# Patient Record
Sex: FEMALE | Race: WHITE | Employment: OTHER | URBAN - METROPOLITAN AREA
[De-identification: names, ages, dates, MRNs, and addresses within clinical notes are randomized per-mention and may not be internally consistent; named-entity substitution may affect disease eponyms.]

---

## 2017-01-13 ENCOUNTER — OFFICE VISIT (OUTPATIENT)
Dept: PAIN MANAGEMENT | Age: 82
End: 2017-01-13

## 2017-01-13 VITALS
SYSTOLIC BLOOD PRESSURE: 96 MMHG | DIASTOLIC BLOOD PRESSURE: 51 MMHG | HEIGHT: 65 IN | BODY MASS INDEX: 22.33 KG/M2 | HEART RATE: 68 BPM | WEIGHT: 134 LBS

## 2017-01-13 DIAGNOSIS — M47.816 LUMBAR FACET ARTHROPATHY: ICD-10-CM

## 2017-01-13 DIAGNOSIS — G89.4 CHRONIC PAIN SYNDROME: Primary | ICD-10-CM

## 2017-01-13 DIAGNOSIS — K59.03 CONSTIPATION DUE TO PAIN MEDICATION: ICD-10-CM

## 2017-01-13 DIAGNOSIS — G89.29 INSOMNIA SECONDARY TO CHRONIC PAIN: ICD-10-CM

## 2017-01-13 DIAGNOSIS — M43.16 SPONDYLOLISTHESIS OF LUMBAR REGION: ICD-10-CM

## 2017-01-13 DIAGNOSIS — M51.37 DEGENERATION OF LUMBAR OR LUMBOSACRAL INTERVERTEBRAL DISC: ICD-10-CM

## 2017-01-13 DIAGNOSIS — Z79.899 ENCOUNTER FOR LONG-TERM (CURRENT) USE OF HIGH-RISK MEDICATION: ICD-10-CM

## 2017-01-13 DIAGNOSIS — M51.36 DDD (DEGENERATIVE DISC DISEASE), LUMBAR: ICD-10-CM

## 2017-01-13 DIAGNOSIS — G47.01 INSOMNIA SECONDARY TO CHRONIC PAIN: ICD-10-CM

## 2017-01-13 RX ORDER — HYDROMORPHONE HYDROCHLORIDE 4 MG/1
TABLET ORAL
Qty: 90 TAB | Refills: 0 | Status: SHIPPED | OUTPATIENT
Start: 2017-01-13 | End: 2017-01-13 | Stop reason: SDUPTHER

## 2017-01-13 RX ORDER — HYDROMORPHONE HYDROCHLORIDE 4 MG/1
TABLET ORAL
Qty: 90 TAB | Refills: 0 | Status: ON HOLD | OUTPATIENT
Start: 2017-01-13 | End: 2017-04-21 | Stop reason: SDUPTHER

## 2017-01-13 NOTE — PATIENT INSTRUCTIONS
1.  Continue medications as prescribed  2. Follow up in two months  3.   Please r/s bone density (ordered several months ago)

## 2017-01-13 NOTE — PROGRESS NOTES
HISTORY OF PRESENT ILLNESS  Cesar Noe is a 80 y.o. female. HPI  The patient presents today for follow up of chronic  the lumbar spine and lower extremities distally attributed to lumbar spondylosis, degenerative disc disease, radiculopathy, and diabetic peripheral neuropathy. The patient denies any significant changes since last f/u. She tolerates medications without side effects. Cesar Noe reports no change in sleep or constipation. She takes trazodone for sleep at night. No cpap. She is not currently taking anything for constipation. The patient reports 50% relief with current medications. She continues to have significant low back pain that is achy and throbbing. She describes her pain in her legs as constant and cramping. Nothing in particular makes the leg pain worse or better. She did not have bone density done which was ordered several month ago. She is scheduled to see Dr. Tricia Menard soon. She is tolerating the dose of dilaudid and has been managing her pain. She reports the increase has been beneficial at tolerating her pain level. UDS 10/20/16 reviewed and consistent      Review of Systems   Constitutional: Positive for malaise/fatigue. Negative for chills, fever and weight loss (gain). HENT: Positive for congestion. Eyes: Negative. Reading glasses  Vision loss left eye (sees Dr. Grey Sommer q 3 months). Respiratory: Positive for shortness of breath (occasional/has seen pcp-has nebulizer machine prn ). Negative for cough (smoker). Cardiovascular: Negative for chest pain. Gastrointestinal: Positive for constipation. Negative for diarrhea, heartburn, nausea and vomiting. Genitourinary: Positive for dysuria (frequent UTI's  sees urology Dr. Marlene Lowery). Musculoskeletal: Positive for back pain, falls (risk/no new falls/no ED visit), joint pain (knees) and myalgias. Negative for neck pain. Skin: Negative.     Neurological: Positive for dizziness, tingling (feet) and headaches (occasional). Negative for seizures. Balance issues-saw ENT   Endo/Heme/Allergies: Positive for environmental allergies. Psychiatric/Behavioral: Positive for depression. Negative for suicidal ideas. The patient is nervous/anxious and has insomnia (takes trazodone nightly which helps). Physical Exam   Constitutional: She is oriented to person, place, and time. She appears well-developed and well-nourished. No distress (appears uncomfortable ). HENT:   Head: Normocephalic. Right Ear: External ear normal.   Left Ear: External ear normal.   Eyes: Conjunctivae and EOM are normal. Pupils are equal, round, and reactive to light. Neck: Normal range of motion. Pulmonary/Chest: Effort normal. No respiratory distress. Musculoskeletal: She exhibits tenderness (lumbar/pain radiating down legs). She exhibits no edema. Lumbar back: She exhibits decreased range of motion (painful), tenderness, pain and spasm. She exhibits no edema. Back:    Neurological: She is alert and oriented to person, place, and time. No cranial nerve deficit (grossly intact). Skin: Skin is warm and dry. Psychiatric: She has a normal mood and affect. Her behavior is normal. Judgment and thought content normal.    present   Nursing note and vitals reviewed. ASSESSMENT and PLAN  Encounter Diagnoses   Name Primary?  Chronic pain syndrome Yes    Encounter for long-term (current) use of high-risk medication     DDD (degenerative disc disease), lumbar     Spondylolisthesis of lumbar region     Lumbar facet arthropathy (HCC)     Degeneration of lumbar or lumbosacral intervertebral disc     Constipation due to pain medication     Insomnia secondary to chronic pain      Orders Placed This Encounter    DISCONTD: HYDROmorphone (DILAUDID) 4 mg tablet    HYDROmorphone (DILAUDID) 4 mg tablet      Patient Instructions   1. Continue medications as prescribed  2.   Follow up in two months  3. Please r/s bone density (ordered several months ago)      40 minutes spent in visit face to face with the patient.     >50% of time spent counseling and coordinating care

## 2017-01-13 NOTE — PROGRESS NOTES
Nursing Notes    Patient presents to the office today in follow-up. Patient rates her pain at 6/10 on the numerical pain scale. Reviewed medications with counts as follows:    Rx Date filled Qty Dispensed Pill Count Last Dose Short   Dilaudid 2 mg - medication not brought to the appt. Pt was counseled 12/09/16 per  90 25 per  today no                                 POC UDS was not performed in office today. Any new labs or imaging since last appointment? NO    Have you been to an emergency room (ER) or urgent care clinic since your last visit? NO            Have you been hospitalized since your last visit? NO     If yes, where, when, and reason for visit? Have you seen or consulted any other health care providers outside of the [address] [place] [address]  since your last visit? YES     If yes, where, when, and reason for visit? pcp    HM deferred to pcp.

## 2017-01-13 NOTE — MR AVS SNAPSHOT
Visit Information Date & Time Provider Department Dept. Phone Encounter #  
 1/13/2017  8:20 AM Kenzie [facility] [phone] Follow-up Instructions Return in about 2 months (around 3/13/2017). Your Appointments 1/26/2017  3:15 PM  
CONSULT with MD YORDY Pugh [facility] [address] [place] [address]) Appt Note: Schedule also a consult with Dr. Maria Luisa Dominguez for lumbar procedure options; consult; consult [address] [facility] [address]  
[phone]  [phone] 5/1/2017  9:05 AM  
LAB with IOC NURSE VISIT Internist of [facility] ([address] [place] [address]) Appt Note: PE lab fasting [address], [address] [facility] [address]  
  
   
 [address], [address]  
  
    
 5/8/2017  9:15 AM  
PHYSICAL with Cory Phan MD  
Internist of [address] [place] [address]) Appt Note: Physical  
 [address], [address] [facility] [address]  
  
   
 [address], [address]  
  
    
 6/7/2017  9:15 AM  
Office Visit with Jake Cantu MD  
[facility] [address] [place] [address]) Appt Note: checkup [address] [facility] [address] [address]  
[phone] [address] [place] [address]  
  
    
 9/6/2017 11:00 AM  
PROCEDURE with [facility] NONIMAGING  
[facility] (FAN SCHEDULING) Appt Note: leg art 1 yr [place] [address] [address]  
[phone] [address] [address]  
  
    
 9/19/2017 11:00 AM  
Follow Up with HETAL Cazares  
[facility] (FAN SCHEDULING) [address] [address]  
[phone]  
  
   
 [address] [address] Upcoming Health Maintenance Date Due  
 EYE EXAM RETINAL OR DILATED Q1 2/5/2015 GLAUCOMA SCREENING Q2Y 4/1/2016 MEDICARE YEARLY EXAM 12/8/2016 MICROALBUMIN Q1 4/5/2017 HEMOGLOBIN A1C Q6M 6/6/2017 LIPID PANEL Q1 12/6/2017 DTaP/Tdap/Td series (2 - Td) 6/11/2024 Allergies as of 1/13/2017  Review Complete On: 1/13/2017 By: HETAL Guevara Severity Noted Reaction Type Reactions Levaquin [Levofloxacin]  05/17/2011    Rash Current Immunizations  Reviewed on 12/13/2016 Name Date Influenza High Dose Vaccine PF 10/13/2016 Influenza Vaccine 10/15/2014 Influenza Vaccine (Quad) PF 12/8/2015 Influenza Vaccine Split 11/22/2011, 11/11/2010 11:46 AM  
 Pneumococcal Conjugate (PCV-13) 12/8/2015 Pneumococcal Vaccine (Unspecified Type) 9/20/2000 Tdap 6/11/2014 Not reviewed this visit You Were Diagnosed With   
  
 Codes Comments Chronic pain syndrome    -  Primary ICD-10-CM: G89.4 ICD-9-CM: 338.4 Encounter for long-term (current) use of high-risk medication     ICD-10-CM: Z79.899 ICD-9-CM: V58.69   
 DDD (degenerative disc disease), lumbar     ICD-10-CM: M51.36 
ICD-9-CM: 722.52 Spondylolisthesis of lumbar region     ICD-10-CM: M43.16 
ICD-9-CM: 738.4 Lumbar facet arthropathy (HCC)     ICD-10-CM: M12.88 ICD-9-CM: 721.3 Degeneration of lumbar or lumbosacral intervertebral disc     ICD-10-CM: M51.37 
ICD-9-CM: 722.52 Constipation due to pain medication     ICD-10-CM: K59.03 
ICD-9-CM: 564.09, E947.9 Insomnia secondary to chronic pain     ICD-10-CM: G89.29, G47.01 
ICD-9-CM: 338.29, 327.01 Vitals BP Pulse Height(growth percentile) Weight(growth percentile) BMI OB Status 96/51 68 5' 5\" (1.651 m) 134 lb (60.8 kg) 22.3 kg/m2 Hysterectomy Smoking Status Current Every Day Smoker Vitals History BMI and BSA Data  Body Mass Index Body Surface Area  
 22.3 kg/m 2 1.67 m 2  
  
  
 Preferred Pharmacy Pharmacy Name Phone [address] [facility], [address] [address] [place] [address] [address] Nyla Wellington Your Updated Medication List  
  
   
This list is accurate as of: 1/13/17  9:16 AM.  Always use your most recent med list.  
  
  
  
  
 albuterol 90 mcg/actuation inhaler Commonly known as:  PROVENTIL HFA, VENTOLIN HFA, PROAIR HFA Take 2 Puffs by inhalation every four (4) hours as needed for Wheezing. CENTRUM 0.4-162-18 mg Tab Generic drug:  WJ-YB-YZ-Fe-Min-Lycopen-Lutein Take 1 Tab by mouth daily. FISH OIL 1,000 mg Cap Generic drug:  omega-3 fatty acids-vitamin e Take 1 Cap by mouth.  
  
 gabapentin 300 mg capsule Commonly known as:  NEURONTIN Take 1 Cap by mouth three (3) times daily. HYDROmorphone 4 mg tablet Commonly known as:  DILAUDID  
1/2 to one tab up to three times per day prn severe pain due to fill 2/13/17 LORazepam 1 mg tablet Commonly known as:  ATIVAN Take  by mouth every four (4) hours as needed. LUMIGAN 0.01 % ophthalmic drops Generic drug:  bimatoprost  
  
 PARoxetine 20 mg tablet Commonly known as:  PAXIL Take 20 mg by mouth daily. pilocarpine hcl 0.25 % Drop Apply  to eye. RESTASIS 0.05 % ophthalmic emulsion Generic drug:  cycloSPORINE Administer 1 Drop to both eyes two (2) times a day. simvastatin 20 mg tablet Commonly known as:  ZOCOR Take 1 Tab by mouth nightly. tamsulosin 0.4 mg capsule Commonly known as:  FLOMAX Take 1 Cap by mouth daily. tiotropium bromide 1.25 mcg/actuation inhaler Commonly known as:  Porsha Catalina Take 2 Puffs by inhalation daily. traZODone 100 mg tablet Commonly known as:  Raquel Ammy Take 100 mg by mouth nightly. Patient takes one and half tabs at bedtime VITAMIN C 1,000 mg tablet Generic drug:  ascorbic acid (vitamin C) Take  by mouth. Prescriptions Printed Refills HYDROmorphone (DILAUDID) 4 mg tablet 0 Si/2 to one tab up to three times per day prn severe pain due to fill 17 Class: Print Follow-up Instructions Return in about 2 months (around 3/13/2017). Patient Instructions 1. Continue medications as prescribed 2. Follow up in two months 3. Please r/s bone density (ordered several months ago) Please provide this summary of care documentation to your next provider. Your primary care clinician is listed as Javier Gerardo. Edgar Enrique. If you have any questions after today's visit, please call [phone].

## 2017-02-06 ENCOUNTER — HOSPITAL ENCOUNTER (INPATIENT)
Age: 82
LOS: 3 days | Discharge: HOME HEALTH | DRG: 167 | End: 2017-02-10
Attending: EMERGENCY MEDICINE | Admitting: EMERGENCY MEDICINE
Payer: MEDICARE

## 2017-02-06 DIAGNOSIS — J44.1 COPD EXACERBATION (HCC): ICD-10-CM

## 2017-02-06 DIAGNOSIS — J18.9 HCAP (HEALTHCARE-ASSOCIATED PNEUMONIA): Primary | ICD-10-CM

## 2017-02-06 PROCEDURE — 99284 EMERGENCY DEPT VISIT MOD MDM: CPT

## 2017-02-06 PROCEDURE — 83880 ASSAY OF NATRIURETIC PEPTIDE: CPT | Performed by: EMERGENCY MEDICINE

## 2017-02-06 PROCEDURE — 96374 THER/PROPH/DIAG INJ IV PUSH: CPT

## 2017-02-06 PROCEDURE — 77030013140 HC MSK NEB VYRM -A

## 2017-02-06 PROCEDURE — 82550 ASSAY OF CK (CPK): CPT | Performed by: EMERGENCY MEDICINE

## 2017-02-06 PROCEDURE — 85025 COMPLETE CBC W/AUTO DIFF WBC: CPT | Performed by: EMERGENCY MEDICINE

## 2017-02-06 PROCEDURE — 80048 BASIC METABOLIC PNL TOTAL CA: CPT | Performed by: EMERGENCY MEDICINE

## 2017-02-06 PROCEDURE — 93005 ELECTROCARDIOGRAM TRACING: CPT

## 2017-02-06 PROCEDURE — 94640 AIRWAY INHALATION TREATMENT: CPT

## 2017-02-06 RX ORDER — ALBUTEROL SULFATE 0.83 MG/ML
2.5 SOLUTION RESPIRATORY (INHALATION)
Status: DISPENSED | OUTPATIENT
Start: 2017-02-06 | End: 2017-02-07

## 2017-02-06 RX ORDER — IPRATROPIUM BROMIDE AND ALBUTEROL SULFATE 2.5; .5 MG/3ML; MG/3ML
3 SOLUTION RESPIRATORY (INHALATION)
Status: COMPLETED | OUTPATIENT
Start: 2017-02-06 | End: 2017-02-07

## 2017-02-06 RX ORDER — AZITHROMYCIN 250 MG/1
500 TABLET, FILM COATED ORAL
Status: COMPLETED | OUTPATIENT
Start: 2017-02-06 | End: 2017-02-07

## 2017-02-06 NOTE — IP AVS SNAPSHOT
Current Discharge Medication List  
  
Take these medications at their scheduled times Dose & Instructions Dispensing Information Comments Morning Noon Evening Bedtime  
 amLODIPine 10 mg tablet Commonly known as:  Robert Dubon Your next dose is: Today, Tomorrow Other:  ____________ Dose:  10 mg Take 1 Tab by mouth daily. Quantity:  30 Tab Refills:  0  
     
   
   
   
  
 amoxicillin-clavulanate 875-125 mg per tablet Commonly known as:  AUGMENTIN Your next dose is: Today, Tomorrow Other:  ____________ Dose:  1 Tab Take 1 Tab by mouth every twelve (12) hours for 7 days. Quantity:  14 Tab Refills:  0  
     
   
   
   
  
 budesonide-formoterol 160-4.5 mcg/actuation HFA inhaler Commonly known as:  SYMBICORT Your next dose is: Today, Tomorrow Other:  ____________ Dose:  1 Puff Take 1 Puff by inhalation two (2) times a day. Quantity:  1 Inhaler Refills:  0 CENTRUM 0.4-162-18 mg Tab Generic drug:  VJ-OY-XI-Fe-Min-Lycopen-Lutein Your next dose is: Today, Tomorrow Other:  ____________ Dose:  1 Tab Take 1 Tab by mouth daily. Refills:  0  
     
   
   
   
  
 FISH -160-1,000 mg Cap Generic drug:  omega 3-dha-epa-fish oil Your next dose is: Today, Tomorrow Other:  ____________ Dose:  1000 mg Take 1,000 mg by mouth daily. Refills:  0  
     
   
   
   
  
 gabapentin 300 mg capsule Commonly known as:  NEURONTIN Your next dose is: Today, Tomorrow Other:  ____________ Dose:  300 mg Take 1 Cap by mouth three (3) times daily. Quantity:  270 Cap Refills:  1  
     
   
   
   
  
 metoprolol tartrate 25 mg tablet Commonly known as:  LOPRESSOR Your next dose is: Today, Tomorrow Other:  ____________ Dose:  25 mg Take 1 Tab by mouth two (2) times a day. Quantity:  60 Tab Refills:  0  
     
   
   
   
  
 nicotine 14 mg/24 hr patch Commonly known as:  [place] Your next dose is: Today, Tomorrow Other:  ____________ Dose:  1 Patch 1 Patch by TransDERmal route every twenty-four (24) hours for 30 days. Quantity:  30 Patch Refills:  0  
     
   
   
   
  
 potassium chloride 20 mEq tablet Commonly known as:  K-DUR, KLOR-CON Start taking on:  2/11/2017 Your next dose is: Today, Tomorrow Other:  ____________ Dose:  20 mEq Take 1 Tab by mouth daily for 3 days. Quantity:  3 Tab Refills:  0  
     
   
   
   
  
 RESTASIS 0.05 % ophthalmic emulsion Generic drug:  cycloSPORINE Your next dose is: Today, Tomorrow Other:  ____________ Dose:  1 Drop Administer 1 Drop to both eyes two (2) times a day. Refills:  0 Saccharomyces boulardii 250 mg capsule Commonly known as:  Fredy eLzama Your next dose is: Today, Tomorrow Other:  ____________ Dose:  250 mg Take 1 Cap by mouth two (2) times a day for 7 days. Quantity:  14 Cap Refills:  0  
     
   
   
   
  
 simvastatin 20 mg tablet Commonly known as:  ZOCOR Your next dose is: Today, Tomorrow Other:  ____________ Dose:  20 mg Take 1 Tab by mouth nightly. Quantity:  90 Tab Refills:  1  
     
   
   
   
  
 tamsulosin 0.4 mg capsule Commonly known as:  FLOMAX Your next dose is: Today, Tomorrow Other:  ____________ Dose:  0.4 mg Take 1 Cap by mouth daily. Quantity:  90 Cap Refills:  3  
     
   
   
   
  
 tiotropium bromide 1.25 mcg/actuation inhaler Commonly known as:  [place] Your next dose is: Today, Tomorrow Other:  ____________ Dose:  2 Puff Take 2 Puffs by inhalation daily. Quantity:  1 Inhaler Refills:  11  
     
   
   
   
  
 traZODone 100 mg tablet Commonly known as:  Gene Lang Your next dose is: Today, Tomorrow Other:  ____________ Dose:  100 mg Take 100 mg by mouth nightly. Patient takes one and half tabs at bedtime Refills:  0 Take these medications as needed Dose & Instructions Dispensing Information Comments Morning Noon Evening Bedtime  
 albuterol 90 mcg/actuation inhaler Commonly known as:  PROVENTIL HFA, VENTOLIN HFA, PROAIR HFA Your next dose is: Today, Tomorrow Other:  ____________ Dose:  2 Puff Take 2 Puffs by inhalation every four (4) hours as needed for Wheezing. Quantity:  1 Inhaler Refills:  5  
     
   
   
   
  
 albuterol-ipratropium 2.5 mg-0.5 mg/3 ml Nebu Commonly known as:  Camila Schaeffer Your next dose is: Today, Tomorrow Other:  ____________ Dose:  3 mL  
3 mL by Nebulization route every four (4) hours as needed. Quantity:  30 Nebule Refills:  0 LORazepam 1 mg tablet Commonly known as:  ATIVAN Your next dose is: Today, Tomorrow Other:  ____________ Dose:  0.5 mg Take 0.5 Tabs by mouth every eight (8) hours as needed. Max Daily Amount: 1.5 mg.  
 Quantity:  30 Tab Refills:  0 phenol throat spray 1.4 % spray Commonly known as:  Yuliya Handing Your next dose is: Today, Tomorrow Other:  ____________ Dose:  1 Spray Take 1 Spray by mouth as needed for Sore throat. Quantity:  1 Bottle Refills:  0 Take these medications as directed Dose & Instructions Dispensing Information Comments Morning Noon Evening Bedtime HYDROmorphone 4 mg tablet Commonly known as:  DILAUDID Your next dose is: Today, Tomorrow Other:  ____________  
   
   
 1/2 to one tab up to three times per day prn severe pain due to fill 2/13/17 Quantity:  90 Tab Refills:  0 LUMIGAN 0.01 % ophthalmic drops Generic drug:  bimatoprost  
   
Your next dose is: Today, Tomorrow Other:  ____________ Refills:  0  
     
   
   
   
  
 VITAMIN C 1,000 mg tablet Generic drug:  ascorbic acid (vitamin C) Your next dose is: Today, Tomorrow Other:  ____________ Take  by mouth. Refills:  0 Where to Get Your Medications These medications were sent to [address], [address] [facility] [address] [facility] [address], [place] [id number] Phone:  [phone] Saccharomyces boulardii 250 mg capsule Information about where to get these medications is not yet available ! Ask your nurse or doctor about these medications  
  albuterol-ipratropium 2.5 mg-0.5 mg/3 ml Nebu  
 amLODIPine 10 mg tablet  
 amoxicillin-clavulanate 875-125 mg per tablet  
 budesonide-formoterol 160-4.5 mcg/actuation HFA inhaler LORazepam 1 mg tablet  
 metoprolol tartrate 25 mg tablet  
 nicotine 14 mg/24 hr patch  
 phenol throat spray 1.4 % spray  
 potassium chloride 20 mEq tablet

## 2017-02-06 NOTE — IP AVS SNAPSHOT
[address] Patient: Donis Morin MRN: [id number] DJI:[id number] You are allergic to the following Allergen Reactions Levaquin (Levofloxacin) Rash Recent Documentation Height Weight Breastfeeding? BMI OB Status Smoking Status 1.651 m 54.7 kg No 20.05 kg/m2 Hysterectomy Current Every Day Smoker Unresulted Labs Order Current Status AFB CULTURE + SMEAR W/RFLX PCR AND ID Preliminary result AFB CULTURE + SMEAR W/RFLX PCR AND ID Preliminary result CULTURE, ANAEROBIC Preliminary result CULTURE, BLOOD Preliminary result CULTURE, BLOOD Preliminary result CULTURE, BLOOD Preliminary result CULTURE, FUNGUS Preliminary result CULTURE, FUNGUS Preliminary result CULTURE, TISSUE W GRAM STAIN Preliminary result Emergency Contacts Name Discharge Info Relation Home Work Mobile Brigitte Pan  Spouse [3] [phone]  
 ZARAZhou  Spouse [3] [phone] About your hospitalization You were admitted on:  February 7, 2017 You last received care in the[address] You were discharged on:  February 10, 2017 Unit phone number:  [phone] Why you were hospitalized Your primary diagnosis was:  Not on File Your diagnoses also included:  Hcap (Healthcare-Associated Pneumonia), Abnormal Ct Scan, Chest  
  
  
 
  
  
Providers Seen During Your Hospitalizations Provider Role Specialty Primary office phone Giselle Boykin MD Attending Provider Emergency Medicine [phone] Elva Perez MD Attending Provider Internal Medicine [phone] Your Primary Care Physician (PCP) Primary Care Physician Office Phone Office Fax Willard Mccrary [phone] Follow-up Information Follow up With Details Comments Contact Info Areli Jay MD On 2/13/2017 At 4:00pm [address] 200 [facility] [address] 
[phone] Helena Eguene MD  Office was closed an appointment is needed for 2 weeks office will open Monday Feb. 14, 2017 [address] [facility] [address] [address] 
[phone] [facility] On 2/15/2017 CT Scan outpatient At 12:30pm- Nothing by mouth, Take sips of water with medicine [address] [place] [address]. [address] Patient Maria L Yoder Brittany Lofton MD On 3/2/2017 At [address] [facility], [address] 200 [facility] [address] 
[phone] Your Appointments Monday February 13, 2017  4:00 PM EST TRANSITIONAL CARE MANAGEMENT with Areli Jay MD  
Internist of [facility] [address] [facility], [address] [place] [address] [facility] [address]  
[phone] Wednesday February 15, 2017  1:00 PM EST  
CT CHEST W WO CONT with [facility] CT RM 2 [facility] RAD CT [address]) [address] DIET RESTRICTIONS  Nothing to eat or drink 4 hours prior to study May have water to take meds  GENERAL INSTRUCTIONS  If you were given medications to take for a contrast allergy prior to having this study, please arrange to have someone drive you to your appointment. If you have had a creatinine level drawn within the past 30 days, please bring most recent results to your appt. This study does not require you to drink contrast prior to your study. MEDICATIONS  Bring a complete list of all medications you are currently taking to include prescriptions, over-the-counter meds, herbals, vitamins & any dietary supplements. OUTSIDE FILMS  Bring outside films, CDs, and reports related to the study with you on the day of your exam.  QUESTIONS  Notify the CT Department if you have any questions concerning your study. Idania Olea - [phone] [facility] Jey Lima - [phone] CHECK IN INSTRUCTIONS:  Check in to Patient Registration in the front of the hospital 30 minutes prior to your appointment. DR[facility] [address] [facility], [address] Thursday March 02, 2017  9:00 AM EST New Patient with Amelia Valencia MD  
[address] [facility]) [address]  
[phone] Tuesday March 07, 2017  3:15 PM EST  
CONSULT with Janet Pena MD  
[facility]) [address] [facility] [address]  
[phone] Friday March 10, 2017  8:40 AM EST Follow Up with HETAL Terry [facility] (FAN SCHEDULING) [address] [place] [id number] [phone] Current Discharge Medication List  
  
START taking these medications Dose & Instructions Dispensing Information Comments Morning Noon Evening Bedtime  
 albuterol-ipratropium 2.5 mg-0.5 mg/3 ml Nebu Commonly known as:  Ailyn Short Your next dose is: Today, Tomorrow Other:  _________ Dose:  3 mL  
3 mL by Nebulization route every four (4) hours as needed. Quantity:  30 Nebule Refills:  0  
     
   
   
   
  
 amLODIPine 10 mg tablet Commonly known as:  Love Breach Your next dose is: Today, Tomorrow Other:  _________ Dose:  10 mg Take 1 Tab by mouth daily. Quantity:  30 Tab Refills:  0  
     
   
   
   
  
 amoxicillin-clavulanate 875-125 mg per tablet Commonly known as:  AUGMENTIN Your next dose is: Today, Tomorrow Other:  _________ Dose:  1 Tab Take 1 Tab by mouth every twelve (12) hours for 7 days. Quantity:  14 Tab Refills:  0  
     
   
   
   
  
 budesonide-formoterol 160-4.5 mcg/actuation HFA inhaler Commonly known as:  SYMBICORT Your next dose is: Today, Tomorrow Other:  _________ Dose:  1 Puff Take 1 Puff by inhalation two (2) times a day. Quantity:  1 Inhaler Refills:  0  
     
   
   
   
  
 metoprolol tartrate 25 mg tablet Commonly known as:  LOPRESSOR Your next dose is: Today, Tomorrow Other:  _________ Dose:  25 mg Take 1 Tab by mouth two (2) times a day. Quantity:  60 Tab Refills:  0  
     
   
   
   
  
 nicotine 14 mg/24 hr patch Commonly known as:  Erskin Neither Your next dose is: Today, Tomorrow Other:  _________ Dose:  1 Patch 1 Patch by TransDERmal route every twenty-four (24) hours for 30 days. Quantity:  30 Patch Refills:  0 phenol throat spray 1.4 % spray Commonly known as:  [place] Carbine Your next dose is: Today, Tomorrow Other:  _________ Dose:  1 Spray Take 1 Spray by mouth as needed for Sore throat. Quantity:  1 Bottle Refills:  0  
     
   
   
   
  
 potassium chloride 20 mEq tablet Commonly known as:  K-DUR, KLOR-CON Start taking on:  2/11/2017 Your next dose is: Today, Tomorrow Other:  _________ Dose:  20 mEq Take 1 Tab by mouth daily for 3 days. Quantity:  3 Tab Refills:  0 Saccharomyces boulardii 250 mg capsule Commonly known as:  Fredy Lezama Your next dose is: Today, Tomorrow Other:  _________ Dose:  250 mg Take 1 Cap by mouth two (2) times a day for 7 days. Quantity:  14 Cap Refills:  0 CONTINUE these medications which have CHANGED Dose & Instructions Dispensing Information Comments Morning Noon Evening Bedtime LORazepam 1 mg tablet Commonly known as:  ATIVAN What changed:   
- how much to take - when to take this Your next dose is: Today, Tomorrow Other:  _________  Dose:  0.5 mg  
 Take 0.5 Tabs by mouth every eight (8) hours as needed. Max Daily Amount: 1.5 mg.  
 Quantity:  30 Tab Refills:  0 CONTINUE these medications which have NOT CHANGED Dose & Instructions Dispensing Information Comments Morning Noon Evening Bedtime  
 albuterol 90 mcg/actuation inhaler Commonly known as:  PROVENTIL HFA, VENTOLIN HFA, PROAIR HFA Your next dose is: Today, Tomorrow Other:  _________ Dose:  2 Puff Take 2 Puffs by inhalation every four (4) hours as needed for Wheezing. Quantity:  1 Inhaler Refills:  5 CENTRUM 0.4-162-18 mg Tab Generic drug:  CT-EI-IM-Fe-Min-Lycopen-Lutein Your next dose is: Today, Tomorrow Other:  _________ Dose:  1 Tab Take 1 Tab by mouth daily. Refills:  0  
     
   
   
   
  
 FISH -160-1,000 mg Cap Generic drug:  omega 3-dha-epa-fish oil Your next dose is: Today, Tomorrow Other:  _________ Dose:  1000 mg Take 1,000 mg by mouth daily. Refills:  0  
     
   
   
   
  
 gabapentin 300 mg capsule Commonly known as:  NEURONTIN Your next dose is: Today, Tomorrow Other:  _________ Dose:  300 mg Take 1 Cap by mouth three (3) times daily. Quantity:  270 Cap Refills:  1 HYDROmorphone 4 mg tablet Commonly known as:  DILAUDID Your next dose is: Today, Tomorrow Other:  _________  
   
   
 1/2 to one tab up to three times per day prn severe pain due to fill 2/13/17 Quantity:  90 Tab Refills:  0 LUMIGAN 0.01 % ophthalmic drops Generic drug:  bimatoprost  
   
Your next dose is: Today, Tomorrow Other:  _________ Refills:  0  
     
   
   
   
  
 RESTASIS 0.05 % ophthalmic emulsion Generic drug:  cycloSPORINE Your next dose is: Today, Tomorrow Other:  _________ Dose:  1 Drop Administer 1 Drop to both eyes two (2) times a day. Refills:  0  
     
   
   
   
  
 simvastatin 20 mg tablet Commonly known as:  ZOCOR Your next dose is: Today, Tomorrow Other:  _________ Dose:  20 mg Take 1 Tab by mouth nightly. Quantity:  90 Tab Refills:  1  
     
   
   
   
  
 tamsulosin 0.4 mg capsule Commonly known as:  FLOMAX Your next dose is: Today, Tomorrow Other:  _________ Dose:  0.4 mg Take 1 Cap by mouth daily. Quantity:  90 Cap Refills:  3  
     
   
   
   
  
 tiotropium bromide 1.25 mcg/actuation inhaler Commonly known as:  Gailen Salon Your next dose is: Today, Tomorrow Other:  _________ Dose:  2 Puff Take 2 Puffs by inhalation daily. Quantity:  1 Inhaler Refills:  11  
     
   
   
   
  
 traZODone 100 mg tablet Commonly known as:  Alden Ziegler Your next dose is: Today, Tomorrow Other:  _________ Dose:  100 mg Take 100 mg by mouth nightly. Patient takes one and half tabs at bedtime Refills:  0  
     
   
   
   
  
 VITAMIN C 1,000 mg tablet Generic drug:  ascorbic acid (vitamin C) Your next dose is: Today, Tomorrow Other:  _________ Take  by mouth. Refills:  0 STOP taking these medications FISH OIL 1,000 mg Cap Generic drug:  omega-3 fatty acids-vitamin e PARoxetine 20 mg tablet Commonly known as:  PAXIL  
   
  
 pilocarpine hcl 0.25 % Drop Where to Get Your Medications These medications were sent to [address] [place] [address], [address] [facility] [address] [facility] [address], [facility] [address] Phone:  [phone] Saccharomyces boulardii 250 mg capsule Information on where to get these meds will be given to you by the nurse or doctor. ! Ask your nurse or doctor about these medications albuterol-ipratropium 2.5 mg-0.5 mg/3 ml Nebu  
 amLODIPine 10 mg tablet  
 amoxicillin-clavulanate 875-125 mg per tablet  
 budesonide-formoterol 160-4.5 mcg/actuation HFA inhaler LORazepam 1 mg tablet  
 metoprolol tartrate 25 mg tablet  
 nicotine 14 mg/24 hr patch  
 phenol throat spray 1.4 % spray  
 potassium chloride 20 mEq tablet Discharge Instructions None Discharge Orders Procedure Order Date Status Priority Quantity Spec Type Associated Dx METABOLIC PANEL, BASIC 52/23/22 1429 Future Routine 1 Blood Comments:  On 2/14/17. Call report to PCP Reason: HTN, Hyponatremia DIET CARDIAC No options chosen 02/10/17 1429 Normal Routine 1 Questions: Additional options:  No options chosen CT CHEST W WO CONT 02/10/17 1432 Future Routine 1 Schedule Instructions:  Call report to [id number] Questions: Is Patient Allergic to Contrast Dye?:  Unknown General Information Please provide this summary of care documentation to your next provider. Patient Signature:  ____________________________________________________________ Date:  ____________________________________________________________  
  
[facility] Signature:  ____________________________________________________________ Date:  ____________________________________________________________

## 2017-02-07 ENCOUNTER — APPOINTMENT (OUTPATIENT)
Dept: CT IMAGING | Age: 82
DRG: 167 | End: 2017-02-07
Attending: INTERNAL MEDICINE
Payer: MEDICARE

## 2017-02-07 ENCOUNTER — APPOINTMENT (OUTPATIENT)
Dept: CT IMAGING | Age: 82
DRG: 167 | End: 2017-02-07
Attending: EMERGENCY MEDICINE
Payer: MEDICARE

## 2017-02-07 ENCOUNTER — ANESTHESIA EVENT (OUTPATIENT)
Dept: ENDOSCOPY | Age: 82
DRG: 167 | End: 2017-02-07
Payer: MEDICARE

## 2017-02-07 PROBLEM — J18.9 HCAP (HEALTHCARE-ASSOCIATED PNEUMONIA): Status: ACTIVE | Noted: 2017-02-07

## 2017-02-07 PROBLEM — R93.89 ABNORMAL CT SCAN, CHEST: Status: ACTIVE | Noted: 2017-02-07

## 2017-02-07 LAB
ANION GAP BLD CALC-SCNC: 10 MMOL/L (ref 3–18)
ARTERIAL PATENCY WRIST A: YES
ATRIAL RATE: 91 BPM
BASE EXCESS BLD CALC-SCNC: 7 MMOL/L
BASOPHILS # BLD AUTO: 0 K/UL (ref 0–0.1)
BASOPHILS # BLD: 0 % (ref 0–2)
BDY SITE: ABNORMAL
BNP SERPL-MCNC: 428 PG/ML (ref 0–1800)
BODY TEMPERATURE: 98.6
BUN SERPL-MCNC: 5 MG/DL (ref 7–18)
BUN/CREAT SERPL: 11 (ref 12–20)
CALCIUM SERPL-MCNC: 9.4 MG/DL (ref 8.5–10.1)
CALCULATED P AXIS, ECG09: 55 DEGREES
CALCULATED R AXIS, ECG10: -71 DEGREES
CALCULATED T AXIS, ECG11: 50 DEGREES
CHLORIDE SERPL-SCNC: 87 MMOL/L (ref 100–108)
CK MB CFR SERPL CALC: 2.6 % (ref 0–4)
CK MB CFR SERPL CALC: 2.9 % (ref 0–4)
CK MB SERPL-MCNC: 2.2 NG/ML (ref 0.5–3.6)
CK MB SERPL-MCNC: 3.7 NG/ML (ref 0.5–3.6)
CK SERPL-CCNC: 140 U/L (ref 26–192)
CK SERPL-CCNC: 76 U/L (ref 26–192)
CO2 SERPL-SCNC: 34 MMOL/L (ref 21–32)
CREAT SERPL-MCNC: 0.44 MG/DL (ref 0.6–1.3)
DIAGNOSIS, 93000: NORMAL
DIFFERENTIAL METHOD BLD: ABNORMAL
EOSINOPHIL # BLD: 0.1 K/UL (ref 0–0.4)
EOSINOPHIL NFR BLD: 1 % (ref 0–5)
ERYTHROCYTE [DISTWIDTH] IN BLOOD BY AUTOMATED COUNT: 13.6 % (ref 11.6–14.5)
GAS FLOW.O2 O2 DELIVERY SYS: ABNORMAL L/MIN
GLUCOSE SERPL-MCNC: 140 MG/DL (ref 74–99)
HCO3 BLD-SCNC: 31.7 MMOL/L (ref 22–26)
HCT VFR BLD AUTO: 37.4 % (ref 35–45)
HGB BLD-MCNC: 13.2 G/DL (ref 12–16)
IRON SATN MFR SERPL: 24 %
IRON SERPL-MCNC: 42 UG/DL (ref 50–175)
LYMPHOCYTES # BLD AUTO: 15 % (ref 21–52)
LYMPHOCYTES # BLD: 2 K/UL (ref 0.9–3.6)
MCH RBC QN AUTO: 31.8 PG (ref 24–34)
MCHC RBC AUTO-ENTMCNC: 35.3 G/DL (ref 31–37)
MCV RBC AUTO: 90.1 FL (ref 74–97)
MONOCYTES # BLD: 0.9 K/UL (ref 0.05–1.2)
MONOCYTES NFR BLD AUTO: 7 % (ref 3–10)
NEUTS SEG # BLD: 9.8 K/UL (ref 1.8–8)
NEUTS SEG NFR BLD AUTO: 77 % (ref 40–73)
O2/TOTAL GAS SETTING VFR VENT: 21 %
OSMOLALITY SERPL: 284 MOSM/KG H2O (ref 280–300)
P-R INTERVAL, ECG05: 120 MS
PCO2 BLD: 43.9 MMHG (ref 35–45)
PH BLD: 7.47 [PH] (ref 7.35–7.45)
PLATELET # BLD AUTO: 279 K/UL (ref 135–420)
PMV BLD AUTO: 9.9 FL (ref 9.2–11.8)
PO2 BLD: 53 MMHG (ref 80–100)
POTASSIUM SERPL-SCNC: 2.7 MMOL/L (ref 3.5–5.5)
Q-T INTERVAL, ECG07: 420 MS
QRS DURATION, ECG06: 114 MS
QTC CALCULATION (BEZET), ECG08: 516 MS
RBC # BLD AUTO: 4.15 M/UL (ref 4.2–5.3)
SAO2 % BLD: 89 % (ref 92–97)
SERVICE CMNT-IMP: ABNORMAL
SODIUM SERPL-SCNC: 131 MMOL/L (ref 136–145)
SPECIMEN TYPE: ABNORMAL
TIBC SERPL-MCNC: 174 UG/DL (ref 250–450)
TOTAL RESP. RATE, ITRR: 18
TROPONIN I SERPL-MCNC: 0.02 NG/ML (ref 0–0.04)
TROPONIN I SERPL-MCNC: 0.02 NG/ML (ref 0–0.04)
TSH SERPL DL<=0.05 MIU/L-ACNC: 0.42 UIU/ML (ref 0.36–3.74)
VENTRICULAR RATE, ECG03: 91 BPM
WBC # BLD AUTO: 12.8 K/UL (ref 4.6–13.2)

## 2017-02-07 PROCEDURE — 86038 ANTINUCLEAR ANTIBODIES: CPT | Performed by: INTERNAL MEDICINE

## 2017-02-07 PROCEDURE — 83930 ASSAY OF BLOOD OSMOLALITY: CPT | Performed by: INTERNAL MEDICINE

## 2017-02-07 PROCEDURE — 87077 CULTURE AEROBIC IDENTIFY: CPT | Performed by: EMERGENCY MEDICINE

## 2017-02-07 PROCEDURE — 86738 MYCOPLASMA ANTIBODY: CPT | Performed by: INTERNAL MEDICINE

## 2017-02-07 PROCEDURE — 74011636320 HC RX REV CODE- 636/320: Performed by: EMERGENCY MEDICINE

## 2017-02-07 PROCEDURE — 71275 CT ANGIOGRAPHY CHEST: CPT

## 2017-02-07 PROCEDURE — 74011250637 HC RX REV CODE- 250/637: Performed by: EMERGENCY MEDICINE

## 2017-02-07 PROCEDURE — 36600 WITHDRAWAL OF ARTERIAL BLOOD: CPT

## 2017-02-07 PROCEDURE — 36415 COLL VENOUS BLD VENIPUNCTURE: CPT | Performed by: INTERNAL MEDICINE

## 2017-02-07 PROCEDURE — 94640 AIRWAY INHALATION TREATMENT: CPT

## 2017-02-07 PROCEDURE — 87186 SC STD MICRODIL/AGAR DIL: CPT | Performed by: EMERGENCY MEDICINE

## 2017-02-07 PROCEDURE — 74011250637 HC RX REV CODE- 250/637: Performed by: INTERNAL MEDICINE

## 2017-02-07 PROCEDURE — 82803 BLOOD GASES ANY COMBINATION: CPT

## 2017-02-07 PROCEDURE — 93306 TTE W/DOPPLER COMPLETE: CPT

## 2017-02-07 PROCEDURE — 87040 BLOOD CULTURE FOR BACTERIA: CPT | Performed by: EMERGENCY MEDICINE

## 2017-02-07 PROCEDURE — 74011250636 HC RX REV CODE- 250/636: Performed by: INTERNAL MEDICINE

## 2017-02-07 PROCEDURE — 74176 CT ABD & PELVIS W/O CONTRAST: CPT

## 2017-02-07 PROCEDURE — 65660000000 HC RM CCU STEPDOWN

## 2017-02-07 PROCEDURE — 74011000250 HC RX REV CODE- 250: Performed by: INTERNAL MEDICINE

## 2017-02-07 PROCEDURE — 74011000258 HC RX REV CODE- 258: Performed by: EMERGENCY MEDICINE

## 2017-02-07 PROCEDURE — 92610 EVALUATE SWALLOWING FUNCTION: CPT

## 2017-02-07 PROCEDURE — 74011250636 HC RX REV CODE- 250/636: Performed by: EMERGENCY MEDICINE

## 2017-02-07 PROCEDURE — 83540 ASSAY OF IRON: CPT | Performed by: INTERNAL MEDICINE

## 2017-02-07 PROCEDURE — 86431 RHEUMATOID FACTOR QUANT: CPT | Performed by: INTERNAL MEDICINE

## 2017-02-07 PROCEDURE — 84443 ASSAY THYROID STIM HORMONE: CPT | Performed by: INTERNAL MEDICINE

## 2017-02-07 PROCEDURE — 74011000250 HC RX REV CODE- 250: Performed by: EMERGENCY MEDICINE

## 2017-02-07 PROCEDURE — 74011000258 HC RX REV CODE- 258: Performed by: INTERNAL MEDICINE

## 2017-02-07 PROCEDURE — 86430 RHEUMATOID FACTOR TEST QUAL: CPT | Performed by: INTERNAL MEDICINE

## 2017-02-07 RX ORDER — ADHESIVE BANDAGE
30 BANDAGE TOPICAL DAILY PRN
Status: DISCONTINUED | OUTPATIENT
Start: 2017-02-07 | End: 2017-02-10 | Stop reason: HOSPADM

## 2017-02-07 RX ORDER — CLONIDINE HYDROCHLORIDE 0.1 MG/1
0.1 TABLET ORAL
Status: DISCONTINUED | OUTPATIENT
Start: 2017-02-07 | End: 2017-02-10 | Stop reason: HOSPADM

## 2017-02-07 RX ORDER — SIMVASTATIN 20 MG/1
20 TABLET, FILM COATED ORAL
Status: DISCONTINUED | OUTPATIENT
Start: 2017-02-07 | End: 2017-02-10 | Stop reason: HOSPADM

## 2017-02-07 RX ORDER — HYDROMORPHONE HYDROCHLORIDE 2 MG/1
2 TABLET ORAL
Status: DISCONTINUED | OUTPATIENT
Start: 2017-02-07 | End: 2017-02-10 | Stop reason: HOSPADM

## 2017-02-07 RX ORDER — POTASSIUM CHLORIDE 20 MEQ/1
40 TABLET, EXTENDED RELEASE ORAL
Status: COMPLETED | OUTPATIENT
Start: 2017-02-07 | End: 2017-02-07

## 2017-02-07 RX ORDER — TRAZODONE HYDROCHLORIDE 50 MG/1
50 TABLET ORAL
Status: DISCONTINUED | OUTPATIENT
Start: 2017-02-07 | End: 2017-02-10 | Stop reason: HOSPADM

## 2017-02-07 RX ORDER — TAMSULOSIN HYDROCHLORIDE 0.4 MG/1
0.4 CAPSULE ORAL DAILY
Status: DISCONTINUED | OUTPATIENT
Start: 2017-02-07 | End: 2017-02-10 | Stop reason: HOSPADM

## 2017-02-07 RX ORDER — CYCLOSPORINE 0.5 MG/ML
1 EMULSION OPHTHALMIC 2 TIMES DAILY
Status: DISCONTINUED | OUTPATIENT
Start: 2017-02-07 | End: 2017-02-10 | Stop reason: HOSPADM

## 2017-02-07 RX ORDER — LORAZEPAM 0.5 MG/1
0.5 TABLET ORAL
Status: DISCONTINUED | OUTPATIENT
Start: 2017-02-07 | End: 2017-02-10 | Stop reason: HOSPADM

## 2017-02-07 RX ORDER — ACETAMINOPHEN 325 MG/1
650 TABLET ORAL
Status: DISCONTINUED | OUTPATIENT
Start: 2017-02-07 | End: 2017-02-10 | Stop reason: HOSPADM

## 2017-02-07 RX ORDER — ASCORBIC ACID 250 MG
500 TABLET ORAL DAILY
Status: DISCONTINUED | OUTPATIENT
Start: 2017-02-07 | End: 2017-02-10 | Stop reason: HOSPADM

## 2017-02-07 RX ORDER — SODIUM CHLORIDE 0.9 % (FLUSH) 0.9 %
5-10 SYRINGE (ML) INJECTION EVERY 8 HOURS
Status: DISCONTINUED | OUTPATIENT
Start: 2017-02-07 | End: 2017-02-10 | Stop reason: HOSPADM

## 2017-02-07 RX ORDER — GABAPENTIN 300 MG/1
300 CAPSULE ORAL 3 TIMES DAILY
Status: DISCONTINUED | OUTPATIENT
Start: 2017-02-07 | End: 2017-02-10 | Stop reason: HOSPADM

## 2017-02-07 RX ORDER — SODIUM CHLORIDE 0.9 % (FLUSH) 0.9 %
5-10 SYRINGE (ML) INJECTION AS NEEDED
Status: DISCONTINUED | OUTPATIENT
Start: 2017-02-07 | End: 2017-02-10 | Stop reason: HOSPADM

## 2017-02-07 RX ORDER — POTASSIUM CHLORIDE 20 MEQ/1
40 TABLET, EXTENDED RELEASE ORAL EVERY 4 HOURS
Status: COMPLETED | OUTPATIENT
Start: 2017-02-07 | End: 2017-02-07

## 2017-02-07 RX ORDER — IPRATROPIUM BROMIDE AND ALBUTEROL SULFATE 2.5; .5 MG/3ML; MG/3ML
3 SOLUTION RESPIRATORY (INHALATION)
Status: DISCONTINUED | OUTPATIENT
Start: 2017-02-07 | End: 2017-02-08

## 2017-02-07 RX ORDER — BUDESONIDE 0.5 MG/2ML
500 INHALANT ORAL
Status: DISCONTINUED | OUTPATIENT
Start: 2017-02-07 | End: 2017-02-10

## 2017-02-07 RX ORDER — HEPARIN SODIUM 5000 [USP'U]/ML
5000 INJECTION, SOLUTION INTRAVENOUS; SUBCUTANEOUS EVERY 8 HOURS
Status: DISCONTINUED | OUTPATIENT
Start: 2017-02-07 | End: 2017-02-10 | Stop reason: HOSPADM

## 2017-02-07 RX ORDER — NALOXONE HYDROCHLORIDE 0.4 MG/ML
0.4 INJECTION, SOLUTION INTRAMUSCULAR; INTRAVENOUS; SUBCUTANEOUS AS NEEDED
Status: DISCONTINUED | OUTPATIENT
Start: 2017-02-07 | End: 2017-02-10 | Stop reason: HOSPADM

## 2017-02-07 RX ORDER — METOPROLOL TARTRATE 25 MG/1
12.5 TABLET, FILM COATED ORAL 2 TIMES DAILY
Status: DISCONTINUED | OUTPATIENT
Start: 2017-02-07 | End: 2017-02-09

## 2017-02-07 RX ORDER — ONDANSETRON 2 MG/ML
4 INJECTION INTRAMUSCULAR; INTRAVENOUS
Status: DISCONTINUED | OUTPATIENT
Start: 2017-02-07 | End: 2017-02-10 | Stop reason: HOSPADM

## 2017-02-07 RX ORDER — CEFTRIAXONE 1 G/1
1 INJECTION, POWDER, FOR SOLUTION INTRAMUSCULAR; INTRAVENOUS
Status: DISCONTINUED | OUTPATIENT
Start: 2017-02-07 | End: 2017-02-07

## 2017-02-07 RX ORDER — IBUPROFEN 200 MG
1 TABLET ORAL EVERY 24 HOURS
Status: DISCONTINUED | OUTPATIENT
Start: 2017-02-07 | End: 2017-02-10 | Stop reason: HOSPADM

## 2017-02-07 RX ADMIN — METHYLPREDNISOLONE SODIUM SUCCINATE 125 MG: 125 INJECTION, POWDER, FOR SOLUTION INTRAMUSCULAR; INTRAVENOUS at 01:39

## 2017-02-07 RX ADMIN — GUAIFENESIN 600 MG: 600 TABLET, EXTENDED RELEASE ORAL at 13:20

## 2017-02-07 RX ADMIN — POTASSIUM CHLORIDE 40 MEQ: 1500 TABLET, EXTENDED RELEASE ORAL at 13:21

## 2017-02-07 RX ADMIN — CEFTRIAXONE 1 G: 1 INJECTION, POWDER, FOR SOLUTION INTRAMUSCULAR; INTRAVENOUS at 09:13

## 2017-02-07 RX ADMIN — DIATRIZOATE MEGLUMINE AND DIATRIZOATE SODIUM 30 ML: 600; 100 SOLUTION ORAL; RECTAL at 15:25

## 2017-02-07 RX ADMIN — CYCLOSPORINE 1 DROP: 0.5 EMULSION OPHTHALMIC at 18:00

## 2017-02-07 RX ADMIN — TRAZODONE HYDROCHLORIDE 50 MG: 50 TABLET ORAL at 22:14

## 2017-02-07 RX ADMIN — IPRATROPIUM BROMIDE AND ALBUTEROL SULFATE 3 ML: .5; 3 SOLUTION RESPIRATORY (INHALATION) at 16:41

## 2017-02-07 RX ADMIN — HEPARIN SODIUM 5000 UNITS: 5000 INJECTION, SOLUTION INTRAVENOUS; SUBCUTANEOUS at 20:44

## 2017-02-07 RX ADMIN — GUAIFENESIN 600 MG: 600 TABLET, EXTENDED RELEASE ORAL at 20:47

## 2017-02-07 RX ADMIN — Medication 500 MG: at 13:20

## 2017-02-07 RX ADMIN — Medication 10 ML: at 22:00

## 2017-02-07 RX ADMIN — LACTULOSE 13.33 G: 20 SOLUTION ORAL at 16:46

## 2017-02-07 RX ADMIN — IPRATROPIUM BROMIDE AND ALBUTEROL SULFATE 3 ML: .5; 3 SOLUTION RESPIRATORY (INHALATION) at 20:16

## 2017-02-07 RX ADMIN — GABAPENTIN 300 MG: 300 CAPSULE ORAL at 16:46

## 2017-02-07 RX ADMIN — AZITHROMYCIN 500 MG: 250 TABLET, FILM COATED ORAL at 01:27

## 2017-02-07 RX ADMIN — Medication 10 ML: at 14:47

## 2017-02-07 RX ADMIN — SIMVASTATIN 20 MG: 20 TABLET, FILM COATED ORAL at 22:14

## 2017-02-07 RX ADMIN — HEPARIN SODIUM 5000 UNITS: 5000 INJECTION, SOLUTION INTRAVENOUS; SUBCUTANEOUS at 13:19

## 2017-02-07 RX ADMIN — METOPROLOL TARTRATE 12.5 MG: 25 TABLET ORAL at 18:34

## 2017-02-07 RX ADMIN — METOPROLOL TARTRATE 12.5 MG: 25 TABLET ORAL at 13:20

## 2017-02-07 RX ADMIN — LACTULOSE 13.33 G: 20 SOLUTION ORAL at 22:00

## 2017-02-07 RX ADMIN — IPRATROPIUM BROMIDE AND ALBUTEROL SULFATE 3 ML: .5; 3 SOLUTION RESPIRATORY (INHALATION) at 11:58

## 2017-02-07 RX ADMIN — POTASSIUM CHLORIDE: 2 INJECTION, SOLUTION, CONCENTRATE INTRAVENOUS at 15:28

## 2017-02-07 RX ADMIN — MULTIPLE VITAMINS W/ MINERALS TAB 1 TABLET: TAB at 13:20

## 2017-02-07 RX ADMIN — IOPAMIDOL 100 ML: 755 INJECTION, SOLUTION INTRAVENOUS at 02:40

## 2017-02-07 RX ADMIN — TAMSULOSIN HYDROCHLORIDE 0.4 MG: 0.4 CAPSULE ORAL at 13:20

## 2017-02-07 RX ADMIN — IPRATROPIUM BROMIDE AND ALBUTEROL SULFATE 3 ML: .5; 3 SOLUTION RESPIRATORY (INHALATION) at 01:43

## 2017-02-07 RX ADMIN — POTASSIUM CHLORIDE 40 MEQ: 1500 TABLET, EXTENDED RELEASE ORAL at 16:46

## 2017-02-07 RX ADMIN — POTASSIUM CHLORIDE 40 MEQ: 1500 TABLET, EXTENDED RELEASE ORAL at 01:25

## 2017-02-07 RX ADMIN — POTASSIUM CHLORIDE: 2 INJECTION, SOLUTION, CONCENTRATE INTRAVENOUS at 14:45

## 2017-02-07 RX ADMIN — BIMATOPROST 1 DROP: 0.1 SOLUTION/ DROPS OPHTHALMIC at 18:34

## 2017-02-07 RX ADMIN — BUDESONIDE 500 MCG: 0.5 INHALANT RESPIRATORY (INHALATION) at 20:16

## 2017-02-07 RX ADMIN — CLONIDINE HYDROCHLORIDE 0.1 MG: 0.1 TABLET ORAL at 16:46

## 2017-02-07 RX ADMIN — POTASSIUM CHLORIDE: 2 INJECTION, SOLUTION, CONCENTRATE INTRAVENOUS at 16:48

## 2017-02-07 RX ADMIN — GABAPENTIN 300 MG: 300 CAPSULE ORAL at 22:00

## 2017-02-07 NOTE — H&P
[address] [facility]  ROUTINE H AND PS    Name:  Sourav Matta  MR#:  [id number]  :  1933  Account #:  [de-identified]  Date of Adm:  2017      PRIMARY CARE PHYSICIAN: Dr. El Hu. CHIEF COMPLAINT: Shortness of breath and dry cough. HISTORY OF PRESENT ILLNESS: This is an 80-year-old female  patient with the below-mentioned medical problem, presented to the  emergency room earlier for further evaluation of worsening shortness  of breath and dry cough for the last 3 weeks. The patient told me she  also had right-sided chest pain associated with cough and shortness of  breath earlier today when she came to the emergency room. In the  emergency room, she had a CTA chest done, which was negative for  PE, but showed multifocal and multilobar small pulmonary masses. The patient states that her appetite has been low and she has had  some night sweats. Her weight has been stable so far, though. No  headache or dizziness. Denies any runny nose or watery eyes. Dry  cough present. No sore throat. No problems swallowing. She has had  dyspnea on exertion and some orthopnea per the patient. Denies any  nausea, vomiting, or abdominal pain. However, she has history of  constipation. Denies any burning sensation while peeing. Denies any  fever or chills at this point. She has had chronic pain and tingling,  numbness in both lower extremities. REVIEW OF SYSTEMS  As described above, otherwise 10-point review of system is negative. ALLERGIES: LEVAQUIN. PAST MEDICAL HISTORY:  1. COPD. 2. Degenerative disk disease. 3. Degenerative lumbar spine disease. 4. Diabetes. 5. Dyslipidemia. 6. Hypertension, but not on medication. 7. Restless leg syndrome. 8. Hemorrhoids. 9. Sciatica. 10. History of syncope. 11. History of recurrent urinary tract infection. 12. Osteoarthritis. PAST SURGICAL HISTORY:  1. Polypectomy. 2. Appendectomy. 3. Hysterectomy. 4. Cholecystectomy.   5. Colonoscopy, last one in 2011 showed a tubular adenoma. FAMILY HISTORY: Sister had colon cancer. Brother had stomach  cancer, another brother had heart problem. SOCIAL HISTORY: She is . Smokes 1 pack a day for 45 years. Denies for any alcohol or illegal drug use. Walks with a cane. HOME MEDICATIONS: She does not remember all of the medication. Her daughter is at bedside and they say whatever we have on the  chart is what she is taking. As per the medical record, she has been  on:  1. Albuterol inhaler 2 puffs every 4 hours p.r.n. for shortness of breath. 2. Vitamin C 1000 mg daily. 3. Restasis 0.05% one drop both eyes b.i.d.  4. Neurontin 300 mg 3 times a day. 5. Dilaudid 4 mg 1/2 tablet 3 times a day as needed for pain. 6. Ativan 1 mg every 4 hours as needed. 7. Lumigan 0.01% in both eyes daily. 8. Multivitamin 1 tablet daily. 9. Fish oil 1 capsule daily. 10. Paxil 20 mg daily, but the patient denies taking it. 11. Zocor 20 mg daily. 12. Flomax 0.4 mg p.o. daily. 13. Spiriva 1 capsule inhaler daily. 14. Trazodone 100 mg p.o. daily. PHYSICAL EXAMINATION  GENERAL: The patient is awake, alert, oriented x3, not in acute  distress. VITAL SIGNS: Temperature 97.8, pulse rate 97, respiratory rate 16,  blood pressure 185/83, saturating 92% on room air. HEENT: Atraumatic, normocephalic. Oral mucosa moist. No pallor. NECK: No JVD. Trachea is midline. CHEST: No chest tenderness. LUNGS: Diminished breath sounds bilaterally as well as rhonchi  present, no wheezes. CARDIOVASCULAR: S1, S2 heard. ABDOMEN: Soft, nontender. Bowel sounds present. EXTREMITIES: No pitting pedal edema. NEUROLOGIC: He moves all 4 extremities very well. SKIN: No active skin rash or decubitus ulcer. NEUROLOGICAL: Follows commands appropriately. Moves all  extremities very well. LABORATORY DATA: WBC 12.8, hemoglobin 13.2, platelet of 861.   Sodium 131, potassium 2.7, chloride 87, bicarbonate 34, anion gap of  10, glucose 140, BUN of 5, creatinine of 0.4. One set of cardiac  enzymes is negative. BNP is 428. CTA chest with and without contrast  negative for PE, showed multifocal and multilobar abnormalities of the  lung and it is hard to rule out soft tissue density mass. ASSESSMENT:  1. Shortness of breath and dry cough, most likely secondary to #2 and  #3.  2. Acute bronchitis with atelectasis. 3. Abnormal CT chest showing soft tissue masses. 4. Hypertension. 5. Hypokalemia. 6. Hyponatremia. 7. Poor appetite. 8. Moderate protein-calorie malnutrition. 9. Right-sided chest pain, currently resolved, most likely  musculoskeletal, rule out acute coronary syndrome. 10. History of dyslipidemia. 11. Chronic obstructive pulmonary disease. 12. Lifetime smoker. 13. Chronic pain syndrome. ASSESSMENT AND PLAN: The patient will be admitted to the  telemetry floor. We will start her on Zosyn at this point. We will wait for  blood culture. We will do echocardiogram. Because the patient is  having history of constipation and family history of colon cancer, we  will go ahead and do CT abdomen and pelvis to look for if these  pulmonary masses are not due to metastasis. Pulmonary has been  consulted. Will check thyroid panel. Will do a basic workup for  hyponatremia. We will continue her statin for dyslipidemia. We will do 2  sets of cardiac enzymes, EKG x1. We will put her on a small dose of  beta blocker and p.r.n. clonidine for hypertension. Will continue some  of her home medication at different low-dose. Will check iron profile. If  the patient's blood culture remains negative and she starts feeling  better, she may be able to go home on Augmentin. CODE STATUS: SHE WISHES TO BE A FULL CODE. TOTAL TIME: Greater than 65 minutes. MD SUZANNA Miller / Navin Palomo  D:  02/07/2017   11:07  T:  02/07/2017   11:39  Job #:  [id number]    Dr. Pita See.

## 2017-02-07 NOTE — PROGRESS NOTES
Left message with Jared Gaitan for nurse to give PO contrast for CT abd/pelvis with oral contrast only

## 2017-02-07 NOTE — CONSULTS
Pulmonology Consult    Subjective:   Consult Note: 2/7/2017 11:43 AM    This patient has been seen and evaluated at the request of Dr. Shannon Parkinson. Patient is a 80 y.o. female admitted with cough and shortness of breath. More than 70 pack years of smoking and continues to smoke. Has been losing weight and appetite  C/o shortness of breath on exertion and cough for past 2 weeks.  Has never had any pft's in the past or seen a pulmonologist      Past Medical History   Diagnosis Date    Colon polyps     COPD 8-30-02    DDD (degenerative disc disease), lumbar 10/1/2014    Degeneration of lumbar or lumbosacral intervertebral disc     Depression     Diabetes ([facility] 7-19-05    Diverticulosis     DM neuropathy, painful ([facility] 10/1/2014    MOORE (Dyspnea on Exertion) 4-19-02     echo: +mild LVH, AG04-95% w/ diastolic dysfxn    Essential hypertension, benign     Glaucoma     Hemorrhoids 6-6-06    HX OTHER MEDICAL      breathing problems    Hypercholesterolemia 4-2008     TSH 1.9     Hyperlipidemia 5/17/2011    Hypertension 4-16-02    LBP (low back pain) 4-13-04    Low back pain radiating to both legs 10/1/2014    Lumbago     Lumbar disc herniation 10/1/2014    Lumbar facet arthropathy ([facility] [address] [facility] 10/1/2014    Lumbosacral radiculopathy at L5 10/1/2014    Lumbosacral radiculopathy at S1 10/1/2014    Microscopic hematuria     OA (osteoarthritis)     Other and unspecified hyperlipidemia     Overactive bladder 5/17/2011    RLS (restless legs syndrome) 1/17/2013    Sciatica     Shortness of breath     Spinal stenosis, lumbar region, without neurogenic claudication     Spondylolisthesis of lumbar region 10/1/2014    Spondylolisthesis, grade 1 10/1/2014    Stress urinary incontinence     Syncope      -MRI brain    Syncope and collapse     Urinary tract infection, site not specified      Past Surgical History   Procedure Laterality Date    Hx polypectomy      Hx appendectomy      Hx hysterectomy       (+)DUB    Hx cholecystectomy      Pr colonoscopy flx dx w/collj spec when pfrmd  6-16-06     normal, Dr Elliott Reyes    Pr colonoscopy flx dx w/collj spec when pfrmd       (+)polyp= tubular adenoma      Family History   Problem Relation Age of Onset    Colon Cancer Sister     Heart Disease Brother     Cancer Brother      stomach CA    Seizures Son     Depression Daughter     Colon Cancer Maternal Aunt      Social History   Substance Use Topics    Smoking status: Current Every Day Smoker     Packs/day: 1.00     Years: 45.00     Types: Cigarettes    Smokeless tobacco: Never Used    Alcohol use No      Current Facility-Administered Medications   Medication Dose Route Frequency Provider Last Rate Last Dose    ascorbic acid (vitamin C) (VITAMIN C) tablet 500 mg  500 mg Oral DAILY Marissa Arias MD        cycloSPORINE (RESTASIS) 0.05 % ophthalmic emulsion 1 Drop  1 Drop Both Eyes BID Marissa Arias MD        gabapentin (NEURONTIN) capsule 300 mg  300 mg Oral TID Marissa Arias MD        HYDROmorphone (DILAUDID) tablet 2 mg  2 mg Oral Q6H PRN Andry Elkins MD        LORazepam (ATIVAN) tablet 0.5 mg  0.5 mg Oral Q6H PRN Andry Elkins MD        bimatoprost (LUMIGAN) 0.01 % ophthalmic drops 1 Drop  1 Drop Both Eyes QPM Marissa Arias MD        multivitamin, tx-iron-ca-min (THERA-M w/ IRON) tablet 1 Tab  1 Tab Oral DAILY Marissa Arias MD        . PHARMACY TO SUBSTITUTE PER PROTOCOL    Per Protocol Marissa Arias MD        simvastatin (ZOCOR) tablet 20 mg  20 mg Oral QHS Marissa Arias MD        traZODone (DESYREL) tablet 50 mg  50 mg Oral QHS Marissa Arias MD        tamsulosin [facility]) capsule 0.4 mg  0.4 mg Oral DAILY Andry Elkins MD        sodium chloride (NS) flush 5-10 mL  5-10 mL IntraVENous Q8H Marissa Arias MD        sodium chloride (NS) flush 5-10 mL  5-10 mL IntraVENous PRN Marissa Arias MD        acetaminophen (TYLENOL) tablet 650 mg  650 mg Oral Q4H PRN Brayden Camarillo MD        naloxone [facility]) injection 0.4 mg  0.4 mg IntraVENous PRN Brayden Camarillo MD        ondansetron [facility]) injection 4 mg  4 mg IntraVENous Q4H PRN Brayden Camarillo MD        magnesium hydroxide (MILK OF MAGNESIA) 400 mg/5 mL oral suspension 30 mL  30 mL Oral DAILY PRN Brayden Camarillo MD        nicotine (NICODERM CQ) 14 mg/24 hr patch 1 Patch  1 Patch TransDERmal Q24H Brayden Camarillo MD        heparin (porcine) injection 5,000 Units  5,000 Units SubCUTAneous Q8H Brayden Camarillo MD        albuterol-ipratropium (DUO-NEB) 2.5 MG-0.5 MG/3 ML  3 mL Nebulization Q4H RT Brayden Camarillo MD        lactulose [facility]) solution 13.33 g  20 mL Oral TID Brayden Camarillo MD        guaiFENesin SR [facility]) tablet 600 mg  600 mg Oral Q12H Brayden Camarillo MD        potassium chloride 10 mEq, lidocaine (PF) (XYLOCAINE) 10 mg/mL (1 %) 1 mL in 0.9% sodium chloride 100 mL IVPB   IntraVENous Q1H Brayden Camarillo MD        potassium chloride (K-DUR, KLOR-CON) SR tablet 40 mEq  40 mEq Oral Q4H Andry Sargent MD        metoprolol tartrate (LOPRESSOR) tablet 12.5 mg  12.5 mg Oral BID Brayden Camarillo MD        cloNIDine HCl (CATAPRES) tablet 0.1 mg  0.1 mg Oral Q8H PRN Andry Sargent MD        budesonide (PULMICORT) 500 mcg/2 ml nebulizer suspension  500 mcg Nebulization BID RT Devan Cordoba MD            Allergies   Allergen Reactions    Levaquin [Levofloxacin] Rash       Review of Systems:  Constitutional: positive for fatigue, malaise and anorexia  Eyes: negative for visual disturbance  Ears, nose, mouth, throat, and face: negative for nasal congestion  Respiratory: positive for cough or sputum  Cardiovascular: negative for chest pain, chest pressure/discomfort  Gastrointestinal: negative for change in bowel habits  Genitourinary:negative for frequency and urinary incontinence  Hematologic/lymphatic: negative for easy bruising, bleeding, lymphadenopathy and petechiae  Musculoskeletal:positive for muscle weakness  Neurological: negative for headaches  Behavioral/Psych: negative for aggressive behavior  Endocrine: negative for fertility problems and temperature intolerance  Allergic/Immunologic: negative for urticaria and angioedema    Objective:     Blood pressure 182/87, pulse 93, temperature 97.5 °F (36.4 °C), resp. rate 18, height 5' 5\" (1.651 m), weight 55 kg (121 lb 3.2 oz), SpO2 93 %, not currently breastfeeding. Temp (24hrs), Av °F (36.7 °C), Min:97.5 °F (36.4 °C), Max:98.6 °F (37 °C)      Intake and Output:  Current Shift:  07 -  1900  In: 240 [P.O.:240]  Out: -   Last 3 Shifts:      Physical Exam:   Visit Vitals    /87 (BP 1 Location: Right arm, BP Patient Position: At rest)    Pulse 93    Temp 97.5 °F (36.4 °C)    Resp 18    Ht 5' 5\" (1.651 m)    Wt 55 kg (121 lb 3.2 oz)    SpO2 93%    Breastfeeding No    BMI 20.17 kg/m2     General:  Alert, cooperative, no distress, appears stated age. Thin built, appears weak and tired   Head:  Normocephalic, without obvious abnormality, atraumatic. Eyes:  Conjunctivae/corneas clear. PERRL, EOMs intact. Fundi benign. Ears:  Normal TMs and external ear canals both ears. Nose: Nares normal. Septum midline. Mucosa normal. No drainage or sinus tenderness. Throat: Lips, mucosa, and tongue normal. Teeth and gums normal.   Neck: Supple, symmetrical, trachea midline, no adenopathy, thyroid: no enlargement/tenderness/nodules, no carotid bruit and no JVD. Back:   Symmetric, no curvature. ROM normal. No CVA tenderness. Lungs:   Clear to auscultation bilaterally. Chest wall:  No tenderness or deformity. Heart:  Regular rate and rhythm, S1, S2 normal, no murmur, click, rub or gallop. Breast Exam:  No tenderness, masses, or nipple abnormality. Abdomen:   Soft, non-tender. Bowel sounds normal. No masses,  No organomegaly.    Genitalia:  Normal female without lesion, discharge or tenderness. Rectal:  Normal tone,  no masses or tenderness  Guaiac negative stool. Extremities: Extremities normal, atraumatic, no cyanosis or edema. Pulses: 2+ and symmetric all extremities. Skin: Skin color, texture, turgor normal. No rashes or lesions. Lymph nodes: Cervical, supraclavicular, and axillary nodes normal.   Neurologic: CNII-XII intact. Normal strength, sensation and reflexes throughout.        Additional comments:None    Lab/Data Review:  BMP:   Lab Results   Component Value Date/Time     (L) 02/06/2017 11:35 PM    K 2.7 (LL) 02/06/2017 11:35 PM    CL 87 (L) 02/06/2017 11:35 PM    CO2 34 (H) 02/06/2017 11:35 PM    AGAP 10 02/06/2017 11:35 PM     (H) 02/06/2017 11:35 PM    BUN 5 (L) 02/06/2017 11:35 PM    CREA 0.44 (L) 02/06/2017 11:35 PM    GFRAA >60 02/06/2017 11:35 PM    GFRNA >60 02/06/2017 11:35 PM     CBC:   Lab Results   Component Value Date/Time    WBC 12.8 02/06/2017 11:35 PM    HGB 13.2 02/06/2017 11:35 PM    HCT 37.4 02/06/2017 11:35 PM     02/06/2017 11:35 PM       Assessment:     Active Problems:    HCAP (healthcare-associated pneumonia) (2/7/2017)      Abnormal CT scan, chest (2/7/2017)        Plan:     Clinically appears to have significant copd  Reviewed ct chest : enlarging right hilar lymph node   R/o malignancy in view of loss of weight and appetite  D/w family about EBUS TBNA of the right hilar lymph node  Called OR for scheduling : block time for EBUS is on Fridays  Might do it Friday or earlier if time is provided   In the meantime agree to watch off abx  Neb rx  Iv steroids  acapella  Sputum for gram stain and culture    Spent more than 1 hour in the evaluation and management of this patient    EBUS scheduled for 10 am on 2/8/17

## 2017-02-07 NOTE — PROGRESS NOTES
Complete 2D echocardiogram study was performed on patient. Report to follow. Patient went back to room with armband still in place.  Echo done by ASHLIE Perrin

## 2017-02-07 NOTE — PROGRESS NOTES
conducted an initial consultation and Spiritual Assessment for Briseyda Chapa, who is a 80 y.o.,female. Patients Primary Language is: [place].    According to the patients EMR Adventist Affiliation is: [facility].     The reason the Patient came to the hospital is:   Patient Active Problem List    Diagnosis Date Noted    HCAP (healthcare-associated pneumonia) 02/07/2017    Abnormal CT scan, chest 02/07/2017    Panlobular emphysema ([address] 12/13/2016    Impaired fasting glucose 12/13/2016    Essential hypertension with goal blood pressure less than 140/90 08/15/2016    Hyperlipidemia LDL goal <100 04/12/2016    Primary osteoarthritis involving multiple joints 04/12/2016    Prediabetes 04/12/2016    Restless leg syndrome 04/12/2016    MOORE (dyspnea on exertion) 09/09/2015    SOB (shortness of breath) 09/09/2015    Murmur, cardiac 09/09/2015    Carotid artery stenosis 08/18/2015    Atherosclerosis of native arteries of the extremities with intermittent claudication 08/18/2015    Acute exacerbation of chronic obstructive pulmonary disease (COPD) ([address] 08/11/2015    Diabetic polyneuropathy associated with type 2 diabetes mellitus ([address] 08/11/2015    Low back pain radiating to both legs 10/01/2014    DDD (degenerative disc disease), lumbar 10/01/2014    Spondylolisthesis of lumbar region 10/01/2014    Spondylolisthesis, grade 1 10/01/2014    Lumbar facet arthropathy ([address] 10/01/2014    Lumbar disc herniation 10/01/2014    Lumbosacral radiculopathy at L5 10/01/2014    Lumbosacral radiculopathy at S1 10/01/2014    DM neuropathy, painful ([address] 10/01/2014    Thoracic or lumbosacral neuritis or radiculitis, unspecified 07/14/2014    Spinal stenosis in cervical region 10/11/2013    Polyneuropathy 10/11/2013    Lumbar stenosis 10/11/2013    High risk medication use 10/11/2013    Ulnar neuropathy at elbow 10/11/2013    Chronic pain syndrome 09/16/2013    Lumbosacral spondylosis without myelopathy 09/16/2013    Cervical stenosis of spinal canal 09/16/2013    Repeated falls 08/07/2013    Ataxic gait 07/22/2013    Chronic neck pain 07/22/2013    Orthostatic hypotension 07/22/2013    Paresthesia 07/22/2013    Aortic dissection, abdominal (HCC) 02/05/2013    RLS (restless legs syndrome) 01/17/2013    Glucose intolerance (pre-diabetes) 05/24/2012    Unspecified vitamin D deficiency 04/04/2012    Depression 09/20/2011    Sciatica     Degeneration of lumbar or lumbosacral intervertebral disc     Encounter for long-term (current) use of other medications     Hyperlipidemia 05/17/2011    Overactive bladder 05/17/2011        The  provided the following Interventions:  Initiated a relationship of care and support. Listened empathically. Offered prayer and assurance of continued prayers on patient's behalf. Chart reviewed. The following outcomes where achieved:  Patient processed feeling about current hospitalization. Patient expressed gratitude for 's visit. Assessment:  Patient does not have any Voodoo/cultural needs that will affect patients preferences in health care. There are no spiritual or Voodoo issues which require intervention at this time. Plan:  Chaplains will continue to follow and will provide pastoral care on an as needed/requested basis.  recommends bedside caregivers page  on duty if patient shows signs of acute spiritual or emotional distress.       [address] [facility] [place]   [phone]

## 2017-02-07 NOTE — ED NOTES
Late Entry. Patient has been assisted up to bedside commode multiple times throughout the night. Patient was assisted up and back to bed with naseem-care given and clean adult diaper applied. Bed linen has been changed when needed. Administration of Rocephin was delayed due to need for blood cultures to be drawn.

## 2017-02-07 NOTE — ED NOTES
Patient being transported to [address] [place] [address] via stretcher and on monitor. Transportation assisting Nurse from [address] [place] [address] in transport.

## 2017-02-07 NOTE — PROGRESS NOTES
Respiratory Care Assessment for Bronchial hygiene or Lung Expansion Therapy  Patient  Aliza Meo     80 y.o.   female     2/7/2017  12:05 PM  Patient Active Problem List   Diagnosis Code    Hyperlipidemia E78.5    Overactive bladder N32.81    Sciatica M54.30    Degeneration of lumbar or lumbosacral intervertebral disc M51.37    Encounter for long-term (current) use of other medications Z79.899    Depression F32.9    Unspecified vitamin D deficiency E55.9    Glucose intolerance (pre-diabetes) R73.03    RLS (restless legs syndrome) G25.81    Aortic dissection, abdominal ([address] I71.02    Ataxic gait R26.0    Chronic neck pain M54.2, G89.29    Orthostatic hypotension I95.1    Paresthesia R20.2    Repeated falls R29.6    Chronic pain syndrome G89.4    Lumbosacral spondylosis without myelopathy M47.817    Cervical stenosis of spinal canal M48.02    Spinal stenosis in cervical region M48.02    Polyneuropathy G62.9    Lumbar stenosis M48.06    High risk medication use Z79.899    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    Thoracic or lumbosacral neuritis or radiculitis, unspecified [id number]    Low back pain radiating to both legs M54.5    DDD (degenerative disc disease), lumbar M51.36    Spondylolisthesis of lumbar region M43.16    Spondylolisthesis, grade 1 M43.10    Lumbar facet arthropathy (HCC) M12.88    Lumbar disc herniation M51.26    Lumbosacral radiculopathy at L5 M54.17    Lumbosacral radiculopathy at S1 M54.17    DM neuropathy, painful ([facility] E11.40    Acute exacerbation of chronic obstructive pulmonary disease (COPD) ([facility]) J44.1    Diabetic polyneuropathy associated with type 2 diabetes mellitus (HCC) E11.42    Carotid artery stenosis I65.29    Atherosclerosis of native arteries of the extremities with intermittent claudication I70.219    MOORE (dyspnea on exertion) R06.09    SOB (shortness of breath) R06.02    Murmur, cardiac R01.1    Hyperlipidemia LDL goal <100 E78.5    Primary osteoarthritis involving multiple joints M15.0    Prediabetes R73.03    Restless leg syndrome G25.81    Essential hypertension with goal blood pressure less than 140/90 I10    Panlobular emphysema (HCC) J43.1    Impaired fasting glucose R73.01    HCAP (healthcare-associated pneumonia) J18.9    Abnormal CT scan, chest R93.8       ABG:  Date:2/7/2017  Lab Results   Component Value Date/Time    PHI 7.466 (H) 02/07/2017 12:31 AM    PCO2I 43.9 02/07/2017 12:31 AM    PO2I 53 (L) 02/07/2017 12:31 AM    HCO3I 31.7 (H) 02/07/2017 12:31 AM    FIO2I 21 02/07/2017 12:31 AM       Chest X-ray:  Date:2/7/2017    Results from Hospital Encounter encounter on 08/31/15   XR CHEST PA LAT   Narrative CHEST    CPT code: 54373    History: Cough, shortness of breath. Findings:     PA and lateral views of the chest demonstrate clear lungs and sharp pleural  margins. There is flattening of the diaphragms, bilaterally. The  cardiomediastinal silhouette is normal.  The bones and soft tissues are  unremarkable. There is no significant change from 2 June 2014. Impression IMPRESSION:    No acute pulmonic disease; possible chronic obstructive changes.           Lab Test:  Date:2/7/2017  WBC:   Lab Results   Component Value Date/Time    WBC 12.8 02/06/2017 11:35 PM   HGB: Lab Results   Component Value Date/Time    HGB 13.2 02/06/2017 11:35 PM    PLTS: Lab Results   Component Value Date/Time    PLATELET 672 71/71/0195 11:35 PM       SaO2%/flow:   SpO2 Readings from Last 1 Encounters:   02/07/17 93%       Vital Signs:   Patient Vitals for the past 8 hrs:   Temp Pulse Resp BP SpO2   02/07/17 1126 97.5 °F (36.4 °C) 93 18 182/87 93 %   02/07/17 0945 97.8 °F (36.6 °C) 97 16 185/83 92 %   02/07/17 0845 - 94 16 - 94 %   02/07/17 0840 - 95 18 (!) 123/96 94 %   02/07/17 0830 - 94 18 - 92 %   02/07/17 0815 - 88 17 - 92 %   02/07/17 0800 - (!) 102 22 198/88 93 %   02/07/17 0724 - 94 22 165/87 96 %   02/07/17 0640 - 92 19 176/71 - 02/07/17 0600 - 93 20 191/84 -         RA/O2 flow/device: RA      First Inital Assesment:     [x]Yes []No   Reevaluation/Reassessment:    []Yes []No     CHART REVIEW   Points 0 X 1 X 2 X 3 X 4 X Points   Pulmonary History Smoking History (1) none  Recent Smoking History <1 PPD  Recent Smoking History >1 PPD  Pulmonary Disease or Impairment  Severe Pulmonary Disease  1   Surgical History No Surgery  General Surgery  Lower Abdominal  Thoracic or Upper Abdominal  Thoracic & Pulmonary Disease  0   CXR Clear or not indicated  Chronic changes or CXR Pending  Infiltrate, atelectasis or pleural effusions  Infiltrates in more than 1lobe  Infiltrates +atelectasis  +/or pleural effusions  2     PATIENT ASSESSMENT    0 X 1 X 2 X 3 X 4 X Points   Respiratory Pattern Regular pattern RR 12-20  Increased RR 21-27   Mild Dyspnea at rest, irregular pattern RR 28-32  Moderate Dyspnea at rest, Use of accessory muscles, RR 33-36  Severe Dyspnea, Use of accessory muscles RR >36  0   Mental Status Alert Oriented cooperative  Confused, Follows commands  Lethargic, Does not follow commands  Obtunded  Unresponsive  0   Breath Sounds Clear  Decreased Unilaterally  Decreased Bilaterally  Mild Scattered wheezing or Crackles in bases  Severe Wheezing or rhonchi  0   Cough Strong dry NPC  Strong Productive  Weak NPC  Weak productive or weak with rhonchi  No cough or may require suctioning  0   Level of Activity Ambulatory  Ambulatory with assistance  Temporarily Non-ambulatory  Non-Ambulatory, able to position self  Unable to position self, confined to bed  1   Total Points/Score:   4     Specific Intervention Chart  Bronchial Hygiene/Secretion Clearance:    []EZPAP []Rotation bed with vibration    []CPT with percussor []CPT via vest   []Oscillastiang positive pressure expiratory device      Lung Expansion:    []Incentive Spirometer w/RT visits []Incentive Spirometer w/nursing    []EZPAP      *Suctioning:    []Nasal Tracheal []Tracheal *suctioning will be ordered and done PRN with an associated frequency such as QID/PRN based on score       Other:    Care Plan   Level # Score Modality Frequency Comment   Level 1 >17      Level 2 14-17      Level 3 10-13      Level 4 1-9 pep tid      BRONCHIAL HYGIENE SCORING AND FREQUENCY GUIDELINES   Frequency Indications/Findings Level #   Q4 ATC Copious secretions, SOB, unable to sleep 1   QID & PRN at night Moderate amounts of secretions 2   TID or Q6 wa Small amounts of secretions and poor cough: recent history of secretions 3   BID or Q8 wa Unable to deep breathe and cough effectively 4        Comments:     Respiratory Therapist: Olaf Bond, RT

## 2017-02-07 NOTE — PROGRESS NOTES
0945:  Received pt from ED via stretcher into [facility] room #205. Settled into bed. Telemetry applied. Complete admit assessment and database performed. Daughter, Cal Tom at side. Oriented to room and plan of care. Family states 4 prong chrome colored cane was in ED, not transferred to [facility] w/ this cane;  on phone, he did not take cane home; ED called, Shyanne Ledezma RN will look for pt's cane; pt and family notified of cane status. Discussed smoking cessation w/ patient; she verbalizes understanding all info. 1000:  \"I'm so hungry. Can I eat? \"  No admit orders noted. Hospitalist paged. 1030:  Hospitalist repaged to inquire about admit orders/ diet. 1530:  Oral contrast started now that pt returned to unit from echocardiogram testing. 1645:  Able to take 1/2 oral contrast only. CT dept called, informed of same, they will send for pt for testing. 1845:  Returned to room s/p CT abdomen. Dinner served. Multiple family at side. No distress. 1930:  Status without acute change noted. Bedside and Verbal shift change report given to [facility] (oncoming nurse) by Arina Snyder RN   (offgoing nurse). Report included the following information SBAR, Kardex, ED Summary, Intake/Output, MAR, Accordion, Recent Results, Med Rec Status, Cardiac Rhythm NSR. and Alarm Parameters .

## 2017-02-07 NOTE — PROGRESS NOTES
TRANSFER - IN REPORT:    Verbal report received from MIGDALIA LozaRN(name) on Cesar Noe  being received from ED(unit) for routine progression of care      Report consisted of patients Situation, Background, Assessment and   Recommendations(SBAR). Information from the following report(s) ED Summary, MAR, Recent Results and Cardiac Rhythm NSR. was reviewed with the receiving nurse. Opportunity for questions and clarification was provided. Assessment completed upon patients arrival to unit and care assumed.

## 2017-02-07 NOTE — PROGRESS NOTES
Problem: Dysphagia (Adult)  Goal: *Acute Goals and Plan of Care (Insert Text)  Patient will:  1. Tolerate PO trials with 0 s/s overt distress in 4/5 trials  2. Utilize compensatory swallow strategies/maneuvers (decrease bite/sip, size/rate, alt. liq/sol) with min cues in 4/5 trials  3. Perform oral-motor/laryngeal exercises to increase oropharyngeal swallow function with min cues  4. Complete an objective swallow study (i.e., MBSS) to assess swallow integrity, r/o aspiration, and determine of safest LRD, min A as indicated/ordered by MD     Recommend:   Reg with thin liquids  Meds as tolerated  Aspiration precautions  HOB >45 degrees during all intake and for at least 30 min after po   Small bites/sips, slow rate of intake, alternating bites/sips  Oral care post meals   [address] EVALUATION     Patient: Dolly Marsh (40 y.o. female)  Date: 2/7/2017  Primary Diagnosis: HCAP (healthcare-associated pneumonia)  Abnormal CT scan, chest  dx  Procedure(s) (LRB):  ENDOSCOPIC BRONCHOSCOPY ULTRASOUND (EBUS) (N/A)     Precautions: aspiration         ASSESSMENT :  Pt was seen for a bedside swallow evaluation with multiple family members present. Pt A&0x4, following commands and denying dysphagia. Cursory OME revealed oral motor structures functional for mastication and deglutition. Pt tolerating thin + straw and solid consistencies across all po trials. Pt demo adequate A-P transit and timely swallow reflex during po intake. Education on safe swallowing compensatory strategies completed and pt  demo verbal understanding. Rec regular diet with thin liquids, medications as tolerated, and utilizing compensatory strategies i.e., alternating solids/liquids, small bites and sips, HOB at ~45 degrees during post po intakes for 30 minutes. ST will follow x1-2 visits to ensure diet tolerance. Patient will benefit from skilled intervention to address the above impairments.   Patients rehabilitation potential is considered to be Good  Factors which may influence rehabilitation potential include:   [ ]            None noted  [ ]            Mental ability/status  [X]            Medical condition  [ ]            Home/family situation and support systems  [ ]            Safety awareness  [ ]            Pain tolerance/management  [ ]            Other:        PLAN :  Recommendations and Planned Interventions: Reg and thin   Frequency/Duration: Patient will be followed by speech-language pathology 1-2 times per day/3-7 days per week to address goals. Discharge Recommendations: To Be Determined       SUBJECTIVE:   Patient stated I am starving.       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    MOORE (Dyspnea on Exertion) 4-19-02       echo: +mild LVH, QM94-15% w/ diastolic dysfxn    Essential hypertension, benign      Glaucoma      Hemorrhoids 6-6-06    HX OTHER MEDICAL         breathing problems    Hypercholesterolemia 4-2008       TSH 1.9     Hyperlipidemia 5/17/2011    Hypertension 4-16-02    LBP (low back pain) 4-13-04    Low back pain radiating to both legs 10/1/2014    Lumbago      Lumbar disc herniation 10/1/2014    Lumbar facet arthropathy ([facility] [address] 10/1/2014    Lumbosacral radiculopathy at L5 10/1/2014    Lumbosacral radiculopathy at S1 10/1/2014    Microscopic hematuria      OA (osteoarthritis)      Other and unspecified hyperlipidemia      Overactive bladder 5/17/2011    RLS (restless legs syndrome) 1/17/2013    Sciatica      Shortness of breath      Spinal stenosis, lumbar region, without neurogenic claudication      Spondylolisthesis of lumbar region 10/1/2014    Spondylolisthesis, grade 1 10/1/2014    Stress urinary incontinence      Syncope        -MRI brain    Syncope and collapse      Urinary tract infection, site not specified       Past Surgical History   Procedure Laterality Date    Hx polypectomy        Hx appendectomy        Hx hysterectomy           (+)DUB    Hx cholecystectomy       Pr colonoscopy flx dx w/collj spec when pfrmd   6-16-06       normal, Dr Angie Queen Pr colonoscopy flx dx w/collj spec when pfrmd          (+)polyp= tubular adenoma     Prior Level of Function/Home Situation: home  Home Situation  Home Environment: Private residence  # Steps to Enter: 3  One/Two Story Residence: One story  Living Alone: No  Support Systems: Spouse/Significant Other/Partner, Family member(s)  Patient Expects to be Discharged to[de-identified] Private residence  Current DME Used/Available at Home: Cane, quad, Safety frame toliet  Diet prior to admission: reg and thin             Current Diet:  Reg and thin    Cognitive and Communication Status:  Neurologic State: Alert  Orientation Level: Oriented to person  Cognition: Appropriate decision making, Follows commands  Oral Assessment:  Oral Assessment  Labial: No impairment  Dentition: Natural  Oral Hygiene: good  Lingual: No impairment  Velum: No impairment  Mandible: No impairment  P.O. Trials:  Patient Position: ~45 at [facility]  Vocal quality prior to P.O.: No impairment  Consistency Presented: Solid; Thin liquid  How Presented: SLP-fed/presented;Straw  Bolus Acceptance: No impairment  Propulsion: No impairment  Oral Residue: None  Initiation of Swallow: No impairment  Laryngeal Elevation: Functional  Aspiration Signs/Symptoms: None  Pharyngeal Phase Characteristics: No impairment, issues, or problems   Effective Modifications: None  Cues for Modifications: None  Oral Phase Severity: No impairment  Pharyngeal Phase Severity : No impairment  GCODESwallowing:  Swallow Current Status CI= 1-19%   Swallow Goal Status CI= 1-19% The severity rating is based on the following outcomes:             Clinical Judgment     Pain:  Pt c/o 0/10 pain prior to evaluation. Pt c/o 0/10 pain post evaluation. After treatment:   [ ]            Patient left in no apparent distress sitting up in chair  [ ]            Patient left in no apparent distress in bed  [X]            Call bell left within reach  [ ]            Nursing notified  [ ]            Caregiver present  [ ]            Bed alarm activated      COMMUNICATION/EDUCATION:   [X]            SLP educated pt with regard to compensatory swallow strategies and                  aspiration/reflux precautions including: small bites/sips,                  alternate liquids/solids, decrease feeding rate, HOB > 45 with all po, and upright in bed at 30 degrees after po for at least 45 minutes. [X]            Patient/family have participated as able in goal setting and plan of care. [ ]            Patient/family agree to work toward stated goals and plan of care. [ ]            Patient understands intent and goals of therapy; neutral about participation. [ ]            Patient is unable to participate in goal setting and plan of care. Thank you for this referral.  Lynnette Madrid.  Lobo Jeffery SLP Student     [facility] [address] [id number] [facility]  Speech-Language Pathologist

## 2017-02-07 NOTE — ED PROVIDER NOTES
HPI Comments: 10:27 PM Karen Collier is a 80 y.o. Female smoker with a hx of a hysterectomy, appendectomy, cholecystectomy, hypercholesterolemia, HTN, COPD, diabetes, and osteoarthritis who presents to the ED for further evaluation of shortness of breath that started a couple of days ago. The pt's daughter reports associated right sided chest pain that started upon arrival to the ED. The patient has had a cold with associated cough and congestion for the past two weeks. She was seen at [facility] today where she had blood work, a chest x-ray, and an EKG. The x-ray showed no pneumonia and her daughter reports that the blood work showed an elevated white blood cell count and the EKG showed a bundle branch block. She was sent to the ED for further evaluation. She was given a breathing treatment at [facility], but she is still feeling short of breath. The pt's daughter denies leg swelling, fever, pt complaints of abdominal pain, and any further complaints. Pt refused to provide history, history provided by her daughter. The history is provided by a relative and the patient.         Past Medical History:   Diagnosis Date    Colon polyps     COPD 8-30-02    DDD (degenerative disc disease), lumbar 10/1/2014    Degeneration of lumbar or lumbosacral intervertebral disc     Depression     Diabetes ([address] 7-19-05    Diverticulosis     DM neuropathy, painful ([address] 10/1/2014    MOORE (Dyspnea on Exertion) 4-19-02     echo: +mild LVH, AG55-86% w/ diastolic dysfxn    Essential hypertension, benign     Glaucoma     Hemorrhoids 6-6-06    HX OTHER MEDICAL      breathing problems    Hypercholesterolemia 4-2008     TSH 1.9     Hyperlipidemia 5/17/2011    Hypertension 4-16-02    LBP (low back pain) 4-13-04    Low back pain radiating to both legs 10/1/2014    Lumbago     Lumbar disc herniation 10/1/2014    Lumbar facet arthropathy ([address] 10/1/2014    Lumbosacral radiculopathy at L5 10/1/2014    Lumbosacral radiculopathy at S1 10/1/2014    Microscopic hematuria     OA (osteoarthritis)     Other and unspecified hyperlipidemia     Overactive bladder 5/17/2011    RLS (restless legs syndrome) 1/17/2013    Sciatica     Shortness of breath     Spinal stenosis, lumbar region, without neurogenic claudication     Spondylolisthesis of lumbar region 10/1/2014    Spondylolisthesis, grade 1 10/1/2014    Stress urinary incontinence     Syncope      -MRI brain    Syncope and collapse     Urinary tract infection, site not specified        Past Surgical History:   Procedure Laterality Date    Hx polypectomy      Hx appendectomy      Hx hysterectomy       (+)DUB    Hx cholecystectomy      Pr colonoscopy flx dx w/collj spec when pfrmd  6-16-06     normal, Dr Shanta Britt    Pr colonoscopy flx dx w/collj spec when pfrmd       (+)polyp= tubular adenoma         Family History:   Problem Relation Age of Onset    Colon Cancer Sister     Heart Disease Brother     Cancer Brother      stomach CA    Seizures Son     Depression Daughter     Colon Cancer Maternal Aunt        Social History     Social History    Marital status:      Spouse name: N/A    Number of children: N/A    Years of education: N/A     Occupational History    Not on file. Social History Main Topics    Smoking status: Current Every Day Smoker     Packs/day: 1.00     Years: 45.00     Types: Cigarettes    Smokeless tobacco: Never Used    Alcohol use No    Drug use: No    Sexual activity: Not Currently     Other Topics Concern    Not on file     Social History Narrative         ALLERGIES: Levaquin [levofloxacin]    Review of Systems   Constitutional: Negative. Negative for activity change and appetite change. HENT: Positive for congestion. Negative for ear discharge, ear pain, facial swelling, nosebleeds, postnasal drip, sinus pressure, sneezing and tinnitus.     Eyes: Negative for pain, discharge, redness and visual disturbance. Respiratory: Positive for cough and shortness of breath. Negative for apnea, choking, chest tightness, wheezing and stridor. Cardiovascular: Positive for chest pain. Negative for leg swelling. Gastrointestinal: Negative for abdominal distention, abdominal pain, anal bleeding, blood in stool, constipation, diarrhea, nausea and vomiting. Genitourinary: Negative for decreased urine volume, difficulty urinating, dyspareunia, dysuria, flank pain, frequency, hematuria, pelvic pain, urgency, vaginal bleeding and vaginal discharge. Musculoskeletal: Negative for arthralgias, back pain, gait problem, joint swelling, myalgias, neck pain and neck stiffness. Skin: Negative for color change. Neurological: Negative for dizziness, tremors, seizures, speech difficulty, weakness, numbness and headaches. Hematological: Negative for adenopathy. Does not bruise/bleed easily. Psychiatric/Behavioral: Negative for agitation, dysphoric mood and self-injury. The patient is not nervous/anxious. Vitals:    02/06/17 2037 02/06/17 2312   BP: 185/76 197/87   Pulse: 90    Resp: 18    Temp: 98.6 °F (37 °C)    SpO2: 93% 96%   Weight: 118.7 kg (261 lb 11 oz)    Height: 5' 5\" (1.651 m)             Physical Exam   Constitutional: She is oriented to person, place, and time. She appears well-developed and well-nourished. HENT:   Head: Normocephalic and atraumatic. Right Ear: External ear normal.   Left Ear: External ear normal.   Nose: Nose normal.   Mouth/Throat: Oropharynx is clear and moist.   Eyes: Conjunctivae and EOM are normal. Pupils are equal, round, and reactive to light. Neck: Normal range of motion. Neck supple. Cardiovascular: Regular rhythm, normal heart sounds and intact distal pulses. Pulmonary/Chest: Effort normal. No respiratory distress. She has wheezes (2+ bilaterally). She has no rales. She exhibits no tenderness. Abdominal: Soft.  Bowel sounds are normal. She exhibits no distension and no mass. There is no tenderness. There is no rebound and no guarding. Musculoskeletal: Normal range of motion. She exhibits no edema. Neurological: She is alert and oriented to person, place, and time. Skin: Skin is warm and dry. No rash noted. No erythema. Psychiatric: She has a normal mood and affect. Her behavior is normal. Judgment normal.   Nursing note and vitals reviewed. MDM  Number of Diagnoses or Management Options  Diagnosis management comments: Patient is an 80year old female presenting with dyspnea. Known COPD. [facility], sent here for further evaluation and treatment.         Amount and/or Complexity of Data Reviewed  Clinical lab tests: ordered    Risk of Complications, Morbidity, and/or Mortality  Presenting problems: moderate      ED Course       Procedures    Vitals:  Patient Vitals for the past 12 hrs:   Temp Pulse Resp BP SpO2   02/06/17 2312 - - - 197/87 96 %   02/06/17 2037 98.6 °F (37 °C) 90 18 185/76 93 %   Pulse ox reviewed      Medications ordered:   Medications   albuterol (PROVENTIL VENTOLIN) nebulizer solution 2.5 mg (not administered)   cefTRIAXone (ROCEPHIN) injection 1 g (not administered)   albuterol-ipratropium (DUO-NEB) 2.5 MG-0.5 MG/3 ML (3 mL Nebulization Given 2/7/17 0143)   methylPREDNISolone (PF) (SOLU-MEDROL) injection 125 mg (125 mg IntraVENous Given 2/7/17 0139)   azithromycin (ZITHROMAX) tablet 500 mg (500 mg Oral Given 2/7/17 0127)   potassium chloride (K-DUR, KLOR-CON) SR tablet 40 mEq (40 mEq Oral Given 2/7/17 0125)   iopamidol (ISOVUE-370) 76 % injection  mL (100 mL IntraVENous Given 2/7/17 0240)         Lab findings:  Recent Results (from the past 12 hour(s))   EKG, 12 LEAD, INITIAL    Collection Time: 02/06/17  9:25 PM   Result Value Ref Range    Ventricular Rate 91 BPM    Atrial Rate 91 BPM    P-R Interval 120 ms    QRS Duration 114 ms    Q-T Interval 420 ms    QTC Calculation (Bezet) 516 ms    Calculated P Axis 55 degrees    Calculated R Axis -71 degrees    Calculated T Axis 50 degrees    Diagnosis       Normal sinus rhythm  Possible Left atrial enlargement  Right bundle branch block  Left anterior fascicular block  Bifascicular block  Abnormal ECG  When compared with ECG of 08-JUL-2010 12:45,  (RBBB and left anterior fascicular block) is now present  Criteria for Septal infarct are no longer present     CBC WITH AUTOMATED DIFF    Collection Time: 02/06/17 11:35 PM   Result Value Ref Range    WBC 12.8 4.6 - 13.2 K/uL    RBC 4.15 (L) 4.20 - 5.30 M/uL    HGB 13.2 12.0 - 16.0 g/dL    HCT 37.4 35.0 - 45.0 %    MCV 90.1 74.0 - 97.0 FL    MCH 31.8 24.0 - 34.0 PG    MCHC 35.3 31.0 - 37.0 g/dL    RDW 13.6 11.6 - 14.5 %    PLATELET 842 682 - 373 K/uL    MPV 9.9 9.2 - 11.8 FL    NEUTROPHILS 77 (H) 40 - 73 %    LYMPHOCYTES 15 (L) 21 - 52 %    MONOCYTES 7 3 - 10 %    EOSINOPHILS 1 0 - 5 %    BASOPHILS 0 0 - 2 %    ABS. NEUTROPHILS 9.8 (H) 1.8 - 8.0 K/UL    ABS. LYMPHOCYTES 2.0 0.9 - 3.6 K/UL    ABS. MONOCYTES 0.9 0.05 - 1.2 K/UL    ABS. EOSINOPHILS 0.1 0.0 - 0.4 K/UL    ABS.  BASOPHILS 0.0 0.0 - 0.1 K/UL    DF AUTOMATED     METABOLIC PANEL, BASIC    Collection Time: 02/06/17 11:35 PM   Result Value Ref Range    Sodium 131 (L) 136 - 145 mmol/L    Potassium 2.7 (LL) 3.5 - 5.5 mmol/L    Chloride 87 (L) 100 - 108 mmol/L    CO2 34 (H) 21 - 32 mmol/L    Anion gap 10 3.0 - 18 mmol/L    Glucose 140 (H) 74 - 99 mg/dL    BUN 5 (L) 7.0 - 18 MG/DL    Creatinine 0.44 (L) 0.6 - 1.3 MG/DL    BUN/Creatinine ratio 11 (L) 12 - 20      GFR est AA >60 >60 ml/min/1.73m2    GFR est non-AA >60 >60 ml/min/1.73m2    Calcium 9.4 8.5 - 10.1 MG/DL   CARDIAC PANEL,(CK, CKMB & TROPONIN)    Collection Time: 02/06/17 11:35 PM   Result Value Ref Range    CK 76 26 - 192 U/L    CK - MB 2.2 0.5 - 3.6 ng/ml    CK-MB Index 2.9 0.0 - 4.0 %    Troponin-I, Qt. 0.02 0.0 - 0.045 NG/ML   PRO-BNP    Collection Time: 02/06/17 11:35 PM   Result Value Ref Range    NT pro- 0 - 1800 PG/ML   POC G3    Collection Time: 02/07/17 12:31 AM   Result Value Ref Range    Device: ROOM AIR      FIO2 (POC) 21 %    pH (POC) 7.466 (H) 7.35 - 7.45      pCO2 (POC) 43.9 35.0 - 45.0 MMHG    pO2 (POC) 53 (L) 80 - 100 MMHG    HCO3 (POC) 31.7 (H) 22 - 26 MMOL/L    sO2 (POC) 89 (L) 92 - 97 %    Base excess (POC) 7 mmol/L    Allens test (POC) YES      Total resp. rate 18      Site LEFT RADIAL      Patient temp. 98.6      Specimen type (POC) ARTERIAL      Performed by Tanna Barahona        EKG interpretation by ED Physician:  [id number]: Normal sinus rhythm, rate of 91 BPM, possible left atrial enlargement, RBBB, left anterior fascicular block, bifascicular block, no STEMI per Dr. Nicholas Coleman, CT or other radiology findings or impressions:  CTA CHEST W WO CONT   Final Result   No evidence of PE. Multifocal and multilobar abnormalities of the lungs ranging from clustered  fuzzy nodularity to groundglass infiltrates to solid infiltrate or atelectasis  vs. soft tissue density mass. New findings from 2009. Likely infectious or  inflammatory. Follow-up recommended in 3 months to document resolution. Per Dr. Mercado Space notes, Consult notes or additional Procedure notes:   Consult:  Discussed care with Dr. Nam Zepeda, Specialty: Hospitalist  Standard discussion; including history of patients chief complaint, available diagnostic results, and treatment course. He requests a CTA chest with and without contrast. He would like to be informed about the result prior to admission  1:42 AM, 2/6/2017     5:15 AM Discussed with Dr. Nam Zepeda. He agrees on admission. He requests that the pt be given a dose of Rocephin. The pt has a long standing hx of COPD. She will be treated as if she has a healthcare associated pneumonia. Reevaluation of patient:  5:16 AM I have reassessed the patient and discussed results and diagnosis. Pt will be admitted. Patient understands and verbalizes agreement with plan. Disposition:  Diagnosis:   1. HCAP (healthcare-associated pneumonia)    2. COPD exacerbation ([facility] [address]        Disposition:Admit       Patient's Medications   Start Taking    No medications on file   Continue Taking    ALBUTEROL (PROVENTIL HFA, VENTOLIN HFA, PROAIR HFA) 90 MCG/ACTUATION INHALER    Take 2 Puffs by inhalation every four (4) hours as needed for Wheezing. ASCORBIC ACID (VITAMIN C) 1,000 MG TABLET    Take  by mouth. CYCLOSPORINE (RESTASIS) 0.05 % OPHTHALMIC EMULSION    Administer 1 Drop to both eyes two (2) times a day. GABAPENTIN (NEURONTIN) 300 MG CAPSULE    Take 1 Cap by mouth three (3) times daily. HYDROMORPHONE (DILAUDID) 4 MG TABLET    1/2 to one tab up to three times per day prn severe pain due to fill 2/13/17    LORAZEPAM (ATIVAN) 1 MG TABLET    Take  by mouth every four (4) hours as needed. LUMIGAN 0.01 % OPHTHALMIC DROPS        KD-QH-JX-FE-MIN-LYCOPEN-LUTEIN (CENTRUM) 0.4-162-18 MG TAB    Take 1 Tab by mouth daily. OMEGA-3 FATTY ACIDS-VITAMIN E (FISH OIL) 1,000 MG CAP    Take 1 Cap by mouth. PAROXETINE (PAXIL) 20 MG TABLET    Take 20 mg by mouth daily. PILOCARPINE HCL 0.25 % DROP    Apply  to eye. SIMVASTATIN (ZOCOR) 20 MG TABLET    Take 1 Tab by mouth nightly. TAMSULOSIN (FLOMAX) 0.4 MG CAPSULE    Take 1 Cap by mouth daily. TIOTROPIUM BROMIDE (SPIRIVA RESPIMAT) 1.25 MCG/ACTUATION INHALER    Take 2 Puffs by inhalation daily. TRAZODONE (DESYREL) 100 MG TABLET    Take 100 mg by mouth nightly.  Patient takes one and half tabs at bedtime   These Medications have changed    No medications on file   Stop Taking    No medications on file         SCRIBE ATTESTATION STATEMENT  Documented by: Sheri Rodriguez for, and in the presence of, Erinn Perez MD 10:28 PM     Signed by: Dinesh Carreno, 02/07/17 10:28 PM        PROVIDER ATTESTATION STATEMENT  I personally performed the services described in the documentation, reviewed the documentation, as recorded by the scribe in my presence, and it accurately and completely records my words and actions.   Marce Mccormick MD

## 2017-02-07 NOTE — ROUTINE PROCESS
TRANSFER - OUT REPORT:    Verbal report given to GREGOR Marie RN(name) on [facility]  being transferred to [address] [place] [address](unit) for routine progression of care       Report consisted of patients Situation, Background, Assessment and   Recommendations(SBAR). Information from the following report(s) SBAR, ED Summary, Intake/Output, MAR and Recent Results was reviewed with the receiving nurse. Lines:   Peripheral IV 01/29/13 Right Forearm (Active)        Opportunity for questions and clarification was provided.       Patient transported with:   Monitor  Registered Nurse

## 2017-02-07 NOTE — PROGRESS NOTES
NUTRITION    BPA/MST Referral    Nutrition Consult: General Nutrition Management & Supplements     RECOMMENDATIONS / PLAN:     - Add supplements: Glucerna Shake TID  - Continue RD inpatient monitoring and evaluation     NUTRITION INTERVENTIONS & DIAGNOSIS:     [x] Meals/Snacks: modified diet  [x] Medical food supplementation: add      Nutrition Diagnosis: Unintentional weight loss related to inadequate energy intake as evidenced by 23 lb, 16% weight loss x 3 months. ASSESSMENT:     Subjective/Objective: Hungry, diet advanced after swallow evaluation today. Current Appetite:   [] Good     [x] Fair     [] Poor     [] Other:  Appetite/meal intake prior to admission:   [] Good     [x] Fair     [] Poor     [] Other:    Diet: DIET CARDIAC Regular  DIET NPO      Food Allergies: NKFA  Feeding Limitations:  [] Swallowing difficulty    [] Chewing difficulty    [x] Other: SLP following   Current Meal Intake: No data found. Average po intake adequate to meet patients estimated nutritional needs:   [] Yes     [x] No   [] Unable to determine at this time    BM: 2/7   Skin Integrity: WDL  Edema: none    Pertinent Medications: Reviewed    Recent Labs      02/06/17   2335   NA  131*   K  2.7*   CL  87*   CO2  34*   GLU  140*   BUN  5*   CREA  0.44*   CA  9.4       Intake/Output Summary (Last 24 hours) at 02/07/17 1418  Last data filed at 02/07/17 1134   Gross per 24 hour   Intake              240 ml   Output                0 ml   Net              240 ml       Anthropometrics:  Ht Readings from Last 1 Encounters:   02/06/17 5' 5\" (1.651 m)     Last 3 Recorded Weights in this Encounter    02/06/17 2037 02/07/17 0945   Weight: 118.7 kg (261 lb 11 oz) 55 kg (121 lb 3.2 oz)     Body mass index is 20.17 kg/(m^2).           Weight History: previous weight was 144 lb 3 months PTA per patient (23 lb, 16% weight loss x 3 months)     Weight Metrics 2/7/2017 1/13/2017 12/13/2016 12/7/2016 11/30/2016 11/2/2016 10/20/2016   Weight 121 lb 3.2 oz 134 lb 134 lb 12.8 oz 133 lb 133 lb 142 lb 138 lb   BMI 20.17 kg/m2 22.3 kg/m2 22.43 kg/m2 22.13 kg/m2 22.13 kg/m2 23.63 kg/m2 22.96 kg/m2        Admitting Diagnosis: HCAP (healthcare-associated pneumonia)  Abnormal CT scan, chest  dx  Past Medical History   Diagnosis Date    Colon polyps     COPD 8-30-02    DDD (degenerative disc disease), lumbar 10/1/2014    Degeneration of lumbar or lumbosacral intervertebral disc     Depression     Diabetes (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 [address] 10/1/2014    MOORE (Dyspnea on Exertion) 4-19-02     echo: +mild LVH, MA18-29% w/ diastolic dysfxn    Essential hypertension, benign     Glaucoma     Hemorrhoids 6-6-06    HX OTHER MEDICAL      breathing problems    Hypercholesterolemia 4-2008     TSH 1.9     Hyperlipidemia 5/17/2011    Hypertension 4-16-02    LBP (low back pain) 4-13-04    Low back pain radiating to both legs 10/1/2014    Lumbago     Lumbar disc herniation 10/1/2014    Lumbar facet arthropathy ([facility] [address] 10/1/2014    Lumbosacral radiculopathy at L5 10/1/2014    Lumbosacral radiculopathy at S1 10/1/2014    Microscopic hematuria     OA (osteoarthritis)     Other and unspecified hyperlipidemia     Overactive bladder 5/17/2011    RLS (restless legs syndrome) 1/17/2013    Sciatica     Shortness of breath     Spinal stenosis, lumbar region, without neurogenic claudication     Spondylolisthesis of lumbar region 10/1/2014    Spondylolisthesis, grade 1 10/1/2014    Stress urinary incontinence     Syncope      -MRI brain    Syncope and collapse     Urinary tract infection, site not specified        Education Needs:        [x] None identified  [] Identified - Not appropriate at this time  []  Identified and addressed - refer to education log  Learning Limitations:   [x] None identified  [] Identified    Cultural, Yazidi & ethnic food preferences:  [x] None identified    [] Identified and addressed ESTIMATED NUTRITION NEEDS:     Calories: 3714-6479 kcal (MSJx1.2-1.5) based on  [x] Actual BW: 55 kg     [] IBW:   CHO: 151-189 gm (50% kcal)   Protein: 44-55 gm (0.8-1 gm/kg) based on  [x] Actual BW: 55 kg     [] IBW:   Fluid: 1 mL/kcal     MONITORING & EVALUATION:     Nutrition Goal(s):   1. Po intake of meals will meet >75% of patient estimated nutritional needs within the next 7 days.   Outcome:  [] Met/Ongoing    []  Not Met    [x] New/Initial Goal     Monitoring:   [x] Monitor meal intake    [x] Monitor diet tolerance     [x] Monitor supplement intake    Previous Recommendations (for follow-up assessments only):     []   Implemented       []   Not Implemented (RD to address)     [] No Recommendation Made     Discharge Planning: cardiac, diabetic diet, consistency per SLP  [x] Participated in care planning, discharge planning, & interdisciplinary rounds as appropriate      Sena Culver, [address]    Pager: [phone]

## 2017-02-08 ENCOUNTER — SURGERY (OUTPATIENT)
Age: 82
End: 2017-02-08

## 2017-02-08 ENCOUNTER — ANESTHESIA (OUTPATIENT)
Dept: ENDOSCOPY | Age: 82
DRG: 167 | End: 2017-02-08
Payer: MEDICARE

## 2017-02-08 LAB
ALBUMIN SERPL BCP-MCNC: 3 G/DL (ref 3.4–5)
ALBUMIN/GLOB SERPL: 0.8 {RATIO} (ref 0.8–1.7)
ALP SERPL-CCNC: 74 U/L (ref 45–117)
ALT SERPL-CCNC: 19 U/L (ref 13–56)
ANION GAP BLD CALC-SCNC: 6 MMOL/L (ref 3–18)
AST SERPL W P-5'-P-CCNC: 20 U/L (ref 15–37)
ATRIAL RATE: 70 BPM
BASOPHILS # BLD AUTO: 0 K/UL (ref 0–0.1)
BASOPHILS # BLD: 0 % (ref 0–2)
BILIRUB SERPL-MCNC: 0.3 MG/DL (ref 0.2–1)
BUN SERPL-MCNC: 7 MG/DL (ref 7–18)
BUN/CREAT SERPL: 15 (ref 12–20)
CALCIUM SERPL-MCNC: 9.3 MG/DL (ref 8.5–10.1)
CALCULATED P AXIS, ECG09: 86 DEGREES
CALCULATED R AXIS, ECG10: 26 DEGREES
CALCULATED T AXIS, ECG11: 42 DEGREES
CHLORIDE SERPL-SCNC: 98 MMOL/L (ref 100–108)
CHOLEST SERPL-MCNC: 117 MG/DL
CO2 SERPL-SCNC: 30 MMOL/L (ref 21–32)
CREAT SERPL-MCNC: 0.46 MG/DL (ref 0.6–1.3)
DIAGNOSIS, 93000: NORMAL
DIFFERENTIAL METHOD BLD: ABNORMAL
EOSINOPHIL # BLD: 0.1 K/UL (ref 0–0.4)
EOSINOPHIL NFR BLD: 1 % (ref 0–5)
ERYTHROCYTE [DISTWIDTH] IN BLOOD BY AUTOMATED COUNT: 14 % (ref 11.6–14.5)
GLOBULIN SER CALC-MCNC: 3.6 G/DL (ref 2–4)
GLUCOSE BLD STRIP.AUTO-MCNC: 164 MG/DL (ref 70–110)
GLUCOSE BLD STRIP.AUTO-MCNC: 188 MG/DL (ref 70–110)
GLUCOSE SERPL-MCNC: 133 MG/DL (ref 74–99)
HCT VFR BLD AUTO: 37.3 % (ref 35–45)
HDLC SERPL-MCNC: 47 MG/DL (ref 40–60)
HDLC SERPL: 2.5 {RATIO} (ref 0–5)
HGB BLD-MCNC: 13 G/DL (ref 12–16)
INR PPP: 1 (ref 0.8–1.2)
LDLC SERPL CALC-MCNC: 53.2 MG/DL (ref 0–100)
LIPID PROFILE,FLP: NORMAL
LYMPHOCYTES # BLD AUTO: 28 % (ref 21–52)
LYMPHOCYTES # BLD: 3.2 K/UL (ref 0.9–3.6)
MAGNESIUM SERPL-MCNC: 1.9 MG/DL (ref 1.8–2.4)
MCH RBC QN AUTO: 31.7 PG (ref 24–34)
MCHC RBC AUTO-ENTMCNC: 34.9 G/DL (ref 31–37)
MCV RBC AUTO: 91 FL (ref 74–97)
MONOCYTES # BLD: 0.9 K/UL (ref 0.05–1.2)
MONOCYTES NFR BLD AUTO: 8 % (ref 3–10)
NEUTS SEG # BLD: 7.1 K/UL (ref 1.8–8)
NEUTS SEG NFR BLD AUTO: 63 % (ref 40–73)
P-R INTERVAL, ECG05: 138 MS
PLATELET # BLD AUTO: 306 K/UL (ref 135–420)
PMV BLD AUTO: 10.3 FL (ref 9.2–11.8)
POTASSIUM SERPL-SCNC: 4.3 MMOL/L (ref 3.5–5.5)
PROT SERPL-MCNC: 6.6 G/DL (ref 6.4–8.2)
PROTHROMBIN TIME: 13.2 SEC (ref 11.5–15.2)
Q-T INTERVAL, ECG07: 450 MS
QRS DURATION, ECG06: 112 MS
QTC CALCULATION (BEZET), ECG08: 486 MS
RBC # BLD AUTO: 4.1 M/UL (ref 4.2–5.3)
RHEUMATOID FACT SER QL LA: POSITIVE
RHEUMATOID FACT TITR SER LA: ABNORMAL {TITER}
SODIUM SERPL-SCNC: 134 MMOL/L (ref 136–145)
T4 FREE SERPL-MCNC: 1.2 NG/DL (ref 0.7–1.5)
TRIGL SERPL-MCNC: 84 MG/DL (ref ?–150)
VENTRICULAR RATE, ECG03: 70 BPM
VLDLC SERPL CALC-MCNC: 16.8 MG/DL
WBC # BLD AUTO: 11.3 K/UL (ref 4.6–13.2)

## 2017-02-08 PROCEDURE — 85025 COMPLETE CBC W/AUTO DIFF WBC: CPT | Performed by: INTERNAL MEDICINE

## 2017-02-08 PROCEDURE — 94640 AIRWAY INHALATION TREATMENT: CPT

## 2017-02-08 PROCEDURE — 85610 PROTHROMBIN TIME: CPT | Performed by: INTERNAL MEDICINE

## 2017-02-08 PROCEDURE — 65660000000 HC RM CCU STEPDOWN

## 2017-02-08 PROCEDURE — 87102 FUNGUS ISOLATION CULTURE: CPT | Performed by: INTERNAL MEDICINE

## 2017-02-08 PROCEDURE — 76060000032 HC ANESTHESIA 0.5 TO 1 HR: Performed by: INTERNAL MEDICINE

## 2017-02-08 PROCEDURE — 07974ZX DRAINAGE OF THORAX LYMPHATIC, PERCUTANEOUS ENDOSCOPIC APPROACH, DIAGNOSTIC: ICD-10-PCS | Performed by: INTERNAL MEDICINE

## 2017-02-08 PROCEDURE — 83735 ASSAY OF MAGNESIUM: CPT | Performed by: INTERNAL MEDICINE

## 2017-02-08 PROCEDURE — 88173 CYTOPATH EVAL FNA REPORT: CPT | Performed by: INTERNAL MEDICINE

## 2017-02-08 PROCEDURE — 74011000250 HC RX REV CODE- 250: Performed by: INTERNAL MEDICINE

## 2017-02-08 PROCEDURE — 74011000250 HC RX REV CODE- 250

## 2017-02-08 PROCEDURE — 80053 COMPREHEN METABOLIC PANEL: CPT | Performed by: INTERNAL MEDICINE

## 2017-02-08 PROCEDURE — 74011636637 HC RX REV CODE- 636/637: Performed by: NURSE ANESTHETIST, CERTIFIED REGISTERED

## 2017-02-08 PROCEDURE — 36415 COLL VENOUS BLD VENIPUNCTURE: CPT | Performed by: INTERNAL MEDICINE

## 2017-02-08 PROCEDURE — 76040000007: Performed by: INTERNAL MEDICINE

## 2017-02-08 PROCEDURE — 84439 ASSAY OF FREE THYROXINE: CPT | Performed by: INTERNAL MEDICINE

## 2017-02-08 PROCEDURE — 77030012699 HC VLV SUC CNTRL OCOA -A: Performed by: INTERNAL MEDICINE

## 2017-02-08 PROCEDURE — 88172 CYTP DX EVAL FNA 1ST EA SITE: CPT | Performed by: INTERNAL MEDICINE

## 2017-02-08 PROCEDURE — 87075 CULTR BACTERIA EXCEPT BLOOD: CPT | Performed by: INTERNAL MEDICINE

## 2017-02-08 PROCEDURE — 87070 CULTURE OTHR SPECIMN AEROBIC: CPT | Performed by: INTERNAL MEDICINE

## 2017-02-08 PROCEDURE — 77030003406 HC NDL ASPIR BIOP OCOA -C: Performed by: INTERNAL MEDICINE

## 2017-02-08 PROCEDURE — 74011250637 HC RX REV CODE- 250/637: Performed by: NURSE ANESTHETIST, CERTIFIED REGISTERED

## 2017-02-08 PROCEDURE — 87040 BLOOD CULTURE FOR BACTERIA: CPT | Performed by: INTERNAL MEDICINE

## 2017-02-08 PROCEDURE — 74011250637 HC RX REV CODE- 250/637: Performed by: INTERNAL MEDICINE

## 2017-02-08 PROCEDURE — 74011250636 HC RX REV CODE- 250/636

## 2017-02-08 PROCEDURE — 87116 MYCOBACTERIA CULTURE: CPT | Performed by: INTERNAL MEDICINE

## 2017-02-08 PROCEDURE — 74011250636 HC RX REV CODE- 250/636: Performed by: INTERNAL MEDICINE

## 2017-02-08 PROCEDURE — 77030008683 HC TU ET CUF COVD -A: Performed by: ANESTHESIOLOGY

## 2017-02-08 PROCEDURE — 93005 ELECTROCARDIOGRAM TRACING: CPT

## 2017-02-08 PROCEDURE — 88305 TISSUE EXAM BY PATHOLOGIST: CPT | Performed by: INTERNAL MEDICINE

## 2017-02-08 PROCEDURE — 80061 LIPID PANEL: CPT | Performed by: INTERNAL MEDICINE

## 2017-02-08 PROCEDURE — 82962 GLUCOSE BLOOD TEST: CPT

## 2017-02-08 RX ORDER — FENTANYL CITRATE 50 UG/ML
25 INJECTION, SOLUTION INTRAMUSCULAR; INTRAVENOUS AS NEEDED
Status: DISCONTINUED | OUTPATIENT
Start: 2017-02-08 | End: 2017-02-08 | Stop reason: HOSPADM

## 2017-02-08 RX ORDER — LIDOCAINE HYDROCHLORIDE 20 MG/ML
INJECTION, SOLUTION EPIDURAL; INFILTRATION; INTRACAUDAL; PERINEURAL AS NEEDED
Status: DISCONTINUED | OUTPATIENT
Start: 2017-02-08 | End: 2017-02-08 | Stop reason: HOSPADM

## 2017-02-08 RX ORDER — ONDANSETRON 2 MG/ML
INJECTION INTRAMUSCULAR; INTRAVENOUS AS NEEDED
Status: DISCONTINUED | OUTPATIENT
Start: 2017-02-08 | End: 2017-02-08 | Stop reason: HOSPADM

## 2017-02-08 RX ORDER — IPRATROPIUM BROMIDE AND ALBUTEROL SULFATE 2.5; .5 MG/3ML; MG/3ML
3 SOLUTION RESPIRATORY (INHALATION)
Status: DISCONTINUED | OUTPATIENT
Start: 2017-02-09 | End: 2017-02-09

## 2017-02-08 RX ORDER — INSULIN LISPRO 100 [IU]/ML
INJECTION, SOLUTION INTRAVENOUS; SUBCUTANEOUS ONCE
Status: COMPLETED | OUTPATIENT
Start: 2017-02-08 | End: 2017-02-08

## 2017-02-08 RX ORDER — INSULIN LISPRO 100 [IU]/ML
INJECTION, SOLUTION INTRAVENOUS; SUBCUTANEOUS ONCE
Status: DISCONTINUED | OUTPATIENT
Start: 2017-02-08 | End: 2017-02-08 | Stop reason: HOSPADM

## 2017-02-08 RX ORDER — SODIUM CHLORIDE, SODIUM LACTATE, POTASSIUM CHLORIDE, CALCIUM CHLORIDE 600; 310; 30; 20 MG/100ML; MG/100ML; MG/100ML; MG/100ML
75 INJECTION, SOLUTION INTRAVENOUS CONTINUOUS
Status: DISCONTINUED | OUTPATIENT
Start: 2017-02-08 | End: 2017-02-08 | Stop reason: HOSPADM

## 2017-02-08 RX ORDER — DEXTROSE 50 % IN WATER (D50W) INTRAVENOUS SYRINGE
25-50 AS NEEDED
Status: DISCONTINUED | OUTPATIENT
Start: 2017-02-08 | End: 2017-02-08 | Stop reason: HOSPADM

## 2017-02-08 RX ORDER — SODIUM CHLORIDE 0.9 % (FLUSH) 0.9 %
5-10 SYRINGE (ML) INJECTION AS NEEDED
Status: DISCONTINUED | OUTPATIENT
Start: 2017-02-08 | End: 2017-02-08 | Stop reason: HOSPADM

## 2017-02-08 RX ORDER — FENTANYL CITRATE 50 UG/ML
INJECTION, SOLUTION INTRAMUSCULAR; INTRAVENOUS AS NEEDED
Status: DISCONTINUED | OUTPATIENT
Start: 2017-02-08 | End: 2017-02-08 | Stop reason: HOSPADM

## 2017-02-08 RX ORDER — FAMOTIDINE 20 MG/1
20 TABLET, FILM COATED ORAL ONCE
Status: COMPLETED | OUTPATIENT
Start: 2017-02-08 | End: 2017-02-08

## 2017-02-08 RX ORDER — SUCCINYLCHOLINE CHLORIDE 20 MG/ML
INJECTION INTRAMUSCULAR; INTRAVENOUS AS NEEDED
Status: DISCONTINUED | OUTPATIENT
Start: 2017-02-08 | End: 2017-02-08 | Stop reason: HOSPADM

## 2017-02-08 RX ORDER — PROPOFOL 10 MG/ML
INJECTION, EMULSION INTRAVENOUS AS NEEDED
Status: DISCONTINUED | OUTPATIENT
Start: 2017-02-08 | End: 2017-02-08 | Stop reason: HOSPADM

## 2017-02-08 RX ORDER — ROCURONIUM BROMIDE 10 MG/ML
INJECTION, SOLUTION INTRAVENOUS AS NEEDED
Status: DISCONTINUED | OUTPATIENT
Start: 2017-02-08 | End: 2017-02-08 | Stop reason: HOSPADM

## 2017-02-08 RX ORDER — DEXAMETHASONE SODIUM PHOSPHATE 4 MG/ML
INJECTION, SOLUTION INTRA-ARTICULAR; INTRALESIONAL; INTRAMUSCULAR; INTRAVENOUS; SOFT TISSUE AS NEEDED
Status: DISCONTINUED | OUTPATIENT
Start: 2017-02-08 | End: 2017-02-08 | Stop reason: HOSPADM

## 2017-02-08 RX ORDER — SODIUM CHLORIDE 0.9 % (FLUSH) 0.9 %
5-10 SYRINGE (ML) INJECTION EVERY 8 HOURS
Status: DISCONTINUED | OUTPATIENT
Start: 2017-02-08 | End: 2017-02-08 | Stop reason: HOSPADM

## 2017-02-08 RX ORDER — LABETALOL HYDROCHLORIDE 5 MG/ML
INJECTION, SOLUTION INTRAVENOUS AS NEEDED
Status: DISCONTINUED | OUTPATIENT
Start: 2017-02-08 | End: 2017-02-08 | Stop reason: HOSPADM

## 2017-02-08 RX ORDER — MAGNESIUM SULFATE 100 %
4 CRYSTALS MISCELLANEOUS AS NEEDED
Status: DISCONTINUED | OUTPATIENT
Start: 2017-02-08 | End: 2017-02-08 | Stop reason: HOSPADM

## 2017-02-08 RX ADMIN — METOPROLOL TARTRATE 12.5 MG: 25 TABLET ORAL at 17:42

## 2017-02-08 RX ADMIN — HEPARIN SODIUM 5000 UNITS: 5000 INJECTION, SOLUTION INTRAVENOUS; SUBCUTANEOUS at 12:35

## 2017-02-08 RX ADMIN — Medication 10 ML: at 17:43

## 2017-02-08 RX ADMIN — HEPARIN SODIUM 5000 UNITS: 5000 INJECTION, SOLUTION INTRAVENOUS; SUBCUTANEOUS at 04:42

## 2017-02-08 RX ADMIN — IPRATROPIUM BROMIDE AND ALBUTEROL SULFATE 3 ML: .5; 3 SOLUTION RESPIRATORY (INHALATION) at 19:50

## 2017-02-08 RX ADMIN — VANCOMYCIN HYDROCHLORIDE 750 MG: 10 INJECTION, POWDER, LYOPHILIZED, FOR SOLUTION INTRAVENOUS at 21:19

## 2017-02-08 RX ADMIN — BUDESONIDE 500 MCG: 0.5 INHALANT RESPIRATORY (INHALATION) at 07:51

## 2017-02-08 RX ADMIN — PROPOFOL 80 MG: 10 INJECTION, EMULSION INTRAVENOUS at 10:43

## 2017-02-08 RX ADMIN — BIMATOPROST 1 DROP: 0.1 SOLUTION/ DROPS OPHTHALMIC at 17:41

## 2017-02-08 RX ADMIN — GUAIFENESIN 600 MG: 600 TABLET, EXTENDED RELEASE ORAL at 21:13

## 2017-02-08 RX ADMIN — DEXAMETHASONE SODIUM PHOSPHATE 4 MG: 4 INJECTION, SOLUTION INTRA-ARTICULAR; INTRALESIONAL; INTRAMUSCULAR; INTRAVENOUS; SOFT TISSUE at 10:43

## 2017-02-08 RX ADMIN — Medication 1 CAPSULE: at 12:41

## 2017-02-08 RX ADMIN — GABAPENTIN 300 MG: 300 CAPSULE ORAL at 21:13

## 2017-02-08 RX ADMIN — IPRATROPIUM BROMIDE AND ALBUTEROL SULFATE 3 ML: .5; 3 SOLUTION RESPIRATORY (INHALATION) at 16:34

## 2017-02-08 RX ADMIN — IPRATROPIUM BROMIDE AND ALBUTEROL SULFATE 3 ML: .5; 3 SOLUTION RESPIRATORY (INHALATION) at 12:28

## 2017-02-08 RX ADMIN — TRAZODONE HYDROCHLORIDE 50 MG: 50 TABLET ORAL at 21:13

## 2017-02-08 RX ADMIN — ONDANSETRON 4 MG: 2 INJECTION INTRAMUSCULAR; INTRAVENOUS at 10:43

## 2017-02-08 RX ADMIN — BUDESONIDE 500 MCG: 0.5 INHALANT RESPIRATORY (INHALATION) at 19:57

## 2017-02-08 RX ADMIN — Medication 500 MG: at 12:41

## 2017-02-08 RX ADMIN — FAMOTIDINE 20 MG: 20 TABLET ORAL at 08:43

## 2017-02-08 RX ADMIN — TAMSULOSIN HYDROCHLORIDE 0.4 MG: 0.4 CAPSULE ORAL at 12:36

## 2017-02-08 RX ADMIN — ROCURONIUM BROMIDE 10 MG: 10 INJECTION, SOLUTION INTRAVENOUS at 10:43

## 2017-02-08 RX ADMIN — Medication 10 ML: at 21:15

## 2017-02-08 RX ADMIN — SUCCINYLCHOLINE CHLORIDE 100 MG: 20 INJECTION INTRAMUSCULAR; INTRAVENOUS at 10:43

## 2017-02-08 RX ADMIN — INSULIN LISPRO 3 UNITS: 100 INJECTION, SOLUTION INTRAVENOUS; SUBCUTANEOUS at 11:53

## 2017-02-08 RX ADMIN — IPRATROPIUM BROMIDE AND ALBUTEROL SULFATE 3 ML: .5; 3 SOLUTION RESPIRATORY (INHALATION) at 07:51

## 2017-02-08 RX ADMIN — LABETALOL HYDROCHLORIDE 5 MG: 5 INJECTION, SOLUTION INTRAVENOUS at 11:11

## 2017-02-08 RX ADMIN — HEPARIN SODIUM 5000 UNITS: 5000 INJECTION, SOLUTION INTRAVENOUS; SUBCUTANEOUS at 19:49

## 2017-02-08 RX ADMIN — CYCLOSPORINE 1 DROP: 0.5 EMULSION OPHTHALMIC at 17:42

## 2017-02-08 RX ADMIN — SIMVASTATIN 20 MG: 20 TABLET, FILM COATED ORAL at 21:13

## 2017-02-08 RX ADMIN — FENTANYL CITRATE 50 MCG: 50 INJECTION, SOLUTION INTRAMUSCULAR; INTRAVENOUS at 11:08

## 2017-02-08 RX ADMIN — IPRATROPIUM BROMIDE AND ALBUTEROL SULFATE 3 ML: .5; 3 SOLUTION RESPIRATORY (INHALATION) at 00:01

## 2017-02-08 RX ADMIN — FENTANYL CITRATE 100 MCG: 50 INJECTION, SOLUTION INTRAMUSCULAR; INTRAVENOUS at 10:43

## 2017-02-08 RX ADMIN — IPRATROPIUM BROMIDE AND ALBUTEROL SULFATE 3 ML: .5; 3 SOLUTION RESPIRATORY (INHALATION) at 03:22

## 2017-02-08 RX ADMIN — VANCOMYCIN HYDROCHLORIDE 750 MG: 10 INJECTION, POWDER, LYOPHILIZED, FOR SOLUTION INTRAVENOUS at 17:37

## 2017-02-08 RX ADMIN — LABETALOL HYDROCHLORIDE 5 MG: 5 INJECTION, SOLUTION INTRAVENOUS at 11:02

## 2017-02-08 RX ADMIN — LIDOCAINE HYDROCHLORIDE 20 MG: 20 INJECTION, SOLUTION EPIDURAL; INFILTRATION; INTRACAUDAL; PERINEURAL at 10:43

## 2017-02-08 RX ADMIN — GABAPENTIN 300 MG: 300 CAPSULE ORAL at 17:42

## 2017-02-08 RX ADMIN — GUAIFENESIN 600 MG: 600 TABLET, EXTENDED RELEASE ORAL at 12:41

## 2017-02-08 NOTE — PERIOP NOTES
Informed Dr. Andrea Farmer of patient's blood sugar of 188 and that patient does not take any blood sugar medicines. Dr. Andrea Farmer stated hold insulin.

## 2017-02-08 NOTE — ANESTHESIA PREPROCEDURE EVALUATION
Anesthetic History               Review of Systems / Medical History  Patient summary reviewed, nursing notes reviewed and pertinent labs reviewed    Pulmonary    COPD: moderate      Smoker         Neuro/Psych              Cardiovascular    Hypertension: well controlled              Exercise tolerance: >4 METS     GI/Hepatic/Renal  Within defined limits              Endo/Other    Diabetes: well controlled, type 2    Arthritis     Other Findings   Comments: Current Smoker? YES       Elective Surgery? Yes       Abstained from smoking 24 hours prior to anesthesia? NO    Risk Factors for Postoperative nausea/vomiting:       History of postoperative nausea/vomiting? NO       Female? YES       Motion sickness? NO       Intended opioid administration for postoperative analgesia?   NO           Physical Exam    Airway  Mallampati: III  TM Distance: 4 - 6 cm  Neck ROM: decreased range of motion   Mouth opening: Diminished (comment)     Cardiovascular  Regular rate and rhythm,  S1 and S2 normal,  no murmur, click, rub, or gallop             Dental    Dentition: Full lower dentures     Pulmonary  Breath sounds clear to auscultation               Abdominal  GI exam deferred       Other Findings            Anesthetic Plan    ASA: 3  Anesthesia type: MAC            Anesthetic plan and risks discussed with: Patient and Son / Daughter

## 2017-02-08 NOTE — PROGRESS NOTES
Pulmonology Progress Note    Subjective: Charles Moreno is awake, alert and on no pressors, breathing comfortably and without complaints of pain.     Current Facility-Administered Medications   Medication Dose Route Frequency    lactated ringers infusion  75 mL/hr IntraVENous CONTINUOUS    sodium chloride (NS) flush 5-10 mL  5-10 mL IntraVENous Q8H    sodium chloride (NS) flush 5-10 mL  5-10 mL IntraVENous PRN    insulin lispro (HUMALOG) injection   SubCUTAneous ONCE    ascorbic acid (vitamin C) (VITAMIN C) tablet 500 mg  500 mg Oral DAILY    cycloSPORINE (RESTASIS) 0.05 % ophthalmic emulsion 1 Drop  1 Drop Both Eyes BID    gabapentin (NEURONTIN) capsule 300 mg  300 mg Oral TID    HYDROmorphone (DILAUDID) tablet 2 mg  2 mg Oral Q6H PRN    LORazepam (ATIVAN) tablet 0.5 mg  0.5 mg Oral Q6H PRN    bimatoprost (LUMIGAN) 0.01 % ophthalmic drops 1 Drop  1 Drop Both Eyes QPM    multivitamin, tx-iron-ca-min (THERA-M w/ IRON) tablet 1 Tab  1 Tab Oral DAILY    fish oil-omega-3 fatty acids 340-1,000 mg capsule 1 Cap  1 Cap Oral DAILY    simvastatin (ZOCOR) tablet 20 mg  20 mg Oral QHS    traZODone (DESYREL) tablet 50 mg  50 mg Oral QHS    tamsulosin (FLOMAX) capsule 0.4 mg  0.4 mg Oral DAILY    sodium chloride (NS) flush 5-10 mL  5-10 mL IntraVENous Q8H    sodium chloride (NS) flush 5-10 mL  5-10 mL IntraVENous PRN    acetaminophen (TYLENOL) tablet 650 mg  650 mg Oral Q4H PRN    naloxone (NARCAN) injection 0.4 mg  0.4 mg IntraVENous PRN    ondansetron (ZOFRAN) injection 4 mg  4 mg IntraVENous Q4H PRN    magnesium hydroxide (MILK OF MAGNESIA) 400 mg/5 mL oral suspension 30 mL  30 mL Oral DAILY PRN    nicotine (NICODERM CQ) 14 mg/24 hr patch 1 Patch  1 Patch TransDERmal Q24H    heparin (porcine) injection 5,000 Units  5,000 Units SubCUTAneous Q8H    albuterol-ipratropium (DUO-NEB) 2.5 MG-0.5 MG/3 ML  3 mL Nebulization Q4H RT    lactulose (CHRONULAC) solution 13.33 g  20 mL Oral TID    guaiFENesin SR (MUCINEX) tablet 600 mg  600 mg Oral Q12H    metoprolol tartrate (LOPRESSOR) tablet 12.5 mg  12.5 mg Oral BID    cloNIDine HCl (CATAPRES) tablet 0.1 mg  0.1 mg Oral Q8H PRN    budesonide (PULMICORT) 500 mcg/2 ml nebulizer suspension  500 mcg Nebulization BID RT     Facility-Administered Medications Ordered in Other Encounters   Medication Dose Route Frequency    fentaNYL citrate (PF) injection   IntraVENous PRN    lidocaine (PF) (XYLOCAINE) 20 mg/mL (2 %) injection   IntraVENous PRN    rocuronium (ZEMURON) injection   IntraVENous PRN    succinylcholine (ANECTINE) injection   IntraVENous PRN    propofol (DIPRIVAN) 10 mg/mL injection   IntraVENous PRN    ondansetron (ZOFRAN) injection   IntraVENous PRN    dexamethasone (DECADRON) 4 mg/mL injection    PRN    labetalol (NORMODYNE;TRANDATE) injection    PRN       Review of Systems:  Pertinent items are noted in HPI. Objective:     Visit Vitals    /67    Pulse (!) 105    Temp 98.4 °F (36.9 °C)    Resp 20    Ht 5' 5\" (1.651 m)    Wt 55 kg (121 lb 3.2 oz)    SpO2 94%    Breastfeeding No    BMI 20.17 kg/m2    O2 Flow Rate (L/min): 2 l/min O2 Device: Nasal cannula    02/06 1901 - 02/08 0700  In: 1330 [P.O.:1330]  Out: 202 [Urine:200]       Physical Exam:   Visit Vitals    /67    Pulse (!) 105    Temp 98.4 °F (36.9 °C)    Resp 20    Ht 5' 5\" (1.651 m)    Wt 55 kg (121 lb 3.2 oz)    SpO2 94%    Breastfeeding No    BMI 20.17 kg/m2     General:  Alert, cooperative, no distress, appears stated age. Head:  Normocephalic, without obvious abnormality, atraumatic. Eyes:  Conjunctivae/corneas clear. PERRL, EOMs intact. Fundi benign. Ears:  Normal TMs and external ear canals both ears. Nose: Nares normal. Septum midline. Mucosa normal. No drainage or sinus tenderness.    Throat: Lips, mucosa, and tongue normal. Teeth and gums normal.   Neck: Supple, symmetrical, trachea midline, no adenopathy, thyroid: no enlargement/tenderness/nodules, no carotid bruit and no JVD. Back:   Symmetric, no curvature. ROM normal. No CVA tenderness. Lungs:   Clear to auscultation bilaterally. Chest wall:  No tenderness or deformity. Heart:  Regular rate and rhythm, S1, S2 normal, no murmur, click, rub or gallop. Breast Exam:  No tenderness, masses, or nipple abnormality. Abdomen:   Soft, non-tender. Bowel sounds normal. No masses,  No organomegaly. Genitalia:  Normal female without lesion, discharge or tenderness. Rectal:  Normal tone,  no masses or tenderness  Guaiac negative stool. Extremities: Extremities normal, atraumatic, no cyanosis or edema. Pulses: 2+ and symmetric all extremities. Skin: Skin color, texture, turgor normal. No rashes or lesions. Lymph nodes: Cervical, supraclavicular, and axillary nodes normal.   Neurologic: CNII-XII intact. Normal strength, sensation and reflexes throughout.        Data Review:    Recent Results (from the past 24 hour(s))   TSH 3RD GENERATION    Collection Time: 02/07/17  8:06 PM   Result Value Ref Range    TSH 0.42 0.36 - 3.74 uIU/mL   IRON PROFILE    Collection Time: 02/07/17  8:06 PM   Result Value Ref Range    Iron 42 (L) 50 - 175 ug/dL    TIBC 174 (L) 250 - 450 ug/dL    Iron % saturation 24 %   CARDIAC PANEL,(CK, CKMB & TROPONIN)    Collection Time: 02/07/17  8:06 PM   Result Value Ref Range     26 - 192 U/L    CK - MB 3.7 (H) 0.5 - 3.6 ng/ml    CK-MB Index 2.6 0.0 - 4.0 %    Troponin-I, Qt. 0.02 0.0 - 0.045 NG/ML   OSMOLALITY, SERUM/PLASMA    Collection Time: 02/07/17  8:06 PM   Result Value Ref Range    Osmolality, serum/plasma 284 280 - 300 MOSM/kg S4T   METABOLIC PANEL, COMPREHENSIVE    Collection Time: 02/08/17  2:30 AM   Result Value Ref Range    Sodium 134 (L) 136 - 145 mmol/L    Potassium 4.3 3.5 - 5.5 mmol/L    Chloride 98 (L) 100 - 108 mmol/L    CO2 30 21 - 32 mmol/L    Anion gap 6 3.0 - 18 mmol/L    Glucose 133 (H) 74 - 99 mg/dL    BUN 7 7.0 - 18 MG/DL Creatinine 0.46 (L) 0.6 - 1.3 MG/DL    BUN/Creatinine ratio 15 12 - 20      GFR est AA >60 >60 ml/min/1.73m2    GFR est non-AA >60 >60 ml/min/1.73m2    Calcium 9.3 8.5 - 10.1 MG/DL    Bilirubin, total 0.3 0.2 - 1.0 MG/DL    ALT (SGPT) 19 13 - 56 U/L    AST (SGOT) 20 15 - 37 U/L    Alk. phosphatase 74 45 - 117 U/L    Protein, total 6.6 6.4 - 8.2 g/dL    Albumin 3.0 (L) 3.4 - 5.0 g/dL    Globulin 3.6 2.0 - 4.0 g/dL    A-G Ratio 0.8 0.8 - 1.7     T4, FREE    Collection Time: 02/08/17  2:30 AM   Result Value Ref Range    T4, Free 1.2 0.7 - 1.5 NG/DL   LIPID PANEL    Collection Time: 02/08/17  2:30 AM   Result Value Ref Range    LIPID PROFILE          Cholesterol, total 117 <200 MG/DL    Triglyceride 84 <150 MG/DL    HDL Cholesterol 47 40 - 60 MG/DL    LDL, calculated 53.2 0 - 100 MG/DL    VLDL, calculated 16.8 MG/DL    CHOL/HDL Ratio 2.5 0 - 5.0     MAGNESIUM    Collection Time: 02/08/17  2:30 AM   Result Value Ref Range    Magnesium 1.9 1.8 - 2.4 mg/dL   CBC WITH AUTOMATED DIFF    Collection Time: 02/08/17  2:30 AM   Result Value Ref Range    WBC 11.3 4.6 - 13.2 K/uL    RBC 4.10 (L) 4.20 - 5.30 M/uL    HGB 13.0 12.0 - 16.0 g/dL    HCT 37.3 35.0 - 45.0 %    MCV 91.0 74.0 - 97.0 FL    MCH 31.7 24.0 - 34.0 PG    MCHC 34.9 31.0 - 37.0 g/dL    RDW 14.0 11.6 - 14.5 %    PLATELET 671 634 - 129 K/uL    MPV 10.3 9.2 - 11.8 FL    NEUTROPHILS 63 40 - 73 %    LYMPHOCYTES 28 21 - 52 %    MONOCYTES 8 3 - 10 %    EOSINOPHILS 1 0 - 5 %    BASOPHILS 0 0 - 2 %    ABS. NEUTROPHILS 7.1 1.8 - 8.0 K/UL    ABS. LYMPHOCYTES 3.2 0.9 - 3.6 K/UL    ABS. MONOCYTES 0.9 0.05 - 1.2 K/UL    ABS. EOSINOPHILS 0.1 0.0 - 0.4 K/UL    ABS.  BASOPHILS 0.0 0.0 - 0.1 K/UL    DF AUTOMATED     PROTHROMBIN TIME + INR    Collection Time: 02/08/17  2:30 AM   Result Value Ref Range    Prothrombin time 13.2 11.5 - 15.2 sec    INR 1.0 0.8 - 1.2     EKG, 12 LEAD, SUBSEQUENT    Collection Time: 02/08/17  7:55 AM   Result Value Ref Range    Ventricular Rate 70 BPM Atrial Rate 70 BPM    P-R Interval 138 ms    QRS Duration 112 ms    Q-T Interval 450 ms    QTC Calculation (Bezet) 486 ms    Calculated P Axis 86 degrees    Calculated R Axis 26 degrees    Calculated T Axis 42 degrees    Diagnosis       Normal sinus rhythm  Possible Left atrial enlargement  Incomplete right bundle branch block  Borderline ECG  When compared with ECG of 06-FEB-2017 21:25,  Left anterior fascicular block is no longer present  Nonspecific T wave abnormality no longer evident in Anterior leads     GLUCOSE, POC    Collection Time: 02/08/17  8:38 AM   Result Value Ref Range    Glucose (POC) 188 (H) 70 - 110 mg/dL           Assessment:     Active Problems:    HCAP (healthcare-associated pneumonia) (2/7/2017)      Abnormal CT scan, chest (2/7/2017)        Plan:       Continue neb rx  For EBUS today

## 2017-02-08 NOTE — ROUTINE PROCESS
Bedside and Verbal shift change report given to Nate Becerra (oncoming nurse) by Alonna Kehr, RN (offgoing nurse). Report included the following information SBAR, Kardex, Intake/Output, MAR and Recent Results.

## 2017-02-08 NOTE — PROCEDURES
Procedure:  EBUS-TBNA    Indication:  Enlarging right hilar lymph node    Summary:  Appropriate time out was performed  Intubated by ET tube no:8 by anesthesia  Procedure performed in endosopy suite under general anesthesia  EBUS scope passed via the ET tube : purulent secretions in the airway which were suctioned out  Right hilar lymph node was identified under ultrasound. 21 gauge needle was passed in to the lymph node under direct ultrasound visualization  Needle aspiration was performed x4 and sent for cytology and culture.   Patient tolerated procedure well  Blood loss less than 5 ml

## 2017-02-08 NOTE — ANESTHESIA POSTPROCEDURE EVALUATION
Post-Anesthesia Evaluation and Assessment    Patient: Rona Parry MRN: [id number]  SSN: [id number]    YOB: 1933  Age: 80 y.o. Sex: female       Cardiovascular Function/Vital Signs  Visit Vitals    /63    Pulse 91    Temp 37.2 °C (98.9 °F)    Resp 20    Ht 5' 5\" (1.651 m)    Wt 55 kg (121 lb 3.2 oz)    SpO2 96%    Breastfeeding No    BMI 20.17 kg/m2       Patient is status post MAC anesthesia for Procedure(s):  ENDOSCOPIC BRONCHOSCOPY ULTRASOUND (EBUS) w/ FNA. Nausea/Vomiting: None    Postoperative hydration reviewed and adequate. Pain:  Pain Scale 1: Numeric (0 - 10) (02/08/17 0735)  Pain Intensity 1: 0 (02/08/17 0735)   Managed    Neurological Status:   Neuro  Neurologic State: Alert (02/08/17 7396)  Orientation Level: Oriented X4 (02/08/17 0735)  Cognition: Appropriate decision making (02/08/17 0735)   At baseline    Mental Status and Level of Consciousness: Arousable    Pulmonary Status:   O2 Device: Oxygen mask (02/08/17 1134)   Adequate oxygenation and airway patent    Complications related to anesthesia: None    Post-anesthesia assessment completed.  No concerns    Signed By: J Luis Wright MD     February 8, 2017

## 2017-02-08 NOTE — ROUTINE PROCESS
Bedside and Verbal shift change report given to Enzo Issa (oncoming nurse) by kieran Coronaing nurse). Report included the following information Kardex, Intake/Output and MAR.

## 2017-02-08 NOTE — PROGRESS NOTES
SUBJECTIVE:    NO CHEST PAIN.  WANTS TO EAT. SOB and cough improving. No nausea or vomiting. No abdominal pain. Family is at bedside. OBJECTIVE:    Visit Vitals    /63    Pulse 91    Temp 98.9 °F (37.2 °C)    Resp 20    Ht 5' 5\" (1.651 m)    Wt 55 kg (121 lb 3.2 oz)    SpO2 96%    Breastfeeding No    BMI 20.17 kg/m2     RS: Bilateral rhonchi present  CVS: RRR  GI: NT, BS +  Extremities; no pedal edema  General: NAD    ASSESSMENT:    1. Shortness of breath and dry cough, most likely secondary to #2 and #3.  2. Acute bronchitis with atelectasis. 3. Abnormal CT chest showing soft tissue masses s/p EBUS and biopsy. 4. Hypertension. 5. Hypokalemia, resolved. 6. Hyponatremia, improving. 7. Poor appetite. 8. Moderate protein-calorie malnutrition. 9. Right-sided chest pain, currently resolved, most likely  musculoskeletal, rule out acute coronary syndrome. 10. History of dyslipidemia. 11. Chronic obstructive pulmonary disease. 12. Lifetime smoker. 13. Chronic pain syndrome.   14. GPC bacteremia 1/4, contamination    PLAN:    Continue current management  Will follow up biopsy report  Talked to dr. Kevin Garcia vancomycin and repeat blood c/s      CMP:   Lab Results   Component Value Date/Time     (L) 02/08/2017 02:30 AM    K 4.3 02/08/2017 02:30 AM    CL 98 (L) 02/08/2017 02:30 AM    CO2 30 02/08/2017 02:30 AM    AGAP 6 02/08/2017 02:30 AM     (H) 02/08/2017 02:30 AM    BUN 7 02/08/2017 02:30 AM    CREA 0.46 (L) 02/08/2017 02:30 AM    GFRAA >60 02/08/2017 02:30 AM    GFRNA >60 02/08/2017 02:30 AM    CA 9.3 02/08/2017 02:30 AM    MG 1.9 02/08/2017 02:30 AM    ALB 3.0 (L) 02/08/2017 02:30 AM    TP 6.6 02/08/2017 02:30 AM    GLOB 3.6 02/08/2017 02:30 AM    AGRAT 0.8 02/08/2017 02:30 AM    SGOT 20 02/08/2017 02:30 AM    ALT 19 02/08/2017 02:30 AM     CBC:   Lab Results   Component Value Date/Time    WBC 11.3 02/08/2017 02:30 AM    HGB 13.0 02/08/2017 02:30 AM    HCT 37.3 02/08/2017 02:30 AM     02/08/2017 02:30 AM

## 2017-02-08 NOTE — ROUTINE PROCESS
TRANSFER - IN REPORT:    Electronic report received on Sandi Andersonr  being received from [facility](unit) for routine progression of care      Report consisted of patients Situation, Background, Assessment and   Recommendations(SBAR). Information from the following report(s) Kardex was reviewed with the receiving nurse. Assessment completed upon patients arrival to unit and care assumed.

## 2017-02-08 NOTE — PROGRESS NOTES
Care Management Interventions  PCP Verified by CM: Yes  Transition of Care Consult (CM Consult): Discharge Planning  Discharge Durable Medical Equipment: No (Pt used a 4 prong cane (now missing). )  Physical Therapy Consult: No  Occupational Therapy Consult: No  Speech Therapy Consult: Yes  Current Support Network: Lives with Spouse  Confirm Follow Up Transport: Family  Plan discussed with Pt/Family/Caregiver: Yes  Discharge Location  Discharge Placement: Home  Pt is a 80year old female admitted for health care associated pneumonia. Pt's spouse and  primary caregiver at bedside. Pt's 4 prong cane has been found and at bedside. Pt was independent with ADLs and IADLs prior to admission. Pt is planning to return home upon d/c.

## 2017-02-08 NOTE — PROGRESS NOTES
conducted a Follow up consultation and Spiritual Assessment for Charles Moreno, who is a 80 y.o.,female. The  provided the following Interventions:  Continued the relationship of care and support. Listened empathically. Offered prayer and assurance of continued prayer on patients behalf. Chart reviewed. The following outcomes were achieved:  Patient expressed gratitude for 's visit. Patient seems to be more at peace about her upcoming surgery. Assessment:  There are no further spiritual or Jewish issues which require Spiritual Care Services interventions at this time. Plan:  Chaplains will continue to follow and will provide pastoral care on an as needed/requested basis.  recommends bedside caregivers page  on duty if patient shows signs of acute spiritual or emotional distress.      [id number] [facility] Certified [address] [place] [address]   [phone]

## 2017-02-08 NOTE — PERIOP NOTES
TRANSFER - OUT REPORT:    Verbal report given to Rolan Andrea RN(name) on Juany Landry  being transferred to [address] [place] [address](unit) for routine progression of care       Report consisted of patients Situation, Background, Assessment and   Recommendations(SBAR). Information from the following report(s) SBAR, Kardex, OR Summary, Procedure Summary, Intake/Output, MAR and Recent Results was reviewed with the receiving nurse. Lines:   Peripheral IV 01/29/13 Right Forearm (Active)       Peripheral IV 02/06/17 Left Forearm (Active)   Site Assessment Clean, dry, & intact 2/8/2017  4:57 AM   Phlebitis Assessment 0 2/8/2017  4:57 AM   Infiltration Assessment 0 2/8/2017  4:57 AM   Dressing Status Clean, dry, & intact 2/8/2017  4:57 AM   Dressing Type Transparent 2/8/2017  4:57 AM   Hub Color/Line Status Pink 2/8/2017  4:57 AM        Opportunity for questions and clarification was provided.       Patient transported with:   O2 @ 2 liters  Tech

## 2017-02-09 LAB
ANA TITR SER IF: NEGATIVE {TITER}
DATE LAST DOSE: NORMAL
GLUCOSE BLD STRIP.AUTO-MCNC: 118 MG/DL (ref 70–110)
GLUCOSE BLD STRIP.AUTO-MCNC: 153 MG/DL (ref 70–110)
GLUCOSE BLD STRIP.AUTO-MCNC: 160 MG/DL (ref 70–110)
M PNEUMO IGG SER IA-ACNC: 431 U/ML (ref 0–99)
M PNEUMO IGM SER IA-ACNC: <770 U/ML (ref 0–769)
REPORTED DOSE,DOSE: NORMAL UNITS
REPORTED DOSE/TIME,TMG: 556
VANCOMYCIN TROUGH SERPL-MCNC: 10.4 UG/ML (ref 10–20)

## 2017-02-09 PROCEDURE — 92526 ORAL FUNCTION THERAPY: CPT

## 2017-02-09 PROCEDURE — 82962 GLUCOSE BLOOD TEST: CPT

## 2017-02-09 PROCEDURE — 94640 AIRWAY INHALATION TREATMENT: CPT

## 2017-02-09 PROCEDURE — 74011250637 HC RX REV CODE- 250/637: Performed by: INTERNAL MEDICINE

## 2017-02-09 PROCEDURE — 74011250636 HC RX REV CODE- 250/636: Performed by: INTERNAL MEDICINE

## 2017-02-09 PROCEDURE — 74011636637 HC RX REV CODE- 636/637: Performed by: INTERNAL MEDICINE

## 2017-02-09 PROCEDURE — 74011000250 HC RX REV CODE- 250: Performed by: INTERNAL MEDICINE

## 2017-02-09 PROCEDURE — 36415 COLL VENOUS BLD VENIPUNCTURE: CPT | Performed by: EMERGENCY MEDICINE

## 2017-02-09 PROCEDURE — 80202 ASSAY OF VANCOMYCIN: CPT | Performed by: EMERGENCY MEDICINE

## 2017-02-09 PROCEDURE — 65660000000 HC RM CCU STEPDOWN

## 2017-02-09 RX ORDER — INSULIN LISPRO 100 [IU]/ML
INJECTION, SOLUTION INTRAVENOUS; SUBCUTANEOUS
Status: DISCONTINUED | OUTPATIENT
Start: 2017-02-09 | End: 2017-02-10 | Stop reason: HOSPADM

## 2017-02-09 RX ORDER — AMLODIPINE BESYLATE 5 MG/1
5 TABLET ORAL DAILY
Status: DISCONTINUED | OUTPATIENT
Start: 2017-02-09 | End: 2017-02-10 | Stop reason: HOSPADM

## 2017-02-09 RX ORDER — ALBUTEROL SULFATE 0.83 MG/ML
2.5 SOLUTION RESPIRATORY (INHALATION)
Status: DISCONTINUED | OUTPATIENT
Start: 2017-02-09 | End: 2017-02-10 | Stop reason: HOSPADM

## 2017-02-09 RX ORDER — MAGNESIUM SULFATE 100 %
16 CRYSTALS MISCELLANEOUS AS NEEDED
Status: DISCONTINUED | OUTPATIENT
Start: 2017-02-09 | End: 2017-02-10 | Stop reason: HOSPADM

## 2017-02-09 RX ORDER — DEXTROSE 50 % IN WATER (D50W) INTRAVENOUS SYRINGE
25-50 AS NEEDED
Status: DISCONTINUED | OUTPATIENT
Start: 2017-02-09 | End: 2017-02-10 | Stop reason: HOSPADM

## 2017-02-09 RX ORDER — METOPROLOL TARTRATE 25 MG/1
25 TABLET, FILM COATED ORAL 2 TIMES DAILY
Status: DISCONTINUED | OUTPATIENT
Start: 2017-02-09 | End: 2017-02-10 | Stop reason: HOSPADM

## 2017-02-09 RX ORDER — ACETYLCYSTEINE 100 MG/ML
2 SOLUTION ORAL; RESPIRATORY (INHALATION)
Status: DISPENSED | OUTPATIENT
Start: 2017-02-09 | End: 2017-02-09

## 2017-02-09 RX ORDER — IPRATROPIUM BROMIDE AND ALBUTEROL SULFATE 2.5; .5 MG/3ML; MG/3ML
3 SOLUTION RESPIRATORY (INHALATION)
Status: DISCONTINUED | OUTPATIENT
Start: 2017-02-10 | End: 2017-02-10 | Stop reason: HOSPADM

## 2017-02-09 RX ADMIN — BUDESONIDE 500 MCG: 0.5 INHALANT RESPIRATORY (INHALATION) at 19:48

## 2017-02-09 RX ADMIN — GUAIFENESIN 600 MG: 600 TABLET, EXTENDED RELEASE ORAL at 23:12

## 2017-02-09 RX ADMIN — INSULIN LISPRO 2 UNITS: 100 INJECTION, SOLUTION INTRAVENOUS; SUBCUTANEOUS at 13:43

## 2017-02-09 RX ADMIN — IPRATROPIUM BROMIDE AND ALBUTEROL SULFATE 3 ML: .5; 3 SOLUTION RESPIRATORY (INHALATION) at 19:47

## 2017-02-09 RX ADMIN — CYCLOSPORINE 1 DROP: 0.5 EMULSION OPHTHALMIC at 18:42

## 2017-02-09 RX ADMIN — GABAPENTIN 300 MG: 300 CAPSULE ORAL at 08:26

## 2017-02-09 RX ADMIN — IPRATROPIUM BROMIDE AND ALBUTEROL SULFATE 3 ML: .5; 3 SOLUTION RESPIRATORY (INHALATION) at 01:35

## 2017-02-09 RX ADMIN — METOPROLOL TARTRATE 12.5 MG: 25 TABLET ORAL at 08:27

## 2017-02-09 RX ADMIN — AMLODIPINE BESYLATE 5 MG: 5 TABLET ORAL at 11:04

## 2017-02-09 RX ADMIN — Medication 10 ML: at 05:57

## 2017-02-09 RX ADMIN — Medication 10 ML: at 13:39

## 2017-02-09 RX ADMIN — VANCOMYCIN HYDROCHLORIDE 750 MG: 10 INJECTION, POWDER, LYOPHILIZED, FOR SOLUTION INTRAVENOUS at 05:56

## 2017-02-09 RX ADMIN — CLONIDINE HYDROCHLORIDE 0.1 MG: 0.1 TABLET ORAL at 17:50

## 2017-02-09 RX ADMIN — TAMSULOSIN HYDROCHLORIDE 0.4 MG: 0.4 CAPSULE ORAL at 08:26

## 2017-02-09 RX ADMIN — SIMVASTATIN 20 MG: 20 TABLET, FILM COATED ORAL at 23:12

## 2017-02-09 RX ADMIN — INSULIN LISPRO 2 UNITS: 100 INJECTION, SOLUTION INTRAVENOUS; SUBCUTANEOUS at 23:13

## 2017-02-09 RX ADMIN — BIMATOPROST 1 DROP: 0.1 SOLUTION/ DROPS OPHTHALMIC at 18:45

## 2017-02-09 RX ADMIN — IPRATROPIUM BROMIDE AND ALBUTEROL SULFATE 3 ML: .5; 3 SOLUTION RESPIRATORY (INHALATION) at 08:31

## 2017-02-09 RX ADMIN — GABAPENTIN 300 MG: 300 CAPSULE ORAL at 17:47

## 2017-02-09 RX ADMIN — METOPROLOL TARTRATE 25 MG: 25 TABLET ORAL at 18:39

## 2017-02-09 RX ADMIN — Medication 1 CAPSULE: at 08:23

## 2017-02-09 RX ADMIN — GUAIFENESIN 600 MG: 600 TABLET, EXTENDED RELEASE ORAL at 08:23

## 2017-02-09 RX ADMIN — TRAZODONE HYDROCHLORIDE 50 MG: 50 TABLET ORAL at 23:12

## 2017-02-09 RX ADMIN — MULTIPLE VITAMINS W/ MINERALS TAB 1 TABLET: TAB at 08:29

## 2017-02-09 RX ADMIN — HEPARIN SODIUM 5000 UNITS: 5000 INJECTION, SOLUTION INTRAVENOUS; SUBCUTANEOUS at 11:04

## 2017-02-09 RX ADMIN — Medication 500 MG: at 08:28

## 2017-02-09 RX ADMIN — CLONIDINE HYDROCHLORIDE 0.1 MG: 0.1 TABLET ORAL at 10:59

## 2017-02-09 RX ADMIN — HEPARIN SODIUM 5000 UNITS: 5000 INJECTION, SOLUTION INTRAVENOUS; SUBCUTANEOUS at 18:40

## 2017-02-09 RX ADMIN — GABAPENTIN 300 MG: 300 CAPSULE ORAL at 23:12

## 2017-02-09 RX ADMIN — CYCLOSPORINE 1 DROP: 0.5 EMULSION OPHTHALMIC at 10:59

## 2017-02-09 RX ADMIN — BUDESONIDE 500 MCG: 0.5 INHALANT RESPIRATORY (INHALATION) at 08:31

## 2017-02-09 RX ADMIN — ACETYLCYSTEINE 200 MG: 100 INHALANT RESPIRATORY (INHALATION) at 16:28

## 2017-02-09 RX ADMIN — VANCOMYCIN HYDROCHLORIDE 750 MG: 10 INJECTION, POWDER, LYOPHILIZED, FOR SOLUTION INTRAVENOUS at 13:38

## 2017-02-09 RX ADMIN — Medication 10 ML: at 22:00

## 2017-02-09 RX ADMIN — HEPARIN SODIUM 5000 UNITS: 5000 INJECTION, SOLUTION INTRAVENOUS; SUBCUTANEOUS at 04:00

## 2017-02-09 NOTE — PROGRESS NOTES
Pulmonology Progress Note    Subjective:     Feeling better  Flutter valve is helping her    Current Facility-Administered Medications   Medication Dose Route Frequency    Vancomycin Lab Information  1 Each Other Once per day on Thu    insulin lispro (HUMALOG) injection   SubCUTAneous AC&HS    glucose chewable tablet 16 g  16 g Oral PRN    glucagon (GLUCAGEN) injection 1 mg  1 mg IntraMUSCular PRN    dextrose (D50W) injection syrg 12.5-25 g  25-50 mL IntraVENous PRN    metoprolol tartrate (LOPRESSOR) tablet 25 mg  25 mg Oral BID    amLODIPine (NORVASC) tablet 5 mg  5 mg Oral DAILY    acetylcysteine (MUCOMYST) 100 mg/mL (10 %) nebulizer solution 200 mg  2 mL Nebulization Q4H RT    vancomycin (VANCOCIN) 750 mg in 0.9% sodium chloride 250 mL IVPB  750 mg IntraVENous Q8H    albuterol-ipratropium (DUO-NEB) 2.5 MG-0.5 MG/3 ML  3 mL Nebulization Q6H RT    ascorbic acid (vitamin C) (VITAMIN C) tablet 500 mg  500 mg Oral DAILY    cycloSPORINE (RESTASIS) 0.05 % ophthalmic emulsion 1 Drop  1 Drop Both Eyes BID    gabapentin (NEURONTIN) capsule 300 mg  300 mg Oral TID    HYDROmorphone (DILAUDID) tablet 2 mg  2 mg Oral Q6H PRN    LORazepam (ATIVAN) tablet 0.5 mg  0.5 mg Oral Q6H PRN    bimatoprost (LUMIGAN) 0.01 % ophthalmic drops 1 Drop  1 Drop Both Eyes QPM    multivitamin, tx-iron-ca-min (THERA-M w/ IRON) tablet 1 Tab  1 Tab Oral DAILY    fish oil-omega-3 fatty acids 340-1,000 mg capsule 1 Cap  1 Cap Oral DAILY    simvastatin (ZOCOR) tablet 20 mg  20 mg Oral QHS    traZODone (DESYREL) tablet 50 mg  50 mg Oral QHS    tamsulosin (FLOMAX) capsule 0.4 mg  0.4 mg Oral DAILY    sodium chloride (NS) flush 5-10 mL  5-10 mL IntraVENous Q8H    sodium chloride (NS) flush 5-10 mL  5-10 mL IntraVENous PRN    acetaminophen (TYLENOL) tablet 650 mg  650 mg Oral Q4H PRN    naloxone (NARCAN) injection 0.4 mg  0.4 mg IntraVENous PRN    ondansetron (ZOFRAN) injection 4 mg  4 mg IntraVENous Q4H PRN    magnesium hydroxide (MILK OF MAGNESIA) 400 mg/5 mL oral suspension 30 mL  30 mL Oral DAILY PRN    nicotine (NICODERM CQ) 14 mg/24 hr patch 1 Patch  1 Patch TransDERmal Q24H    heparin (porcine) injection 5,000 Units  5,000 Units SubCUTAneous Q8H    guaiFENesin SR (MUCINEX) tablet 600 mg  600 mg Oral Q12H    cloNIDine HCl (CATAPRES) tablet 0.1 mg  0.1 mg Oral Q8H PRN    budesonide (PULMICORT) 500 mcg/2 ml nebulizer suspension  500 mcg Nebulization BID RT       Objective:     Visit Vitals    BP (!) 184/94 (BP 1 Location: Right arm, BP Patient Position: At rest)    Pulse 80    Temp 97.8 °F (36.6 °C)    Resp 20    Ht 5' 5\" (1.651 m)    Wt 55 kg (121 lb 3.2 oz)    SpO2 94%    Breastfeeding No    BMI 20.17 kg/m2    O2 Flow Rate (L/min): 2 l/min O2 Device: Nasal cannula    02/07 1901 - 02/09 0700  In: 880 [P.O.:730; I.V.:150]  Out: 1305 [Urine:1300]  02/09 0701 - 02/09 1900  In: 240 [P.O.:240]  Out: 1550 [Urine:1550]    Physical Exam:   Visit Vitals    BP (!) 184/94 (BP 1 Location: Right arm, BP Patient Position: At rest)    Pulse 80    Temp 97.8 °F (36.6 °C)    Resp 20    Ht 5' 5\" (1.651 m)    Wt 55 kg (121 lb 3.2 oz)    SpO2 94%    Breastfeeding No    BMI 20.17 kg/m2     General:  Alert, cooperative, no distress, appears stated age, thin built   Head:  Normocephalic, without obvious abnormality, atraumatic. Eyes:  Conjunctivae/corneas clear. PERRL, EOMs intact. Fundi benign. Ears:  Normal TMs and external ear canals both ears. Nose: Nares normal. Septum midline. Mucosa normal. No drainage or sinus tenderness. Throat: Lips, mucosa, and tongue normal. Teeth and gums normal.   Neck: Supple, symmetrical, trachea midline, no adenopathy, thyroid: no enlargement/tenderness/nodules, no carotid bruit and no JVD. Back:   Symmetric, no curvature. ROM normal. No CVA tenderness. Lungs:   Poor air entry bilaterally. Chest wall:  No tenderness or deformity.    Heart:  Regular rate and rhythm, S1, S2 normal, no murmur, click, rub or gallop. Breast Exam:  No tenderness, masses, or nipple abnormality. Abdomen:   Soft, non-tender. Bowel sounds normal. No masses,  No organomegaly. Genitalia:  Normal female without lesion, discharge or tenderness. Rectal:  Normal tone,  no masses or tenderness  Guaiac negative stool. Extremities: Extremities normal, atraumatic, no cyanosis or edema. Pulses: 2+ and symmetric all extremities. Skin: Skin color, texture, turgor normal. No rashes or lesions. Lymph nodes: Cervical, supraclavicular, and axillary nodes normal.   Neurologic: CNII-XII intact. Normal strength, sensation and reflexes throughout.        Data Review:    Recent Results (from the past 24 hour(s))   CULTURE, BLOOD    Collection Time: 02/08/17  1:43 PM   Result Value Ref Range    Special Requests: NO SPECIAL REQUESTS      Culture result: NO GROWTH AFTER 18 HOURS     CULTURE, BLOOD    Collection Time: 02/08/17  1:55 PM   Result Value Ref Range    Special Requests: NO SPECIAL REQUESTS      Culture result: NO GROWTH AFTER 18 HOURS           Assessment:     Active Problems:    HCAP (healthcare-associated pneumonia) (2/7/2017)      Abnormal CT scan, chest (2/7/2017)        Plan:     F/u cultures  Continue vanco : suspect was just a skin contaminant  Assess for home o2 prior to d/c  Will need pft's as outpatient  F/u the hilar lymph node cytology as outpatient with [facility]

## 2017-02-09 NOTE — DIABETES MGMT
GLYCEMIC CONTROL PLAN OF CARE    Assessment:  Pt is 80 yr old female admitted on 2/6/17 with shortness of breath and dry cough. Pt with past medical history significant for HCAP, HLD, Vitamin D deficiency, DM, polynueropathy, DDD. Recommendations:  Pt BG trending above target range, recommend place pt on correctional lispro protocol ACHS- normal insulin sensitivity to monitor and manage inpatient bG. Will continue to monitor inpatient for intervention. Most recent blood glucose values:  Results for Agustin Rivera (MRN [id number]) as of 2/9/2017 10:58   Ref.  Range 2/8/2017 08:38 2/8/2017 11:43   GLUCOSE,FAST - POC Latest Ref Range: 70 - 110 mg/dL 188 (H) 164 (H)     Current A1C:  5.9% (12/06/16) equivalent  to ave Blood Glucose of 123mg/dl for 2-3 months prior to admission    Current hospital diabetes medications:  None noted    Diet:   Cardiac, consistent carbohydrate 1800 kcal with glucerna each meal  Current Meal Intake:  Patient Vitals for the past 100 hrs:   % Diet Eaten   02/08/17 1807 100 %   02/08/17 0457 0 %   02/07/17 1859 25 %   02/07/17 1400 100 %       Home diabetes medications:  None     Goals:  Pt BG will be within target range by _2/12/17____    Education:  ___  Refer to Diabetes Education Record             _x__  Education not indicated at this time    Dallin Calzada, MS, RD, CDE

## 2017-02-09 NOTE — ROUTINE PROCESS
Bedside and Verbal shift change report given to ADRIANNA Clement (oncoming nurse) by Erlin Fairbanks RN (offgoing nurse). Report included the following information SBAR and Kardex. Patient had no acute events overnight. Currently resting in bed in [facility].

## 2017-02-09 NOTE — PROGRESS NOTES
SUBJECTIVE:    Wants to go home. Dry cough present. No chest pain or abdominal pain. No N/V/D.  Spo2 is 94% on room air. OBJECTIVE:    Visit Vitals    BP (!) 185/101 (BP 1 Location: Right arm, BP Patient Position: Sitting)    Pulse 84    Temp 97.5 °F (36.4 °C)    Resp 18    Ht 5' 5\" (1.651 m)    Wt 55 kg (121 lb 3.2 oz)    SpO2 94%  Comment: Room air    Breastfeeding No    BMI 20.17 kg/m2     RS: Bilateral rhonchi present but better  CVS: RRR  GI: NT, BS +  Extremities; no pedal edema  General: NAD    ASSESSMENT:    1. Shortness of breath and dry cough, most likely secondary to #2 and #3.  2. Acute bronchitis with atelectasis. 3. Abnormal CT chest showing soft tissue masses s/p EBUS and biopsy. 4. Hypertension. 5. Hypokalemia, resolved. 6. Hyponatremia, improving. 7. Poor appetite. 8. Moderate protein-calorie malnutrition. 9. Right-sided chest pain, currently resolved, most likely musculoskeletal, rule out acute coronary syndrome. 10. History of dyslipidemia. 11. Chronic obstructive pulmonary disease. 12. Lifetime smoker. 13. Chronic pain syndrome. 14. GPC bacteremia 1/4, contamination    PLAN:    Continue current management  Will follow up biopsy report  Talked to dr. Alex Solares to be on board to follow up on biopsy report  Will follow repeat blood c/s  PT/OT and possible discharge tomorrow if remains stable overnight.      CMP:   No results found for: NA, K, CL, CO2, AGAP, GLU, BUN, CREA, GFRAA, GFRNA, CA, MG, PHOS, ALB, TBIL, TP, ALB, GLOB, AGRAT, SGOT, ALT, GPT     CBC:   No results found for: WBC, HGB, HGBEXT, HCT, HCTEXT, PLT, PLTEXT, HGBEXT, HCTEXT, PLTEXT

## 2017-02-10 ENCOUNTER — HOME HEALTH ADMISSION (OUTPATIENT)
Dept: HOME HEALTH SERVICES | Facility: HOME HEALTH | Age: 82
End: 2017-02-10

## 2017-02-10 VITALS
OXYGEN SATURATION: 91 % | SYSTOLIC BLOOD PRESSURE: 178 MMHG | WEIGHT: 120.5 LBS | BODY MASS INDEX: 20.08 KG/M2 | HEART RATE: 82 BPM | DIASTOLIC BLOOD PRESSURE: 78 MMHG | RESPIRATION RATE: 18 BRPM | TEMPERATURE: 97.9 F | HEIGHT: 65 IN

## 2017-02-10 LAB
ANION GAP BLD CALC-SCNC: 10 MMOL/L (ref 3–18)
ARTERIAL PATENCY WRIST A: YES
BACTERIA SPEC CULT: ABNORMAL
BACTERIA SPEC CULT: NORMAL
BASE EXCESS BLD CALC-SCNC: 3 MMOL/L
BASOPHILS # BLD AUTO: 0 K/UL (ref 0–0.1)
BASOPHILS # BLD: 0 % (ref 0–2)
BDY SITE: ABNORMAL
BUN SERPL-MCNC: 4 MG/DL (ref 7–18)
BUN/CREAT SERPL: 11 (ref 12–20)
CALCIUM SERPL-MCNC: 8.9 MG/DL (ref 8.5–10.1)
CHLORIDE SERPL-SCNC: 91 MMOL/L (ref 100–108)
CO2 SERPL-SCNC: 28 MMOL/L (ref 21–32)
CREAT SERPL-MCNC: 0.37 MG/DL (ref 0.6–1.3)
DIFFERENTIAL METHOD BLD: ABNORMAL
EOSINOPHIL # BLD: 0.1 K/UL (ref 0–0.4)
EOSINOPHIL NFR BLD: 2 % (ref 0–5)
ERYTHROCYTE [DISTWIDTH] IN BLOOD BY AUTOMATED COUNT: 13.4 % (ref 11.6–14.5)
GAS FLOW.O2 O2 DELIVERY SYS: ABNORMAL L/MIN
GLUCOSE BLD STRIP.AUTO-MCNC: 123 MG/DL (ref 70–110)
GLUCOSE BLD STRIP.AUTO-MCNC: 127 MG/DL (ref 70–110)
GLUCOSE BLD STRIP.AUTO-MCNC: 127 MG/DL (ref 70–110)
GLUCOSE SERPL-MCNC: 86 MG/DL (ref 74–99)
GRAM STN SPEC: ABNORMAL
GRAM STN SPEC: ABNORMAL
GRAM STN SPEC: NORMAL
GRAM STN SPEC: NORMAL
HCO3 BLD-SCNC: 26.6 MMOL/L (ref 22–26)
HCT VFR BLD AUTO: 35.3 % (ref 35–45)
HGB BLD-MCNC: 12.4 G/DL (ref 12–16)
LYMPHOCYTES # BLD AUTO: 23 % (ref 21–52)
LYMPHOCYTES # BLD: 2.2 K/UL (ref 0.9–3.6)
MAGNESIUM SERPL-MCNC: 1.8 MG/DL (ref 1.8–2.4)
MCH RBC QN AUTO: 31.5 PG (ref 24–34)
MCHC RBC AUTO-ENTMCNC: 35.1 G/DL (ref 31–37)
MCV RBC AUTO: 89.6 FL (ref 74–97)
MONOCYTES # BLD: 0.8 K/UL (ref 0.05–1.2)
MONOCYTES NFR BLD AUTO: 9 % (ref 3–10)
NEUTS SEG # BLD: 6.3 K/UL (ref 1.8–8)
NEUTS SEG NFR BLD AUTO: 66 % (ref 40–73)
PCO2 BLD: 37.5 MMHG (ref 35–45)
PH BLD: 7.46 [PH] (ref 7.35–7.45)
PLATELET # BLD AUTO: 307 K/UL (ref 135–420)
PMV BLD AUTO: 9.9 FL (ref 9.2–11.8)
PO2 BLD: 67 MMHG (ref 80–100)
POTASSIUM SERPL-SCNC: 3.3 MMOL/L (ref 3.5–5.5)
RBC # BLD AUTO: 3.94 M/UL (ref 4.2–5.3)
SAO2 % BLD: 94 % (ref 92–97)
SERVICE CMNT-IMP: ABNORMAL
SERVICE CMNT-IMP: ABNORMAL
SERVICE CMNT-IMP: NORMAL
SODIUM SERPL-SCNC: 129 MMOL/L (ref 136–145)
SPECIMEN TYPE: ABNORMAL
TOTAL RESP. RATE, ITRR: 14
WBC # BLD AUTO: 9.5 K/UL (ref 4.6–13.2)

## 2017-02-10 PROCEDURE — 36600 WITHDRAWAL OF ARTERIAL BLOOD: CPT

## 2017-02-10 PROCEDURE — 97165 OT EVAL LOW COMPLEX 30 MIN: CPT

## 2017-02-10 PROCEDURE — 80048 BASIC METABOLIC PNL TOTAL CA: CPT | Performed by: INTERNAL MEDICINE

## 2017-02-10 PROCEDURE — 74011250636 HC RX REV CODE- 250/636: Performed by: INTERNAL MEDICINE

## 2017-02-10 PROCEDURE — 36415 COLL VENOUS BLD VENIPUNCTURE: CPT | Performed by: INTERNAL MEDICINE

## 2017-02-10 PROCEDURE — 74011250637 HC RX REV CODE- 250/637: Performed by: INTERNAL MEDICINE

## 2017-02-10 PROCEDURE — 74011000250 HC RX REV CODE- 250: Performed by: INTERNAL MEDICINE

## 2017-02-10 PROCEDURE — 94640 AIRWAY INHALATION TREATMENT: CPT

## 2017-02-10 PROCEDURE — 97530 THERAPEUTIC ACTIVITIES: CPT

## 2017-02-10 PROCEDURE — 82962 GLUCOSE BLOOD TEST: CPT

## 2017-02-10 PROCEDURE — 97162 PT EVAL MOD COMPLEX 30 MIN: CPT

## 2017-02-10 PROCEDURE — 83735 ASSAY OF MAGNESIUM: CPT | Performed by: INTERNAL MEDICINE

## 2017-02-10 PROCEDURE — 85025 COMPLETE CBC W/AUTO DIFF WBC: CPT | Performed by: INTERNAL MEDICINE

## 2017-02-10 PROCEDURE — 82803 BLOOD GASES ANY COMBINATION: CPT

## 2017-02-10 RX ORDER — METOPROLOL TARTRATE 25 MG/1
25 TABLET, FILM COATED ORAL 2 TIMES DAILY
Qty: 60 TAB | Refills: 0 | Status: SHIPPED | OUTPATIENT
Start: 2017-02-10 | End: 2017-05-08 | Stop reason: SDUPTHER

## 2017-02-10 RX ORDER — IBUPROFEN 200 MG
1 TABLET ORAL EVERY 24 HOURS
Qty: 30 PATCH | Refills: 0 | Status: SHIPPED | OUTPATIENT
Start: 2017-02-10 | End: 2017-03-12

## 2017-02-10 RX ORDER — POTASSIUM CHLORIDE 20 MEQ/1
20 TABLET, EXTENDED RELEASE ORAL
Status: DISCONTINUED | OUTPATIENT
Start: 2017-02-10 | End: 2017-02-10

## 2017-02-10 RX ORDER — AMOXICILLIN AND CLAVULANATE POTASSIUM 875; 125 MG/1; MG/1
1 TABLET, FILM COATED ORAL EVERY 12 HOURS
Qty: 14 TAB | Refills: 0 | Status: SHIPPED | OUTPATIENT
Start: 2017-02-10 | End: 2017-02-17

## 2017-02-10 RX ORDER — IPRATROPIUM BROMIDE AND ALBUTEROL SULFATE 2.5; .5 MG/3ML; MG/3ML
3 SOLUTION RESPIRATORY (INHALATION)
Qty: 30 NEBULE | Refills: 0 | Status: SHIPPED | OUTPATIENT
Start: 2017-02-10 | End: 2017-12-13 | Stop reason: ALTCHOICE

## 2017-02-10 RX ORDER — POTASSIUM CHLORIDE 20 MEQ/1
40 TABLET, EXTENDED RELEASE ORAL
Status: COMPLETED | OUTPATIENT
Start: 2017-02-10 | End: 2017-02-10

## 2017-02-10 RX ORDER — AMLODIPINE BESYLATE 10 MG/1
10 TABLET ORAL DAILY
Qty: 30 TAB | Refills: 0 | Status: SHIPPED | OUTPATIENT
Start: 2017-02-10 | End: 2018-04-18 | Stop reason: SDUPTHER

## 2017-02-10 RX ORDER — BUDESONIDE AND FORMOTEROL FUMARATE DIHYDRATE 160; 4.5 UG/1; UG/1
1 AEROSOL RESPIRATORY (INHALATION) 2 TIMES DAILY
Qty: 1 INHALER | Refills: 0 | Status: SHIPPED | OUTPATIENT
Start: 2017-02-10 | End: 2017-03-24 | Stop reason: SDUPTHER

## 2017-02-10 RX ORDER — LORAZEPAM 1 MG/1
0.5 TABLET ORAL
Qty: 30 TAB | Refills: 0 | Status: SHIPPED
Start: 2017-02-10 | End: 2017-12-13 | Stop reason: ALTCHOICE

## 2017-02-10 RX ORDER — POTASSIUM CHLORIDE 20 MEQ/1
20 TABLET, EXTENDED RELEASE ORAL DAILY
Qty: 3 TAB | Refills: 0 | Status: SHIPPED | OUTPATIENT
Start: 2017-02-11 | End: 2017-02-13 | Stop reason: ALTCHOICE

## 2017-02-10 RX ORDER — SAME BUTANEDISULFONATE/BETAINE 400-600 MG
250 POWDER IN PACKET (EA) ORAL 2 TIMES DAILY
Qty: 14 CAP | Refills: 0 | Status: SHIPPED | OUTPATIENT
Start: 2017-02-10 | End: 2017-02-17

## 2017-02-10 RX ORDER — BUDESONIDE AND FORMOTEROL FUMARATE DIHYDRATE 160; 4.5 UG/1; UG/1
2 AEROSOL RESPIRATORY (INHALATION)
Status: DISCONTINUED | OUTPATIENT
Start: 2017-02-10 | End: 2017-02-10 | Stop reason: HOSPADM

## 2017-02-10 RX ADMIN — GABAPENTIN 300 MG: 300 CAPSULE ORAL at 16:41

## 2017-02-10 RX ADMIN — IPRATROPIUM BROMIDE AND ALBUTEROL SULFATE 3 ML: .5; 3 SOLUTION RESPIRATORY (INHALATION) at 14:26

## 2017-02-10 RX ADMIN — GUAIFENESIN 600 MG: 600 TABLET, EXTENDED RELEASE ORAL at 08:14

## 2017-02-10 RX ADMIN — MULTIPLE VITAMINS W/ MINERALS TAB 1 TABLET: TAB at 08:14

## 2017-02-10 RX ADMIN — HEPARIN SODIUM 5000 UNITS: 5000 INJECTION, SOLUTION INTRAVENOUS; SUBCUTANEOUS at 12:53

## 2017-02-10 RX ADMIN — ACETAMINOPHEN 650 MG: 325 TABLET, FILM COATED ORAL at 09:45

## 2017-02-10 RX ADMIN — BUDESONIDE 500 MCG: 0.5 INHALANT RESPIRATORY (INHALATION) at 08:06

## 2017-02-10 RX ADMIN — METOPROLOL TARTRATE 25 MG: 25 TABLET ORAL at 08:14

## 2017-02-10 RX ADMIN — Medication 500 MG: at 08:14

## 2017-02-10 RX ADMIN — GABAPENTIN 300 MG: 300 CAPSULE ORAL at 08:14

## 2017-02-10 RX ADMIN — Medication 10 ML: at 13:01

## 2017-02-10 RX ADMIN — HEPARIN SODIUM 5000 UNITS: 5000 INJECTION, SOLUTION INTRAVENOUS; SUBCUTANEOUS at 06:02

## 2017-02-10 RX ADMIN — IPRATROPIUM BROMIDE AND ALBUTEROL SULFATE 3 ML: .5; 3 SOLUTION RESPIRATORY (INHALATION) at 08:06

## 2017-02-10 RX ADMIN — CYCLOSPORINE 1 DROP: 0.5 EMULSION OPHTHALMIC at 08:14

## 2017-02-10 RX ADMIN — Medication 10 ML: at 06:02

## 2017-02-10 RX ADMIN — TAMSULOSIN HYDROCHLORIDE 0.4 MG: 0.4 CAPSULE ORAL at 08:14

## 2017-02-10 RX ADMIN — AMLODIPINE BESYLATE 5 MG: 5 TABLET ORAL at 08:14

## 2017-02-10 RX ADMIN — Medication 1 CAPSULE: at 08:14

## 2017-02-10 RX ADMIN — POTASSIUM CHLORIDE 40 MEQ: 1500 TABLET, EXTENDED RELEASE ORAL at 16:41

## 2017-02-10 RX ADMIN — VANCOMYCIN HYDROCHLORIDE 1250 MG: 10 INJECTION, POWDER, LYOPHILIZED, FOR SOLUTION INTRAVENOUS at 12:58

## 2017-02-10 RX ADMIN — VANCOMYCIN HYDROCHLORIDE 1250 MG: 10 INJECTION, POWDER, LYOPHILIZED, FOR SOLUTION INTRAVENOUS at 06:02

## 2017-02-10 NOTE — PROGRESS NOTES
Care Management Interventions  PCP Verified by CM: Yes  Transition of Care Consult (CM Consult): [address]: Yes  Discharge Durable Medical Equipment: No (Pt used a 4 prong cane (now missing). )  Physical Therapy Consult: No  Occupational Therapy Consult: No  Speech Therapy Consult: Yes  Current Support Network: Lives with Spouse  Confirm Follow Up Transport: Family  Plan discussed with Pt/Family/Caregiver: Yes  Freedom of Choice Offered: Yes (Pt chose [address])  Discharge Location  Discharge Placement: Home with home health  Pt is discharging to home today. Order submitted to [address] [facility] from [facility] aware. Khalif Obando from [employer] will call back with fax number from [employer] for submission of order for nebulizer. Spoke with Khalif Obando from [employer]. Pt agreeable to private pay with [employer]; Order faxed to [employer] received by Khalif Obando. Nebulizer to be delivered to Pt's room today. Family aware.

## 2017-02-10 NOTE — PROGRESS NOTES
Pulmonology Progress Note    Subjective:     Feeling better  Flutter valve is helping her    Current Facility-Administered Medications   Medication Dose Route Frequency    insulin lispro (HUMALOG) injection   SubCUTAneous AC&HS    glucose chewable tablet 16 g  16 g Oral PRN    glucagon (GLUCAGEN) injection 1 mg  1 mg IntraMUSCular PRN    dextrose (D50W) injection syrg 12.5-25 g  25-50 mL IntraVENous PRN    metoprolol tartrate (LOPRESSOR) tablet 25 mg  25 mg Oral BID    amLODIPine (NORVASC) tablet 5 mg  5 mg Oral DAILY    vancomycin (VANCOCIN) 1,250 mg in 0.9% sodium chloride 250 mL IVPB  1,250 mg IntraVENous Q8H    albuterol-ipratropium (DUO-NEB) 2.5 MG-0.5 MG/3 ML  3 mL Nebulization TID RT    albuterol (PROVENTIL VENTOLIN) nebulizer solution 2.5 mg  2.5 mg Nebulization Q6H PRN    ascorbic acid (vitamin C) (VITAMIN C) tablet 500 mg  500 mg Oral DAILY    cycloSPORINE (RESTASIS) 0.05 % ophthalmic emulsion 1 Drop  1 Drop Both Eyes BID    gabapentin (NEURONTIN) capsule 300 mg  300 mg Oral TID    HYDROmorphone (DILAUDID) tablet 2 mg  2 mg Oral Q6H PRN    LORazepam (ATIVAN) tablet 0.5 mg  0.5 mg Oral Q6H PRN    bimatoprost (LUMIGAN) 0.01 % ophthalmic drops 1 Drop  1 Drop Both Eyes QPM    multivitamin, tx-iron-ca-min (THERA-M w/ IRON) tablet 1 Tab  1 Tab Oral DAILY    fish oil-omega-3 fatty acids 340-1,000 mg capsule 1 Cap  1 Cap Oral DAILY    simvastatin (ZOCOR) tablet 20 mg  20 mg Oral QHS    traZODone (DESYREL) tablet 50 mg  50 mg Oral QHS    tamsulosin (FLOMAX) capsule 0.4 mg  0.4 mg Oral DAILY    sodium chloride (NS) flush 5-10 mL  5-10 mL IntraVENous Q8H    sodium chloride (NS) flush 5-10 mL  5-10 mL IntraVENous PRN    acetaminophen (TYLENOL) tablet 650 mg  650 mg Oral Q4H PRN    naloxone (NARCAN) injection 0.4 mg  0.4 mg IntraVENous PRN    ondansetron (ZOFRAN) injection 4 mg  4 mg IntraVENous Q4H PRN    magnesium hydroxide (MILK OF MAGNESIA) 400 mg/5 mL oral suspension 30 mL  30 mL Oral DAILY PRN    nicotine (NICODERM CQ) 14 mg/24 hr patch 1 Patch  1 Patch TransDERmal Q24H    heparin (porcine) injection 5,000 Units  5,000 Units SubCUTAneous Q8H    guaiFENesin SR (MUCINEX) tablet 600 mg  600 mg Oral Q12H    cloNIDine HCl (CATAPRES) tablet 0.1 mg  0.1 mg Oral Q8H PRN    budesonide (PULMICORT) 500 mcg/2 ml nebulizer suspension  500 mcg Nebulization BID RT       Objective:     Visit Vitals    /75 (BP 1 Location: Left arm, BP Patient Position: At rest)    Pulse 75    Temp 97.7 °F (36.5 °C)    Resp 18    Ht 5' 5\" (1.651 m)    Wt 54.7 kg (120 lb 8 oz)    SpO2 92%    Breastfeeding No    BMI 20.05 kg/m2    O2 Flow Rate (L/min): 2 l/min O2 Device: Room air    02/08 1901 - 02/10 0700  In: 1450 [P.O.:1200; I.V.:250]  Out: 2450 [Urine:2450]  02/10 0701 - 02/10 1900  In: 240 [P.O.:240]  Out: -     Physical Exam:   Visit Vitals    /75 (BP 1 Location: Left arm, BP Patient Position: At rest)    Pulse 75    Temp 97.7 °F (36.5 °C)    Resp 18    Ht 5' 5\" (1.651 m)    Wt 54.7 kg (120 lb 8 oz)    SpO2 92%    Breastfeeding No    BMI 20.05 kg/m2     General:  Alert, cooperative, no distress, appears stated age, thin built   Head:  Normocephalic, without obvious abnormality, atraumatic. Eyes:  Conjunctivae/corneas clear. PERRL, EOMs intact. Fundi benign. Ears:  Normal TMs and external ear canals both ears. Nose: Nares normal. Septum midline. Mucosa normal. No drainage or sinus tenderness. Throat: Lips, mucosa, and tongue normal. Teeth and gums normal.   Neck: Supple, symmetrical, trachea midline, no adenopathy, thyroid: no enlargement/tenderness/nodules, no carotid bruit and no JVD. Back:   Symmetric, no curvature. ROM normal. No CVA tenderness. Lungs:   Poor air entry bilaterally. Chest wall:  No tenderness or deformity. Heart:  Regular rate and rhythm, S1, S2 normal, no murmur, click, rub or gallop. Breast Exam:  No tenderness, masses, or nipple abnormality.    Abdomen: Soft, non-tender. Bowel sounds normal. No masses,  No organomegaly. Genitalia:  Normal female without lesion, discharge or tenderness. Rectal:  Normal tone,  no masses or tenderness  Guaiac negative stool. Extremities: Extremities normal, atraumatic, no cyanosis or edema. Pulses: 2+ and symmetric all extremities. Skin: Skin color, texture, turgor normal. No rashes or lesions. Lymph nodes: Cervical, supraclavicular, and axillary nodes normal.   Neurologic: CNII-XII intact. Normal strength, sensation and reflexes throughout. Data Review:    Recent Results (from the past 24 hour(s))   VANCOMYCIN, TROUGH    Collection Time: 02/09/17  1:37 PM   Result Value Ref Range    Vancomycin,trough 10.4 10.0 - 20.0 ug/mL    Reported dose date: 20170209      Reported dose time: 556      Reported dose: 750 MG UNITS   GLUCOSE, POC    Collection Time: 02/09/17  1:42 PM   Result Value Ref Range    Glucose (POC) 153 (H) 70 - 110 mg/dL   GLUCOSE, POC    Collection Time: 02/09/17  4:03 PM   Result Value Ref Range    Glucose (POC) 118 (H) 70 - 110 mg/dL   GLUCOSE, POC    Collection Time: 02/09/17  8:13 PM   Result Value Ref Range    Glucose (POC) 160 (H) 70 - 110 mg/dL   POC G3    Collection Time: 02/10/17  3:45 AM   Result Value Ref Range    Device: ROOM AIR      pH (POC) 7.460 (H) 7.35 - 7.45      pCO2 (POC) 37.5 35.0 - 45.0 MMHG    pO2 (POC) 67 (L) 80 - 100 MMHG    HCO3 (POC) 26.6 (H) 22 - 26 MMOL/L    sO2 (POC) 94 92 - 97 %    Base excess (POC) 3 mmol/L    Allens test (POC) YES      Total resp.  rate 14      Site RIGHT RADIAL      Specimen type (POC) ARTERIAL      Performed by Jody Orjoana    CBC WITH AUTOMATED DIFF    Collection Time: 02/10/17  3:56 AM   Result Value Ref Range    WBC 9.5 4.6 - 13.2 K/uL    RBC 3.94 (L) 4.20 - 5.30 M/uL    HGB 12.4 12.0 - 16.0 g/dL    HCT 35.3 35.0 - 45.0 %    MCV 89.6 74.0 - 97.0 FL    MCH 31.5 24.0 - 34.0 PG    MCHC 35.1 31.0 - 37.0 g/dL    RDW 13.4 11.6 - 14.5 %    PLATELET 400 135 - 420 K/uL    MPV 9.9 9.2 - 11.8 FL    NEUTROPHILS 66 40 - 73 %    LYMPHOCYTES 23 21 - 52 %    MONOCYTES 9 3 - 10 %    EOSINOPHILS 2 0 - 5 %    BASOPHILS 0 0 - 2 %    ABS. NEUTROPHILS 6.3 1.8 - 8.0 K/UL    ABS. LYMPHOCYTES 2.2 0.9 - 3.6 K/UL    ABS. MONOCYTES 0.8 0.05 - 1.2 K/UL    ABS. EOSINOPHILS 0.1 0.0 - 0.4 K/UL    ABS.  BASOPHILS 0.0 0.0 - 0.1 K/UL    DF AUTOMATED     METABOLIC PANEL, BASIC    Collection Time: 02/10/17  3:56 AM   Result Value Ref Range    Sodium 129 (L) 136 - 145 mmol/L    Potassium 3.3 (L) 3.5 - 5.5 mmol/L    Chloride 91 (L) 100 - 108 mmol/L    CO2 28 21 - 32 mmol/L    Anion gap 10 3.0 - 18 mmol/L    Glucose 86 74 - 99 mg/dL    BUN 4 (L) 7.0 - 18 MG/DL    Creatinine 0.37 (L) 0.6 - 1.3 MG/DL    BUN/Creatinine ratio 11 (L) 12 - 20      GFR est AA >60 >60 ml/min/1.73m2    GFR est non-AA >60 >60 ml/min/1.73m2    Calcium 8.9 8.5 - 10.1 MG/DL   MAGNESIUM    Collection Time: 02/10/17  3:56 AM   Result Value Ref Range    Magnesium 1.8 1.8 - 2.4 mg/dL   GLUCOSE, POC    Collection Time: 02/10/17  8:20 AM   Result Value Ref Range    Glucose (POC) 123 (H) 70 - 110 mg/dL         Assessment:     Active Problems:    HCAP (healthcare-associated pneumonia) (2/7/2017)      Abnormal CT scan, chest (2/7/2017)        Plan:     FNA of right hilar lymph node -ve for malignancy  Repeat ct in 6-8  Weeks  Will need pft's as outpatient  Assess for home o2 prior to d/c  Needs to f/u with [facility] as outpatient  Will sign off at this time

## 2017-02-10 NOTE — PROGRESS NOTES
Problem: Self Care Deficits Care Plan (Adult)  Goal: *Acute Goals and Plan of Care (Insert Text)  Occupational Therapy Goals  Initiated 2/10/2017     1. Patient will perform grooming with modified independence   2. Patient will perform bathing with supervision/set-up. 3. Patient will perform lower body dressing with supervision/set-up. 4. Patient will perform toilet transfers with supervision/set-up. 5. Patient will perform all aspects of toileting with supervision/set-up. 6. Patient will participate in upper extremity therapeutic exercise/activities with supervision/set-up in preparation for self care tasks  Outcome: Progressing Towards Goal  OCCUPATIONAL THERAPY EVALUATION     Patient: Juany Landry (34 y.o. female)  Date: 2/10/2017  Primary Diagnosis: HCAP (healthcare-associated pneumonia)  Abnormal CT scan, chest  dx  Procedure(s) (LRB):  ENDOSCOPIC BRONCHOSCOPY ULTRASOUND (EBUS) w/ FNA (N/A) 2 Days Post-Op   Precautions:   Fall      ASSESSMENT :  Based on the objective data described below, the patient needed CGA/SBA during functional mobility and simulated self care tasks. Patient educated on energy conservation during self care tasks. Patient has decreased but functional BUE strength. Patient returned to supine with supervision. Patient will benefit from skilled intervention to address the above impairments.   Patients rehabilitation potential is considered to be Good  Factors which may influence rehabilitation potential include:   [ ]             None noted  [ ]             Mental ability/status  [X]             Medical condition  [ ]             Home/family situation and support systems  [ ]             Safety awareness  [ ]             Pain tolerance/management  [ ]             Other:        PLAN :  Recommendations and Planned Interventions:  [X]               Self Care Training                  [X]        Therapeutic Activities  [X]               Functional Mobility Training    [ ] Cognitive Retraining  [X]               Therapeutic Exercises           [X]        Endurance Activities  [X]               Balance Training                   [ ]        Neuromuscular Re-Education  [ ]               Visual/Perceptual Training     [X]   Home Safety Training  [X]               Patient Education                 [ ]        Family Training/Education  [ ]               Other (comment):     Frequency/Duration: Patient will be followed by occupational therapy 1-2 times per day/4-7 days per week to address goals. Discharge Recommendations: Home Health  Further Equipment Recommendations for Discharge: transfer shower bench       PATIENT COMPLEXITY      Eval Complexity: History: LOW Complexity : Brief history review ; Examination: LOW Complexity : 1-3 performance deficits relating to physical, cognitive , or psychosocial skils that result in activity limitations and / or participation restrictions ; Decision Making:LOW Complexity : No comorbidities that affect functional and no verbal or physical assistance needed to complete eval tasks  Assessment: Low Complexity       G-CODES:      Self Care  Current  CJ= 20-39%   Goal  CI= 1-19%. The severity rating is based on the Level of Assistance required for Functional Mobility and ADLs. SUBJECTIVE:   Patient stated I'm just so tired. 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    MOORE (Dyspnea on Exertion) 4-19-02       echo: +mild LVH, XH15-14% w/ diastolic dysfxn    Essential hypertension, benign      Glaucoma      Hemorrhoids 6-6-06    HX OTHER MEDICAL         breathing problems    Hypercholesterolemia 4-2008       TSH 1.9     Hyperlipidemia 5/17/2011    Hypertension 4-16-02    LBP (low back pain) 4-13-04    Low back pain radiating to both legs 10/1/2014    Lumbago      Lumbar disc herniation 10/1/2014    Lumbar facet arthropathy ([address] 10/1/2014    Lumbosacral radiculopathy at L5 10/1/2014    Lumbosacral radiculopathy at S1 10/1/2014    Microscopic hematuria      OA (osteoarthritis)      Other and unspecified hyperlipidemia      Overactive bladder 5/17/2011    RLS (restless legs syndrome) 1/17/2013    Sciatica      Shortness of breath      Spinal stenosis, lumbar region, without neurogenic claudication      Spondylolisthesis of lumbar region 10/1/2014    Spondylolisthesis, grade 1 10/1/2014    Stress urinary incontinence      Syncope        -MRI brain    Syncope and collapse      Urinary tract infection, site not specified       Past Surgical History   Procedure Laterality Date    Hx polypectomy        Hx appendectomy        Hx hysterectomy           (+)DUB    Hx cholecystectomy       Pr colonoscopy flx dx w/collj spec when pfrmd   6-16-06       normal, Dr Aj Marley Pr colonoscopy flx dx w/collj spec when pfrmd          (+)polyp= tubular adenoma     Prior Level of Function/Home Situation:Patient reported she was independent in basic self care tasks and functional mobility PTA.    Home Situation  Home Environment: Private residence  # Steps to Enter: 3  One/Two Story Residence: One story  Living Alone: No  Support Systems: Spouse/Significant Other/Partner, Child(corina)  Patient Expects to be Discharged to[de-identified] Private residence  Current DME Used/Available at Home: Cane, quad, Walker, rolling  Tub or Shower Type: Tub/Shower combination  [X]  Right hand dominant          [ ]  Left hand dominant  Cognitive/Behavioral Status:  Neurologic State: Alert  Orientation Level: Oriented X4  Cognition: Follows commands     Skin: No skin changes noted     Edema: No edema noted     Vision/Perceptual:       Acuity: Within Defined Limits       Coordination:  Coordination: Within functional limits (BUEs)     Balance:  Sitting: Intact  Standing: Impaired; With support  Standing - Static: Good  Standing - Dynamic : Fair      Strength:  Strength: Generally decreased, functional ( strength)      Tone & Sensation:  Tone: Normal (BUEs)  Sensation: Intact (BUEs)      Range of Motion:  AROM: Generally decreased, functional (BUEs)     Functional Mobility and Transfers for ADLs:  Bed Mobility:  Supine to Sit: Supervision  Sit to Supine: Supervision  Scooting: Supervision  Transfers:  Sit to Stand: Contact guard assistance;Stand-by asssistance                 ADL Assessment:(clinical judgement based on functional mobility)  Feeding: Independent     Oral Facial Hygiene/Grooming: Stand-by assistance     Bathing: Contact guard assistance     Upper Body Dressing: Supervision     Lower Body Dressing: Contact guard assistance     Toileting: Contact guard assistance     Pain:  Pain Scale 1: Numeric (0 - 10)  Pain Intensity 1: 0     Activity Tolerance:   Fair     Please refer to the flowsheet for vital signs taken during this treatment. After treatment:   [ ] Patient left in no apparent distress sitting up in chair  [X] Patient left in no apparent distress in bed  [X] Call bell left within reach  [ ] Nursing notified  [X] Caregiver present  [ ] Bed alarm activated      COMMUNICATION/EDUCATION:   [ ] Home safety education was provided and the patient/caregiver indicated understanding. [X] Patient/family have participated as able in goal setting and plan of care. [X] Patient/family agree to work toward stated goals and plan of care. [ ] Patient understands intent and goals of therapy, but is neutral about his/her participation. [ ] Patient is unable to participate in goal setting and plan of care.      Thank you for this referral.  Margie Barnhart OTR/L  Time Calculation: 24 mins

## 2017-02-10 NOTE — ROUTINE PROCESS
Bedside, Verbal and Written shift change report given to BRO Benavides (oncoming nurse) by ADRIANNA Burns RN (offgoing nurse). Report included the following information SBAR, Kardex, Procedure Summary, Intake/Output and Recent Results. Assumed care of pt sitting up in bed, family at bedside, a/o x 4, following commands, denies pain, room air, telemetry, up with assist.  2200  Scheduled med's given, pt tolerating well, family at bedside. 0200  Pt status unchanged, sleeping at this time, family remains at bedside. 0600  Scheduled med's given, pt tolerated well.  0715  Bedside, Verbal and Written shift change report given to Rosanna Pritchard RN (oncoming nurse) by Danuta Dill. Tori Benavides (offgoing nurse). Report included the following information SBAR, Kardex, Intake/Output and Recent Results.

## 2017-02-10 NOTE — PROGRESS NOTES
Problem: Mobility Impaired (Adult and Pediatric)  Goal: *Acute Goals and Plan of Care (Insert Text)  Physical Therapy Goals  Initiated 2/10/2017 and to be accomplished within 7 day(s)  1. Patient will move from supine to sit and sit to supine in bed with independence. 2. Patient will transfer from bed to chair and chair to bed with modified independence using the least restrictive device. 3. Patient will perform sit to stand with modified independence. 4. Patient will ambulate with modified independence for 250 feet with the least restrictive device. 5. Patient will ascend/descend 3 stairs with 1 handrail(s) with supervision/set-up. PHYSICAL THERAPY EVALUATION     Patient: Angeles Augustine (14 y.o. female)  Date: 2/10/2017  Primary Diagnosis: HCAP (healthcare-associated pneumonia)  Abnormal CT scan, chest  dx  Procedure(s) (LRB):  ENDOSCOPIC BRONCHOSCOPY ULTRASOUND (EBUS) w/ FNA (N/A) 2 Days Post-Op   Precautions: fall         ASSESSMENT :  Based on the objective data described below, the patient presents with decreased strength, balance and activity tolerance resulting in decreased independence with functional mobility. Pt required use of RW during ambulation versus the quad cane that she previously used. Patient will benefit from skilled intervention to address the above impairments.   Patients rehabilitation potential is considered to be Good  Factors which may influence rehabilitation potential include:   [ ]         None noted  [ ]         Mental ability/status  [X]         Medical condition  [ ]         Home/family situation and support systems  [ ]         Safety awareness  [ ]         Pain tolerance/management  [ ]         Other:        PLAN :  Recommendations and Planned Interventions:  [X]           Bed Mobility Training             [ ]    Neuromuscular Re-Education  [X]           Transfer Training                   [ ]    Orthotic/Prosthetic Training  [X]           Gait Training [ ]    Modalities  [X]           Therapeutic Exercises          [ ]    Edema Management/Control  [X]           Therapeutic Activities            [X]    Patient and Family Training/Education  [ ]           Other (comment):     Frequency/Duration: Patient will be followed by physical therapy 1-2 times per day/4-7 days per week to address goals. Discharge Recommendations: Home Health  Further Equipment Recommendations for Discharge: rolling walker       G-CODES       Mobility  Current  CJ= 20-39%   Goal  CI= 1-19%. The severity rating is based on the Level of Assistance required for Functional Mobility and ADLs. Eval Complexity: History: MEDIUM  Complexity : 1-2 comorbidities / personal factors will impact the outcome/ POC Exam:MEDIUM Complexity : 3 Standardized tests and measures addressing body structure, function, activity limitation and / or participation in recreation  Presentation: MEDIUM Complexity : Evolving with changing characteristics  Clinical Decision Making:Medium Complexity , Overall Complexity:MEDIUM      SUBJECTIVE:   Patient stated I really want to go home.       OBJECTIVE DATA SUMMARY:       Past Medical History   Diagnosis Date    Colon polyps      COPD 8-30-02    DDD (degenerative disc disease), lumbar 10/1/2014    Degeneration of lumbar or lumbosacral intervertebral disc      Depression      Diabetes ([address] 7-19-05    Diverticulosis     DM neuropathy, painful ([address] 10/1/2014    MOORE (Dyspnea on Exertion) 4-19-02       echo: +mild LVH, MR47-62% w/ diastolic dysfxn    Essential hypertension, benign      Glaucoma      Hemorrhoids 6-6-06    HX OTHER MEDICAL         breathing problems    Hypercholesterolemia 4-2008       TSH 1.9     Hyperlipidemia 5/17/2011    Hypertension 4-16-02    LBP (low back pain) 4-13-04    Low back pain radiating to both legs 10/1/2014    Lumbago      Lumbar disc herniation 10/1/2014    Lumbar facet arthropathy ([address] 10/1/2014    Lumbosacral radiculopathy at L5 10/1/2014    Lumbosacral radiculopathy at S1 10/1/2014    Microscopic hematuria      OA (osteoarthritis)      Other and unspecified hyperlipidemia      Overactive bladder 5/17/2011    RLS (restless legs syndrome) 1/17/2013    Sciatica      Shortness of breath      Spinal stenosis, lumbar region, without neurogenic claudication      Spondylolisthesis of lumbar region 10/1/2014    Spondylolisthesis, grade 1 10/1/2014    Stress urinary incontinence      Syncope        -MRI brain    Syncope and collapse      Urinary tract infection, site not specified       Past Surgical History   Procedure Laterality Date    Hx polypectomy        Hx appendectomy        Hx hysterectomy           (+)DUB    Hx cholecystectomy       Pr colonoscopy flx dx w/collj spec when pfrmd   6-16-06       normal, Dr Jerrold Simmonds Pr colonoscopy flx dx w/collj spec when pfrmd          (+)polyp= tubular adenoma     Prior Level of Function/Home Situation: ambulated at mod I level with quad cane  Home Situation  Home Environment: Private residence  # Steps to Enter: 3  One/Two Story Residence: One story  Living Alone: No  Support Systems: Spouse/Significant Other/Partner, Family member(s)  Patient Expects to be Discharged to[de-identified] Private residence  Current DME Used/Available at Home: Cane, quad, Safety frame toliet  Critical Behavior:  Neurologic State: Alert  Orientation Level: Oriented X4  Cognition: Appropriate decision making        Strength:    Strength: Generally decreased, functional (B LEs)      Tone & Sensation:   Tone: Normal (B LEs)   Range Of Motion:  AROM: Generally decreased, functional (B LEs)      Functional Mobility:  Bed Mobility:  Supine to Sit: Independent  Sit to Supine: Independent     Transfers:  Sit to Stand: Supervision  Stand to Sit: Supervision  Balance:   Sitting: Intact  Standing: With support  Standing - Static: Good  Standing - Dynamic : Fair  Ambulation/Gait Training:  Distance (ft): 120 Feet (ft)  Assistive Device: Walker, rolling  Ambulation - Level of Assistance: Stand-by asssistance  Gait Abnormalities: Decreased step clearance        Pain:  Pt reports 0/10 pain or discomfort prior to treatment. Pt reports 0/10 pain or discomfort post treatment. Activity Tolerance:   fair  Please refer to the flowsheet for vital signs taken during this treatment. After treatment:   [ ]         Patient left in no apparent distress sitting up in chair  [X]         Patient left in no apparent distress in bed  [X]         Call bell left within reach  [X]         Nursing notified  [X]         Caregiver present  [ ]         Bed alarm activated      COMMUNICATION/EDUCATION:   [X]         Fall prevention education was provided and the patient/caregiver indicated understanding. [X]         Patient/family have participated as able in goal setting and plan of care. [X]         Patient/family agree to work toward stated goals and plan of care. [ ]         Patient understands intent and goals of therapy, but is neutral about his/her participation. [ ]         Patient is unable to participate in goal setting and plan of care.   Educated patient on activity pacing, calling for assistance to get up and using RW     Thank you for this referral.  Adry Brown, PT   Time Calculation: 15 mins

## 2017-02-10 NOTE — ROUTINE PROCESS
Bedside and Verbal shift change report given to Consuelo Barahona RN  (oncoming nurse) by Alexander Adams RN  (offgoing nurse). Report included the following information SBAR, Kardex, ED Summary, Procedure Summary, Intake/Output, MAR, Accordion and Recent Results.

## 2017-02-10 NOTE — CONSULTS
[facility] HEMATOLOGY ONCOLOGY       Assessment/ Plan:     Patient Active Problem List   Diagnosis Code    Hyperlipidemia E78.5    Overactive bladder N32.81    Sciatica M54.30    Degeneration of lumbar or lumbosacral intervertebral disc M51.37    Encounter for long-term (current) use of other medications Z79.899    Depression F32.9    Unspecified vitamin D deficiency E55.9    Glucose intolerance (pre-diabetes) R73.03    RLS (restless legs syndrome) G25.81    Aortic dissection, abdominal (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) M12.88    Lumbar disc herniation M51.26    Lumbosacral radiculopathy at L5 M54.17    Lumbosacral radiculopathy at S1 M54.17    DM neuropathy, painful ([facility]) E11.40    Acute exacerbation of chronic obstructive pulmonary disease (COPD) ([facility]) J44.1    Diabetic polyneuropathy associated with type 2 diabetes mellitus ([facility]) E11.42    Carotid artery stenosis I65.29    Atherosclerosis of native arteries of the extremities with intermittent claudication I70.219    MOORE (dyspnea on exertion) R06.09    SOB (shortness of breath) R06.02    Murmur, cardiac R01.1    Hyperlipidemia LDL goal <100 E78.5    Primary osteoarthritis involving multiple joints M15.0    Prediabetes R73.03    Restless leg syndrome G25.81    Essential hypertension with goal blood pressure less than 140/90 I10    Panlobular emphysema (HCC) J43.1    Impaired fasting glucose R73.01    HCAP (healthcare-associated pneumonia) J18.9    Abnormal CT scan, chest R93.8     This is an 80-year-old female patient  presented to the emergency room earlier for further evaluation of worsening shortness  of breath and dry cough for the last 3 weeks. The patient also had right-sided chest pain associated with cough and shortness of  breath when she came to the emergency room. In the emergency room, she had a CTA chest done, which was negative for  PE, but showed multifocal and multilobar small pulmonary masses. The patient states that her appetite has been low and she has had  some night sweats. Her weight has been stable so far, though. No headache or dizziness. Denies any runny nose or watery eyes. Dry  cough present. No sore throat. No problems swallowing. She has had dyspnea on exertion and some orthopnea per the patient. Denies any  nausea, vomiting, or abdominal pain. However, she has history of constipation. Denies any burning sensation while peeing. Denies any  fever or chills at this point. She has had chronic pain and tingling, numbness in both lower extremities. Bronchoscopic work up has not yielded any malignancy. I will see her in office and repeat CT scan in 3-4 weeks to document resolution of infectious process. If there is still persistent abnormality, I will pursue further work up. She will also be a candidate for Prolia injections due to severe osteoporosis.     Smoking cessation counseling done     Review and order of clinical lab tests done  Review of radiology test done  Review and summation of old records done  Patient is prescribed Drug therapy requiring intensive monitoring for toxicity    Highly Complex medical decision making today for therapy including dosage of therapy, frequency of therapy, monitoring parameters, continuation of therapy. Discussions included side-effect, toxicity, benefit and risks of therapy. Patient understood the expected side-effects. After weighing the benefit and risks, patient agreed to proceed with therapy. Thank you for the opportunity to participate in the care, please do not hesitate to call for any questions or concerns. I have discussed the diagnosis with the patient and the intended plan. The patient verbalized understanding and agrees with the plan.       History:   Davian Beth is a 80 y.o. female presenting today for Abnormal Lab Results      Current Facility-Administered Medications   Medication Dose Route Frequency Provider Last Rate Last Dose    insulin lispro (HUMALOG) injection   SubCUTAneous AC&HS Aristides Guerrero MD   Stopped at 02/10/17 0730    glucose chewable tablet 16 g  16 g Oral PRN Aristides Guerrero MD        glucagon (GLUCAGEN) injection 1 mg  1 mg IntraMUSCular PRN Aristides Guerrero MD        dextrose (D50W) injection syrg 12.5-25 g  25-50 mL IntraVENous PRN Aristides Guerrero MD        metoprolol tartrate (LOPRESSOR) tablet 25 mg  25 mg Oral BID Aristides Guerrero MD   25 mg at 02/10/17 0814    amLODIPine (NORVASC) tablet 5 mg  5 mg Oral DAILY Aristides Guerrero MD   5 mg at 02/10/17 0814    vancomycin (VANCOCIN) 1,250 mg in 0.9% sodium chloride 250 mL IVPB  1,250 mg IntraVENous Ijeoma Marsh .7 mL/hr at 02/10/17 0602 1,250 mg at 02/10/17 0602    albuterol-ipratropium (DUO-NEB) 2.5 MG-0.5 MG/3 ML  3 mL Nebulization TID RT Elena Aguilar MD   3 mL at 02/10/17 0806    albuterol (PROVENTIL VENTOLIN) nebulizer solution 2.5 mg  2.5 mg Nebulization Q6H PRN Elena Aguilar MD        ascorbic acid (vitamin C) (VITAMIN C) tablet 500 mg  500 mg Oral DAILY Aristides Guerrero MD   500 mg at 02/10/17 6525    cycloSPORINE (RESTASIS) 0.05 % ophthalmic emulsion 1 Drop  1 Drop Both Eyes BID Aristides Guerrero MD   1 Drop at 02/10/17 0814    gabapentin (NEURONTIN) capsule 300 mg  300 mg Oral TID Amparo Shine MD   300 mg at 02/10/17 0814    HYDROmorphone (DILAUDID) tablet 2 mg  2 mg Oral Q6H PRN Amparo Shine MD        LORazepam (ATIVAN) tablet 0.5 mg  0.5 mg Oral Q6H PRN Amparo Shine MD        bimatoprost (LUMIGAN) 0.01 % ophthalmic drops 1 Drop  1 Drop Both Eyes QPM Amparo Shine MD   1 Drop at 02/09/17 1845    multivitamin, tx-iron-ca-min (THERA-M w/ IRON) tablet 1 Tab  1 Tab Oral DAILY Amparo Shine MD   1 Tab at 02/10/17 0814    fish oil-omega-3 fatty acids 340-1,000 mg capsule 1 Cap  1 Cap Oral DAILY Amparo Shine MD   1 Cap at 02/10/17 0814    simvastatin (ZOCOR) tablet 20 mg  20 mg Oral QHS Amparo Shine MD   20 mg at 02/09/17 2312    traZODone (DESYREL) tablet 50 mg  50 mg Oral QHS Amparo Shine MD   50 mg at 02/09/17 2312    tamsulosin (FLOMAX) capsule 0.4 mg  0.4 mg Oral DAILY Amparo Shine MD   0.4 mg at 02/10/17 0814    sodium chloride (NS) flush 5-10 mL  5-10 mL IntraVENous Senia Stokes MD   10 mL at 02/10/17 0602    sodium chloride (NS) flush 5-10 mL  5-10 mL IntraVENous PRN Amparo Shine MD        acetaminophen (TYLENOL) tablet 650 mg  650 mg Oral Q4H PRN Amparo Shine MD   650 mg at 02/10/17 0945    naloxone (NARCAN) injection 0.4 mg  0.4 mg IntraVENous PRN Amparo Shine MD        ondansetron [facility]) injection 4 mg  4 mg IntraVENous Q4H PRN Amparo Shine MD        magnesium hydroxide (MILK OF MAGNESIA) 400 mg/5 mL oral suspension 30 mL  30 mL Oral DAILY PRN Amparo Shine MD        nicotine (NICODERM CQ) 14 mg/24 hr patch 1 Patch  1 Patch TransDERmal Q24H Amparo Shine MD   1 Patch at 02/09/17 1342    heparin (porcine) injection 5,000 Units  5,000 Units SubCUTAneous Senia Stokes MD   5,000 Units at 02/10/17 0602    guaiFENesin SR (MUCINEX) tablet 600 mg  600 mg Oral Q12H Amparo Shine MD   600 mg at 02/10/17 0814    cloNIDine HCl (CATAPRES) tablet 0.1 mg  0.1 mg Oral Q8H PRN Anish Cruz MD   0.1 mg at 02/09/17 1750    budesonide (PULMICORT) 500 mcg/2 ml nebulizer suspension  500 mcg Nebulization BID RT Andie Duval MD   500 mcg at 02/10/17 0806       Objective:   VS:    Visit Vitals    /75 (BP 1 Location: Left arm, BP Patient Position: At rest)    Pulse 75    Temp 97.7 °F (36.5 °C)    Resp 18    Ht 5' 5\" (1.651 m)    Wt 54.7 kg (120 lb 8 oz)    SpO2 92%    Breastfeeding No    BMI 20.05 kg/m2        Physical Exam:     Constitutional:  well developed, well nourished, no acute distress and alert and oriented x 3    HENT:  Oral mucosa pink and moist, no cyanosis observed and no pallor observed.    Eyes:  conjunctiva normal, upper and lower eyelid normal bilaterally.    Neck:  No jugular venous distension present.    Cardiovascular:  heart sounds normal, normal rate and regular rhythm, no murmurs or rubs, no thrills, no S3 or S4 palpable,and no palpable opening snaps. Point of maximal impulse normal. Carotid arteries normal. BP in 2+ extremities not indicated.    Pulmonary/Chest Wall:  breath sounds are coarse bilaterally and no use of accessory muscles in breathing.    Abdominal:  Non tender, no palpable masses, no hepatosplenomegaly, and no abdominal bruits.    Genitourinary/Anorectal:  Occult blood testing is not indicated for this patient.    Musculoskeletal:  No digital clubbing or cyanosis. Normal muscle strength and tone.    Skin:  Normal and no rashes or lesions    Peripheral Vascular:  No edema present in extremities. Femoral pulse normal bilaterally. Dorsalis pedis pulse normal bilaterally.        Review of Systems  Constitutional:  Fever: Negative, Chills: Negative, Weight Loss: Negative, Malaise/Fatigue: y  Diaphoresis: Negative, Weakness: y  Skin:  Rash: Negative, Itching: Negative  HENT:  Headache:Negative, Hearing Loss: Negative, Tinnitus: Negative, Ear Pain: Negative  Nosebleeds: Negative, Congestion: Negative, Stridor: Negative,  Sore Throat: Negative  Eyes:   Blurred Vision: Negative, Double Vision: Negative, Photophobia: Negative  Eye Pain: Negative, Eye Discharge: Negative, Eye Redness: Negative  Cardiovascular:   Chest Pain: Negative, Palpitations: Negative, Orthopnea: Negative  Claudication: Negative, Leg Swelling: Negative PND: Negative  Respiratory:  Cough: Negative, Hemoptysis: Negative, Sputum Production: y  Shortness of Breath: y, Wheezing: Negative  Gastrointestinal:  Heartburn: Negative, Nausea: Negative, Vomiting: Negative  Abdominal Pain: Negative, Diarrhea: Negative, Constipation: Negative  Blood in Stool: Negative, Melena: Negative   Genitourinary:   Dysuria: Negative, Urgency: Negative, Frequency: Negative  Hematuria: Negative, Flank Pain: Negative  Musculoskeletal:   Myalgias: Negative, Neck Pain: Negative, Back Pain: Negative    Joint Pain: Negative, Falls: Negative  Endo/Heme/Allergies:   Easy Bruise/Blood: Negative, Env. Allergies: Negative, Polydipsia: Negative   Neurological:   Dizziness: Negative, Tingling: Negative. Tremor: Negative,    Sensory Change: Negative.  Speech Change: Negative, Focal Weakness: Negative Seizures: Negative, LOC: Negative  Psychiatric:  Depression: Negative, Suicidal Ideas: Negative, Substance Abuse: Negative  Hallucinations: Negative, Nervous/Anxious: Negative  Insomnia: Negative, Memory Loss: Negative     Recent Results (from the past 168 hour(s))   EKG, 12 LEAD, INITIAL    Collection Time: 02/06/17  9:25 PM   Result Value Ref Range    Ventricular Rate 91 BPM    Atrial Rate 91 BPM    P-R Interval 120 ms    QRS Duration 114 ms    Q-T Interval 420 ms    QTC Calculation (Bezet) 516 ms    Calculated P Axis 55 degrees    Calculated R Axis -71 degrees    Calculated T Axis 50 degrees    Diagnosis       Normal sinus rhythm  Possible Left atrial enlargement  Right bundle branch block  Left anterior fascicular block  Bifascicular block  Abnormal ECG  When compared with ECG of 08-JUL-2010 12:45,  (RBBB and left anterior fascicular block) is now present  Criteria for Septal infarct are no longer present  Confirmed by mad Sensor ([phone]) on 2/7/2017 12:00:41 PM     CBC WITH AUTOMATED DIFF    Collection Time: 02/06/17 11:35 PM   Result Value Ref Range    WBC 12.8 4.6 - 13.2 K/uL    RBC 4.15 (L) 4.20 - 5.30 M/uL    HGB 13.2 12.0 - 16.0 g/dL    HCT 37.4 35.0 - 45.0 %    MCV 90.1 74.0 - 97.0 FL    MCH 31.8 24.0 - 34.0 PG    MCHC 35.3 31.0 - 37.0 g/dL    RDW 13.6 11.6 - 14.5 %    PLATELET 650 802 - 081 K/uL    MPV 9.9 9.2 - 11.8 FL    NEUTROPHILS 77 (H) 40 - 73 %    LYMPHOCYTES 15 (L) 21 - 52 %    MONOCYTES 7 3 - 10 %    EOSINOPHILS 1 0 - 5 %    BASOPHILS 0 0 - 2 %    ABS. NEUTROPHILS 9.8 (H) 1.8 - 8.0 K/UL    ABS. LYMPHOCYTES 2.0 0.9 - 3.6 K/UL    ABS. MONOCYTES 0.9 0.05 - 1.2 K/UL    ABS. EOSINOPHILS 0.1 0.0 - 0.4 K/UL    ABS.  BASOPHILS 0.0 0.0 - 0.1 K/UL    DF AUTOMATED     METABOLIC PANEL, BASIC    Collection Time: 02/06/17 11:35 PM   Result Value Ref Range    Sodium 131 (L) 136 - 145 mmol/L    Potassium 2.7 (LL) 3.5 - 5.5 mmol/L    Chloride 87 (L) 100 - 108 mmol/L    CO2 34 (H) 21 - 32 mmol/L    Anion gap 10 3.0 - 18 mmol/L    Glucose 140 (H) 74 - 99 mg/dL    BUN 5 (L) 7.0 - 18 MG/DL    Creatinine 0.44 (L) 0.6 - 1.3 MG/DL    BUN/Creatinine ratio 11 (L) 12 - 20      GFR est AA >60 >60 ml/min/1.73m2    GFR est non-AA >60 >60 ml/min/1.73m2    Calcium 9.4 8.5 - 10.1 MG/DL   CARDIAC PANEL,(CK, CKMB & TROPONIN)    Collection Time: 02/06/17 11:35 PM   Result Value Ref Range    CK 76 26 - 192 U/L    CK - MB 2.2 0.5 - 3.6 ng/ml    CK-MB Index 2.9 0.0 - 4.0 %    Troponin-I, Qt. 0.02 0.0 - 0.045 NG/ML   PRO-BNP    Collection Time: 02/06/17 11:35 PM   Result Value Ref Range    NT pro- 0 - 1800 PG/ML   POC G3    Collection Time: 02/07/17 12:31 AM   Result Value Ref Range    Device: ROOM AIR      FIO2 (POC) 21 %    pH (POC) 7.466 (H) 7.35 - 7.45      pCO2 (POC) 43.9 35.0 - 45.0 MMHG    pO2 (POC) 53 (L) 80 - 100 MMHG    HCO3 (POC) 31.7 (H) 22 - 26 MMOL/L    sO2 (POC) 89 (L) 92 - 97 %    Base excess (POC) 7 mmol/L    Allens test (POC) YES      Total resp. rate 18      Site LEFT RADIAL      Patient temp.  98.6      Specimen type (POC) ARTERIAL      Performed by Carolina Levine    CULTURE, BLOOD    Collection Time: 02/07/17  5:40 AM   Result Value Ref Range    Special Requests: NO SPECIAL REQUESTS      GRAM STAIN GRAM POSITIVE COCCI  IN GROUPS  ANAEROBIC BOTTLE        GRAM STAIN        SMEAR CALLED TO AND CORRECTLY REPEATED BY:  DR CHANTAL ATKINS 2S [id number] BY JULIETTE STATON [facility] MICRO      Culture result: ANAEROBIC BOTTLE  STAPHYLOCOCCUS EPIDERMIDIS   (A)         Susceptibility    Staphylococcus epidermidis - RONNIE     Ampicillin/sulbactam ($)  Resistant ug/mL     Penicillin G ($$) >=0.5 Resistant ug/mL     Cefazolin ($)  Resistant ug/mL     Clindamycin ($) <=0.25 Resistant ug/mL     Erythromycin ($$$$) >=8 Resistant ug/mL     Gentamicin ($) <=0.5 Susceptible ug/mL     Levofloxacin ($) >=8 Resistant ug/mL     Oxacillin >=4 Resistant ug/mL     Tetracycline 2 Susceptible ug/mL     Vancomycin ($) 1 Susceptible ug/mL     Trimeth-Sulfamethoxa 160 Resistant ug/mL     Tigecycline ($$$$) <=0.12 Susceptible ug/mL   CULTURE, BLOOD    Collection Time: 02/07/17  6:00 AM   Result Value Ref Range    Special Requests: NO SPECIAL REQUESTS      Culture result: NO GROWTH 3 DAYS     TSH 3RD GENERATION    Collection Time: 02/07/17  8:06 PM   Result Value Ref Range    TSH 0.42 0.36 - 3.74 uIU/mL   IRON PROFILE    Collection Time: 02/07/17  8:06 PM   Result Value Ref Range    Iron 42 (L) 50 - 175 ug/dL    TIBC 174 (L) 250 - 450 ug/dL    Iron % saturation 24 %   RHEUMATOID FACTOR, QL    Collection Time: 02/07/17  8:06 PM   Result Value Ref Range    RA Latex, Ql. POSITIVE (A) NEG     ANTINUCLEAR ANTIBODIES, IFA    Collection Time: 02/07/17  8:06 PM   Result Value Ref Range    Antinuclear Abs, IFA NEGATIVE      CARDIAC PANEL,(CK, CKMB & TROPONIN) Collection Time: 02/07/17  8:06 PM   Result Value Ref Range     26 - 192 U/L    CK - MB 3.7 (H) 0.5 - 3.6 ng/ml    CK-MB Index 2.6 0.0 - 4.0 %    Troponin-I, Qt. 0.02 0.0 - 0.045 NG/ML   OSMOLALITY, SERUM/PLASMA    Collection Time: 02/07/17  8:06 PM   Result Value Ref Range    Osmolality, serum/plasma 284 280 - 300 MOSM/kg H2O   MYCOPLASMA AB, IGG/IGM    Collection Time: 02/07/17  8:06 PM   Result Value Ref Range    M. pneumoniae Ab, IgG 431 (H) 0 - 99 U/mL    M. pneumoniae Ab, IgM <770 0 - 769 U/mL   RHEUMATOID FACTOR, QT    Collection Time: 02/07/17  8:06 PM   Result Value Ref Range    RA, Qt. POSITIVE 1:4 (A) NEG     METABOLIC PANEL, COMPREHENSIVE    Collection Time: 02/08/17  2:30 AM   Result Value Ref Range    Sodium 134 (L) 136 - 145 mmol/L    Potassium 4.3 3.5 - 5.5 mmol/L    Chloride 98 (L) 100 - 108 mmol/L    CO2 30 21 - 32 mmol/L    Anion gap 6 3.0 - 18 mmol/L    Glucose 133 (H) 74 - 99 mg/dL    BUN 7 7.0 - 18 MG/DL    Creatinine 0.46 (L) 0.6 - 1.3 MG/DL    BUN/Creatinine ratio 15 12 - 20      GFR est AA >60 >60 ml/min/1.73m2    GFR est non-AA >60 >60 ml/min/1.73m2    Calcium 9.3 8.5 - 10.1 MG/DL    Bilirubin, total 0.3 0.2 - 1.0 MG/DL    ALT (SGPT) 19 13 - 56 U/L    AST (SGOT) 20 15 - 37 U/L    Alk.  phosphatase 74 45 - 117 U/L    Protein, total 6.6 6.4 - 8.2 g/dL    Albumin 3.0 (L) 3.4 - 5.0 g/dL    Globulin 3.6 2.0 - 4.0 g/dL    A-G Ratio 0.8 0.8 - 1.7     T4, FREE    Collection Time: 02/08/17  2:30 AM   Result Value Ref Range    T4, Free 1.2 0.7 - 1.5 NG/DL   LIPID PANEL    Collection Time: 02/08/17  2:30 AM   Result Value Ref Range    LIPID PROFILE          Cholesterol, total 117 <200 MG/DL    Triglyceride 84 <150 MG/DL    HDL Cholesterol 47 40 - 60 MG/DL    LDL, calculated 53.2 0 - 100 MG/DL    VLDL, calculated 16.8 MG/DL    CHOL/HDL Ratio 2.5 0 - 5.0     MAGNESIUM    Collection Time: 02/08/17  2:30 AM   Result Value Ref Range    Magnesium 1.9 1.8 - 2.4 mg/dL   CBC WITH AUTOMATED DIFF Collection Time: 02/08/17  2:30 AM   Result Value Ref Range    WBC 11.3 4.6 - 13.2 K/uL    RBC 4.10 (L) 4.20 - 5.30 M/uL    HGB 13.0 12.0 - 16.0 g/dL    HCT 37.3 35.0 - 45.0 %    MCV 91.0 74.0 - 97.0 FL    MCH 31.7 24.0 - 34.0 PG    MCHC 34.9 31.0 - 37.0 g/dL    RDW 14.0 11.6 - 14.5 %    PLATELET 508 963 - 952 K/uL    MPV 10.3 9.2 - 11.8 FL    NEUTROPHILS 63 40 - 73 %    LYMPHOCYTES 28 21 - 52 %    MONOCYTES 8 3 - 10 %    EOSINOPHILS 1 0 - 5 %    BASOPHILS 0 0 - 2 %    ABS. NEUTROPHILS 7.1 1.8 - 8.0 K/UL    ABS. LYMPHOCYTES 3.2 0.9 - 3.6 K/UL    ABS. MONOCYTES 0.9 0.05 - 1.2 K/UL    ABS. EOSINOPHILS 0.1 0.0 - 0.4 K/UL    ABS.  BASOPHILS 0.0 0.0 - 0.1 K/UL    DF AUTOMATED     PROTHROMBIN TIME + INR    Collection Time: 02/08/17  2:30 AM   Result Value Ref Range    Prothrombin time 13.2 11.5 - 15.2 sec    INR 1.0 0.8 - 1.2     EKG, 12 LEAD, SUBSEQUENT    Collection Time: 02/08/17  7:55 AM   Result Value Ref Range    Ventricular Rate 70 BPM    Atrial Rate 70 BPM    P-R Interval 138 ms    QRS Duration 112 ms    Q-T Interval 450 ms    QTC Calculation (Bezet) 486 ms    Calculated P Axis 86 degrees    Calculated R Axis 26 degrees    Calculated T Axis 42 degrees    Diagnosis       Normal sinus rhythm  Possible Left atrial enlargement  Incomplete right bundle branch block  Borderline ECG  When compared with ECG of 06-FEB-2017 21:25,  Left anterior fascicular block is no longer present  Nonspecific T wave abnormality no longer evident in Anterior leads  Confirmed by Olinda Barrera ([id number]) on 2/8/2017 4:15:15 PM     GLUCOSE, POC    Collection Time: 02/08/17  8:38 AM   Result Value Ref Range    Glucose (POC) 188 (H) 70 - 110 mg/dL   CULTURE, RESPIRATORY/SPUTUM/BRONCH W GRAM STAIN    Collection Time: 02/08/17 11:20 AM   Result Value Ref Range    Special Requests: NO SPECIAL REQUESTS      GRAM STAIN FEW  WBC'S        GRAM STAIN NO ORGANISMS SEEN      Culture result: NO GROWTH 2 DAYS     AFB CULTURE + SMEAR W/RFLX PCR AND ID Collection Time: 02/08/17 11:20 AM   Result Value Ref Range    Source BRONCHIAL WASHING      AFB Specimen processing Concentration     Acid Fast Smear NEGATIVE       Acid Fast Culture PENDING    CULTURE, FUNGUS    Collection Time: 02/08/17 11:20 AM   Result Value Ref Range    Special Requests: NO SPECIAL REQUESTS      FUNGUS SMEAR NO FUNGAL ELEMENTS SEEN      Culture result: PENDING    CULTURE, TISSUE W GRAM STAIN    Collection Time: 02/08/17 11:20 AM   Result Value Ref Range    Special Requests: RT HILAR LYMPH NODE     GRAM STAIN FEW  WBC'S        GRAM STAIN NO ORGANISMS SEEN      Culture result: NO GROWTH 2 DAYS     CULTURE, ANAEROBIC    Collection Time: 02/08/17 11:20 AM   Result Value Ref Range    Special Requests: RT HILAR LYMPH NODE     Culture result: NO ANAEROBES ISOLATED 2 DAYS     AFB CULTURE + SMEAR W/RFLX PCR AND ID    Collection Time: 02/08/17 11:20 AM   Result Value Ref Range    Source LYMPH NODE      AFB Specimen processing Concentration     Acid Fast Smear NEGATIVE       Acid Fast Culture PENDING    CULTURE, FUNGUS    Collection Time: 02/08/17 11:20 AM   Result Value Ref Range    Special Requests: RT HILAR LYMPH NODE     FUNGUS SMEAR NO FUNGAL ELEMENTS SEEN      Culture result: PENDING    GLUCOSE, POC    Collection Time: 02/08/17 11:43 AM   Result Value Ref Range    Glucose (POC) 164 (H) 70 - 110 mg/dL   CULTURE, BLOOD    Collection Time: 02/08/17  1:43 PM   Result Value Ref Range    Special Requests: NO SPECIAL REQUESTS      Culture result: NO GROWTH 2 DAYS     CULTURE, BLOOD    Collection Time: 02/08/17  1:55 PM   Result Value Ref Range    Special Requests: NO SPECIAL REQUESTS      Culture result: NO GROWTH 2 DAYS     VANCOMYCIN, TROUGH    Collection Time: 02/09/17  1:37 PM   Result Value Ref Range    Vancomycin,trough 10.4 10.0 - 20.0 ug/mL    Reported dose date: 20170209      Reported dose time: 556      Reported dose: 750 MG UNITS   GLUCOSE, POC    Collection Time: 02/09/17  1:42 PM   Result Value Ref Range    Glucose (POC) 153 (H) 70 - 110 mg/dL   GLUCOSE, POC    Collection Time: 02/09/17  4:03 PM   Result Value Ref Range    Glucose (POC) 118 (H) 70 - 110 mg/dL   GLUCOSE, POC    Collection Time: 02/09/17  8:13 PM   Result Value Ref Range    Glucose (POC) 160 (H) 70 - 110 mg/dL   POC G3    Collection Time: 02/10/17  3:45 AM   Result Value Ref Range    Device: ROOM AIR      pH (POC) 7.460 (H) 7.35 - 7.45      pCO2 (POC) 37.5 35.0 - 45.0 MMHG    pO2 (POC) 67 (L) 80 - 100 MMHG    HCO3 (POC) 26.6 (H) 22 - 26 MMOL/L    sO2 (POC) 94 92 - 97 %    Base excess (POC) 3 mmol/L    Allens test (POC) YES      Total resp. rate 14      Site RIGHT RADIAL      Specimen type (POC) ARTERIAL      Performed by Tod Dietz    CBC WITH AUTOMATED DIFF    Collection Time: 02/10/17  3:56 AM   Result Value Ref Range    WBC 9.5 4.6 - 13.2 K/uL    RBC 3.94 (L) 4.20 - 5.30 M/uL    HGB 12.4 12.0 - 16.0 g/dL    HCT 35.3 35.0 - 45.0 %    MCV 89.6 74.0 - 97.0 FL    MCH 31.5 24.0 - 34.0 PG    MCHC 35.1 31.0 - 37.0 g/dL    RDW 13.4 11.6 - 14.5 %    PLATELET 325 489 - 642 K/uL    MPV 9.9 9.2 - 11.8 FL    NEUTROPHILS 66 40 - 73 %    LYMPHOCYTES 23 21 - 52 %    MONOCYTES 9 3 - 10 %    EOSINOPHILS 2 0 - 5 %    BASOPHILS 0 0 - 2 %    ABS. NEUTROPHILS 6.3 1.8 - 8.0 K/UL    ABS. LYMPHOCYTES 2.2 0.9 - 3.6 K/UL    ABS. MONOCYTES 0.8 0.05 - 1.2 K/UL    ABS. EOSINOPHILS 0.1 0.0 - 0.4 K/UL    ABS.  BASOPHILS 0.0 0.0 - 0.1 K/UL    DF AUTOMATED     METABOLIC PANEL, BASIC    Collection Time: 02/10/17  3:56 AM   Result Value Ref Range    Sodium 129 (L) 136 - 145 mmol/L    Potassium 3.3 (L) 3.5 - 5.5 mmol/L    Chloride 91 (L) 100 - 108 mmol/L    CO2 28 21 - 32 mmol/L    Anion gap 10 3.0 - 18 mmol/L    Glucose 86 74 - 99 mg/dL    BUN 4 (L) 7.0 - 18 MG/DL    Creatinine 0.37 (L) 0.6 - 1.3 MG/DL    BUN/Creatinine ratio 11 (L) 12 - 20      GFR est AA >60 >60 ml/min/1.73m2    GFR est non-AA >60 >60 ml/min/1.73m2    Calcium 8.9 8.5 - 10.1 MG/DL   MAGNESIUM Collection Time: 02/10/17  3:56 AM   Result Value Ref Range    Magnesium 1.8 1.8 - 2.4 mg/dL   GLUCOSE, POC    Collection Time: 02/10/17  8:20 AM   Result Value Ref Range    Glucose (POC) 123 (H) 70 - 110 mg/dL       Past Medical History   Diagnosis Date    Colon polyps     COPD 8-30-02    DDD (degenerative disc disease), lumbar 10/1/2014    Degeneration of lumbar or lumbosacral intervertebral disc     Depression     Diabetes ([facility] [address] 7-19-05    Diverticulosis     DM neuropathy, painful ([facility] [address] 10/1/2014    MOORE (Dyspnea on Exertion) 4-19-02     echo: +mild LVH, YP70-10% w/ diastolic dysfxn    Essential hypertension, benign     Glaucoma     Hemorrhoids 6-6-06    HX OTHER MEDICAL      breathing problems    Hypercholesterolemia 4-2008     TSH 1.9     Hyperlipidemia 5/17/2011    Hypertension 4-16-02    LBP (low back pain) 4-13-04    Low back pain radiating to both legs 10/1/2014    Lumbago     Lumbar disc herniation 10/1/2014    Lumbar facet arthropathy ([facility] [address] 10/1/2014    Lumbosacral radiculopathy at L5 10/1/2014    Lumbosacral radiculopathy at S1 10/1/2014    Microscopic hematuria     OA (osteoarthritis)     Other and unspecified hyperlipidemia     Overactive bladder 5/17/2011    RLS (restless legs syndrome) 1/17/2013    Sciatica     Shortness of breath     Spinal stenosis, lumbar region, without neurogenic claudication     Spondylolisthesis of lumbar region 10/1/2014    Spondylolisthesis, grade 1 10/1/2014    Stress urinary incontinence     Syncope      -MRI brain    Syncope and collapse     Urinary tract infection, site not specified        Past Surgical History   Procedure Laterality Date    Hx polypectomy      Hx appendectomy      Hx hysterectomy       (+)DUB    Hx cholecystectomy      Pr colonoscopy flx dx w/collj spec when pfrmd  6-16-06     normal, Dr Chelle Major Pr colonoscopy flx dx w/collj spec when pfrmd       (+)polyp= tubular adenoma Social History     Social History    Marital status:      Spouse name: N/A    Number of children: N/A    Years of education: N/A     Occupational History    Not on file.      Social History Main Topics    Smoking status: Current Every Day Smoker     Packs/day: 1.00     Years: 45.00     Types: Cigarettes    Smokeless tobacco: Never Used    Alcohol use No    Drug use: No    Sexual activity: Not Currently     Other Topics Concern    Not on file     Social History Narrative       Family History   Problem Relation Age of Onset    Colon Cancer Sister     Heart Disease Brother     Cancer Brother      stomach CA    Seizures Son     Depression Daughter     Colon Cancer Maternal Aunt        Allergies   Allergen Reactions    Levaquin [Levofloxacin] Rash       Follow-up Disposition: Not on File    Lilia Silva MD    Hematologist & Oncologist  [address] [facility]

## 2017-02-10 NOTE — DISCHARGE SUMMARY
PATIENT DISCHARGE INSTRUCTIONS      PATIENT DISCHARGE INSTRUCTIONS    Sandi Blossom / [id number] : 1933    Admitted 2017 Discharged: 2/10/2017     Dictated # [id number]    · It is important that you take the medication exactly as they are prescribed. · Keep your medication in the bottles provided by the pharmacist and keep a list of the medication names, dosages, and times to be taken in your wallet. · Do not take other medications without consulting your doctor. What to do at Home    Recommended Diet: Cardiac Diet    Recommended Activity: PT/OT per Home Health    Current Discharge Medication List      START taking these medications    Details   albuterol-ipratropium (DUO-NEB) 2.5 mg-0.5 mg/3 ml nebu 3 mL by Nebulization route every four (4) hours as needed. Qty: 30 Nebule, Refills: 0      amLODIPine (NORVASC) 10 mg tablet Take 1 Tab by mouth daily. Qty: 30 Tab, Refills: 0      metoprolol tartrate (LOPRESSOR) 25 mg tablet Take 1 Tab by mouth two (2) times a day. Qty: 60 Tab, Refills: 0      nicotine (NICODERM CQ) 14 mg/24 hr patch 1 Patch by TransDERmal route every twenty-four (24) hours for 30 days. Qty: 30 Patch, Refills: 0      potassium chloride (K-DUR, KLOR-CON) 20 mEq tablet Take 1 Tab by mouth daily for 3 days. Qty: 3 Tab, Refills: 0      budesonide-formoterol (SYMBICORT) 160-4.5 mcg/actuation HFA inhaler Take 1 Puff by inhalation two (2) times a day. Qty: 1 Inhaler, Refills: 0      phenol throat spray (CHLORASEPTIC) 1.4 % spray Take 1 Spray by mouth as needed for Sore throat. Qty: 1 Bottle, Refills: 0      amoxicillin-clavulanate (AUGMENTIN) 875-125 mg per tablet Take 1 Tab by mouth every twelve (12) hours for 7 days. Qty: 14 Tab, Refills: 0      Saccharomyces boulardii (FLORASTOR) 250 mg capsule Take 1 Cap by mouth two (2) times a day for 7 days.   Qty: 14 Cap, Refills: 0         CONTINUE these medications which have CHANGED    Details   LORazepam (ATIVAN) 1 mg tablet Take 0.5 Tabs by mouth every eight (8) hours as needed. Max Daily Amount: 1.5 mg.  Qty: 30 Tab, Refills: 0         CONTINUE these medications which have NOT CHANGED    Details   omega 3-dha-epa-fish oil (FISH OIL) 100-160-1,000 mg cap Take 1,000 mg by mouth daily. HYDROmorphone (DILAUDID) 4 mg tablet 1/2 to one tab up to three times per day prn severe pain due to fill 2/13/17  Qty: 90 Tab, Refills: 0      LUMIGAN 0.01 % ophthalmic drops       simvastatin (ZOCOR) 20 mg tablet Take 1 Tab by mouth nightly. Qty: 90 Tab, Refills: 1    Associated Diagnoses: Hyperlipidemia, unspecified hyperlipidemia type      gabapentin (NEURONTIN) 300 mg capsule Take 1 Cap by mouth three (3) times daily. Qty: 270 Cap, Refills: 1      tiotropium bromide (SPIRIVA RESPIMAT) 1.25 mcg/actuation inhaler Take 2 Puffs by inhalation daily. Qty: 1 Inhaler, Refills: 11      tamsulosin (FLOMAX) 0.4 mg capsule Take 1 Cap by mouth daily. Qty: 90 Cap, Refills: 3      ascorbic acid (VITAMIN C) 1,000 mg tablet Take  by mouth. albuterol (PROVENTIL HFA, VENTOLIN HFA, PROAIR HFA) 90 mcg/actuation inhaler Take 2 Puffs by inhalation every four (4) hours as needed for Wheezing. Qty: 1 Inhaler, Refills: 5      traZODone (DESYREL) 100 mg tablet Take 100 mg by mouth nightly. Patient takes one and half tabs at bedtime      cycloSPORINE (RESTASIS) 0.05 % ophthalmic emulsion Administer 1 Drop to both eyes two (2) times a day. ML-TM-OY-Fe-Min-Lycopen-Lutein (CENTRUM) 0.4-162-18 mg Tab Take 1 Tab by mouth daily.          STOP taking these medications       PARoxetine (PAXIL) 20 mg tablet Comments:   Reason for Stopping:         pilocarpine hcl 0.25 % Drop Comments:   Reason for Stopping:         omega-3 fatty acids-vitamin e (FISH OIL) 1,000 mg cap Comments:   Reason for Stopping:               Signed By: Adelina Guillen MD     February 10, 2017

## 2017-02-10 NOTE — DISCHARGE SUMMARY
[address]  Face to Face Encounter    Patients Name: Aliza Woo    YOB: 1933    Primary Diagnosis: COPD, Atelectasis, lung nodules    Date of Face to Face:   2/10/2017                                  Face to Face Encounter findings are related to primary reason for home care:   yes. 1. I certify that the patient needs intermittent care as follows: skilled nursing care:  skilled observation/assessment, patient education and complex care plan management  physical therapy: strengthening, stretching/ROM and transfer training  occupational therapy:  ADL safety (ie. cooking, bathing, dressing)    2. I certify that this patient is homebound, that is: 1) patient requires the use of a cane device, special transportation, or assistance of another to leave the home; or 2) patient's condition makes leaving the home medically contraindicated; and 3) patient has a normal inability to leave the home and leaving the home requires considerable and taxing effort. Patient may leave the home for infrequent and short duration for medical reasons, and occasional absences for non-medical reasons. Homebound status is due to the following functional limitations: Patient with poor safety awareness and is at risk for falls without assistance of another person and the use of an assistive device. Patient with poor ambulation endurance limiting their safe ability to ascend/descend the required number of steps to leave the home. Patient with increased shortness of breath with all exertional activity limiting ambulation outside the home. Patient with strength deficits limiting the ability to carry portable O2 for distances outside the home without the assistance of a caregiver.     3. I certify that this patient is under my care and that I, or a nurse practitioner or [phone] [phone], or clinical nurse specialist, or certified nurse midwife, working with me, had a Face-to-Face Encounter that meets the physician Face-to-Face Encounter requirements. The following are the clinical findings from the [address] [place] encounter that support the need for skilled services and is a summary of the encounter: COPD, Atelectasis, bronchitis, HTN, Hyponatremia. See discharge summary      Ese Woodall MD  2/10/2017      THE FOLLOWING TO BE COMPLETED BY THE COMMUNITY PHYSICIAN:    I concur with the findings described above from the F2F encounter that this patient is homebound and in need of a skilled service.     Certifying Physician: _____________________________________      Printed Certifying Physician Name: _____________________________________    Date: _________________

## 2017-02-10 NOTE — PROGRESS NOTES
ADULT PROTOCOL: JET AEROSOL ASSESSMENT    Patient  Chasidy Franco     80 y.o.   female     2/9/2017  8:12 PM    Breath Sounds Pre Procedure:  Breath Sounds Bilateral: Coarse              Left Breath Sounds: Diminished                             Breath Sounds Post Procedure: Breath Sounds Bilateral: Diminished                                               Breathing pattern: Pre procedure  Breathing Pattern: Regular          Post procedure  Breathing Pattern: Regular    Cough: Pre procedure  Cough: Congested               Post procedure Cough: Non-productive    Heart Rate: Pre procedure Pulse: 76           Post procedure Pulse: 76    Resp Rate: Pre procedure  Respirations: 16           Post procedure          Nebulizer Therapy: Current medications Aerosolized Medications: DuoNeb, Pulmicort       Problem List:   Patient Active Problem List   Diagnosis Code    Hyperlipidemia E78.5    Overactive bladder N32.81    Sciatica M54.30    Degeneration of lumbar or lumbosacral intervertebral disc M51.37    Encounter for long-term (current) use of other medications Z79.899    Depression F32.9    Unspecified vitamin D deficiency E55.9    Glucose intolerance (pre-diabetes) R73.03    RLS (restless legs syndrome) G25.81    Aortic dissection, abdominal (HCC) I71.02    Ataxic gait R26.0    Chronic neck pain M54.2, G89.29    Orthostatic hypotension I95.1    Paresthesia R20.2    Repeated falls R29.6    Chronic pain syndrome G89.4    Lumbosacral spondylosis without myelopathy M47.817    Cervical stenosis of spinal canal M48.02    Spinal stenosis in cervical region M48.02    Polyneuropathy G62.9    Lumbar stenosis M48.06    High risk medication use Z79.899    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    Thoracic or lumbosacral neuritis or radiculitis, unspecified [id number]    Low back pain radiating to both legs M54.5    DDD (degenerative disc disease), lumbar M51.36    Spondylolisthesis of lumbar region M43.16    Spondylolisthesis, grade 1 M43.10    Lumbar facet arthropathy ([facility]) M12.88    Lumbar disc herniation M51.26    Lumbosacral radiculopathy at L5 M54.17    Lumbosacral radiculopathy at S1 M54.17    DM neuropathy, painful ([facility]) E11.40    Acute exacerbation of chronic obstructive pulmonary disease (COPD) ([facility]) J44.1    Diabetic polyneuropathy associated with type 2 diabetes mellitus ([facility]) E11.42    Carotid artery stenosis I65.29    Atherosclerosis of native arteries of the extremities with intermittent claudication I70.219    MOORE (dyspnea on exertion) R06.09    SOB (shortness of breath) R06.02    Murmur, cardiac R01.1    Hyperlipidemia LDL goal <100 E78.5    Primary osteoarthritis involving multiple joints M15.0    Prediabetes R73.03    Restless leg syndrome G25.81    Essential hypertension with goal blood pressure less than 140/90 I10    Panlobular emphysema ([facility]) J43.1    Impaired fasting glucose R73.01    HCAP (healthcare-associated pneumonia) J18.9    Abnormal CT scan, chest R93.8       Patient alert and cooperative to use MDI: Yes    Home Respiratory Therapy Regimen/Frequency:  NO  Medication   Device  Frequency     SEVERITY INDEX:    ITEM 0 1 2 3 4 Score   Respiratory Pattern and or Rate Regular  10-19 Regular  20-24   24-30    30-34 Severe SOB or   Greater than 35 1   Breath Sounds Clear Occasional Wheeze Mild Wheezing Moderate Wheezing  wheezing/Absent breath sounds 1   Shortness of Breath None Dyspnea on Exertion Dyspnea at Rest Moderate Shortness of Breath at Rest Severe Shortness of Breath - Limited Speech 1       Total Score:  3    * Scoring Guidelines  0-4 pts:  PRN-BID   5-7 pts:  BID, TID, QID  8-9 pts:  TID, QID, Q6  10-12 pts:  Q4-Q6  * - Guidelines used with clinical judgement. PRN Treatments can be ordered to supplement scheduled treatments. Regardless of score, frequency should not be less than normal home regimen.     Recommended Order/Frequency:  3    Comments: Respiratory Therapist: Marge Everett, RT

## 2017-02-10 NOTE — HOME CARE
Rec HC order - d/c noted for today - family to transport pt home - [facility] will follow for SN/PT/OT - D Bulmaro PANCHAL

## 2017-02-10 NOTE — PROGRESS NOTES
SUBJECTIVE:    Wants to go home. Dry cough present. No chest pain or abdominal pain. No N/V/D. SOB is better. Spo2 is 94% on room air. OBJECTIVE:    Visit Vitals    /81 (BP 1 Location: Right arm, BP Patient Position: Sitting)    Pulse 65    Temp 97.7 °F (36.5 °C)    Resp 20    Ht 5' 5\" (1.651 m)    Wt 54.7 kg (120 lb 8 oz)    SpO2 93%    Breastfeeding No    BMI 20.05 kg/m2     RS: CTA, no wheezes  CVS: RRR  GI: NT, BS +  Extremities; no pedal edema  General: NAD    ASSESSMENT:    1. Shortness of breath and dry cough, most likely secondary to #2 and #3. resolving  2. Acute bronchitis with atelectasis. 3. Abnormal CT chest showing soft tissue masses s/p EBUS and negative biopsy. 4. Hypertension. 5. Hypokalemia, resolved. 6. Hyponatremia, improving. 7. Poor appetite. 8. Moderate protein-calorie malnutrition. 9. Right-sided chest pain, currently resolved, most likely musculoskeletal, rule out acute coronary syndrome. 10. History of dyslipidemia. 11. Chronic obstructive pulmonary disease. 12. Lifetime smoker. 13. Chronic pain syndrome. 14. GPC bacteremia 1/4, contamination.  RESOLVED    PLAN:    Continue current management  Pulmonology input noted  Discharge patient home with [facility]    CMP:   Lab Results   Component Value Date/Time     (L) 02/10/2017 03:56 AM    K 3.3 (L) 02/10/2017 03:56 AM    CL 91 (L) 02/10/2017 03:56 AM    CO2 28 02/10/2017 03:56 AM    AGAP 10 02/10/2017 03:56 AM    GLU 86 02/10/2017 03:56 AM    BUN 4 (L) 02/10/2017 03:56 AM    CREA 0.37 (L) 02/10/2017 03:56 AM    GFRAA >60 02/10/2017 03:56 AM    GFRNA >60 02/10/2017 03:56 AM    CA 8.9 02/10/2017 03:56 AM    MG 1.8 02/10/2017 03:56 AM        CBC:   Lab Results   Component Value Date/Time    WBC 9.5 02/10/2017 03:56 AM    HGB 12.4 02/10/2017 03:56 AM    HCT 35.3 02/10/2017 03:56 AM     02/10/2017 03:56 AM       Current Discharge Medication List      START taking these medications    Details albuterol-ipratropium (DUO-NEB) 2.5 mg-0.5 mg/3 ml nebu 3 mL by Nebulization route every four (4) hours as needed. Qty: 30 Nebule, Refills: 0      amLODIPine (NORVASC) 10 mg tablet Take 1 Tab by mouth daily. Qty: 30 Tab, Refills: 0      metoprolol tartrate (LOPRESSOR) 25 mg tablet Take 1 Tab by mouth two (2) times a day. Qty: 60 Tab, Refills: 0      nicotine (NICODERM CQ) 14 mg/24 hr patch 1 Patch by TransDERmal route every twenty-four (24) hours for 30 days. Qty: 30 Patch, Refills: 0      potassium chloride (K-DUR, KLOR-CON) 20 mEq tablet Take 1 Tab by mouth daily for 3 days. Qty: 3 Tab, Refills: 0      budesonide-formoterol (SYMBICORT) 160-4.5 mcg/actuation HFA inhaler Take 1 Puff by inhalation two (2) times a day. Qty: 1 Inhaler, Refills: 0      phenol throat spray (CHLORASEPTIC) 1.4 % spray Take 1 Spray by mouth as needed for Sore throat. Qty: 1 Bottle, Refills: 0      amoxicillin-clavulanate (AUGMENTIN) 875-125 mg per tablet Take 1 Tab by mouth every twelve (12) hours for 7 days. Qty: 14 Tab, Refills: 0      Saccharomyces boulardii (FLORASTOR) 250 mg capsule Take 1 Cap by mouth two (2) times a day for 7 days. Qty: 14 Cap, Refills: 0         CONTINUE these medications which have CHANGED    Details   LORazepam (ATIVAN) 1 mg tablet Take 0.5 Tabs by mouth every eight (8) hours as needed. Max Daily Amount: 1.5 mg.  Qty: 30 Tab, Refills: 0         CONTINUE these medications which have NOT CHANGED    Details   omega 3-dha-epa-fish oil (FISH OIL) 100-160-1,000 mg cap Take 1,000 mg by mouth daily. HYDROmorphone (DILAUDID) 4 mg tablet 1/2 to one tab up to three times per day prn severe pain due to fill 2/13/17  Qty: 90 Tab, Refills: 0      LUMIGAN 0.01 % ophthalmic drops       simvastatin (ZOCOR) 20 mg tablet Take 1 Tab by mouth nightly.   Qty: 90 Tab, Refills: 1    Associated Diagnoses: Hyperlipidemia, unspecified hyperlipidemia type      gabapentin (NEURONTIN) 300 mg capsule Take 1 Cap by mouth three (3) times daily. Qty: 270 Cap, Refills: 1      tiotropium bromide (SPIRIVA RESPIMAT) 1.25 mcg/actuation inhaler Take 2 Puffs by inhalation daily. Qty: 1 Inhaler, Refills: 11      tamsulosin (FLOMAX) 0.4 mg capsule Take 1 Cap by mouth daily. Qty: 90 Cap, Refills: 3      ascorbic acid (VITAMIN C) 1,000 mg tablet Take  by mouth. albuterol (PROVENTIL HFA, VENTOLIN HFA, PROAIR HFA) 90 mcg/actuation inhaler Take 2 Puffs by inhalation every four (4) hours as needed for Wheezing. Qty: 1 Inhaler, Refills: 5      traZODone (DESYREL) 100 mg tablet Take 100 mg by mouth nightly. Patient takes one and half tabs at bedtime      cycloSPORINE (RESTASIS) 0.05 % ophthalmic emulsion Administer 1 Drop to both eyes two (2) times a day. ZY-XA-DT-Fe-Min-Lycopen-Lutein (CENTRUM) 0.4-162-18 mg Tab Take 1 Tab by mouth daily.          STOP taking these medications       PARoxetine (PAXIL) 20 mg tablet Comments:   Reason for Stopping:         pilocarpine hcl 0.25 % Drop Comments:   Reason for Stopping:         omega-3 fatty acids-vitamin e (FISH OIL) 1,000 mg cap Comments:   Reason for Stopping:

## 2017-02-11 NOTE — DISCHARGE SUMMARY
[facility] [address]  [address] SUMMARY    Name:  Jena Morse  MR#:  [id number]  :  1933  Account #:  [de-identified]  Date of Adm:  2017  Date of Discharge:  02/10/2017      DISPOSITION: Discharged to home with home health care for PT, OT,  DME as needed, and skilled nursing. DISCHARGE CONDITION: Stable. DISCHARGE DIAGNOSES:  1. Shortness of breath and dry cough secondary to #2 and #3, now  improving. 2. Acute bronchitis with atelectasis, improving. 3. Abnormal CT chest showed during soft tissue masses, status  post EBUS and biopsy that was negative for malignancy. 4. Hypertension. 5. Hypokalemia. 6. Hyponatremia. 7. Poor appetite. 8. Moderate protein-calorie malnutrition. 9. Right-sided chest pain, musculoskeletal, now resolved. 10. History of dyslipidemia. 11. Soreness of throat. 12. Chronic obstructive pulmonary disease without any exacerbation at  this point. 13. Lifetime smoker. 14. Chronic pain syndrome. 15. Staphylococcus epidermidis bacteremia, 1/4 bottles contamination,  resolved. DISCHARGE MEDICATIONS:  1. DuoNeb every 4 hours p.r.n. for shortness of breath and/or  wheezing. 2. Norvasc 10 mg daily. 3. Metoprolol 25 mg b.i.d.. 4. Nicoderm 1 patch, 14 mg patch daily. 5. Potassium chloride 20 mEq p.o. daily. 6. Symbicort 160/4.5, 1 puff p.o. b.i.d.. 7. Chloraseptic spray as needed for sore throat. 8. Augmentin 875 mg b.i.d. for 7 days. 9. Florastor 250 mg b.i.d. for 7 days. 10. Ativan 0.5 mg every 8 hours p.r.n. for anxiety. 11. Dilaudid 2 mg 3 times a day as needed p.r.n. for pain. 12. Fish oil 1 capsule daily. 13. Lumigan 0.01% eye drops to both eyes per the admitting doses. 14. Zocor 20 mg daily. 15. Neurontin 300 mg 3 times a day. 16. Respimat 2 puffs daily. 17. Flomax 0.4 mg daily. 18. Vitamin C 1000 mg daily. 19. Albuterol inhaler 2 puffs every 4 hours p.r.n. for shortness of  breath.   20. Trazodone 100 mg daily at bedtime for sleep.  21. Restasis 1 drop both eyes 2 times a day. 22. Centrum Silver 1 tablet p.o. daily. IMAGING AND PROCEDURES:  1. CTA chest done showed the patient has multifocal and multilobar  abnormalities of the lung, most likely solid infiltrate versus atelectasis  versus soft tissue masses. The patient also has hilar lymphadenopathy. 2. EBUS was done and biopsy was negative for malignancy, so far. 3. CT abdomen and pelvis without contrast done showed diverticulosis,  small hiatal hernia, bilateral renal cysts and chronic L1 compression  fracture. 4. Echocardiogram was done, showed ejection fraction of 55% to 60%. 5. Blood culture done at the time of admission showed 1/4 bottles  staph epidermidis. 6. Repeat blood culture on 02/08/2017 remained negative. 7. Sputum culture and AFB remained negative. CONSULTANTS: Pulmonary with Dr. Kirill Beauchamp. HOSPITAL COURSE: The patient was admitted to the hospital on  February 7, 2017, with a complaint of shortness of breath and dry  cough. Please refer to hospital admission H and P done by me for  further details. The patient was admitted here with a diagnosis of acute  bronchitis and atelectasis. The patient also had a CT scan done in the  emergency room which was reported abnormal, may have some soft  tissue masses. The patient is a lifetime smoker. She was started on IV  antibiotic and nebulizer treatment with improvement in her symptoms. She was seen by pulmonary specialist for abnormal CT scan. The  patient underwent EBUS and biopsy was done, which was negative for  malignancy. The patient had 1/4 blood culture done at the time of  admission was positive for staph epidermidis which was contamination. She remained afebrile. No leukocytosis. Repeat blood cultures  remained negative. The patient's IV antibiotic was changed to p.o. Augmentin and will be continued on probiotic for 7 more days to  prevent diarrhea from Augmentin.  The patient was also seen by Dr. Alex Solares so he can follow this the patient as an outpatient. The patient  has RA factor positive and we will need further connective tissue  workup done as an outpatient, which can be ordered by the patient's  primary care physician. The patient's KANA is negative at this point. It is  hard to say this abnormal CT scan finding is due to connective tissue  disorder versus inflammation sources, infections. The plan is to  finish the antibiotic course and have a CT scan done after 1 month  from now and to see whether her lymph nodes, as well as soft tissue  masses are gone or not. If the patient has persistent soft tissue  masses, then she will need CT-guided biopsy done by Interventional  Radiologist. Dr. Giuliano Gar will also follow this patient as an outpatient. The  patient will be followed by pulmonologist as an outpatient  pulmonologist, Dr. Wilfrido Hernandez has seen this patient and cleared her for the  discharge. The patient had some soreness of throat after the  EBUS and will be treated with Chloraseptic spray. For COPD she will  be continued on Albuterol inhaler and Albuterol nebulizer as needed,  Spiriva and was added on Symbicort. The patient also has a history of  hypertension, but was not taking any medication, started on Norvasc  and beta blocker. DISCHARGE INSTRUCTIONS:  1. DIET: Cardiac diet. 2. Activity as tolerated. 3. Follow with PCP in 1 week. 4. Follow with Dr. Sanford Rosales in 2 weeks. 5. Follow with Dr. Giuliano Gar in 4 weeks. 6. Dr. Nikolay Riggins, PCP, needs to order rheumatoid arthritis workup with  some blood tests or the patient may have to go and see a  rheumatologist.  7. BMP after 4 days from now. Total time greater than 35 minutes.         Caretha Brittle, MD VP / [facility]  D:  02/10/2017   16:01  T:  02/10/2017   19:54  Job #:  [id number]

## 2017-02-12 ENCOUNTER — HOME CARE VISIT (OUTPATIENT)
Dept: HOME HEALTH SERVICES | Facility: HOME HEALTH | Age: 82
End: 2017-02-12

## 2017-02-13 ENCOUNTER — PATIENT OUTREACH (OUTPATIENT)
Dept: INTERNAL MEDICINE CLINIC | Age: 82
End: 2017-02-13

## 2017-02-13 LAB
BACTERIA SPEC CULT: NORMAL
GRAM STN SPEC: NORMAL
GRAM STN SPEC: NORMAL
SERVICE CMNT-IMP: NORMAL

## 2017-02-13 RX ORDER — ROPINIROLE 1 MG/1
1 TABLET, FILM COATED ORAL DAILY
COMMUNITY
End: 2017-09-13 | Stop reason: ALTCHOICE

## 2017-02-14 ENCOUNTER — HOME CARE VISIT (OUTPATIENT)
Dept: HOME HEALTH SERVICES | Facility: HOME HEALTH | Age: 82
End: 2017-02-14

## 2017-02-14 ENCOUNTER — TELEPHONE (OUTPATIENT)
Dept: INTERNAL MEDICINE CLINIC | Age: 82
End: 2017-02-14

## 2017-02-14 LAB
BACTERIA SPEC CULT: NORMAL
BACTERIA SPEC CULT: NORMAL
SERVICE CMNT-IMP: NORMAL
SERVICE CMNT-IMP: NORMAL

## 2017-02-14 NOTE — TELEPHONE ENCOUNTER
Home Health nurse calling. Says she went to first visit but patient and  refused the visit. She says they seemed over whelmed and didn't want her there.

## 2017-02-27 ENCOUNTER — OFFICE VISIT (OUTPATIENT)
Dept: INTERNAL MEDICINE CLINIC | Age: 82
End: 2017-02-27

## 2017-02-27 VITALS
TEMPERATURE: 98.3 F | WEIGHT: 130.6 LBS | SYSTOLIC BLOOD PRESSURE: 138 MMHG | HEART RATE: 50 BPM | DIASTOLIC BLOOD PRESSURE: 70 MMHG | BODY MASS INDEX: 21.76 KG/M2 | RESPIRATION RATE: 12 BRPM | HEIGHT: 65 IN | OXYGEN SATURATION: 94 %

## 2017-02-27 DIAGNOSIS — E78.5 HYPERLIPIDEMIA, UNSPECIFIED HYPERLIPIDEMIA TYPE: ICD-10-CM

## 2017-02-27 DIAGNOSIS — I10 ESSENTIAL HYPERTENSION WITH GOAL BLOOD PRESSURE LESS THAN 140/90: ICD-10-CM

## 2017-02-27 DIAGNOSIS — J43.1 PANLOBULAR EMPHYSEMA (HCC): Primary | ICD-10-CM

## 2017-02-27 DIAGNOSIS — M81.0 OSTEOPOROSIS: ICD-10-CM

## 2017-02-27 RX ORDER — SIMVASTATIN 20 MG/1
20 TABLET, FILM COATED ORAL
Qty: 90 TAB | Refills: 1 | Status: SHIPPED | OUTPATIENT
Start: 2017-02-27 | End: 2017-09-19 | Stop reason: SDUPTHER

## 2017-02-27 RX ORDER — TRIAMCINOLONE ACETONIDE 5 MG/G
CREAM TOPICAL
Qty: 30 G | Refills: 0 | Status: SHIPPED | OUTPATIENT
Start: 2017-02-27 | End: 2017-09-13 | Stop reason: ALTCHOICE

## 2017-02-27 NOTE — MR AVS SNAPSHOT
Visit Information Date & Time Provider Department Dept. Phone Encounter #  
 2/27/2017  4:00 PM Sandria Curling, MD Internist of [address] [place] [address] [id number] Your Appointments 3/2/2017  9:00 AM  
New Patient with Paola Molina MD  
[address] [facility] Appt Note: hos f/u; hos f/u  
 [address] [facility], [address] [facility] [address], [address]  
  
    
 3/7/2017  3:15 PM  
CONSULT with Chloe Brennan MD  
[address] [place] [address] for Pain Management [facility]) Appt Note: Schedule also a consult with Dr. Drew Santacruz for lumbar procedure options; consult; consult; Return in about 14 weeks (around 5/4/2017). [address] [facility] [address]  
[phone] [phone]  
  
    
 3/10/2017  8:40 AM  
Follow Up with HETAL Marie [address] [place] [address] for Pain Management (FAN SCHEDULING) Appt Note: return in months [address] [facility] [address]  
[phone] [id number]  
  
    
 3/24/2017 11:00 AM  
Follow Up with Michael Miller MD  
[address] ([facility]) Appt Note: hosp fu; seen by Dr Shayy Miranda; .  
 [address] [place] [address], [address] [facility] [address]  
[phone]  
  
   
 [address] [place] [address], [address] [facility] 1  15 [place] [address] 5/1/2017  9:05 AM  
LAB with [facility] NURSE VISIT Internist of [facility] ([facility] Appt Note: PE lab fasting [address], [address] [facility] [address] [place]  
  
   
 [address], [address]  
  
    
 5/8/2017  9:15 AM  
PHYSICAL with Sandria Curling, MD  
Internist of [address] [place] [address] [facility] Appt Note: Physical  
 [address], [address] [place] [address] Se  
[phone] [address]  
  
    
 6/7/2017  9:15 AM  
Office Visit with Jacky Mahoney MD  
[facility] [address]) Appt Note: checkup [address] [address]  
[phone] [address] [place] [address]  
  
    
 9/6/2017 11:00 AM  
PROCEDURE with [facility] NONIMAGING  
[facility] (FAN SCHEDULING) Appt Note: leg art 1 yr [place] [address] [address]  
[phone] [address] [place] [address]  
  
    
 9/19/2017 11:00 AM  
Follow Up with HETAL Connors  
[facility] (FAN SCHEDULING) [address] [address]  
[phone]  
  
   
 [address] [facility] [address] [address] Upcoming Health Maintenance Date Due  
 EYE EXAM RETINAL OR DILATED Q1 2/5/2015 GLAUCOMA SCREENING Q2Y 4/1/2016 MEDICARE YEARLY EXAM 12/8/2016 MICROALBUMIN Q1 4/5/2017 HEMOGLOBIN A1C Q6M 6/6/2017 LIPID PANEL Q1 2/8/2018 DTaP/Tdap/Td series (2 - Td) 6/11/2024 Allergies as of 2/27/2017  Review Complete On: 2/13/2017 By: Rolan Rdz RN Severity Noted Reaction Type Reactions Levaquin [Levofloxacin]  05/17/2011    Rash Current Immunizations  Reviewed on 2/7/2017 Name Date Influenza High Dose Vaccine PF 10/13/2016 Influenza Vaccine 10/15/2014 Influenza Vaccine (Quad) PF 12/8/2015 Influenza Vaccine Split 11/22/2011, 11/11/2010 11:46 AM  
 Pneumococcal Conjugate (PCV-13) 12/8/2015 Pneumococcal Vaccine (Unspecified Type) 9/20/2000 Tdap 6/11/2014 Not reviewed this visit You Were Diagnosed With   
  
 Codes Comments Panlobular emphysema ([facility] [address]    -  Primary ICD-10-CM: J43.1 ICD-9-CM: 492.8 Hyperlipidemia, unspecified hyperlipidemia type     ICD-10-CM: E78.5 ICD-9-CM: 272.4 Essential hypertension with goal blood pressure less than 140/90     ICD-10-CM: I10 
ICD-9-CM: 401.9 Vitals BP  
  
  
  
  
  
 140/60 Vitals History BMI and BSA Data Body Mass Index Body Surface Area 21.73 kg/m 2 1.65 m 2 Preferred Pharmacy Pharmacy Name Phone 305 [facility], [address] [address] [place] [address] [address] Nyla Wellington Your Updated Medication List  
  
   
This list is accurate as of: 2/27/17  4:46 PM.  Always use your most recent med list.  
  
  
  
  
 albuterol 90 mcg/actuation inhaler Commonly known as:  PROVENTIL HFA, VENTOLIN HFA, PROAIR HFA Take 2 Puffs by inhalation every four (4) hours as needed for Wheezing. albuterol-ipratropium 2.5 mg-0.5 mg/3 ml Nebu Commonly known as:  DUO-NEB  
3 mL by Nebulization route every four (4) hours as needed. amLODIPine 10 mg tablet Commonly known as:  Elspeth Bakes Take 1 Tab by mouth daily. budesonide-formoterol 160-4.5 mcg/actuation HFA inhaler Commonly known as:  SYMBICORT Take 1 Puff by inhalation two (2) times a day. CENTRUM 0.4-162-18 mg Tab Generic drug:  CP-UB-JZ-Fe-Min-Lycopen-Lutein Take 1 Tab by mouth daily. FISH -160-1,000 mg Cap Generic drug:  omega 3-dha-epa-fish oil Take 1,000 mg by mouth daily. gabapentin 300 mg capsule Commonly known as:  NEURONTIN Take 1 Cap by mouth three (3) times daily. HYDROmorphone 4 mg tablet Commonly known as:  DILAUDID  
1/2 to one tab up to three times per day prn severe pain due to fill 2/13/17 LINZESS 145 mcg Cap capsule Generic drug:  linaclotide Take  by mouth Daily (before breakfast). LORazepam 1 mg tablet Commonly known as:  ATIVAN Take 0.5 Tabs by mouth every eight (8) hours as needed. Max Daily Amount: 1.5 mg.  
  
 LUMIGAN 0.01 % ophthalmic drops Generic drug:  bimatoprost  
  
 metoprolol tartrate 25 mg tablet Commonly known as:  LOPRESSOR  
 Take 1 Tab by mouth two (2) times a day. nicotine 14 mg/24 hr patch Commonly known as:  NICODERM CQ  
1 Patch by TransDERmal route every twenty-four (24) hours for 30 days. phenol throat spray 1.4 % spray Commonly known as:  Steve Otter Take 1 Spray by mouth as needed for Sore throat. RESTASIS 0.05 % ophthalmic emulsion Generic drug:  cycloSPORINE Administer 1 Drop to both eyes two (2) times a day. rOPINIRole 1 mg tablet Commonly known as:  Karime Glimpse Take 1 mg by mouth daily. simvastatin 20 mg tablet Commonly known as:  ZOCOR Take 1 Tab by mouth nightly. tamsulosin 0.4 mg capsule Commonly known as:  FLOMAX Take 1 Cap by mouth daily. tiotropium bromide 1.25 mcg/actuation inhaler Commonly known as:  Mehul Founds Take 2 Puffs by inhalation daily. traZODone 100 mg tablet Commonly known as:  Fili [place] Take 100 mg by mouth nightly. Patient takes one and half tabs at bedtime VITAMIN C 1,000 mg tablet Generic drug:  ascorbic acid (vitamin C) Take  by mouth. Prescriptions Sent to Pharmacy Refills  
 simvastatin (ZOCOR) 20 mg tablet 1 Sig: Take 1 Tab by mouth nightly. Class: Normal  
 Pharm[address]: [address] Ph #: [phone] Route: Oral  
  
To-Do List   
 03/01/2017 10:30 AM  
  Appointment with [facility] RM 2 at [facility] [address] ([phone]) DIET RESTRICTIONS  Nothing to eat or drink 4 hours prior to study May have water to take meds  GENERAL INSTRUCTIONS  If you were given medications to take for a contrast allergy prior to having this study, please arrange to have someone drive you to your appointment. If you have had a creatinine level drawn within the past 30 days, please bring most recent results to your appt. This study does not require you to drink contrast prior to your study.   MEDICATIONS  Bring a complete list of all medications you are currently taking to include prescriptions, over-the-counter meds, herbals, vitamins & any dietary supplements. OUTSIDE FILMS  Bring outside films, CDs, and reports related to the study with you on the day of your exam.  QUESTIONS  Notify the CT Department if you have any questions concerning your study. Nerda Loredo - [phone] [facility] - [phone] Please provide this summary of care documentation to your next provider. Your primary care clinician is listed as Meghan Delgado. Saulo Pérez. If you have any questions after today's visit, please call [phone].

## 2017-02-27 NOTE — PROGRESS NOTES
Dale Speaker 9/22/1933, is a 80 y.o. female, who is seen today for reevaluation after fairly recent hospitalizations for apparent pneumonia versus exacerbation of COPD. She was discharged February 10 and from the time of admission through now she has not smoked. She sits around at home a lot and would love to smoke but has used nicotine patches and is managed not to smoke. She does have a follow-up CT of the chest scheduled for 2 days from now and follow-up with Dr. Shannan Esquivel after that. She had multiple nodules on chest CT during hospitalization. She is breathing quite well at this point and not coughing as much. Her  thinks she is using all of her medicines correctly but he has a handwritten list of medications and is really not clear exactly what she is taking. He talks about a capsule but it looks like she is on Spiriva Respimat instead. I am not sure what he refers to since that is not on his list and not on the hospital discharge list.  The patient's daughter sent a list of things she wanted done including evaluation by a rheumatologist for rheumatoid arthritis, colonoscopy, sleep medicine, bone density and treatment of hypertension.     Past Medical History:   Diagnosis Date    Colon polyps     COPD 8-30-02    DDD (degenerative disc disease), lumbar 10/1/2014    Degeneration of lumbar or lumbosacral intervertebral disc     Depression     Diabetes ([facility] [address] 7-19-05    Diverticulosis     DM neuropathy, painful ([facility] [address] 10/1/2014    MOORE (Dyspnea on Exertion) 4-19-02    echo: +mild LVH, ND90-86% w/ diastolic dysfxn    Essential hypertension, benign     Glaucoma     Hemorrhoids 6-6-06    HX OTHER MEDICAL     breathing problems    Hypercholesterolemia 4-2008    TSH 1.9     Hyperlipidemia 5/17/2011    Hypertension 4-16-02    LBP (low back pain) 4-13-04    Low back pain radiating to both legs 10/1/2014    Lumbago     Lumbar disc herniation 10/1/2014    Lumbar facet arthropathy ([facility] [address] 10/1/2014    Lumbosacral radiculopathy at L5 10/1/2014    Lumbosacral radiculopathy at S1 10/1/2014    Microscopic hematuria     OA (osteoarthritis)     Other and unspecified hyperlipidemia     Overactive bladder 5/17/2011    RLS (restless legs syndrome) 1/17/2013    Sciatica     Shortness of breath     Spinal stenosis, lumbar region, without neurogenic claudication     Spondylolisthesis of lumbar region 10/1/2014    Spondylolisthesis, grade 1 10/1/2014    Stress urinary incontinence     Syncope     -MRI brain    Syncope and collapse     Urinary tract infection, site not specified      Current Outpatient Prescriptions   Medication Sig Dispense Refill    simvastatin (ZOCOR) 20 mg tablet Take 1 Tab by mouth nightly. 90 Tab 1    triamcinolone (ARISTOCORT) 0.5 % topical cream Apply a thin layer to rash twice daily 30 g 0    rOPINIRole (REQUIP) 1 mg tablet Take 1 mg by mouth daily.  linaclotide (LINZESS) 145 mcg cap capsule Take  by mouth Daily (before breakfast).  albuterol-ipratropium (DUO-NEB) 2.5 mg-0.5 mg/3 ml nebu 3 mL by Nebulization route every four (4) hours as needed. 30 Nebule 0    amLODIPine (NORVASC) 10 mg tablet Take 1 Tab by mouth daily. 30 Tab 0    metoprolol tartrate (LOPRESSOR) 25 mg tablet Take 1 Tab by mouth two (2) times a day. 60 Tab 0    nicotine (NICODERM CQ) 14 mg/24 hr patch 1 Patch by TransDERmal route every twenty-four (24) hours for 30 days. 30 Patch 0    phenol throat spray (CHLORASEPTIC) 1.4 % spray Take 1 Spray by mouth as needed for Sore throat. 1 Bottle 0    omega 3-dha-epa-fish oil (FISH OIL) 100-160-1,000 mg cap Take 1,000 mg by mouth daily.  LUMIGAN 0.01 % ophthalmic drops       tamsulosin (FLOMAX) 0.4 mg capsule Take 1 Cap by mouth daily. 90 Cap 3    ascorbic acid (VITAMIN C) 1,000 mg tablet Take  by mouth.       albuterol (PROVENTIL HFA, VENTOLIN HFA, PROAIR HFA) 90 mcg/actuation inhaler Take 2 Puffs by inhalation every four (4) hours as needed for Wheezing. 1 Inhaler 5    traZODone (DESYREL) 100 mg tablet Take 100 mg by mouth nightly. Patient takes one and half tabs at bedtime      cycloSPORINE (RESTASIS) 0.05 % ophthalmic emulsion Administer 1 Drop to both eyes two (2) times a day.  TK-BB-GX-Fe-Min-Lycopen-Lutein (CENTRUM) 0.4-162-18 mg Tab Take 1 Tab by mouth daily.  LORazepam (ATIVAN) 1 mg tablet Take 0.5 Tabs by mouth every eight (8) hours as needed. Max Daily Amount: 1.5 mg. 30 Tab 0    budesonide-formoterol (SYMBICORT) 160-4.5 mcg/actuation HFA inhaler Take 1 Puff by inhalation two (2) times a day. 1 Inhaler 0    HYDROmorphone (DILAUDID) 4 mg tablet 1/2 to one tab up to three times per day prn severe pain due to fill 2/13/17 90 Tab 0    gabapentin (NEURONTIN) 300 mg capsule Take 1 Cap by mouth three (3) times daily. 270 Cap 1    tiotropium bromide (SPIRIVA RESPIMAT) 1.25 mcg/actuation inhaler Take 2 Puffs by inhalation daily. 1 Inhaler 11     Visit Vitals    /70    Pulse (!) 50    Temp 98.3 °F (36.8 °C) (Oral)    Resp 12    Ht 5' 5\" (1.651 m)    Wt 130 lb 9.6 oz (59.2 kg)    SpO2 94%    BMI 21.73 kg/m2     Carotids are 2+ without bruits. No JVD or HJR. Lungs are clear to percussion. Somewhat diminished breath sounds throughout with no wheezing or crackles. Heart reveals a regular rhythm with normal S1 and S2 no murmur gallop click or rub. Apical impulse is not palpable. Abdomen is soft and nontender with no hepatosplenomegaly or masses and no bruits. Extremities reveal no clubbing cyanosis or edema. Pulses are diminished in the feet but 2+ elsewhere. The hands reveal some mild degenerative changes in the MP joints but no deformities and stenosis effusions or redness or warmth anywhere over the joints. She does have a moderate amount of redness in areas on her upper arms where she has been wearing nicotine patches. Over-the-counter cortisone has not been helping.     Assessment: #1.  Blood pressure is adequately controlled. She will continue amlodipine 10 mg daily. #2.  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 Further evaluation is not needed. #3. She may well have osteoporosis, we will order a DEXA scan for her. #4.  Screening colonoscopy is not indicated. 83 and quite debilitated. #4.  Multiple pulmonary nodules, she will follow-up with CT and with Dr. Floresita Yates. #5.  Panlobular emphysema clinically, she had been smoking a pack per day until about 3 weeks ago and plans not to smoke anymore. She is going to plan on using her nicotine patches for another month even though she is developing dermatitis at the site of the patches. Her  will rotate the patches to the arms and upper back and will use triamcinolone cream 0.5% twice daily on the areas of redness until they clear. Hopefully she will not need the nicotine patches much longer but I have emphasized at least twice during this evaluation that she must not go back to smoking and she should call me if she feels like she will go back to smoking. Follow-up as previously planned    [facility]. Terri Alonzo MD FACP    Please note: This document has been produced using voice recognition software. Unrecognized errors in transcription may be present.

## 2017-03-01 ENCOUNTER — HOSPITAL ENCOUNTER (OUTPATIENT)
Dept: CT IMAGING | Age: 82
Discharge: HOME | End: 2017-03-01
Attending: INTERNAL MEDICINE
Payer: MEDICARE

## 2017-03-01 PROCEDURE — 71260 CT THORAX DX C+: CPT

## 2017-03-01 PROCEDURE — 74011636320 HC RX REV CODE- 636/320: Performed by: INTERNAL MEDICINE

## 2017-03-01 RX ADMIN — IOPAMIDOL 70 ML: 612 INJECTION, SOLUTION INTRAVENOUS at 11:03

## 2017-03-08 ENCOUNTER — PATIENT OUTREACH (OUTPATIENT)
Dept: INTERNAL MEDICINE CLINIC | Age: 82
End: 2017-03-08

## 2017-03-08 ENCOUNTER — TELEPHONE (OUTPATIENT)
Dept: INTERNAL MEDICINE CLINIC | Age: 82
End: 2017-03-08

## 2017-03-08 NOTE — PROGRESS NOTES
Contacted patient for s/p hospitalization. No answer. Left message introducing self, role and reason for call. Requested return call. Contact information provided. Will attempt to contact at a later time.

## 2017-03-10 ENCOUNTER — OFFICE VISIT (OUTPATIENT)
Dept: PAIN MANAGEMENT | Age: 82
End: 2017-03-10

## 2017-03-10 ENCOUNTER — PATIENT OUTREACH (OUTPATIENT)
Dept: INTERNAL MEDICINE CLINIC | Age: 82
End: 2017-03-10

## 2017-03-10 VITALS
HEIGHT: 65 IN | DIASTOLIC BLOOD PRESSURE: 71 MMHG | HEART RATE: 53 BPM | BODY MASS INDEX: 22.16 KG/M2 | WEIGHT: 133 LBS | SYSTOLIC BLOOD PRESSURE: 139 MMHG

## 2017-03-10 DIAGNOSIS — K59.03 CONSTIPATION DUE TO PAIN MEDICATION: ICD-10-CM

## 2017-03-10 DIAGNOSIS — M54.50 LOW BACK PAIN RADIATING TO BOTH LEGS: ICD-10-CM

## 2017-03-10 DIAGNOSIS — F51.04 CHRONIC INSOMNIA: ICD-10-CM

## 2017-03-10 DIAGNOSIS — M51.36 DDD (DEGENERATIVE DISC DISEASE), LUMBAR: ICD-10-CM

## 2017-03-10 DIAGNOSIS — M47.817 LUMBOSACRAL SPONDYLOSIS WITHOUT MYELOPATHY: ICD-10-CM

## 2017-03-10 DIAGNOSIS — M79.604 LOW BACK PAIN RADIATING TO BOTH LEGS: ICD-10-CM

## 2017-03-10 DIAGNOSIS — M48.061 LUMBAR STENOSIS: ICD-10-CM

## 2017-03-10 DIAGNOSIS — Z79.899 ENCOUNTER FOR LONG-TERM (CURRENT) USE OF HIGH-RISK MEDICATION: ICD-10-CM

## 2017-03-10 DIAGNOSIS — M48.02 SPINAL STENOSIS IN CERVICAL REGION: ICD-10-CM

## 2017-03-10 DIAGNOSIS — M79.605 LOW BACK PAIN RADIATING TO BOTH LEGS: ICD-10-CM

## 2017-03-10 DIAGNOSIS — M48.02 CERVICAL STENOSIS OF SPINAL CANAL: ICD-10-CM

## 2017-03-10 DIAGNOSIS — G89.4 CHRONIC PAIN SYNDROME: Primary | ICD-10-CM

## 2017-03-10 RX ORDER — HYDROMORPHONE HYDROCHLORIDE 4 MG/1
TABLET ORAL
Qty: 90 TAB | Refills: 0 | Status: SHIPPED | OUTPATIENT
Start: 2017-03-10 | End: 2017-04-22

## 2017-03-10 NOTE — PROGRESS NOTES
HISTORY OF PRESENT ILLNESS  Marlo Mendoza is a 80 y.o. female. HPI  The patient presents today for follow up of chronic the lumbar spine and lower extremities distally attributed to lumbar spondylosis, degenerative disc disease, radiculopathy, and diabetic peripheral neuropathy. She was recently admitted to [facility] for questionable pneumonia for four days in February. No other diagnoses. She recently saw Dr. Ashley Cabrera and he reordered her bone density (never heard about the one that was ordered here). She and her  were reminded to bring medication to each visit. They report she has filled two prescriptions for the dilaudid and still has one unfilled prescription. They believe she still has about 25 left in the bottle. The patient denies any other significant changes since last f/u. She tolerates medications without side effects. Marlo Mendoza reports no change in sleep or constipation. She takes trazodone for sleep at night. No cpap. She is not currently taking anything for constipation. She is requesting a refill of a medication we have given her in the past. The patient reports 50% relief with current medications. She continues to have significant low back pain that is achy and throbbing. She describes her pain in her legs as constant and cramping. Nothing in particular makes the leg pain worse or better. Review of Systems   Constitutional: Positive for malaise/fatigue. Negative for chills, fever and weight loss (gain). HENT: Positive for congestion. Eyes: Negative. Reading glasses  Vision loss left eye (sees Dr. Jj Tejada q 3 months). Respiratory: Positive for shortness of breath (occasional/has seen pcp-has nebulizer machine prn ). Negative for cough (smoker). Cardiovascular: Negative for chest pain. Gastrointestinal: Positive for constipation. Negative for diarrhea, heartburn, nausea and vomiting.    Genitourinary: Positive for dysuria (frequent UTI's  sees urology Dr. Catalina Scott). Musculoskeletal: Positive for back pain, falls (risk/no new falls/no ED visit), joint pain (knees) and myalgias. Negative for neck pain. Skin: Negative. Neurological: Positive for dizziness, tingling (feet) and headaches (occasional). Negative for seizures. Balance issues-saw ENT   Endo/Heme/Allergies: Positive for environmental allergies. Psychiatric/Behavioral: Positive for depression. Negative for suicidal ideas. The patient is nervous/anxious and has insomnia (takes trazodone nightly which helps). Physical Exam   Constitutional: She is oriented to person, place, and time. She appears well-developed and well-nourished. No distress (appears uncomfortable ). HENT:   Head: Normocephalic. Right Ear: External ear normal.   Left Ear: External ear normal.   Eyes: Conjunctivae and EOM are normal. Pupils are equal, round, and reactive to light. Neck: Normal range of motion. Pulmonary/Chest: Effort normal. No respiratory distress. Musculoskeletal: She exhibits tenderness (lumbar/pain radiating down legs). She exhibits no edema. Lumbar back: She exhibits decreased range of motion (painful), tenderness, pain and spasm. She exhibits no edema. Back:    Neurological: She is alert and oriented to person, place, and time. No cranial nerve deficit (grossly intact). Skin: Skin is warm and dry. Psychiatric: She has a normal mood and affect. Her behavior is normal. Judgment and thought content normal.    present   Nursing note and vitals reviewed. ASSESSMENT and PLAN  Encounter Diagnoses   Name Primary?     Chronic pain syndrome Yes    Encounter for long-term (current) use of high-risk medication     Lumbosacral spondylosis without myelopathy     Cervical stenosis of spinal canal     Spinal stenosis in cervical region     Lumbar stenosis     DDD (degenerative disc disease), lumbar     Low back pain radiating to both legs     Constipation due to pain medication     Chronic insomnia      Orders Placed This Encounter    HYDROmorphone (DILAUDID) 4 mg tablet    linaclotide (LINZESS) 145 mcg cap capsule      Patient Instructions   1. Continue medications as prescribed  2. Follow up in two months  3. Bring medication to every appointment       40 minutes spent in visit face to face with the patient.     >50% of time spent counseling and coordinating care

## 2017-03-10 NOTE — MR AVS SNAPSHOT
Visit Information Date & Time Provider Department Dept. Phone Encounter #  
 3/10/2017  8:40 AM [id number] Follow-up Instructions Return in about 2 months (around 5/10/2017). Your Appointments 3/24/2017 11:00 AM  
Follow Up with Matteo Perry MD  
[address] ([facility]) Appt Note: hosp fu; seen by Dr Maria De Jesus Marrero; .  
 [address] [place] [address], [address] [address]  
[phone]  
  
   
 [address] [place] [address], [address] [place] [address] 5/1/2017  9:05 AM  
LAB with IOC NURSE VISIT Internist of [facility] ([address] [place] [address]) Appt Note: PE lab fasting [address], [address] [facility] [address] [place]  
  
   
 [address], [address]  
  
    
 5/8/2017  9:15 AM  
PHYSICAL with Gee Gold MD  
Internist of [address] [place] [address]) Appt Note: Physical  
 [address], [address] [address] [address] [place]  
  
   
 [address], [address]  
  
    
 6/7/2017  9:15 AM  
Office Visit with Alexandria Spain MD  
[facility] [address] [place] [address]) Appt Note: checkup [address] [facility] [address] [address]  
[phone] [address] [place] [address]  
  
    
 9/6/2017 11:00 AM  
PROCEDURE with [facility] NONIMAGING  
[facility] (FAN SCHEDULING) Appt Note: leg art 1 yr [place] [address] [address]  
[phone] [address] [place] [address] [place] [address]  
  
    
 9/19/2017 11:00 AM  
Follow Up with HETAL Valenzuela  
[facility] (FAN SCHEDULING) [address] [address]  
[phone]  
  
   
 [address] [address] Upcoming Health Maintenance Date Due  
 EYE EXAM RETINAL OR DILATED Q1 2/5/2015 GLAUCOMA SCREENING Q2Y 4/1/2016 MEDICARE YEARLY EXAM 12/8/2016 MICROALBUMIN Q1 4/5/2017 HEMOGLOBIN A1C Q6M 6/6/2017 LIPID PANEL Q1 2/8/2018 DTaP/Tdap/Td series (2 - Td) 6/11/2024 Allergies as of 3/10/2017  Review Complete On: 3/10/2017 By: HETAL Gresham Severity Noted Reaction Type Reactions Levaquin [Levofloxacin]  05/17/2011    Rash Current Immunizations  Reviewed on 2/7/2017 Name Date Influenza High Dose Vaccine PF 10/13/2016 Influenza Vaccine 10/15/2014 Influenza Vaccine (Quad) PF 12/8/2015 Influenza Vaccine Split 11/22/2011, 11/11/2010 11:46 AM  
 Pneumococcal Conjugate (PCV-13) 12/8/2015 Pneumococcal Vaccine (Unspecified Type) 9/20/2000 Tdap 6/11/2014 Not reviewed this visit You Were Diagnosed With   
  
 Codes Comments Chronic pain syndrome    -  Primary ICD-10-CM: G89.4 ICD-9-CM: 338.4 Encounter for long-term (current) use of high-risk medication     ICD-10-CM: Z79.899 ICD-9-CM: V58.69 Lumbosacral spondylosis without myelopathy     ICD-10-CM: H95.307 ICD-9-CM: 721.3 Cervical stenosis of spinal canal     ICD-10-CM: M48.02 
ICD-9-CM: 723.0 Spinal stenosis in cervical region     ICD-10-CM: M48.02 
ICD-9-CM: 723.0 Lumbar stenosis     ICD-10-CM: M48.06 
ICD-9-CM: 724.02   
 DDD (degenerative disc disease), lumbar     ICD-10-CM: M51.36 
ICD-9-CM: 722.52 Low back pain radiating to both legs     ICD-10-CM: M54.5 ICD-9-CM: 724.2 Constipation due to pain medication     ICD-10-CM: K59.03 
ICD-9-CM: 564.09, E947.9 Chronic insomnia     ICD-10-CM: F51.04 
ICD-9-CM: 780.52 Vitals BP Pulse Height(growth percentile) Weight(growth percentile) BMI OB Status 139/71 (!) 53 5' 5\" (1.651 m) 133 lb (60.3 kg) 22.13 kg/m2 Hysterectomy Smoking Status Current Every Day Smoker Vitals History BMI and BSA Data Body Mass Index Body Surface Area  
 22.13 kg/m 2 1.66 m 2 Preferred Pharmacy Pharmacy Name Phone [facility] [address] [place], [address] Your Updated Medication List  
  
   
This list is accurate as of: 3/10/17  9:28 AM.  Always use your most recent med list.  
  
  
  
  
 albuterol 90 mcg/actuation inhaler Commonly known as:  PROVENTIL HFA, VENTOLIN HFA, PROAIR HFA Take 2 Puffs by inhalation every four (4) hours as needed for Wheezing. albuterol-ipratropium 2.5 mg-0.5 mg/3 ml Nebu Commonly known as:  DUO-NEB  
3 mL by Nebulization route every four (4) hours as needed. amLODIPine 10 mg tablet Commonly known as:  Milvia Gables Take 1 Tab by mouth daily. budesonide-formoterol 160-4.5 mcg/actuation HFA inhaler Commonly known as:  SYMBICORT Take 1 Puff by inhalation two (2) times a day. CENTRUM 0.4-162-18 mg Tab Generic drug:  IG-LA-RF-Fe-Min-Lycopen-Lutein Take 1 Tab by mouth daily. FISH -160-1,000 mg Cap Generic drug:  omega 3-dha-epa-fish oil Take 1,000 mg by mouth daily. gabapentin 300 mg capsule Commonly known as:  NEURONTIN Take 1 Cap by mouth three (3) times daily. * HYDROmorphone 4 mg tablet Commonly known as:  DILAUDID  
1/2 to one tab up to three times per day prn severe pain due to fill 2/13/17  
  
 * HYDROmorphone 4 mg tablet Commonly known as:  DILAUDID  
1/2 to one tab up to three times per day prn severe pain (due to fill 4/8/17) * LINZESS 145 mcg Cap capsule Generic drug:  linaclotide Take  by mouth Daily (before breakfast). * linaclotide 145 mcg Cap capsule Commonly known as:  Alto Longs Take 1 Cap by mouth Daily (before breakfast). LORazepam 1 mg tablet Commonly known as:  ATIVAN Take 0.5 Tabs by mouth every eight (8) hours as needed. Max Daily Amount: 1.5 mg.  
  
 LUMIGAN 0.01 % ophthalmic drops Generic drug:  bimatoprost  
  
 metoprolol tartrate 25 mg tablet Commonly known as:  LOPRESSOR Take 1 Tab by mouth two (2) times a day. nicotine 14 mg/24 hr patch Commonly known as:  NICODERM CQ  
1 Patch by TransDERmal route every twenty-four (24) hours for 30 days. phenol throat spray 1.4 % spray Commonly known as:  Willadean Larch Take 1 Spray by mouth as needed for Sore throat. RESTASIS 0.05 % ophthalmic emulsion Generic drug:  cycloSPORINE Administer 1 Drop to both eyes two (2) times a day. rOPINIRole 1 mg tablet Commonly known as:  Minerva Chapito Take 1 mg by mouth daily. simvastatin 20 mg tablet Commonly known as:  ZOCOR Take 1 Tab by mouth nightly. tamsulosin 0.4 mg capsule Commonly known as:  FLOMAX Take 1 Cap by mouth daily. tiotropium bromide 1.25 mcg/actuation inhaler Commonly known as:  [address] Take 2 Puffs by inhalation daily. traZODone 100 mg tablet Commonly known as:  [place] Bump Take 100 mg by mouth nightly. Patient takes one and half tabs at bedtime  
  
 triamcinolone 0.5 % topical cream  
Commonly known as:  ARISTOCORT Apply a thin layer to rash twice daily VITAMIN C 1,000 mg tablet Generic drug:  ascorbic acid (vitamin C) Take  by mouth. * Notice: This list has 4 medication(s) that are the same as other medications prescribed for you. Read the directions carefully, and ask your doctor or other care provider to review them with you. Prescriptions Printed Refills HYDROmorphone (DILAUDID) 4 mg tablet 0 Si/2 to one tab up to three times per day prn severe pain (due to fill 17) Class: Print Prescriptions Sent to Pharmacy Refills  
 linaclotide (LINZESS) 145 mcg cap capsule 2 Sig: Take 1 Cap by mouth Daily (before breakfast). Class: Normal  
 Pharmacy: [address] [place] [address] [place] [address] #: [phone] Route: Oral  
  
Follow-up Instructions Return in about 2 months (around 5/10/2017). Patient Instructions 1. Continue medications as prescribed 2. Follow up in two months 3. Bring medication to every appointment Please provide this summary of care documentation to your next provider. Your primary care clinician is listed as Pietro Fuller. Yasmin Short. If you have any questions after today's visit, please call [phone].

## 2017-03-10 NOTE — PROGRESS NOTES
Nursing Notes    Patient presents to the office today in follow-up. Patient rates her pain at 8/10 on the numerical pain scale. Reviewed medications with counts as follows:    Rx Date filled Qty Dispensed Pill Count Last Dose Short   Dilaudid 4 mg - pt did not bring medication today. Pt and  were counseled again about bringing medication to appts. Verbalized understanding. Pt has one unfilled prescription 02/21/17 per  90 25-30 per  today                                       POC UDS was not performed in office today    Any new labs or imaging since last appointment? YES. Pt had CT scan of chest and abd/pelvis. She had a chest x-ray and a bone density study done while she was in the hospital    Have you been to an emergency room (ER) or urgent care clinic since your last visit? YES. Pt went to the ER for breathing difficulties            Have you been hospitalized since your last visit? YES     If yes, where, when, and reason for visit? Pt was at [facility] for 4 days for pneumonia    Have you seen or consulted any other health care providers outside of the [address] [place] [address]  since your last visit? YES     If yes, where, when, and reason for visit? pcp    HM deferred to pcp.

## 2017-03-10 NOTE — PATIENT INSTRUCTIONS
1.  Continue medications as prescribed  2. Follow up in two months  3.   Bring medication to every appointment

## 2017-03-10 NOTE — PROGRESS NOTES
Returned call to Domee, .  verbalized patient is not having a whole lot of SOB and is doing quite well. Has pulmonary follow up on 3/24/17 with Dr. Terence Decker.  thanked writer for call. Episode closed. Patient admitted to [address] [facility] on 2/7/17 to 2/10/17 for HCAP. No hospitalization or ED admission post 30 days from discharge of 2/10/17.

## 2017-03-13 LAB
BACTERIA SPEC CULT: NORMAL
BACTERIA SPEC CULT: NORMAL
FUNGUS SMEAR,FNGSMR: NORMAL
FUNGUS SMEAR,FNGSMR: NORMAL
SERVICE CMNT-IMP: NORMAL
SERVICE CMNT-IMP: NORMAL

## 2017-03-20 ENCOUNTER — PATIENT OUTREACH (OUTPATIENT)
Dept: INTERNAL MEDICINE CLINIC | Age: 82
End: 2017-03-20

## 2017-03-21 ENCOUNTER — HOSPITAL ENCOUNTER (OUTPATIENT)
Dept: BONE DENSITY | Age: 82
Discharge: HOME | End: 2017-03-21
Attending: INTERNAL MEDICINE
Payer: MEDICARE

## 2017-03-21 DIAGNOSIS — M81.0 OSTEOPOROSIS: ICD-10-CM

## 2017-03-21 PROCEDURE — 77080 DXA BONE DENSITY AXIAL: CPT

## 2017-03-23 LAB
ACID FAST STN SPEC: NEGATIVE
ACID FAST STN SPEC: NEGATIVE
MYCOBACTERIUM SPEC QL CULT: NEGATIVE
MYCOBACTERIUM SPEC QL CULT: NEGATIVE
SPECIMEN PREPARATION: NORMAL
SPECIMEN PREPARATION: NORMAL
SPECIMEN SOURCE: NORMAL
SPECIMEN SOURCE: NORMAL

## 2017-03-24 ENCOUNTER — OFFICE VISIT (OUTPATIENT)
Dept: PULMONOLOGY | Age: 82
End: 2017-03-24

## 2017-03-24 VITALS
OXYGEN SATURATION: 96 % | TEMPERATURE: 97.1 F | HEIGHT: 65 IN | SYSTOLIC BLOOD PRESSURE: 110 MMHG | WEIGHT: 133 LBS | HEART RATE: 78 BPM | DIASTOLIC BLOOD PRESSURE: 70 MMHG | RESPIRATION RATE: 18 BRPM | BODY MASS INDEX: 22.16 KG/M2

## 2017-03-24 DIAGNOSIS — J43.1 PANLOBULAR EMPHYSEMA (HCC): Primary | ICD-10-CM

## 2017-03-24 DIAGNOSIS — R06.09 DOE (DYSPNEA ON EXERTION): ICD-10-CM

## 2017-03-24 DIAGNOSIS — J44.9 ASTHMA WITH COPD (HCC): ICD-10-CM

## 2017-03-24 DIAGNOSIS — J18.9 HCAP (HEALTHCARE-ASSOCIATED PNEUMONIA): ICD-10-CM

## 2017-03-24 RX ORDER — ALBUTEROL SULFATE 90 UG/1
2 AEROSOL, METERED RESPIRATORY (INHALATION)
Qty: 1 INHALER | Refills: 5 | Status: SHIPPED | OUTPATIENT
Start: 2017-03-24 | End: 2017-12-13 | Stop reason: ALTCHOICE

## 2017-03-24 RX ORDER — BUDESONIDE AND FORMOTEROL FUMARATE DIHYDRATE 160; 4.5 UG/1; UG/1
1 AEROSOL RESPIRATORY (INHALATION) 2 TIMES DAILY
Qty: 1 INHALER | Refills: 6 | Status: SHIPPED | OUTPATIENT
Start: 2017-03-24 | End: 2017-12-13 | Stop reason: ALTCHOICE

## 2017-03-24 NOTE — PATIENT INSTRUCTIONS
COPD and Asthma: Care Instructions  Your Care Instructions  Some people who have chronic obstructive pulmonary disease (COPD) also have asthma. Both of these problems can damage your lungs. This makes it very important to control them. Asthma causes the airways that lead to the lungs to swell and become narrow. This makes it hard to breathe. You may wheeze or cough. If you have a bad attack, you may need emergency care. There are two parts to treating asthma. · Controlling asthma over the long term. · Treating attacks when they occur. You and your doctor can make an asthma treatment plan that will help. This plan tells you the medicines you take every day to reduce the swelling in your airways and prevent attacks. It also tells you what to do if you have an asthma attack. Follow-up care is a key part of your treatment and safety. Be sure to make and go to all appointments, and call your doctor if you are having problems. It's also a good idea to know your test results and keep a list of the medicines you take. How can you care for yourself at home? To control asthma over the long term  Medicines  Controller medicines reduce swelling in your lungs. They also prevent asthma attacks. Take your controller medicine exactly as prescribed. Talk to your doctor if you have any problems with your medicine. · Inhaled corticosteroid is a common and effective controller medicine. Using it the right way can prevent or reduce most side effects. · Take your controller medicine every day, not just when you have symptoms. This helps prevent problems before they occur. · Always bring your asthma medicine with you when you travel. · Your doctor may prescribe long-acting medicine that combines a corticosteroid with a beta2-agonist. Follow your doctor's instructions exactly about how to take a long-acting medicine. Examples include:  ¨ Fluticasone and salmeterol (Advair). ¨ Budesonide and formoterol (Symbicort).   · Do not depend on your controller medicines to stop an asthma attack that has already started. They do not work fast enough to help. · Your doctor may also prescribe anticholinergic inhalers. These include ipratropium (Atrovent) and tiotropium (Spiriva). Education  · Learn what sets off an asthma attack. Avoid these triggers when you can. Common triggers include smoke, air pollution, pollen, animal dander, colds, stress, and cold air. · Do not smoke. Smoking can make COPD and asthma worse. If you need help quitting, talk to your doctor about stop-smoking programs and medicines. These can increase your chances of quitting for good. · Check yourself for symptoms to know which step to follow in your action plan. Watch for things like being short of breath, having chest tightness, coughing, and wheezing. Also notice if symptoms wake you up at night or if you get tired quickly when you exercise. · You may want to learn how to use a peak flow meter. This measures how open your airways are. It may help you know when you will have an asthma attack. To treat attacks when they occur  Use your asthma action plan when you have an attack. Your quick-relief medicine, such as albuterol, will stop an asthma attack. It relaxes the muscles that get tight around the airways. · Take your quick-relief medicine exactly as prescribed. Talk with your doctor if you have any problems with your medicine. · Keep this medicine with you at all times. · You may need to use this medicine before you exercise. If your doctor prescribed corticosteroid pills to use during an attack, take them as directed. They may take hours to work, but they may shorten the attack and help you breathe better. When should you call for help? Call 911 anytime you think you may need emergency care. For example, call if:  · You have severe trouble breathing.   Call your doctor now or seek immediate medical care if:  · You have new or worse shortness of breath. · You are coughing more deeply or more often, especially if you notice more mucus or a change in the color of your mucus. · You cough up blood. · You have new or increased swelling in your legs or belly. · You have a fever. · You have used your quick-relief medicine but you are still short of breath. Watch closely for changes in your health, and be sure to contact your doctor if you have any problems. Where can you learn more? Go to http://shantell-leona.info/. Enter A350 in the search box to learn more about \"COPD and Asthma: Care Instructions. \"  Current as of: May 23, 2016  Content Version: 11.1  © 5393-6938 [employer]. Care instructions adapted under license by [employer] (which disclaims liability or warranty for this information). If you have questions about a medical condition or this instruction, always ask your healthcare professional. [address] any warranty or liability for your use of this information.

## 2017-03-24 NOTE — PROGRESS NOTES
[facility]  Pulmonary, Critical Care, and Sleep Medicine      Pulmonary Office Progress Notes    Name: Mari Be     : 1933     Date: 3/24/2017        Subjective:     HPI:  Patient is a 80 y.o. female who is here today for a f/u visit s/p hospitalization at [facility] on 17 for SOB. Per chart, pt was seen in the ED for worsening SOB and dry cough x3 weeks with associated R sided CP. CTA was negative for PE but did show multifocal and multilobar small pulmonary masses. Pt was admitted and treated for SOB, dry cough, Acute bronchitis with atelectasis and abnormal CT chest showing soft tissue masses. Dr. Julia Fairbanks was following the pt while in the hospital. EBUS & biopsy was negative for malignancy. Of note, the following labs were abnormal:   RA - Positive   KANA - Positive   Sjogren's Anti-SS-B - 3.6 (High)   Mycoplasma AB, IgG - 431 (High)    17:  Pt states that she does feel better on today's visit  Still smoking, but is trying to quit - down to 6 cigarettes a day from 1 pk/day. Decrease in smoking since 02/10/17. Pt admits to nights sweats & chills at night; dry eyes & mouth  Pt states that she still is coughing, but now it is more of an occasional, intermittent dry cough without any sputum production. Still has some mild SOB and MOORE, but states that it feels like it is getting much better now as she is trying to quit smoking.    Pt states that she is eating better  Denies any CP, H/A, N/V, abd pain     Past Medical History:   Diagnosis Date    Colon polyps     COPD 02    DDD (degenerative disc disease), lumbar 10/1/2014    Degeneration of lumbar or lumbosacral intervertebral disc     Depression     Diabetes ([address] 05    Diverticulosis     DM neuropathy, painful ([facility] [address] 10/1/2014    MOORE (Dyspnea on Exertion) 02    echo: +mild LVH, ER01-07% w/ diastolic dysfxn    Essential hypertension, benign     Glaucoma     Hemorrhoids 06    HX OTHER MEDICAL     breathing problems    Hypercholesterolemia 4-2008    TSH 1.9     Hyperlipidemia 5/17/2011    Hypertension 4-16-02    LBP (low back pain) 4-13-04    Low back pain radiating to both legs 10/1/2014    Lumbago     Lumbar disc herniation 10/1/2014    Lumbar facet arthropathy ([address] 10/1/2014    Lumbosacral radiculopathy at L5 10/1/2014    Lumbosacral radiculopathy at S1 10/1/2014    Microscopic hematuria     OA (osteoarthritis)     Other and unspecified hyperlipidemia     Overactive bladder 5/17/2011    RLS (restless legs syndrome) 1/17/2013    Sciatica     Shortness of breath     Spinal stenosis, lumbar region, without neurogenic claudication     Spondylolisthesis of lumbar region 10/1/2014    Spondylolisthesis, grade 1 10/1/2014    Stress urinary incontinence     Syncope     -MRI brain    Syncope and collapse     Urinary tract infection, site not specified        Allergies   Allergen Reactions    Levaquin [Levofloxacin] Rash       Current Outpatient Prescriptions   Medication Sig Dispense Refill    linaclotide (LINZESS) 145 mcg cap capsule Take 1 Cap by mouth Daily (before breakfast). 90 Cap 2    simvastatin (ZOCOR) 20 mg tablet Take 1 Tab by mouth nightly. 90 Tab 1    linaclotide (LINZESS) 145 mcg cap capsule Take  by mouth Daily (before breakfast).  LORazepam (ATIVAN) 1 mg tablet Take 0.5 Tabs by mouth every eight (8) hours as needed. Max Daily Amount: 1.5 mg. 30 Tab 0    albuterol-ipratropium (DUO-NEB) 2.5 mg-0.5 mg/3 ml nebu 3 mL by Nebulization route every four (4) hours as needed. 30 Nebule 0    metoprolol tartrate (LOPRESSOR) 25 mg tablet Take 1 Tab by mouth two (2) times a day. 60 Tab 0    budesonide-formoterol (SYMBICORT) 160-4.5 mcg/actuation HFA inhaler Take 1 Puff by inhalation two (2) times a day. 1 Inhaler 0    omega 3-dha-epa-fish oil (FISH OIL) 100-160-1,000 mg cap Take 1,000 mg by mouth daily.       HYDROmorphone (DILAUDID) 4 mg tablet 1/2 to one tab up to three times per day prn severe pain due to fill 2/13/17 90 Tab 0    LUMIGAN 0.01 % ophthalmic drops       gabapentin (NEURONTIN) 300 mg capsule Take 1 Cap by mouth three (3) times daily. 270 Cap 1    tiotropium bromide (SPIRIVA RESPIMAT) 1.25 mcg/actuation inhaler Take 2 Puffs by inhalation daily. 1 Inhaler 11    tamsulosin (FLOMAX) 0.4 mg capsule Take 1 Cap by mouth daily. 90 Cap 3    ascorbic acid (VITAMIN C) 1,000 mg tablet Take  by mouth.  albuterol (PROVENTIL HFA, VENTOLIN HFA, PROAIR HFA) 90 mcg/actuation inhaler Take 2 Puffs by inhalation every four (4) hours as needed for Wheezing. 1 Inhaler 5    traZODone (DESYREL) 100 mg tablet Take 100 mg by mouth nightly. Patient takes one and half tabs at bedtime      cycloSPORINE (RESTASIS) 0.05 % ophthalmic emulsion Administer 1 Drop to both eyes two (2) times a day.  FN-EP-NX-Fe-Min-Lycopen-Lutein (CENTRUM) 0.4-162-18 mg Tab Take 1 Tab by mouth daily.  HYDROmorphone (DILAUDID) 4 mg tablet 1/2 to one tab up to three times per day prn severe pain (due to fill 4/8/17) 90 Tab 0    triamcinolone (ARISTOCORT) 0.5 % topical cream Apply a thin layer to rash twice daily 30 g 0    rOPINIRole (REQUIP) 1 mg tablet Take 1 mg by mouth daily.  amLODIPine (NORVASC) 10 mg tablet Take 1 Tab by mouth daily. 30 Tab 0    phenol throat spray (CHLORASEPTIC) 1.4 % spray Take 1 Spray by mouth as needed for Sore throat.  1 Bottle 0       Review of Systems:  HEENT: No epistaxis, no nasal drainage, no difficulty in swallowing, no redness in eyes  Respiratory: as above  Cardiovascular: no chest pain, no palpitations, no chronic leg edema, no syncope  Gastrointestinal: no abd pain, no vomiting, no diarrhea, no bleeding symptoms  Genitourinary: No urinary symptoms or hematuria  Integument/breast: No ulcers or rashes  Musculoskeletal:Neg  Neurological: No focal weakness, no seizures, no headaches  Behvioral/Psych: No anxiety, no depression  Constitutional: No fever, no chills, no weight loss, no night sweats     Objective:     Visit Vitals    /70 (BP 1 Location: Left arm, BP Patient Position: Sitting)    Pulse 78    Temp 97.1 °F (36.2 °C) (Oral)    Resp 18    Ht 5' 5\" (1.651 m)    Wt 60.3 kg (133 lb)    SpO2 96%    BMI 22.13 kg/m2        Physical Exam:   General: comfortable, NAD  HEENT: pupils reactive, sclera anicteric, EOM intact  Neck: No adenopathy or thyroid swelling, no JVD, supple  CVS: S1S2; RRR, no m/r/g  RS: Symmetrical chest rise; Mod AE bilaterally, decreased intensity of breath sounds; prolonged expiratory phase; no tactile fremitus or egophony, no accessory muscle use; no wheezes/rhonchi/rales noted. Abd: soft, non tender, no hepatosplenomegaly  Neuro: non focal, awake, alert  Extrm: no leg edema, clubbing or cyanosis  Skin: PWD; no rash     Data review:     Admission on 02/06/2017, Discharged on 02/10/2017   No results displayed because visit has over 200 results.       Hospital Outpatient Visit on 12/06/2016   Component Date Value Ref Range Status    Vitamin B12 12/06/2016 696  211 - 911 pg/mL Final    LIPID PROFILE 12/06/2016        Final    Cholesterol, total 12/06/2016 120  <200 MG/DL Final    Triglyceride 12/06/2016 71  <150 MG/DL Final    HDL Cholesterol 12/06/2016 58  40 - 60 MG/DL Final    LDL, calculated 12/06/2016 47.8  0 - 100 MG/DL Final    VLDL, calculated 12/06/2016 14.2  MG/DL Final    CHOL/HDL Ratio 12/06/2016 2.1  0 - 5.0   Final    Sodium 12/06/2016 130* 136 - 145 mmol/L Final    Potassium 12/06/2016 4.4  3.5 - 5.5 mmol/L Final    Chloride 12/06/2016 91* 100 - 108 mmol/L Final    CO2 12/06/2016 31  21 - 32 mmol/L Final    Anion gap 12/06/2016 8  3.0 - 18 mmol/L Final    Glucose 12/06/2016 120* 74 - 99 mg/dL Final    BUN 12/06/2016 5* 7.0 - 18 MG/DL Final    Creatinine 12/06/2016 0.50* 0.6 - 1.3 MG/DL Final    BUN/Creatinine ratio 12/06/2016 10* 12 - 20   Final    GFR est AA 12/06/2016 >60  >60 ml/min/1.73m2 Final    GFR est non-AA 12/06/2016 >60  >60 ml/min/1.73m2 Final    Comment: (NOTE)  Estimated GFR is calculated using the Modification of Diet in Renal   Disease (MDRD) Study equation, reported for both  Americans   (GFRAA) and non- Americans (GFRNA), and normalized to 1.73m2   body surface area. The physician must decide which value applies to   the patient. The MDRD study equation should only be used in   individuals age 25 or older. It has not been validated for the   following: pregnant women, patients with serious comorbid conditions,   or on certain medications, or persons with extremes of body size,   muscle mass, or nutritional status.  Calcium 12/06/2016 8.6  8.5 - 10.1 MG/DL Final    Bilirubin, total 12/06/2016 0.2  0.2 - 1.0 MG/DL Final    ALT (SGPT) 12/06/2016 30  13 - 56 U/L Final    AST (SGOT) 12/06/2016 32  15 - 37 U/L Final    Alk. phosphatase 12/06/2016 59  45 - 117 U/L Final    Protein, total 12/06/2016 6.6  6.4 - 8.2 g/dL Final    Albumin 12/06/2016 3.9  3.4 - 5.0 g/dL Final    Globulin 12/06/2016 2.7  2.0 - 4.0 g/dL Final    A-G Ratio 12/06/2016 1.4  0.8 - 1.7   Final    Hemoglobin A1c 12/06/2016 5.9* 4.2 - 5.6 % Final    Comment: (NOTE)  HbA1C Interpretive Ranges  <5.7              Normal  5.7 - 6.4         Consider Prediabetes  >6.5              Consider Diabetes      Est. average glucose 12/06/2016 123  mg/dL Final    Comment: (NOTE)  The eAG should be interpreted with patient characteristics in mind   since ethnicity, interindividual differences, red cell lifespan,   variation in rates of glycation, etc. may affect the validity of the   calculation. Imaging:  CT CHEST W CONT [03/01/17]:  COMPARISON: CT 2/7/2017     FINDINGS:    Stable curvilinear focus of subpleural scar or atelectasis which spans from the  posterior left lung apex to the posterior upper lobe. Associated groundglass  density has since resolved.  Fine linear band of atelectasis in the anterior left  upper lobe, stable. New focus of atelectasis at the left lung base. No airspace  consolidation, nodule or mass in the left lung.     The irregular somewhat triangular-shaped subpleural consolidation in the lateral  right upper lobe has decreased in size and density, axial image 26. The nodular  groundglass density in the right upper lobe versus superior aspect of the right  middle lobe has resolved. Irregular patchy linear densities in the right middle  lobe most likely representing atelectasis have a somewhat variable appearance. The more dense consolidation in the caudal and medial segment of the right  middle lobe persists but is intervally smaller measuring 2.3 x 1.5 cm on axial  image 46 compared to previous of 3.4 x 2.1 cm by my measurements. There is a new  irregular subpleural consolidation at the lateral right lower lobe which abuts a  small right pleural effusion. Persistent cluster of tree-in-bud nodularity in  the lateral right upper lobe, image 27, just above the fissure.     No axillary or intrathoracic lymphadenopathy. Normal caliber aorta with moderate  burden of calcific plaque. Variant aortic branching pattern with the left  vertebral artery arising directly from the arch. There is a large irregular  slightly ulcerated atherosclerotic plaque in the distal descending thoracic  aorta. No pericardial effusion. Nondistended esophagus is grossly normal.     Enhancing nodule in the right hepatic lobe measuring 9 mm. Previous study had  demonstrated a peripheral nodular pattern of enhancement.     L1 compression fracture with moderate loss of vertebral body height. Fracture is  nonacute and present on the 11/1/2016 lumbar MRI.  Mild loss of the T2, T3 and T5  vertebral body heights, age indeterminate but stable compared to 2/7/2017.     IMPRESSION:  Consolidations in the right upper and right middle lobe have intervally  decreased in size.     There is a new irregular focus of consolidation at the lateral right lung base  which is associated with a newly developed small pleural effusion. Area of  consolidation is favored to represent atelectasis.     Persistent cluster of tree-in-bud nodules in the lateral inferior right upper  lobe just above the fissure. Nonspecific but may be a small focus of  endobronchial infection.     Scattered subsegmental atelectasis with a new focus at the left lung base.     All other findings are stable including a hyperenhancing nodule in the right  hepatic lobe which may represent a hemangioma. CTA CHEST W WO CONT [02/07/17]: FINDINGS:   The quality of opacification of the pulmonary arteries is excellent. There are  no filling defects .     Focal groundglass infiltrate in the right upper lobe laterally measuring 3 x 1.8  cm, image 26 series 3. 9 mm groundglass infiltrate in the right upper lobe or  the superior aspect of the right middle lobe image 33 series 3. Focal  consolidation atelectasis or mass in the inferior medial right middle lobe  measuring 3.2 x 2.2 cm, image 185 series 2, image 47 series 3. Left upper lobe posterior medial groundglass opacity, image 20 series 3. Bronchial wall thickening. Patchy fuzzy micronodularity in the right upper lobe posterior segment image 28  series 3.     Atherosclerotic irregularity of the arch and descending thoracic aorta. No  evidence of aneurysm. Minimally prominent right hilar lymph node at 2.2 x 1.8 cm, image 1:15 series 2. Previous measurement 1.7 x 1.4 cm. Bilateral posterior medial diaphragmatic hernias containing only fat. The included portion of the of the liver is unremarkable. The adrenal glands  are normal.     The chest wall soft tissues are unremarkable.     Reconstructions: Coronal and sagittal MIPs ( maximum intensity projections) were  obtained from the axial acquisition. The pulmonary arteries branch normally.    There are no filling defects.     IMPRESSION:  No evidence of PE.  Multifocal and multilobar abnormalities of the lungs ranging from clustered  fuzzy nodularity to groundglass infiltrates to solid infiltrate or atelectasis  vs. soft tissue density mass. New findings from 2009. Likely infectious or  inflammatory. Follow-up recommended in 3 months to document resolution.       I have personally reviewed the patients radiographs and have reviewed the reports:  AMD     PFT's: 08/31/15  DATE 08/31/2015 Pre-Bronchodilator Post-Bronchodilator   FVC  1.38 (53) 1.85 (70)   FEV1  .99 (51) 1.19 (61)   FEV1/FVC  72 (98) 64 (87)         LUNG VOLUMES  RESULTS % PREDICTED   E3 TLC  3.57< 73<   E3 FRC  2.07 75<   E3 RV  1.99 88   E3 ERV  .08 17<   E3 RV/TLC  56 120>         DIFFUSION  RESULTS % PREDICTED   Dsb  12.44< 58<   DsbHb  12.44< 58<   VAsb  3.26 68<         IMPRESSION:   · S/p PNA - Multifocal, obstructive mycoplasma. CT 03/01/17 shows much improvement  · COPD - Asthma (FEV1 decreased with + Bronchodilator response)  · + KANA, + Sjogrens screen - ? Sjogrens Syndrome or SICCA Syndrome  · Current Smoker - Has cut back since 02/10/17  · Abnormal CT - Nodules, + hilar lymphadenopathy - Resolved; EBUS on 02/18/17 (-) for any milgnancy      RECOMMENDATIONS:   · No further w/o needed for lung nodules found on CT - last CT showed marked improvement  · Continue Symbicort, Spiriva Respimat, and Albuterol PRN  · Encourage hydration for possible Sjogren's Syndrome; recommended artifical tear drops if dry eyes become irritating.    · Continue to encourage smoking cessation   · Continue to encourage active lifestyle  · F/U in about 3 months to monitor progression         CHELITA Marques MD

## 2017-03-24 NOTE — PROGRESS NOTES
Chief Complaint   Patient presents with   [facility] Follow Up    COPD     Patient here for hospital follow up. Patient in [facility] 2/6/17-2/10/17.

## 2017-03-24 NOTE — MR AVS SNAPSHOT
Visit Information Date & Time Provider Department Dept. Phone Encounter #  
 3/24/2017 11:00 AM Lori Romero MD [facility] [place] [phone] Follow-up Instructions Return in about 3 months (around 6/24/2017). Your Appointments 5/1/2017  9:05 AM  
LAB with [facility] NURSE VISIT Internist of [facility] ([address]) Appt Note: PE lab fasting [address], Suite [facility] [address] [place]  
  
   
 [address], [address]  
  
    
 5/8/2017  9:15 AM  
PHYSICAL with Karla Fraga MD  
Internist of [facility] [address] [facility]) Appt Note: Physical  
 [address], [address] [place] [address] [place]  
  
   
 [address], [address]  
  
    
 5/10/2017  9:00 AM  
Follow Up with HETAL Starr [facility] (FAN SCHEDULING) Appt Note: 2 MONTH F/U  
 [address] [facility] [address]  
[phone] [address]  
  
    
 6/7/2017  9:15 AM  
Office Visit with Henry Santiago MD  
[facility] [address] [facility]) Appt Note: checkup [address] [facility] [address] [address]  
[phone] [address] [place] [address]  
  
    
 9/6/2017 11:00 AM  
PROCEDURE with [facility] NONIMAGING  
[facility] (FAN SCHEDULING) Appt Note: leg art 1 yr [place] [address] [address]  
[phone] [address] [place] [address] [place] [address]  
  
    
 9/19/2017 11:00 AM  
Follow Up with HETAL Lyle  
[facility] (FAN SCHEDULING) [address] [facility] [address] [address]  
[phone]  
  
   
 [address] [facility] [address] [address] Upcoming Health Maintenance  Date Due  
 EYE EXAM RETINAL OR DILATED Q1 2/5/2015 GLAUCOMA SCREENING Q2Y 4/1/2016 MEDICARE YEARLY EXAM 12/8/2016 MICROALBUMIN Q1 4/5/2017 HEMOGLOBIN A1C Q6M 6/6/2017 LIPID PANEL Q1 2/8/2018 DTaP/Tdap/Td series (2 - Td) 6/11/2024 Allergies as of 3/24/2017  Review Complete On: 3/24/2017 By: Samreen Barnes MD  
  
 Severity Noted Reaction Type Reactions Levaquin [Levofloxacin]  05/17/2011    Rash Current Immunizations  Reviewed on 2/7/2017 Name Date Influenza High Dose Vaccine PF 10/13/2016 Influenza Vaccine 10/15/2014 Influenza Vaccine (Quad) PF 12/8/2015 Influenza Vaccine Split 11/22/2011, 11/11/2010 11:46 AM  
 Pneumococcal Conjugate (PCV-13) 12/8/2015 Pneumococcal Vaccine (Unspecified Type) 9/20/2000 Tdap 6/11/2014 Not reviewed this visit You Were Diagnosed With   
  
 Codes Comments Panlobular emphysema ([facility]    -  Primary ICD-10-CM: J43.1 ICD-9-CM: 492.8 HCAP (healthcare-associated pneumonia)     ICD-10-CM: J18.9 ICD-9-CM: 711 MOORE (dyspnea on exertion)     ICD-10-CM: R06.09 
ICD-9-CM: 786.09 Asthma with COPD ([facility].)     ICD-10-CM: J44.9, J45.909 ICD-9-CM: 493.20 Vitals BP Pulse Temp Resp Height(growth percentile) Weight(growth percentile) 110/70 (BP 1 Location: Left arm, BP Patient Position: Sitting) 78 97.1 °F (36.2 °C) (Oral) 18 5' 5\" (1.651 m) 133 lb (60.3 kg) SpO2 BMI OB Status Smoking Status 96% 22.13 kg/m2 Hysterectomy Current Every Day Smoker Vitals History BMI and BSA Data Body Mass Index Body Surface Area  
 22.13 kg/m 2 1.66 m 2 Preferred Pharmacy Pharmacy Name Phone [facility] [address] Your Updated Medication List  
  
   
This list is accurate as of: 3/24/17 11:47 AM.  Always use your most recent med list.  
  
  
  
  
 albuterol 90 mcg/actuation inhaler Commonly known as:  PROVENTIL HFA, VENTOLIN HFA, PROAIR HFA Take 2 Puffs by inhalation every four (4) hours as needed for Wheezing. albuterol-ipratropium 2.5 mg-0.5 mg/3 ml Nebu Commonly known as:  DUO-NEB  
3 mL by Nebulization route every four (4) hours as needed. amLODIPine 10 mg tablet Commonly known as:  Xavier Ruths Take 1 Tab by mouth daily. budesonide-formoterol 160-4.5 mcg/actuation HFA inhaler Commonly known as:  SYMBICORT Take 1 Puff by inhalation two (2) times a day. CENTRUM 0.4-162-18 mg Tab Generic drug:  HF-PF-YC-Fe-Min-Lycopen-Lutein Take 1 Tab by mouth daily. FISH -160-1,000 mg Cap Generic drug:  omega 3-dha-epa-fish oil Take 1,000 mg by mouth daily. gabapentin 300 mg capsule Commonly known as:  NEURONTIN Take 1 Cap by mouth three (3) times daily. * HYDROmorphone 4 mg tablet Commonly known as:  DILAUDID  
1/2 to one tab up to three times per day prn severe pain due to fill 2/13/17  
  
 * HYDROmorphone 4 mg tablet Commonly known as:  DILAUDID  
1/2 to one tab up to three times per day prn severe pain (due to fill 4/8/17) * LINZESS 145 mcg Cap capsule Generic drug:  linaclotide Take  by mouth Daily (before breakfast). * linaclotide 145 mcg Cap capsule Commonly known as:  Charanjit [place] Take 1 Cap by mouth Daily (before breakfast). LORazepam 1 mg tablet Commonly known as:  ATIVAN Take 0.5 Tabs by mouth every eight (8) hours as needed. Max Daily Amount: 1.5 mg.  
  
 LUMIGAN 0.01 % ophthalmic drops Generic drug:  bimatoprost  
  
 metoprolol tartrate 25 mg tablet Commonly known as:  LOPRESSOR Take 1 Tab by mouth two (2) times a day. phenol throat spray 1.4 % spray Commonly known as:  Andrew Backers Take 1 Spray by mouth as needed for Sore throat. RESTASIS 0.05 % ophthalmic emulsion Generic drug:  cycloSPORINE Administer 1 Drop to both eyes two (2) times a day. rOPINIRole 1 mg tablet Commonly known as:  Stana Mc Take 1 mg by mouth daily. simvastatin 20 mg tablet Commonly known as:  ZOCOR Take 1 Tab by mouth nightly. tamsulosin 0.4 mg capsule Commonly known as:  FLOMAX Take 1 Cap by mouth daily. tiotropium bromide 1.25 mcg/actuation inhaler Commonly known as:  Arby Bevel Take 2 Puffs by inhalation daily. traZODone 100 mg tablet Commonly known as:  Lencho Nasra Take 100 mg by mouth nightly. Patient takes one and half tabs at bedtime  
  
 triamcinolone 0.5 % topical cream  
Commonly known as:  ARISTOCORT Apply a thin layer to rash twice daily VITAMIN C 1,000 mg tablet Generic drug:  ascorbic acid (vitamin C) Take  by mouth. * Notice: This list has 4 medication(s) that are the same as other medications prescribed for you. Read the directions carefully, and ask your doctor or other care provider to review them with you. Prescriptions Sent to Pharmacy Refills  
 budesonide-formoterol (SYMBICORT) 160-4.5 mcg/actuation HFA inhaler 6 Sig: Take 1 Puff by inhalation two (2) times a day. Class: Normal  
 Pharmacy: [facility] [address] [place] [address] [facility] Ph #: [phone] Route: Inhalation  
 tiotropium bromide (SPIRIVA RESPIMAT) 1.25 mcg/actuation inhaler 11 Sig: Take 2 Puffs by inhalation daily. Class: Normal  
 Pharmacy: [facility] [address] [place] [address] [facility] Ph #: [phone] Route: Inhalation  
 albuterol (PROVENTIL HFA, VENTOLIN HFA, PROAIR HFA) 90 mcg/actuation inhaler 5 Sig: Take 2 Puffs by inhalation every four (4) hours as needed for Wheezing. Class: Normal  
 Pharmacy: [facility] [address] [place] [address] Ph #: [phone] Route: Inhalation Follow-up Instructions Return in about 3 months (around 6/24/2017). Patient Instructions COPD and Asthma: Care Instructions Your Care Instructions Some people who have chronic obstructive pulmonary disease (COPD) also have asthma. Both of these problems can damage your lungs. This makes it very important to control them. Asthma causes the airways that lead to the lungs to swell and become narrow. This makes it hard to breathe. You may wheeze or cough. If you have a bad attack, you may need emergency care. There are two parts to treating asthma. · Controlling asthma over the long term. · Treating attacks when they occur. You and your doctor can make an asthma treatment plan that will help. This plan tells you the medicines you take every day to reduce the swelling in your airways and prevent attacks. It also tells you what to do if you have an asthma attack. Follow-up care is a key part of your treatment and safety. Be sure to make and go to all appointments, and call your doctor if you are having problems. It's also a good idea to know your test results and keep a list of the medicines you take. How can you care for yourself at home? To control asthma over the long term Medicines Controller medicines reduce swelling in your lungs. They also prevent asthma attacks. Take your controller medicine exactly as prescribed. Talk to your doctor if you have any problems with your medicine. · Inhaled corticosteroid is a common and effective controller medicine. Using it the right way can prevent or reduce most side effects. · Take your controller medicine every day, not just when you have symptoms. This helps prevent problems before they occur. · Always bring your asthma medicine with you when you travel. · Your doctor may prescribe long-acting medicine that combines a corticosteroid with a beta2-agonist. Follow your doctor's instructions exactly about how to take a long-acting medicine. Examples include: ¨ Fluticasone and salmeterol (Advair). ¨ Budesonide and formoterol (Symbicort). · Do not depend on your controller medicines to stop an asthma attack that has already started. They do not work fast enough to help. · Your doctor may also prescribe anticholinergic inhalers. These include ipratropium (Atrovent) and tiotropium (Spiriva). Education · Learn what sets off an asthma attack. Avoid these triggers when you can. Common triggers include smoke, air pollution, pollen, animal dander, colds, stress, and cold air. · Do not smoke. Smoking can make COPD and asthma worse. If you need help quitting, talk to your doctor about stop-smoking programs and medicines. These can increase your chances of quitting for good. · Check yourself for symptoms to know which step to follow in your action plan. Watch for things like being short of breath, having chest tightness, coughing, and wheezing. Also notice if symptoms wake you up at night or if you get tired quickly when you exercise. · You may want to learn how to use a peak flow meter. This measures how open your airways are. It may help you know when you will have an asthma attack. To treat attacks when they occur Use your asthma action plan when you have an attack. Your quick-relief medicine, such as albuterol, will stop an asthma attack. It relaxes the muscles that get tight around the airways. · Take your quick-relief medicine exactly as prescribed. Talk with your doctor if you have any problems with your medicine. · Keep this medicine with you at all times. · You may need to use this medicine before you exercise. If your doctor prescribed corticosteroid pills to use during an attack, take them as directed. They may take hours to work, but they may shorten the attack and help you breathe better. When should you call for help? Call 911 anytime you think you may need emergency care. For example, call if: 
· You have severe trouble breathing. Call your doctor now or seek immediate medical care if: · You have new or worse shortness of breath. · You are coughing more deeply or more often, especially if you notice more mucus or a change in the color of your mucus. · You cough up blood. · You have new or increased swelling in your legs or belly. · You have a fever. · You have used your quick-relief medicine but you are still short of breath. Watch closely for changes in your health, and be sure to contact your doctor if you have any problems. Where can you learn more? Go to http://shantell-leona.info/. Enter A350 in the search box to learn more about \"COPD and Asthma: Care Instructions. \" Current as of: May 23, 2016 Content Version: 11.1 © 8390-5740 "[employer]. Care instructions adapted under license by [employer] (which disclaims liability or warranty for this information). If you have questions about a medical condition or this instruction, always ask your healthcare professional. [place] [address] any warranty or liability for your use of this information. Please provide this summary of care documentation to your next provider. Your primary care clinician is listed as Morene Hodgkin. Naima Schuster. If you have any questions after today's visit, please call [phone].

## 2017-04-06 DIAGNOSIS — I10 ESSENTIAL HYPERTENSION WITH GOAL BLOOD PRESSURE LESS THAN 140/90: ICD-10-CM

## 2017-04-06 DIAGNOSIS — E55.9 HYPOVITAMINOSIS D: ICD-10-CM

## 2017-04-06 DIAGNOSIS — E78.5 HYPERLIPIDEMIA LDL GOAL <100: ICD-10-CM

## 2017-04-06 DIAGNOSIS — R73.01 IMPAIRED FASTING GLUCOSE: ICD-10-CM

## 2017-04-21 ENCOUNTER — TELEPHONE (OUTPATIENT)
Dept: INTERNAL MEDICINE CLINIC | Age: 82
End: 2017-04-21

## 2017-04-21 ENCOUNTER — APPOINTMENT (OUTPATIENT)
Dept: CT IMAGING | Age: 82
DRG: 690 | End: 2017-04-21
Attending: PHYSICIAN ASSISTANT
Payer: MEDICARE

## 2017-04-21 ENCOUNTER — APPOINTMENT (OUTPATIENT)
Dept: GENERAL RADIOLOGY | Age: 82
DRG: 690 | End: 2017-04-21
Attending: PHYSICIAN ASSISTANT
Payer: MEDICARE

## 2017-04-21 ENCOUNTER — HOSPITAL ENCOUNTER (INPATIENT)
Age: 82
LOS: 1 days | Discharge: HOME | DRG: 690 | End: 2017-04-22
Attending: EMERGENCY MEDICINE | Admitting: HOSPITALIST
Payer: MEDICARE

## 2017-04-21 DIAGNOSIS — S09.90XA CHI (CLOSED HEAD INJURY), INITIAL ENCOUNTER: ICD-10-CM

## 2017-04-21 DIAGNOSIS — D72.829 LEUKOCYTOSIS, UNSPECIFIED TYPE: ICD-10-CM
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
## 2017-04-21 DIAGNOSIS — R06.02 SOB (SHORTNESS OF BREATH): ICD-10-CM

## 2017-04-21 DIAGNOSIS — E87.6 HYPOKALEMIA: ICD-10-CM

## 2017-04-21 DIAGNOSIS — R42 DIZZINESS: ICD-10-CM

## 2017-04-21 DIAGNOSIS — R09.02 HYPOXIA: ICD-10-CM

## 2017-04-21 DIAGNOSIS — R77.8 ELEVATED TROPONIN: ICD-10-CM

## 2017-04-21 LAB
ALBUMIN SERPL BCP-MCNC: 3.9 G/DL (ref 3.4–5)
ALBUMIN/GLOB SERPL: 1.2 {RATIO} (ref 0.8–1.7)
ALP SERPL-CCNC: 74 U/L (ref 45–117)
ALT SERPL-CCNC: 28 U/L (ref 13–56)
ANION GAP BLD CALC-SCNC: 7 MMOL/L (ref 3–18)
APPEARANCE UR: ABNORMAL
AST SERPL W P-5'-P-CCNC: 21 U/L (ref 15–37)
ATRIAL RATE: 91 BPM
BACTERIA URNS QL MICRO: ABNORMAL /HPF
BASOPHILS # BLD AUTO: 0 K/UL (ref 0–0.06)
BASOPHILS # BLD: 0 % (ref 0–2)
BILIRUB DIRECT SERPL-MCNC: 0.1 MG/DL (ref 0–0.2)
BILIRUB SERPL-MCNC: 0.4 MG/DL (ref 0.2–1)
BILIRUB UR QL: NEGATIVE
BUN SERPL-MCNC: 18 MG/DL (ref 7–18)
BUN/CREAT SERPL: 20 (ref 12–20)
CALCIUM SERPL-MCNC: 9 MG/DL (ref 8.5–10.1)
CALCULATED P AXIS, ECG09: 83 DEGREES
CALCULATED R AXIS, ECG10: -84 DEGREES
CALCULATED T AXIS, ECG11: 46 DEGREES
CHLORIDE SERPL-SCNC: 95 MMOL/L (ref 100–108)
CK MB CFR SERPL CALC: 4.5 % (ref 0–4)
CK MB CFR SERPL CALC: 4.9 % (ref 0–4)
CK MB SERPL-MCNC: 2.7 NG/ML (ref 5–25)
CK MB SERPL-MCNC: 3.5 NG/ML (ref 5–25)
CK SERPL-CCNC: 60 U/L (ref 26–192)
CK SERPL-CCNC: 72 U/L (ref 26–192)
CO2 SERPL-SCNC: 33 MMOL/L (ref 21–32)
COLOR UR: ABNORMAL
CREAT SERPL-MCNC: 0.89 MG/DL (ref 0.6–1.3)
DIAGNOSIS, 93000: NORMAL
DIFFERENTIAL METHOD BLD: ABNORMAL
EOSINOPHIL # BLD: 0 K/UL (ref 0–0.4)
EOSINOPHIL NFR BLD: 0 % (ref 0–5)
EPITH CASTS URNS QL MICRO: ABNORMAL /LPF (ref 0–5)
ERYTHROCYTE [DISTWIDTH] IN BLOOD BY AUTOMATED COUNT: 13.3 % (ref 11.6–14.5)
GLOBULIN SER CALC-MCNC: 3.3 G/DL (ref 2–4)
GLUCOSE BLD STRIP.AUTO-MCNC: 158 MG/DL (ref 70–110)
GLUCOSE SERPL-MCNC: 198 MG/DL (ref 74–99)
GLUCOSE UR STRIP.AUTO-MCNC: NEGATIVE MG/DL
HCT VFR BLD AUTO: 43 % (ref 35–45)
HGB BLD-MCNC: 14.8 G/DL (ref 12–16)
HGB UR QL STRIP: NEGATIVE
HYALINE CASTS URNS QL MICRO: ABNORMAL /LPF (ref 0–2)
INR PPP: 1 (ref 0.8–1.2)
KETONES UR QL STRIP.AUTO: 15 MG/DL
LACTATE BLD-SCNC: 1.3 MMOL/L (ref 0.4–2)
LEUKOCYTE ESTERASE UR QL STRIP.AUTO: ABNORMAL
LYMPHOCYTES # BLD AUTO: 12 % (ref 21–52)
LYMPHOCYTES # BLD: 1.6 K/UL (ref 0.9–3.6)
MAGNESIUM SERPL-MCNC: 1.8 MG/DL (ref 1.6–2.6)
MCH RBC QN AUTO: 31.2 PG (ref 24–34)
MCHC RBC AUTO-ENTMCNC: 34.4 G/DL (ref 31–37)
MCV RBC AUTO: 90.5 FL (ref 74–97)
MONOCYTES # BLD: 0.9 K/UL (ref 0.05–1.2)
MONOCYTES NFR BLD AUTO: 6 % (ref 3–10)
MUCOUS THREADS URNS QL MICRO: ABNORMAL /LPF
NEUTS SEG # BLD: 11.2 K/UL (ref 1.8–8)
NEUTS SEG NFR BLD AUTO: 82 % (ref 40–73)
NITRITE UR QL STRIP.AUTO: NEGATIVE
P-R INTERVAL, ECG05: 138 MS
PH UR STRIP: 5.5 [PH] (ref 5–8)
PLATELET # BLD AUTO: 195 K/UL (ref 135–420)
PMV BLD AUTO: 11 FL (ref 9.2–11.8)
POTASSIUM SERPL-SCNC: 2.9 MMOL/L (ref 3.5–5.5)
PROT SERPL-MCNC: 7.2 G/DL (ref 6.4–8.2)
PROT UR STRIP-MCNC: ABNORMAL MG/DL
PROTHROMBIN TIME: 13.1 SEC (ref 11.5–15.2)
Q-T INTERVAL, ECG07: 446 MS
QRS DURATION, ECG06: 120 MS
QTC CALCULATION (BEZET), ECG08: 548 MS
RBC # BLD AUTO: 4.75 M/UL (ref 4.2–5.3)
RBC #/AREA URNS HPF: ABNORMAL /HPF (ref 0–5)
SODIUM SERPL-SCNC: 135 MMOL/L (ref 136–145)
SP GR UR REFRACTOMETRY: 1.02 (ref 1–1.03)
TROPONIN I SERPL-MCNC: 0.09 NG/ML (ref 0–0.04)
TROPONIN I SERPL-MCNC: 0.11 NG/ML (ref 0–0.04)
UROBILINOGEN UR QL STRIP.AUTO: 1 EU/DL (ref 0.2–1)
VENTRICULAR RATE, ECG03: 91 BPM
WBC # BLD AUTO: 13.7 K/UL (ref 4.6–13.2)
WBC URNS QL MICRO: ABNORMAL /HPF (ref 0–4)

## 2017-04-21 PROCEDURE — 85610 PROTHROMBIN TIME: CPT | Performed by: EMERGENCY MEDICINE

## 2017-04-21 PROCEDURE — 72125 CT NECK SPINE W/O DYE: CPT

## 2017-04-21 PROCEDURE — 80048 BASIC METABOLIC PNL TOTAL CA: CPT | Performed by: EMERGENCY MEDICINE

## 2017-04-21 PROCEDURE — 99285 EMERGENCY DEPT VISIT HI MDM: CPT

## 2017-04-21 PROCEDURE — 93005 ELECTROCARDIOGRAM TRACING: CPT

## 2017-04-21 PROCEDURE — 74011250637 HC RX REV CODE- 250/637: Performed by: HOSPITALIST

## 2017-04-21 PROCEDURE — 70450 CT HEAD/BRAIN W/O DYE: CPT

## 2017-04-21 PROCEDURE — 71010 XR CHEST PORT: CPT

## 2017-04-21 PROCEDURE — 74011636637 HC RX REV CODE- 636/637: Performed by: HOSPITALIST

## 2017-04-21 PROCEDURE — 36415 COLL VENOUS BLD VENIPUNCTURE: CPT | Performed by: PHYSICIAN ASSISTANT

## 2017-04-21 PROCEDURE — 74011250637 HC RX REV CODE- 250/637: Performed by: PHYSICIAN ASSISTANT

## 2017-04-21 PROCEDURE — 83605 ASSAY OF LACTIC ACID: CPT

## 2017-04-21 PROCEDURE — 82962 GLUCOSE BLOOD TEST: CPT

## 2017-04-21 PROCEDURE — 85025 COMPLETE CBC W/AUTO DIFF WBC: CPT | Performed by: EMERGENCY MEDICINE

## 2017-04-21 PROCEDURE — 87086 URINE CULTURE/COLONY COUNT: CPT | Performed by: HOSPITALIST

## 2017-04-21 PROCEDURE — 74011000258 HC RX REV CODE- 258: Performed by: HOSPITALIST

## 2017-04-21 PROCEDURE — 96360 HYDRATION IV INFUSION INIT: CPT

## 2017-04-21 PROCEDURE — 87040 BLOOD CULTURE FOR BACTERIA: CPT | Performed by: PHYSICIAN ASSISTANT

## 2017-04-21 PROCEDURE — 74011250636 HC RX REV CODE- 250/636: Performed by: HOSPITALIST

## 2017-04-21 PROCEDURE — 74011250636 HC RX REV CODE- 250/636: Performed by: PHYSICIAN ASSISTANT

## 2017-04-21 PROCEDURE — 65270000029 HC RM PRIVATE

## 2017-04-21 PROCEDURE — 99218 HC RM OBSERVATION: CPT

## 2017-04-21 PROCEDURE — 80076 HEPATIC FUNCTION PANEL: CPT | Performed by: EMERGENCY MEDICINE

## 2017-04-21 PROCEDURE — 81001 URINALYSIS AUTO W/SCOPE: CPT | Performed by: PHYSICIAN ASSISTANT

## 2017-04-21 PROCEDURE — 82550 ASSAY OF CK (CPK): CPT | Performed by: EMERGENCY MEDICINE

## 2017-04-21 PROCEDURE — 83735 ASSAY OF MAGNESIUM: CPT | Performed by: PHYSICIAN ASSISTANT

## 2017-04-21 RX ORDER — SODIUM CHLORIDE 0.9 % (FLUSH) 0.9 %
5-10 SYRINGE (ML) INJECTION AS NEEDED
Status: DISCONTINUED | OUTPATIENT
Start: 2017-04-21 | End: 2017-04-22 | Stop reason: HOSPADM

## 2017-04-21 RX ORDER — ONDANSETRON 2 MG/ML
4 INJECTION INTRAMUSCULAR; INTRAVENOUS
Status: DISCONTINUED | OUTPATIENT
Start: 2017-04-21 | End: 2017-04-22 | Stop reason: HOSPADM

## 2017-04-21 RX ORDER — ROPINIROLE 1 MG/1
1 TABLET, FILM COATED ORAL DAILY
Status: DISCONTINUED | OUTPATIENT
Start: 2017-04-22 | End: 2017-04-22 | Stop reason: HOSPADM

## 2017-04-21 RX ORDER — POTASSIUM CHLORIDE 20 MEQ/1
40 TABLET, EXTENDED RELEASE ORAL EVERY 6 HOURS
Status: COMPLETED | OUTPATIENT
Start: 2017-04-21 | End: 2017-04-22

## 2017-04-21 RX ORDER — ASPIRIN 325 MG
325 TABLET ORAL
Status: COMPLETED | OUTPATIENT
Start: 2017-04-21 | End: 2017-04-21

## 2017-04-21 RX ORDER — TRAZODONE HYDROCHLORIDE 50 MG/1
100 TABLET ORAL
Status: DISCONTINUED | OUTPATIENT
Start: 2017-04-21 | End: 2017-04-22 | Stop reason: HOSPADM

## 2017-04-21 RX ORDER — BUDESONIDE AND FORMOTEROL FUMARATE DIHYDRATE 160; 4.5 UG/1; UG/1
1 AEROSOL RESPIRATORY (INHALATION) 2 TIMES DAILY
Status: DISCONTINUED | OUTPATIENT
Start: 2017-04-21 | End: 2017-04-22

## 2017-04-21 RX ORDER — INSULIN LISPRO 100 [IU]/ML
INJECTION, SOLUTION INTRAVENOUS; SUBCUTANEOUS
Status: DISCONTINUED | OUTPATIENT
Start: 2017-04-21 | End: 2017-04-22 | Stop reason: HOSPADM

## 2017-04-21 RX ORDER — AMOXICILLIN 250 MG
2 CAPSULE ORAL
Status: DISCONTINUED | OUTPATIENT
Start: 2017-04-21 | End: 2017-04-22 | Stop reason: HOSPADM

## 2017-04-21 RX ORDER — FACIAL-BODY WIPES
10 EACH TOPICAL DAILY PRN
Status: DISCONTINUED | OUTPATIENT
Start: 2017-04-21 | End: 2017-04-22 | Stop reason: HOSPADM

## 2017-04-21 RX ORDER — DEXTROSE 50 % IN WATER (D50W) INTRAVENOUS SYRINGE
25-50 AS NEEDED
Status: DISCONTINUED | OUTPATIENT
Start: 2017-04-21 | End: 2017-04-22 | Stop reason: HOSPADM

## 2017-04-21 RX ORDER — GABAPENTIN 300 MG/1
300 CAPSULE ORAL 3 TIMES DAILY
Status: DISCONTINUED | OUTPATIENT
Start: 2017-04-21 | End: 2017-04-22 | Stop reason: HOSPADM

## 2017-04-21 RX ORDER — CYCLOSPORINE 0.5 MG/ML
1 EMULSION OPHTHALMIC 2 TIMES DAILY
Status: DISCONTINUED | OUTPATIENT
Start: 2017-04-21 | End: 2017-04-22 | Stop reason: HOSPADM

## 2017-04-21 RX ORDER — IPRATROPIUM BROMIDE AND ALBUTEROL SULFATE 2.5; .5 MG/3ML; MG/3ML
3 SOLUTION RESPIRATORY (INHALATION)
Status: DISCONTINUED | OUTPATIENT
Start: 2017-04-21 | End: 2017-04-21

## 2017-04-21 RX ORDER — SODIUM CHLORIDE 9 MG/ML
100 INJECTION, SOLUTION INTRAVENOUS CONTINUOUS
Status: DISCONTINUED | OUTPATIENT
Start: 2017-04-21 | End: 2017-04-22 | Stop reason: HOSPADM

## 2017-04-21 RX ORDER — ALBUTEROL SULFATE 0.83 MG/ML
2.5 SOLUTION RESPIRATORY (INHALATION)
Status: DISCONTINUED | OUTPATIENT
Start: 2017-04-21 | End: 2017-04-22 | Stop reason: HOSPADM

## 2017-04-21 RX ORDER — ENOXAPARIN SODIUM 100 MG/ML
40 INJECTION SUBCUTANEOUS EVERY 24 HOURS
Status: DISCONTINUED | OUTPATIENT
Start: 2017-04-21 | End: 2017-04-22 | Stop reason: HOSPADM

## 2017-04-21 RX ORDER — MAGNESIUM SULFATE 100 %
16 CRYSTALS MISCELLANEOUS AS NEEDED
Status: DISCONTINUED | OUTPATIENT
Start: 2017-04-21 | End: 2017-04-22 | Stop reason: HOSPADM

## 2017-04-21 RX ORDER — METOPROLOL TARTRATE 25 MG/1
12.5 TABLET, FILM COATED ORAL 2 TIMES DAILY
Status: DISCONTINUED | OUTPATIENT
Start: 2017-04-21 | End: 2017-04-22 | Stop reason: HOSPADM

## 2017-04-21 RX ORDER — LANOLIN ALCOHOL/MO/W.PET/CERES
400 CREAM (GRAM) TOPICAL
Status: COMPLETED | OUTPATIENT
Start: 2017-04-21 | End: 2017-04-21

## 2017-04-21 RX ORDER — TAMSULOSIN HYDROCHLORIDE 0.4 MG/1
0.4 CAPSULE ORAL DAILY
Status: DISCONTINUED | OUTPATIENT
Start: 2017-04-22 | End: 2017-04-22 | Stop reason: HOSPADM

## 2017-04-21 RX ORDER — ACETAMINOPHEN 500 MG
500 TABLET ORAL
Status: DISCONTINUED | OUTPATIENT
Start: 2017-04-21 | End: 2017-04-22 | Stop reason: HOSPADM

## 2017-04-21 RX ORDER — ASCORBIC ACID 250 MG
1000 TABLET ORAL DAILY
Status: DISCONTINUED | OUTPATIENT
Start: 2017-04-22 | End: 2017-04-21

## 2017-04-21 RX ORDER — SIMVASTATIN 20 MG/1
20 TABLET, FILM COATED ORAL
Status: DISCONTINUED | OUTPATIENT
Start: 2017-04-21 | End: 2017-04-22 | Stop reason: HOSPADM

## 2017-04-21 RX ADMIN — SODIUM CHLORIDE 100 ML/HR: 900 INJECTION, SOLUTION INTRAVENOUS at 20:58

## 2017-04-21 RX ADMIN — ENOXAPARIN SODIUM 40 MG: 40 INJECTION SUBCUTANEOUS at 21:09

## 2017-04-21 RX ADMIN — SODIUM CHLORIDE 1632 ML: 900 INJECTION, SOLUTION INTRAVENOUS at 15:20

## 2017-04-21 RX ADMIN — SODIUM CHLORIDE 250 ML: 9 INJECTION, SOLUTION INTRAVENOUS at 14:45

## 2017-04-21 RX ADMIN — TRAZODONE HYDROCHLORIDE 100 MG: 50 TABLET ORAL at 21:16

## 2017-04-21 RX ADMIN — INSULIN LISPRO 2 UNITS: 100 INJECTION, SOLUTION INTRAVENOUS; SUBCUTANEOUS at 22:37

## 2017-04-21 RX ADMIN — Medication 400 MG: at 20:59

## 2017-04-21 RX ADMIN — STANDARDIZED SENNA CONCENTRATE AND DOCUSATE SODIUM 2 TABLET: 8.6; 5 TABLET, FILM COATED ORAL at 21:16

## 2017-04-21 RX ADMIN — GABAPENTIN 300 MG: 300 CAPSULE ORAL at 21:16

## 2017-04-21 RX ADMIN — METOPROLOL TARTRATE 12.5 MG: 25 TABLET ORAL at 20:59

## 2017-04-21 RX ADMIN — CEFTRIAXONE SODIUM 1 G: 1 INJECTION, POWDER, FOR SOLUTION INTRAMUSCULAR; INTRAVENOUS at 20:58

## 2017-04-21 RX ADMIN — POTASSIUM CHLORIDE 40 MEQ: 1500 TABLET, EXTENDED RELEASE ORAL at 20:59

## 2017-04-21 RX ADMIN — SIMVASTATIN 20 MG: 20 TABLET, FILM COATED ORAL at 21:16

## 2017-04-21 RX ADMIN — LACTULOSE 30 G: 20 SOLUTION ORAL at 20:58

## 2017-04-21 RX ADMIN — ASPIRIN 325 MG ORAL TABLET 325 MG: 325 PILL ORAL at 16:28

## 2017-04-21 NOTE — ED TRIAGE NOTES
\"I'm here because I'm having hot and cold sweats all the time. I'm not in pain, I did fall this morning. When I woke up I felt dizzy and fell to the floor. I hit my back and head when I fell. \"

## 2017-04-21 NOTE — ED PROVIDER NOTES
HPI Comments: 1:57 PM: Alexia Shaw is a unipolar cane dependent 80 y.o. Frail appearing white female smoker h/o HTN, DM, HLP, COPD, MOORE, Syncope, Stress Urinary Incontinence, Depression, who presents to ED for evaluation secondary to falling. Pt stood up from her chair and felt dizzy, went to sit back down, missed the chair. Pt fell on tile floor, hit butt and head. No LOC. Pt is reporting no pain currently and states \"I feel fine\". She reports feeling dizzy this morning upon waking up this morning, generally feels weak, underlying sob. The history is provided by the patient.         Past Medical History:   Diagnosis Date    Colon polyps     COPD 8-30-02    DDD (degenerative disc disease), lumbar 10/1/2014    Degeneration of lumbar or lumbosacral intervertebral disc     Depression     Diabetes ([facility] [address] 7-19-05    Diverticulosis     DM neuropathy, painful ([facility] [address] 10/1/2014    MOORE (Dyspnea on Exertion) 4-19-02    echo: +mild LVH, KD50-54% w/ diastolic dysfxn    Glaucoma     HCAP (healthcare-associated pneumonia) 03/24/2017    Hemorrhoids 6-6-06    Hyperlipidemia 5/17/2011    Hypertension 4-16-02    LBP (low back pain) 4-13-04    Low back pain radiating to both legs 10/1/2014    Lumbago     Lumbar disc herniation 10/1/2014    Lumbar facet arthropathy ([facility] [address] 10/1/2014    Lumbosacral radiculopathy at L5 10/1/2014    Lumbosacral radiculopathy at S1 10/1/2014    Microscopic hematuria     OA (osteoarthritis)     Overactive bladder 5/17/2011    RLS (restless legs syndrome) 1/17/2013    Sciatica     Shortness of breath     Spinal stenosis, lumbar region, without neurogenic claudication     Spondylolisthesis of lumbar region 10/1/2014    Spondylolisthesis, grade 1 10/1/2014    Stress urinary incontinence     Syncope     -MRI brain    Urinary tract infection, site not specified        Past Surgical History:   Procedure Laterality Date    HX APPENDECTOMY      HX CHOLECYSTECTOMY      HX HYSTERECTOMY      (+)DUB    HX POLYPECTOMY      [place] COLONOSCOPY FLX DX W/COLLJ SPEC WHEN PFRMD  6-16-06    normal, Dr Rochelle Miranda    [place] COLONOSCOPY FLX DX W/COLLJ Vickie 1978 PFRMD      (+)polyp= tubular adenoma         Family History:   Problem Relation Age of Onset    Colon Cancer Sister     Heart Disease Brother     Cancer Brother      stomach CA    Seizures Son     Depression Daughter     Colon Cancer Maternal Aunt        Social History     Social History    Marital status:      Spouse name: N/A    Number of children: N/A    Years of education: N/A     Occupational History    Not on file. Social History Main Topics    Smoking status: Current Every Day Smoker     Packs/day: 0.25     Years: 45.00     Types: Cigarettes    Smokeless tobacco: Never Used    Alcohol use No    Drug use: No    Sexual activity: Not Currently     Other Topics Concern    Not on file     Social History Narrative         ALLERGIES: Levaquin [levofloxacin]    Review of Systems   Constitutional: Positive for chills, diaphoresis and fatigue. Negative for fever. HENT: Negative for ear pain, rhinorrhea, sore throat and trouble swallowing. Eyes: Negative for pain and visual disturbance. Respiratory: Positive for shortness of breath. Negative for cough, chest tightness and wheezing. Cardiovascular: Negative for chest pain, palpitations and leg swelling. Gastrointestinal: Negative for abdominal pain, blood in stool, diarrhea, nausea and vomiting. Genitourinary: Negative for dysuria, flank pain, frequency, hematuria and urgency. Musculoskeletal: Negative for arthralgias, back pain, joint swelling, neck pain and neck stiffness. Skin: Negative for color change, pallor, rash and wound. Neurological: Positive for dizziness, weakness and light-headedness. Negative for syncope, facial asymmetry, numbness and headaches.    Psychiatric/Behavioral: Negative for behavioral problems. The patient is not nervous/anxious. Vitals:    04/21/17 1304 04/21/17 1445 04/21/17 1446 04/21/17 1447   BP: 110/61 124/58 118/55 104/57   Pulse: 98 76 84 92   Resp: 18      Temp: 97.3 °F (36.3 °C)      SpO2: 90%      Weight: 54.4 kg (120 lb)               Physical Exam   Constitutional: She is oriented to person, place, and time. She appears well-developed and well-nourished. No distress. HENT:   Head: Normocephalic and atraumatic. Right Ear: Hearing, tympanic membrane, external ear and ear canal normal.   Left Ear: Hearing, tympanic membrane, external ear and ear canal normal.   Nose: Nose normal. No mucosal edema or rhinorrhea. Mouth/Throat: Uvula is midline, oropharynx is clear and moist and mucous membranes are normal. No uvula swelling. No oropharyngeal exudate, posterior oropharyngeal edema, posterior oropharyngeal erythema or tonsillar abscesses. Eyes: Conjunctivae and EOM are normal. Pupils are equal, round, and reactive to light. Right eye exhibits no discharge. Left eye exhibits no discharge. No scleral icterus. Neck: Trachea normal, normal range of motion, full passive range of motion without pain and phonation normal. Neck supple. No tracheal tenderness, no spinous process tenderness and no muscular tenderness present. No rigidity. No tracheal deviation, no edema, no erythema and normal range of motion present. No thyroid mass and no thyromegaly present. Cardiovascular: Normal rate, regular rhythm and normal heart sounds. Exam reveals no gallop and no friction rub. No murmur heard. Pulmonary/Chest: Effort normal and breath sounds normal. No accessory muscle usage or stridor. No tachypnea. No respiratory distress. She has no decreased breath sounds. She has no wheezes. She has no rhonchi. She has no rales. Abdominal: Soft. Bowel sounds are normal. There is no tenderness.  There is no rigidity, no rebound, no guarding, no CVA tenderness, no tenderness at McBurney's point and negative Elaine's sign. Musculoskeletal: Normal range of motion. Lymphadenopathy:     She has no cervical adenopathy. Neurological: She is alert and oriented to person, place, and time. She has normal strength and normal reflexes. No sensory deficit. Gait normal. GCS eye subscore is 4. GCS verbal subscore is 5. GCS motor subscore is 6. Skin: Skin is warm, dry and intact. No rash noted. She is not diaphoretic. No cyanosis. No pallor. Psychiatric: She has a normal mood and affect. Her behavior is normal.   Nursing note and vitals reviewed. MDM  Number of Diagnoses or Management Options  CHI (closed head injury), initial encounter: new and requires workup  Dizziness: new and requires workup  Elevated troponin: new and requires workup  Fall, initial encounter: new and requires workup  Hypokalemia: new and requires workup  Hypoxia: new and requires workup  Leukocytosis, unspecified type: new and requires workup  SOB (shortness of breath): new and requires workup  Diagnosis management comments: DDX: Migraine, Tension, Cluster, ICH, Cervical sprain, Cervical strain, Skull Fracture, CVA, TIA, Concussion with or without LOC, CHI, Skull Contusion, Neoplasm    DDX: Cervical Sprain/Strain, Fracture (Atlantoaxial Injury and Dysfunction), Brachial Plexus Injury, Cervical Disc Injury, Cervical Discogenic Pain Syndrome, Cervical Facet Syndrome, Cervical Radiculopathy, Myofascial Pain. DDX: Vestibular disorder of the inner ear, Cardiac Arrhythmia, Pulmonary Embolism, TIA, CVA, Seizures, Dehydration, Aortic Stenosis, Hypoglycemia, Subarachnoid hemorrhage, Hemorrhage (GI, Ectopic pregnancy), Adrenal insufficiency, Sepsis, Hypotension. ED EKG interpretation:  Rhythm: Normal Sinus Rhythm;  Vent rate: 91 bpm; MN Interval: 138 ms; QRS duration: 120 ms; QT/QTc: 446/548 ms; P-R-T axes: 83 -84 46. Other findings: Possible Left Atrial Enlargement, Left Axis Deviation, Pulmonary Disease Pattern, RBBB, Abnormal ekg. EKG was completed at 1318. This EKG was interpreted by Dr. Juliana Hammond (ED attending) at 1320. Compared to ECG [id number], d/w Dr. Brice Joya there are new ST Segment Depressions in anterolateral leads. Consulted with Dr. Elizabeth Holcomb (EP) concerning patient Trudee An, standard discussion of reason for visit, HPI, ROS, PE, and current results available. Recommendation for admission once all studies completed. HETAL Jones  April 21, 2017  2:54 PM     2:52 PM: CT HEAD W/O CONTRAST RESULT:   No evidence of intracranial hemorrhage, or any other definable acute  intracranial process.     Moderate cerebral cortical atrophy and mild central atrophy.     At the lateral aspect of left frontal lobe, anterior to left sylvian fissure  there is a focus of curvilinear hyperdensity, most likely to be due to vascular  calcification or possibly dural calcification, unchanged as compared to previous  CT scan on 4/17/2009.    3:00 PM: CT CERVICAL SPINE W/O CONTRAST RESULT:   No evidence of acute fracture or dislocation in cervical spine.     There are findings of multilevel moderate-to-severe spondylosis including  degenerative disc disease and facet osteoarthritis in cervical spine, as  described above. The findings are grossly unchanged as compared to previous MRI  of cervical spine on 8/1/2013.    3:49 PM: CXR PORTABLE RESULT: Mild hyperinflation. Consulted with Dr. Lukasz Brooks) concerning patient Trudee An, standard discussion of reason for visit, HPI, ROS, PE, and current results available. Agrees to admit. HETAL Ernst  April 21, 2017  4:31 PM       Consulted with Cardiology HETAL Anderson concerning patient Trudee An, standard discussion of reason for visit, HPI, ROS, PE, and current results available. Agrees to consult on the patient.     HETAL Ernst  April 21, 2017  4:30 PM            Amount and/or Complexity of Data Reviewed  Clinical lab tests: reviewed and ordered  Tests in the radiology section of CPT®: ordered and reviewed  Tests in the medicine section of CPT®: reviewed and ordered  Discussion of test results with the performing providers: yes  Decide to obtain previous medical records or to obtain history from someone other than the patient: yes  Obtain history from someone other than the patient: yes ()  Review and summarize past medical records: yes  Discuss the patient with other providers: yes  Independent visualization of images, tracings, or specimens: yes    Risk of Complications, Morbidity, and/or Mortality  Presenting problems: moderate  Diagnostic procedures: moderate  Management options: moderate    Patient Progress  Patient progress: stable        Procedures    Scribe Attestation:     Stephen Aguilar, dianeibing for and in the presence of Nick Alfaro [facility] April 21, 2017     Signed by: Dinesh Kathleen, April 21, 2017 at 4:31 PM     Physician Attestation:   I personally performed the services described in this documentation, reviewed and edited the documentation which was dictated to the scribe in my presence, and it accurately records my words and actions. Jared Davisma  April 21, 2017    Diagnosis:   1. Fall, initial encounter    2. CHI (closed head injury), initial encounter    3. Dizziness    4. SOB (shortness of breath)    5. Hypoxia    6. Elevated troponin    7. Hypokalemia    8. Leukocytosis, unspecified type          Disposition: ADMIT    Follow-up Information     None          Patient's Medications   Start Taking    No medications on file   Continue Taking    ALBUTEROL (PROVENTIL HFA, VENTOLIN HFA, PROAIR HFA) 90 MCG/ACTUATION INHALER    Take 2 Puffs by inhalation every four (4) hours as needed for Wheezing. ALBUTEROL-IPRATROPIUM (DUO-NEB) 2.5 MG-0.5 MG/3 ML NEBU    3 mL by Nebulization route every four (4) hours as needed. AMLODIPINE (NORVASC) 10 MG TABLET    Take 1 Tab by mouth daily.     ASCORBIC ACID (VITAMIN C) 1,000 MG TABLET    Take  by mouth. BUDESONIDE-FORMOTEROL (SYMBICORT) 160-4.5 MCG/ACTUATION HFA INHALER    Take 1 Puff by inhalation two (2) times a day. CYCLOSPORINE (RESTASIS) 0.05 % OPHTHALMIC EMULSION    Administer 1 Drop to both eyes two (2) times a day. GABAPENTIN (NEURONTIN) 300 MG CAPSULE    Take 1 Cap by mouth three (3) times daily. HYDROMORPHONE (DILAUDID) 4 MG TABLET    1/2 to one tab up to three times per day prn severe pain due to fill 2/13/17    HYDROMORPHONE (DILAUDID) 4 MG TABLET    1/2 to one tab up to three times per day prn severe pain (due to fill 4/8/17)    LINACLOTIDE (LINZESS) 145 MCG CAP CAPSULE    Take  by mouth Daily (before breakfast). LINACLOTIDE (LINZESS) 145 MCG CAP CAPSULE    Take 1 Cap by mouth Daily (before breakfast). LORAZEPAM (ATIVAN) 1 MG TABLET    Take 0.5 Tabs by mouth every eight (8) hours as needed. Max Daily Amount: 1.5 mg.    LUMIGAN 0.01 % OPHTHALMIC DROPS        METOPROLOL TARTRATE (LOPRESSOR) 25 MG TABLET    Take 1 Tab by mouth two (2) times a day. JP-PY-TA-FE-MIN-LYCOPEN-LUTEIN (CENTRUM) 0.4-162-18 MG TAB    Take 1 Tab by mouth daily. OMEGA 3-DHA-EPA-FISH OIL (FISH OIL) 100-160-1,000 MG CAP    Take 1,000 mg by mouth daily. PHENOL THROAT SPRAY (CHLORASEPTIC) 1.4 % SPRAY    Take 1 Spray by mouth as needed for Sore throat. ROPINIROLE (REQUIP) 1 MG TABLET    Take 1 mg by mouth daily. SIMVASTATIN (ZOCOR) 20 MG TABLET    Take 1 Tab by mouth nightly. TAMSULOSIN (FLOMAX) 0.4 MG CAPSULE    Take 1 Cap by mouth daily. TIOTROPIUM BROMIDE (SPIRIVA RESPIMAT) 1.25 MCG/ACTUATION INHALER    Take 2 Puffs by inhalation daily. TRAZODONE (DESYREL) 100 MG TABLET    Take 100 mg by mouth nightly.  Patient takes one and half tabs at bedtime    TRIAMCINOLONE (ARISTOCORT) 0.5 % TOPICAL CREAM    Apply a thin layer to rash twice daily   These Medications have changed    No medications on file   Stop Taking    No medications on file       Recent Results (from the past 24 hour(s))   EKG, 12 LEAD, SUBSEQUENT    Collection Time: 04/21/17  1:18 PM   Result Value Ref Range    Ventricular Rate 91 BPM    Atrial Rate 91 BPM    P-R Interval 138 ms    QRS Duration 120 ms    Q-T Interval 446 ms    QTC Calculation (Bezet) 548 ms    Calculated P Axis 83 degrees    Calculated R Axis -84 degrees    Calculated T Axis 46 degrees    Diagnosis       Normal sinus rhythm  Possible Left atrial enlargement  Left axis deviation  Right bundle branch block  Nonspecific ST abnormality  Prolonged QT  Abnormal ECG  When compared with ECG of 08-FEB-2017 07:55,  QRS axis shifted left  Non-specific change in ST segment in Inferolateral leads  QT has lengthened  Confirmed by Monika Mota MD ([id number]) on 4/21/2017 2:56:26 PM     CBC WITH AUTOMATED DIFF    Collection Time: 04/21/17  1:25 PM   Result Value Ref Range    WBC 13.7 (H) 4.6 - 13.2 K/uL    RBC 4.75 4.20 - 5.30 M/uL    HGB 14.8 12.0 - 16.0 g/dL    HCT 43.0 35.0 - 45.0 %    MCV 90.5 74.0 - 97.0 FL    MCH 31.2 24.0 - 34.0 PG    MCHC 34.4 31.0 - 37.0 g/dL    RDW 13.3 11.6 - 14.5 %    PLATELET 833 680 - 011 K/uL    MPV 11.0 9.2 - 11.8 FL    NEUTROPHILS 82 (H) 40 - 73 %    LYMPHOCYTES 12 (L) 21 - 52 %    MONOCYTES 6 3 - 10 %    EOSINOPHILS 0 0 - 5 %    BASOPHILS 0 0 - 2 %    ABS. NEUTROPHILS 11.2 (H) 1.8 - 8.0 K/UL    ABS. LYMPHOCYTES 1.6 0.9 - 3.6 K/UL    ABS. MONOCYTES 0.9 0.05 - 1.2 K/UL    ABS. EOSINOPHILS 0.0 0.0 - 0.4 K/UL    ABS.  BASOPHILS 0.0 0.0 - 0.06 K/UL    DF AUTOMATED     METABOLIC PANEL, BASIC    Collection Time: 04/21/17  1:25 PM   Result Value Ref Range    Sodium 135 (L) 136 - 145 mmol/L    Potassium 2.9 (LL) 3.5 - 5.5 mmol/L    Chloride 95 (L) 100 - 108 mmol/L    CO2 33 (H) 21 - 32 mmol/L    Anion gap 7 3.0 - 18 mmol/L    Glucose 198 (H) 74 - 99 mg/dL    BUN 18 7.0 - 18 MG/DL    Creatinine 0.89 0.6 - 1.3 MG/DL    BUN/Creatinine ratio 20 12 - 20      GFR est AA >60 >60 ml/min/1.73m2    GFR est non-AA >60 >60 ml/min/1.73m2    Calcium 9.0 8.5 - 10.1 MG/DL   HEPATIC FUNCTION PANEL    Collection Time: 04/21/17  1:25 PM   Result Value Ref Range    Protein, total 7.2 6.4 - 8.2 g/dL    Albumin 3.9 3.4 - 5.0 g/dL    Globulin 3.3 2.0 - 4.0 g/dL    A-G Ratio 1.2 0.8 - 1.7      Bilirubin, total 0.4 0.2 - 1.0 MG/DL    Bilirubin, direct 0.1 0.0 - 0.2 MG/DL    Alk.  phosphatase 74 45 - 117 U/L    AST (SGOT) 21 15 - 37 U/L    ALT (SGPT) 28 13 - 56 U/L   PROTHROMBIN TIME + INR    Collection Time: 04/21/17  1:25 PM   Result Value Ref Range    Prothrombin time 13.1 11.5 - 15.2 sec    INR 1.0 0.8 - 1.2     CARDIAC PANEL,(CK, CKMB & TROPONIN)    Collection Time: 04/21/17  1:25 PM   Result Value Ref Range    CK 60 26 - 192 U/L    CK - MB 2.7 <3.6 ng/ml    CK-MB Index 4.5 (H) 0.0 - 4.0 %    Troponin-I, Qt. 0.09 (H) 0.0 - 0.045 NG/ML   MAGNESIUM    Collection Time: 04/21/17  1:25 PM   Result Value Ref Range    Magnesium 1.8 1.6 - 2.6 mg/dL   POC LACTIC ACID    Collection Time: 04/21/17  3:05 PM   Result Value Ref Range    Lactic Acid (POC) 1.3 0.4 - 2.0 mmol/L

## 2017-04-21 NOTE — IP AVS SNAPSHOT
Current Discharge Medication List  
  
START taking these medications Dose & Instructions Dispensing Information Comments Morning Noon Evening Bedtime  
 trimethoprim-sulfamethoxazole  mg per tablet Commonly known as:  Catalina Knapp Your last dose was: Your next dose is:    
   
   
 Dose:  1 Tab Take 1 Tab by mouth every twelve (12) hours. Quantity:  3 Tab Refills:  0 CONTINUE these medications which have CHANGED Dose & Instructions Dispensing Information Comments Morning Noon Evening Bedtime  
 linaclotide 145 mcg Cap capsule Commonly known as:  Melanie Oliver What changed:  Another medication with the same name was removed. Continue taking this medication, and follow the directions you see here. Your last dose was: Your next dose is:    
   
   
 Dose:  145 mcg Take 1 Cap by mouth Daily (before breakfast). Quantity:  90 Cap Refills:  2 CONTINUE these medications which have NOT CHANGED Dose & Instructions Dispensing Information Comments Morning Noon Evening Bedtime  
 albuterol 90 mcg/actuation inhaler Commonly known as:  PROVENTIL HFA, VENTOLIN HFA, PROAIR HFA Your last dose was: Your next dose is:    
   
   
 Dose:  2 Puff Take 2 Puffs by inhalation every four (4) hours as needed for Wheezing. Quantity:  1 Inhaler Refills:  5  
     
   
   
   
  
 albuterol-ipratropium 2.5 mg-0.5 mg/3 ml Nebu Commonly known as:  Barry Castillo Your last dose was: Your next dose is:    
   
   
 Dose:  3 mL  
3 mL by Nebulization route every four (4) hours as needed. Quantity:  30 Nebule Refills:  0  
     
   
   
   
  
 amLODIPine 10 mg tablet Commonly known as:  Barabara Puls Your last dose was: Your next dose is:    
   
   
 Dose:  10 mg Take 1 Tab by mouth daily. Quantity:  30 Tab Refills:  0 budesonide-formoterol 160-4.5 mcg/actuation HFA inhaler Commonly known as:  SYMBICORT Your last dose was: Your next dose is:    
   
   
 Dose:  1 Puff Take 1 Puff by inhalation two (2) times a day. Quantity:  1 Inhaler Refills:  6 CENTRUM 0.4-162-18 mg Tab Generic drug:  NO-PM-UV-Fe-Min-Lycopen-Lutein Your last dose was: Your next dose is:    
   
   
 Dose:  1 Tab Take 1 Tab by mouth daily. Refills:  0  
     
   
   
   
  
 FISH -160-1,000 mg Cap Generic drug:  omega 3-dha-epa-fish oil Your last dose was: Your next dose is:    
   
   
 Dose:  1000 mg Take 1,000 mg by mouth daily. Refills:  0  
     
   
   
   
  
 gabapentin 300 mg capsule Commonly known as:  NEURONTIN Your last dose was: Your next dose is:    
   
   
 Dose:  300 mg Take 1 Cap by mouth three (3) times daily. Quantity:  270 Cap Refills:  1 LORazepam 1 mg tablet Commonly known as:  ATIVAN Your last dose was: Your next dose is:    
   
   
 Dose:  0.5 mg Take 0.5 Tabs by mouth every eight (8) hours as needed. Max Daily Amount: 1.5 mg.  
 Quantity:  30 Tab Refills:  0 LUMIGAN 0.01 % ophthalmic drops Generic drug:  bimatoprost  
   
Your last dose was: Your next dose is:    
   
   
 Dose:  1 Drop Administer 1 Drop to both eyes nightly. Refills:  0  
     
   
   
   
  
 metoprolol tartrate 25 mg tablet Commonly known as:  LOPRESSOR Your last dose was: Your next dose is:    
   
   
 Dose:  25 mg Take 1 Tab by mouth two (2) times a day. Quantity:  60 Tab Refills:  0 phenol throat spray 1.4 % spray Commonly known as:  Raquel Net Your last dose was: Your next dose is:    
   
   
 Dose:  1 Spray Take 1 Spray by mouth as needed for Sore throat. Quantity:  1 Bottle Refills:  0 RESTASIS 0.05 % ophthalmic emulsion Generic drug:  cycloSPORINE Your last dose was: Your next dose is:    
   
   
 Dose:  1 Drop Administer 1 Drop to both eyes two (2) times a day. Refills:  0  
     
   
   
   
  
 rOPINIRole 1 mg tablet Commonly known as:  Keagan Rosas Your last dose was: Your next dose is:    
   
   
 Dose:  1 mg Take 1 mg by mouth daily. Refills:  0  
     
   
   
   
  
 simvastatin 20 mg tablet Commonly known as:  ZOCOR Your last dose was: Your next dose is:    
   
   
 Dose:  20 mg Take 1 Tab by mouth nightly. Quantity:  90 Tab Refills:  1  
     
   
   
   
  
 tamsulosin 0.4 mg capsule Commonly known as:  FLOMAX Your last dose was: Your next dose is:    
   
   
 Dose:  0.4 mg Take 1 Cap by mouth daily. Quantity:  90 Cap Refills:  3  
     
   
   
   
  
 tiotropium bromide 1.25 mcg/actuation inhaler Commonly known as:  Catherene  Your last dose was: Your next dose is:    
   
   
 Dose:  2 Puff Take 2 Puffs by inhalation daily. Quantity:  1 Inhaler Refills:  11  
     
   
   
   
  
 traZODone 100 mg tablet Commonly known as:  Aranza Damon Your last dose was: Your next dose is:    
   
   
 Dose:  100 mg Take 100 mg by mouth nightly. Patient takes one and half tabs at bedtime Refills:  0  
     
   
   
   
  
 triamcinolone 0.5 % topical cream  
Commonly known as:  ARISTOCORT Your last dose was: Your next dose is:    
   
   
 Apply a thin layer to rash twice daily Quantity:  30 g Refills:  0  
     
   
   
   
  
 VITAMIN C 1,000 mg tablet Generic drug:  ascorbic acid (vitamin C) Your last dose was: Your next dose is:    
   
   
 Dose:  1000 mg Take 1,000 mg by mouth daily. Refills:  0 STOP taking these medications HYDROmorphone 4 mg tablet Commonly known as:  DILAUDID Where to Get Your Medications These medications were sent to [facility], [address] [facility], [address] [place] [id number] Phone:  [phone]  
  trimethoprim-sulfamethoxazole  mg per tablet

## 2017-04-21 NOTE — CONSULTS
[facility] - Consult Note    Consultation request by Adalberto Medeiros MD for advice/opinion related to evaluating Dizziness  CHI (closed head injury)  SOB (shortness of breath)  Elevated troponin  Hypokalemia    Date of  Admission: 4/21/2017  1:19 PM   Primary Care Physician:  Sana Joseph MD     Assessment:     Patient Active Problem List   Diagnosis Code    Hyperlipidemia E78.5    Overactive bladder N32.81    Sciatica M54.30    Degeneration of lumbar or lumbosacral intervertebral disc M51.37    Encounter for long-term (current) use of other medications Z79.899    Depression F32.9    Unspecified vitamin D deficiency E55.9    Glucose intolerance (pre-diabetes) R73.03    RLS (restless legs syndrome) G25.81    Aortic dissection, abdominal ([facility] [address] I71.02    Ataxic gait R26.0    Chronic neck pain M54.2, G89.29    Orthostatic hypotension I95.1    Paresthesia R20.2    Repeated falls R29.6    Chronic pain syndrome G89.4    Lumbosacral spondylosis without myelopathy M47.817    Cervical stenosis of spinal canal M48.02    Spinal stenosis in cervical region M48.02    Polyneuropathy G62.9    Lumbar stenosis M48.06    High risk medication use Z79.899    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    Thoracic or lumbosacral neuritis or radiculitis, unspecified [id number]    Low back pain radiating to both legs M54.5    DDD (degenerative disc disease), lumbar M51.36    Spondylolisthesis of lumbar region M43.16    Spondylolisthesis, grade 1 M43.10    Lumbar facet arthropathy (HCC) M12.88    Lumbar disc herniation M51.26    Lumbosacral radiculopathy at L5 M54.17    Lumbosacral radiculopathy at S1 M54.17    DM neuropathy, painful (HCC) E11.40    Acute exacerbation of chronic obstructive pulmonary disease (COPD) ([address] J44.1    Diabetic polyneuropathy associated with type 2 diabetes mellitus (HCC) E11.42    Carotid artery stenosis I65.29    Atherosclerosis of native arteries of the extremities with intermittent claudication I70.219    MOORE (dyspnea on exertion) R06.09    SOB (shortness of breath) R06.02    Murmur, cardiac R01.1    Hyperlipidemia LDL goal <100 E78.5    Primary osteoarthritis involving multiple joints M15.0    Prediabetes R73.03    Restless leg syndrome G25.81    Essential hypertension with goal blood pressure less than 140/90 I10    Panlobular emphysema (HCC) J43.1    Impaired fasting glucose R73.01    HCAP (healthcare-associated pneumonia) J18.9    Abnormal CT scan, chest R93.8    Hypercholesterolemia E78.00    Hypokalemia E87.6    Dizziness R42    CHI (closed head injury) S09. 90XA    Elevated troponin R74.8       -s/p fall with prodrome of dizziness after getting up, no LOC  -Indeterminate troponin, nonspecific EKG changes, previous RBBB. No chest pain, low suspicion for ACS  -Hypokalemia, Hypomagnesemia  -Leukocytosis with possible UTI  -Echo 2/7/17 with normal EF  -COPD on MDI  -Recent findings of lung masses, s/p bronch in Feb 2017 without malignancy. Suspected pneumonia  -Diabetes  -h/o HTN on meds  -Remote syncope  -HLD  -Protein malnutrition  -Long h/o tobacco use  -Chronic pain on narcotics, presumably for back issues     Plan:     Asked to see for indeterminate troponin after a fall today associated with dizziness. No LOC, occurred after getting up. Likely a combination of dehydration, deconditioning, malnutrition, medications, poor balance, possible infection. Low suspicion for ACS/arrhythmia. Replace K/Mg, serial enzymes (at a minimum 2nd set of enzymes before discharge). If remains in-house overnight, I will check limited echo for wall motion/EF. Discussed with Dr. Ancelmo Varela. History of Present Illness: This is a 80 y.o. female admitted for Dizziness  CHI (closed head injury)  SOB (shortness of breath)  Elevated troponin  Hypokalemia. Patient complains of:    Dizziness this morning.   Stood up from chair, went back to sit down, missed chair, fell on tile floor, hit coccyx and head. No chest pain, LOC. Generally feels with weak with chronic dyspnea. Asked to see for indeterminate troponin. No h/o CAD, MI, CHF, stroke. Eating poorly. Still smoking 5-6 cig/day, down from a pack a day.     Review of Symptoms:  Except as stated above include:  Constitutional:  Weak, fall  Respiratory:  dyspnea  Cardiovascular:  negative  Gastrointestinal: negative  Genitourinary:  negative  Musculoskeletal:  Negative  Neurological:  Negative  Dermatological:  Negative  Endocrinological: Negative  Psychological:  Negative     Past Medical History:     Past Medical History:   Diagnosis Date    Colon polyps     COPD 8-30-02    DDD (degenerative disc disease), lumbar 10/1/2014    Degeneration of lumbar or lumbosacral intervertebral disc     Depression     Diabetes ([facility]) 7-19-05    Diverticulosis     DM neuropathy, painful ([facility]) 10/1/2014    MOORE (Dyspnea on Exertion) 4-19-02    echo: +mild LVH, SX05-14% w/ diastolic dysfxn    Glaucoma     HCAP (healthcare-associated pneumonia) 03/24/2017    Hemorrhoids 6-6-06    Hyperlipidemia 5/17/2011    Hypertension 4-16-02    LBP (low back pain) 4-13-04    Low back pain radiating to both legs 10/1/2014    Lumbago     Lumbar disc herniation 10/1/2014    Lumbar facet arthropathy ([facility]) 10/1/2014    Lumbosacral radiculopathy at L5 10/1/2014    Lumbosacral radiculopathy at S1 10/1/2014    Microscopic hematuria     OA (osteoarthritis)     Overactive bladder 5/17/2011    RLS (restless legs syndrome) 1/17/2013    Sciatica     Shortness of breath     Spinal stenosis, lumbar region, without neurogenic claudication     Spondylolisthesis of lumbar region 10/1/2014    Spondylolisthesis, grade 1 10/1/2014    Stress urinary incontinence     Syncope     -MRI brain    Urinary tract infection, site not specified          Social History:     Social History     Social History    Marital status:      Spouse name: N/A    Number of children: N/A    Years of education: N/A     Social History Main Topics    Smoking status: Current Every Day Smoker     Packs/day: 0.25     Years: 45.00     Types: Cigarettes    Smokeless tobacco: Never Used    Alcohol use No    Drug use: No    Sexual activity: Not Currently     Other Topics Concern    None     Social History Narrative        Family History:     Family History   Problem Relation Age of Onset    Colon Cancer Sister     Heart Disease Brother     Cancer Brother      stomach CA    Seizures Son     Depression Daughter     Colon Cancer Maternal Aunt         Medications: Allergies   Allergen Reactions    Levaquin [Levofloxacin] Rash        Current Facility-Administered Medications   Medication Dose Route Frequency    sodium chloride (NS) flush 5-10 mL  5-10 mL IntraVENous PRN     Current Outpatient Prescriptions   Medication Sig    budesonide-formoterol (SYMBICORT) 160-4.5 mcg/actuation HFA inhaler Take 1 Puff by inhalation two (2) times a day.  tiotropium bromide (SPIRIVA RESPIMAT) 1.25 mcg/actuation inhaler Take 2 Puffs by inhalation daily.  albuterol (PROVENTIL HFA, VENTOLIN HFA, PROAIR HFA) 90 mcg/actuation inhaler Take 2 Puffs by inhalation every four (4) hours as needed for Wheezing.  HYDROmorphone (DILAUDID) 4 mg tablet 1/2 to one tab up to three times per day prn severe pain (due to fill 4/8/17)    linaclotide (LINZESS) 145 mcg cap capsule Take 1 Cap by mouth Daily (before breakfast).  simvastatin (ZOCOR) 20 mg tablet Take 1 Tab by mouth nightly.  triamcinolone (ARISTOCORT) 0.5 % topical cream Apply a thin layer to rash twice daily    rOPINIRole (REQUIP) 1 mg tablet Take 1 mg by mouth daily.  linaclotide (LINZESS) 145 mcg cap capsule Take  by mouth Daily (before breakfast).  LORazepam (ATIVAN) 1 mg tablet Take 0.5 Tabs by mouth every eight (8) hours as needed.  Max Daily Amount: 1.5 mg.   Jenni Howell albuterol-ipratropium (DUO-NEB) 2.5 mg-0.5 mg/3 ml nebu 3 mL by Nebulization route every four (4) hours as needed.  amLODIPine (NORVASC) 10 mg tablet Take 1 Tab by mouth daily.  metoprolol tartrate (LOPRESSOR) 25 mg tablet Take 1 Tab by mouth two (2) times a day.  phenol throat spray (CHLORASEPTIC) 1.4 % spray Take 1 Spray by mouth as needed for Sore throat.  omega 3-dha-epa-fish oil (FISH OIL) 100-160-1,000 mg cap Take 1,000 mg by mouth daily.  HYDROmorphone (DILAUDID) 4 mg tablet 1/2 to one tab up to three times per day prn severe pain due to fill 2/13/17    LUMIGAN 0.01 % ophthalmic drops     gabapentin (NEURONTIN) 300 mg capsule Take 1 Cap by mouth three (3) times daily.  tamsulosin (FLOMAX) 0.4 mg capsule Take 1 Cap by mouth daily.  ascorbic acid (VITAMIN C) 1,000 mg tablet Take  by mouth.  traZODone (DESYREL) 100 mg tablet Take 100 mg by mouth nightly. Patient takes one and half tabs at bedtime    cycloSPORINE (RESTASIS) 0.05 % ophthalmic emulsion Administer 1 Drop to both eyes two (2) times a day.  UH-OL-FQ-Fe-Min-Lycopen-Lutein (CENTRUM) 0.4-162-18 mg Tab Take 1 Tab by mouth daily.          Physical Exam:     Visit Vitals    /57 (BP 1 Location: Right arm, BP Patient Position: Standing)    Pulse 92    Temp 97.3 °F (36.3 °C)    Resp 18    Wt 54.4 kg (120 lb)    SpO2 90%    BMI 19.97 kg/m2     BP Readings from Last 3 Encounters:   04/21/17 104/57   03/24/17 110/70   03/10/17 139/71     Pulse Readings from Last 3 Encounters:   04/21/17 92   03/24/17 78   03/10/17 (!) 53     Wt Readings from Last 3 Encounters:   04/21/17 54.4 kg (120 lb)   03/24/17 60.3 kg (133 lb)   03/10/17 60.3 kg (133 lb)       General:  alert, cooperative, no distress, cachectic  Neck:  nontender  Lungs:  clear to auscultation bilaterally  Heart:  regular rate and rhythm, S1, S2 normal, no murmur, click, rub or gallop  Abdomen:  abdomen is soft without significant tenderness, masses, organomegaly or guarding  Extremities:  extremities normal, atraumatic, no cyanosis or edema  Skin: Warm and dry.  no hyperpigmentation, vitiligo, or suspicious lesions  Neuro: alert, oriented x3, affect appropriate, no focal neurological deficits, moves all extremities well, no involuntary movements, reflexes at knee and ankle intact  Psych: non focal     Data Review:     Recent Labs      04/21/17   1325   WBC  13.7*   HGB  14.8   HCT  43.0   PLT  195     Recent Labs      04/21/17   1325   NA  135*   K  2.9*   CL  95*   CO2  33*   GLU  198*   BUN  18   CREA  0.89   CA  9.0   MG  1.8   ALB  3.9   SGOT  21   ALT  28   INR  1.0       Results for orders placed or performed during the hospital encounter of 02/06/17   EKG, 12 LEAD, INITIAL   Result Value Ref Range    Ventricular Rate 91 BPM    Atrial Rate 91 BPM    P-R Interval 120 ms    QRS Duration 114 ms    Q-T Interval 420 ms    QTC Calculation (Bezet) 516 ms    Calculated P Axis 55 degrees    Calculated R Axis -71 degrees    Calculated T Axis 50 degrees    Diagnosis       Normal sinus rhythm  Possible Left atrial enlargement  Right bundle branch block  Left anterior fascicular block  Bifascicular block  Abnormal ECG  When compared with ECG of 08-JUL-2010 12:45,  (RBBB and left anterior fascicular block) is now present  Criteria for Septal infarct are no longer present  Confirmed by Marcela Mejia ([id number]) on 2/7/2017 12:00:41 PM         All Cardiac Markers in the last 24 hours:    Lab Results   Component Value Date/Time    CPK 60 04/21/2017 01:25 PM    CKMB 2.7 04/21/2017 01:25 PM    CKND1 4.5 (H) 04/21/2017 01:25 PM    TROIQ 0.09 (H) 04/21/2017 01:25 PM       Last Lipid:    Lab Results   Component Value Date/Time    Cholesterol, total 117 02/08/2017 02:30 AM    HDL Cholesterol 47 02/08/2017 02:30 AM    LDL, calculated 53.2 02/08/2017 02:30 AM    Triglyceride 84 02/08/2017 02:30 AM    CHOL/HDL Ratio 2.5 02/08/2017 02:30 AM       Signed By: Josee Matute MD     April 21, 2017

## 2017-04-21 NOTE — TELEPHONE ENCOUNTER
Patient's daughter is calling back. Stating her mother has fallen. She hit her head and is sweating profusely. Advised to go to the ER.

## 2017-04-21 NOTE — TELEPHONE ENCOUNTER
Patient's daughter is calling. She thinks her Mother may have pneumonia. Wants to know if we can order a CXR.

## 2017-04-21 NOTE — ED NOTES
TRANSFER - OUT REPORT:    Verbal report given to [facility]) on [facility]  being transferred to [facility] (unit) for routine progression of care       Report consisted of patients Situation, Background, Assessment and   Recommendations(SBAR). Information from the following report(s) SBAR, ED Summary, Intake/Output, MAR, Recent Results and Cardiac Rhythm NSR was reviewed with the receiving nurse. Lines:   Peripheral IV 04/21/17 Right Antecubital (Active)   Site Assessment Clean, dry, & intact 4/21/2017  2:45 PM   Phlebitis Assessment 0 4/21/2017  2:45 PM   Infiltration Assessment 0 4/21/2017  2:45 PM   Dressing Status Clean, dry, & intact 4/21/2017  2:45 PM   Dressing Type Transparent 4/21/2017  2:45 PM   Hub Color/Line Status Patent; Flushed 4/21/2017  2:45 PM        Opportunity for questions and clarification was provided.       Patient transported with:   Monitor  O2 @ 2 liters  Tech

## 2017-04-21 NOTE — IP AVS SNAPSHOT
[id number] [place] [address] Patient: Afshin De La Cruz MRN: [id number] ZUD:[birth year] You are allergic to the following Allergen Reactions Levaquin (Levofloxacin) Rash Recent Documentation Weight BMI OB Status Smoking Status 56.9 kg 20.87 kg/m2 Hysterectomy Current Every Day Smoker Unresulted Labs Order Current Status CULTURE, BLOOD Preliminary result CULTURE, BLOOD Preliminary result CULTURE, URINE Preliminary result Emergency Contacts Name Discharge Info Relation Home Work Mobile [address] [facility] CAREGIVER [3] Spouse [3] [phone]  
 ZARAZhou  Spouse [3] [phone] Iesha Rehman  Child [2] [phone] About your hospitalization You were admitted on:  April 21, 2017 You last received care in the:  [facility] [address] You were discharged on:  April 22, 2017 Unit phone number:  [phone] Why you were hospitalized Your primary diagnosis was:  Not on File Your diagnoses also included:  Sob (Shortness Of Breath), Hypokalemia, Dizziness, Chi (Closed Head Injury), Elevated Troponin Providers Seen During Your Hospitalizations Provider Role Specialty Primary office phone Rosalinda Epps MD Attending Provider Emergency Medicine [phone] Can Martinez MD Attending Provider Internal Medicine [phone] Your Primary Care Physician (PCP) Primary Care Physician Office Phone Office Fax Sheeba Nunez [phone] Follow-up Information Follow up With Details Comments Contact Info Paloma Colorado MD   [address] 
[phone] Your Appointments Monday May 01, 2017  9:05 AM EDT  
LAB with [facility] NURSE VISIT Internist of [facility] (Arnulfo Mittal) [address] [facility] [address] [facility]  
[phone] Monday May 08, 2017  9:15 AM EDT PHYSICAL with Aden Graf MD  
Internist of [facility] [address] [facility] [address] [place] [address]  
[phone] Wednesday May 10, 2017  9:00 AM EDT Follow Up with HETAL Negron [facility] (FAN SCHEDULING) [address] [facility] [address]  
[phone] Current Discharge Medication List  
  
START taking these medications Dose & Instructions Dispensing Information Comments Morning Noon Evening Bedtime  
 trimethoprim-sulfamethoxazole  mg per tablet Commonly known as:  Erik Caraballo Your last dose was: Your next dose is:    
   
   
 Dose:  1 Tab Take 1 Tab by mouth every twelve (12) hours. Quantity:  6 Tab Refills:  0 CONTINUE these medications which have CHANGED Dose & Instructions Dispensing Information Comments Morning Noon Evening Bedtime  
 linaclotide 145 mcg Cap capsule Commonly known as:  Beryle Downs What changed:  Another medication with the same name was removed. Continue taking this medication, and follow the directions you see here. Your last dose was: Your next dose is:    
   
   
 Dose:  145 mcg Take 1 Cap by mouth Daily (before breakfast). Quantity:  90 Cap Refills:  2 CONTINUE these medications which have NOT CHANGED Dose & Instructions Dispensing Information Comments Morning Noon Evening Bedtime  
 albuterol 90 mcg/actuation inhaler Commonly known as:  PROVENTIL HFA, VENTOLIN HFA, PROAIR HFA Your last dose was: Your next dose is:    
   
   
 Dose:  2 Puff Take 2 Puffs by inhalation every four (4) hours as needed for Wheezing. Quantity:  1 Inhaler Refills:  5 albuterol-ipratropium 2.5 mg-0.5 mg/3 ml Nebu Commonly known as:  Hansa Cline Your last dose was: Your next dose is:    
   
   
 Dose:  3 mL  
3 mL by Nebulization route every four (4) hours as needed. Quantity:  30 Nebule Refills:  0  
     
   
   
   
  
 amLODIPine 10 mg tablet Commonly known as:  Nahed Lai Your last dose was: Your next dose is:    
   
   
 Dose:  10 mg Take 1 Tab by mouth daily. Quantity:  30 Tab Refills:  0  
     
   
   
   
  
 budesonide-formoterol 160-4.5 mcg/actuation HFA inhaler Commonly known as:  SYMBICORT Your last dose was: Your next dose is:    
   
   
 Dose:  1 Puff Take 1 Puff by inhalation two (2) times a day. Quantity:  1 Inhaler Refills:  6 CENTRUM 0.4-162-18 mg Tab Generic drug:  IC-FQ-AP-Fe-Min-Lycopen-Lutein Your last dose was: Your next dose is:    
   
   
 Dose:  1 Tab Take 1 Tab by mouth daily. Refills:  0  
     
   
   
   
  
 FISH -160-1,000 mg Cap Generic drug:  omega 3-dha-epa-fish oil Your last dose was: Your next dose is:    
   
   
 Dose:  1000 mg Take 1,000 mg by mouth daily. Refills:  0  
     
   
   
   
  
 gabapentin 300 mg capsule Commonly known as:  NEURONTIN Your last dose was: Your next dose is:    
   
   
 Dose:  300 mg Take 1 Cap by mouth three (3) times daily. Quantity:  270 Cap Refills:  1 LORazepam 1 mg tablet Commonly known as:  ATIVAN Your last dose was: Your next dose is:    
   
   
 Dose:  0.5 mg Take 0.5 Tabs by mouth every eight (8) hours as needed. Max Daily Amount: 1.5 mg.  
 Quantity:  30 Tab Refills:  0 LUMIGAN 0.01 % ophthalmic drops Generic drug:  bimatoprost  
   
Your last dose was: Your next dose is:    
   
   
 Dose:  1 Drop Administer 1 Drop to both eyes nightly. Refills:  0 metoprolol tartrate 25 mg tablet Commonly known as:  LOPRESSOR Your last dose was: Your next dose is:    
   
   
 Dose:  25 mg Take 1 Tab by mouth two (2) times a day. Quantity:  60 Tab Refills:  0 phenol throat spray 1.4 % spray Commonly known as:  Ricardo Ramos Your last dose was: Your next dose is:    
   
   
 Dose:  1 Spray Take 1 Spray by mouth as needed for Sore throat. Quantity:  1 Bottle Refills:  0  
     
   
   
   
  
 RESTASIS 0.05 % ophthalmic emulsion Generic drug:  cycloSPORINE Your last dose was: Your next dose is:    
   
   
 Dose:  1 Drop Administer 1 Drop to both eyes two (2) times a day. Refills:  0  
     
   
   
   
  
 rOPINIRole 1 mg tablet Commonly known as:  Fabienne Torres Your last dose was: Your next dose is:    
   
   
 Dose:  1 mg Take 1 mg by mouth daily. Refills:  0  
     
   
   
   
  
 simvastatin 20 mg tablet Commonly known as:  ZOCOR Your last dose was: Your next dose is:    
   
   
 Dose:  20 mg Take 1 Tab by mouth nightly. Quantity:  90 Tab Refills:  1  
     
   
   
   
  
 tamsulosin 0.4 mg capsule Commonly known as:  FLOMAX Your last dose was: Your next dose is:    
   
   
 Dose:  0.4 mg Take 1 Cap by mouth daily. Quantity:  90 Cap Refills:  3  
     
   
   
   
  
 tiotropium bromide 1.25 mcg/actuation inhaler Commonly known as:  Emile Correia Your last dose was: Your next dose is:    
   
   
 Dose:  2 Puff Take 2 Puffs by inhalation daily. Quantity:  1 Inhaler Refills:  11  
     
   
   
   
  
 traZODone 100 mg tablet Commonly known as:  Alexia Salt Your last dose was: Your next dose is:    
   
   
 Dose:  100 mg Take 100 mg by mouth nightly. Patient takes one and half tabs at bedtime  Refills:  0  
     
   
   
   
  
 triamcinolone 0.5 % topical cream  
 Commonly known as:  ARISTOCORT Your last dose was: Your next dose is:    
   
   
 Apply a thin layer to rash twice daily Quantity:  30 g Refills:  0  
     
   
   
   
  
 VITAMIN C 1,000 mg tablet Generic drug:  ascorbic acid (vitamin C) Your last dose was: Your next dose is:    
   
   
 Dose:  1000 mg Take 1,000 mg by mouth daily. Refills:  0 STOP taking these medications HYDROmorphone 4 mg tablet Commonly known as:  DILAUDID Where to Get Your Medications Information on where to get these meds will be given to you by the nurse or doctor. ! Ask your nurse or doctor about these medications  
  trimethoprim-sulfamethoxazole  mg per tablet Discharge Instructions Dizziness: Care Instructions Your Care Instructions Dizziness is the feeling of unsteadiness or fuzziness in your head. It is different than having vertigo, which is a feeling that the room is spinning or that you are moving or falling. It is also different from lightheadedness, which is the feeling that you are about to faint. It can be hard to know what causes dizziness. Some people feel dizzy when they have migraine headaches. Sometimes bouts of flu can make you feel dizzy. Some medical conditions, such as heart problems or high blood pressure, can make you feel dizzy. Many medicines can cause dizziness, including medicines for high blood pressure, pain, or anxiety. If a medicine causes your symptoms, your doctor may recommend that you stop or change the medicine. If it is a problem with your heart, you may need medicine to help your heart work better. If there is no clear reason for your symptoms, your doctor may suggest watching and waiting for a while to see if the dizziness goes away on its own. Follow-up care is a key part of your treatment and safety.  Be sure to make and go to all appointments, and call your doctor if you are having problems. It's also a good idea to know your test results and keep a list of the medicines you take. How can you care for yourself at home? · If your doctor recommends or prescribes medicine, take it exactly as directed. Call your doctor if you think you are having a problem with your medicine. · Do not drive while you feel dizzy. · Try to prevent falls. Steps you can take include: ¨ Using nonskid mats, adding grab bars near the tub, and using night-lights. ¨ Clearing your home so that walkways are free of anything you might trip on. ¨ Letting family and friends know that you have been feeling dizzy. This will help them know how to help you. When should you call for help? Call 911 anytime you think you may need emergency care. For example, call if: 
· You passed out (lost consciousness). · You have dizziness along with symptoms of a heart attack. These may include: ¨ Chest pain or pressure, or a strange feeling in the chest. 
¨ Sweating. ¨ Shortness of breath. ¨ Nausea or vomiting. ¨ Pain, pressure, or a strange feeling in the back, neck, jaw, or upper belly or in one or both shoulders or arms. ¨ Lightheadedness or sudden weakness. ¨ A fast or irregular heartbeat. · You have symptoms of a stroke. These may include: 
¨ Sudden numbness, tingling, weakness, or loss of movement in your face, arm, or leg, especially on only one side of your body. ¨ Sudden vision changes. ¨ Sudden trouble speaking. ¨ Sudden confusion or trouble understanding simple statements. ¨ Sudden problems with walking or balance. ¨ A sudden, severe headache that is different from past headaches. Call your doctor now or seek immediate medical care if: 
· You feel dizzy and have a fever, headache, or ringing in your ears. · You have new or increased nausea and vomiting. · Your dizziness does not go away or comes back. Watch closely for changes in your health, and be sure to contact your doctor if: 
· You do not get better as expected. Where can you learn more? Go to http://shantell-leona.info/. Enter K571 in the search box to learn more about \"Dizziness: Care Instructions. \" Current as of: May 27, 2016 Content Version: 11.2 © 1657-5878 [employer]. Care instructions adapted under license by [employer] (which disclaims liability or warranty for this information). If you have questions about a medical condition or this instruction, always ask your healthcare professional. [place] [address] any warranty or liability for your use of this information. Patient armband removed and shredded Litehouse[employer] Activation Thank you for requesting access to [employer]. Please follow the instructions below to securely access and download your online medical record. [employer] allows you to send messages to your doctor, view your test results, renew your prescriptions, schedule appointments, and more. How Do I Sign Up? 1. In your internet browser, go to [email or web address] 
2. Click on the First Time User? Click Here link in the Sign In box. You will be redirect to the New Member Sign Up page. 3. Enter your [employer] Access Code exactly as it appears below. You will not need to use this code after youve completed the sign-up process. If you do not sign up before the expiration date, you must request a new code. [employer] Access Code: Activation code not generated Current [facility] Status: Patient Declined (This is the date your [employer] access code will ) 4. Enter the last four digits of your Social Security Number (xxxx) and Date of Birth (mm/dd/yyyy) as indicated and click Submit. You will be taken to the next sign-up page. 5. Create a [employer] ID. This will be your [employer] login ID and cannot be changed, so think of one that is secure and easy to remember. 6. Create a Comenta.TV (Wayin) password. You can change your password at any time. 7. Enter your Password Reset Question and Answer. This can be used at a later time if you forget your password. 8. Enter your e-mail address. You will receive e-mail notification when new information is available in [address] 9. Click Sign Up. You can now view and download portions of your medical record. 10. Click the Download Summary menu link to download a portable copy of your medical information. Additional Information If you have questions, please visit the Frequently Asked Questions section of the [employer]Way[employer] website at [email or web address] Remember, [employer]Way[employer]) is NOT to be used for urgent needs. For medical emergencies, dial 911. DISCHARGE SUMMARY from Nurse The following personal items are in your possession at time of discharge: 
 
Dental Appliances: None Home Medications: None Jewelry: None Clothing: Pants, Undergarments, With patient PATIENT INSTRUCTIONS: 
 
 
F-face looks uneven A-arms unable to move or move unevenly S-speech slurred or non-existent T-time-call 911 as soon as signs and symptoms begin-DO NOT go Back to bed or wait to see if you get better-TIME IS BRAIN. Warning Signs of HEART ATTACK Call 911 if you have these symptoms: 
? Chest discomfort. Most heart attacks involve discomfort in the center of the chest that lasts more than a few minutes, or that goes away and comes back. It can feel like uncomfortable pressure, squeezing, fullness, or pain. ? Discomfort in other areas of the upper body.  Symptoms can include pain or discomfort in one or both arms, the back, neck, jaw, or stomach. ? Shortness of breath with or without chest discomfort. ? Other signs may include breaking out in a cold sweat, nausea, or lightheadedness. Don't wait more than five minutes to call [address]! Fast action can save your life. Calling 911 is almost always the fastest way to get lifesaving treatment. Emergency Medical Services staff can begin treatment when they arrive  up to an hour sooner than if someone gets to the hospital by car. The discharge information has been reviewed with the patient and spouse. The patient and spouse verbalized understanding. Discharge medications reviewed with the patient and spouse and appropriate educational materials and side effects teaching were provided. Discharge Instructions Attachments/References SULFAMETHOXAZOLE/TRIMETHOPRIM (BY MOUTH) (ENGLISH) SOB (SHORTNESS OF BREATH) (ENGLISH) DIABETES: TYPE 2: GENERAL INFO (ENGLISH) Discharge Orders None ACO Transitions of Care Introducing [facility] offers a voluntary care coordination program to provide high quality service and care to [facility] fee-for-service beneficiaries. Janna Serrato was designed to help you enhance your health and well-being through the following services: ? Transitions of Care  support for individuals who are transitioning from one care setting to another (example: Hospital to home). ? Chronic and Complex Care Coordination  support for individuals and caregivers of those with serious or chronic illnesses or with more than one chronic (ongoing) condition and those who take a number of different medications. If you meet specific medical criteria, a [facility] may call you directly to coordinate your care with your primary care physician and your other care providers. For questions about the [facility] programs, please, contact your physicians office. For general questions or additional information about Accountable Care Organizations: 
Please visit www.medicare.gov/acos. html or call 1-800-MEDICARE (5-381.288.3848) Y users should call 7-905.661.8776. Cherry Announcement We are excited to announce that we are making your provider's discharge notes available to you in [employer]. You will see these notes when they are completed and signed by the physician that discharged you from your recent hospital stay. If you have any questions or concerns about any information you see in [employer], please call the Health Information Department where you were seen or reach out to your Primary Care Provider for more information about your plan of care. General Information Please provide this summary of care documentation to your next provider. Patient Signature:  ____________________________________________________________ Date:  ____________________________________________________________  
  
Sofiya Vasquez Provider Signature:  ____________________________________________________________ Date:  ____________________________________________________________ More Information Sulfamethoxazole/Trimethoprim (By mouth) Sulfamethoxazole (sul-fa-meth-OX-a-zole), Trimethoprim (trye-METH-oh-prim) Treats or prevents infections. Brand Name(s): Bactrim, Bactrim DS, SMZ-TMP Pediatric, Septra, Sulfatrim, Sulfatrim Pediatric There may be other brand names for this medicine. When This Medicine Should Not Be Used: This medicine is not right for everyone. Do not use it if you had an allergic reaction to trimethoprim, sulfamethoxazole, or any sulfa drug. Do not use this medicine if you are pregnant, if you have anemia caused by low levels of folic acid, or if you have a history of drug-induced thrombocytopenia. How to Use This Medicine:  
Liquid, Tablet · Your doctor will tell you how much medicine to use. Do not use more than directed. · Measure the oral liquid medicine with a marked measuring spoon, oral syringe, or medicine cup. · Drink extra fluids so you will urinate more often and help prevent kidney problems. · Take all of the medicine in your prescription to clear up your infection, even if you feel better after the first few doses. · Missed dose: Take a dose as soon as you remember. If it is almost time for your next dose, wait until then and take a regular dose. Do not take extra medicine to make up for a missed dose. · Store the medicine in a closed container at room temperature, away from heat, moisture, and direct light. Do not freeze the oral liquid. Drugs and Foods to Avoid: Ask your doctor or pharmacist before using any other medicine, including over-the-counter medicines, vitamins, and herbal products. · Some medicines can affect how this medicine works. Tell your doctor if you also use the following:  
¨ amantadine, cyclosporine, digoxin, indomethacin, memantine, methotrexate, phenytoin, pyrimethamine, or warfarin ¨ an ACE inhibitor, diabetes medicine (glipizide, glyburide, metformin, pioglitazone, repaglinide, rosiglitazone), a diuretic (water pill, such as hydrochlorothiazide), or a tricyclic antidepressant Warnings While Using This Medicine: · It is not safe to take this medicine during pregnancy. It could harm an unborn baby. Tell your doctor right away if you become pregnant. · Tell your doctor if you are breastfeeding, or if you have kidney disease, liver disease, diabetes, malabsorption or malnutrition, folate deficiency, porphyria, thyroid problems, or a history of alcoholism. Tell your doctor if you have asthma or severe allergies, especially if you are allergic to any medicines.  It is important for your doctor to know if you have HIV or AIDS, because this medicine might work differently for you. · This medicine may cause a severe allergic reaction. · This medicine may lower the number of platelets in your body, which are necessary for proper blood clotting. This may cause you to bleed or get infections more easily. Talk with your doctor if you have concerns about this. · This medicine can cause diarrhea. Call your doctor if the diarrhea becomes severe, does not stop, or is bloody. Do not take any medicine to stop diarrhea until you have talked to your doctor. Diarrhea can occur 2 months or more after you stop taking this medicine. · Tell any doctor or dentist who treats you that you are using this medicine. This medicine may affect certain medical test results. · Your doctor will do lab tests at regular visits to check on the effects of this medicine. Keep all appointments. · Keep all medicine out of the reach of children. Never share your medicine with anyone. Possible Side Effects While Using This Medicine:  
Call your doctor right away if you notice any of these side effects: · Allergic reaction: Itching or hives, swelling in your face or hands, swelling or tingling in your mouth or throat, chest tightness, trouble breathing · Blistering, peeling, or red skin rash · Dark urine or pale stools, nausea, vomiting, loss of appetite, stomach pain, yellow skin or eyes · Chest pain, cough, or trouble breathing · Confusion, weakness · Muscle twitching · Severe diarrhea, stomach pain, cramps, bloating · Skin rash, purple spots on your skin, or very pale or yellow skin · Sore throat, fever, muscle pain · Uneven heartbeat, numbness or tingling in your hands, feet, or lips · Unusual bleeding, bruising, or weakness If you notice these less serious side effects, talk with your doctor: · Mild nausea, vomiting, or loss of appetite If you notice other side effects that you think are caused by this medicine, tell your doctor. Call your doctor for medical advice about side effects. You may report side effects to FDA at 8-798-BKZ-9784 © 2016 [address] is for End User's use only and may not be sold, redistributed or otherwise used for commercial purposes. The above information is an  only. It is not intended as medical advice for individual conditions or treatments. Talk to your doctor, nurse or pharmacist before following any medical regimen to see if it is safe and effective for you. Shortness of Breath: Care Instructions Your Care Instructions Shortness of breath has many causes. Sometimes conditions such as anxiety can lead to shortness of breath. Some people get mild shortness of breath when they exercise. Trouble breathing also can be a symptom of a serious problem, such as asthma, lung disease, emphysema, heart problems, and pneumonia. If your shortness of breath continues, you may need tests and treatment. Watch for any changes in your breathing and other symptoms. Follow-up care is a key part of your treatment and safety. Be sure to make and go to all appointments, and call your doctor if you are having problems. Its also a good idea to know your test results and keep a list of the medicines you take. How can you care for yourself at home? · Do not smoke or allow others to smoke around you. If you need help quitting, talk to your doctor about stop-smoking programs and medicines. These can increase your chances of quitting for good. · Get plenty of rest and sleep. · Take your medicines exactly as prescribed. Call your doctor if you think you are having a problem with your medicine. · Find healthy ways to deal with stress. ¨ Exercise daily. ¨ Get plenty of sleep. ¨ Eat regularly and well. When should you call for help? Call 911 anytime you think you may need emergency care. For example, call if: 
· You have severe shortness of breath. · You have symptoms of a heart attack. These may include: ¨ Chest pain or pressure, or a strange feeling in the chest. 
¨ Sweating. ¨ Shortness of breath. ¨ Nausea or vomiting. ¨ Pain, pressure, or a strange feeling in the back, neck, jaw, or upper belly or in one or both shoulders or arms. ¨ Lightheadedness or sudden weakness. ¨ A fast or irregular heartbeat. After you call 911, the  may tell you to chew 1 adult-strength or 2 to 4 low-dose aspirin. Wait for an ambulance. Do not try to drive yourself. Call your doctor now or seek immediate medical care if: 
· Your shortness of breath gets worse or you start to wheeze. Wheezing is a high-pitched sound when you breathe. · You wake up at night out of breath or have to prop your head up on several pillows to breathe. · You are short of breath after only light activity or while at rest. 
Watch closely for changes in your health, and be sure to contact your doctor if: 
· You do not get better over the next 1 to 2 days. Where can you learn more? Go to http://shantell-leona.info/. Enter S780 in the search box to learn more about \"Shortness of Breath: Care Instructions. \" Current as of: May 23, 2016 Content Version: 11.2 © 0300-6640 [employer]. Care instructions adapted under license by [employer] (which disclaims liability or warranty for this information). If you have questions about a medical condition or this instruction, always ask your healthcare professional. [place] [address] any warranty or liability for your use of this information. Learning About Type 2 Diabetes What is type 2 diabetes? Insulin is a hormone that helps your body use sugar from your food as energy. Type 2 diabetes happens when your body can't use insulin the right way. Over time, the pancreas can't make enough insulin. If you don't have enough insulin, too much sugar stays in your blood. If you are overweight, get little or no exercise, or have type 2 diabetes in your family, you are more likely to have problems with the way insulin works in your body.  Americans, Hispanics, Native Americans,  Americans, and Pacific Islanders have a higher risk for type 2 diabetes. Type 2 diabetes can be prevented or delayed with a healthy lifestyle, which includes staying at a healthy weight, making smart food choices, and getting regular exercise. What can you expect with type 2 diabetes? Montserrat Nieves keep hearing about how important it is to keep your blood sugar within a target range. That's because over time, high blood sugar can lead to serious problems. It can: 
· Harm your eyes, nerves, and kidneys. · Damage your blood vessels, leading to heart disease and stroke. · Reduce blood flow and cause nerve damage to parts of your body, especially your feet. This can cause slow healing and pain when you walk. · Make your immune system weak and less able to fight infections. When people hear the word \"diabetes,\" they often think of problems like these. But daily care and treatment can help prevent or delay these problems. The goal is to keep your blood sugar in a target range. That's the best way to reduce your chance of having more problems from diabetes. What are the symptoms? Some people who have type 2 diabetes may not have any symptoms early on. Many people with the disease don't even know they have it at first. But with time, diabetes starts to cause symptoms. You experience most symptoms of type 2 diabetes when your blood sugar is either too high or too low. The most common symptoms of high blood sugar include: · Thirst. 
· Frequent urination. · Weight loss. · Blurry vision. The symptoms of low blood sugar include: · Sweating. · Shakiness. · Weakness. · Hunger. · Confusion. How can you prevent type 2 diabetes?  
The best way to prevent or delay type 2 diabetes is to adopt healthy habits, which include: 
· Staying at a healthy weight. · Exercising regularly. · Eating healthy foods. How is type 2 diabetes treated? If you have type 2 diabetes, here are the most important things you can do. · Take your diabetes medicines. · Check your blood sugar as often as your doctor recommends. Also, get a hemoglobin A1c test at least every 6 months. · Try to eat a variety of foods and to spread carbohydrate throughout the day. Carbohydrate raises blood sugar higher and more quickly than any other nutrient does. Carbohydrate is found in sugar, breads and cereals, fruit, starchy vegetables such as potatoes and corn, and milk and yogurt. · Get at least 30 minutes of exercise on most days of the week. Walking is a good choice. You also may want to do other activities, such as running, swimming, cycling, or playing tennis or team sports. If your doctor says it's okay, do muscle-strengthening exercises at least 2 times a week. · See your doctor for checkups and tests on a regular schedule. · If you have high blood pressure or high cholesterol, take the medicines as prescribed by your doctor. · Do not smoke. Smoking can make health problems worse. This includes problems you might have with type 2 diabetes. If you need help quitting, talk to your doctor about stop-smoking programs and medicines. These can increase your chances of quitting for good. Follow-up care is a key part of your treatment and safety. Be sure to make and go to all appointments, and call your doctor if you are having problems. It's also a good idea to know your test results and keep a list of the medicines you take. Where can you learn more? Go to http://shantell-leona.info/. Enter T059 in the search box to learn more about \"Learning About Type 2 Diabetes. \" Current as of: May 23, 2016 Content Version: 11.2 © 4353-9496 [employer].  Care instructions adapted under license by [address] (which disclaims liability or warranty for this information). If you have questions about a medical condition or this instruction, always ask your healthcare professional. [address] any warranty or liability for your use of this information.

## 2017-04-22 VITALS
HEART RATE: 66 BPM | DIASTOLIC BLOOD PRESSURE: 66 MMHG | RESPIRATION RATE: 16 BRPM | OXYGEN SATURATION: 93 % | TEMPERATURE: 97.8 F | WEIGHT: 125.4 LBS | BODY MASS INDEX: 20.87 KG/M2 | SYSTOLIC BLOOD PRESSURE: 148 MMHG

## 2017-04-22 LAB
ANION GAP BLD CALC-SCNC: 4 MMOL/L (ref 3–18)
ATRIAL RATE: 67 BPM
BUN SERPL-MCNC: 10 MG/DL (ref 7–18)
BUN/CREAT SERPL: 26 (ref 12–20)
CALCIUM SERPL-MCNC: 8.1 MG/DL (ref 8.5–10.1)
CALCULATED P AXIS, ECG09: 79 DEGREES
CALCULATED R AXIS, ECG10: -75 DEGREES
CALCULATED T AXIS, ECG11: -58 DEGREES
CHLORIDE SERPL-SCNC: 104 MMOL/L (ref 100–108)
CO2 SERPL-SCNC: 30 MMOL/L (ref 21–32)
CREAT SERPL-MCNC: 0.38 MG/DL (ref 0.6–1.3)
DIAGNOSIS, 93000: NORMAL
EST. AVERAGE GLUCOSE BLD GHB EST-MCNC: 131 MG/DL
GLUCOSE SERPL-MCNC: 109 MG/DL (ref 74–99)
HBA1C MFR BLD: 6.2 % (ref 4.2–5.6)
P-R INTERVAL, ECG05: 136 MS
POTASSIUM SERPL-SCNC: 4.3 MMOL/L (ref 3.5–5.5)
Q-T INTERVAL, ECG07: 492 MS
QRS DURATION, ECG06: 110 MS
QTC CALCULATION (BEZET), ECG08: 519 MS
SODIUM SERPL-SCNC: 138 MMOL/L (ref 136–145)
VENTRICULAR RATE, ECG03: 67 BPM

## 2017-04-22 PROCEDURE — 93308 TTE F-UP OR LMTD: CPT

## 2017-04-22 PROCEDURE — 84484 ASSAY OF TROPONIN QUANT: CPT | Performed by: HOSPITALIST

## 2017-04-22 PROCEDURE — 74011250636 HC RX REV CODE- 250/636: Performed by: HOSPITALIST

## 2017-04-22 PROCEDURE — 36415 COLL VENOUS BLD VENIPUNCTURE: CPT | Performed by: HOSPITALIST

## 2017-04-22 PROCEDURE — 99218 HC RM OBSERVATION: CPT

## 2017-04-22 PROCEDURE — 74011250637 HC RX REV CODE- 250/637: Performed by: HOSPITALIST

## 2017-04-22 PROCEDURE — 77010033678 HC OXYGEN DAILY

## 2017-04-22 PROCEDURE — 83036 HEMOGLOBIN GLYCOSYLATED A1C: CPT | Performed by: HOSPITALIST

## 2017-04-22 PROCEDURE — 80048 BASIC METABOLIC PNL TOTAL CA: CPT | Performed by: PHYSICIAN ASSISTANT

## 2017-04-22 RX ORDER — SULFAMETHOXAZOLE AND TRIMETHOPRIM 400; 80 MG/1; MG/1
1 TABLET ORAL EVERY 12 HOURS
Status: DISCONTINUED | OUTPATIENT
Start: 2017-04-22 | End: 2017-04-22 | Stop reason: HOSPADM

## 2017-04-22 RX ORDER — SULFAMETHOXAZOLE AND TRIMETHOPRIM 400; 80 MG/1; MG/1
1 TABLET ORAL EVERY 12 HOURS
Qty: 6 TAB | Refills: 0 | Status: SHIPPED | OUTPATIENT
Start: 2017-04-22 | End: 2017-05-08 | Stop reason: ALTCHOICE

## 2017-04-22 RX ORDER — SULFAMETHOXAZOLE AND TRIMETHOPRIM 400; 80 MG/1; MG/1
1 TABLET ORAL EVERY 12 HOURS
Qty: 6 TAB | Refills: 0 | Status: SHIPPED | OUTPATIENT
Start: 2017-04-22 | End: 2017-04-22

## 2017-04-22 RX ORDER — BUDESONIDE AND FORMOTEROL FUMARATE DIHYDRATE 160; 4.5 UG/1; UG/1
1 AEROSOL RESPIRATORY (INHALATION)
Status: DISCONTINUED | OUTPATIENT
Start: 2017-04-22 | End: 2017-04-22 | Stop reason: HOSPADM

## 2017-04-22 RX ORDER — SULFAMETHOXAZOLE AND TRIMETHOPRIM 400; 80 MG/1; MG/1
1 TABLET ORAL EVERY 12 HOURS
Qty: 3 TAB | Refills: 0 | Status: SHIPPED | OUTPATIENT
Start: 2017-04-22 | End: 2017-04-22

## 2017-04-22 RX ADMIN — Medication 1 CAPSULE: at 09:19

## 2017-04-22 RX ADMIN — POTASSIUM CHLORIDE 40 MEQ: 1500 TABLET, EXTENDED RELEASE ORAL at 01:06

## 2017-04-22 RX ADMIN — TAMSULOSIN HYDROCHLORIDE 0.4 MG: 0.4 CAPSULE ORAL at 09:19

## 2017-04-22 RX ADMIN — POTASSIUM CHLORIDE 40 MEQ: 1500 TABLET, EXTENDED RELEASE ORAL at 06:38

## 2017-04-22 RX ADMIN — SODIUM CHLORIDE 100 ML/HR: 900 INJECTION, SOLUTION INTRAVENOUS at 06:48

## 2017-04-22 RX ADMIN — ACETAMINOPHEN 500 MG: 500 TABLET ORAL at 12:24

## 2017-04-22 RX ADMIN — GABAPENTIN 300 MG: 300 CAPSULE ORAL at 09:19

## 2017-04-22 RX ADMIN — METOPROLOL TARTRATE 12.5 MG: 25 TABLET ORAL at 09:20

## 2017-04-22 RX ADMIN — ROPINIROLE HYDROCHLORIDE 1 MG: 1 TABLET, FILM COATED ORAL at 09:19

## 2017-04-22 RX ADMIN — GABAPENTIN 300 MG: 300 CAPSULE ORAL at 15:50

## 2017-04-22 NOTE — PROGRESS NOTES
Patient has no open areas to body. She has L arm hematoma to her R arm due to old IV stick.  Dual skin assessment completed with Elisa Mark.

## 2017-04-22 NOTE — ROUTINE PROCESS
Assumed care for Madison LPN in order to discharge patient -- orders already in computer    Discharge orders given and discussed with patient and spouse-- they are stating they already have an appointment with primary care physician on MAY 3, 2017 -- for year check up    Patient was sleeping on and off but  participated in teach back and understood the medications -- he already knew about appointment at Dr. Sukhwinder Last    Patient iv site removed, tele box returned and patient left via wheel chair with transport team-- no problems noted.

## 2017-04-22 NOTE — DISCHARGE INSTRUCTIONS
Dizziness: Care Instructions  Your Care Instructions  Dizziness is the feeling of unsteadiness or fuzziness in your head. It is different than having vertigo, which is a feeling that the room is spinning or that you are moving or falling. It is also different from lightheadedness, which is the feeling that you are about to faint. It can be hard to know what causes dizziness. Some people feel dizzy when they have migraine headaches. Sometimes bouts of flu can make you feel dizzy. Some medical conditions, such as heart problems or high blood pressure, can make you feel dizzy. Many medicines can cause dizziness, including medicines for high blood pressure, pain, or anxiety. If a medicine causes your symptoms, your doctor may recommend that you stop or change the medicine. If it is a problem with your heart, you may need medicine to help your heart work better. If there is no clear reason for your symptoms, your doctor may suggest watching and waiting for a while to see if the dizziness goes away on its own. Follow-up care is a key part of your treatment and safety. Be sure to make and go to all appointments, and call your doctor if you are having problems. It's also a good idea to know your test results and keep a list of the medicines you take. How can you care for yourself at home? · If your doctor recommends or prescribes medicine, take it exactly as directed. Call your doctor if you think you are having a problem with your medicine. · Do not drive while you feel dizzy. · Try to prevent falls. Steps you can take include:  ¨ Using nonskid mats, adding grab bars near the tub, and using night-lights. ¨ Clearing your home so that walkways are free of anything you might trip on. ¨ Letting family and friends know that you have been feeling dizzy. This will help them know how to help you. When should you call for help? Call 911 anytime you think you may need emergency care.  For example, call if:  · You passed out (lost consciousness). · You have dizziness along with symptoms of a heart attack. These may include:  ¨ Chest pain or pressure, or a strange feeling in the chest.  ¨ Sweating. ¨ Shortness of breath. ¨ Nausea or vomiting. ¨ Pain, pressure, or a strange feeling in the back, neck, jaw, or upper belly or in one or both shoulders or arms. ¨ Lightheadedness or sudden weakness. ¨ A fast or irregular heartbeat. · You have symptoms of a stroke. These may include:  ¨ Sudden numbness, tingling, weakness, or loss of movement in your face, arm, or leg, especially on only one side of your body. ¨ Sudden vision changes. ¨ Sudden trouble speaking. ¨ Sudden confusion or trouble understanding simple statements. ¨ Sudden problems with walking or balance. ¨ A sudden, severe headache that is different from past headaches. Call your doctor now or seek immediate medical care if:  · You feel dizzy and have a fever, headache, or ringing in your ears. · You have new or increased nausea and vomiting. · Your dizziness does not go away or comes back. Watch closely for changes in your health, and be sure to contact your doctor if:  · You do not get better as expected. Where can you learn more? Go to http://shantell-leona.info/. Enter W368 in the search box to learn more about \"Dizziness: Care Instructions. \"  Current as of: May 27, 2016  Content Version: 11.2  © 1996-3116 [employer]. Care instructions adapted under license by [employer] (which disclaims liability or warranty for this information). If you have questions about a medical condition or this instruction, always ask your healthcare professional. [place] [address] any warranty or liability for your use of this information. Patient armband removed and shredded  MyChart Activation    Thank you for requesting access to [facility].  Please follow the instructions below to securely access and download your online medical record. My e[facility] allows you to send messages to your doctor, view your test results, renew your prescriptions, schedule appointments, and more. How Do I Sign Up? 1. In your internet browser, go to [email or web address]  2. Click on the First Time User? Click Here link in the Sign In box. You will be redirect to the New Member Sign Up page. 3. Enter your My eShoe Access Code exactly as it appears below. You will not need to use this code after youve completed the sign-up process. If you do not sign up before the expiration date, you must request a new code. My eShoe Access Code: Activation code not generated  Current My eShoe Status: Patient Declined (This is the date your My eShoe access code will )    4. Enter the last four digits of your Social Security Number (xxxx) and Date of Birth (mm/dd/yyyy) as indicated and click Submit. You will be taken to the next sign-up page. 5. Create a My eShoe ID. This will be your My eShoe login ID and cannot be changed, so think of one that is secure and easy to remember. 6. Create a My eShoe password. You can change your password at any time. 7. Enter your Password Reset Question and Answer. This can be used at a later time if you forget your password. 8. Enter your e-mail address. You will receive e-mail notification when new information is available in [address] 9. Click Sign Up. You can now view and download portions of your medical record. 10. Click the Download Summary menu link to download a portable copy of your medical information. Additional Information    If you have questions, please visit the Frequently Asked Questions section of the My eShoe website at [email or web address] Remember, My eShoe is NOT to be used for urgent needs. For medical emergencies, dial 911.         DISCHARGE SUMMARY from Nurse    The following personal items are in your possession at time of discharge:    Dental Appliances: None        Home Medications: None  Jewelry: None  Clothing: Pants, Undergarments, With patient                PATIENT INSTRUCTIONS:    After general anesthesia or intravenous sedation, for 24 hours or while taking prescription Narcotics:  · Limit your activities  · Do not drive and operate hazardous machinery  · Do not make important personal or business decisions  · Do  not drink alcoholic beverages  · If you have not urinated within 8 hours after discharge, please contact your surgeon on call. Report the following to your surgeon:  · Excessive pain, swelling, redness or odor of or around the surgical area  · Temperature over 100.5  · Nausea and vomiting lasting longer than 4 hours or if unable to take medications  · Any signs of decreased circulation or nerve impairment to extremity: change in color, persistent  numbness, tingling, coldness or increase pain  · Any questions        What to do at Home:  Recommended activity: Activity as tolerated,     If you experience any of the following symptoms shortness of breath, chest pain, or any concerning symptoms, please follow up with primary care doctor or emergency room. *  Please give a list of your current medications to your Primary Care Provider. *  Please update this list whenever your medications are discontinued, doses are      changed, or new medications (including over-the-counter products) are added. *  Please carry medication information at all times in case of emergency situations. These are general instructions for a healthy lifestyle:    No smoking/ No tobacco products/ Avoid exposure to second hand smoke    Surgeon General's Warning:  Quitting smoking now greatly reduces serious risk to your health.     Obesity, smoking, and sedentary lifestyle greatly increases your risk for illness    A healthy diet, regular physical exercise & weight monitoring are important for maintaining a healthy lifestyle    You may be retaining fluid if you have a history of heart failure or if you experience any of the following symptoms:  Weight gain of 3 pounds or more overnight or 5 pounds in a week, increased swelling in our hands or feet or shortness of breath while lying flat in bed. Please call your doctor as soon as you notice any of these symptoms; do not wait until your next office visit. Recognize signs and symptoms of STROKE:    F-face looks uneven    A-arms unable to move or move unevenly    S-speech slurred or non-existent    T-time-call 911 as soon as signs and symptoms begin-DO NOT go       Back to bed or wait to see if you get better-TIME IS BRAIN. Warning Signs of HEART ATTACK     Call 911 if you have these symptoms:   Chest discomfort. Most heart attacks involve discomfort in the center of the chest that lasts more than a few minutes, or that goes away and comes back. It can feel like uncomfortable pressure, squeezing, fullness, or pain.  Discomfort in other areas of the upper body. Symptoms can include pain or discomfort in one or both arms, the back, neck, jaw, or stomach.  Shortness of breath with or without chest discomfort.  Other signs may include breaking out in a cold sweat, nausea, or lightheadedness. Don't wait more than five minutes to call 911 - MINUTES MATTER! Fast action can save your life. Calling 911 is almost always the fastest way to get lifesaving treatment. Emergency Medical Services staff can begin treatment when they arrive -- up to an hour sooner than if someone gets to the hospital by car. The discharge information has been reviewed with the patient and spouse. The patient and spouse verbalized understanding. Discharge medications reviewed with the patient and spouse and appropriate educational materials and side effects teaching were provided.

## 2017-04-22 NOTE — H&P
[address] [place] [address] PS    Name:  Nayan Sharp  MR#:  [id number]  :  1933  Account #:  [de-identified]  Date of Adm:  2017      CHIEF COMPLAINT: Fall at home, preceded by dizziness. PRIMARY CARE PHYSICIAN: Enedelia Meadows MD    HISTORY OF PRESENT ILLNESS: This is an 77-year-old white  female with multiple medical issues, she was recently treated for  healthcare-associated pneumonia per Dr. Kimo Gonzalez. She states  she is doing better in this regard. She also has a history of diabetes. She had preserved EF on a recent echo. She apparently has chronic  pain and she is under the care of a pain management doctor, but she  states she actually has no back pain and that she is being prescribed  Dilaudid. She has a family history of colon cancer. She states that she  is up-to-date with her colonoscopy. She has chronic constipation in the  setting of chronic narcotic use, COPD and dyslipidemia. She has been  in her usual state of health of late, and this morning she stood up from  her chair and she felt dizzy. She went to sit back down and she missed  the chair, she fell onto a tile floor, she hit her head and her back. She  denies any chest pain prior to this event or since then. No chest pain  over the past several days. She states the room was not spinning. She  denies any spots in front of her eyes. She did not lose consciousness. She denies any recent cough. She states she occasionally will get  dyspnea on exertion, but this is at her baseline. She does continue to  smoke. She denies any nausea or vomiting. She states that she is  constipated. She had a bowel movement yesterday she states and it  was her first one in weeks. She denies any blood in her stool. She  denies dysuria. No frequency. She states that she is actually having  difficulty passing her urine. She is on Flomax per Dr. Era Denson for this. She does have urgency of late. She denies any hematuria. No  hematochezia. No focal weakness. No rash, no blurred vision, no  double vision. She presented to [facility] ED, where she had a head  CT and a CT of her cervical spine which were negative for any acute  process. She was noted to have a troponin of 0.09. She was evaluated  by Cardiology who felt that she had some nonspecific EKG changes  and a previous right bundle branch block. It was felt that she has low  suspicion for ACS. In the ED, she is noted to have trace leukocyte  esterase and 4-10 WBC in her urine. A urine culture has been sent. She has been started on ceftriaxone in the ED. She also received  aspirin 325 mg, as well as 1.5 liters of normal saline preceded by a  bolus of 250 mL. PAST MEDICAL HISTORY  1. Recent healthcare-associated pneumonia status post treatment per  Dr. Ondina Arce. 2. Chronic pain, on chronic narcotics and benzodiazepines. 3. History of urinary tract infection. She has recurrent urinary tract  infections with cystoscopy showing chronic cystitis cystica, the  cystoscopy was done in February 2014. 4. Diabetes type 2.  5. Echocardiogram on 02/07/2017: EF 55% to 60%; no obvious wall  motion abnormalities; wall thickness was mildly increased. 6. Ongoing tobacco abuse. 7. Family history of colon cancer. Her last colonoscopy was in 2011.  8. Glaucoma. 9. Chronic constipation. 10. Hypertension. 11. Dyslipidemia. 12. Chronic obstructive pulmonary disease. ALLERGIES: SHE IS ALLERGIC TO LEVAQUIN, WHICH CAUSED A  RASH. HOME MEDICATIONS  Please note that I updated list in the computer based on the list that  the  provided:  1. Albuterol 2 puffs inhaled every 4 hours p.r.n.  2. DuoNeb 3 mL inhaled every 4 hours p.r.n.  3. Norvasc 10 mg p.o. daily. Apparently, her PCP told her to take this  every other day. 4. Vitamin C 1000 mg p.o. daily. 5. Symbicort 160/4.5 mcg per actuation 1 puff inhaled twice daily.   6. Restasis 0.05% ophthalmic emulsion 1 drop to both eyes twice daily.  7. Gabapentin 300 mg p.o. t.i.d.  8. Dilaudid 4 mg 1/2 tab up to 3 times daily p.r.n. severe pain. 9. Linzess 145 mcg capsule 1 cap p.o. daily before breakfast.  10. Ativan 1 mg tablet 1/2 tab p.o. every 8 hours p.r.n. 11. Lumigan 0.01% ophthalmic drops 1 drop to both eyes at bedtime. 12. Lopressor 25 mg p.o. twice daily. 13. Centrum multivitamin 1 tab p.o. daily. 14. Fish oil 1000 mg cap p.o. daily. 15. Chloraseptic 1.4% spray p.r.n. for sore throat. 16. Requip 4 mg p.o. daily. 17. Zocor 20 mg p.o. at bedtime. 18. Flomax 0.4 mg p.o. daily. 19. Spiriva 1.25 mcg per actuation inhaler 2 puffs inhaled daily. 20. trazodone 150 mg p.o. at bedtime. 21. Aristocort 0.5% topical cream thin layer to rash twice daily. PAST SURGICAL HISTORY  1. Appendectomy. 2. Cholecystectomy. 3. Partial hysterectomy for dysfunctional uterine bleeding. 4. Colonoscopy 2006, Dr. Yudi Connelly. 5. Colonoscopy with polypectomy in 2011, tubular adenoma. FAMILY HISTORY: Her brother had colon cancer. Her sister had colon  cancer. Her maternal aunt had colon cancer and she has a son with  seizures. SOCIAL HISTORY: She has cut down to 6 cigarettes daily, she used  to smoke a pack per day. Her  quit smoking quite some time  ago. She denies alcohol. She is a retired . She resides  with her  of 77 years, who is at the bedside. PHYSICAL EXAMINATION  VITAL SIGNS: Temperature 97.3, pulse 93. Blood pressure 164/77,  improved from 104/57. Respiratory rate 16, saturating 90% on room air. GENERAL: She is awake, alert and oriented x4. She does appear frail. Her  is at the bedside. HEENT: Normocephalic, atraumatic. Pupils equally round and reactive  to light. No scleral icterus, no conjunctival pallor. Oropharynx is clear. Mucous membranes slightly dry. Dentition is fair. NECK: Supple. Neck veins flat. No cervical lymphadenopathy. No  carotid bruit.   CARDIOVASCULAR: S1, S2, regular rate and rhythm. No murmur. LUNGS: Clear to auscultation bilaterally. No wheezing. No rales. ABDOMEN: Soft, nontender, nondistended, normoactive bowel  sounds. EXTREMITIES: No edema. Dorsalis pedis pulses 2+ bilaterally. NEUROLOGIC: Cranial nerves 2 through 12 grossly intact, moving all  4 extremities. SKIN: No rash. No lesions. GENITOURINARY: Limited external exam was performed, with her   present. No lesions, nor francia discharge were noted. She  does have some erythema to the labia majora. LABORATORY DATA: WBC 13.7, hemoglobin and hematocrit 14.8  and 43, platelets 771, neutrophils 82%, lymphocytes 12%. Urinalysis:  Appearance was cloudy, specific gravity 1.019, nitrite negative,  leukocyte esterase trace, WBC 4-10. PT 13.1, INR 1.0. Sodium 135,  potassium 2.9, chloride 95, CO2 of 33, BUN 18, creatinine 0.89,  glucose 198, calcium 9, magnesium 1.8, ALT 28, AST 21, alkaline  phosphatase 74. Urine culture is pending at the time of this dictation. IMAGING: CT head noncontrast: No evidence of intracranial  hemorrhage or any other definable acute intracranial process;  moderate cerebral cortical atrophy and mild central atrophy; at the  lateral aspect of the left frontal lobe anterior to the left Sylvian fissure  there is a focus of curvilinear hyperdensity most likely due to vascular  calcification or possibly dural calcification, unchanged as compared to  previous CT scan 04/17/2009. CT cervical spine, noncontrast: No evidence of acute fracture or  dislocation in the cervical spine; there are findings of multilevel  moderate to severe spondylosis, including degenerative disk disease  and facet osteoarthritis of the cervical spine; the findings are grossly  unchanged as compared with previous MR 08/01/2013. Chest x-ray: Mild hyperinflation.     EKG: Normal sinus rhythm, possible left atrial enlargement, rate 91,  left axis deviation, right bundle branch block, prolonged QT with a QTc  of 548 milliseconds. ASSESSMENT AND PLAN  1. Indeterminate troponin. They have been unable to draw a repeat. I  have asked the nurse upstairs to please reattempt this. Cardiology is  following her and has ordered a limited echo for tomorrow morning. 2. Fall at home, appears multifactorial. She is taking both  benzodiazepines and narcotics. She also appears a bit dehydrated and  to have a urinary tract infection. Fall precautions. Physical therapy and  occupational therapy. 3. Recent healthcare-associated pneumonia status post treatment. She appears to be recovering well from this. 4. Chronic pain, on multiple pain medications at home. Advised both  she and her  to cut back on these. The patient states she does  not have chronic back pain and reducing the dose of her  benzodiazepines was discussed as well. 5. Urinary tract infection. Please follow her culture. I have started  ceftriaxone. 6. Hypokalemia, was repleted and magnesium x1 dose given as well. 7. Diabetes type 2 with hyperglycemia. ADA diet, sliding scale insulin. Check hemoglobin A1c.  8. Leukocytosis, appears related to urinary tract infection. Please  follow her culture. 9. Metabolic alkalosis. 10. Fall at home. She did hit her head. She did not lose  consciousness. Head CT and CTA of her cervical spine are reassuring. 11. Preserved ejection fraction on recent echocardiogram.  12. Tobacco abuse. Smoking cessation education. 13. Unintentional weight loss. Will consult Nutrition. She is up-to-date  with her colonoscopy. 15. Family history of colon cancer. Again, she is up-to-date with her  colonoscopy. 15. Glaucoma. Continue her home medications. 16. Chronic constipation in the setting of narcotic use. Will start her on  a stool softener as well as give her a couple doses of lactulose,  Dulcolax p.r.n., enema p.r.n.  17. Hypertension. She was relatively hypotensive in the emergency  department.  We will hold her Norvasc and reduce the dose of her  metoprolol. 18. Dyslipidemia. Continue statin. 19. Chronic obstructive pulmonary disease. Continue home inhalers. 20. Deep venous thrombosis prophylaxis. 21. Disposition: She is a FULL CODE. Admit to telemetry. TIME SPENT: 70 minutes.         Sandrita Brink M.D.    Sujatha Urbina MP  D:  04/21/2017   19:57  T:  04/21/2017   21:13  Job #:  [id number]

## 2017-04-22 NOTE — PROGRESS NOTES
[facility] - Progress Note  Admit Date: 4/21/2017    Assessment:     Hospital Problems  Date Reviewed: 3/24/2017          Codes Class Noted POA    Hypokalemia ICD-10-CM: E87.6  ICD-9-CM: 276.8  4/21/2017 Unknown        Dizziness ICD-10-CM: J80  ICD-9-CM: 780.4  4/21/2017 Unknown        CHI (closed head injury) ICD-10-CM: S09. 90XA  ICD-9-CM: 959.01  4/21/2017 Unknown        Elevated troponin ICD-10-CM: R74.8  ICD-9-CM: 790.6  4/21/2017 Unknown        SOB (shortness of breath) ICD-10-CM: R06.02  ICD-9-CM: 786.05  9/9/2015 Unknown                -s/p fall with prodrome of dizziness after getting up, no LOC  -Indeterminate troponin, nonspecific EKG changes, previous RBBB. No chest pain, low suspicion for ACS  -Hypokalemia, Hypomagnesemia  -Leukocytosis with possible UTI  -Echo 2/7/17 with normal EF  -COPD on MDI  -Recent findings of lung masses, s/p bronch in Feb 2017 without malignancy. Suspected pneumonia  -Diabetes  -h/o HTN on meds  -Remote syncope  -HLD  -Protein malnutrition  -Long h/o tobacco use  -Chronic pain on narcotics, presumably for back issues      Plan:     - Limited echo this AM  - Potassium replaced yesterday, check BMP this AM  - Cardiac enzymes flat x2. Suspect multifactorial syncopal episode. Await echo results. Subjective:     Mild dizziness when standing, no syncope.  Had bowel incontinence this AM    Objective:      Patient Vitals for the past 8 hrs:   Temp Pulse Resp BP SpO2   04/22/17 0400 98.1 °F (36.7 °C) 64 18 125/69 92 %         Patient Vitals for the past 96 hrs:   Weight   04/22/17 0453 125 lb 6.4 oz (56.9 kg)   04/21/17 1304 120 lb (54.4 kg)                  No intake or output data in the 24 hours ending 04/22/17 0846    Physical Exam:  General:  alert, cooperative, no distress  Neck:  nontender, no JVD  Lungs:  clear to auscultation bilaterally  Heart:  regular rate and rhythm, S1, S2 normal, no murmur, click, rub or gallop  Abdomen:  abdomen is soft without significant tenderness, masses, organomegaly or guarding  Extremities:  extremities normal, atraumatic, no cyanosis or edema    Data Review:     Labs: Results:       Chemistry Recent Labs      04/21/17   1325   GLU  198*   NA  135*   K  2.9*   CL  95*   CO2  33*   BUN  18   CREA  0.89   CA  9.0   MG  1.8   AGAP  7   BUCR  20   AP  74   TP  7.2   ALB  3.9   GLOB  3.3   AGRAT  1.2      CBC w/Diff Recent Labs      04/21/17   1325   WBC  13.7*   RBC  4.75   HGB  14.8   HCT  43.0   PLT  195   GRANS  82*   LYMPH  12*   EOS  0      Cardiac Enzymes Lab Results   Component Value Date/Time    CPK 72 04/21/2017 07:35 PM    CPK 60 04/21/2017 01:25 PM    CKMB 3.5 04/21/2017 07:35 PM    CKMB 2.7 04/21/2017 01:25 PM    CKND1 4.9 (H) 04/21/2017 07:35 PM    CKND1 4.5 (H) 04/21/2017 01:25 PM    TROIQ 0.11 (H) 04/21/2017 07:35 PM    TROIQ 0.09 (H) 04/21/2017 01:25 PM      Coagulation Recent Labs      04/21/17   1325   PTP  13.1   INR  1.0       Lipid Panel Lab Results   Component Value Date/Time    Cholesterol, total 117 02/08/2017 02:30 AM    HDL Cholesterol 47 02/08/2017 02:30 AM    LDL, calculated 53.2 02/08/2017 02:30 AM    VLDL, calculated 16.8 02/08/2017 02:30 AM    Triglyceride 84 02/08/2017 02:30 AM    CHOL/HDL Ratio 2.5 02/08/2017 02:30 AM      BNP No results found for: BNP, BNPP, XBNPT   Liver Enzymes Recent Labs      04/21/17   1325   TP  7.2   ALB  3.9   AP  74   SGOT  21      Digoxin    Thyroid Studies Lab Results   Component Value Date/Time    T4, Total 7.6 04/04/2012 08:38 AM    TSH 0.42 02/07/2017 08:06 PM          Signed By: Concepción Andrade.  Zandra Beckwith     April 22, 2017

## 2017-04-22 NOTE — ROUTINE PROCESS
Bedside shift change report given to Viviane RN (oncoming nurse) by Dawson Phoenix RN (offgoing nurse). Report included the following information SBAR, Kardex and ED Summary. Skin assessment completed with oncoming nurse, skin intact.

## 2017-04-22 NOTE — PROGRESS NOTES
Completed 2D Echocardiogram. Report to follow. Patient to be transported back to room.     Sara Mitchell, RCS, RDCS

## 2017-04-22 NOTE — PROGRESS NOTES
NUTRITION    BPA/MST Referral    Nutrition Consult: General Nutrition Management & Supplements     RECOMMENDATIONS / PLAN:     - Add nutritional supplements: Glucerna Shake TID.   - Continue RD inpatient monitoring and evaluation. NUTRITION INTERVENTIONS & DIAGNOSIS:     [x] Meals/Snacks: modified diet  [x] Medical food supplementation: initiate   [x] Vitamin and mineral supplementation: Vital D, fish oil    Nutrition Diagnosis: Unintentional weight loss related to decreased po intake as evidenced by 17% weight loss over the last 6 months with report of decreased meal intake. Patient meets the AND/ASPEN criteria for Chronic Severe Protein Calorie Malnutrition as evidenced by:   Weight loss of >5% in 1 month, >7.5% in 3 months, >10% in 6 months, or >20% in 1 year  Nutritional intake of <75% of recommended intake for >/= 1 month    ASSESSMENT:     Subjective/Objective: Patient out of room for echo. Per family, pt continues to have weight loss with very poor intake x last week and overall decreased intake x last 6 months. Drinks nutritional supplement at home, 1-2 per day, discussed increasing to 3/day and discussed high calorie foods that will fit in diet. Family reports pt prefers \"sweets. \"    Average po intake adequate to meet patients estimated nutritional needs:   [] Yes     [x] No   [] Unable to determine at this time    Diet: DIET DIABETIC WITH OPTIONS Consistent Carb 1800kcal; Regular; No Conc. Sweets      Food Allergies: NKFA  Current Appetite:   [] Good     [] Fair     [x] Poor: per      [] Other:  Appetite/meal intake prior to admission:   [] Good     [] Fair     [x] Poor: x last couple of days, but decreased over the last couple months per      [] Other:  Feeding Limitations:  [] Swallowing difficulty    [] Chewing difficulty    [x] Other: SLP eval at last admission, on regular diet with thin liquids  Current Meal Intake: No data found.     BM:  4/21, no BM x 1 week prior  Skin Integrity: WDL  Edema: none  Pertinent Medications: Reviewed    Recent Labs      04/21/17   1325   NA  135*   K  2.9*   CL  95*   CO2  33*   GLU  198*   BUN  18   CREA  0.89   CA  9.0   MG  1.8   ALB  3.9   SGOT  21   ALT  28     No intake or output data in the 24 hours ending 04/22/17 1043    Anthropometrics:  Ht Readings from Last 1 Encounters:   03/24/17 5' 5\" (1.651 m)     Last 3 Recorded Weights in this Encounter    04/21/17 1304 04/22/17 0453   Weight: 54.4 kg (120 lb) 56.9 kg (125 lb 6.4 oz)     Body mass index is 20.87 kg/(m^2). Weight History: 144# (11/2016), 134# (1/2017); -24#, 17% x 6 months  Weight Metrics 4/22/2017 4/21/2017 3/24/2017 3/10/2017 2/27/2017 2/10/2017 1/13/2017   Weight 125 lb 6.4 oz - 133 lb 133 lb 130 lb 9.6 oz 120 lb 8 oz 134 lb   BMI - 20.87 kg/m2 22.13 kg/m2 22.13 kg/m2 21.73 kg/m2 20.05 kg/m2 22.3 kg/m2        Admitting Diagnosis: Dizziness  CHI (closed head injury)  SOB (shortness of breath)  Elevated troponin  Hypokalemia  SOB (shortness of breath)  PMHx: hyperlipidemia, Diabetes, COPD, HTN    Education Needs:        [x] None identified  [] Identified - Not appropriate at this time  []  Identified and addressed - refer to education log  Learning Limitations:   [x] None identified  [] Identified    Cultural, Hindu & ethnic food preferences:  [x] None identified    [] Identified and addressed     ESTIMATED NUTRITION NEEDS:     Calories: 1461-5208 kcal (MSJx1.2-1.3) based on  [x] Actual BW: 57 kg      [] IBW   CHO: 155-168 gm (50% kcal)   Protein: 45-57 gm (0.8-1 gm/kg) based on  [x] Actual BW      [] IBW   Fluid: 1 mL/kcal     MONITORING & EVALUATION:     Nutrition Goal(s):   1. Po intake of meals will meet >75% of patient estimated nutritional needs within the next 7 days.   Outcome:  [] Met/Ongoing    []  Not Met    [x] New/Initial Goal     Monitoring:   [x] Diet tolerance   [x] Meal intake   [x] Supplement intake   [] GI symptoms/ability to tolerate po diet   [] Respiratory status   [] Plan of care      Previous Recommendations (for follow-up assessments only):     []   Implemented       []   Not Implemented (RD to address)     [] No Recommendation Made     Discharge Planning: cardiac, diabetic diet  [x] Participated in care planning, discharge planning, & interdisciplinary rounds as appropriate      Oanh Jaime, [address] [place] [address]    Pager: [phone]

## 2017-04-22 NOTE — DISCHARGE SUMMARY
Discharge Summary     Patient ID:  Mirtha Duncan  [id number]  49 y.o.  9/22/1933    PCP on record: Chaz Mcelroy MD    Admit date: 4/21/2017  Discharge date and time: 4/22/2017    Discharge Diagnoses:      Hypokalemia   Dizziness   COPD   UTI- POA   Elevated trop   Mild Protein calorie mal nutrition   Under weight                                                Consults: Cardiology          Hospital Course by problems:  80 yr old female admitted with fall , prodrome of dizziness , elevated Trop , severe Hypokalemia , UTI- POA , Leucocytosis ,COPD , DM2, HTN, Mild PC Malnutrition.      1. [address]   - Cardiology note reviewed   - ECHO - unremarkable   - No Further cardiac work up planned   - CT Head and CT C Spine - no acute fracture      2 DM2  - Acceptable blood glucose , no evidence of hypoglycemia      3 Hypokalemia  - replaced and confirmed with rpt labs      4 HTN  Continue current treatments      5 COPD - Stable , continue BD     6 UTI- POA   - Change to Bactrim - allergy to Levaquin   - Follow culture urine   - Blood culture also done in ED         Patient seen and examined by me on discharge day. Pertinent Findings:  Asymptomatic alert and awake and eger to go home   RS clear   [facility]     PLAN   DC home with family     Follow UP - out patient     - Follow up lytes   - Follow Up on urine and Blood cultures done at [facility]  - Try to reduce meds - ?  [facility] pharmacy leading to Dizziness             Significant Diagnostic Studies:  ECHO       Pertinent Lab Data:  Recent Labs      04/21/17   1325   WBC  13.7*   HGB  14.8   HCT  43.0   PLT  195     Recent Labs      04/22/17   1030  04/21/17   1325   NA  138  135*   K  4.3  2.9*   CL  104  95*   CO2  30  33*   GLU  109*  198*   BUN  10  18   CREA  0.38*  0.89   CA  8.1*  9.0   MG   --   1.8   ALB   --   3.9   SGOT   --   21   ALT   --   28   INR   --   1.0       DISCHARGE MEDICATIONS:   @  Current Discharge Medication List      START taking these medications    Details   trimethoprim-sulfamethoxazole (BACTRIM, SEPTRA)  mg per tablet Take 1 Tab by mouth every twelve (12) hours. Qty: 6 Tab, Refills: 0         CONTINUE these medications which have NOT CHANGED    Details   budesonide-formoterol (SYMBICORT) 160-4.5 mcg/actuation HFA inhaler Take 1 Puff by inhalation two (2) times a day. Qty: 1 Inhaler, Refills: 6      tiotropium bromide (SPIRIVA RESPIMAT) 1.25 mcg/actuation inhaler Take 2 Puffs by inhalation daily. Qty: 1 Inhaler, Refills: 11      albuterol (PROVENTIL HFA, VENTOLIN HFA, PROAIR HFA) 90 mcg/actuation inhaler Take 2 Puffs by inhalation every four (4) hours as needed for Wheezing. Qty: 1 Inhaler, Refills: 5      linaclotide (LINZESS) 145 mcg cap capsule Take 1 Cap by mouth Daily (before breakfast). Qty: 90 Cap, Refills: 2      simvastatin (ZOCOR) 20 mg tablet Take 1 Tab by mouth nightly. Qty: 90 Tab, Refills: 1    Associated Diagnoses: Hyperlipidemia, unspecified hyperlipidemia type      triamcinolone (ARISTOCORT) 0.5 % topical cream Apply a thin layer to rash twice daily  Qty: 30 g, Refills: 0      rOPINIRole (REQUIP) 1 mg tablet Take 1 mg by mouth daily. LORazepam (ATIVAN) 1 mg tablet Take 0.5 Tabs by mouth every eight (8) hours as needed. Max Daily Amount: 1.5 mg.  Qty: 30 Tab, Refills: 0      albuterol-ipratropium (DUO-NEB) 2.5 mg-0.5 mg/3 ml nebu 3 mL by Nebulization route every four (4) hours as needed. Qty: 30 Nebule, Refills: 0      amLODIPine (NORVASC) 10 mg tablet Take 1 Tab by mouth daily. Qty: 30 Tab, Refills: 0      metoprolol tartrate (LOPRESSOR) 25 mg tablet Take 1 Tab by mouth two (2) times a day. Qty: 60 Tab, Refills: 0      phenol throat spray (CHLORASEPTIC) 1.4 % spray Take 1 Spray by mouth as needed for Sore throat. Qty: 1 Bottle, Refills: 0      omega 3-dha-epa-fish oil (FISH OIL) 100-160-1,000 mg cap Take 1,000 mg by mouth daily.       LUMIGAN 0.01 % ophthalmic drops Administer 1 Drop to both eyes nightly.      gabapentin (NEURONTIN) 300 mg capsule Take 1 Cap by mouth three (3) times daily. Qty: 270 Cap, Refills: 1      tamsulosin (FLOMAX) 0.4 mg capsule Take 1 Cap by mouth daily. Qty: 90 Cap, Refills: 3      ascorbic acid (VITAMIN C) 1,000 mg tablet Take 1,000 mg by mouth daily. traZODone (DESYREL) 100 mg tablet Take 100 mg by mouth nightly. Patient takes one and half tabs at bedtime      cycloSPORINE (RESTASIS) 0.05 % ophthalmic emulsion Administer 1 Drop to both eyes two (2) times a day. NR-EU-TE-Fe-Min-Lycopen-Lutein (CENTRUM) 0.4-162-18 mg Tab Take 1 Tab by mouth daily. STOP taking these medications       HYDROmorphone (DILAUDID) 4 mg tablet Comments:   Reason for Stopping:         HYDROmorphone (DILAUDID) 4 mg tablet Comments:   Reason for Stopping:                 My Recommended Diet, Activity, Wound Care, and follow-up labs are listed in the patient's Discharge Insturctions which I have personally completed and reviewed. Disposition:     [] Home with family     [] [facility] PT/RN   [] SNF/NH   [] Inpatient Rehab/[facility]  Condition at Discharge:  Stable    Follow up with:   PCP : Hari Montano MD      Please follow-up tests/labs that are still pendin.  None  2.    >30 minutes spent coordinating this discharge (review instructions/follow-up, prescriptions, preparing report for sign off)    Signed:  Karl Tejeda MD  2017  3:42 PM

## 2017-04-23 LAB
BACTERIA SPEC CULT: NORMAL
SERVICE CMNT-IMP: NORMAL

## 2017-04-24 ENCOUNTER — PATIENT OUTREACH (OUTPATIENT)
Dept: INTERNAL MEDICINE CLINIC | Age: 82
End: 2017-04-24

## 2017-04-24 LAB
CK MB CFR SERPL CALC: 3 % (ref 0–4)
CK MB SERPL-MCNC: 2.1 NG/ML (ref 5–25)
CK SERPL-CCNC: 71 U/L (ref 26–192)
TROPONIN I SERPL-MCNC: 0.08 NG/ML (ref 0–0.04)

## 2017-04-24 NOTE — PROGRESS NOTES
Ryan Kemp a 80 y.o. female was admitted to [facility] on 4/21/17 to 4/22/17 for SOB. Hospital course:  Admitted s/p fall. Labs showed severe hypokalemia, elevated troponin, leukocytosis and  UTI (PTA). Received Rocephin and Bactrim and replacement of enzymes. Cardiology consulted. Pertinent labs/imaging:  Component      Latest Ref Rng & Units 4/22/2017 4/21/2017          10:30 AM  1:25 PM   Potassium      3.5 - 5.5 mmol/L 4.3 2.9 (LL)     Component      Latest Ref Rng & Units 4/22/2017 4/21/2017 4/21/2017           4:13 AM  7:35 PM  1:25 PM   CK-MB Index      0.0 - 4.0 % 3.0 4.9 (H) 4.5 (H)   Troponin-I, Qt.      0.0 - 0.045 NG/ML 0.08 (H) 0.11 (H) 0.09 (H)     Inpatient Consults:  Dr. Aundrea Rao, cardiology    Discharge diagnoses:   Hypokalemia   Dizziness   COPD   UTI- POA   Elevated trop   Mild Protein calorie mal nutrition   Under weight      New medications: Bactrim DS for tx of UTI (PTA)    RRAT Score: 41  CCI: [facility] utilization in past 12 months: 1  ED utilization in past 12 months: 0    Contacted patient for hospital follow up. Introduced self, role and reason for call. Verified 2 patient identifiers. Both patient and  participated in call. Patient/ reports: On and off dizziness when standing  Very weak   Sweating a lot  A little SOB; not new  Decreased appetite  Drinks fluid constantly  Does not check blood sugar; A1C 6.2 as of 4/22/17    Patient/ denies:  N/V  CP  Fever  Chills  Fatigue  Diarrhea    ADL's:  Ambulates with cane. Performs all other ADL's independently. DME:   Cedric Davies and walker    Resources/Support:        AMD:   Not on file    Patient reports she does not eat well. Usually has a piece of toast of supplement drink for breakfast.  reports she eats a hamburger or chicken for lunch. Neither of them cook much.  states they just buy something quick. Wife verbalizes she has lost weight and appetite has worsened.  Both report patient drinks a lot of fluid; water and tea. Admits tea is caffeinated. Explained to patient too much fluid is not good. Advised to avoid over hydrating and limit caffeine consumption. Advised to drink no more than 8 eight oz glasses of water  daily. Patient denies pain from fall. Patient verbalizes she is trying to quit smoking. Down to 6-7 cigarettes a day.  verbalizes this is an improvement. Provided patient with encouragement. Initiated medication reconciliation with .  reports patient is not taking Symbicort or nebulizer solution. Advised  to have patient restart Symbicort and to use nebulizer solution/machine PRN for wheezing/SOB.  verbalizes understanding. Med reconciliation incomplete as  verbalizes he prefers to bring in list of medications to next appt. Educated patient/ to monitor and report Red flags and any new or concerning symptoms. They verbalize understanding of all information discussed and are aware of  when to seek medical attention from PCP or ED. Opportunity to ask questions was provided. They have contact information for future reference or further questions. Appointments:  Labs 5/1/17 at 9:05 AM  5/8/17 at 9:15 AM  Offered earlier appointment.  works Wednesdays and Fridays. Will keep previously scheduled appointment. They are aware of appointments.  will provide transportation. Potential Barriers to care  No apparent barriers to care identified at this time. Adherence to previous treatment and likelihood for follow-up:  Patient and  verbalize understanding of discharge instructions and need for follow up. Plan of Care/Goals:  Patient will attend all scheduled appointments. Patient/ will monitor/report for red flags or new/concerning s/s. Patient will adhere to medication regimen. This represents Transitions of Care b/c NN spoke with patient and/or caregiver within 2 business days of discharge.

## 2017-05-01 ENCOUNTER — HOSPITAL ENCOUNTER (OUTPATIENT)
Dept: LAB | Age: 82
Discharge: HOME | End: 2017-05-01
Payer: MEDICARE

## 2017-05-01 ENCOUNTER — LAB ONLY (OUTPATIENT)
Dept: INTERNAL MEDICINE CLINIC | Age: 82
End: 2017-05-01

## 2017-05-01 DIAGNOSIS — I10 ESSENTIAL HYPERTENSION WITH GOAL BLOOD PRESSURE LESS THAN 140/90: Primary | ICD-10-CM

## 2017-05-01 DIAGNOSIS — E78.5 HYPERLIPIDEMIA LDL GOAL <100: ICD-10-CM

## 2017-05-01 DIAGNOSIS — I10 ESSENTIAL HYPERTENSION WITH GOAL BLOOD PRESSURE LESS THAN 140/90: ICD-10-CM

## 2017-05-01 DIAGNOSIS — R73.01 IMPAIRED FASTING GLUCOSE: ICD-10-CM

## 2017-05-01 DIAGNOSIS — E55.9 HYPOVITAMINOSIS D: ICD-10-CM

## 2017-05-01 LAB
ALBUMIN SERPL BCP-MCNC: 3.8 G/DL (ref 3.4–5)
ALBUMIN/GLOB SERPL: 1.3 {RATIO} (ref 0.8–1.7)
ALP SERPL-CCNC: 85 U/L (ref 45–117)
ALT SERPL-CCNC: 18 U/L (ref 13–56)
ANION GAP BLD CALC-SCNC: 8 MMOL/L (ref 3–18)
AST SERPL W P-5'-P-CCNC: 18 U/L (ref 15–37)
BASOPHILS # BLD AUTO: 0 K/UL (ref 0–0.06)
BASOPHILS # BLD: 0 % (ref 0–2)
BILIRUB SERPL-MCNC: 0.2 MG/DL (ref 0.2–1)
BUN SERPL-MCNC: 8 MG/DL (ref 7–18)
BUN/CREAT SERPL: 14 (ref 12–20)
CALCIUM SERPL-MCNC: 9.5 MG/DL (ref 8.5–10.1)
CHLORIDE SERPL-SCNC: 95 MMOL/L (ref 100–108)
CHOLEST SERPL-MCNC: 134 MG/DL
CO2 SERPL-SCNC: 32 MMOL/L (ref 21–32)
CREAT SERPL-MCNC: 0.57 MG/DL (ref 0.6–1.3)
DIFFERENTIAL METHOD BLD: NORMAL
EOSINOPHIL # BLD: 0.1 K/UL (ref 0–0.4)
EOSINOPHIL NFR BLD: 2 % (ref 0–5)
ERYTHROCYTE [DISTWIDTH] IN BLOOD BY AUTOMATED COUNT: 14.3 % (ref 11.6–14.5)
EST. AVERAGE GLUCOSE BLD GHB EST-MCNC: 131 MG/DL
GLOBULIN SER CALC-MCNC: 3 G/DL (ref 2–4)
GLUCOSE SERPL-MCNC: 167 MG/DL (ref 74–99)
HBA1C MFR BLD: 6.2 % (ref 4.2–5.6)
HCT VFR BLD AUTO: 44.6 % (ref 35–45)
HDLC SERPL-MCNC: 43 MG/DL (ref 40–60)
HDLC SERPL: 3.1 {RATIO} (ref 0–5)
HGB BLD-MCNC: 14.1 G/DL (ref 12–16)
LDLC SERPL CALC-MCNC: 63.6 MG/DL (ref 0–100)
LIPID PROFILE,FLP: NORMAL
LYMPHOCYTES # BLD AUTO: 24 % (ref 21–52)
LYMPHOCYTES # BLD: 1.6 K/UL (ref 0.9–3.6)
MCH RBC QN AUTO: 30.3 PG (ref 24–34)
MCHC RBC AUTO-ENTMCNC: 31.6 G/DL (ref 31–37)
MCV RBC AUTO: 95.9 FL (ref 74–97)
MONOCYTES # BLD: 0.4 K/UL (ref 0.05–1.2)
MONOCYTES NFR BLD AUTO: 7 % (ref 3–10)
NEUTS SEG # BLD: 4.4 K/UL (ref 1.8–8)
NEUTS SEG NFR BLD AUTO: 67 % (ref 40–73)
PLATELET # BLD AUTO: 266 K/UL (ref 135–420)
PMV BLD AUTO: 10.5 FL (ref 9.2–11.8)
POTASSIUM SERPL-SCNC: 4.1 MMOL/L (ref 3.5–5.5)
PROT SERPL-MCNC: 6.8 G/DL (ref 6.4–8.2)
RBC # BLD AUTO: 4.65 M/UL (ref 4.2–5.3)
SODIUM SERPL-SCNC: 135 MMOL/L (ref 136–145)
TRIGL SERPL-MCNC: 137 MG/DL (ref ?–150)
TSH SERPL DL<=0.05 MIU/L-ACNC: 1.96 UIU/ML (ref 0.36–3.74)
VLDLC SERPL CALC-MCNC: 27.4 MG/DL
WBC # BLD AUTO: 6.6 K/UL (ref 4.6–13.2)

## 2017-05-01 PROCEDURE — 83036 HEMOGLOBIN GLYCOSYLATED A1C: CPT | Performed by: INTERNAL MEDICINE

## 2017-05-01 PROCEDURE — 82306 VITAMIN D 25 HYDROXY: CPT | Performed by: INTERNAL MEDICINE

## 2017-05-01 PROCEDURE — 85025 COMPLETE CBC W/AUTO DIFF WBC: CPT | Performed by: INTERNAL MEDICINE

## 2017-05-01 PROCEDURE — 36415 COLL VENOUS BLD VENIPUNCTURE: CPT | Performed by: INTERNAL MEDICINE

## 2017-05-01 PROCEDURE — 80061 LIPID PANEL: CPT | Performed by: INTERNAL MEDICINE

## 2017-05-01 PROCEDURE — 80053 COMPREHEN METABOLIC PANEL: CPT | Performed by: INTERNAL MEDICINE

## 2017-05-01 PROCEDURE — 84443 ASSAY THYROID STIM HORMONE: CPT | Performed by: INTERNAL MEDICINE

## 2017-05-02 LAB — 25(OH)D3 SERPL-MCNC: 35.9 NG/ML (ref 30–100)

## 2017-05-08 ENCOUNTER — HOSPITAL ENCOUNTER (OUTPATIENT)
Dept: LAB | Age: 82
Discharge: HOME | End: 2017-05-08
Payer: MEDICARE

## 2017-05-08 ENCOUNTER — OFFICE VISIT (OUTPATIENT)
Dept: INTERNAL MEDICINE CLINIC | Age: 82
End: 2017-05-08

## 2017-05-08 VITALS
SYSTOLIC BLOOD PRESSURE: 124 MMHG | WEIGHT: 114.6 LBS | HEART RATE: 88 BPM | OXYGEN SATURATION: 91 % | TEMPERATURE: 97.8 F | RESPIRATION RATE: 14 BRPM | HEIGHT: 65 IN | DIASTOLIC BLOOD PRESSURE: 54 MMHG | BODY MASS INDEX: 19.09 KG/M2

## 2017-05-08 DIAGNOSIS — E78.5 HYPERLIPIDEMIA LDL GOAL <100: ICD-10-CM

## 2017-05-08 DIAGNOSIS — E55.9 HYPOVITAMINOSIS D: ICD-10-CM

## 2017-05-08 DIAGNOSIS — R73.01 IMPAIRED FASTING GLUCOSE: ICD-10-CM

## 2017-05-08 DIAGNOSIS — J43.1 PANLOBULAR EMPHYSEMA (HCC): ICD-10-CM

## 2017-05-08 DIAGNOSIS — I10 ESSENTIAL HYPERTENSION WITH GOAL BLOOD PRESSURE LESS THAN 140/90: ICD-10-CM

## 2017-05-08 DIAGNOSIS — E11.42 DIABETIC POLYNEUROPATHY ASSOCIATED WITH TYPE 2 DIABETES MELLITUS (HCC): Primary | ICD-10-CM

## 2017-05-08 LAB
APPEARANCE UR: CLEAR
BACTERIA URNS QL MICRO: ABNORMAL /HPF
BILIRUB UR QL: NEGATIVE
COLOR UR: YELLOW
EPITH CASTS URNS QL MICRO: ABNORMAL /LPF (ref 0–5)
GLUCOSE UR STRIP.AUTO-MCNC: NEGATIVE MG/DL
HGB UR QL STRIP: NEGATIVE
KETONES UR QL STRIP.AUTO: NEGATIVE MG/DL
LEUKOCYTE ESTERASE UR QL STRIP.AUTO: ABNORMAL
NITRITE UR QL STRIP.AUTO: NEGATIVE
PH UR STRIP: 7 [PH] (ref 5–8)
PROT UR STRIP-MCNC: NEGATIVE MG/DL
RBC #/AREA URNS HPF: 0 /HPF (ref 0–5)
SP GR UR REFRACTOMETRY: 1.01 (ref 1–1.03)
UROBILINOGEN UR QL STRIP.AUTO: 1 EU/DL (ref 0.2–1)
WBC URNS QL MICRO: ABNORMAL /HPF (ref 0–4)

## 2017-05-08 PROCEDURE — 81001 URINALYSIS AUTO W/SCOPE: CPT | Performed by: INTERNAL MEDICINE

## 2017-05-08 RX ORDER — PAROXETINE 30 MG/1
30 TABLET, FILM COATED ORAL DAILY
Qty: 90 TAB | Refills: 3 | Status: SHIPPED | OUTPATIENT
Start: 2017-05-08 | End: 2017-09-19 | Stop reason: SDUPTHER

## 2017-05-08 RX ORDER — GABAPENTIN 300 MG/1
300 CAPSULE ORAL 3 TIMES DAILY
Qty: 270 CAP | Refills: 1 | Status: SHIPPED | OUTPATIENT
Start: 2017-05-08 | End: 2017-09-19 | Stop reason: SDUPTHER

## 2017-05-08 RX ORDER — METOPROLOL TARTRATE 25 MG/1
25 TABLET, FILM COATED ORAL 2 TIMES DAILY
Qty: 60 TAB | Refills: 5 | Status: SHIPPED | OUTPATIENT
Start: 2017-05-08 | End: 2017-09-19 | Stop reason: SDUPTHER

## 2017-05-08 NOTE — MR AVS SNAPSHOT
Visit Information Date & Time Provider Department Dept. Phone Encounter #  
 5/8/2017  9:15 AM Gustavo Quevedo MD Internist of [address] [id number] Your Appointments 5/10/2017  9:00 AM  
Follow Up with HETAL Ayala [address] for [facility] (FAN SCHEDULING) Appt Note: 2 MONTH F/U  
 [address] [facility] [address]  
[phone] [address]  
  
    
 6/7/2017  9:15 AM  
Office Visit with Elvia Portillo MD  
[facility] [address]) Appt Note: checkup [address] [facility] [address] [place] [address]  
[phone] [address]  
  
    
 9/6/2017 11:00 AM  
PROCEDURE with [facility] NONIMAGING [facility] ([address]) Appt Note: leg art 1 yr knaak; r/s to new office 1212 [facility] [address] [facility] [address]  
[phone] 1212 [facility] [place] [address] [facility] [address] 9/13/2017 10:00 AM  
Office Visit with Gustavo Quevedo MD  
Internist of [address] [place] [address] [address] [place] [address] Road) Appt Note: 4 month f/u  
 [address] [facility], [address] [address] [address]  
  
   
 [address], [address] 9/19/2017 11:00 AM  
Follow Up with HET[facility] ([address]) Appt Note: rescheduled from [place] schedule 1212 [facility] [place] [address] [facility] [address]  
[phone] 1212 [facility] [place] [address] [facility] [address] Upcoming Health Maintenance Date Due  
 EYE EXAM RETINAL OR DILATED Q1 2/5/2015 GLAUCOMA SCREENING Q2Y 4/1/2016 MEDICARE YEARLY EXAM 12/8/2016 MICROALBUMIN Q1 4/5/2017 INFLUENZA AGE 9 TO ADULT 8/1/2017 HEMOGLOBIN A1C Q6M 11/1/2017 LIPID PANEL Q1 5/1/2018 DTaP/Tdap/Td series (2 - Td) 6/11/2024 Allergies as of 5/8/2017  Review Complete On: 5/8/2017 By: Ronny Amador MD  
  
 Severity Noted Reaction Type Reactions Levaquin [Levofloxacin]  05/17/2011    Rash Current Immunizations  Reviewed on 2/7/2017 Name Date Influenza High Dose Vaccine PF 10/13/2016 Influenza Vaccine 10/15/2014 Influenza Vaccine (Quad) PF 12/8/2015 Influenza Vaccine Split 11/22/2011, 11/11/2010 11:46 AM  
 Pneumococcal Conjugate (PCV-13) 12/8/2015 Pneumococcal Vaccine (Unspecified Type) 9/20/2000 Tdap 6/11/2014 Not reviewed this visit You Were Diagnosed With   
  
 Codes Comments Diabetic polyneuropathy associated with type 2 diabetes mellitus ([facility]    -  Primary ICD-10-CM: E11.42 
ICD-9-CM: 250.60, 357.2 Panlobular emphysema ([facility]     ICD-10-CM: J43.1 ICD-9-CM: 492.8 Essential hypertension with goal blood pressure less than 140/90     ICD-10-CM: I10 
ICD-9-CM: 401.9 Hyperlipidemia LDL goal <100     ICD-10-CM: E78.5 ICD-9-CM: 272.4 Impaired fasting glucose     ICD-10-CM: R73.01 
ICD-9-CM: 790.21 Vitals BP Pulse Temp Resp Height(growth percentile) Weight(growth percentile) 124/54 88 97.8 °F (36.6 °C) (Oral) 14 5' 5\" (1.651 m) 114 lb 9.6 oz (52 kg) SpO2 BMI OB Status Smoking Status 91% 19.07 kg/m2 Hysterectomy Current Every Day Smoker Vitals History BMI and BSA Data Body Mass Index Body Surface Area 19.07 kg/m 2 1.54 m 2 Preferred Pharmacy Pharmacy Name Phone [facility], [address] [place] [address] [address] Nyla [address] Your Updated Medication List  
  
   
This list is accurate as of: 5/8/17  9:44 AM.  Always use your most recent med list.  
  
  
  
  
 albuterol 90 mcg/actuation inhaler Commonly known as:  PROVENTIL HFA, VENTOLIN HFA, PROAIR HFA Take 2 Puffs by inhalation every four (4) hours as needed for Wheezing. albuterol-ipratropium 2.5 mg-0.5 mg/3 ml Nebu Commonly known as:  DUO-NEB  
3 mL by Nebulization route every four (4) hours as needed. amLODIPine 10 mg tablet Commonly known as:  Sharion [place] Take 1 Tab by mouth daily. budesonide-formoterol 160-4.5 mcg/actuation HFA inhaler Commonly known as:  SYMBICORT Take 1 Puff by inhalation two (2) times a day. CENTRUM 0.4-162-18 mg Tab Generic drug:  DR-ML-YD-Fe-Min-Lycopen-Lutein Take 1 Tab by mouth daily. FISH -160-1,000 mg Cap Generic drug:  omega 3-dha-epa-fish oil Take 1,000 mg by mouth daily. gabapentin 300 mg capsule Commonly known as:  NEURONTIN Take 1 Cap by mouth three (3) times daily. LORazepam 1 mg tablet Commonly known as:  ATIVAN Take 0.5 Tabs by mouth every eight (8) hours as needed. Max Daily Amount: 1.5 mg.  
  
 LUMIGAN 0.01 % ophthalmic drops Generic drug:  bimatoprost  
Administer 1 Drop to both eyes nightly. metoprolol tartrate 25 mg tablet Commonly known as:  LOPRESSOR Take 1 Tab by mouth two (2) times a day. RESTASIS 0.05 % ophthalmic emulsion Generic drug:  cycloSPORINE Administer 1 Drop to both eyes two (2) times a day. rOPINIRole 1 mg tablet Commonly known as:  Ramakrishna Carlee Take 1 mg by mouth daily. simvastatin 20 mg tablet Commonly known as:  ZOCOR Take 1 Tab by mouth nightly. tamsulosin 0.4 mg capsule Commonly known as:  FLOMAX Take 1 Cap by mouth daily. tiotropium bromide 1.25 mcg/actuation inhaler Commonly known as:  Kingsland Donnie Take 2 Puffs by inhalation daily. traZODone 100 mg tablet Commonly known as:  Redge Kilkenny Take 100 mg by mouth nightly. Patient takes one and half tabs at bedtime  
  
 triamcinolone 0.5 % topical cream  
Commonly known as:  ARISTOCORT Apply a thin layer to rash twice daily VITAMIN C 1,000 mg tablet Generic drug:  ascorbic acid (vitamin C) Take 1,000 mg by mouth daily. Prescriptions Sent to Pharmacy Refills  
 gabapentin (NEURONTIN) 300 mg capsule 1 Sig: Take 1 Cap by mouth three (3) times daily. Class: Normal  
 Pharmacy: [address]  #: [phone] Route: Oral  
 metoprolol tartrate (LOPRESSOR) 25 mg tablet 5 Sig: Take 1 Tab by mouth two (2) times a day. Class: Normal  
 Pharmacy: [address] Ph #: [phone] Route: Oral  
  
To-Do List   
 Around 09/01/2017 Lab:  HEMOGLOBIN A1C WITH EAG Around 09/01/2017 Lab:  LIPID PANEL Around 09/01/2017 Lab:  METABOLIC PANEL, COMPREHENSIVE Please provide this summary of care documentation to your next provider. Your primary care clinician is listed as Ciera Hernandez. If you have any questions after today's visit, please call [phone].

## 2017-05-08 NOTE — PROGRESS NOTES
Noni Dorantes 9/22/1933, is a 80 y.o. female, who is seen today for reevaluation of impaired fasting glucose hyperlipidemia atherosclerotic heart disease peripheral neuropathy. She says that since she was in the hospital a couple of months ago she has continued to cough. She brings up no sputum. She is still smoking 5 or 6 cigarettes a day. She is using her inhalers as directed by Dr. Joanna Enrique. She takes her other medicine regularly. Her  thinks she is more nervous than she used to be despite using lorazepam and she has been on Paxil 20 mg daily for quite some time, unclear when that was started, it is on his list but not on our list.  She also has lost about 15 pounds over the last few months, her appetite is not any different, she says she never did eat much and does not really want to eat more does not want any medicine to make her gain weight. She is followed at pain management for Dilaudid prescriptions but those are not on the chart anymore either. She complains of periodic sweats some chilliness. No fever.     Past Medical History:   Diagnosis Date    Colon polyps     COPD 8-30-02    DDD (degenerative disc disease), lumbar 10/1/2014    Degeneration of lumbar or lumbosacral intervertebral disc     Depression     Diabetes ([address] 7-19-05    Diverticulosis     DM neuropathy, painful ([address] 10/1/2014    MOORE (Dyspnea on Exertion) 4-19-02    echo: +mild LVH, DM82-92% w/ diastolic dysfxn    Glaucoma     HCAP (healthcare-associated pneumonia) 03/24/2017    Hemorrhoids 6-6-06    Hyperlipidemia 5/17/2011    Hypertension 4-16-02    LBP (low back pain) 4-13-04    Low back pain radiating to both legs 10/1/2014    Lumbago     Lumbar disc herniation 10/1/2014    Lumbar facet arthropathy ([address] 10/1/2014    Lumbosacral radiculopathy at L5 10/1/2014    Lumbosacral radiculopathy at S1 10/1/2014    Microscopic hematuria     OA (osteoarthritis)     Overactive bladder 5/17/2011    RLS (restless legs syndrome) 1/17/2013    Sciatica     Shortness of breath     Spinal stenosis, lumbar region, without neurogenic claudication     Spondylolisthesis of lumbar region 10/1/2014    Spondylolisthesis, grade 1 10/1/2014    Stress urinary incontinence     Syncope     -MRI brain    Urinary tract infection, site not specified      Current Outpatient Prescriptions   Medication Sig Dispense Refill    gabapentin (NEURONTIN) 300 mg capsule Take 1 Cap by mouth three (3) times daily. 270 Cap 1    metoprolol tartrate (LOPRESSOR) 25 mg tablet Take 1 Tab by mouth two (2) times a day. 60 Tab 5    budesonide-formoterol (SYMBICORT) 160-4.5 mcg/actuation HFA inhaler Take 1 Puff by inhalation two (2) times a day. 1 Inhaler 6    tiotropium bromide (SPIRIVA RESPIMAT) 1.25 mcg/actuation inhaler Take 2 Puffs by inhalation daily. 1 Inhaler 11    albuterol (PROVENTIL HFA, VENTOLIN HFA, PROAIR HFA) 90 mcg/actuation inhaler Take 2 Puffs by inhalation every four (4) hours as needed for Wheezing. 1 Inhaler 5    simvastatin (ZOCOR) 20 mg tablet Take 1 Tab by mouth nightly. 90 Tab 1    triamcinolone (ARISTOCORT) 0.5 % topical cream Apply a thin layer to rash twice daily 30 g 0    rOPINIRole (REQUIP) 1 mg tablet Take 1 mg by mouth daily.  LORazepam (ATIVAN) 1 mg tablet Take 0.5 Tabs by mouth every eight (8) hours as needed. Max Daily Amount: 1.5 mg. 30 Tab 0    albuterol-ipratropium (DUO-NEB) 2.5 mg-0.5 mg/3 ml nebu 3 mL by Nebulization route every four (4) hours as needed. 30 Nebule 0    amLODIPine (NORVASC) 10 mg tablet Take 1 Tab by mouth daily. 30 Tab 0    omega 3-dha-epa-fish oil (FISH OIL) 100-160-1,000 mg cap Take 1,000 mg by mouth daily.  LUMIGAN 0.01 % ophthalmic drops Administer 1 Drop to both eyes nightly.  tamsulosin (FLOMAX) 0.4 mg capsule Take 1 Cap by mouth daily. 90 Cap 3    ascorbic acid (VITAMIN C) 1,000 mg tablet Take 1,000 mg by mouth daily.       traZODone (DESYREL) 100 mg tablet Take 100 mg by mouth nightly. Patient takes one and half tabs at bedtime      cycloSPORINE (RESTASIS) 0.05 % ophthalmic emulsion Administer 1 Drop to both eyes two (2) times a day.  YN-YJ-CU-Fe-Min-Lycopen-Lutein (CENTRUM) 0.4-162-18 mg Tab Take 1 Tab by mouth daily. Visit Vitals    /54    Pulse 88    Temp 97.8 °F (36.6 °C) (Oral)    Resp 14    Ht 5' 5\" (1.651 m)    Wt 114 lb 9.6 oz (52 kg)    SpO2 91%    BMI 19.07 kg/m2     Carotids are 2+ without bruits. No adenopathy or thyromegaly. Lungs are clear to percussion. Somewhat diminished breath sounds throughout with mild end expiratory wheeze and minimal crackles in the bases. Heart reveals a regular rhythm with normal S1 and S2 no murmur gallop click or rub. Apical impulse is not palpable. Breasts reveal no masses no skin or nipple abnormalities and no axillary adenopathy. Abdomen is soft and nontender with no hepatosplenomegaly or masses and no bruits. Extremities reveal no clubbing cyanosis or edema. Pulses are diminished in the feet and 2+ elsewhere.     Results for orders placed or performed during the hospital encounter of 05/01/17   VITAMIN D, 25 HYDROXY   Result Value Ref Range    Vitamin D 25-Hydroxy 35.9 30 - 100 ng/mL   TSH 3RD GENERATION   Result Value Ref Range    TSH 1.96 0.36 - 3.74 uIU/mL   HEMOGLOBIN A1C WITH EAG   Result Value Ref Range    Hemoglobin A1c 6.2 (H) 4.2 - 5.6 %    Est. average glucose 303 mg/dL   METABOLIC PANEL, COMPREHENSIVE   Result Value Ref Range    Sodium 135 (L) 136 - 145 mmol/L    Potassium 4.1 3.5 - 5.5 mmol/L    Chloride 95 (L) 100 - 108 mmol/L    CO2 32 21 - 32 mmol/L    Anion gap 8 3.0 - 18 mmol/L    Glucose 167 (H) 74 - 99 mg/dL    BUN 8 7.0 - 18 MG/DL    Creatinine 0.57 (L) 0.6 - 1.3 MG/DL    BUN/Creatinine ratio 14 12 - 20      GFR est AA >60 >60 ml/min/1.73m2    GFR est non-AA >60 >60 ml/min/1.73m2    Calcium 9.5 8.5 - 10.1 MG/DL    Bilirubin, total 0.2 0.2 - 1.0 MG/DL    ALT (SGPT) 18 13 - 56 U/L    AST (SGOT) 18 15 - 37 U/L    Alk. phosphatase 85 45 - 117 U/L    Protein, total 6.8 6.4 - 8.2 g/dL    Albumin 3.8 3.4 - 5.0 g/dL    Globulin 3.0 2.0 - 4.0 g/dL    A-G Ratio 1.3 0.8 - 1.7     LIPID PANEL   Result Value Ref Range    LIPID PROFILE          Cholesterol, total 134 <200 MG/DL    Triglyceride 137 <150 MG/DL    HDL Cholesterol 43 40 - 60 MG/DL    LDL, calculated 63.6 0 - 100 MG/DL    VLDL, calculated 27.4 MG/DL    CHOL/HDL Ratio 3.1 0 - 5.0     CBC WITH AUTOMATED DIFF   Result Value Ref Range    WBC 6.6 4.6 - 13.2 K/uL    RBC 4.65 4.20 - 5.30 M/uL    HGB 14.1 12.0 - 16.0 g/dL    HCT 44.6 35.0 - 45.0 %    MCV 95.9 74.0 - 97.0 FL    MCH 30.3 24.0 - 34.0 PG    MCHC 31.6 31.0 - 37.0 g/dL    RDW 14.3 11.6 - 14.5 %    PLATELET 902 677 - 557 K/uL    MPV 10.5 9.2 - 11.8 FL    NEUTROPHILS 67 40 - 73 %    LYMPHOCYTES 24 21 - 52 %    MONOCYTES 7 3 - 10 %    EOSINOPHILS 2 0 - 5 %    BASOPHILS 0 0 - 2 %    ABS. NEUTROPHILS 4.4 1.8 - 8.0 K/UL    ABS. LYMPHOCYTES 1.6 0.9 - 3.6 K/UL    ABS. MONOCYTES 0.4 0.05 - 1.2 K/UL    ABS. EOSINOPHILS 0.1 0.0 - 0.4 K/UL    ABS. BASOPHILS 0.0 0.0 - 0.06 K/UL    DF AUTOMATED       Assessment: #1. Hyperlipidemia doing well. She will continue simvastatin 20 mg each evening. #2.  Chronic insomnia, she will continue trazodone at bedtime. #3.  COPD still smoking. Oxygen saturation 91%. I talked to her again today about smoking and she has no interest in quitting. She will continue Spiriva and albuterol as needed. #4. She is more anxious than usual, we will increase fluoxetine from 20 mg daily to 30 mg daily. She will continue lorazepam as needed. #4.  Chronic neuropathic pain in the feet, she will follow-up at pain management for Dilaudid prescriptions. #5. Sweating without fever, it is possible increase in paroxetine will help that. She will call if she develops fever. Follow-up in 4 months with lab or sooner if needed    Gaetano Sparrow MD FACP    Please note: This document has been produced using voice recognition software. Unrecognized errors in transcription may be present.

## 2017-05-10 ENCOUNTER — PATIENT OUTREACH (OUTPATIENT)
Dept: INTERNAL MEDICINE CLINIC | Age: 82
End: 2017-05-10

## 2017-05-10 NOTE — PROGRESS NOTES
Contacted patient for post hospital follow up. Patient reports she is about the same. States she is tired of being sick. No increase in appetite. States that she wants something but sh doesn't know what it is. Reports she remains a little dizzy, a little SOB, still sweating and is unable to cough up anything. Patient coughing during call; sounds productive. Both patient and  reports she is not using Symbicort, Spiriva or nebulizer device as instructed during last call.  reports patient has medications but she doesn't use them.  reports offering nebulizer treatment to patient but patient refused and stated \"not right now\". Patient verbalizes that was only on one occasion. Explained to patient Symbicort, Spiriva and use of the nebulizer device and solution may help with cough, decrease SOB and help her feel better. Patient and spouse verbalize understanding. Patient agreed to follow up call in 1-2 weeks.

## 2017-05-26 ENCOUNTER — TELEPHONE (OUTPATIENT)
Dept: INTERNAL MEDICINE CLINIC | Age: 82
End: 2017-05-26

## 2017-05-26 RX ORDER — AZITHROMYCIN 250 MG/1
TABLET, FILM COATED ORAL
Qty: 6 TAB | Refills: 0 | Status: SHIPPED | OUTPATIENT
Start: 2017-05-26 | End: 2017-05-31

## 2017-05-26 NOTE — TELEPHONE ENCOUNTER
Pt's  called and stated tht pt was seen a few weeks ago for a cold, he said it is now in her chest area and she is very congested, he is asking if an antibiotic can be called in , if so pls send to [facility] [place], [place]

## 2017-06-01 ENCOUNTER — PATIENT OUTREACH (OUTPATIENT)
Dept: INTERNAL MEDICINE CLINIC | Age: 82
End: 2017-06-01

## 2017-06-01 NOTE — PROGRESS NOTES
Episode closed:   Natasha Collier a 80 y.o. female was admitted to [facility] on 4/21/17 to 4/22/17 for SOB. No hospitalization or ED admission post 30 days from discharge of 4/22/17.

## 2017-06-09 ENCOUNTER — OFFICE VISIT (OUTPATIENT)
Dept: PAIN MANAGEMENT | Age: 82
End: 2017-06-09

## 2017-06-09 VITALS — WEIGHT: 114 LBS | BODY MASS INDEX: 18.97 KG/M2

## 2017-06-09 DIAGNOSIS — M47.817 LUMBOSACRAL SPONDYLOSIS WITHOUT MYELOPATHY: ICD-10-CM

## 2017-06-09 DIAGNOSIS — G25.81 RLS (RESTLESS LEGS SYNDROME): ICD-10-CM

## 2017-06-09 DIAGNOSIS — G89.4 CHRONIC PAIN SYNDROME: ICD-10-CM

## 2017-06-09 DIAGNOSIS — M51.36 DDD (DEGENERATIVE DISC DISEASE), LUMBAR: ICD-10-CM

## 2017-06-09 DIAGNOSIS — M54.50 LOW BACK PAIN RADIATING TO BOTH LEGS: ICD-10-CM

## 2017-06-09 DIAGNOSIS — M54.30 SCIATICA, UNSPECIFIED LATERALITY: ICD-10-CM

## 2017-06-09 DIAGNOSIS — M79.605 LOW BACK PAIN RADIATING TO BOTH LEGS: ICD-10-CM

## 2017-06-09 DIAGNOSIS — M43.16 SPONDYLOLISTHESIS OF LUMBAR REGION: ICD-10-CM

## 2017-06-09 DIAGNOSIS — M79.604 LOW BACK PAIN RADIATING TO BOTH LEGS: ICD-10-CM

## 2017-06-09 DIAGNOSIS — M48.061 LUMBAR STENOSIS: ICD-10-CM

## 2017-06-09 DIAGNOSIS — G62.9 POLYNEUROPATHY: ICD-10-CM

## 2017-06-09 LAB
ALCOHOL UR POC: NORMAL
AMPHETAMINES UR POC: NEGATIVE
BARBITURATES UR POC: NEGATIVE
BENZODIAZEPINES UR POC: NEGATIVE
BUPRENORPHINE UR POC: NORMAL
CANNABINOIDS UR POC: NEGATIVE
CARISOPRODOL UR POC: NORMAL
COCAINE UR POC: NEGATIVE
FENTANYL UR POC: NORMAL
MDMA/ECSTASY UR POC: NEGATIVE
METHADONE UR POC: NEGATIVE
METHAMPHETAMINE UR POC: NEGATIVE
METHYLPHENIDATE UR POC: NEGATIVE
OPIATES UR POC: NORMAL
OXYCODONE UR POC: NEGATIVE
PHENCYCLIDINE UR POC: NEGATIVE
PROPOXYPHENE UR POC: NORMAL
TRAMADOL UR POC: NORMAL
TRICYCLICS UR POC: NEGATIVE

## 2017-06-09 RX ORDER — HYDROMORPHONE HYDROCHLORIDE 4 MG/1
TABLET ORAL
Qty: 90 TAB | Refills: 0 | Status: SHIPPED | OUTPATIENT
Start: 2017-07-09 | End: 2017-08-07 | Stop reason: SDUPTHER

## 2017-06-09 RX ORDER — HYDROMORPHONE HYDROCHLORIDE 4 MG/1
TABLET ORAL
Qty: 90 TAB | Refills: 0 | Status: SHIPPED | OUTPATIENT
Start: 2017-06-09 | End: 2017-08-07 | Stop reason: SDUPTHER

## 2017-06-09 RX ORDER — ROPINIROLE 1 MG/1
.5-1 TABLET, FILM COATED ORAL
Qty: 90 TAB | Refills: 3 | Status: SHIPPED | OUTPATIENT
Start: 2017-06-09 | End: 2017-10-04 | Stop reason: SDUPTHER

## 2017-06-09 NOTE — PROGRESS NOTES
HISTORY OF PRESENT ILLNESS  Lencho Martinez is a 80 y.o. female    HPI: Ms. Tigist Chung  returns today for f/u of chronic low back pain and diabetic peripheral neuropathy. No h/o lumbar surgery. Prior injections with no help. Today is my first visit with Ms. Tigist Chung. She is here today with her . She continues unchanged since last visit. She is doing well with her current treatment plan. She does report some uncontrolled constipation. We discussed adding conservative treatment plan. See note below. She is otherwise doing well with no other complaints today. I will have her follow-up and 2 months for further evaluation recommendation     Medications are helping with pain control and quality of life. Her pain is 4-5/10 with medication and 7/10 without. Pt describes pain as aching and throbbing. Aggravating factors include walking. Relieved with rest, medication, and avoiding painful activities. Current treatment is helping to improve general activity, mood, walking, sleep, enjoyment of life    In the past 30 days, the patient reports approximately 25% pain relief with current treatment/medications. Pt does report constipation minimally controlled with Linzess. Pt instructed to Increase fluids, Add fruit juices, Add high fiber diet, add Metamucil and/or Citrucel if not getting enough fiber in diet, and add OTC stool softener for moderate constipation. May add Miralax on an as needed basis. She  is otherwise doing well with no other complaints today. She denies any adverse events including nausea, vomiting, dizziness, increased constipation, hallucinations, or seizures. POC UDS today. Confirmation pending.          Allergies   Allergen Reactions    Levaquin [Levofloxacin] Rash       Past Surgical History:   Procedure Laterality Date    HX APPENDECTOMY      HX CHOLECYSTECTOMY      HX HYSTERECTOMY      (+)DUB    HX POLYPECTOMY      MI COLONOSCOPY FLX DX W/COLLJ SPEC WHEN PFRMD 6-16-06    normal, Dr Lloyd Damon FLX DX W/COLLJ [place] 1978 PFRMD      (+)polyp= tubular adenoma         Review of Systems   Constitutional: Negative for chills, fever and weight loss. HENT: Negative for congestion and sore throat. Eyes: Negative for blurred vision and double vision. Respiratory: Positive for cough and shortness of breath. Negative for wheezing. Cardiovascular: Negative for chest pain and palpitations. Gastrointestinal: Positive for constipation. Negative for diarrhea, heartburn, nausea and vomiting. Genitourinary: Negative. Musculoskeletal: Positive for back pain, joint pain, myalgias and neck pain. Neurological: Negative for dizziness, seizures and loss of consciousness. Endo/Heme/Allergies: Does not bruise/bleed easily. Psychiatric/Behavioral: Positive for depression. The patient has insomnia. The patient is not nervous/anxious. Physical Exam   Constitutional: She is oriented to person, place, and time and well-developed, well-nourished, and in no distress. No distress. HENT:   Head: Normocephalic and atraumatic. Eyes: EOM are normal.   Pulmonary/Chest: Effort normal.   Neurological: She is alert and oriented to person, place, and time. Gait ( using a cane) abnormal.   Skin: Skin is warm and dry. No rash noted. She is not diaphoretic. No erythema. Psychiatric: Mood, memory, affect and judgment normal.   Nursing note and vitals reviewed. ASSESSMENT:    1. Sciatica, unspecified laterality    2. Low back pain radiating to both legs    3. Polyneuropathy    4. RLS (restless legs syndrome)    5. Chronic pain syndrome    6. Lumbosacral spondylosis without myelopathy    7. Lumbar stenosis    8. DDD (degenerative disc disease), lumbar    9.  Spondylolisthesis of lumbar region           [facility] Program was reviewed which does not demonstrate aberrancies and/or inconsistencies with regard to the historical prescribing of controlled medications to this patient by other providers. PLAN / Pt Instructions:  1. Continue current plan with no evidence of addiction or diversion. Stable on current medication without adverse events. 2. Refill Dilaudid 4 mg up to 3 times daily as needed. 3. Refill Linzess 145 mcg capsule once daily before breakfast.  2 refills  4. Refill Requip 1 mg once nightly as needed. 3 refills  5. Pt instructed to Increase fluids, Add fruit juices, Add high fiber diet, add Metamucil and/or Citrucel if not getting enough fiber in diet, and add OTC stool softener for moderate constipation. May add Miralax on an as needed basis. 6. Discussed risks of addiction, dependency, and opioid induced hyperalgesia. 7. Return to clinic in 2 months with MM    Medications Ordered Today   Medications    HYDROmorphone (DILAUDID) 4 mg tablet     Si/2 to one tab up to three times per day prn severe pain     Dispense:  90 Tab     Refill:  0    HYDROmorphone (DILAUDID) 4 mg tablet     Si/2 to one tab up to three times per day prn severe pain     Dispense:  90 Tab     Refill:  0    linaclotide (LINZESS) 145 mcg cap capsule     Sig: Take 1 Cap by mouth Daily (before breakfast). Dispense:  90 Cap     Refill:  2    rOPINIRole (REQUIP) 1 mg tablet     Sig: Take 0.5-1 Tabs by mouth nightly as needed (RLS) for up to 90 days. Dispense:  90 Tab     Refill:  3       Pain medications prescribed with the objective of pain relief and improved physical and psychosocial function in this patient. Spent 25 minutes with patient today reviewing the treatment plan, goals of treatment plan, and limitations of the treatment plan, to include the potential for side effects from medications and procedures. Merline Maas, [place] 2017      Note: Please excuse any typographical errors. Voice recognition software was used for this note and may cause mistakes.

## 2017-06-09 NOTE — MR AVS SNAPSHOT
Visit Information Date & Time Provider Department Dept. Phone Encounter #  
 6/9/2017  9:40 AM HETAL Simeon [facility] [id number] Follow-up Instructions Follow-up and Disposition History Your Appointments 6/30/2017  9:45 AM  
Follow Up with Angela Anderson MD  
[address] ([address]) Appt Note: from 3/24/17  
 [address] [place] [address], [address] [place] [address]  
[phone]  
  
   
 [address] [place] [address], [address] [place] [address]  
  
    
 9/6/2017 10:15 AM  
LAB with [facility] NURSE VISIT Internist of [facility] ([address]) Appt Note: lab  
 [address], [address] [address] [address] [place]  
  
   
 [address] [facility], [address]  
  
    
 9/6/2017 11:00 AM  
PROCEDURE with [facility] ([address]) Appt Note: leg art 1 yr knaak; r/s to new office [address] [facility] [address] [facility] [address]  
[phone] [address] [facility], [facility] [address] [facility] [address] 9/13/2017 10:00 AM  
Office Visit with Vinh Heredia MD  
Internist of [address] [place] [address] [address]) Appt Note: 4 month f/u  
 [address] [facility], [address] [address] [address] [place]  
  
   
 [address], [address] 9/19/2017 11:00 AM  
Follow Up with HETAL Slaughter [facility] ([address]) Appt Note: rescheduled from [facility] schedule [address] [facility] [address] [facility] [address]  
[phone] [address] [facility], [facility] [address] Upcoming Health Maintenance Date Due  
 EYE EXAM RETINAL OR DILATED Q1 2/5/2015 GLAUCOMA SCREENING Q2Y 4/1/2016 MEDICARE YEARLY EXAM 12/8/2016 MICROALBUMIN Q1 4/5/2017 INFLUENZA AGE 9 TO ADULT 8/1/2017 HEMOGLOBIN A1C Q6M 11/1/2017 LIPID PANEL Q1 5/1/2018 DTaP/Tdap/Td series (2 - Td) 6/11/2024 Allergies as of 6/9/2017  Review Complete On: 6/9/2017 By: HETAL Novak Severity Noted Reaction Type Reactions Levaquin [Levofloxacin]  05/17/2011    Rash Current Immunizations  Reviewed on 2/7/2017 Name Date Influenza High Dose Vaccine PF 10/13/2016 Influenza Vaccine 10/15/2014 Influenza Vaccine (Quad) PF 12/8/2015 Influenza Vaccine Split 11/22/2011, 11/11/2010 11:46 AM  
 Pneumococcal Conjugate (PCV-13) 12/8/2015 Pneumococcal Vaccine (Unspecified Type) 9/20/2000 Tdap 6/11/2014 Not reviewed this visit You Were Diagnosed With   
  
 Codes Comments Sciatica, unspecified laterality     ICD-10-CM: M54.30 ICD-9-CM: 724.3 Low back pain radiating to both legs     ICD-10-CM: M54.5 ICD-9-CM: 724.2 Polyneuropathy     ICD-10-CM: G62.9 ICD-9-CM: 356.9 RLS (restless legs syndrome)     ICD-10-CM: G25.81 ICD-9-CM: 333.94 Chronic pain syndrome     ICD-10-CM: G89.4 ICD-9-CM: 338.4 Lumbosacral spondylosis without myelopathy     ICD-10-CM: T60.739 ICD-9-CM: 721.3 Lumbar stenosis     ICD-10-CM: M48.06 
ICD-9-CM: 724.02   
 DDD (degenerative disc disease), lumbar     ICD-10-CM: M51.36 
ICD-9-CM: 722.52 Spondylolisthesis of lumbar region     ICD-10-CM: M43.16 
ICD-9-CM: 738.4 Vitals Weight(growth percentile) BMI OB Status Smoking Status 114 lb (51.7 kg) 18.97 kg/m2 Hysterectomy Current Every Day Smoker BMI and BSA Data Body Mass Index Body Surface Area  
 18.97 kg/m 2 1.54 m 2 Preferred Pharmacy Pharmacy Name Phone [facility] [address] [phone] Your Updated Medication List  
  
   
This list is accurate as of: 6/9/17 10:09 AM.  Always use your most recent med list.  
  
  
  
  
 albuterol 90 mcg/actuation inhaler Commonly known as:  PROVENTIL HFA, VENTOLIN HFA, PROAIR HFA Take 2 Puffs by inhalation every four (4) hours as needed for Wheezing. albuterol-ipratropium 2.5 mg-0.5 mg/3 ml Nebu Commonly known as:  DUO-NEB  
3 mL by Nebulization route every four (4) hours as needed. amLODIPine 10 mg tablet Commonly known as:  Damaris Dural Take 1 Tab by mouth daily. budesonide-formoterol 160-4.5 mcg/actuation HFA inhaler Commonly known as:  SYMBICORT Take 1 Puff by inhalation two (2) times a day. CENTRUM 0.4-162-18 mg Tab Generic drug:  DS-VV-UT-Fe-Min-Lycopen-Lutein Take 1 Tab by mouth daily. FISH -160-1,000 mg Cap Generic drug:  omega 3-dha-epa-fish oil Take 1,000 mg by mouth daily. gabapentin 300 mg capsule Commonly known as:  NEURONTIN Take 1 Cap by mouth three (3) times daily. * HYDROmorphone 4 mg tablet Commonly known as:  DILAUDID  
1/2 to one tab up to three times per day prn severe pain  
  
 * HYDROmorphone 4 mg tablet Commonly known as:  DILAUDID  
1/2 to one tab up to three times per day prn severe pain Start taking on:  7/9/2017  
  
 linaclotide 145 mcg Cap capsule Commonly known as:  Neida Born Take 1 Cap by mouth Daily (before breakfast). LORazepam 1 mg tablet Commonly known as:  ATIVAN Take 0.5 Tabs by mouth every eight (8) hours as needed. Max Daily Amount: 1.5 mg.  
  
 LUMIGAN 0.01 % ophthalmic drops Generic drug:  bimatoprost  
Administer 1 Drop to both eyes nightly. metoprolol tartrate 25 mg tablet Commonly known as:  LOPRESSOR Take 1 Tab by mouth two (2) times a day. PARoxetine 30 mg tablet Commonly known as:  PAXIL Take 1 Tab by mouth daily. RESTASIS 0.05 % ophthalmic emulsion Generic drug:  cycloSPORINE Administer 1 Drop to both eyes two (2) times a day. * rOPINIRole 1 mg tablet Commonly known as:  Noralyariel Guan Take 1 mg by mouth daily. * rOPINIRole 1 mg tablet Commonly known as:  Mechele Solan Take 0.5-1 Tabs by mouth nightly as needed (RLS) for up to 90 days. simvastatin 20 mg tablet Commonly known as:  ZOCOR Take 1 Tab by mouth nightly. tamsulosin 0.4 mg capsule Commonly known as:  FLOMAX Take 1 Cap by mouth daily. tiotropium bromide 1.25 mcg/actuation inhaler Commonly known as:  Ac Pagoda Take 2 Puffs by inhalation daily. traZODone 100 mg tablet Commonly known as:  Stan Mallet Take 100 mg by mouth nightly. Patient takes one and half tabs at bedtime  
  
 triamcinolone 0.5 % topical cream  
Commonly known as:  ARISTOCORT Apply a thin layer to rash twice daily VITAMIN C 1,000 mg tablet Generic drug:  ascorbic acid (vitamin C) Take 1,000 mg by mouth daily. * Notice: This list has 4 medication(s) that are the same as other medications prescribed for you. Read the directions carefully, and ask your doctor or other care provider to review them with you. Prescriptions Printed Refills HYDROmorphone (DILAUDID) 4 mg tablet 0 Si/2 to one tab up to three times per day prn severe pain  
 Class: Print HYDROmorphone (DILAUDID) 4 mg tablet 0 Starting on: 2017 Si/2 to one tab up to three times per day prn severe pain  
 Class: Print Prescriptions Sent to Pharmacy Refills  
 linaclotide (LINZESS) 145 mcg cap capsule 2 Sig: Take 1 Cap by mouth Daily (before breakfast). Class: Normal  
 Pharmacy: [place] [id number]. Ph #: [phone] Route: Oral  
 rOPINIRole (REQUIP) 1 mg tablet 3 Sig: Take 0.5-1 Tabs by mouth nightly as needed (RLS) for up to 90 days. Class: Normal  
 Pharmacy: [place] [address]. Ph #: [phone] Route: Oral  
  
Patient Instructions 1. Continue current plan with no evidence of addiction or diversion. Stable on current medication without adverse events. 2. Refill Dilaudid 4 mg up to 3 times daily as needed. 3. Refill Linzess 145 mcg capsule once daily before breakfast.  2 refills 4. Refill Requip 1 mg once nightly as needed. 3 refills 5. Pt instructed to Increase fluids, Add fruit juices, Add high fiber diet, add Metamucil and/or Citrucel if not getting enough fiber in diet, and add OTC stool softener for moderate constipation. May add Miralax on an as needed basis. 6. Discussed risks of addiction, dependency, and opioid induced hyperalgesia. 7. Return to clinic in 2 months with MM Please provide this summary of care documentation to your next provider. Your primary care clinician is listed as Tushar Muniz. Adal Conteh. If you have any questions after today's visit, please call [phone].

## 2017-06-09 NOTE — PROGRESS NOTES
Nursing Notes    Patient presents to the office today in follow-up. Patient rates her pain at 7/10 on the numerical pain scale. Reviewed medications with counts as follows:    Rx Date filled Qty Dispensed Pill Count Last Dose Short   Dilaudid 4 4/7/17 90 0 6/8/17 no       Comments:     POC UDS was performed in office today    Any new labs or imaging since last appointment? NO    Have you been to an emergency room (ER) or urgent care clinic since your last visit? NO            Have you been hospitalized since your last visit? NO     If yes, where, when, and reason for visit? Have you seen or consulted any other health care providers outside of the [facility]  since your last visit? NO     If yes, where, when, and reason for visit? Ms. Samia Dawkins has a reminder for a \"due or due soon\" health maintenance. I have asked that she contact her primary care provider for follow-up on this health maintenance.

## 2017-06-09 NOTE — PATIENT INSTRUCTIONS
1. Continue current plan with no evidence of addiction or diversion. Stable on current medication without adverse events. 2. Refill Dilaudid 4 mg up to 3 times daily as needed. 3. Refill Linzess 145 mcg capsule once daily before breakfast.  2 refills  4. Refill Requip 1 mg once nightly as needed. 3 refills  5. Pt instructed to Increase fluids, Add fruit juices, Add high fiber diet, add Metamucil and/or Citrucel if not getting enough fiber in diet, and add OTC stool softener for moderate constipation. May add Miralax on an as needed basis. 6. Discussed risks of addiction, dependency, and opioid induced hyperalgesia.    7. Return to clinic in 2 months with FANNY

## 2017-07-10 ENCOUNTER — HOSPITAL ENCOUNTER (OUTPATIENT)
Dept: MAMMOGRAPHY | Age: 82
Discharge: HOME | End: 2017-07-10
Attending: INTERNAL MEDICINE
Payer: MEDICARE

## 2017-07-10 DIAGNOSIS — Z12.31 VISIT FOR SCREENING MAMMOGRAM: ICD-10-CM

## 2017-07-10 PROCEDURE — 77063 BREAST TOMOSYNTHESIS BI: CPT

## 2017-08-07 ENCOUNTER — OFFICE VISIT (OUTPATIENT)
Dept: PAIN MANAGEMENT | Age: 82
End: 2017-08-07

## 2017-08-07 VITALS
RESPIRATION RATE: 22 BRPM | DIASTOLIC BLOOD PRESSURE: 67 MMHG | BODY MASS INDEX: 18.99 KG/M2 | TEMPERATURE: 96.3 F | SYSTOLIC BLOOD PRESSURE: 139 MMHG | HEIGHT: 65 IN | WEIGHT: 114 LBS | HEART RATE: 47 BPM

## 2017-08-07 DIAGNOSIS — M79.605 LOW BACK PAIN RADIATING TO BOTH LEGS: ICD-10-CM

## 2017-08-07 DIAGNOSIS — M15.9 PRIMARY OSTEOARTHRITIS INVOLVING MULTIPLE JOINTS: ICD-10-CM

## 2017-08-07 DIAGNOSIS — M47.816 LUMBAR FACET ARTHROPATHY: ICD-10-CM

## 2017-08-07 DIAGNOSIS — M43.16 SPONDYLOLISTHESIS OF LUMBAR REGION: ICD-10-CM

## 2017-08-07 DIAGNOSIS — M48.061 LUMBAR STENOSIS: ICD-10-CM

## 2017-08-07 DIAGNOSIS — M51.37 DEGENERATION OF LUMBAR OR LUMBOSACRAL INTERVERTEBRAL DISC: ICD-10-CM

## 2017-08-07 DIAGNOSIS — M47.817 LUMBOSACRAL SPONDYLOSIS WITHOUT MYELOPATHY: ICD-10-CM

## 2017-08-07 DIAGNOSIS — G89.4 CHRONIC PAIN SYNDROME: Primary | ICD-10-CM

## 2017-08-07 DIAGNOSIS — M79.604 LOW BACK PAIN RADIATING TO BOTH LEGS: ICD-10-CM

## 2017-08-07 DIAGNOSIS — G62.9 POLYNEUROPATHY: ICD-10-CM

## 2017-08-07 DIAGNOSIS — M54.50 LOW BACK PAIN RADIATING TO BOTH LEGS: ICD-10-CM

## 2017-08-07 RX ORDER — HYDROMORPHONE HYDROCHLORIDE 4 MG/1
TABLET ORAL
Qty: 90 TAB | Refills: 0 | Status: SHIPPED | OUTPATIENT
Start: 2017-09-18 | End: 2017-10-04

## 2017-08-07 RX ORDER — HYDROMORPHONE HYDROCHLORIDE 4 MG/1
TABLET ORAL
Qty: 90 TAB | Refills: 0 | Status: SHIPPED | OUTPATIENT
Start: 2017-08-19 | End: 2017-10-04 | Stop reason: SDUPTHER

## 2017-08-07 RX ORDER — NALOXONE HYDROCHLORIDE 4 MG/.1ML
4 SPRAY NASAL AS NEEDED
Qty: 1 PACKAGE | Refills: 0 | Status: SHIPPED | OUTPATIENT
Start: 2017-08-07 | End: 2018-07-09 | Stop reason: ALTCHOICE

## 2017-08-07 NOTE — PROGRESS NOTES
HISTORY OF PRESENT ILLNESS  Claudia Perdomo is a 80 y.o. female    HPI: Ms. Odette Huynh  returns today for f/u of chronic low back pain and diabetic peripheral neuropathy. No h/o lumbar surgery. Prior injections with no help. She is here today with her . She continues unchanged since last visit. She is doing well with her current treatment plan which has been offering moderate pain control. She is doing mildly better with her constipation but does report that her Linzess has been unaffordable and over $400 per month. I have asked them to contact her pharmacy to find out what is going on with the medication. This  sounds like her insurance is not covering the medication and could just be a prior authorization issue. We will discuss this further next visit. She is otherwise doing well with her current treatment plan. We will continue with no changes today. I will have her follow-up with 2 months for further assessment. Because the patient's current regimen places him/her at increased risk for possible overdose, a prescription for naloxone nasal spray is being provided. The patient understands that this medication is only to be used in the setting of a possible overdose and that inadvertent use of this medication could precipitate overt withdrawal.     Medications are helping with pain control and quality of life. Her pain is 4-5/10 with medication and 7/10 without. Pt describes pain as aching and throbbing. Aggravating factors include walking. Relieved with rest, medication, and avoiding painful activities. Current treatment is helping to improve general activity, mood, walking, sleep, enjoyment of life    In the past 30 days, the patient reports approximately 25% pain relief with current treatment/medications. Pt does report constipation minimally controlled with Linzess.  Pt instructed to Increase fluids, Add fruit juices, Add high fiber diet, add Metamucil and/or Citrucel if not getting enough fiber in diet, and add OTC stool softener for moderate constipation. May add Miralax on an as needed basis. She  is otherwise doing well with no other complaints today. She denies any adverse events including nausea, vomiting, dizziness, increased constipation, hallucinations, or seizures. Allergies   Allergen Reactions    Levaquin [Levofloxacin] Rash       Past Surgical History:   Procedure Laterality Date    HX APPENDECTOMY      HX CHOLECYSTECTOMY      HX HYSTERECTOMY      (+)DUB    HX POLYPECTOMY      [place] COLONOSCOPY FLX DX W/COLLJ SPEC WHEN PFRMD  6-16-06    normal, Dr Shukri London FLX DX W/COLLJ [address] 1978 PFRMD      (+)polyp= tubular adenoma         Review of Systems   Constitutional: Negative for chills, fever and weight loss. HENT: Negative for congestion and sore throat. Eyes: Negative for blurred vision and double vision. Respiratory: Positive for cough and shortness of breath. Negative for wheezing. Cardiovascular: Negative for chest pain and palpitations. Gastrointestinal: Positive for constipation. Negative for diarrhea, heartburn, nausea and vomiting. Genitourinary: Negative. Musculoskeletal: Positive for back pain, joint pain, myalgias and neck pain. Neurological: Negative for dizziness, seizures and loss of consciousness. Endo/Heme/Allergies: Does not bruise/bleed easily. Psychiatric/Behavioral: Positive for depression. The patient has insomnia. The patient is not nervous/anxious. Physical Exam   Constitutional: She is oriented to person, place, and time and well-developed, well-nourished, and in no distress. No distress. HENT:   Head: Normocephalic and atraumatic. Eyes: EOM are normal.   Pulmonary/Chest: Effort normal.   Neurological: She is alert and oriented to person, place, and time. Gait ( using a cane) abnormal.   Skin: Skin is warm and dry. No rash noted. She is not diaphoretic. No erythema.    Psychiatric: Mood, memory, affect and judgment normal.   Nursing note and vitals reviewed. ASSESSMENT:    1. Chronic pain syndrome    2. Low back pain radiating to both legs    3. Lumbar facet arthropathy    4. Lumbar stenosis    5. Lumbosacral spondylosis without myelopathy    6. Polyneuropathy ([address]    7. Primary osteoarthritis involving multiple joints    8. Spondylolisthesis of lumbar region    9. Degeneration of lumbar or lumbosacral intervertebral disc           [place] Prescription Monitoring Program was reviewed which does not demonstrate aberrancies and/or inconsistencies with regard to the historical prescribing of controlled medications to this patient by other providers. PLAN / Pt Instructions:  1. Continue current plan with no evidence of addiction or diversion. Stable on current medication without adverse events. 2. Refill Dilaudid 4 mg up to 3 times daily as needed. 3. Continue Linzess 145 mcg capsule once daily before breakfast.  2 refills  4. Continue Requip 1 mg once nightly as needed. 3 refills  5. Pt instructed to Increase fluids, Add fruit juices, Add high fiber diet, add Metamucil and/or Citrucel if not getting enough fiber in diet, and add OTC stool softener for moderate constipation. May add Miralax on an as needed basis. 6. Add naloxone 4 mg nasal spray for opioid induced respiratory depression emergency only. Extensive counseling was provided regarding this medication. Instructions were given to take home  7. Discussed risks of addiction, dependency, and opioid induced hyperalgesia.    8. Return to clinic in 2 months     Medications Ordered Today   Medications    HYDROmorphone (DILAUDID) 4 mg tablet     Si/2 to one tab up to three times per day prn severe pain     Dispense:  90 Tab     Refill:  0    HYDROmorphone (DILAUDID) 4 mg tablet     Si/2 to one tab up to three times per day prn severe pain     Dispense:  90 Tab     Refill:  0    naloxone 4 mg/actuation spry     Sig: 4 mg by Nasal route as needed. Indications: OPIATE-INDUCED RESPIRATORY DEPRESSION     Dispense:  1 Package     Refill:  0       Pain medications prescribed with the objective of pain relief and improved physical and psychosocial function in this patient. Spent 25 minutes with patient today reviewing the treatment plan, goals of treatment plan, and limitations of the treatment plan, to include the potential for side effects from medications and procedures. Derek Briscoe, [address] 8/7/2017      Note: Please excuse any typographical errors. Voice recognition software was used for this note and may cause mistakes.

## 2017-08-07 NOTE — PROGRESS NOTES
Nursing Notes    Patient presents to the office today in follow-up. Patient rates her pain at 7/10 on the numerical pain scale. Reviewed medications with counts as follows:    Rx Date filled Qty Dispensed Pill Count Last Dose Short   dilaudid 4 mg 07/20/17 90 43 This a.m. no                                   POC UDS was not performed in office today    Any new labs or imaging since last appointment? NO    Have you been to an emergency room (ER) or urgent care clinic since your last visit? NO            Have you been hospitalized since your last visit? NO     If yes, where, when, and reason for visit? Have you seen or consulted any other health care providers outside of the [address] [place] [address]  since your last visit? NO     If yes, where, when, and reason for visit? HM deferred to pcp.

## 2017-08-07 NOTE — MR AVS SNAPSHOT
Visit Information Date & Time Provider Department Dept. Phone Encounter #  
 8/7/2017  9:20 AM [facility] [id number] Follow-up Instructions Return in about 2 months (around 10/7/2017). Your Appointments 9/6/2017 10:15 AM  
LAB with [facility] NURSE VISIT Internist of [facility] ([facility]) Appt Note: lab  
 [address], [address] [id number] [address] [place]  
  
   
 [address] [facility], [address]  
  
    
 9/6/2017 11:00 AM  
PROCEDURE with [facility] NONIMAGING [facility] ([facility]) Appt Note: leg art 1 yr knaak; r/s to new office 12[address] [facility] [address] [facility] [address]  
[phone] 12[address] [facility] [address] [facility] [address] 9/13/2017 10:00 AM  
Office Visit with Devan Solis MD  
Internist of [facility]) Appt Note: 4 month f/u  
 [address] [facility], [address] [address] [address] [place]  
  
   
 [address], [address] 9/19/2017 11:00 AM  
Follow Up with HETAL [facility] ([facility]) Appt Note: rescheduled from [place] schedule 12[address] [facility] 514 200 [facility] [address]  
[phone] 1212 [facility] 47 [facility]  
  
    
 10/4/2017  9:00 AM  
Follow Up with HETAL [facility] ([facility]) Appt Note: return in 2 months [address] [place] [address]  
[phone] [facility] 3918 [address] Upcoming Health Maintenance Date Due  
 EYE EXAM RETINAL OR DILATED Q1 2/5/2015 GLAUCOMA SCREENING Q2Y 4/1/2016 MEDICARE YEARLY EXAM 12/8/2016 MICROALBUMIN Q1 4/5/2017 INFLUENZA AGE 9 TO ADULT 8/1/2017 HEMOGLOBIN A1C Q6M 11/1/2017 LIPID PANEL Q1 5/1/2018 DTaP/Tdap/Td series (2 - Td) 6/11/2024 Allergies as of 8/7/2017  Review Complete On: 8/7/2017 By: HETAL Pulliam Severity Noted Reaction Type Reactions Levaquin [Levofloxacin]  05/17/2011    Rash Current Immunizations  Reviewed on 2/7/2017 Name Date Influenza High Dose Vaccine PF 10/13/2016 Influenza Vaccine 10/15/2014 Influenza Vaccine (Quad) PF 12/8/2015 Influenza Vaccine Split 11/22/2011, 11/11/2010 11:46 AM  
 Pneumococcal Conjugate (PCV-13) 12/8/2015 Tdap 6/11/2014 ZZZ-RETIRED (DO NOT USE) Pneumococcal Vaccine (Unspecified Type) 9/20/2000 Not reviewed this visit You Were Diagnosed With   
  
 Codes Comments Chronic pain syndrome    -  Primary ICD-10-CM: G89.4 ICD-9-CM: 338.4 Low back pain radiating to both legs     ICD-10-CM: M54.5 ICD-9-CM: 724.2 Lumbar facet arthropathy     ICD-10-CM: M12.88 ICD-9-CM: 721.3 Lumbar stenosis     ICD-10-CM: M48.06 
ICD-9-CM: 724.02 Lumbosacral spondylosis without myelopathy     ICD-10-CM: I34.418 ICD-9-CM: 721.3 Polyneuropathy ([address]     ICD-10-CM: G62.9 ICD-9-CM: 356.9 Primary osteoarthritis involving multiple joints     ICD-10-CM: M15.0 ICD-9-CM: 715.09 Spondylolisthesis of lumbar region     ICD-10-CM: M43.16 
ICD-9-CM: 738.4 Degeneration of lumbar or lumbosacral intervertebral disc     ICD-10-CM: M51.37 
ICD-9-CM: 722.52 Vitals BP Pulse Temp Resp Height(growth percentile) Weight(growth percentile) 139/67 (!) 47 96.3 °F (35.7 °C) 22 5' 5\" (1.651 m) 114 lb (51.7 kg) BMI OB Status Smoking Status 18.97 kg/m2 Hysterectomy Current Every Day Smoker Vitals History BMI and BSA Data Body Mass Index Body Surface Area  
 18.97 kg/m 2 1.54 m 2 Preferred Pharmacy Pharmacy Name Phone [facility] [address] [phone] Your Updated Medication List  
  
   
This list is accurate as of: 8/7/17  9:38 AM.  Always use your most recent med list.  
  
  
  
  
 albuterol 90 mcg/actuation inhaler Commonly known as:  PROVENTIL HFA, VENTOLIN HFA, PROAIR HFA Take 2 Puffs by inhalation every four (4) hours as needed for Wheezing. albuterol-ipratropium 2.5 mg-0.5 mg/3 ml Nebu Commonly known as:  DUO-NEB  
3 mL by Nebulization route every four (4) hours as needed. amLODIPine 10 mg tablet Commonly known as:  [place] Sara Take 1 Tab by mouth daily. budesonide-formoterol 160-4.5 mcg/actuation HFA inhaler Commonly known as:  SYMBICORT Take 1 Puff by inhalation two (2) times a day. CENTRUM 0.4-162-18 mg Tab Generic drug:  DV-GG-FW-Fe-Min-Lycopen-Lutein Take 1 Tab by mouth daily. FISH -160-1,000 mg Cap Generic drug:  omega 3-dha-epa-fish oil Take 1,000 mg by mouth daily. gabapentin 300 mg capsule Commonly known as:  NEURONTIN Take 1 Cap by mouth three (3) times daily. * HYDROmorphone 4 mg tablet Commonly known as:  DILAUDID  
1/2 to one tab up to three times per day prn severe pain Start taking on:  8/19/2017  
  
 * HYDROmorphone 4 mg tablet Commonly known as:  DILAUDID  
1/2 to one tab up to three times per day prn severe pain Start taking on:  9/18/2017  
  
 linaclotide 145 mcg Cap capsule Commonly known as:  Hurshel Covert Take 1 Cap by mouth Daily (before breakfast). LORazepam 1 mg tablet Commonly known as:  ATIVAN Take 0.5 Tabs by mouth every eight (8) hours as needed. Max Daily Amount: 1.5 mg.  
  
 LUMIGAN 0.01 % ophthalmic drops Generic drug:  bimatoprost  
Administer 1 Drop to both eyes nightly. metoprolol tartrate 25 mg tablet Commonly known as:  LOPRESSOR Take 1 Tab by mouth two (2) times a day.  
  
 naloxone 4 mg/actuation Spry 4 mg by Nasal route as needed. Indications: OPIATE-INDUCED RESPIRATORY DEPRESSION PARoxetine 30 mg tablet Commonly known as:  PAXIL Take 1 Tab by mouth daily. RESTASIS 0.05 % ophthalmic emulsion Generic drug:  cycloSPORINE Administer 1 Drop to both eyes two (2) times a day. * rOPINIRole 1 mg tablet Commonly known as:  Ebbie Locks Take 1 mg by mouth daily. * rOPINIRole 1 mg tablet Commonly known as:  Ebbie Locks Take 0.5-1 Tabs by mouth nightly as needed (RLS) for up to 90 days. simvastatin 20 mg tablet Commonly known as:  ZOCOR Take 1 Tab by mouth nightly. tamsulosin 0.4 mg capsule Commonly known as:  FLOMAX Take 1 Cap by mouth daily. tiotropium bromide 1.25 mcg/actuation inhaler Commonly known as:  Sueellen Dannielle Take 2 Puffs by inhalation daily. traZODone 100 mg tablet Commonly known as:  Georganna Quiver Take 100 mg by mouth nightly. Patient takes one and half tabs at bedtime  
  
 triamcinolone 0.5 % topical cream  
Commonly known as:  ARISTOCORT Apply a thin layer to rash twice daily VITAMIN C 1,000 mg tablet Generic drug:  ascorbic acid (vitamin C) Take 1,000 mg by mouth daily. * Notice: This list has 4 medication(s) that are the same as other medications prescribed for you. Read the directions carefully, and ask your doctor or other care provider to review them with you. Prescriptions Printed Refills HYDROmorphone (DILAUDID) 4 mg tablet 0 Starting on: 2017 Si/2 to one tab up to three times per day prn severe pain  
 Class: Print HYDROmorphone (DILAUDID) 4 mg tablet 0 Starting on: 2017 Si/2 to one tab up to three times per day prn severe pain  
 Class: Print  
 naloxone 4 mg/actuation spry 0 Si mg by Nasal route as needed. Indications: OPIATE-INDUCED RESPIRATORY DEPRESSION Class: Print Route: Nasal  
  
Follow-up Instructions Return in about 2 months (around 10/7/2017). Patient Instructions 1. Continue current plan with no evidence of addiction or diversion. Stable on current medication without adverse events. 2. Refill Dilaudid 4 mg up to 3 times daily as needed. 3. Continue Linzess 145 mcg capsule once daily before breakfast.  2 refills 4. Continue Requip 1 mg once nightly as needed. 3 refills 5. Pt instructed to Increase fluids, Add fruit juices, Add high fiber diet, add Metamucil and/or Citrucel if not getting enough fiber in diet, and add OTC stool softener for moderate constipation. May add Miralax on an as needed basis. 6. Add naloxone 4 mg nasal spray for opioid induced respiratory depression emergency only. Extensive counseling was provided regarding this medication. Instructions were given to take home 7. Discussed risks of addiction, dependency, and opioid induced hyperalgesia. 8. Return to clinic in 2 months Please provide this summary of care documentation to your next provider. Your primary care clinician is listed as Prince Tarango. Karen Cummings. If you have any questions after today's visit, please call [phone].

## 2017-08-07 NOTE — PATIENT INSTRUCTIONS
1. Continue current plan with no evidence of addiction or diversion. Stable on current medication without adverse events. 2. Refill Dilaudid 4 mg up to 3 times daily as needed. 3. Continue Linzess 145 mcg capsule once daily before breakfast.  2 refills  4. Continue Requip 1 mg once nightly as needed. 3 refills  5. Pt instructed to Increase fluids, Add fruit juices, Add high fiber diet, add Metamucil and/or Citrucel if not getting enough fiber in diet, and add OTC stool softener for moderate constipation. May add Miralax on an as needed basis. 6. Add naloxone 4 mg nasal spray for opioid induced respiratory depression emergency only. Extensive counseling was provided regarding this medication. Instructions were given to take home  7. Discussed risks of addiction, dependency, and opioid induced hyperalgesia.    8. Return to clinic in 2 months

## 2017-08-31 ENCOUNTER — OFFICE VISIT (OUTPATIENT)
Dept: UROLOGY | Age: 82
End: 2017-08-31

## 2017-08-31 VITALS
SYSTOLIC BLOOD PRESSURE: 120 MMHG | DIASTOLIC BLOOD PRESSURE: 80 MMHG | HEIGHT: 65 IN | OXYGEN SATURATION: 94 % | HEART RATE: 45 BPM | TEMPERATURE: 97.8 F

## 2017-08-31 DIAGNOSIS — R31.29 MICROHEMATURIA: Primary | ICD-10-CM

## 2017-08-31 DIAGNOSIS — N39.0 URINARY TRACT INFECTION WITHOUT HEMATURIA, SITE UNSPECIFIED: ICD-10-CM

## 2017-08-31 LAB
BILIRUB UR QL STRIP: NEGATIVE
GLUCOSE UR-MCNC: NEGATIVE MG/DL
KETONES P FAST UR STRIP-MCNC: NEGATIVE MG/DL
PH UR STRIP: 8 [PH] (ref 4.6–8)
PROT UR QL STRIP: NORMAL MG/DL
SP GR UR STRIP: 1.01 (ref 1–1.03)
UA UROBILINOGEN AMB POC: NORMAL (ref 0.2–1)
URINALYSIS CLARITY POC: CLEAR
URINALYSIS COLOR POC: YELLOW
URINE BLOOD POC: NORMAL
URINE LEUKOCYTES POC: NORMAL
URINE NITRITES POC: POSITIVE

## 2017-08-31 RX ORDER — SULFAMETHOXAZOLE AND TRIMETHOPRIM 400; 80 MG/1; MG/1
1 TABLET ORAL 2 TIMES DAILY
Qty: 20 TAB | Refills: 2 | Status: SHIPPED | OUTPATIENT
Start: 2017-08-31 | End: 2017-08-31 | Stop reason: SDUPTHER

## 2017-08-31 RX ORDER — SULFAMETHOXAZOLE AND TRIMETHOPRIM 800; 160 MG/1; MG/1
1 TABLET ORAL 2 TIMES DAILY
Qty: 20 TAB | Refills: 2 | Status: SHIPPED | OUTPATIENT
Start: 2017-08-31 | End: 2017-09-10

## 2017-08-31 NOTE — PATIENT INSTRUCTIONS
Urinary Tract Infection in Women: Care Instructions  Your Care Instructions    A urinary tract infection, or UTI, is a general term for an infection anywhere between the kidneys and the urethra (where urine comes out). Most UTIs are bladder infections. They often cause pain or burning when you urinate. UTIs are caused by bacteria and can be cured with antibiotics. Be sure to complete your treatment so that the infection goes away. Follow-up care is a key part of your treatment and safety. Be sure to make and go to all appointments, and call your doctor if you are having problems. It's also a good idea to know your test results and keep a list of the medicines you take. How can you care for yourself at home? · Take your antibiotics as directed. Do not stop taking them just because you feel better. You need to take the full course of antibiotics. · Drink extra water and other fluids for the next day or two. This may help wash out the bacteria that are causing the infection. (If you have kidney, heart, or liver disease and have to limit fluids, talk with your doctor before you increase your fluid intake.)  · Avoid drinks that are carbonated or have caffeine. They can irritate the bladder. · Urinate often. Try to empty your bladder each time. · To relieve pain, take a hot bath or lay a heating pad set on low over your lower belly or genital area. Never go to sleep with a heating pad in place. To prevent UTIs  · Drink plenty of water each day. This helps you urinate often, which clears bacteria from your system. (If you have kidney, heart, or liver disease and have to limit fluids, talk with your doctor before you increase your fluid intake.)  · Urinate when you need to. · Urinate right after you have sex. · Change sanitary pads often. · Avoid douches, bubble baths, feminine hygiene sprays, and other feminine hygiene products that have deodorants.   · After going to the bathroom, wipe from front to back.  When should you call for help? Call your doctor now or seek immediate medical care if:  · Symptoms such as fever, chills, nausea, or vomiting get worse or appear for the first time. · You have new pain in your back just below your rib cage. This is called flank pain. · There is new blood or pus in your urine. · You have any problems with your antibiotic medicine. Watch closely for changes in your health, and be sure to contact your doctor if:  · You are not getting better after taking an antibiotic for 2 days. · Your symptoms go away but then come back. Where can you learn more? Go to http://shantell-leona.info/. Enter X978 in the search box to learn more about \"Urinary Tract Infection in Women: Care Instructions. \"  Current as of: November 28, 2016  Content Version: 11.3  © 6432-3812 [employer]. Care instructions adapted under license by [employer] (which disclaims liability or warranty for this information). If you have questions about a medical condition or this instruction, always ask your healthcare professional. [address] any warranty or liability for your use of this information.

## 2017-08-31 NOTE — PROGRESS NOTES
Ms. Chloe Dsouza has a reminder for a \"due or due soon\" health maintenance. I have asked that she contact her primary care provider for follow-up on this health maintenance.

## 2017-08-31 NOTE — MR AVS SNAPSHOT
Visit Information Date & Time Provider Department Dept. Phone Encounter #  
 8/31/2017  2:30 PM Deepti Simon, [address] [facility] [id number] Your Appointments 9/6/2017 10:15 AM  
LAB with [facility] NURSE VISIT Internist of [facility] ([address] [place] [address]) Appt Note: lab  
 [address], [address] [place] [address]  
  
   
 [address] [facility]  
  
    
 9/6/2017 11:00 AM  
PROCEDURE with [facility] NONIMAGING [facility] ([address] [place] [address]) Appt Note: leg art 1 yr knaak; r/s to [facility] office [address] [facility] [address] 200 [facility] [address]  
[phone] 2300 [facility] 200 [facility] 9/13/2017 10:00 AM  
Office Visit with Bozena Quinn MD  
Internist of [address] [place] [address]) Appt Note: 4 month f/u  
 [address] [facility], [address] [place] [address]  
  
   
 [address] [facility] 9/19/2017 11:00 AM  
Follow Up with HETAL [facility] ([address] [place] [address]) Appt Note: rescheduled from [place] schedule 2300 [facility] [address] 200 [facility] [address]  
[phone] 23[address] [facility] [address] 47 [facility]  
  
    
 10/4/2017  9:00 AM  
Follow Up with [facility]ETAL [facility] (FAN SCHEDULING) Appt Note: return in 2 months 30 [facility] [address]  
[phone] [address] [address]  
  
    
 3/5/2018  9:00 AM  
Office Visit with Deepti Simon MD  
[facility] [address] [place] [address]) Appt Note: check up [address] [address]  
[phone] [address] [place] [address] Upcoming Health Maintenance  Date Due  
 EYE EXAM RETINAL OR DILATED Q1 2/5/2015 GLAUCOMA SCREENING Q2Y 4/1/2016 MEDICARE YEARLY EXAM 12/8/2016 MICROALBUMIN Q1 4/5/2017 INFLUENZA AGE 9 TO ADULT 8/1/2017 HEMOGLOBIN A1C Q6M 11/1/2017 LIPID PANEL Q1 5/1/2018 DTaP/Tdap/Td series (2 - Td) 6/11/2024 Allergies as of 8/31/2017  Review Complete On: 8/31/2017 By: Kerline Leonard MD  
  
 Severity Noted Reaction Type Reactions Levaquin [Levofloxacin]  05/17/2011    Rash Current Immunizations  Reviewed on 2/7/2017 Name Date Influenza High Dose Vaccine PF 10/13/2016 Influenza Vaccine 10/15/2014 Influenza Vaccine (Quad) PF 12/8/2015 Influenza Vaccine Split 11/22/2011, 11/11/2010 11:46 AM  
 Pneumococcal Conjugate (PCV-13) 12/8/2015 Tdap 6/11/2014 ZZZ-RETIRED (DO NOT USE) Pneumococcal Vaccine (Unspecified Type) 9/20/2000 Not reviewed this visit You Were Diagnosed With   
  
 Codes Comments Microhematuria    -  Primary ICD-10-CM: R31.29 ICD-9-CM: 599.72 Urinary tract infection without hematuria, site unspecified     ICD-10-CM: N39.0 ICD-9-CM: 599.0 Vitals BP Pulse Temp Height(growth percentile) SpO2 OB Status 120/80 (BP 1 Location: Left arm, BP Patient Position: Sitting) (!) 45 97.8 °F (36.6 °C) (Oral) 5' 5\" (1.651 m) 94% Hysterectomy Smoking Status Current Every Day Smoker Preferred Pharmacy Pharmacy Name Phone [facility] [address] [phone] Your Updated Medication List  
  
   
This list is accurate as of: 8/31/17  3:03 PM.  Always use your most recent med list.  
  
  
  
  
 albuterol 90 mcg/actuation inhaler Commonly known as:  PROVENTIL HFA, VENTOLIN HFA, PROAIR HFA Take 2 Puffs by inhalation every four (4) hours as needed for Wheezing. albuterol-ipratropium 2.5 mg-0.5 mg/3 ml Nebu Commonly known as:  DUO-NEB  
3 mL by Nebulization route every four (4) hours as needed. amLODIPine 10 mg tablet Commonly known as:  Duy Railing Take 1 Tab by mouth daily. budesonide-formoterol 160-4.5 mcg/actuation HFA inhaler Commonly known as:  SYMBICORT Take 1 Puff by inhalation two (2) times a day. CENTRUM 0.4-162-18 mg Tab Generic drug:  NR-UH-ZV-Fe-Min-Lycopen-Lutein Take 1 Tab by mouth daily. FISH -160-1,000 mg Cap Generic drug:  omega 3-dha-epa-fish oil Take 1,000 mg by mouth daily. gabapentin 300 mg capsule Commonly known as:  NEURONTIN Take 1 Cap by mouth three (3) times daily. * HYDROmorphone 4 mg tablet Commonly known as:  DILAUDID  
1/2 to one tab up to three times per day prn severe pain  
  
 * HYDROmorphone 4 mg tablet Commonly known as:  DILAUDID  
1/2 to one tab up to three times per day prn severe pain Start taking on:  9/18/2017  
  
 linaclotide 145 mcg Cap capsule Commonly known as:  Yi Pries Take 1 Cap by mouth Daily (before breakfast). LORazepam 1 mg tablet Commonly known as:  ATIVAN Take 0.5 Tabs by mouth every eight (8) hours as needed. Max Daily Amount: 1.5 mg.  
  
 LUMIGAN 0.01 % ophthalmic drops Generic drug:  bimatoprost  
Administer 1 Drop to both eyes nightly. metoprolol tartrate 25 mg tablet Commonly known as:  LOPRESSOR Take 1 Tab by mouth two (2) times a day.  
  
 naloxone 4 mg/actuation Spry 4 mg by Nasal route as needed. Indications: OPIATE-INDUCED RESPIRATORY DEPRESSION PARoxetine 30 mg tablet Commonly known as:  PAXIL Take 1 Tab by mouth daily. RESTASIS 0.05 % ophthalmic emulsion Generic drug:  cycloSPORINE Administer 1 Drop to both eyes two (2) times a day. * rOPINIRole 1 mg tablet Commonly known as:  Casimiro Mines Take 1 mg by mouth daily. * rOPINIRole 1 mg tablet Commonly known as:  Casimiro Mines Take 0.5-1 Tabs by mouth nightly as needed (RLS) for up to 90 days. simvastatin 20 mg tablet Commonly known as:  ZOCOR  
 Take 1 Tab by mouth nightly. tamsulosin 0.4 mg capsule Commonly known as:  FLOMAX Take 1 Cap by mouth daily. tiotropium bromide 1.25 mcg/actuation inhaler Commonly known as:  Sandor Railing Take 2 Puffs by inhalation daily. traZODone 100 mg tablet Commonly known as:  Darreld Groton Take 100 mg by mouth nightly. Patient takes one and half tabs at bedtime  
  
 triamcinolone 0.5 % topical cream  
Commonly known as:  ARISTOCORT Apply a thin layer to rash twice daily  
  
 trimethoprim-sulfamethoxazole  mg per tablet Commonly known as:  BACTRIM Take 1 Tab by mouth two (2) times a day. Indications: E. COLI URINARY TRACT INFECTION  
  
 VITAMIN C 1,000 mg tablet Generic drug:  ascorbic acid (vitamin C) Take 1,000 mg by mouth daily. * Notice: This list has 4 medication(s) that are the same as other medications prescribed for you. Read the directions carefully, and ask your doctor or other care provider to review them with you. Prescriptions Printed Refills  
 trimethoprim-sulfamethoxazole (BACTRIM)  mg per tablet 2 Sig: Take 1 Tab by mouth two (2) times a day. Indications: E. COLI URINARY TRACT INFECTION Class: Print Route: Oral  
  
We Performed the Following AMB POC URINALYSIS DIP STICK AUTO W/O MICRO [49931 CPT(R)] Patient Instructions Urinary Tract Infection in Women: Care Instructions Your Care Instructions A urinary tract infection, or UTI, is a general term for an infection anywhere between the kidneys and the urethra (where urine comes out). Most UTIs are bladder infections. They often cause pain or burning when you urinate. UTIs are caused by bacteria and can be cured with antibiotics. Be sure to complete your treatment so that the infection goes away. Follow-up care is a key part of your treatment and safety.  Be sure to make and go to all appointments, and call your doctor if you are having problems. It's also a good idea to know your test results and keep a list of the medicines you take. How can you care for yourself at home? · Take your antibiotics as directed. Do not stop taking them just because you feel better. You need to take the full course of antibiotics. · Drink extra water and other fluids for the next day or two. This may help wash out the bacteria that are causing the infection. (If you have kidney, heart, or liver disease and have to limit fluids, talk with your doctor before you increase your fluid intake.) · Avoid drinks that are carbonated or have caffeine. They can irritate the bladder. · Urinate often. Try to empty your bladder each time. · To relieve pain, take a hot bath or lay a heating pad set on low over your lower belly or genital area. Never go to sleep with a heating pad in place. To prevent UTIs · Drink plenty of water each day. This helps you urinate often, which clears bacteria from your system. (If you have kidney, heart, or liver disease and have to limit fluids, talk with your doctor before you increase your fluid intake.) · Urinate when you need to. · Urinate right after you have sex. · Change sanitary pads often. · Avoid douches, bubble baths, feminine hygiene sprays, and other feminine hygiene products that have deodorants. · After going to the bathroom, wipe from front to back. When should you call for help? Call your doctor now or seek immediate medical care if: · Symptoms such as fever, chills, nausea, or vomiting get worse or appear for the first time. · You have new pain in your back just below your rib cage. This is called flank pain. · There is new blood or pus in your urine. · You have any problems with your antibiotic medicine. Watch closely for changes in your health, and be sure to contact your doctor if: 
· You are not getting better after taking an antibiotic for 2 days. · Your symptoms go away but then come back. Where can you learn more? Go to http://shantell-leona.info/. Enter J916 in the search box to learn more about \"Urinary Tract Infection in Women: Care Instructions. \" Current as of: November 28, 2016 Content Version: 11.3 © 8857-1106 [employer]. Care instructions adapted under license by [employer] (which disclaims liability or warranty for this information). If you have questions about a medical condition or this instruction, always ask your healthcare professional. [address] any warranty or liability for your use of this information. Please provide this summary of care documentation to your next provider. Your primary care clinician is listed as Torsten Balderas. Bakari Bermudez. If you have any questions after today's visit, please call [phone].

## 2017-09-01 DIAGNOSIS — R73.01 IMPAIRED FASTING GLUCOSE: ICD-10-CM

## 2017-09-01 DIAGNOSIS — E78.5 HYPERLIPIDEMIA LDL GOAL <100: ICD-10-CM

## 2017-09-01 NOTE — PROGRESS NOTES
Dima Watts 80 y.o. female     Ms. Elena Plunkett seen today for urinary tract infection-patient is experiencing urgency frequency with mild dysuria-no fever chills or flank pain    Has history of recurrent UTIs successfully managed with short course self-directed antibiotic treatment with Cipro    Patient has history of recurrent urinary tract infections with cystoscopy showing chronic cystitis cystica  Patient has chronic pain syndrome on daily narcotic analgesic therapy with Dilaudid  Patient has history of recurrent UTIs usually responding promptly to short course antibiotic therapy      Normal cystoscopic findings in February 2014, and except for cystitis cystica      Most recent UTI November 2015 100,000 Klebsiella sensitive to [employer]  Application of the estrogen cream to introitus has not produced expected benefit      Review of Systems:    CNS: depression/syncope    Respiratory: COPD and reactive airway disease    Cardiovascular:hypertension/dyspnea on exertion    Intestinal:colonic polyps    Urinary: stress urinary incontinence, recurrent urinary tract infections    Skeletal: sciatica/lumbar disc disease/spinal stenosis    Endocrine:diabetes    Other:glaucoma,restless leg syndrome          Allergies: Allergies   Allergen Reactions    Levaquin [Levofloxacin] Rash      Medications:    Current Outpatient Prescriptions   Medication Sig Dispense Refill    trimethoprim-sulfamethoxazole (BACTRIM DS, SEPTRA DS) 160-800 mg per tablet Take 1 Tab by mouth two (2) times a day for 10 days. 20 Tab 2    HYDROmorphone (DILAUDID) 4 mg tablet 1/2 to one tab up to three times per day prn severe pain 90 Tab 0    [START ON 9/18/2017] HYDROmorphone (DILAUDID) 4 mg tablet 1/2 to one tab up to three times per day prn severe pain 90 Tab 0    naloxone 4 mg/actuation spry 4 mg by Nasal route as needed.  Indications: OPIATE-INDUCED RESPIRATORY DEPRESSION 1 Package 0    linaclotide (LINZESS) 145 mcg cap capsule Take 1 Cap by mouth Daily (before breakfast). 90 Cap 2    rOPINIRole (REQUIP) 1 mg tablet Take 0.5-1 Tabs by mouth nightly as needed (RLS) for up to 90 days. 90 Tab 3    gabapentin (NEURONTIN) 300 mg capsule Take 1 Cap by mouth three (3) times daily. 270 Cap 1    metoprolol tartrate (LOPRESSOR) 25 mg tablet Take 1 Tab by mouth two (2) times a day. 60 Tab 5    PARoxetine (PAXIL) 30 mg tablet Take 1 Tab by mouth daily. 90 Tab 3    budesonide-formoterol (SYMBICORT) 160-4.5 mcg/actuation HFA inhaler Take 1 Puff by inhalation two (2) times a day. 1 Inhaler 6    tiotropium bromide (SPIRIVA RESPIMAT) 1.25 mcg/actuation inhaler Take 2 Puffs by inhalation daily. 1 Inhaler 11    albuterol (PROVENTIL HFA, VENTOLIN HFA, PROAIR HFA) 90 mcg/actuation inhaler Take 2 Puffs by inhalation every four (4) hours as needed for Wheezing. 1 Inhaler 5    simvastatin (ZOCOR) 20 mg tablet Take 1 Tab by mouth nightly. 90 Tab 1    triamcinolone (ARISTOCORT) 0.5 % topical cream Apply a thin layer to rash twice daily 30 g 0    rOPINIRole (REQUIP) 1 mg tablet Take 1 mg by mouth daily.  LORazepam (ATIVAN) 1 mg tablet Take 0.5 Tabs by mouth every eight (8) hours as needed. Max Daily Amount: 1.5 mg. 30 Tab 0    albuterol-ipratropium (DUO-NEB) 2.5 mg-0.5 mg/3 ml nebu 3 mL by Nebulization route every four (4) hours as needed. 30 Nebule 0    amLODIPine (NORVASC) 10 mg tablet Take 1 Tab by mouth daily. 30 Tab 0    omega 3-dha-epa-fish oil (FISH OIL) 100-160-1,000 mg cap Take 1,000 mg by mouth daily.  LUMIGAN 0.01 % ophthalmic drops Administer 1 Drop to both eyes nightly.  tamsulosin (FLOMAX) 0.4 mg capsule Take 1 Cap by mouth daily. 90 Cap 3    ascorbic acid (VITAMIN C) 1,000 mg tablet Take 1,000 mg by mouth daily.  traZODone (DESYREL) 100 mg tablet Take 100 mg by mouth nightly. Patient takes one and half tabs at bedtime      cycloSPORINE (RESTASIS) 0.05 % ophthalmic emulsion Administer 1 Drop to both eyes two (2) times a day.  PK-SK-FT-Fe-Min-Lycopen-Lutein (CENTRUM) 0.4-162-18 mg Tab Take 1 Tab by mouth daily.          Past Medical History:   Diagnosis Date    Colon polyps     COPD 8-30-02    DDD (degenerative disc disease), lumbar 10/1/2014    Degeneration of lumbar or lumbosacral intervertebral disc     Depression     Diabetes ([facility] [address] 7-19-05    Diverticulosis     DM neuropathy, painful ([facility] [address] 10/1/2014    MOORE (Dyspnea on Exertion) 4-19-02    echo: +mild LVH, CU10-79% w/ diastolic dysfxn    Glaucoma     HCAP (healthcare-associated pneumonia) 03/24/2017    Hemorrhoids 6-6-06    Hyperlipidemia 5/17/2011    Hypertension 4-16-02    LBP (low back pain) 4-13-04    Low back pain radiating to both legs 10/1/2014    Lumbago     Lumbar disc herniation 10/1/2014    Lumbar facet arthropathy 10/1/2014    Lumbosacral radiculopathy at L5 10/1/2014    Lumbosacral radiculopathy at S1 10/1/2014    Microscopic hematuria     OA (osteoarthritis)     Overactive bladder 5/17/2011    RLS (restless legs syndrome) 1/17/2013    Sciatica     Shortness of breath     Spinal stenosis, lumbar region, without neurogenic claudication     Spondylolisthesis of lumbar region 10/1/2014    Spondylolisthesis, grade 1 10/1/2014    Stress urinary incontinence     Syncope     -MRI brain    Urinary tract infection, site not specified       Past Surgical History:   Procedure Laterality Date    HX APPENDECTOMY      HX CHOLECYSTECTOMY      HX HYSTERECTOMY      (+)DUB    HX POLYPECTOMY      ND COLONOSCOPY FLX DX W/COLLJ SPEC WHEN PFRMD  6-16-06    normal, Dr Andrews [place]    ND COLONOSCOPY FLX DX W/COLLJ Darrius [place] PFRMD      (+)polyp= tubular adenoma     Family History   Problem Relation Age of Onset    Colon Cancer Sister     Heart Disease Brother     Cancer Brother      stomach CA    Seizures Son     Depression Daughter     Colon Cancer Maternal Aunt         Physical Examination: Well-nourished mature female in no apparent distress    Abdomen is nontender  Back-no percussion CVA tenderness      Urinalysis: Positive nitrite / negative heme      Impression: Active urinary tract infection                        History of recurrent urinary tract infections        Plan: C&S urine today              Septra DS twice daily pending C&S Septra DS twice daily ×2-3 days as needed symptoms of UTI    -C&S urine if symptoms persist after 2 days of antibiotic Rx      rtc 6 mo      More than 1/2 of this 15 minute visit was spent in counselling and coordination of care, as described above. Rivera Alvarado MD  -electronically signed-    PLEASE NOTE:  This document has been produced using voice recognition software. Unrecognized errors in transcription may be present.

## 2017-09-07 ENCOUNTER — HOSPITAL ENCOUNTER (OUTPATIENT)
Dept: LAB | Age: 82
Discharge: HOME | End: 2017-09-07
Payer: MEDICARE

## 2017-09-07 ENCOUNTER — LAB ONLY (OUTPATIENT)
Dept: INTERNAL MEDICINE CLINIC | Age: 82
End: 2017-09-07

## 2017-09-07 DIAGNOSIS — E11.42 DIABETIC POLYNEUROPATHY ASSOCIATED WITH TYPE 2 DIABETES MELLITUS (HCC): Primary | ICD-10-CM

## 2017-09-07 LAB
ALBUMIN SERPL-MCNC: 4.2 G/DL (ref 3.4–5)
ALBUMIN/GLOB SERPL: 1.8 {RATIO} (ref 0.8–1.7)
ALP SERPL-CCNC: 66 U/L (ref 45–117)
ALT SERPL-CCNC: 20 U/L (ref 13–56)
ANION GAP SERPL CALC-SCNC: 6 MMOL/L (ref 3–18)
AST SERPL-CCNC: 16 U/L (ref 15–37)
BILIRUB SERPL-MCNC: 0.3 MG/DL (ref 0.2–1)
BUN SERPL-MCNC: 11 MG/DL (ref 7–18)
BUN/CREAT SERPL: 16 (ref 12–20)
CALCIUM SERPL-MCNC: 8.9 MG/DL (ref 8.5–10.1)
CHLORIDE SERPL-SCNC: 93 MMOL/L (ref 100–108)
CHOLEST SERPL-MCNC: 119 MG/DL
CO2 SERPL-SCNC: 35 MMOL/L (ref 21–32)
CREAT SERPL-MCNC: 0.67 MG/DL (ref 0.6–1.3)
EST. AVERAGE GLUCOSE BLD GHB EST-MCNC: 126 MG/DL
GLOBULIN SER CALC-MCNC: 2.3 G/DL (ref 2–4)
GLUCOSE SERPL-MCNC: 98 MG/DL (ref 74–99)
HBA1C MFR BLD: 6 % (ref 4.2–5.6)
HDLC SERPL-MCNC: 53 MG/DL (ref 40–60)
HDLC SERPL: 2.2 {RATIO} (ref 0–5)
LDLC SERPL CALC-MCNC: 46.4 MG/DL (ref 0–100)
LIPID PROFILE,FLP: NORMAL
POTASSIUM SERPL-SCNC: 4.6 MMOL/L (ref 3.5–5.5)
PROT SERPL-MCNC: 6.5 G/DL (ref 6.4–8.2)
SODIUM SERPL-SCNC: 134 MMOL/L (ref 136–145)
TRIGL SERPL-MCNC: 98 MG/DL (ref ?–150)
VLDLC SERPL CALC-MCNC: 19.6 MG/DL

## 2017-09-07 PROCEDURE — 36415 COLL VENOUS BLD VENIPUNCTURE: CPT | Performed by: INTERNAL MEDICINE

## 2017-09-07 PROCEDURE — 80061 LIPID PANEL: CPT | Performed by: INTERNAL MEDICINE

## 2017-09-07 PROCEDURE — 83036 HEMOGLOBIN GLYCOSYLATED A1C: CPT | Performed by: INTERNAL MEDICINE

## 2017-09-07 PROCEDURE — 80053 COMPREHEN METABOLIC PANEL: CPT | Performed by: INTERNAL MEDICINE

## 2017-09-13 ENCOUNTER — OFFICE VISIT (OUTPATIENT)
Dept: INTERNAL MEDICINE CLINIC | Age: 82
End: 2017-09-13

## 2017-09-13 VITALS
HEIGHT: 65 IN | TEMPERATURE: 97.7 F | HEART RATE: 83 BPM | SYSTOLIC BLOOD PRESSURE: 132 MMHG | BODY MASS INDEX: 18.93 KG/M2 | OXYGEN SATURATION: 93 % | DIASTOLIC BLOOD PRESSURE: 50 MMHG | RESPIRATION RATE: 14 BRPM | WEIGHT: 113.6 LBS

## 2017-09-13 DIAGNOSIS — I10 ESSENTIAL HYPERTENSION WITH GOAL BLOOD PRESSURE LESS THAN 140/90: ICD-10-CM

## 2017-09-13 DIAGNOSIS — Z13.39 SCREENING FOR ALCOHOLISM: ICD-10-CM

## 2017-09-13 DIAGNOSIS — E78.5 HYPERLIPIDEMIA LDL GOAL <100: ICD-10-CM

## 2017-09-13 DIAGNOSIS — Z00.00 MEDICARE ANNUAL WELLNESS VISIT, SUBSEQUENT: ICD-10-CM

## 2017-09-13 DIAGNOSIS — E11.40 TYPE 2 DIABETES MELLITUS WITH DIABETIC NEUROPATHY, WITHOUT LONG-TERM CURRENT USE OF INSULIN (HCC): ICD-10-CM

## 2017-09-13 DIAGNOSIS — J43.1 PANLOBULAR EMPHYSEMA (HCC): Primary | ICD-10-CM

## 2017-09-13 DIAGNOSIS — E11.42 DIABETIC POLYNEUROPATHY ASSOCIATED WITH TYPE 2 DIABETES MELLITUS (HCC): ICD-10-CM

## 2017-09-13 NOTE — PROGRESS NOTES
Ghislaine Gates 9/22/1933, is a 80 y.o. female, who is seen today for reevaluation of diabetes hyperlipidemia COPD hypertension insomnia. She tripped on a cord 1 week ago and hit the back of her head with still hurts somewhat. Her balance is good at this point she has had no further falls. No lightheadedness or visual impairment. She does not have enough head pain to warrant any medicine. She has a hard time sleeping, constantly thinking about things, even with using trazodone and paroxetine. She still smokes about a quarter pack per day and she is not using Symbicort, she forgets to use it but she thinks when she does use it it does help her somewhat with dyspnea. She is taking all of her other medicine correctly.     Past Medical History:   Diagnosis Date    Colon polyps     COPD 8-30-02    DDD (degenerative disc disease), lumbar 10/1/2014    Degeneration of lumbar or lumbosacral intervertebral disc     Depression     Diabetes ([facility] [address] 7-19-05    Diverticulosis     DM neuropathy, painful ([facility] [address] 10/1/2014    MOORE (Dyspnea on Exertion) 4-19-02    echo: +mild LVH, OP78-28% w/ diastolic dysfxn    Glaucoma     HCAP (healthcare-associated pneumonia) 03/24/2017    Hemorrhoids 6-6-06    Hyperlipidemia 5/17/2011    Hypertension 4-16-02    LBP (low back pain) 4-13-04    Low back pain radiating to both legs 10/1/2014    Lumbago     Lumbar disc herniation 10/1/2014    Lumbar facet arthropathy 10/1/2014    Lumbosacral radiculopathy at L5 10/1/2014    Lumbosacral radiculopathy at S1 10/1/2014    Microscopic hematuria     OA (osteoarthritis)     Overactive bladder 5/17/2011    RLS (restless legs syndrome) 1/17/2013    Sciatica     Shortness of breath     Spinal stenosis, lumbar region, without neurogenic claudication     Spondylolisthesis of lumbar region 10/1/2014    Spondylolisthesis, grade 1 10/1/2014    Stress urinary incontinence     Syncope     -MRI brain    Urinary tract infection, site not specified      Current Outpatient Prescriptions   Medication Sig Dispense Refill    HYDROmorphone (DILAUDID) 4 mg tablet 1/2 to one tab up to three times per day prn severe pain 90 Tab 0    [START ON 9/18/2017] HYDROmorphone (DILAUDID) 4 mg tablet 1/2 to one tab up to three times per day prn severe pain 90 Tab 0    gabapentin (NEURONTIN) 300 mg capsule Take 1 Cap by mouth three (3) times daily. 270 Cap 1    metoprolol tartrate (LOPRESSOR) 25 mg tablet Take 1 Tab by mouth two (2) times a day. 60 Tab 5    PARoxetine (PAXIL) 30 mg tablet Take 1 Tab by mouth daily. 90 Tab 3    budesonide-formoterol (SYMBICORT) 160-4.5 mcg/actuation HFA inhaler Take 1 Puff by inhalation two (2) times a day. 1 Inhaler 6    tiotropium bromide (SPIRIVA RESPIMAT) 1.25 mcg/actuation inhaler Take 2 Puffs by inhalation daily. 1 Inhaler 11    albuterol (PROVENTIL HFA, VENTOLIN HFA, PROAIR HFA) 90 mcg/actuation inhaler Take 2 Puffs by inhalation every four (4) hours as needed for Wheezing. 1 Inhaler 5    simvastatin (ZOCOR) 20 mg tablet Take 1 Tab by mouth nightly. 90 Tab 1    LORazepam (ATIVAN) 1 mg tablet Take 0.5 Tabs by mouth every eight (8) hours as needed. Max Daily Amount: 1.5 mg. 30 Tab 0    albuterol-ipratropium (DUO-NEB) 2.5 mg-0.5 mg/3 ml nebu 3 mL by Nebulization route every four (4) hours as needed. 30 Nebule 0    amLODIPine (NORVASC) 10 mg tablet Take 1 Tab by mouth daily. 30 Tab 0    omega 3-dha-epa-fish oil (FISH OIL) 100-160-1,000 mg cap Take 1,000 mg by mouth daily.  LUMIGAN 0.01 % ophthalmic drops Administer 1 Drop to both eyes nightly.  tamsulosin (FLOMAX) 0.4 mg capsule Take 1 Cap by mouth daily. 90 Cap 3    ascorbic acid (VITAMIN C) 1,000 mg tablet Take 1,000 mg by mouth daily.  traZODone (DESYREL) 100 mg tablet Take 100 mg by mouth nightly.  Patient takes one and half tabs at bedtime      cycloSPORINE (RESTASIS) 0.05 % ophthalmic emulsion Administer 1 Drop to both eyes two (2) times a day.  UO-OF-TN-Fe-Min-Lycopen-Lutein (CENTRUM) 0.4-162-18 mg Tab Take 1 Tab by mouth daily.  naloxone 4 mg/actuation spry 4 mg by Nasal route as needed. Indications: OPIATE-INDUCED RESPIRATORY DEPRESSION 1 Package 0     Visit Vitals    /50    Pulse 83    Temp 97.7 °F (36.5 °C) (Oral)    Resp 14    Ht 5' 5\" (1.651 m)    Wt 113 lb 9.6 oz (51.5 kg)    SpO2 93%    BMI 18.9 kg/m2     Scalp is nontender. There is no hematoma. Neck is quite supple without discomfort with range of motion of the neck. Carotids are 2+ no bruits. Lungs are clear to percussion. Diminished breath sounds throughout with minimal end expiratory wheeze and a few scattered rhonchi. Heart reveals a regular rhythm with normal S1 and S2 no murmur gallop click or rub. Apical impulse is not palpable. Abdomen is soft and nontender with no hepatosplenomegaly or masses and no bruits. Extremities reveal no clubbing cyanosis or edema. Pulses are 1+ in the feet and 2+ elsewhere. Results for orders placed or performed during the hospital encounter of 09/07/17   LIPID PANEL   Result Value Ref Range    LIPID PROFILE          Cholesterol, total 119 <200 MG/DL    Triglyceride 98 <150 MG/DL    HDL Cholesterol 53 40 - 60 MG/DL    LDL, calculated 46.4 0 - 100 MG/DL    VLDL, calculated 19.6 MG/DL    CHOL/HDL Ratio 2.2 0 - 5.0     METABOLIC PANEL, COMPREHENSIVE   Result Value Ref Range    Sodium 134 (L) 136 - 145 mmol/L    Potassium 4.6 3.5 - 5.5 mmol/L    Chloride 93 (L) 100 - 108 mmol/L    CO2 35 (H) 21 - 32 mmol/L    Anion gap 6 3.0 - 18 mmol/L    Glucose 98 74 - 99 mg/dL    BUN 11 7.0 - 18 MG/DL    Creatinine 0.67 0.6 - 1.3 MG/DL    BUN/Creatinine ratio 16 12 - 20      GFR est AA >60 >60 ml/min/1.73m2    GFR est non-AA >60 >60 ml/min/1.73m2    Calcium 8.9 8.5 - 10.1 MG/DL    Bilirubin, total 0.3 0.2 - 1.0 MG/DL    ALT (SGPT) 20 13 - 56 U/L    AST (SGOT) 16 15 - 37 U/L    Alk.  phosphatase 66 45 - 117 U/L Protein, total 6.5 6.4 - 8.2 g/dL    Albumin 4.2 3.4 - 5.0 g/dL    Globulin 2.3 2.0 - 4.0 g/dL    A-G Ratio 1.8 (H) 0.8 - 1.7     HEMOGLOBIN A1C WITH EAG   Result Value Ref Range    Hemoglobin A1c 6.0 (H) 4.2 - 5.6 %    Est. average glucose 126 mg/dL     Assessment: #1. Hyperlipidemia doing well. She will continue simvastatin 20 mg each evening. #2.  COPD with continued smoking will not nearly as much as she used to smoke years ago, she had smoked at least a pack per day. Have encouraged her to try to quit smoking altogether and she says she is doing the best she can requires the cigarettes for anxiety control. #3.  Chronic anxiety and insomnia, continue trazodone 150 mg at bedtime, paroxetine 30 mg daily and lorazepam 0.5 mg as needed. #4.  Recent fall without significant injury. She is to be extremely careful when walking around to avoid falls. #5. Hypertension is well controlled. She will continue amlodipine 10 mg daily and metoprolol 25 mg twice daily. #6.  History of diabetes doing very well. She has lost a lot of weight over the years. She will continue diabetic diet. Follow-up in 4 months with lab. She had a Medicare wellness evaluation today and I agree with Filipe's note. I also recommended a flu shot today but her  says that they get them in December, I was unable to get them to changes her mind and have flu shot done earlier. Gaetano Quijano MD FACP    Please note: This document has been produced using voice recognition software. Unrecognized errors in transcription may be present.

## 2017-09-13 NOTE — PATIENT INSTRUCTIONS
Medicare Part B Preventive Services Limitations Recommendation Follow-up Action Needed    Bone Mass Measurement  (age 72 & older, biennial) Requires diagnosis related to osteoporosis or estrogen deficiency. Biennial benefit unless patient has history of long-term glucocorticoid tx or baseline is needed because initial test was by other method Last: 3/2017    A DEXA scan is recommended after you turn 72years of age to determine your risk for osteoporosis Follow up as recommended by primary care provider and/or specialist        Colorectal Cancer Screening  -Fecal occult blood test (annual)  -Flexible sigmoidoscopy (5y)  -Screening colonoscopy (10y)  -Barium Enema Normally recommended for patients age 48 - 78  Last: 2/2011    Every 5-10 years depending on your colonoscopy result starting at age 48 years Next: No longer needed based on age, follow up as recommended by primary care provider and/or specialist      Glaucoma Screening (no USPSTF recommendation) Diabetes mellitus, family history, , age 48 or over,  American, age 72 or over Last: Summer 2017    Every year, or as directed by provider Next: Follow up as recommended by primary care provider and/or specialist        Screening Mammography (biennial age 54-69) Annually (age 36 or over) Last: July 2017  Next: July 2018    Screening Pap Tests and Pelvic Examination (up to age 72 and after 72 if unknown history or abnormal study last 10 years) Every 24 months except high risk Last: N/A  Next: Discuss with your doctor if this screening is appropriate for you.    Cardiovascular Screening Blood Tests (every 5 years)  Total cholesterol, HDL, Triglycerides Order as a panel if possible Last: September 2017    Every Year Next: Follow up as recommended by primary care provider and/or specialist        Diabetes Screening Tests (at least every 3 years, Medicare covers annually or at 6-month intervals for prediabetic patients)    Fasting blood sugar (FBS) or glucose tolerance test (GTT) Patient must be diagnosed with one of the following:  -Hypertension, Dyslipidemia, obesity, previous impaired FBS or GTT  Or any two of the following: overweight, FH of diabetes, age ? 72, history of gestational diabetes, birth of baby weighing more than 9 pounds Last: A1c 6 9/2017    Every 3-6 months depending on your result    Every 3 years Next: Follow up as recommended by primary care provider and/or specialist        Diabetes Self-Management Training (DSMT) (no USPSTF recommendation) Requires referral by treating physician for patient with diabetes or renal disease. 10 hours of initial DSMT session of no less than 30 minutes each in a continuous 12-month period. 2 hours of follow-up DSMT in subsequent years. Last: N/A Talk to your doctor if you are interested in a refresher course. Medical Nutrition Therapy (MNT) (for diabetes or renal disease not recommended schedule) Requires referral by treating physician for patient with diabetes or renal disease. Can be provided in same year as diabetes self-management training (DSMT), and CMS recommends medical nutrition therapy take place after DSMT. Up to 3 hours for initial year and 2 hours in subsequent years. Last: N/A Talk to your doctor if you are interested in a refresher course. Seasonal Influenza Vaccination (annually)  Last: 12/2016     Every Fall Please get one this Fall. Pneumococcal Vaccination (once after 65)  Last:  Pneumovax: 9/2000    Prevnar-13: 12/2015   Next: Vaccination series complete      Shingles Vaccination  Last: N/A     A shingles vaccine is recommended once in a lifetime after age 61 Please consider getting one at your pharmacy. Tetanus, Diphtheria and Pertussis (Tdap) Vaccination Booster One Booster as an adult and then tetanus every 10 years or as indicated Last: N/A  Please consider, especially if you will have frequent contact with young children who have not completed their vaccine series. Can get at your pharmacy. Hepatitis B Vaccinations (if medium/high risk) Medium/high risk factors:  End-stage renal disease,  Hemophiliacs who received Factor VIII or IX concentrates, Clients of institutions for the mentally retarded, Persons who live in the same house as a HepB virus carrier, Homosexual men, Illicit injectable drug abusers. Last: N/A Next: N/A   Counseling to Prevent Tobacco Use (up to 8 sessions per year)  - Counseling greater than 3 and up to 10 minutes  - Counseling greater than 10 minutes Patients must be asymptomatic of tobacco-related conditions to receive as preventive service  As recommended by your PCP or specialist    Human Immunodeficiency Virus (HIV) Screening (annually for increased risk patients)  HIV-1 and HIV-2 by EIA, CATHY, rapid antibody test, or oral mucosa transudate Patient must be at increased risk for HIV infection per USPSTF guidelines or pregnant. Tests covered annually for patients at increased risk. Pregnant patients may receive up to 3 test during pregnancy. As recommended by your PCP or Specialist   Ultrasound Screening for Abdominal Aortic Aneurysm (AAA) once Patient must be referred through IPPE and not have had a screening for abdominal aortic aneurysm before under medicare. Limited to patients who meet onf of the following criteria:  -Men who are 73-68 years old and have smoked more than 100 cigarettes in their lifetime  -Anyone with a FH of AAA  -Anyone recommended for screening by the USPSFTF    As recommended by your PCP or Specialist     NA = Not Applicable; NI= Not Indicated     Advanced care planning: Please bring in a copy of your advanced directives at your convenience to our office so we may scan a copy for our records.

## 2017-09-13 NOTE — PROGRESS NOTES
Dr. Brenda Hdz  referred Maile Sheriff (9/22/1933) a 80 y.o. female for a Medicare Annual Wellness Visit ([phone])    This is a Subsequent Medicare Annual Wellness Visit providing Personalized Prevention Plan Services (PPPS) (Performed 12 months after initial AWV and PPPS )    I have reviewed the patient's medical history in detail and updated the computerized patient record.      History     Past Medical History:   Diagnosis Date    Colon polyps     COPD 8-30-02    DDD (degenerative disc disease), lumbar 10/1/2014    Degeneration of lumbar or lumbosacral intervertebral disc     Depression     Diabetes ([facility] [address] 7-19-05    Diverticulosis     DM neuropathy, painful ([facility] [address] 10/1/2014    MOORE (Dyspnea on Exertion) 4-19-02    echo: +mild LVH, GO93-09% w/ diastolic dysfxn    Glaucoma     HCAP (healthcare-associated pneumonia) 03/24/2017    Hemorrhoids 6-6-06    Hyperlipidemia 5/17/2011    Hypertension 4-16-02    LBP (low back pain) 4-13-04    Low back pain radiating to both legs 10/1/2014    Lumbago     Lumbar disc herniation 10/1/2014    Lumbar facet arthropathy 10/1/2014    Lumbosacral radiculopathy at L5 10/1/2014    Lumbosacral radiculopathy at S1 10/1/2014    Microscopic hematuria     OA (osteoarthritis)     Overactive bladder 5/17/2011    RLS (restless legs syndrome) 1/17/2013    Sciatica     Shortness of breath     Spinal stenosis, lumbar region, without neurogenic claudication     Spondylolisthesis of lumbar region 10/1/2014    Spondylolisthesis, grade 1 10/1/2014    Stress urinary incontinence     Syncope     -MRI brain    Urinary tract infection, site not specified       Past Surgical History:   Procedure Laterality Date    HX APPENDECTOMY      HX CHOLECYSTECTOMY      HX HYSTERECTOMY      (+)DUB    HX POLYPECTOMY      WA COLONOSCOPY FLX DX W/COLLJ SPEC WHEN PFRMD  6-16-06    normal, Dr Afua Watt FLX DX W/COLLJ [address] PFRMD     (+)polyp= tubular adenoma     Current Outpatient Prescriptions   Medication Sig Dispense Refill    HYDROmorphone (DILAUDID) 4 mg tablet 1/2 to one tab up to three times per day prn severe pain 90 Tab 0    [START ON 9/18/2017] HYDROmorphone (DILAUDID) 4 mg tablet 1/2 to one tab up to three times per day prn severe pain 90 Tab 0    gabapentin (NEURONTIN) 300 mg capsule Take 1 Cap by mouth three (3) times daily. 270 Cap 1    metoprolol tartrate (LOPRESSOR) 25 mg tablet Take 1 Tab by mouth two (2) times a day. 60 Tab 5    PARoxetine (PAXIL) 30 mg tablet Take 1 Tab by mouth daily. 90 Tab 3    budesonide-formoterol (SYMBICORT) 160-4.5 mcg/actuation HFA inhaler Take 1 Puff by inhalation two (2) times a day. 1 Inhaler 6    tiotropium bromide (SPIRIVA RESPIMAT) 1.25 mcg/actuation inhaler Take 2 Puffs by inhalation daily. 1 Inhaler 11    albuterol (PROVENTIL HFA, VENTOLIN HFA, PROAIR HFA) 90 mcg/actuation inhaler Take 2 Puffs by inhalation every four (4) hours as needed for Wheezing. 1 Inhaler 5    simvastatin (ZOCOR) 20 mg tablet Take 1 Tab by mouth nightly. 90 Tab 1    LORazepam (ATIVAN) 1 mg tablet Take 0.5 Tabs by mouth every eight (8) hours as needed. Max Daily Amount: 1.5 mg. 30 Tab 0    albuterol-ipratropium (DUO-NEB) 2.5 mg-0.5 mg/3 ml nebu 3 mL by Nebulization route every four (4) hours as needed. 30 Nebule 0    amLODIPine (NORVASC) 10 mg tablet Take 1 Tab by mouth daily. 30 Tab 0    omega 3-dha-epa-fish oil (FISH OIL) 100-160-1,000 mg cap Take 1,000 mg by mouth daily.  LUMIGAN 0.01 % ophthalmic drops Administer 1 Drop to both eyes nightly.  tamsulosin (FLOMAX) 0.4 mg capsule Take 1 Cap by mouth daily. 90 Cap 3    ascorbic acid (VITAMIN C) 1,000 mg tablet Take 1,000 mg by mouth daily.  traZODone (DESYREL) 100 mg tablet Take 100 mg by mouth nightly.  Patient takes one and half tabs at bedtime      cycloSPORINE (RESTASIS) 0.05 % ophthalmic emulsion Administer 1 Drop to both eyes two (2) times a day.  PT-NS-KJ-Fe-Min-Lycopen-Lutein (CENTRUM) 0.4-162-18 mg Tab Take 1 Tab by mouth daily.  naloxone 4 mg/actuation spry 4 mg by Nasal route as needed.  Indications: OPIATE-INDUCED RESPIRATORY DEPRESSION 1 Package 0     Allergies   Allergen Reactions    Levaquin [Levofloxacin] Rash     Family History   Problem Relation Age of Onset    Colon Cancer Sister     Heart Disease Brother     Seizures Son     Colon Cancer Maternal Aunt      Social History   Substance Use Topics    Smoking status: Current Every Day Smoker     Packs/day: 0.25     Years: 50.00     Types: Cigarettes    Smokeless tobacco: Never Used    Alcohol use No     Patient Active Problem List   Diagnosis Code    Hyperlipidemia E78.5    Overactive bladder N32.81    Sciatica M54.30    Degeneration of lumbar or lumbosacral intervertebral disc M51.37    Encounter for long-term (current) use of other medications Z79.899    Depression F32.9    Unspecified vitamin D deficiency E55.9    Glucose intolerance (pre-diabetes) R73.03    RLS (restless legs syndrome) G25.81    Aortic dissection, abdominal (HCC) I71.02    Ataxic gait R26.0    Chronic neck pain M54.2, G89.29    Orthostatic hypotension I95.1    Paresthesia R20.2    Repeated falls R29.6    Chronic pain syndrome G89.4    Lumbosacral spondylosis without myelopathy M47.817    Cervical stenosis of spinal canal M48.02    Spinal stenosis in cervical region M48.02    Polyneuropathy (HCC) G62.9    Lumbar stenosis M48.06    High risk medication use Z79.899    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1    Diabetic polyneuropathy associated with type 2 diabetes mellitus ([facility]) E11.42    Carotid artery stenosis I65.29    Atherosclerosis of native arteries of the extremities with intermittent claudication I70.219    MOORE (dyspnea on exertion) R06.09    SOB (shortness of breath) R06.02    Murmur, cardiac R01.1    Hyperlipidemia LDL goal <100 E78.5    Primary osteoarthritis involving multiple joints M15.0    Prediabetes R73.03    Restless leg syndrome G25.81    Essential hypertension with goal blood pressure less than 140/90 I10    Panlobular emphysema ([facility]) J43.1    Impaired fasting glucose R73.01    HCAP (healthcare-associated pneumonia) J18.9    Abnormal CT scan, chest R93.8    Hypercholesterolemia E78.00    Hypokalemia E87.6    Dizziness R42    CHI (closed head injury) S09. 90XA    Elevated troponin R74.8    Type 2 diabetes mellitus with diabetic neuropathy, without long-term current use of insulin ([facility]) E11.40     Depression Risk Factor Screening:     PHQ over the last two weeks 8/7/2017   PHQ Not Done -   Little interest or pleasure in doing things Several days   Feeling down, depressed or hopeless Several days   Total Score PHQ 2 2   Trouble falling or staying asleep, or sleeping too much -   Feeling tired or having little energy -   Poor appetite or overeating -   Feeling bad about yourself - or that you are a failure or have let yourself or your family down -   Trouble concentrating on things such as school, work, reading or watching TV -   Moving or speaking so slowly that other people could have noticed; or the opposite being so fidgety that others notice -   Thoughts of being better off dead, or hurting yourself in some way -   How difficult have these problems made it for you to do your work, take care of your home and get along with others -     Alcohol Risk Factor Screening: You do not drink alcohol or very rarely. Functional Ability and Level of Safety:     Hearing Loss   Hearing is good. Activities of Daily Living   The home contains: no safety equipment    Partial assistance. Requires assistance with: See below  ADL Assessment 9/13/2017   Feeding yourself No Help Needed   Getting from bed to chair No Help Needed   Getting dressed No Help Needed   Bathing or showering Help Needed   Walk across the room (includes cane/walker) Help Needed   Using the telphone No Help Needed   Taking your medications Help Needed   Preparing meals Help Needed   Managing money (expenses/bills) No Help Needed   Moderately strenuous housework (laundry) No Help Needed   Shopping for personal items (toiletries/medicines) Help Needed   Shopping for groceries No Help Needed   Driving Help Needed   Climbing a flight of stairs No Help Needed   Getting to places beyond walking distances No Help Needed       Fall Risk     Fall Risk Assessment, last 12 mths 9/13/2017   Able to walk? Yes   Fall in past 12 months? Yes   Fall with injury? Yes   Number of falls in past 12 months 2   Fall Risk Score 3     Abuse Screen   Patient is not abused    Abuse Screening Questionnaire 9/13/2017   Do you ever feel afraid of your partner? N   Are you in a relationship with someone who physically or mentally threatens you? N   Is it safe for you to go home? Y       Cognitive Screening     Evaluation of Cognitive Function:  Has your family/caregiver stated any concerns about your memory: no    Mood/affect:  happy  Appearance: age appropriate, well dressed and within normal Limits  Family member/caregiver input: None significant     No exam performed by pharmacist today, not required for Medicare Annual Wellness Visit. Patient to be seen by Dr. Tanna Bullard separately.     Patient Care Team:  Tanna Bullard MD as PCP - General (Internal Medicine)  Gorge Ceron MD as Physician (Urology)  Renny Perry, RN as [address] [place] [address] (Internal Medicine)  Milagros Haley MD (Ophthalmology)    Advice/Referrals/Counseling   Education and counseling provided:  End-of-Life planning (with patient's consent)  Pneumococcal Vaccine  Influenza Vaccine  Screening Mammography  Screening Pap and pelvic (covered once every 2 years)  Colorectal cancer screening tests  Cardiovascular screening blood test  Bone mass measurement (DEXA)  Screening for glaucoma  Diabetes screening test  Tdap / Zoster vaccination recommendations       Assessment/Plan       ICD-10-CM ICD-9-CM    1. Panlobular emphysema (HCC) J43.1 492.8    2. Hyperlipidemia LDL goal <100 E78.5 272.4 LIPID PANEL      TSH 3RD GENERATION      T4, FREE   3. Diabetic polyneuropathy associated with type 2 diabetes mellitus (HCC) E11.42 250.60      357.2    4. Essential hypertension with goal blood pressure less than 140/90 I10 401.9    5. Medicare annual wellness visit, subsequent Z00.00 V70.0 [address]   6. Screening for alcoholism Z13.89 V79.1    7. Type 2 diabetes mellitus with diabetic neuropathy, without long-term current use of insulin (HCC) E11.40 250.60 LIPID PANEL     429.4 METABOLIC PANEL, COMPREHENSIVE      HEMOGLOBIN A1C WITH EAG      MICROALBUMIN, UR, RAND W/ MICROALBUMIN/CREA RATIO      URINALYSIS W/ RFLX MICROSCOPIC   . 8. Medication Reconciliation: Performed today and patient did not bring her medications to the visit. and the following changes were made. Discontinued: no longer using medications listed below    Additions: None    Changes / other discrepancies: None     Medications Discontinued During This Encounter   Medication Reason    rOPINIRole (REQUIP) 1 mg tablet Therapy Completed    linaclotide (LINZESS) 145 mcg cap capsule Therapy Completed    triamcinolone (ARISTOCORT) 0.5 % topical cream Therapy Completed       9.  Immunizations: Patient confirmed the following records of vaccinations are correct and current.   -Pneumococcal: Vaccination series complete    -Influenza: Patient and  choose to wait until December to get vaccinated      -Zoster:  Patient thinks she might have gotten this but doesn't remember, documented in [address] as addressed Sept 2014     -Tdap:  Recommended that patient receive at her pharmacy and have pharmacy records faxed to the office. Is not if frequent contact with small children or engages in activities that would put her a risk for tetanus     Immunization History   Administered Date(s) Administered    Influenza High Dose Vaccine PF 10/13/2016    Influenza Vaccine 10/15/2014    Influenza Vaccine (Quad) PF 12/08/2015    Influenza Vaccine Split 11/11/2010, 11/22/2011    Pneumococcal Conjugate (PCV-13) 12/08/2015    Pneumococcal Polysaccharide (PPSV-23) 09/20/2000    Tdap 06/11/2014    ZZZ-RETIRED (DO NOT USE) Pneumococcal Vaccine (Unspecified Type) 09/20/2000       10. Screenings: (see patient instructions for chart/information):   -DEXA scan: 7/2014 which showed osteopenia. Follow up as needed per Dr. Stahl Dear    -Lipid panel: Last done 7/2015. Repeat as indicated by provider    -Colonoscopy: Last done a number of years ago. No longer needed based on age but follow up as recommended by provider     -Diabetes: Last checked with BMP 8/22/17. Follow up as needed per provider    -Glaucoma screening/Eye exam: Last saw opthalmology this summer. Asked patient to have records faxed to our office     -Mammogram: Done 7/2017 - patient still wishes to get screening yearly. Next due summer 2018    -Cervical Screening: no longer needed based on age     6. Advanced Care Planning:     Patient educated on the importance of an advanced care plan and paperwork was given to the patient today. Asked patient to read over the information and bring it back to her next appointment with her physician. Patient verbalized understanding of information presented. Answered all of the patient's questions. AVS information was reviewed with patient and will be printed on checkout.     Jeremias Sharif PharmD, BCPS

## 2017-09-13 NOTE — MR AVS SNAPSHOT
Visit Information Date & Time Provider Department Dept. Phone Encounter #  
 9/13/2017 10:00 AM Ruth Escobar MD Internist of [address] [id number] Your Appointments 10/4/2017  9:00 AM  
Follow Up with HETAL Marsh [address] for Pain Management (FAN SCHEDULING) Appt Note: return in 2 months [address] [address]  
[phone] [address]  
  
    
 1/8/2018  9:55 AM  
LAB with [facility] NURSE VISIT Internist of [facility] ([address]) Appt Note: labs [address], [facility] [address]  
  
   
 [address], [facility]  
  
    
 1/15/2018 10:00 AM  
Office Visit with Ruth Escobar MD  
Internist of [address] [place] [address]) Appt Note: ov 4mos. rm  
 [address], [facility] [address] [address]  
  
   
 [address], [facility]  
  
    
 3/5/2018  9:00 AM  
Office Visit with Ayana Beach MD  
[facility]) Appt Note: check up [address] [address]  
[phone] [address] [facility] [address] [place] [address] Upcoming Health Maintenance Date Due  
 EYE EXAM RETINAL OR DILATED Q1 2/5/2015 GLAUCOMA SCREENING Q2Y 4/1/2016 MEDICARE YEARLY EXAM 12/8/2016 MICROALBUMIN Q1 4/5/2017 INFLUENZA AGE 9 TO ADULT 8/1/2017 HEMOGLOBIN A1C Q6M 3/7/2018 LIPID PANEL Q1 9/7/2018 DTaP/Tdap/Td series (2 - Td) 6/11/2024 Allergies as of 9/13/2017  Review Complete On: 9/13/2017 By: Ruth Escobar MD  
  
 Severity Noted Reaction Type Reactions Levaquin [Levofloxacin]  05/17/2011    Rash Current Immunizations  Reviewed on 9/13/2017 Name Date Influenza High Dose Vaccine PF 10/13/2016 Influenza Vaccine 10/15/2014 Influenza Vaccine (Quad) PF 12/8/2015 Influenza Vaccine Split 11/22/2011, 11/11/2010 11:46 AM  
 Pneumococcal Conjugate (PCV-13) 12/8/2015 Pneumococcal Polysaccharide (PPSV-23) 9/20/2000 Tdap 6/11/2014 ZZZ-RETIRED (DO NOT USE) Pneumococcal Vaccine (Unspecified Type) 9/20/2000 Reviewed by Brianda Dawkins MD on 9/13/2017 at 10:01 AM  
 Reviewed by Brianda Dawkins MD on 9/13/2017 at 10:01 AM  
You Were Diagnosed With   
  
 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 
ICD-9-CM: 250.60, 357.2 Essential hypertension with goal blood pressure less than 140/90     ICD-10-CM: I10 
ICD-9-CM: 401.9 Vitals BP Pulse Temp Resp Height(growth percentile) Weight(growth percentile) 132/50 83 97.7 °F (36.5 °C) (Oral) 14 5' 5\" (1.651 m) 113 lb 9.6 oz (51.5 kg) SpO2 BMI OB Status Smoking Status 93% 18.9 kg/m2 Hysterectomy Current Every Day Smoker Vitals History BMI and BSA Data Body Mass Index Body Surface Area 18.9 kg/m 2 1.54 m 2 Preferred Pharmacy Pharmacy Name Phone [facility] [address] [phone] Your Updated Medication List  
  
   
This list is accurate as of: 9/13/17 10:48 AM.  Always use your most recent med list.  
  
  
  
  
 albuterol 90 mcg/actuation inhaler Commonly known as:  PROVENTIL HFA, VENTOLIN HFA, PROAIR HFA Take 2 Puffs by inhalation every four (4) hours as needed for Wheezing. albuterol-ipratropium 2.5 mg-0.5 mg/3 ml Nebu Commonly known as:  DUO-NEB  
3 mL by Nebulization route every four (4) hours as needed. amLODIPine 10 mg tablet Commonly known as:  U[place]ano Emerald Take 1 Tab by mouth daily. budesonide-formoterol 160-4.5 mcg/actuation HFA inhaler Commonly known as:  SYMBICORT Take 1 Puff by inhalation two (2) times a day. CENTRUM 0.4-162-18 mg Tab Generic drug:  MR-CV-JY-Fe-Min-Lycopen-Lutein Take 1 Tab by mouth daily. FISH -160-1,000 mg Cap Generic drug:  omega 3-dha-epa-fish oil Take 1,000 mg by mouth daily. gabapentin 300 mg capsule Commonly known as:  NEURONTIN Take 1 Cap by mouth three (3) times daily. * HYDROmorphone 4 mg tablet Commonly known as:  DILAUDID  
1/2 to one tab up to three times per day prn severe pain  
  
 * HYDROmorphone 4 mg tablet Commonly known as:  DILAUDID  
1/2 to one tab up to three times per day prn severe pain Start taking on:  9/18/2017 LORazepam 1 mg tablet Commonly known as:  ATIVAN Take 0.5 Tabs by mouth every eight (8) hours as needed. Max Daily Amount: 1.5 mg.  
  
 LUMIGAN 0.01 % ophthalmic drops Generic drug:  bimatoprost  
Administer 1 Drop to both eyes nightly. metoprolol tartrate 25 mg tablet Commonly known as:  LOPRESSOR Take 1 Tab by mouth two (2) times a day.  
  
 naloxone 4 mg/actuation nasal spray Commonly known as:  NARCAN  
4 mg by Nasal route as needed. Indications: OPIATE-INDUCED RESPIRATORY DEPRESSION PARoxetine 30 mg tablet Commonly known as:  PAXIL Take 1 Tab by mouth daily. RESTASIS 0.05 % ophthalmic emulsion Generic drug:  cycloSPORINE Administer 1 Drop to both eyes two (2) times a day. simvastatin 20 mg tablet Commonly known as:  ZOCOR Take 1 Tab by mouth nightly. tamsulosin 0.4 mg capsule Commonly known as:  FLOMAX Take 1 Cap by mouth daily. tiotropium bromide 1.25 mcg/actuation inhaler Commonly known as:  Lisa Phlegm Take 2 Puffs by inhalation daily. traZODone 100 mg tablet Commonly known as:   Bledsoe Take 100 mg by mouth nightly. Patient takes one and half tabs at bedtime VITAMIN C 1,000 mg tablet Generic drug:  ascorbic acid (vitamin C) Take 1,000 mg by mouth daily. * Notice: This list has 2 medication(s) that are the same as other medications prescribed for you. Read the directions carefully, and ask your doctor or other care provider to review them with you. Patient Instructions Medicare Part B Preventive Services Limitations Recommendation Follow-up Action Needed Bone Mass Measurement 
(age 72 & older, biennial) Requires diagnosis related to osteoporosis or estrogen deficiency. Biennial benefit unless patient has history of long-term glucocorticoid tx or baseline is needed because initial test was by other method Last: 3/2017 A DEXA scan is recommended after you turn 72years of age to determine your risk for osteoporosis Follow up as recommended by primary care provider and/or specialist  
 
  
Colorectal Cancer Screening 
-Fecal occult blood test (annual) -Flexible sigmoidoscopy (5y) 
-Screening colonoscopy (10y) -Barium Enema Normally recommended for patients age 48 - 78  Last: 2/2011 Every 5-10 years depending on your colonoscopy result starting at age 48 years Next: No longer needed based on age, follow up as recommended by primary care provider and/or specialist  
  
Glaucoma Screening (no USPSTF recommendation) Diabetes mellitus, family history, , age 48 or over,  American, age 72 or over Last: Summer 2017 Every year, or as directed by provider Next: Follow up as recommended by primary care provider and/or specialist  
 
  
Screening Mammography (biennial age 54-69) Annually (age 36 or over) Last: July 2017  Next: July 2018 Screening Pap Tests and Pelvic Examination (up to age 72 and after 72 if unknown history or abnormal study last 10 years) Every 24 months except high risk Last: N/A  Next: Discuss with your doctor if this screening is appropriate for you. Cardiovascular Screening Blood Tests (every 5 years) Total cholesterol, HDL, Triglycerides Order as a panel if possible Last: September 2017 Every Year Next: Follow up as recommended by primary care provider and/or specialist  
 
  
Diabetes Screening Tests (at least every 3 years, Medicare covers annually or at 6-month intervals for prediabetic patients) Fasting blood sugar (FBS) or glucose tolerance test (GTT) Patient must be diagnosed with one of the following: 
-Hypertension, Dyslipidemia, obesity, previous impaired FBS or GTT 
Or any two of the following: overweight, FH of diabetes, age ? 72, history of gestational diabetes, birth of baby weighing more than 9 pounds Last: A1c 6 9/2017 Every 3-6 months depending on your result Every 3 years Next: Follow up as recommended by primary care provider and/or specialist  
 
  
Diabetes Self-Management Training (DSMT) (no USPSTF recommendation) Requires referral by treating physician for patient with diabetes or renal disease. 10 hours of initial DSMT session of no less than 30 minutes each in a continuous 12-month period. 2 hours of follow-up DSMT in subsequent years. Last: N/A Talk to your doctor if you are interested in a refresher course. Medical Nutrition Therapy (MNT) (for diabetes or renal disease not recommended schedule) Requires referral by treating physician for patient with diabetes or renal disease. Can be provided in same year as diabetes self-management training (DSMT), and CMS recommends medical nutrition therapy take place after DSMT. Up to 3 hours for initial year and 2 hours in subsequent years. Last: N/A Talk to your doctor if you are interested in a refresher course. Seasonal Influenza Vaccination (annually)  Last: 12/2016 Every Fall Please get one this Fall. Pneumococcal Vaccination (once after 65)  Last: 
Pneumovax: 9/2000 Prevnar-13: 12/2015 Next: Vaccination series complete Shingles Vaccination  Last: N/A  
 
 A shingles vaccine is recommended once in a lifetime after age 61 Please consider getting one at your pharmacy. Tetanus, Diphtheria and Pertussis (Tdap) Vaccination Booster One Booster as an adult and then tetanus every 10 years or as indicated Last: N/A  Please consider, especially if you will have frequent contact with young children who have not completed their vaccine series. Can get at your pharmacy. Hepatitis B Vaccinations (if medium/high risk) Medium/high risk factors:  End-stage renal disease, Hemophiliacs who received Factor VIII or IX concentrates, Clients of institutions for the mentally retarded, Persons who live in the same house as a HepB virus carrier, Homosexual men, Illicit injectable drug abusers. Last: N/A Next: N/A Counseling to Prevent Tobacco Use (up to 8 sessions per year) - Counseling greater than 3 and up to 10 minutes - Counseling greater than 10 minutes Patients must be asymptomatic of tobacco-related conditions to receive as preventive service  As recommended by your PCP or specialist   
Human Immunodeficiency Virus (HIV) Screening (annually for increased risk patients) HIV-1 and HIV-2 by EIA, CATHY, rapid antibody test, or oral mucosa transudate Patient must be at increased risk for HIV infection per USPSTF guidelines or pregnant. Tests covered annually for patients at increased risk. Pregnant patients may receive up to 3 test during pregnancy. As recommended by your PCP or Specialist  
Ultrasound Screening for Abdominal Aortic Aneurysm (AAA) once Patient must be referred through IPPE and not have had a screening for abdominal aortic aneurysm before under medicare. Limited to patients who meet onf of the following criteria: 
-Men who are 73-68 years old and have smoked more than 100 cigarettes in their lifetime 
-Anyone with a FH of AAA 
-Anyone recommended for screening by the USPSFTF As recommended by your PCP or Specialist 
  
NA = Not Applicable; NI= Not Indicated Advanced care planning: Please bring in a copy of your advanced directives at your convenience to our office so we may scan a copy for our records. Introducing [facility] SERVICES! Dear Herschel Moritz: Thank you for requesting a [employer] account. Our records indicate that you have previously registered for a [employer] account but its currently inactive. Please call our [employer] support line at [phone]. Additional Information If you have questions, please visit the Frequently Asked Questions section of the [employer] website at [email or web address] Remember, [employer] is NOT to be used for urgent needs. For medical emergencies, dial 911. Now available from your iPhone and Android! Please provide this summary of care documentation to your next provider. Your primary care clinician is listed as Cesar Steele. If you have any questions after today's visit, please call [phone].

## 2017-09-18 ENCOUNTER — TELEPHONE (OUTPATIENT)
Dept: INTERNAL MEDICINE CLINIC | Age: 82
End: 2017-09-18

## 2017-09-18 NOTE — TELEPHONE ENCOUNTER
Pt. Was in last week to see . Patient  forgot to tell dr that pt. Was having shortness of breathe and wanted to know if patient needed to have any type of test done or if pt. Should schedule another appointment with .   Pls advise Jay Manrique (daughter) [phone] cell

## 2017-09-19 ENCOUNTER — OFFICE VISIT (OUTPATIENT)
Dept: INTERNAL MEDICINE CLINIC | Age: 82
End: 2017-09-19

## 2017-09-19 VITALS
HEIGHT: 65 IN | DIASTOLIC BLOOD PRESSURE: 58 MMHG | SYSTOLIC BLOOD PRESSURE: 120 MMHG | HEART RATE: 68 BPM | TEMPERATURE: 98.2 F | RESPIRATION RATE: 12 BRPM | BODY MASS INDEX: 17.93 KG/M2 | WEIGHT: 107.6 LBS | OXYGEN SATURATION: 92 %

## 2017-09-19 DIAGNOSIS — J44.1 ACUTE EXACERBATION OF CHRONIC OBSTRUCTIVE PULMONARY DISEASE (COPD) (HCC): Primary | ICD-10-CM

## 2017-09-19 DIAGNOSIS — E78.5 HYPERLIPIDEMIA, UNSPECIFIED HYPERLIPIDEMIA TYPE: ICD-10-CM

## 2017-09-19 RX ORDER — GABAPENTIN 300 MG/1
300 CAPSULE ORAL 3 TIMES DAILY
Qty: 270 CAP | Refills: 1 | Status: SHIPPED | OUTPATIENT
Start: 2017-09-19 | End: 2018-04-05 | Stop reason: SDUPTHER

## 2017-09-19 RX ORDER — SIMVASTATIN 20 MG/1
20 TABLET, FILM COATED ORAL
Qty: 90 TAB | Refills: 1 | Status: SHIPPED | OUTPATIENT
Start: 2017-09-19 | End: 2018-04-05 | Stop reason: SDUPTHER

## 2017-09-19 RX ORDER — METOPROLOL TARTRATE 25 MG/1
25 TABLET, FILM COATED ORAL 2 TIMES DAILY
Qty: 120 TAB | Refills: 3 | Status: SHIPPED | OUTPATIENT
Start: 2017-09-19 | End: 2018-02-10

## 2017-09-19 RX ORDER — AZITHROMYCIN 250 MG/1
TABLET, FILM COATED ORAL
Qty: 6 TAB | Refills: 0 | Status: SHIPPED | OUTPATIENT
Start: 2017-09-19 | End: 2017-09-24

## 2017-09-19 RX ORDER — PAROXETINE 30 MG/1
30 TABLET, FILM COATED ORAL DAILY
Qty: 90 TAB | Refills: 3 | Status: SHIPPED | OUTPATIENT
Start: 2017-09-19 | End: 2018-09-10 | Stop reason: SDUPTHER

## 2017-09-19 NOTE — PROGRESS NOTES
Coco Yin 9/22/1933, is a 80 y.o. female, who is seen today for dyspnea. She thinks he might be a little more short of breath the last week or so and started coughing a little bit the last week or so and then has become somewhat productive just for the last 24 hours. No chills or sweats. She is not actually producing sputum that she can see. She uses her inhalers regularly but has not seen her specialist since she was hospitalized in February. She has no PND or orthopnea and no edema, just the increase in dyspnea which is subtle. She is still smoking but down to only about 6 cigarettes per day and would love to quit smoking. She used a nicotine patch a few years ago but not since then.     Past Medical History:   Diagnosis Date    Colon polyps     COPD 8-30-02    DDD (degenerative disc disease), lumbar 10/1/2014    Degeneration of lumbar or lumbosacral intervertebral disc     Depression     Diabetes ([facility] 7-19-05    Diverticulosis     DM neuropathy, painful ([facility] 10/1/2014    MOORE (Dyspnea on Exertion) 4-19-02    echo: +mild LVH, JC10-30% w/ diastolic dysfxn    Glaucoma     HCAP (healthcare-associated pneumonia) 03/24/2017    Hemorrhoids 6-6-06    Hyperlipidemia 5/17/2011    Hypertension 4-16-02    LBP (low back pain) 4-13-04    Low back pain radiating to both legs 10/1/2014    Lumbago     Lumbar disc herniation 10/1/2014    Lumbar facet arthropathy 10/1/2014    Lumbosacral radiculopathy at L5 10/1/2014    Lumbosacral radiculopathy at S1 10/1/2014    Microscopic hematuria     OA (osteoarthritis)     Overactive bladder 5/17/2011    RLS (restless legs syndrome) 1/17/2013    Sciatica     Shortness of breath     Spinal stenosis, lumbar region, without neurogenic claudication     Spondylolisthesis of lumbar region 10/1/2014    Spondylolisthesis, grade 1 10/1/2014    Stress urinary incontinence     Syncope     -MRI brain    Urinary tract infection, site not specified      Current Outpatient Prescriptions   Medication Sig Dispense Refill    gabapentin (NEURONTIN) 300 mg capsule Take 1 Cap by mouth three (3) times daily. 270 Cap 1    PARoxetine (PAXIL) 30 mg tablet Take 1 Tab by mouth daily. 90 Tab 3    simvastatin (ZOCOR) 20 mg tablet Take 1 Tab by mouth nightly. 90 Tab 1    metoprolol tartrate (LOPRESSOR) 25 mg tablet Take 1 Tab by mouth two (2) times a day. 120 Tab 3    azithromycin (ZITHROMAX) 250 mg tablet 2 tablets by mouth today, then 1 daily 6 Tab 0    HYDROmorphone (DILAUDID) 4 mg tablet 1/2 to one tab up to three times per day prn severe pain 90 Tab 0    HYDROmorphone (DILAUDID) 4 mg tablet 1/2 to one tab up to three times per day prn severe pain 90 Tab 0    naloxone 4 mg/actuation spry 4 mg by Nasal route as needed. Indications: OPIATE-INDUCED RESPIRATORY DEPRESSION 1 Package 0    budesonide-formoterol (SYMBICORT) 160-4.5 mcg/actuation HFA inhaler Take 1 Puff by inhalation two (2) times a day. 1 Inhaler 6    tiotropium bromide (SPIRIVA RESPIMAT) 1.25 mcg/actuation inhaler Take 2 Puffs by inhalation daily. 1 Inhaler 11    albuterol (PROVENTIL HFA, VENTOLIN HFA, PROAIR HFA) 90 mcg/actuation inhaler Take 2 Puffs by inhalation every four (4) hours as needed for Wheezing. 1 Inhaler 5    LORazepam (ATIVAN) 1 mg tablet Take 0.5 Tabs by mouth every eight (8) hours as needed. Max Daily Amount: 1.5 mg. 30 Tab 0    albuterol-ipratropium (DUO-NEB) 2.5 mg-0.5 mg/3 ml nebu 3 mL by Nebulization route every four (4) hours as needed. 30 Nebule 0    amLODIPine (NORVASC) 10 mg tablet Take 1 Tab by mouth daily. 30 Tab 0    omega 3-dha-epa-fish oil (FISH OIL) 100-160-1,000 mg cap Take 1,000 mg by mouth daily.  LUMIGAN 0.01 % ophthalmic drops Administer 1 Drop to both eyes nightly.  tamsulosin (FLOMAX) 0.4 mg capsule Take 1 Cap by mouth daily. 90 Cap 3    ascorbic acid (VITAMIN C) 1,000 mg tablet Take 1,000 mg by mouth daily.       traZODone (DESYREL) 100 mg tablet Take 100 mg by mouth nightly. Patient takes one and half tabs at bedtime      cycloSPORINE (RESTASIS) 0.05 % ophthalmic emulsion Administer 1 Drop to both eyes two (2) times a day.  RI-ZO-VI-Fe-Min-Lycopen-Lutein (CENTRUM) 0.4-162-18 mg Tab Take 1 Tab by mouth daily. Visit Vitals    /58    Pulse 68    Temp 98.2 °F (36.8 °C) (Oral)    Resp 12    Ht 5' 5\" (1.651 m)    Wt 107 lb 9.6 oz (48.8 kg)    SpO2 92%    BMI 17.91 kg/m2     No JVD or HJR. Carotids are 2+ without bruits. Lungs are clear to percussion. Diminished breath sounds throughout with minimal scattered rhonchi and minimal end expiratory wheeze with forced expiration. Heart reveals a regular rhythm with normal S1 and S2 no murmur gallop click or rub. Apical impulse is not palpable. Abdomen is soft and nontender with no hepatosplenomegaly or masses and no bruits. Extremities reveal no clubbing cyanosis or edema. Diminished pulses in the feet and pulses are 2+ elsewhere. Assessment: #1. COPD with emphysema, not doing quite as well and now with a slight cough consistent with mild exacerbation of COPD. Her O2 saturation is not significantly different from several months ago, it fluctuates from 91% to 94%. She will continue her inhalers and will be treated with azithromycin. #2.  Cigarette smoker, she absolutely needs to quit smoking to decrease the risk of worsening lung disease that will happen naturally with age. Also getting rid of carbon monoxide from her bloodstream should help her symptomatically. She wishes to try nicotine patches again so her  will get NicoDerm over-the-counter today and if not doing well over the next several weeks we could try her on Chantix but her  suspects insurance will not cover that it is fairly expensive. #3.  Chronic anxiety and mild depression has been doing quite well, she will continue paroxetine 20 mg daily.     She was seen quite recently and arrangements were made for follow-up in a few months, she will keep that appointment or call sooner if needed. Gaetano Jensen MD FACP    Please note: This document has been produced using voice recognition software. Unrecognized errors in transcription may be present.

## 2017-10-04 ENCOUNTER — OFFICE VISIT (OUTPATIENT)
Dept: PAIN MANAGEMENT | Age: 82
End: 2017-10-04

## 2017-10-04 VITALS
TEMPERATURE: 97.3 F | SYSTOLIC BLOOD PRESSURE: 167 MMHG | BODY MASS INDEX: 17.83 KG/M2 | WEIGHT: 107 LBS | HEIGHT: 65 IN | RESPIRATION RATE: 14 BRPM | HEART RATE: 46 BPM | DIASTOLIC BLOOD PRESSURE: 70 MMHG

## 2017-10-04 DIAGNOSIS — G89.4 CHRONIC PAIN SYNDROME: ICD-10-CM

## 2017-10-04 DIAGNOSIS — G62.9 POLYNEUROPATHY: ICD-10-CM

## 2017-10-04 DIAGNOSIS — G25.81 RLS (RESTLESS LEGS SYNDROME): ICD-10-CM

## 2017-10-04 DIAGNOSIS — M47.816 LUMBAR FACET ARTHROPATHY: ICD-10-CM

## 2017-10-04 DIAGNOSIS — M48.061 SPINAL STENOSIS OF LUMBAR REGION, UNSPECIFIED WHETHER NEUROGENIC CLAUDICATION PRESENT: ICD-10-CM

## 2017-10-04 DIAGNOSIS — M54.50 LOW BACK PAIN RADIATING TO BOTH LEGS: ICD-10-CM

## 2017-10-04 DIAGNOSIS — M79.605 LOW BACK PAIN RADIATING TO BOTH LEGS: ICD-10-CM

## 2017-10-04 DIAGNOSIS — M79.604 LOW BACK PAIN RADIATING TO BOTH LEGS: ICD-10-CM

## 2017-10-04 DIAGNOSIS — M54.30 SCIATICA, UNSPECIFIED LATERALITY: ICD-10-CM

## 2017-10-04 DIAGNOSIS — M51.26 LUMBAR DISC HERNIATION: ICD-10-CM

## 2017-10-04 DIAGNOSIS — M43.16 SPONDYLOLISTHESIS OF LUMBAR REGION: ICD-10-CM

## 2017-10-04 DIAGNOSIS — M51.36 DDD (DEGENERATIVE DISC DISEASE), LUMBAR: ICD-10-CM

## 2017-10-04 DIAGNOSIS — M43.10 SPONDYLOLISTHESIS, GRADE 1: ICD-10-CM

## 2017-10-04 RX ORDER — LUBIPROSTONE 24 UG/1
24 CAPSULE, GELATIN COATED ORAL
Qty: 30 CAP | Refills: 3 | Status: SHIPPED | OUTPATIENT
Start: 2017-10-04 | End: 2017-11-03

## 2017-10-04 RX ORDER — ROPINIROLE 1 MG/1
.5-1 TABLET, FILM COATED ORAL
Qty: 90 TAB | Refills: 5 | Status: SHIPPED | OUTPATIENT
Start: 2017-10-04 | End: 2018-01-11 | Stop reason: SDUPTHER

## 2017-10-04 RX ORDER — HYDROMORPHONE HYDROCHLORIDE 4 MG/1
TABLET ORAL
Qty: 90 TAB | Refills: 0 | Status: SHIPPED | OUTPATIENT
Start: 2017-10-10 | End: 2018-07-09 | Stop reason: ALTCHOICE

## 2017-10-04 RX ORDER — HYDROMORPHONE HYDROCHLORIDE 4 MG/1
TABLET ORAL
Qty: 90 TAB | Refills: 0 | Status: SHIPPED | OUTPATIENT
Start: 2017-11-09 | End: 2018-02-10

## 2017-10-04 RX ORDER — HYDROMORPHONE HYDROCHLORIDE 4 MG/1
TABLET ORAL
Qty: 90 TAB | Refills: 0 | Status: SHIPPED | OUTPATIENT
Start: 2017-12-08 | End: 2018-02-10

## 2017-10-04 RX ORDER — ROPINIROLE 1 MG/1
.5-1 TABLET, FILM COATED ORAL
Qty: 90 TAB | Refills: 3 | Status: CANCELLED | OUTPATIENT
Start: 2017-10-04 | End: 2018-01-02

## 2017-10-04 RX ORDER — LUBIPROSTONE 24 UG/1
24 CAPSULE, GELATIN COATED ORAL
Qty: 30 CAP | Refills: 3 | Status: SHIPPED | OUTPATIENT
Start: 2017-10-04 | End: 2017-10-04 | Stop reason: SDUPTHER

## 2017-10-04 NOTE — MR AVS SNAPSHOT
Visit Information Date & Time Provider Department Dept. Phone Encounter #  
 10/4/2017  9:00 AM Eloise Maddox, [address] [place] [address] for Pain Management [phone] Follow-up Instructions Return in about 3 months (around 1/4/2018). Your Appointments 1/8/2018  9:55 AM  
LAB with C NURSE VISIT Internist of [facility] ([facility]) Appt Note: labs [address], [address] [facility] [address] [place]  
  
   
 [address], [address]  
  
    
 1/15/2018 10:00 AM  
Office Visit with Wild Becerra MD  
Internist of [facility]) Appt Note: ov 4mos. rm  
 [address], [address] [address] [address] [place]  
  
   
 [address], [address]  
  
    
 3/5/2018  9:00 AM  
Office Visit with Grace Angulo MD  
[facility]) Appt Note: check up [address] [place] [address] [address]  
[phone] [address] [place] [address] Upcoming Health Maintenance Date Due  
 EYE EXAM RETINAL OR DILATED Q1 2/5/2015 GLAUCOMA SCREENING Q2Y 4/1/2016 MICROALBUMIN Q1 4/5/2017 INFLUENZA AGE 9 TO ADULT 8/1/2017 HEMOGLOBIN A1C Q6M 3/7/2018 LIPID PANEL Q1 9/7/2018 MEDICARE YEARLY EXAM 9/14/2018 DTaP/Tdap/Td series (2 - Td) 6/11/2024 Allergies as of 10/4/2017  Review Complete On: 10/4/2017 By: Isidro De La Cruz Severity Noted Reaction Type Reactions Levaquin [Levofloxacin]  05/17/2011    Rash Current Immunizations  Reviewed on 9/13/2017 Name Date Influenza High Dose Vaccine PF 10/13/2016 Influenza Vaccine 10/15/2014 Influenza Vaccine (Quad) PF 12/8/2015 Influenza Vaccine Split 11/22/2011, 11/11/2010 11:46 AM  
 Pneumococcal Conjugate (PCV-13) 12/8/2015 Pneumococcal Polysaccharide (PPSV-23) 9/20/2000 Tdap 6/11/2014 ZZZ-RETIRED (DO NOT USE) Pneumococcal Vaccine (Unspecified Type) 9/20/2000 Not reviewed this visit You Were Diagnosed With   
  
 Codes Comments Low back pain radiating to both legs     ICD-10-CM: M54.5 ICD-9-CM: 724.2 DDD (degenerative disc disease), lumbar     ICD-10-CM: M51.36 
ICD-9-CM: 722.52 Spondylolisthesis of lumbar region     ICD-10-CM: M43.16 
ICD-9-CM: 738.4 Spondylolisthesis, grade 1     ICD-10-CM: M43.10 ICD-9-CM: 738.4 Lumbar facet arthropathy     ICD-10-CM: M12.88 ICD-9-CM: 721.3 Lumbar disc herniation     ICD-10-CM: M51.26 
ICD-9-CM: 722.10 Spinal stenosis of lumbar region, unspecified whether neurogenic claudication present     ICD-10-CM: M48.061 
ICD-9-CM: 724.02 Chronic pain syndrome     ICD-10-CM: G89.4 ICD-9-CM: 338.4 Sciatica, unspecified laterality     ICD-10-CM: M54.30 ICD-9-CM: 724.3 Polyneuropathy     ICD-10-CM: G62.9 ICD-9-CM: 356.9 RLS (restless legs syndrome)     ICD-10-CM: G25.81 ICD-9-CM: 333.94 Vitals BP Pulse Temp Resp Height(growth percentile) Weight(growth percentile) 167/70 (!) 46 97.3 °F (36.3 °C) 14 5' 5\" (1.651 m) 107 lb (48.5 kg) BMI OB Status Smoking Status 17.81 kg/m2 Hysterectomy Current Every Day Smoker BMI and BSA Data Body Mass Index Body Surface Area  
 17.81 kg/m 2 1.49 m 2 Preferred Pharmacy Pharmacy Name Phone [address] [place] [address], [address] [place] [address] [address] Your Updated Medication List  
  
   
This list is accurate as of: 10/4/17  9:40 AM.  Always use your most recent med list.  
  
  
  
  
 albuterol 90 mcg/actuation inhaler Commonly known as:  PROVENTIL HFA, VENTOLIN HFA, PROAIR HFA Take 2 Puffs by inhalation every four (4) hours as needed for Wheezing. albuterol-ipratropium 2.5 mg-0.5 mg/3 ml Nebu Commonly known as:  Daryle [place]  
 3 mL by Nebulization route every four (4) hours as needed. amLODIPine 10 mg tablet Commonly known as:  Horris Bills Take 1 Tab by mouth daily. budesonide-formoterol 160-4.5 mcg/actuation Hfaa Commonly known as:  SYMBICORT Take 1 Puff by inhalation two (2) times a day. CENTRUM 0.4-162-18 mg Tab Generic drug:  PJ-OX-OM-Fe-Min-Lycopen-Lutein Take 1 Tab by mouth daily. FISH -160-1,000 mg Cap Generic drug:  omega 3-dha-epa-fish oil Take 1,000 mg by mouth daily. gabapentin 300 mg capsule Commonly known as:  NEURONTIN Take 1 Cap by mouth three (3) times daily. * HYDROmorphone 4 mg tablet Commonly known as:  DILAUDID  
1/2 to one tab up to three times per day prn severe pain Start taking on:  10/10/2017  
  
 * HYDROmorphone 4 mg tablet Commonly known as:  DILAUDID  
1/2 to one tab up to three times per day prn severe pain Start taking on:  11/9/2017  
  
 * HYDROmorphone 4 mg tablet Commonly known as:  DILAUDID  
1/2 to one tab up to three times per day prn severe pain Start taking on:  12/8/2017 LORazepam 1 mg tablet Commonly known as:  ATIVAN Take 0.5 Tabs by mouth every eight (8) hours as needed. Max Daily Amount: 1.5 mg.  
  
 lubiPROStone 24 mcg capsule Commonly known as:  Erica Lalo Take 1 Cap by mouth daily (with breakfast) for 30 days. LUMIGAN 0.01 % ophthalmic drops Generic drug:  bimatoprost  
Administer 1 Drop to both eyes nightly. metoprolol tartrate 25 mg tablet Commonly known as:  LOPRESSOR Take 1 Tab by mouth two (2) times a day.  
  
 naloxone 4 mg/actuation nasal spray Commonly known as:  NARCAN  
4 mg by Nasal route as needed. Indications: OPIATE-INDUCED RESPIRATORY DEPRESSION PARoxetine 30 mg tablet Commonly known as:  PAXIL Take 1 Tab by mouth daily. RESTASIS 0.05 % ophthalmic emulsion Generic drug:  cycloSPORINE Administer 1 Drop to both eyes two (2) times a day. rOPINIRole 1 mg tablet Commonly known as:  Awais Magana Take 0.5-1 Tabs by mouth nightly as needed (RLS) for up to 90 days. simvastatin 20 mg tablet Commonly known as:  ZOCOR Take 1 Tab by mouth nightly. tamsulosin 0.4 mg capsule Commonly known as:  FLOMAX Take 1 Cap by mouth daily. tiotropium bromide 1.25 mcg/actuation inhaler Commonly known as:  Moy Cedars Take 2 Puffs by inhalation daily. traZODone 100 mg tablet Commonly known as:  Nayeli Au Take 100 mg by mouth nightly. Patient takes one and half tabs at bedtime VITAMIN C 1,000 mg tablet Generic drug:  ascorbic acid (vitamin C) Take 1,000 mg by mouth daily. * Notice: This list has 3 medication(s) that are the same as other medications prescribed for you. Read the directions carefully, and ask your doctor or other care provider to review them with you. Prescriptions Printed Refills HYDROmorphone (DILAUDID) 4 mg tablet 0 Starting on: 10/10/2017 Si/2 to one tab up to three times per day prn severe pain  
 Class: Print HYDROmorphone (DILAUDID) 4 mg tablet 0 Starting on: 2017 Si/2 to one tab up to three times per day prn severe pain  
 Class: Print HYDROmorphone (DILAUDID) 4 mg tablet 0 Starting on: 2017 Si/2 to one tab up to three times per day prn severe pain  
 Class: Print  
 rOPINIRole (REQUIP) 1 mg tablet 5 Sig: Take 0.5-1 Tabs by mouth nightly as needed (RLS) for up to 90 days. Class: Print Route: Oral  
 lubiPROStone (AMITIZA) 24 mcg capsule 3 Sig: Take 1 Cap by mouth daily (with breakfast) for 30 days. Class: Print Route: Oral  
  
Follow-up Instructions Return in about 3 months (around 2018). Patient Instructions 1. Continue current plan with no evidence of addiction or diversion. Stable on current medication without adverse events. 2. Refill Dilaudid 4 mg up to 3 times daily as needed. 3. Discontinue Linzess. Denied by insurance. 4. Add Amitiza 24 mcg capsule once daily with breakfast.  3 refills 5. Refill Requip 1 mg once nightly as needed. 5 refills 6. Pt instructed to continue with increase fluids, fruit juices, high fiber diet, Metamucil and/or Citrucel if not getting enough fiber in diet, and OTC stool softener for moderate constipation. Miralax on an as needed basis only. 7. Naloxone 4 mg nasal spray for opioid induced respiratory depression emergency only. 8. Discussed risks of addiction, dependency, and opioid induced hyperalgesia. 9. Return to clinic in 3 months Introducing [facility]! Dear Ailin Ann: Thank you for requesting a [employer] account. Our records indicate that you have previously registered for a [employer] account but its currently inactive. Please call our [employer] support line at [phone]. Additional Information If you have questions, please visit the Frequently Asked Questions section of the [employer] website at [email or web address] Remember, [employer] is NOT to be used for urgent needs. For medical emergencies, dial 911. Now available from your iPhone and Android! Please provide this summary of care documentation to your next provider. Your primary care clinician is listed as Jose Rafael Cox. Capri Garcias. If you have any questions after today's visit, please call [phone].

## 2017-10-04 NOTE — PATIENT INSTRUCTIONS
1. Continue current plan with no evidence of addiction or diversion. Stable on current medication without adverse events. 2. Refill Dilaudid 4 mg up to 3 times daily as needed. 3. Discontinue Linzess. Denied by insurance. 4. Add Amitiza 24 mcg capsule once daily with breakfast.  3 refills  5. Refill Requip 1 mg once nightly as needed. 5 refills  6. Pt instructed to continue with increase fluids, fruit juices, high fiber diet, Metamucil and/or Citrucel if not getting enough fiber in diet, and OTC stool softener for moderate constipation. Miralax on an as needed basis only. 7. Naloxone 4 mg nasal spray for opioid induced respiratory depression emergency only. 8. Discussed risks of addiction, dependency, and opioid induced hyperalgesia.    9. Return to clinic in 3 months

## 2017-10-04 NOTE — PROGRESS NOTES
Nursing Notes    Patient presents to the office today in follow-up. Patient rates her pain at 6/10 on the numerical pain scale. Reviewed medications with counts as follows:    Rx Date filled Qty Dispensed Pill Count Last Dose Short   Dilaudid 4mg 07/20/17 90 23 today no                                          Comments:     POC UDS was not performed in office today    Any new labs or imaging since last appointment? YES lab    Have you been to an emergency room (ER) or urgent care clinic since your last visit? NO            Have you been hospitalized since your last visit? NO     If yes, where, when, and reason for visit? Have you seen or consulted any other health care providers outside of the [address] [place] [address]  since your last visit? YES     If yes, where, when, and reason for visit? HM deferred to pcp.

## 2017-10-04 NOTE — PROGRESS NOTES
HISTORY OF PRESENT ILLNESS  Nick Atkins is a 80 y.o. female    HPI: Ms. Monika Adrian  returns today for f/u of chronic low back pain and diabetic peripheral neuropathy. No h/o lumbar surgery. Prior injections with no help. She is here today with her . She continues unchanged since last visit. She has been doing well with her current treatment plan which has been offering good pain control. However, she has some of her medication left over from a prescription that was filled 7/20/2017. She says that she uses her medication sparingly, half to 1 tablet up to 3 times daily. She will continue with this plan for now. She does still complain of mild constipation  but is doing better with conservative treatment. Unfortunately Linzess appears to be denied by her insurance. We will discontinue Linzess and try Amitiza. I will have her follow-up in 3 months or sooner if needed. Medications are helping with pain control and quality of life. Her pain is 4-5/10 with medication and 7/10 without. Pt describes pain as aching and throbbing. Aggravating factors include walking. Relieved with rest, medication, and avoiding painful activities. Current treatment is helping to improve general activity, mood, walking, sleep, enjoyment of life    In the past 30 days, the patient reports approximately 25% pain relief with current treatment/medications. Pt does report constipation minimally controlled. Pt instructed to Increase fluids, Add fruit juices, Add high fiber diet, add Metamucil and/or Citrucel if not getting enough fiber in diet, and add OTC stool softener for moderate constipation. May add Miralax on an as needed basis. She  is otherwise doing well with no other complaints today. She denies any adverse events including nausea, vomiting, dizziness, increased constipation, hallucinations, or seizures.      Because the patient's current regimen places him/her at increased risk for possible overdose, a prescription for naloxone nasal spray has been provided. The patient understands that this medication is only to be used in the setting of a possible overdose and that inadvertent use of this medication could precipitate overt withdrawal.       Allergies   Allergen Reactions    Levaquin [Levofloxacin] Rash       Past Surgical History:   Procedure Laterality Date    HX APPENDECTOMY      HX CHOLECYSTECTOMY      HX HYSTERECTOMY      (+)DUB    HX POLYPECTOMY      [place] COLONOSCOPY FLX DX W/COLLJ SPEC WHEN PFRMD  6-16-06    normal, Dr David Thurman    [place] COLONOSCOPY FLX DX W/COLLJ [address] PFRMD      (+)polyp= tubular adenoma         Review of Systems   Constitutional: Negative for chills, fever and weight loss. HENT: Negative for congestion and sore throat. Eyes: Negative for blurred vision and double vision. Respiratory: Positive for cough and shortness of breath. Negative for wheezing. Cardiovascular: Negative for chest pain and palpitations. Gastrointestinal: Positive for constipation. Negative for diarrhea, heartburn, nausea and vomiting. Genitourinary: Negative. Musculoskeletal: Positive for back pain, joint pain, myalgias and neck pain. Neurological: Negative for dizziness, seizures and loss of consciousness. Endo/Heme/Allergies: Does not bruise/bleed easily. Psychiatric/Behavioral: Positive for depression. The patient has insomnia. The patient is not nervous/anxious. Physical Exam   Constitutional: She is oriented to person, place, and time and well-developed, well-nourished, and in no distress. No distress. HENT:   Head: Normocephalic and atraumatic. Eyes: EOM are normal.   Pulmonary/Chest: Effort normal.   Neurological: She is alert and oriented to person, place, and time. Gait ( using a cane) abnormal.   Skin: Skin is warm and dry. No rash noted. She is not diaphoretic. No erythema.    Psychiatric: Mood, memory, affect and judgment normal.   Nursing note and vitals reviewed. ASSESSMENT:    1. Low back pain radiating to both legs    2. DDD (degenerative disc disease), lumbar    3. Spondylolisthesis of lumbar region    4. Spondylolisthesis, grade 1    5. Lumbar facet arthropathy    6. Lumbar disc herniation    7. Spinal stenosis of lumbar region, unspecified whether neurogenic claudication present    8. Chronic pain syndrome    9. Sciatica, unspecified laterality    10. Polyneuropathy    11. RLS (restless legs syndrome)           [place] Prescription Monitoring Program was reviewed which does not demonstrate aberrancies and/or inconsistencies with regard to the historical prescribing of controlled medications to this patient by other providers. PLAN / Pt Instructions:  1. Continue current plan with no evidence of addiction or diversion. Stable on current medication without adverse events. 2. Refill Dilaudid 4 mg up to 3 times daily as needed. 3. Discontinue Linzess. Denied by insurance. 4. Add Amitiza 24 mcg capsule once daily with breakfast.  3 refills  5. Refill Requip 1 mg once nightly as needed. 5 refills  6. Pt instructed to continue with increase fluids, fruit juices, high fiber diet, Metamucil and/or Citrucel if not getting enough fiber in diet, and OTC stool softener for moderate constipation. Miralax on an as needed basis only. 7. Naloxone 4 mg nasal spray for opioid induced respiratory depression emergency only. 8. Discussed risks of addiction, dependency, and opioid induced hyperalgesia.    9. Return to clinic in 3 months     Medications Ordered Today   Medications    HYDROmorphone (DILAUDID) 4 mg tablet     Si/2 to one tab up to three times per day prn severe pain     Dispense:  90 Tab     Refill:  0    HYDROmorphone (DILAUDID) 4 mg tablet     Si/2 to one tab up to three times per day prn severe pain     Dispense:  90 Tab     Refill:  0    HYDROmorphone (DILAUDID) 4 mg tablet     Si/2 to one tab up to three times per day prn severe pain     Dispense:  90 Tab     Refill:  0    lubiPROStone (AMITIZA) 24 mcg capsule     Sig: Take 1 Cap by mouth daily (with breakfast) for 30 days. Dispense:  30 Cap     Refill:  3    rOPINIRole (REQUIP) 1 mg tablet     Sig: Take 0.5-1 Tabs by mouth nightly as needed (RLS) for up to 90 days. Dispense:  90 Tab     Refill:  5       Pain medications prescribed with the objective of pain relief and improved physical and psychosocial function in this patient. Spent 25 minutes with patient today reviewing the treatment plan, goals of treatment plan, and limitations of the treatment plan, to include the potential for side effects from medications and procedures. Maurizio Vigil 10/4/2017      Note: Please excuse any typographical errors. Voice recognition software was used for this note and may cause mistakes.

## 2017-12-04 ENCOUNTER — TELEPHONE (OUTPATIENT)
Dept: INTERNAL MEDICINE CLINIC | Age: 82
End: 2017-12-04

## 2017-12-04 ENCOUNTER — HOSPITAL ENCOUNTER (OUTPATIENT)
Dept: LAB | Age: 82
Discharge: HOME | End: 2017-12-04
Payer: MEDICARE

## 2017-12-04 ENCOUNTER — OFFICE VISIT (OUTPATIENT)
Dept: INTERNAL MEDICINE CLINIC | Age: 82
End: 2017-12-04

## 2017-12-04 VITALS
DIASTOLIC BLOOD PRESSURE: 80 MMHG | WEIGHT: 105.4 LBS | BODY MASS INDEX: 17.56 KG/M2 | RESPIRATION RATE: 14 BRPM | TEMPERATURE: 98.1 F | HEIGHT: 65 IN | HEART RATE: 87 BPM | SYSTOLIC BLOOD PRESSURE: 168 MMHG | OXYGEN SATURATION: 97 %

## 2017-12-04 DIAGNOSIS — E11.42 DIABETIC POLYNEUROPATHY ASSOCIATED WITH TYPE 2 DIABETES MELLITUS (HCC): ICD-10-CM

## 2017-12-04 DIAGNOSIS — E78.5 HYPERLIPIDEMIA LDL GOAL <100: ICD-10-CM

## 2017-12-04 DIAGNOSIS — J43.1 PANLOBULAR EMPHYSEMA (HCC): ICD-10-CM

## 2017-12-04 DIAGNOSIS — R63.4 WEIGHT LOSS: Primary | ICD-10-CM

## 2017-12-04 DIAGNOSIS — F51.01 PRIMARY INSOMNIA: ICD-10-CM

## 2017-12-04 DIAGNOSIS — E11.40 TYPE 2 DIABETES MELLITUS WITH DIABETIC NEUROPATHY, WITHOUT LONG-TERM CURRENT USE OF INSULIN (HCC): ICD-10-CM

## 2017-12-04 LAB
ALBUMIN SERPL-MCNC: 4.4 G/DL (ref 3.4–5)
ALBUMIN/GLOB SERPL: 1.5 {RATIO} (ref 0.8–1.7)
ALP SERPL-CCNC: 58 U/L (ref 45–117)
ALT SERPL-CCNC: 38 U/L (ref 13–56)
ANION GAP SERPL CALC-SCNC: 10 MMOL/L (ref 3–18)
AST SERPL-CCNC: 33 U/L (ref 15–37)
BILIRUB SERPL-MCNC: 0.6 MG/DL (ref 0.2–1)
BUN SERPL-MCNC: 12 MG/DL (ref 7–18)
BUN/CREAT SERPL: 24 (ref 12–20)
CALCIUM SERPL-MCNC: 9.4 MG/DL (ref 8.5–10.1)
CHLORIDE SERPL-SCNC: 84 MMOL/L (ref 100–108)
CHOLEST SERPL-MCNC: 125 MG/DL
CO2 SERPL-SCNC: 31 MMOL/L (ref 21–32)
CREAT SERPL-MCNC: 0.49 MG/DL (ref 0.6–1.3)
EST. AVERAGE GLUCOSE BLD GHB EST-MCNC: 117 MG/DL
GLOBULIN SER CALC-MCNC: 3 G/DL (ref 2–4)
GLUCOSE SERPL-MCNC: 115 MG/DL (ref 74–99)
HBA1C MFR BLD: 5.7 % (ref 4.2–5.6)
HDLC SERPL-MCNC: 68 MG/DL (ref 40–60)
HDLC SERPL: 1.8 {RATIO} (ref 0–5)
LDLC SERPL CALC-MCNC: 38 MG/DL (ref 0–100)
LDLC/HDLC SERPL: 0.6 {RATIO}
LIPID PROFILE,FLP: ABNORMAL
POTASSIUM SERPL-SCNC: 2.9 MMOL/L (ref 3.5–5.5)
PROT SERPL-MCNC: 7.4 G/DL (ref 6.4–8.2)
SODIUM SERPL-SCNC: 125 MMOL/L (ref 136–145)
T4 FREE SERPL-MCNC: 2 NG/DL (ref 0.7–1.5)
TRIGL SERPL-MCNC: 95 MG/DL (ref ?–150)
TSH SERPL DL<=0.05 MIU/L-ACNC: 0.85 UIU/ML (ref 0.36–3.74)
VLDLC SERPL CALC-MCNC: 19 MG/DL

## 2017-12-04 PROCEDURE — 36415 COLL VENOUS BLD VENIPUNCTURE: CPT | Performed by: INTERNAL MEDICINE

## 2017-12-04 PROCEDURE — 84443 ASSAY THYROID STIM HORMONE: CPT | Performed by: INTERNAL MEDICINE

## 2017-12-04 PROCEDURE — 80053 COMPREHEN METABOLIC PANEL: CPT | Performed by: INTERNAL MEDICINE

## 2017-12-04 PROCEDURE — 83036 HEMOGLOBIN GLYCOSYLATED A1C: CPT | Performed by: INTERNAL MEDICINE

## 2017-12-04 PROCEDURE — 80061 LIPID PANEL: CPT | Performed by: INTERNAL MEDICINE

## 2017-12-04 PROCEDURE — 84439 ASSAY OF FREE THYROXINE: CPT | Performed by: INTERNAL MEDICINE

## 2017-12-04 NOTE — PROGRESS NOTES
Ginger Pozo 9/22/1933, is a 80 y.o. female, who is seen today for concerns about weight loss, weakness, insomnia, COPD, chronic diabetic pain requiring narcotics. The pain medicine does control the pain quite well and she is not sure why she wakes up at night after 3 hours or so. She uses trazodone but still wakes up sweating and cannot sleep. She gets filled up quickly and cannot eat much and has lost 27 pounds over the last year. She thinks her breathing is doing well and she is not actually using all of her inhalers. Her bowels are working well.     Past Medical History:   Diagnosis Date    Colon polyps     COPD 8-30-02    DDD (degenerative disc disease), lumbar 10/1/2014    Degeneration of lumbar or lumbosacral intervertebral disc     Depression     Diabetes ([facility] [address] 7-19-05    Diverticulosis     DM neuropathy, painful ([facility] [address] 10/1/2014    MOORE (Dyspnea on Exertion) 4-19-02    echo: +mild LVH, LP94-56% w/ diastolic dysfxn    Glaucoma     HCAP (healthcare-associated pneumonia) 03/24/2017    Hemorrhoids 6-6-06    Hyperlipidemia 5/17/2011    Hypertension 4-16-02    LBP (low back pain) 4-13-04    Low back pain radiating to both legs 10/1/2014    Lumbago     Lumbar disc herniation 10/1/2014    Lumbar facet arthropathy 10/1/2014    Lumbosacral radiculopathy at L5 10/1/2014    Lumbosacral radiculopathy at S1 10/1/2014    Microscopic hematuria     OA (osteoarthritis)     Overactive bladder 5/17/2011    RLS (restless legs syndrome) 1/17/2013    Sciatica     Shortness of breath     Spinal stenosis, lumbar region, without neurogenic claudication     Spondylolisthesis of lumbar region 10/1/2014    Spondylolisthesis, grade 1 10/1/2014    Stress urinary incontinence     Syncope     -MRI brain    Urinary tract infection, site not specified      Current Outpatient Prescriptions   Medication Sig Dispense Refill    HYDROmorphone (DILAUDID) 4 mg tablet 1/2 to one tab up to three times per day prn severe pain 90 Tab 0    HYDROmorphone (DILAUDID) 4 mg tablet 1/2 to one tab up to three times per day prn severe pain 90 Tab 0    [START ON 12/8/2017] HYDROmorphone (DILAUDID) 4 mg tablet 1/2 to one tab up to three times per day prn severe pain 90 Tab 0    rOPINIRole (REQUIP) 1 mg tablet Take 0.5-1 Tabs by mouth nightly as needed (RLS) for up to 90 days. 90 Tab 5    gabapentin (NEURONTIN) 300 mg capsule Take 1 Cap by mouth three (3) times daily. 270 Cap 1    PARoxetine (PAXIL) 30 mg tablet Take 1 Tab by mouth daily. 90 Tab 3    simvastatin (ZOCOR) 20 mg tablet Take 1 Tab by mouth nightly. 90 Tab 1    metoprolol tartrate (LOPRESSOR) 25 mg tablet Take 1 Tab by mouth two (2) times a day. 120 Tab 3    naloxone 4 mg/actuation spry 4 mg by Nasal route as needed. Indications: OPIATE-INDUCED RESPIRATORY DEPRESSION 1 Package 0    budesonide-formoterol (SYMBICORT) 160-4.5 mcg/actuation HFA inhaler Take 1 Puff by inhalation two (2) times a day. 1 Inhaler 6    tiotropium bromide (SPIRIVA RESPIMAT) 1.25 mcg/actuation inhaler Take 2 Puffs by inhalation daily. 1 Inhaler 11    albuterol (PROVENTIL HFA, VENTOLIN HFA, PROAIR HFA) 90 mcg/actuation inhaler Take 2 Puffs by inhalation every four (4) hours as needed for Wheezing. 1 Inhaler 5    LORazepam (ATIVAN) 1 mg tablet Take 0.5 Tabs by mouth every eight (8) hours as needed. Max Daily Amount: 1.5 mg. 30 Tab 0    albuterol-ipratropium (DUO-NEB) 2.5 mg-0.5 mg/3 ml nebu 3 mL by Nebulization route every four (4) hours as needed. 30 Nebule 0    amLODIPine (NORVASC) 10 mg tablet Take 1 Tab by mouth daily. 30 Tab 0    omega 3-dha-epa-fish oil (FISH OIL) 100-160-1,000 mg cap Take 1,000 mg by mouth daily.  LUMIGAN 0.01 % ophthalmic drops Administer 1 Drop to both eyes nightly.  tamsulosin (FLOMAX) 0.4 mg capsule Take 1 Cap by mouth daily. 90 Cap 3    ascorbic acid (VITAMIN C) 1,000 mg tablet Take 1,000 mg by mouth daily.       traZODone (DESYREL) 100 mg tablet Take 100 mg by mouth nightly. Patient takes one and half tabs at bedtime      cycloSPORINE (RESTASIS) 0.05 % ophthalmic emulsion Administer 1 Drop to both eyes two (2) times a day.  RH-JN-UV-Fe-Min-Lycopen-Lutein (CENTRUM) 0.4-162-18 mg Tab Take 1 Tab by mouth daily. Visit Vitals    /80    Pulse 87    Temp 98.1 °F (36.7 °C) (Oral)    Resp 14    Ht 5' 5\" (1.651 m)    Wt 105 lb 6.4 oz (47.8 kg)    SpO2 97%    BMI 17.54 kg/m2     Carotids are 2+ without bruits. No JVD or HJR. Lungs are clear to percussion. Diminished breath sounds with no wheezing or crackles. Heart reveals a regular rhythm with normal S1 and S2 no murmur gallop click or rub. Apical impulse is not palpable. Abdomen is soft and nontender with no hepatosplenomegaly or masses and no bruits. There is no tympany and no distention. Active bowel sounds. Extremities reveal no clubbing cyanosis or edema. Pulses are intact. Assessment: #1. Great deal of weight loss probably multifactorial.  We will check a battery of lab for that. #2.  Severe COPD but doing pretty well with oxygen saturation of 97% and breathing quite comfortably on room air. She will bring in her inhalers and show me what she is using what she is not using at her next visit. #3.  Chronic insomnia, we will get some lab and try and see what we can do to help her with her insomnia. #4.  Waking up sweating at night possibly related to her narcotics. She may be overtreated, unclear, we might have to work that out with her pain management doctor. Follow-up in 2 days after we get lab from today to go over with her at that time. She is to bring in all of her medication so I can see what she is taking when she is not taking. Gaetano Alanis MD FACP    Please note: This document has been produced using voice recognition software. Unrecognized errors in transcription may be present.

## 2017-12-04 NOTE — MR AVS SNAPSHOT
Visit Information Date & Time Provider Department Dept. Phone Encounter #  
 12/4/2017  3:00 PM Whit Harris MD [facility] [address] [place] [address] [phone] [id number] Your Appointments 12/19/2017  2:00 PM  
Follow Up with HETAL Cueto [address] [place] [address] for Pain Management (FAN SCHEDULING) Appt Note: retunr in 3 months [address] [address]  
[phone] [address]  
  
    
 1/8/2018  9:55 AM  
LAB with [facility] NURSE VISIT [facility] [address] [place] [address] ([address]) Appt Note: labs [address], Suite [facility] [address] [place]  
  
   
 [address], [facility]  
  
    
 1/15/2018 10:00 AM  
Office Visit with Whit Harris MD  
[facility] [address] [place] [address] [address] [facility]) Appt Note: ov 4mos. rm  
 [address], Suite [facility] [address] [place]  
  
   
 [address], [facility]  
  
    
 3/5/2018  9:00 AM  
Office Visit with Jina Bence, MD  
[facility] [address] [place] [address]) Appt Note: check up [address] [place] [address] [address]  
[phone] [address] [place] [address] Upcoming Health Maintenance Date Due  
 EYE EXAM RETINAL OR DILATED Q1 2/5/2015 GLAUCOMA SCREENING Q2Y 4/1/2016 MICROALBUMIN Q1 4/5/2017 Influenza Age 5 to Adult 8/1/2017 HEMOGLOBIN A1C Q6M 3/7/2018 LIPID PANEL Q1 9/7/2018 MEDICARE YEARLY EXAM 9/14/2018 DTaP/Tdap/Td series (2 - Td) 6/11/2024 Allergies as of 12/4/2017  Review Complete On: 12/4/2017 By: Whit Harris MD  
  
 Severity Noted Reaction Type Reactions Levaquin [Levofloxacin]  05/17/2011    Rash Current Immunizations  Reviewed on 9/13/2017 Name Date Influenza High Dose Vaccine PF 10/13/2016 Influenza Vaccine 10/15/2014 Influenza Vaccine (Quad) PF 12/8/2015 Influenza Vaccine Split 11/22/2011, 11/11/2010 11:46 AM  
 Pneumococcal Conjugate (PCV-13) 12/8/2015 Pneumococcal Polysaccharide (PPSV-23) 9/20/2000 Tdap 6/11/2014 ZZZ-RETIRED (DO NOT USE) Pneumococcal Vaccine (Unspecified Type) 9/20/2000 Not reviewed this visit You Were Diagnosed With   
  
 Codes Comments Weight loss    -  Primary ICD-10-CM: R63.4 ICD-9-CM: 783.21 Diabetic polyneuropathy associated with type 2 diabetes mellitus ([facility]     ICD-10-CM: E11.42 
ICD-9-CM: 250.60, 357.2 Panlobular emphysema ([facility]     ICD-10-CM: J43.1 ICD-9-CM: 492.8 Primary insomnia     ICD-10-CM: F51.01 
ICD-9-CM: 307.42 Vitals BP Pulse Temp Resp Height(growth percentile) Weight(growth percentile) 168/80 87 98.1 °F (36.7 °C) (Oral) 14 5' 5\" (1.651 m) 105 lb 6.4 oz (47.8 kg) SpO2 BMI OB Status Smoking Status 97% 17.54 kg/m2 Hysterectomy Current Every Day Smoker Vitals History BMI and BSA Data Body Mass Index Body Surface Area  
 17.54 kg/m 2 1.48 m 2 Preferred Pharmacy Pharmacy Name Phone 305 [facility], [address] [place] [address] [address] [facility] 134 Your Updated Medication List  
  
   
This list is accurate as of: 12/4/17  3:53 PM.  Always use your most recent med list.  
  
  
  
  
 albuterol 90 mcg/actuation inhaler Commonly known as:  PROVENTIL HFA, VENTOLIN HFA, PROAIR HFA Take 2 Puffs by inhalation every four (4) hours as needed for Wheezing. albuterol-ipratropium 2.5 mg-0.5 mg/3 ml Nebu Commonly known as:  DUO-NEB  
3 mL by Nebulization route every four (4) hours as needed. amLODIPine 10 mg tablet Commonly known as:  Oscar Presser Take 1 Tab by mouth daily. budesonide-formoterol 160-4.5 mcg/actuation Hfaa Commonly known as:  SYMBICORT Take 1 Puff by inhalation two (2) times a day. CENTRUM 0.4-162-18 mg Tab Generic drug:  NM-AF-QW-Fe-Min-Lycopen-Lutein Take 1 Tab by mouth daily. FISH -160-1,000 mg Cap Generic drug:  omega 3-dha-epa-fish oil Take 1,000 mg by mouth daily. gabapentin 300 mg capsule Commonly known as:  NEURONTIN Take 1 Cap by mouth three (3) times daily. * HYDROmorphone 4 mg tablet Commonly known as:  DILAUDID  
1/2 to one tab up to three times per day prn severe pain  
  
 * HYDROmorphone 4 mg tablet Commonly known as:  DILAUDID  
1/2 to one tab up to three times per day prn severe pain  
  
 * HYDROmorphone 4 mg tablet Commonly known as:  DILAUDID  
1/2 to one tab up to three times per day prn severe pain Start taking on:  12/8/2017 LORazepam 1 mg tablet Commonly known as:  ATIVAN Take 0.5 Tabs by mouth every eight (8) hours as needed. Max Daily Amount: 1.5 mg.  
  
 LUMIGAN 0.01 % ophthalmic drops Generic drug:  bimatoprost  
Administer 1 Drop to both eyes nightly. metoprolol tartrate 25 mg tablet Commonly known as:  LOPRESSOR Take 1 Tab by mouth two (2) times a day.  
  
 naloxone 4 mg/actuation nasal spray Commonly known as:  NARCAN  
4 mg by Nasal route as needed. Indications: OPIATE-INDUCED RESPIRATORY DEPRESSION PARoxetine 30 mg tablet Commonly known as:  PAXIL Take 1 Tab by mouth daily. RESTASIS 0.05 % ophthalmic emulsion Generic drug:  cycloSPORINE Administer 1 Drop to both eyes two (2) times a day. rOPINIRole 1 mg tablet Commonly known as:  Stana Mc Take 0.5-1 Tabs by mouth nightly as needed (RLS) for up to 90 days. simvastatin 20 mg tablet Commonly known as:  ZOCOR Take 1 Tab by mouth nightly. tamsulosin 0.4 mg capsule Commonly known as:  FLOMAX Take 1 Cap by mouth daily. tiotropium bromide 1.25 mcg/actuation inhaler Commonly known as:  Arby Bevel Take 2 Puffs by inhalation daily. traZODone 100 mg tablet Commonly known as:  Lencho Kossuth  
 Take 100 mg by mouth nightly. Patient takes one and half tabs at bedtime VITAMIN C 1,000 mg tablet Generic drug:  ascorbic acid (vitamin C) Take 1,000 mg by mouth daily. * Notice: This list has 3 medication(s) that are the same as other medications prescribed for you. Read the directions carefully, and ask your doctor or other care provider to review them with you. Introducing \[facility]\"" & HEALTH SERVICES! Dear Ailin Ann: Thank you for requesting a [employer] account. Our records indicate that you have previously registered for a [employer] account but its currently inactive. Please call our [employer] support line at [phone]. Additional Information If you have questions, please visit the Frequently Asked Questions section of the [employer] website at [email or web address] Remember, [employer] is NOT to be used for urgent needs. For medical emergencies, dial 911. Now available from your iPhone and Android! Please provide this summary of care documentation to your next provider. Your primary care clinician is listed as Jose Rafael Cox. Capri Garcias. If you have any questions after today's visit, please call [phone].

## 2017-12-04 NOTE — TELEPHONE ENCOUNTER
Patients daughter called to schedule appt today. Essence Lion mother has lost 8 lobs in the last week and a half. Said she is weak, wheezing and has been nauseated. Pt not willing to go to ER, pt has voiced she believes she has cancer. Daughter tearful on phone, said mother has really declined and looking gaunt in face. Wants Dr Indy Arredondo to listen to her lungs and check weight, concerned maybe she has an infection. Scheduled pt to be seen at 3:00.

## 2017-12-06 ENCOUNTER — OFFICE VISIT (OUTPATIENT)
Dept: INTERNAL MEDICINE CLINIC | Age: 82
End: 2017-12-06

## 2017-12-06 VITALS
TEMPERATURE: 97.5 F | HEIGHT: 65 IN | SYSTOLIC BLOOD PRESSURE: 130 MMHG | HEART RATE: 91 BPM | DIASTOLIC BLOOD PRESSURE: 60 MMHG | WEIGHT: 110.4 LBS | BODY MASS INDEX: 18.39 KG/M2 | RESPIRATION RATE: 14 BRPM | OXYGEN SATURATION: 96 %

## 2017-12-06 DIAGNOSIS — F32.9 MAJOR DEPRESSION, CHRONIC: Primary | ICD-10-CM

## 2017-12-06 DIAGNOSIS — J43.1 PANLOBULAR EMPHYSEMA (HCC): ICD-10-CM

## 2017-12-06 DIAGNOSIS — E11.40 TYPE 2 DIABETES MELLITUS WITH DIABETIC NEUROPATHY, WITHOUT LONG-TERM CURRENT USE OF INSULIN (HCC): ICD-10-CM

## 2017-12-06 DIAGNOSIS — I10 ESSENTIAL HYPERTENSION WITH GOAL BLOOD PRESSURE LESS THAN 140/90: ICD-10-CM

## 2017-12-06 DIAGNOSIS — R63.4 WEIGHT LOSS: ICD-10-CM

## 2017-12-06 RX ORDER — MIRTAZAPINE 7.5 MG/1
7.5 TABLET, FILM COATED ORAL
Qty: 30 TAB | Refills: 0 | Status: SHIPPED | OUTPATIENT
Start: 2017-12-06 | End: 2017-12-26 | Stop reason: DRUGHIGH

## 2017-12-06 NOTE — PROGRESS NOTES
Dandy Walker 9/22/1933, is a 80 y.o. female, who is seen today for weight loss early satiety worrying all the time about perhaps having cancer since 2 of her siblings had cancer, she just cannot stop thinking about bad things including bad things about her children and her . She is here with her  and her daughter today. She has been followed over the years by Dr. Noah Dominguez who was not available at the time now. She has been on Paxil and trazodone for a long time. She sleeps for about 3 hours and then cannot sleep she wakes up sweaty and thinking about things again. Her breathing is about the same and she does not use her inhalers all the time. She has chronic pain in the feet and her back but is not using her pain medicine a lot of the time according to her . He seems to be having some memory issues as well. They did not bring in medication as requested 2 days ago.     Past Medical History:   Diagnosis Date    Colon polyps     COPD 8-30-02    DDD (degenerative disc disease), lumbar 10/1/2014    Degeneration of lumbar or lumbosacral intervertebral disc     Depression     Diabetes ([facility] 7-19-05    Diverticulosis     DM neuropathy, painful ([facility] 10/1/2014    MOORE (Dyspnea on Exertion) 4-19-02    echo: +mild LVH, XR81-79% w/ diastolic dysfxn    Glaucoma     HCAP (healthcare-associated pneumonia) 03/24/2017    Hemorrhoids 6-6-06    Hyperlipidemia 5/17/2011    Hypertension 4-16-02    LBP (low back pain) 4-13-04    Low back pain radiating to both legs 10/1/2014    Lumbago     Lumbar disc herniation 10/1/2014    Lumbar facet arthropathy 10/1/2014    Lumbosacral radiculopathy at L5 10/1/2014    Lumbosacral radiculopathy at S1 10/1/2014    Microscopic hematuria     OA (osteoarthritis)     Overactive bladder 5/17/2011    RLS (restless legs syndrome) 1/17/2013    Sciatica     Shortness of breath     Spinal stenosis, lumbar region, without neurogenic claudication     Spondylolisthesis of lumbar region 10/1/2014    Spondylolisthesis, grade 1 10/1/2014    Stress urinary incontinence     Syncope     -MRI brain    Urinary tract infection, site not specified      Current Outpatient Prescriptions   Medication Sig Dispense Refill    HYDROmorphone (DILAUDID) 4 mg tablet 1/2 to one tab up to three times per day prn severe pain 90 Tab 0    HYDROmorphone (DILAUDID) 4 mg tablet 1/2 to one tab up to three times per day prn severe pain 90 Tab 0    [START ON 12/8/2017] HYDROmorphone (DILAUDID) 4 mg tablet 1/2 to one tab up to three times per day prn severe pain 90 Tab 0    rOPINIRole (REQUIP) 1 mg tablet Take 0.5-1 Tabs by mouth nightly as needed (RLS) for up to 90 days. 90 Tab 5    gabapentin (NEURONTIN) 300 mg capsule Take 1 Cap by mouth three (3) times daily. 270 Cap 1    PARoxetine (PAXIL) 30 mg tablet Take 1 Tab by mouth daily. 90 Tab 3    simvastatin (ZOCOR) 20 mg tablet Take 1 Tab by mouth nightly. 90 Tab 1    metoprolol tartrate (LOPRESSOR) 25 mg tablet Take 1 Tab by mouth two (2) times a day. 120 Tab 3    naloxone 4 mg/actuation spry 4 mg by Nasal route as needed. Indications: OPIATE-INDUCED RESPIRATORY DEPRESSION 1 Package 0    budesonide-formoterol (SYMBICORT) 160-4.5 mcg/actuation HFA inhaler Take 1 Puff by inhalation two (2) times a day. 1 Inhaler 6    tiotropium bromide (SPIRIVA RESPIMAT) 1.25 mcg/actuation inhaler Take 2 Puffs by inhalation daily. 1 Inhaler 11    albuterol (PROVENTIL HFA, VENTOLIN HFA, PROAIR HFA) 90 mcg/actuation inhaler Take 2 Puffs by inhalation every four (4) hours as needed for Wheezing. 1 Inhaler 5    LORazepam (ATIVAN) 1 mg tablet Take 0.5 Tabs by mouth every eight (8) hours as needed. Max Daily Amount: 1.5 mg. 30 Tab 0    albuterol-ipratropium (DUO-NEB) 2.5 mg-0.5 mg/3 ml nebu 3 mL by Nebulization route every four (4) hours as needed. 30 Nebule 0    amLODIPine (NORVASC) 10 mg tablet Take 1 Tab by mouth daily. 30 Tab 0    omega 3-dha-epa-fish oil (FISH OIL) 100-160-1,000 mg cap Take 1,000 mg by mouth daily.  LUMIGAN 0.01 % ophthalmic drops Administer 1 Drop to both eyes nightly.  tamsulosin (FLOMAX) 0.4 mg capsule Take 1 Cap by mouth daily. 90 Cap 3    ascorbic acid (VITAMIN C) 1,000 mg tablet Take 1,000 mg by mouth daily.  traZODone (DESYREL) 100 mg tablet Take 100 mg by mouth nightly. Patient takes one and half tabs at bedtime      cycloSPORINE (RESTASIS) 0.05 % ophthalmic emulsion Administer 1 Drop to both eyes two (2) times a day.  FO-EF-UE-Fe-Min-Lycopen-Lutein (CENTRUM) 0.4-162-18 mg Tab Take 1 Tab by mouth daily. Visit Vitals    /60    Pulse 91    Temp 97.5 °F (36.4 °C) (Oral)    Resp 14    Ht 5' 5\" (1.651 m)    Wt 110 lb 6.4 oz (50.1 kg)    SpO2 96%    BMI 18.37 kg/m2     Neck reveals no adenopathy or thyromegaly. Carotids are 2+ without bruits. Lungs are clear to percussion. Diminished breath sounds but no wheezing or crackles. Heart reveals a regular rhythm with normal S1 and S2 no murmur gallop click or rub. Apical impulse is not palpable. Abdomen is soft and nontender with no hepatosplenomegaly or masses and no bruits. Extremities reveal no clubbing cyanosis or edema. No active joints. Pulses are diminished in the feet but 2+ everywhere except absent dorsalis pedis pulses. Breasts reveal no masses no skin or nipple abnormalities no axillary adenopathy.     Results for orders placed or performed during the hospital encounter of 12/04/17   LIPID PANEL   Result Value Ref Range    LIPID PROFILE          Cholesterol, total 125 <200 MG/DL    Triglyceride 95 <150 MG/DL    HDL Cholesterol 68 (H) 40 - 60 MG/DL    LDL, calculated 38 0 - 100 MG/DL    VLDL, calculated 19 MG/DL    CHOL/HDL Ratio 1.8 0 - 5.0      LDL/HDL Ratio 0.6     METABOLIC PANEL, COMPREHENSIVE   Result Value Ref Range    Sodium 125 (L) 136 - 145 mmol/L    Potassium 2.9 (LL) 3.5 - 5.5 mmol/L Chloride 84 (L) 100 - 108 mmol/L    CO2 31 21 - 32 mmol/L    Anion gap 10 3.0 - 18 mmol/L    Glucose 115 (H) 74 - 99 mg/dL    BUN 12 7.0 - 18 MG/DL    Creatinine 0.49 (L) 0.6 - 1.3 MG/DL    BUN/Creatinine ratio 24 (H) 12 - 20      GFR est AA >60 >60 ml/min/1.73m2    GFR est non-AA >60 >60 ml/min/1.73m2    Calcium 9.4 8.5 - 10.1 MG/DL    Bilirubin, total 0.6 0.2 - 1.0 MG/DL    ALT (SGPT) 38 13 - 56 U/L    AST (SGOT) 33 15 - 37 U/L    Alk. phosphatase 58 45 - 117 U/L    Protein, total 7.4 6.4 - 8.2 g/dL    Albumin 4.4 3.4 - 5.0 g/dL    Globulin 3.0 2.0 - 4.0 g/dL    A-G Ratio 1.5 0.8 - 1.7     HEMOGLOBIN A1C WITH EAG   Result Value Ref Range    Hemoglobin A1c 5.7 (H) 4.2 - 5.6 %    Est. average glucose 117 mg/dL   TSH 3RD GENERATION   Result Value Ref Range    TSH 0.85 0.36 - 3.74 uIU/mL   T4, FREE   Result Value Ref Range    T4, Free 2.0 (H) 0.7 - 1.5 NG/DL     Assessment: #1. The patient's weight loss and early satiety and a lot of her other symptoms seem to be mainly psychological.  She does have significant COPD but that does not seem to be the limiting factor she is breathing comfortably, I do not think it is the main reason she has lost all this weight. There is no evidence of cancer and she did have CT of the abdomen and pelvis as well as chest earlier this year and a normal mammogram just a few weeks ago. I discussed this scenario with the patient and  and daughter at some length today and at this point since she really cannot get in to see her psychiatrist anytime soon I recommended she at least try mirtazapine at 7.5 mg at bedtime. We plan to escalate the dose if she tolerates this dose well. I will plan to see her back in 1 week and review all of her medication with her and maybe she does not need some other drugs that she has been on.   She is not requiring her narcotics she really should keep using those and she can notify her pain management doctor that she is not needing that kind of dosage. I have encouraged her not to smoke at all. She will continue all of her other medicine at this time but probably will be able to start to cut back on some of her medicine such as paroxetine and trazodone if she does well as we escalate the dose of mirtazapine, assuming she tolerates it. It should help her sleep and perhaps stimulate appetite. It may help with anxiety. She may need additional medication at night for her anxiety. If we need to have her see a different psychiatrist we can do so but the nurse practitioner seen by her daughter for her psychiatric issues is only here 2 weeks out of every 6 weeks and spends the rest of the time in [place]. Follow-up one week    This visit lasted 45 minutes, greater than 50% of the time was spent counseling as above. Gaetano Portillo MD FACP    Please note: This document has been produced using voice recognition software. Unrecognized errors in transcription may be present.

## 2017-12-06 NOTE — MR AVS SNAPSHOT
Visit Information Date & Time Provider Department Dept. Phone Encounter #  
 12/6/2017 11:00 AM Suleiman Gold MD [facility] [id number] Follow-up Instructions Return in about 1 week (around 12/13/2017). Your Appointments 12/19/2017  2:00 PM  
Follow Up with HETAL [facility] for Pain Management (FAN SCHEDULING) Appt Note: retunr in 3 months [address] [address]  
[phone] [address]  
  
    
 1/8/2018  9:55 AM  
[facility] with [facility] NURSE VISIT [facility] ([facility]) Appt Note: labs [address], Suite [facility] 530 [facility]  
  
   
 [address], [address]  
  
    
 1/15/2018 10:00 AM  
Office Visit with Suleiman Gold MD  
[facility]) Appt Note: ov 4mos. rm  
 [address], [address] [place] [address] [address] [facility]  
  
   
 [address], [address]  
  
    
 3/5/2018  9:00 AM  
Office Visit with Susanna Moseley MD  
[facility]) Appt Note: check up [address] [address]  
[phone] [address] [place] [address] Upcoming Health Maintenance Date Due  
 EYE EXAM RETINAL OR DILATED Q1 2/5/2015 GLAUCOMA SCREENING Q2Y 4/1/2016 MICROALBUMIN Q1 4/5/2017 Influenza Age 5 to Adult 8/1/2017 HEMOGLOBIN A1C Q6M 6/4/2018 MEDICARE YEARLY EXAM 9/14/2018 LIPID PANEL Q1 12/4/2018 DTaP/Tdap/Td series (2 - Td) 6/11/2024 Allergies as of 12/6/2017  Review Complete On: 12/6/2017 By: Suleiman Gold MD  
  
 Severity Noted Reaction Type Reactions Levaquin [Levofloxacin]  05/17/2011    Rash Current Immunizations  Reviewed on 9/13/2017 Name Date Influenza High Dose Vaccine PF 10/13/2016 Influenza Vaccine 10/15/2014 Influenza Vaccine (Quad) PF 12/8/2015 Influenza Vaccine Split 11/22/2011, 11/11/2010 11:46 AM  
 Pneumococcal Conjugate (PCV-13) 12/8/2015 Pneumococcal Polysaccharide (PPSV-23) 9/20/2000 Tdap 6/11/2014 ZZZ-RETIRED (DO NOT USE) Pneumococcal Vaccine (Unspecified Type) 9/20/2000 Not reviewed this visit You Were Diagnosed With   
  
 Codes Comments Major depression, chronic    -  Primary ICD-10-CM: F32.9 ICD-9-CM: 296.20 Weight loss     ICD-10-CM: R63.4 ICD-9-CM: 783.21 Essential hypertension with goal blood pressure less than 140/90     ICD-10-CM: I10 
ICD-9-CM: 401.9 Type 2 diabetes mellitus with diabetic neuropathy, without long-term current use of insulin (HCC)     ICD-10-CM: E11.40 ICD-9-CM: 250.60, 357.2 Panlobular emphysema ([facility]     ICD-10-CM: J43.1 ICD-9-CM: 492.8 Vitals BP Pulse Temp Resp Height(growth percentile) Weight(growth percentile) 130/60 91 97.5 °F (36.4 °C) (Oral) 14 5' 5\" (1.651 m) 110 lb 6.4 oz (50.1 kg) SpO2 BMI OB Status Smoking Status 96% 18.37 kg/m2 Hysterectomy Current Every Day Smoker Vitals History BMI and BSA Data Body Mass Index Body Surface Area  
 18.37 kg/m 2 1.52 m 2 Preferred Pharmacy Pharmacy Name Phone [facility] [address] [place] [address] 32 Your Updated Medication List  
  
   
This list is accurate as of: 12/6/17 11:45 AM.  Always use your most recent med list.  
  
  
  
  
 albuterol 90 mcg/actuation inhaler Commonly known as:  PROVENTIL HFA, VENTOLIN HFA, PROAIR HFA Take 2 Puffs by inhalation every four (4) hours as needed for Wheezing. albuterol-ipratropium 2.5 mg-0.5 mg/3 ml Nebu Commonly known as:  DUO-NEB  
3 mL by Nebulization route every four (4) hours as needed. amLODIPine 10 mg tablet Commonly known as:  Thomson Royals Take 1 Tab by mouth daily. budesonide-formoterol 160-4.5 mcg/actuation Hfaa Commonly known as:  SYMBICORT Take 1 Puff by inhalation two (2) times a day. CENTRUM 0.4-162-18 mg Tab Generic drug:  XF-TH-ZI-Fe-Min-Lycopen-Lutein Take 1 Tab by mouth daily. FISH -160-1,000 mg Cap Generic drug:  omega 3-dha-epa-fish oil Take 1,000 mg by mouth daily. gabapentin 300 mg capsule Commonly known as:  NEURONTIN Take 1 Cap by mouth three (3) times daily. * HYDROmorphone 4 mg tablet Commonly known as:  DILAUDID  
1/2 to one tab up to three times per day prn severe pain  
  
 * HYDROmorphone 4 mg tablet Commonly known as:  DILAUDID  
1/2 to one tab up to three times per day prn severe pain  
  
 * HYDROmorphone 4 mg tablet Commonly known as:  DILAUDID  
1/2 to one tab up to three times per day prn severe pain Start taking on:  12/8/2017 LORazepam 1 mg tablet Commonly known as:  ATIVAN Take 0.5 Tabs by mouth every eight (8) hours as needed. Max Daily Amount: 1.5 mg.  
  
 LUMIGAN 0.01 % ophthalmic drops Generic drug:  bimatoprost  
Administer 1 Drop to both eyes nightly. metoprolol tartrate 25 mg tablet Commonly known as:  LOPRESSOR Take 1 Tab by mouth two (2) times a day. mirtazapine 7.5 mg tablet Commonly known as:  Annette Costain Take 1 Tab by mouth nightly. naloxone 4 mg/actuation nasal spray Commonly known as:  NARCAN  
4 mg by Nasal route as needed. Indications: OPIATE-INDUCED RESPIRATORY DEPRESSION PARoxetine 30 mg tablet Commonly known as:  PAXIL Take 1 Tab by mouth daily. RESTASIS 0.05 % ophthalmic emulsion Generic drug:  cycloSPORINE Administer 1 Drop to both eyes two (2) times a day. rOPINIRole 1 mg tablet Commonly known as:  Johanna Maryanne Take 0.5-1 Tabs by mouth nightly as needed (RLS) for up to 90 days. simvastatin 20 mg tablet Commonly known as:  ZOCOR Take 1 Tab by mouth nightly. tamsulosin 0.4 mg capsule Commonly known as:  FLOMAX Take 1 Cap by mouth daily. tiotropium bromide 1.25 mcg/actuation inhaler Commonly known as:  Orren Salo Take 2 Puffs by inhalation daily. traZODone 100 mg tablet Commonly known as:  Corneyuli Haymaker Take 100 mg by mouth nightly. Patient takes one and half tabs at bedtime VITAMIN C 1,000 mg tablet Generic drug:  ascorbic acid (vitamin C) Take 1,000 mg by mouth daily. * Notice: This list has 3 medication(s) that are the same as other medications prescribed for you. Read the directions carefully, and ask your doctor or other care provider to review them with you. Prescriptions Sent to Pharmacy Refills  
 mirtazapine (REMERON) 7.5 mg tablet 0 Sig: Take 1 Tab by mouth nightly. Class: Normal  
 Pharmacy: [address] [address] [place] [address] #: [phone] Route: Oral  
  
Follow-up Instructions Return in about 1 week (around 12/13/2017). Introducing [facility] & HEALTH SERVICES! Dear Mike Garsia: Thank you for requesting a [employer] account. Our records indicate that you have previously registered for a [employer] account but its currently inactive. Please call our [employer] support line at [phone]. Additional Information If you have questions, please visit the Frequently Asked Questions section of the [employer] website at [email or web address] Remember, [employer] is NOT to be used for urgent needs. For medical emergencies, dial 911. Now available from your iPhone and Android! Please provide this summary of care documentation to your next provider. Your primary care clinician is listed as Ailyn Root. Flores Islas. If you have any questions after today's visit, please call [phone].

## 2017-12-13 ENCOUNTER — OFFICE VISIT (OUTPATIENT)
Dept: INTERNAL MEDICINE CLINIC | Age: 82
End: 2017-12-13

## 2017-12-13 ENCOUNTER — HOSPITAL ENCOUNTER (OUTPATIENT)
Dept: LAB | Age: 82
Discharge: HOME | End: 2017-12-13
Payer: MEDICARE

## 2017-12-13 VITALS
WEIGHT: 110.4 LBS | SYSTOLIC BLOOD PRESSURE: 150 MMHG | DIASTOLIC BLOOD PRESSURE: 78 MMHG | HEART RATE: 93 BPM | HEIGHT: 65 IN | RESPIRATION RATE: 14 BRPM | OXYGEN SATURATION: 98 % | BODY MASS INDEX: 18.39 KG/M2 | TEMPERATURE: 98.3 F

## 2017-12-13 DIAGNOSIS — I10 ESSENTIAL HYPERTENSION WITH GOAL BLOOD PRESSURE LESS THAN 140/90: ICD-10-CM

## 2017-12-13 DIAGNOSIS — E11.40 TYPE 2 DIABETES MELLITUS WITH DIABETIC NEUROPATHY, WITHOUT LONG-TERM CURRENT USE OF INSULIN (HCC): ICD-10-CM

## 2017-12-13 DIAGNOSIS — F51.04 CHRONIC INSOMNIA: Primary | ICD-10-CM

## 2017-12-13 DIAGNOSIS — R63.0 ANOREXIA: ICD-10-CM

## 2017-12-13 LAB
APPEARANCE UR: CLEAR
BACTERIA URNS QL MICRO: ABNORMAL /HPF
BILIRUB UR QL: NEGATIVE
COLOR UR: YELLOW
EPITH CASTS URNS QL MICRO: ABNORMAL /LPF (ref 0–5)
GLUCOSE UR STRIP.AUTO-MCNC: NEGATIVE MG/DL
HGB UR QL STRIP: NEGATIVE
KETONES UR QL STRIP.AUTO: NEGATIVE MG/DL
LEUKOCYTE ESTERASE UR QL STRIP.AUTO: ABNORMAL
NITRITE UR QL STRIP.AUTO: NEGATIVE
PH UR STRIP: 7.5 [PH] (ref 5–8)
PROT UR STRIP-MCNC: NEGATIVE MG/DL
RBC #/AREA URNS HPF: 0 /HPF (ref 0–5)
SP GR UR REFRACTOMETRY: 1.01 (ref 1–1.03)
UROBILINOGEN UR QL STRIP.AUTO: 0.2 EU/DL (ref 0.2–1)
WBC URNS QL MICRO: ABNORMAL /HPF (ref 0–4)

## 2017-12-13 PROCEDURE — 81001 URINALYSIS AUTO W/SCOPE: CPT | Performed by: INTERNAL MEDICINE

## 2017-12-13 PROCEDURE — 82043 UR ALBUMIN QUANTITATIVE: CPT | Performed by: INTERNAL MEDICINE

## 2017-12-13 RX ORDER — LUBIPROSTONE 24 UG/1
24 CAPSULE, GELATIN COATED ORAL
Qty: 30 CAP | Refills: 0
Start: 2017-12-13 | End: 2018-01-12

## 2017-12-13 NOTE — MR AVS SNAPSHOT
Visit Information Date & Time Provider Department Dept. Phone Encounter #  
 12/13/2017  1:30 PM Nancy Lynch MD [facility] [address] [place] [address] [id number] Your Appointments 12/19/2017  2:00 PM  
Follow Up with HETAL Swift [facility] (FAN SCHEDULING) Appt Note: retunr in 3 months [address] [address]  
[phone] Maricel Maynard 1348 [address]  
  
    
 12/26/2017 11:00 AM  
Office Visit with Nancy Lynch MD  
[facility] [address] [place] [address]) Appt Note: f/u  
 [address] [address] [address] [place]  
  
   
 [address], [address]  
  
    
 1/8/2018  9:55 AM  
LAB with [facility] NURSE VISIT [facility] [address] [place] [address] ([facility]) Appt Note: labs [address], [facility] [address] [place]  
  
   
 [address], [address]  
  
    
 1/15/2018 10:00 AM  
Office Visit with Nancy Lynch MD  
[facility] [address] [place] [address]) Appt Note: ov 4mos. rm  
 [address], [facility] [address] [address] [address]  
[phone]  
  
    
 3/5/2018  9:00 AM  
Office Visit with Nimisha Meza MD  
[facility] [address] [place] [address]) Appt Note: check up [address] Denny [address] [facility] [address]  
[phone] [address] [place] [address] Upcoming Health Maintenance Date Due  
 EYE EXAM RETINAL OR DILATED Q1 2/5/2015 GLAUCOMA SCREENING Q2Y 4/1/2016 MICROALBUMIN Q1 4/5/2017 Influenza Age 5 to Adult 8/1/2017 HEMOGLOBIN A1C Q6M 6/4/2018 MEDICARE YEARLY EXAM 9/14/2018 LIPID PANEL Q1 12/4/2018 DTaP/Tdap/Td series (2 - Td) 6/11/2024 Allergies as of 12/13/2017  Review Complete On: 12/13/2017 By: Nancy Lynch MD  
  
 Severity Noted Reaction Type Reactions Levaquin [Levofloxacin]  05/17/2011    Rash Current Immunizations  Reviewed on 9/13/2017 Name Date Influenza High Dose Vaccine PF 10/13/2016 Influenza Vaccine 10/15/2014 Influenza Vaccine (Quad) PF 12/8/2015 Influenza Vaccine Split 11/22/2011, 11/11/2010 11:46 AM  
 Pneumococcal Conjugate (PCV-13) 12/8/2015 Pneumococcal Polysaccharide (PPSV-23) 9/20/2000 Tdap 6/11/2014 ZZZ-RETIRED (DO NOT USE) Pneumococcal Vaccine (Unspecified Type) 9/20/2000 Not reviewed this visit You Were Diagnosed With   
  
 Codes Comments Chronic insomnia    -  Primary ICD-10-CM: F51.04 
ICD-9-CM: 780.52 Anorexia     ICD-10-CM: R63.0 ICD-9-CM: 783.0 Essential hypertension with goal blood pressure less than 140/90     ICD-10-CM: I10 
ICD-9-CM: 401.9 Vitals BP Pulse Temp Resp Height(growth percentile) Weight(growth percentile) 150/78 93 98.3 °F (36.8 °C) (Oral) 14 5' 5\" (1.651 m) 110 lb 6.4 oz (50.1 kg) SpO2 BMI OB Status Smoking Status 98% 18.37 kg/m2 Hysterectomy Current Every Day Smoker Vitals History BMI and BSA Data Body Mass Index Body Surface Area  
 18.37 kg/m 2 1.52 m 2 Preferred Pharmacy Pharmacy Name Phone [facility] [address] [place], [address] Your Updated Medication List  
  
   
This list is accurate as of: 12/13/17  1:46 PM.  Always use your most recent med list. amLODIPine 10 mg tablet Commonly known as:  Tamara Croon Take 1 Tab by mouth daily. CENTRUM 0.4-162-18 mg Tab Generic drug:  NU-LN-KQ-Fe-Min-Lycopen-Lutein Take 1 Tab by mouth daily. FISH -160-1,000 mg Cap Generic drug:  omega 3-dha-epa-fish oil Take 1,000 mg by mouth daily. gabapentin 300 mg capsule Commonly known as:  NEURONTIN Take 1 Cap by mouth three (3) times daily. * HYDROmorphone 4 mg tablet Commonly known as:  DILAUDID  
1/2 to one tab up to three times per day prn severe pain  
  
 * HYDROmorphone 4 mg tablet Commonly known as:  DILAUDID  
1/2 to one tab up to three times per day prn severe pain  
  
 * HYDROmorphone 4 mg tablet Commonly known as:  DILAUDID  
1/2 to one tab up to three times per day prn severe pain LUMIGAN 0.01 % ophthalmic drops Generic drug:  bimatoprost  
Administer 1 Drop to both eyes nightly. metoprolol tartrate 25 mg tablet Commonly known as:  LOPRESSOR Take 1 Tab by mouth two (2) times a day. mirtazapine 7.5 mg tablet Commonly known as:  Cordero Sitter Take 1 Tab by mouth nightly. naloxone 4 mg/actuation nasal spray Commonly known as:  NARCAN  
4 mg by Nasal route as needed. Indications: OPIATE-INDUCED RESPIRATORY DEPRESSION PARoxetine 30 mg tablet Commonly known as:  PAXIL Take 1 Tab by mouth daily. RESTASIS 0.05 % ophthalmic emulsion Generic drug:  cycloSPORINE Administer 1 Drop to both eyes two (2) times a day. rOPINIRole 1 mg tablet Commonly known as:  Cherl Yessy Take 0.5-1 Tabs by mouth nightly as needed (RLS) for up to 90 days. simvastatin 20 mg tablet Commonly known as:  ZOCOR Take 1 Tab by mouth nightly. traZODone 100 mg tablet Commonly known as:  Raquel Ammy Take 100 mg by mouth nightly. Patient takes one and half tabs at bedtime VITAMIN C 1,000 mg tablet Generic drug:  ascorbic acid (vitamin C) Take 1,000 mg by mouth daily. * Notice: This list has 3 medication(s) that are the same as other medications prescribed for you. Read the directions carefully, and ask your doctor or other care provider to review them with you. Introducing [facility]! Dear Nicol Kumar: Thank you for requesting a [facility] account. Our records indicate that you have previously registered for a [facility] account but its currently inactive. Please call our [facility] support line at [phone]. Additional Information If you have questions, please visit the Frequently Asked Questions section of the [employer] website at [email or web address] Remember, [employer] is NOT to be used for urgent needs. For medical emergencies, dial 911. Now available from your iPhone and Android! Please provide this summary of care documentation to your next provider. Your primary care clinician is listed as Kailash Solorzano. Kamala Estrada. If you have any questions after today's visit, please call [phone].

## 2017-12-13 NOTE — PROGRESS NOTES
Marissa Salisbury 9/22/1933, is a 80 y.o. female, who is seen today for reevaluation of chronic insomnia, weight loss of 27 pounds this year, anorexia, sweats at night, chronic pain COPD hypertension history of depression. She is sleeping a little better now that she has been on mirtazapine this last week and she continues her other medicine but going through her medicines she has not been using any of her inhalers for quite some time. Also some of her medicine is empty bottles. Past Medical History:   Diagnosis Date    Colon polyps     COPD 8-30-02    DDD (degenerative disc disease), lumbar 10/1/2014    Degeneration of lumbar or lumbosacral intervertebral disc     Depression     Diabetes ([facility] [address] 7-19-05    Diverticulosis     [facility], [facility] 10/1/2014    MOORE (Dyspnea on Exertion) 4-19-02    echo: +mild LVH, US92-94% w/ diastolic dysfxn    Glaucoma     HCAP (healthcare-associated pneumonia) 03/24/2017    Hemorrhoids 6-6-06    Hyperlipidemia 5/17/2011    Hypertension 4-16-02    LBP (low back pain) 4-13-04    Low back pain radiating to both legs 10/1/2014    Lumbago     Lumbar disc herniation 10/1/2014    Lumbar facet arthropathy 10/1/2014    Lumbosacral radiculopathy at L5 10/1/2014    Lumbosacral radiculopathy at S1 10/1/2014    Microscopic hematuria     OA (osteoarthritis)     Overactive bladder 5/17/2011    RLS (restless legs syndrome) 1/17/2013    Sciatica     Shortness of breath     Spinal stenosis, lumbar region, without neurogenic claudication     Spondylolisthesis of lumbar region 10/1/2014    Spondylolisthesis, grade 1 10/1/2014    Stress urinary incontinence     Syncope     -MRI brain    Urinary tract infection, site not specified      Current Outpatient Prescriptions   Medication Sig Dispense Refill    mirtazapine (REMERON) 7.5 mg tablet Take 1 Tab by mouth nightly.  30 Tab 0    HYDROmorphone (DILAUDID) 4 mg tablet 1/2 to one tab up to three times per day prn severe pain 90 Tab 0    HYDROmorphone (DILAUDID) 4 mg tablet 1/2 to one tab up to three times per day prn severe pain 90 Tab 0    HYDROmorphone (DILAUDID) 4 mg tablet 1/2 to one tab up to three times per day prn severe pain 90 Tab 0    rOPINIRole (REQUIP) 1 mg tablet Take 0.5-1 Tabs by mouth nightly as needed (RLS) for up to 90 days. 90 Tab 5    gabapentin (NEURONTIN) 300 mg capsule Take 1 Cap by mouth three (3) times daily. 270 Cap 1    PARoxetine (PAXIL) 30 mg tablet Take 1 Tab by mouth daily. 90 Tab 3    simvastatin (ZOCOR) 20 mg tablet Take 1 Tab by mouth nightly. 90 Tab 1    metoprolol tartrate (LOPRESSOR) 25 mg tablet Take 1 Tab by mouth two (2) times a day. 120 Tab 3    naloxone 4 mg/actuation spry 4 mg by Nasal route as needed. Indications: OPIATE-INDUCED RESPIRATORY DEPRESSION 1 Package 0    amLODIPine (NORVASC) 10 mg tablet Take 1 Tab by mouth daily. 30 Tab 0    omega 3-dha-epa-fish oil (FISH OIL) 100-160-1,000 mg cap Take 1,000 mg by mouth daily.  LUMIGAN 0.01 % ophthalmic drops Administer 1 Drop to both eyes nightly.  ascorbic acid (VITAMIN C) 1,000 mg tablet Take 1,000 mg by mouth daily.  traZODone (DESYREL) 100 mg tablet Take 100 mg by mouth nightly. Patient takes one and half tabs at bedtime      cycloSPORINE (RESTASIS) 0.05 % ophthalmic emulsion Administer 1 Drop to both eyes two (2) times a day.  IQ-II-OL-Fe-Min-Lycopen-Lutein (CENTRUM) 0.4-162-18 mg Tab Take 1 Tab by mouth daily. Visit Vitals    /78    Pulse 93    Temp 98.3 °F (36.8 °C) (Oral)    Resp 14    Ht 5' 5\" (1.651 m)    Wt 110 lb 6.4 oz (50.1 kg)    SpO2 98%    BMI 18.37 kg/m2     Carotids are 2+ without bruits. Lungs are clear to percussion. Diminished breath sounds with no wheezing or crackles. Heart reveals a regular rhythm with normal S1 and S2 no murmur gallop click or rub. Abdomen is soft and nontender with no hepatosplenomegaly or masses and no bruits. Extremities reveal no clubbing cyanosis or edema. Diminished pulses in the feet and 2+ elsewhere. Assessment: #1.  27 pound weight loss, depressed concerned about cancer until just this last week when I went over all of her studies with her and showed her there is no indication of cancer from mammography or CT of the chest abdomen pelvis and exam and symptoms. We will try increasing mirtazapine to 15 mg at bedtime, I did write that down for the patient and her . #2. Insomnia and sweats at night a little better, hopefully increasing mirtazapine will help and she will continue trazodone and her other medication. #3.  Depression still not doing great. She will continue paroxetine 30 mg daily and we are increasing mirtazapine to 15 mg at bedtime. #4.  COPD not using inhalers and doing quite well. #5.  Chronic pain, her  says he needs to refill her Dilaudid, there is an empty bottle of that in her bag today. #6.  Some degree of memory loss which also is affecting her , they are somewhat confused on the medicines, one for her GI tract is not being given but she has a full bottle in her bag. He is going to start giving that to her since it did help with her IBS. Follow-up in about 2 weeks and we may increase mirtazapine to 30 mg at bedtime if she is tolerating that well and still not sleeping well or eating that well. This visit lasted 25 minutes, greater than 50% of the time was spent counseling, I discussed all of these issues with the patient and her  and her daughter who came into the appointment somewhat late. I counseled them on the pathophysiology of the perceived problems, particularly depression with associated symptoms of anorexia weight loss and insomnia.

## 2017-12-14 LAB
CREAT UR-MCNC: 27.43 MG/DL (ref 30–125)
MICROALBUMIN UR-MCNC: 5.7 MG/DL (ref 0–3)
MICROALBUMIN/CREAT UR-RTO: 208 MG/G (ref 0–30)

## 2017-12-26 ENCOUNTER — OFFICE VISIT (OUTPATIENT)
Dept: INTERNAL MEDICINE CLINIC | Age: 82
End: 2017-12-26

## 2017-12-26 VITALS
HEIGHT: 65 IN | BODY MASS INDEX: 18.33 KG/M2 | DIASTOLIC BLOOD PRESSURE: 70 MMHG | WEIGHT: 110 LBS | TEMPERATURE: 98 F | HEART RATE: 80 BPM | SYSTOLIC BLOOD PRESSURE: 138 MMHG | RESPIRATION RATE: 14 BRPM | OXYGEN SATURATION: 94 %

## 2017-12-26 DIAGNOSIS — I10 ESSENTIAL HYPERTENSION WITH GOAL BLOOD PRESSURE LESS THAN 140/90: ICD-10-CM

## 2017-12-26 DIAGNOSIS — F51.01 PRIMARY INSOMNIA: ICD-10-CM

## 2017-12-26 DIAGNOSIS — F32.9 MAJOR DEPRESSION, CHRONIC: Primary | ICD-10-CM

## 2017-12-26 RX ORDER — TAMSULOSIN HYDROCHLORIDE 0.4 MG/1
0.4 CAPSULE ORAL DAILY
COMMUNITY
End: 2018-03-23 | Stop reason: SDUPTHER

## 2017-12-26 RX ORDER — MIRTAZAPINE 30 MG/1
30 TABLET, FILM COATED ORAL
Qty: 30 TAB | Refills: 2 | Status: SHIPPED | OUTPATIENT
Start: 2017-12-26 | End: 2018-03-18 | Stop reason: SDUPTHER

## 2017-12-26 RX ORDER — MIRTAZAPINE 7.5 MG/1
7.5 TABLET, FILM COATED ORAL
Qty: 30 TAB | Refills: 0 | Status: CANCELLED | OUTPATIENT
Start: 2017-12-26

## 2017-12-26 NOTE — PROGRESS NOTES
Danisha Lim 9/22/1933, is a 80 y.o. female, who is seen today for reevaluation of weight loss chronic depression insomnia chronic pain and hypertension. She says she is eating everything in sight and her  says she is eating quite well. She has lost 26 pounds this last year but now the last 3 weeks there is been no change in weight. She is breathing pretty well. She is on pain medication and gabapentin for chronic pain through pain management. She says she is not sleeping all that well even with mirtazapine at 15 mg and trazodone. For her depression she has been on Paxil as well previously treated by psychiatrist but has not seen a psychiatrist recently.     Past Medical History:   Diagnosis Date    Colon polyps     COPD 8-30-02    DDD (degenerative disc disease), lumbar 10/1/2014    Degeneration of lumbar or lumbosacral intervertebral disc     Depression     Diabetes ([facility] [address] 7-19-05    Diverticulosis     DM neuropathy, painful ([facility] [address] 10/1/2014    MOORE (Dyspnea on Exertion) 4-19-02    echo: +mild LVH, WS23-94% w/ diastolic dysfxn    Glaucoma     HCAP (healthcare-associated pneumonia) 03/24/2017    Hemorrhoids 6-6-06    Hyperlipidemia 5/17/2011    Hypertension 4-16-02    LBP (low back pain) 4-13-04    Low back pain radiating to both legs 10/1/2014    Lumbago     Lumbar disc herniation 10/1/2014    Lumbar facet arthropathy 10/1/2014    Lumbosacral radiculopathy at L5 10/1/2014    Lumbosacral radiculopathy at S1 10/1/2014    Microscopic hematuria     OA (osteoarthritis)     Overactive bladder 5/17/2011    RLS (restless legs syndrome) 1/17/2013    Sciatica     Shortness of breath     Spinal stenosis, lumbar region, without neurogenic claudication     Spondylolisthesis of lumbar region 10/1/2014    Spondylolisthesis, grade 1 10/1/2014    Stress urinary incontinence     Syncope     -MRI brain    Urinary tract infection, site not specified      Current Outpatient Prescriptions   Medication Sig Dispense Refill    tamsulosin (FLOMAX) 0.4 mg capsule Take 0.4 mg by mouth daily.  lubiPROStone (AMITIZA) 24 mcg capsule Take 1 Cap by mouth daily (with breakfast) for 30 days. 30 Cap 0    mirtazapine (REMERON) 7.5 mg tablet Take 1 Tab by mouth nightly. 30 Tab 0    HYDROmorphone (DILAUDID) 4 mg tablet 1/2 to one tab up to three times per day prn severe pain 90 Tab 0    HYDROmorphone (DILAUDID) 4 mg tablet 1/2 to one tab up to three times per day prn severe pain 90 Tab 0    HYDROmorphone (DILAUDID) 4 mg tablet 1/2 to one tab up to three times per day prn severe pain 90 Tab 0    rOPINIRole (REQUIP) 1 mg tablet Take 0.5-1 Tabs by mouth nightly as needed (RLS) for up to 90 days. 90 Tab 5    gabapentin (NEURONTIN) 300 mg capsule Take 1 Cap by mouth three (3) times daily. 270 Cap 1    PARoxetine (PAXIL) 30 mg tablet Take 1 Tab by mouth daily. 90 Tab 3    simvastatin (ZOCOR) 20 mg tablet Take 1 Tab by mouth nightly. 90 Tab 1    metoprolol tartrate (LOPRESSOR) 25 mg tablet Take 1 Tab by mouth two (2) times a day. 120 Tab 3    naloxone 4 mg/actuation spry 4 mg by Nasal route as needed. Indications: OPIATE-INDUCED RESPIRATORY DEPRESSION 1 Package 0    amLODIPine (NORVASC) 10 mg tablet Take 1 Tab by mouth daily. 30 Tab 0    omega 3-dha-epa-fish oil (FISH OIL) 100-160-1,000 mg cap Take 1,000 mg by mouth daily.  LUMIGAN 0.01 % ophthalmic drops Administer 1 Drop to both eyes nightly.  ascorbic acid (VITAMIN C) 1,000 mg tablet Take 1,000 mg by mouth daily.  traZODone (DESYREL) 100 mg tablet Take 100 mg by mouth nightly. Patient takes one and half tabs at bedtime      cycloSPORINE (RESTASIS) 0.05 % ophthalmic emulsion Administer 1 Drop to both eyes two (2) times a day.  QM-OR-BQ-Fe-Min-Lycopen-Lutein (CENTRUM) 0.4-162-18 mg Tab Take 1 Tab by mouth daily.        Visit Vitals    /70    Pulse 80    Temp 98 °F (36.7 °C) (Oral)    Resp 14    Ht 5' 5\" (1.651 m)    Wt 110 lb (49.9 kg)    SpO2 94%    BMI 18.3 kg/m2     Carotids are 2+ without bruits. Lungs are clear to percussion. Somewhat diminished breath sounds with no wheezing or crackles. Heart reveals a regular rhythm with normal S1 and S2 no murmur gallop click or rub. Abdomen is soft and nontender with no hepatosplenomegaly or masses. Extremities reveal no clubbing cyanosis or edema. Pulses are 2+. Assessment: #1. Depression seems to stabilize somewhat, she will continue Paxil 30 mg daily. #2.  Still having insomnia, for now she will continue trazodone and we will increase mirtazapine from 15 mg at bedtime to 30 mg at bedtime and hopefully stimulate appetite and help with insomnia. #3. Hypertension is controlled, when she first came into the office her blood pressure was 160/80 but resting blood pressure is normal.  She will continue amlodipine and metoprolol. #4.  Weight loss has stabilized, probably older depression, she is to be worried about cancer but does not seem to be worried about that anymore after I talked to her at length her last visit here. Follow-up in about 4 weeks. Gaetano Garcias MD FACP    Please note: This document has been produced using voice recognition software. Unrecognized errors in transcription may be present.

## 2017-12-26 NOTE — MR AVS SNAPSHOT
Visit Information Date & Time Provider Department Dept. Phone Encounter #  
 12/26/2017 11:00 AM Kirstin Dyson MD [facility] [phone] [id number] Follow-up Instructions Return in about 4 weeks (around 1/23/2018). Your Appointments 1/8/2018  9:55 AM  
LAB with [facility] NURSE VISIT [facility] ([address]) Appt Note: labs [address], [address] [place]  
  
   
 [address], [address]  
  
    
 1/23/2018 11:00 AM  
Office Visit with Kirstin Dyson MD  
[facility] [address] [place] [address]) Appt Note: 4 week f/u  
 [address] [place]  
  
   
 [address], [address]  
  
    
 3/5/2018  9:00 AM  
Office Visit with Ramona Ulrich MD  
[facility] [address] [place] [address]) Appt Note: check up [address] [facility] [address] [address]  
[phone] [address] [facility] [address] Upcoming Health Maintenance Date Due  
 EYE EXAM RETINAL OR DILATED Q1 2/5/2015 GLAUCOMA SCREENING Q2Y 4/1/2016 Influenza Age 5 to Adult 8/1/2017 HEMOGLOBIN A1C Q6M 6/4/2018 MEDICARE YEARLY EXAM 9/14/2018 LIPID PANEL Q1 12/4/2018 MICROALBUMIN Q1 12/13/2018 DTaP/Tdap/Td series (2 - Td) 6/11/2024 Allergies as of 12/26/2017  Review Complete On: 12/26/2017 By: Kirstin Dyson MD  
  
 Severity Noted Reaction Type Reactions Levaquin [Levofloxacin]  05/17/2011    Rash Current Immunizations  Reviewed on 9/13/2017 Name Date Influenza High Dose Vaccine PF 10/13/2016 Influenza Vaccine 10/15/2014 Influenza Vaccine (Quad) PF 12/8/2015 Influenza Vaccine Split 11/22/2011, 11/11/2010 11:46 AM  
 Pneumococcal Conjugate (PCV-13) 12/8/2015 Pneumococcal Polysaccharide (PPSV-23) 9/20/2000 Tdap 6/11/2014 ZZZ-RETIRED (DO NOT USE) Pneumococcal Vaccine (Unspecified Type) 9/20/2000 Not reviewed this visit You Were Diagnosed With   
  
 Codes Comments Major depression, chronic    -  Primary ICD-10-CM: F32.9 ICD-9-CM: 296.20 Primary insomnia     ICD-10-CM: F51.01 
ICD-9-CM: 307.42 Essential hypertension with goal blood pressure less than 140/90     ICD-10-CM: I10 
ICD-9-CM: 401.9 Vitals BP Pulse Temp Resp Height(growth percentile) Weight(growth percentile) 138/70 80 98 °F (36.7 °C) (Oral) 14 5' 5\" (1.651 m) 110 lb (49.9 kg) SpO2 BMI OB Status Smoking Status 94% 18.3 kg/m2 Hysterectomy Current Every Day Smoker Vitals History BMI and BSA Data Body Mass Index Body Surface Area  
 18.3 kg/m 2 1.51 m 2 Preferred Pharmacy Pharmacy Name Phone [facility] [address] [facility] Your Updated Medication List  
  
   
This list is accurate as of: 12/26/17 11:17 AM.  Always use your most recent med list. amLODIPine 10 mg tablet Commonly known as:  Samson Adrián Take 1 Tab by mouth daily. CENTRUM 0.4-162-18 mg Tab Generic drug:  DG-ZN-ES-Fe-Min-Lycopen-Lutein Take 1 Tab by mouth daily. FISH -160-1,000 mg Cap Generic drug:  omega 3-dha-epa-fish oil Take 1,000 mg by mouth daily. FLOMAX 0.4 mg capsule Generic drug:  tamsulosin Take 0.4 mg by mouth daily. gabapentin 300 mg capsule Commonly known as:  NEURONTIN Take 1 Cap by mouth three (3) times daily. * HYDROmorphone 4 mg tablet Commonly known as:  DILAUDID  
1/2 to one tab up to three times per day prn severe pain  
  
 * HYDROmorphone 4 mg tablet Commonly known as:  DILAUDID  
1/2 to one tab up to three times per day prn severe pain  
  
 * HYDROmorphone 4 mg tablet Commonly known as:  DILAUDID  
1/2 to one tab up to three times per day prn severe pain lubiPROStone 24 mcg capsule Commonly known as:  Markus Brown Take 1 Cap by mouth daily (with breakfast) for 30 days. LUMIGAN 0.01 % ophthalmic drops Generic drug:  bimatoprost  
Administer 1 Drop to both eyes nightly. metoprolol tartrate 25 mg tablet Commonly known as:  LOPRESSOR Take 1 Tab by mouth two (2) times a day. mirtazapine 7.5 mg tablet Commonly known as:  Jimmey Yg Take 1 Tab by mouth nightly. naloxone 4 mg/actuation nasal spray Commonly known as:  NARCAN  
4 mg by Nasal route as needed. Indications: OPIATE-INDUCED RESPIRATORY DEPRESSION PARoxetine 30 mg tablet Commonly known as:  PAXIL Take 1 Tab by mouth daily. RESTASIS 0.05 % ophthalmic emulsion Generic drug:  cycloSPORINE Administer 1 Drop to both eyes two (2) times a day. rOPINIRole 1 mg tablet Commonly known as:  Gene Gage Take 0.5-1 Tabs by mouth nightly as needed (RLS) for up to 90 days. simvastatin 20 mg tablet Commonly known as:  ZOCOR Take 1 Tab by mouth nightly. traZODone 100 mg tablet Commonly known as:  Lei [place] Take 100 mg by mouth nightly. Patient takes one and half tabs at bedtime VITAMIN C 1,000 mg tablet Generic drug:  ascorbic acid (vitamin C) Take 1,000 mg by mouth daily. * Notice: This list has 3 medication(s) that are the same as other medications prescribed for you. Read the directions carefully, and ask your doctor or other care provider to review them with you. Follow-up Instructions Return in about 4 weeks (around 1/23/2018). Introducing [facility]! Dear Don Ward: Thank you for requesting a [employer] account. Our records indicate that you have previously registered for a [employer] account but its currently inactive. Please call our [employer] support line at [phone]. Additional Information If you have questions, please visit the Frequently Asked Questions section of the [employer] website at [email or web address] Remember, [employer] is NOT to be used for urgent needs. For medical emergencies, dial 911. Now available from your iPhone and Android! Please provide this summary of care documentation to your next provider. Your primary care clinician is listed as Morene Hodgkin. Caridad Hoar. If you have any questions after today's visit, please call [phone].

## 2018-01-08 ENCOUNTER — LAB ONLY (OUTPATIENT)
Dept: INTERNAL MEDICINE CLINIC | Age: 83
End: 2018-01-08

## 2018-01-08 ENCOUNTER — HOSPITAL ENCOUNTER (OUTPATIENT)
Dept: LAB | Age: 83
Discharge: HOME | End: 2018-01-08
Payer: MEDICARE

## 2018-01-08 DIAGNOSIS — E87.6 HYPOKALEMIA: ICD-10-CM

## 2018-01-08 DIAGNOSIS — E87.6 HYPOKALEMIA: Primary | ICD-10-CM

## 2018-01-08 LAB
ALBUMIN SERPL-MCNC: 3.7 G/DL (ref 3.4–5)
ALBUMIN/GLOB SERPL: 1.3 {RATIO} (ref 0.8–1.7)
ALP SERPL-CCNC: 70 U/L (ref 45–117)
ALT SERPL-CCNC: 17 U/L (ref 13–56)
ANION GAP SERPL CALC-SCNC: 8 MMOL/L (ref 3–18)
AST SERPL-CCNC: 18 U/L (ref 15–37)
BILIRUB SERPL-MCNC: 0.3 MG/DL (ref 0.2–1)
BUN SERPL-MCNC: 10 MG/DL (ref 7–18)
BUN/CREAT SERPL: 16 (ref 12–20)
CALCIUM SERPL-MCNC: 8.7 MG/DL (ref 8.5–10.1)
CHLORIDE SERPL-SCNC: 98 MMOL/L (ref 100–108)
CO2 SERPL-SCNC: 31 MMOL/L (ref 21–32)
CREAT SERPL-MCNC: 0.64 MG/DL (ref 0.6–1.3)
GLOBULIN SER CALC-MCNC: 2.9 G/DL (ref 2–4)
GLUCOSE SERPL-MCNC: 98 MG/DL (ref 74–99)
POTASSIUM SERPL-SCNC: 4.3 MMOL/L (ref 3.5–5.5)
PROT SERPL-MCNC: 6.6 G/DL (ref 6.4–8.2)
SODIUM SERPL-SCNC: 137 MMOL/L (ref 136–145)

## 2018-01-08 PROCEDURE — 36415 COLL VENOUS BLD VENIPUNCTURE: CPT | Performed by: INTERNAL MEDICINE

## 2018-01-08 PROCEDURE — 80053 COMPREHEN METABOLIC PANEL: CPT | Performed by: INTERNAL MEDICINE

## 2018-01-11 DIAGNOSIS — M54.50 LOW BACK PAIN RADIATING TO BOTH LEGS: ICD-10-CM

## 2018-01-11 DIAGNOSIS — G25.81 RLS (RESTLESS LEGS SYNDROME): ICD-10-CM

## 2018-01-11 DIAGNOSIS — M79.604 LOW BACK PAIN RADIATING TO BOTH LEGS: ICD-10-CM

## 2018-01-11 DIAGNOSIS — M54.30 SCIATICA, UNSPECIFIED LATERALITY: ICD-10-CM

## 2018-01-11 DIAGNOSIS — G62.9 POLYNEUROPATHY: ICD-10-CM

## 2018-01-11 DIAGNOSIS — M79.605 LOW BACK PAIN RADIATING TO BOTH LEGS: ICD-10-CM

## 2018-01-11 RX ORDER — ROPINIROLE 1 MG/1
.5-1 TABLET, FILM COATED ORAL
Qty: 90 TAB | Refills: 5 | Status: SHIPPED | OUTPATIENT
Start: 2018-01-11 | End: 2019-03-09 | Stop reason: SDUPTHER

## 2018-01-24 ENCOUNTER — OFFICE VISIT (OUTPATIENT)
Dept: INTERNAL MEDICINE CLINIC | Age: 83
End: 2018-01-24

## 2018-01-24 VITALS
SYSTOLIC BLOOD PRESSURE: 144 MMHG | TEMPERATURE: 97.8 F | HEIGHT: 65 IN | WEIGHT: 115 LBS | BODY MASS INDEX: 19.16 KG/M2 | HEART RATE: 56 BPM | OXYGEN SATURATION: 95 % | DIASTOLIC BLOOD PRESSURE: 60 MMHG | RESPIRATION RATE: 16 BRPM

## 2018-01-24 DIAGNOSIS — I10 ESSENTIAL HYPERTENSION WITH GOAL BLOOD PRESSURE LESS THAN 140/90: ICD-10-CM

## 2018-01-24 DIAGNOSIS — J43.1 PANLOBULAR EMPHYSEMA (HCC): Primary | ICD-10-CM

## 2018-01-24 DIAGNOSIS — E11.40 TYPE 2 DIABETES MELLITUS WITH DIABETIC NEUROPATHY, WITHOUT LONG-TERM CURRENT USE OF INSULIN (HCC): ICD-10-CM

## 2018-01-24 DIAGNOSIS — F32.9 MAJOR DEPRESSION, CHRONIC: ICD-10-CM

## 2018-01-24 NOTE — PROGRESS NOTES
Jennifer Hu 9/22/1933, is a 80 y.o. female, who is seen today for reevaluation of depression with insomnia weight loss, hypertension, emphysema. She was started on mirtazapine about a month ago and the dose was increased to 30 mg daily 1 month ago and she is finally gained 5 pounds after losing a great deal of weight over the last year. Her psychiatrist is Dr. Gulshan Mosley who treats her with paroxetine but for the last few visits appointments have been canceled and another one was canceled since I saw her a month ago. Nothing was rescheduled. The  told her that the doctor would call and he never called. She has had some increasing wheezing and slight dyspnea the last couple of weeks but no fever or chills. Producing no sputum. Still smoking a pack per day. She is stressed partly because her  is chronically ill. She feels nervous but she is sleeping a little better. She has chronic pain and uses medicine through pain management.     Past Medical History:   Diagnosis Date    Colon polyps     COPD 8-30-02    DDD (degenerative disc disease), lumbar 10/1/2014    Degeneration of lumbar or lumbosacral intervertebral disc     Depression     Diabetes ([facility] [address] 7-19-05    Diverticulosis     DM neuropathy, painful ([facility] [address] 10/1/2014    MOORE (Dyspnea on Exertion) 4-19-02    echo: +mild LVH, [id number]% w/ diastolic dysfxn    Glaucoma     HCAP (healthcare-associated pneumonia) 03/24/2017    Hemorrhoids 6-6-06    Hyperlipidemia 5/17/2011    Hypertension 4-16-02    LBP (low back pain) 4-13-04    Low back pain radiating to both legs 10/1/2014    Lumbago     Lumbar disc herniation 10/1/2014    Lumbar facet arthropathy 10/1/2014    Lumbosacral radiculopathy at L5 10/1/2014    Lumbosacral radiculopathy at S1 10/1/2014    Microscopic hematuria     OA (osteoarthritis)     Overactive bladder 5/17/2011    RLS (restless legs syndrome) 1/17/2013    Sciatica     Shortness of breath  Spinal stenosis, lumbar region, without neurogenic claudication     Spondylolisthesis of lumbar region 10/1/2014    Spondylolisthesis, grade 1 10/1/2014    Stress urinary incontinence     Syncope     -MRI brain    Urinary tract infection, site not specified      Current Outpatient Prescriptions   Medication Sig Dispense Refill    rOPINIRole (REQUIP) 1 mg tablet Take 0.5-1 Tabs by mouth nightly as needed (RLS) for up to 90 days. 90 Tab 5    tamsulosin (FLOMAX) 0.4 mg capsule Take 0.4 mg by mouth daily.  mirtazapine (REMERON) 30 mg tablet Take 1 Tab by mouth nightly. 30 Tab 2    HYDROmorphone (DILAUDID) 4 mg tablet 1/2 to one tab up to three times per day prn severe pain 90 Tab 0    HYDROmorphone (DILAUDID) 4 mg tablet 1/2 to one tab up to three times per day prn severe pain 90 Tab 0    HYDROmorphone (DILAUDID) 4 mg tablet 1/2 to one tab up to three times per day prn severe pain 90 Tab 0    gabapentin (NEURONTIN) 300 mg capsule Take 1 Cap by mouth three (3) times daily. 270 Cap 1    PARoxetine (PAXIL) 30 mg tablet Take 1 Tab by mouth daily. 90 Tab 3    simvastatin (ZOCOR) 20 mg tablet Take 1 Tab by mouth nightly. 90 Tab 1    metoprolol tartrate (LOPRESSOR) 25 mg tablet Take 1 Tab by mouth two (2) times a day. 120 Tab 3    naloxone 4 mg/actuation spry 4 mg by Nasal route as needed. Indications: OPIATE-INDUCED RESPIRATORY DEPRESSION 1 Package 0    amLODIPine (NORVASC) 10 mg tablet Take 1 Tab by mouth daily. 30 Tab 0    omega 3-dha-epa-fish oil (FISH OIL) 100-160-1,000 mg cap Take 1,000 mg by mouth daily.  LUMIGAN 0.01 % ophthalmic drops Administer 1 Drop to both eyes nightly.  ascorbic acid (VITAMIN C) 1,000 mg tablet Take 1,000 mg by mouth daily.  traZODone (DESYREL) 100 mg tablet Take 100 mg by mouth nightly. Patient takes one and half tabs at bedtime      cycloSPORINE (RESTASIS) 0.05 % ophthalmic emulsion Administer 1 Drop to both eyes two (2) times a day.         VD-JS-AE-Fe-Min-Lycopen-Lutein (CENTRUM) 0.4-162-18 mg Tab Take 1 Tab by mouth daily. Visit Vitals    /60    Pulse (!) 56    Temp 97.8 °F (36.6 °C) (Oral)    Resp 16    Ht 5' 5\" (1.651 m)    Wt 115 lb (52.2 kg)    SpO2 95%    BMI 19.14 kg/m2     Carotids are 2+ without bruits. Lungs are clear to percussion. Diminished breath sounds throughout with mild end expiratory wheeze with forced expiration. No crackles. Heart reveals a regular rhythm with normal S1 and S2 no murmur gallop click or rub. Apical impulse is not palpable. Abdomen is soft and nontender with no hepatosplenomegaly or masses and no bruits. Extremities reveal no clubbing cyanosis or edema. Pulses are diminished in the feet and 2+ elsewhere. Assessment: #1. Depression with a lot of stress as well and substantial weight loss but finally gaining some weight. Her appetite is better. She will continue mirtazapine 30 mg daily and paroxetine 30 mg daily and since Dr. Minnie Kirby cannot see her I recommended she check with the office and see if Dr. Chapo Anders could see her. Even the PA would be a reasonable alternative if none of the psychiatrist can see her. Driving to [facility] is not feasible for her so I will not recommend a psychiatrist in that area. #2. Hypertension with blood pressure under reasonable control. She will continue amlodipine 10 mg daily and metoprolol 25 mg twice daily. #3.  Emphysema still smoking, minimal wheeze. I recommended she do the best she can to either cut back and/or quit smoking but no additional medicine is felt worthwhile at this point. If the bruising worsens I will reevaluate her. She and her daughter agree with all of the above recommendations. #4.  Diabetes and hyperlipidemia, she will continue current medication and healthy diet. Follow-up 3 months or sooner if needed. We will check some lab when she is here for follow-up.   She has not gotten a flu shot this fall and we no longer have high potency influenza vaccine so I recommended strongly she go ahead and have that done at a local pharmacy. Her daughter agrees to try to get her into a pharmacy and have that done very soon. I did explain to her that despite what she hears on the television about the relative ineffectiveness of this years flu shots, there is still significant potential benefit from getting the flu shot. Gaetano Rocha MD FACP    Please note: This document has been produced using voice recognition software. Unrecognized errors in transcription may be present.

## 2018-01-24 NOTE — MR AVS SNAPSHOT
[address], [address] [facility] 
[phone] Patient: Shawna Camarillo MRN:  [id number] Visit Information Date & Time Provider Department Dept. Phone Encounter #  
 1/24/2018  4:00 PM Desiree Lanier MD [facility] [phone] [id number] Follow-up Instructions Return in about 3 months (around 4/24/2018). Your Appointments 3/5/2018  9:00 AM  
Office Visit with Logan Gregg MD  
[facility]) Appt Note: check up [address] [facility] [address]  
[phone] [address] [place] [id number] Upcoming Health Maintenance Date Due  
 EYE EXAM RETINAL OR DILATED Q1 2/5/2015 GLAUCOMA SCREENING Q2Y 4/1/2016 Influenza Age 5 to Adult 8/1/2017 HEMOGLOBIN A1C Q6M 6/4/2018 MEDICARE YEARLY EXAM 9/14/2018 LIPID PANEL Q1 12/4/2018 MICROALBUMIN Q1 12/13/2018 DTaP/Tdap/Td series (2 - Td) 6/11/2024 Allergies as of 1/24/2018  Review Complete On: 1/24/2018 By: Desiree Lanier MD  
  
 Severity Noted Reaction Type Reactions Levaquin [Levofloxacin]  05/17/2011    Rash Current Immunizations  Reviewed on 9/13/2017 Name Date Influenza High Dose Vaccine PF 10/13/2016 Influenza Vaccine 10/15/2014 Influenza Vaccine (Quad) PF 12/8/2015 Influenza Vaccine Split 11/22/2011, 11/11/2010 11:46 AM  
 Pneumococcal Conjugate (PCV-13) 12/8/2015 Pneumococcal Polysaccharide (PPSV-23) 9/20/2000 Tdap 6/11/2014 ZZZ-RETIRED (DO NOT USE) Pneumococcal Vaccine (Unspecified Type) 9/20/2000 Not reviewed this visit You Were Diagnosed With   
  
 Codes Comments Panlobular emphysema ([address]    -  Primary ICD-10-CM: J43.1 ICD-9-CM: 492.8 Type 2 diabetes mellitus with diabetic neuropathy, without long-term current use of insulin (HCC)     ICD-10-CM: E11.40 ICD-9-CM: 250.60, 357.2 Major depression, chronic     ICD-10-CM: F32.9 ICD-9-CM: 296.20 Essential hypertension with goal blood pressure less than 140/90     ICD-10-CM: I10 
ICD-9-CM: 401.9 Vitals BP Pulse Temp Resp Height(growth percentile) Weight(growth percentile) 144/60 (!) 56 97.8 °F (36.6 °C) (Oral) 16 5' 5\" (1.651 m) 115 lb (52.2 kg) SpO2 BMI OB Status Smoking Status 95% 19.14 kg/m2 Hysterectomy Current Every Day Smoker Vitals History BMI and BSA Data Body Mass Index Body Surface Area  
 19.14 kg/m 2 1.55 m 2 Preferred Pharmacy Pharmacy Name Phone [address] [phone] Your Updated Medication List  
  
   
This list is accurate as of: 1/24/18  4:46 PM.  Always use your most recent med list. amLODIPine 10 mg tablet Commonly known as:  Bosie Shield Take 1 Tab by mouth daily. CENTRUM 0.4-162-18 mg Tab Generic drug:  DS-LD-LS-Fe-Min-Lycopen-Lutein Take 1 Tab by mouth daily. FISH -160-1,000 mg Cap Generic drug:  omega 3-dha-epa-fish oil Take 1,000 mg by mouth daily. FLOMAX 0.4 mg capsule Generic drug:  tamsulosin Take 0.4 mg by mouth daily. gabapentin 300 mg capsule Commonly known as:  NEURONTIN Take 1 Cap by mouth three (3) times daily. * HYDROmorphone 4 mg tablet Commonly known as:  DILAUDID  
1/2 to one tab up to three times per day prn severe pain  
  
 * HYDROmorphone 4 mg tablet Commonly known as:  DILAUDID  
1/2 to one tab up to three times per day prn severe pain  
  
 * HYDROmorphone 4 mg tablet Commonly known as:  DILAUDID  
1/2 to one tab up to three times per day prn severe pain LUMIGAN 0.01 % ophthalmic drops Generic drug:  bimatoprost  
Administer 1 Drop to both eyes nightly. metoprolol tartrate 25 mg tablet Commonly known as:  LOPRESSOR Take 1 Tab by mouth two (2) times a day. mirtazapine 30 mg tablet Commonly known as:  [place] Drown Take 1 Tab by mouth nightly. naloxone 4 mg/actuation nasal spray Commonly known as:  NARCAN  
4 mg by Nasal route as needed. Indications: OPIATE-INDUCED RESPIRATORY DEPRESSION PARoxetine 30 mg tablet Commonly known as:  PAXIL Take 1 Tab by mouth daily. RESTASIS 0.05 % ophthalmic emulsion Generic drug:  cycloSPORINE Administer 1 Drop to both eyes two (2) times a day. rOPINIRole 1 mg tablet Commonly known as:  Vigo Max Take 0.5-1 Tabs by mouth nightly as needed (RLS) for up to 90 days. simvastatin 20 mg tablet Commonly known as:  ZOCOR Take 1 Tab by mouth nightly. traZODone 100 mg tablet Commonly known as:  Genevieve Ramon Take 100 mg by mouth nightly. Patient takes one and half tabs at bedtime VITAMIN C 1,000 mg tablet Generic drug:  ascorbic acid (vitamin C) Take 1,000 mg by mouth daily. * Notice: This list has 3 medication(s) that are the same as other medications prescribed for you. Read the directions carefully, and ask your doctor or other care provider to review them with you. Follow-up Instructions Return in about 3 months (around 4/24/2018). Introducing [facility]! Dear Eitan Jackson: Thank you for requesting a [facility] account. Our records indicate that you have previously registered for a [employer] account but its currently inactive. Please call our [employer] support line at [phone]. Additional Information If you have questions, please visit the Frequently Asked Questions section of the [employer] website at [email or web address] [employer] Remember, [employer] is NOT to be used for urgent needs. For medical emergencies, dial 911. Now available from your iPhone and Android! Please provide this summary of care documentation to your next provider. Your primary care clinician is listed as Lei Drake. Tc Gonzales.  If you have any questions after today's visit, please call [phone].

## 2018-01-29 RX ORDER — TAMSULOSIN HYDROCHLORIDE 0.4 MG/1
CAPSULE ORAL
Qty: 90 CAP | Refills: 3 | Status: SHIPPED | OUTPATIENT
Start: 2018-01-29 | End: 2018-02-10

## 2018-02-07 ENCOUNTER — HOSPITAL ENCOUNTER (INPATIENT)
Age: 83
LOS: 3 days | Discharge: HOME HEALTH | DRG: 193 | End: 2018-02-10
Attending: EMERGENCY MEDICINE | Admitting: INTERNAL MEDICINE
Payer: MEDICARE

## 2018-02-07 ENCOUNTER — APPOINTMENT (OUTPATIENT)
Dept: GENERAL RADIOLOGY | Age: 83
DRG: 193 | End: 2018-02-07
Attending: EMERGENCY MEDICINE
Payer: MEDICARE

## 2018-02-07 ENCOUNTER — OFFICE VISIT (OUTPATIENT)
Dept: INTERNAL MEDICINE CLINIC | Age: 83
End: 2018-02-07

## 2018-02-07 VITALS
TEMPERATURE: 98.5 F | WEIGHT: 114 LBS | SYSTOLIC BLOOD PRESSURE: 166 MMHG | HEART RATE: 58 BPM | HEIGHT: 65 IN | BODY MASS INDEX: 18.99 KG/M2 | DIASTOLIC BLOOD PRESSURE: 68 MMHG | OXYGEN SATURATION: 86 %

## 2018-02-07 DIAGNOSIS — J44.1 ACUTE EXACERBATION OF CHRONIC OBSTRUCTIVE PULMONARY DISEASE (COPD) (HCC): Primary | ICD-10-CM

## 2018-02-07 DIAGNOSIS — J43.1 PANLOBULAR EMPHYSEMA (HCC): ICD-10-CM

## 2018-02-07 DIAGNOSIS — I10 ESSENTIAL HYPERTENSION WITH GOAL BLOOD PRESSURE LESS THAN 140/90: ICD-10-CM

## 2018-02-07 DIAGNOSIS — E11.40 TYPE 2 DIABETES MELLITUS WITH DIABETIC NEUROPATHY, WITHOUT LONG-TERM CURRENT USE OF INSULIN (HCC): ICD-10-CM

## 2018-02-07 DIAGNOSIS — J18.9 PNEUMONIA OF RIGHT LOWER LOBE DUE TO INFECTIOUS ORGANISM: Primary | ICD-10-CM

## 2018-02-07 LAB
ALBUMIN SERPL-MCNC: 3.5 G/DL (ref 3.4–5)
ALBUMIN/GLOB SERPL: 0.9 {RATIO} (ref 0.8–1.7)
ALP SERPL-CCNC: 61 U/L (ref 45–117)
ALT SERPL-CCNC: 22 U/L (ref 13–56)
ANION GAP SERPL CALC-SCNC: 1 MMOL/L (ref 3–18)
AST SERPL-CCNC: 31 U/L (ref 15–37)
ATRIAL RATE: 49 BPM
BASOPHILS # BLD: 0 K/UL (ref 0–0.06)
BASOPHILS NFR BLD: 1 % (ref 0–2)
BILIRUB SERPL-MCNC: 0.2 MG/DL (ref 0.2–1)
BUN SERPL-MCNC: 18 MG/DL (ref 7–18)
BUN/CREAT SERPL: 25 (ref 12–20)
CALCIUM SERPL-MCNC: 9.3 MG/DL (ref 8.5–10.1)
CALCULATED P AXIS, ECG09: 82 DEGREES
CALCULATED R AXIS, ECG10: -13 DEGREES
CALCULATED T AXIS, ECG11: 51 DEGREES
CHLORIDE SERPL-SCNC: 99 MMOL/L (ref 100–108)
CO2 SERPL-SCNC: 39 MMOL/L (ref 21–32)
CREAT SERPL-MCNC: 0.71 MG/DL (ref 0.6–1.3)
DIAGNOSIS, 93000: NORMAL
DIFFERENTIAL METHOD BLD: ABNORMAL
EOSINOPHIL # BLD: 0.1 K/UL (ref 0–0.4)
EOSINOPHIL NFR BLD: 1 % (ref 0–5)
ERYTHROCYTE [DISTWIDTH] IN BLOOD BY AUTOMATED COUNT: 14.2 % (ref 11.6–14.5)
FLUAV AG NPH QL IA: NEGATIVE
FLUBV AG NOSE QL IA: NEGATIVE
GLOBULIN SER CALC-MCNC: 3.8 G/DL (ref 2–4)
GLUCOSE SERPL-MCNC: 162 MG/DL (ref 74–99)
HCT VFR BLD AUTO: 43.3 % (ref 35–45)
HGB BLD-MCNC: 13.8 G/DL (ref 12–16)
LACTATE BLD-SCNC: 0.7 MMOL/L (ref 0.4–2)
LYMPHOCYTES # BLD: 1.2 K/UL (ref 0.9–3.6)
LYMPHOCYTES NFR BLD: 29 % (ref 21–52)
MCH RBC QN AUTO: 29.8 PG (ref 24–34)
MCHC RBC AUTO-ENTMCNC: 31.9 G/DL (ref 31–37)
MCV RBC AUTO: 93.5 FL (ref 74–97)
MONOCYTES # BLD: 0.7 K/UL (ref 0.05–1.2)
MONOCYTES NFR BLD: 16 % (ref 3–10)
NEUTS SEG # BLD: 2.2 K/UL (ref 1.8–8)
NEUTS SEG NFR BLD: 53 % (ref 40–73)
P-R INTERVAL, ECG05: 148 MS
PLATELET # BLD AUTO: 154 K/UL (ref 135–420)
PMV BLD AUTO: 11 FL (ref 9.2–11.8)
POTASSIUM SERPL-SCNC: 4.4 MMOL/L (ref 3.5–5.5)
PROT SERPL-MCNC: 7.3 G/DL (ref 6.4–8.2)
Q-T INTERVAL, ECG07: 494 MS
QRS DURATION, ECG06: 114 MS
QTC CALCULATION (BEZET), ECG08: 446 MS
RBC # BLD AUTO: 4.63 M/UL (ref 4.2–5.3)
SODIUM SERPL-SCNC: 139 MMOL/L (ref 136–145)
VENTRICULAR RATE, ECG03: 49 BPM
WBC # BLD AUTO: 4.2 K/UL (ref 4.6–13.2)

## 2018-02-07 PROCEDURE — 96374 THER/PROPH/DIAG INJ IV PUSH: CPT

## 2018-02-07 PROCEDURE — 85025 COMPLETE CBC W/AUTO DIFF WBC: CPT | Performed by: EMERGENCY MEDICINE

## 2018-02-07 PROCEDURE — 87040 BLOOD CULTURE FOR BACTERIA: CPT | Performed by: EMERGENCY MEDICINE

## 2018-02-07 PROCEDURE — 74011250636 HC RX REV CODE- 250/636: Performed by: EMERGENCY MEDICINE

## 2018-02-07 PROCEDURE — 74011250636 HC RX REV CODE- 250/636: Performed by: INTERNAL MEDICINE

## 2018-02-07 PROCEDURE — 74011000250 HC RX REV CODE- 250: Performed by: EMERGENCY MEDICINE

## 2018-02-07 PROCEDURE — 94640 AIRWAY INHALATION TREATMENT: CPT

## 2018-02-07 PROCEDURE — 71046 X-RAY EXAM CHEST 2 VIEWS: CPT

## 2018-02-07 PROCEDURE — 80053 COMPREHEN METABOLIC PANEL: CPT | Performed by: EMERGENCY MEDICINE

## 2018-02-07 PROCEDURE — 65270000029 HC RM PRIVATE

## 2018-02-07 PROCEDURE — 36415 COLL VENOUS BLD VENIPUNCTURE: CPT | Performed by: INTERNAL MEDICINE

## 2018-02-07 PROCEDURE — 96361 HYDRATE IV INFUSION ADD-ON: CPT

## 2018-02-07 PROCEDURE — 99285 EMERGENCY DEPT VISIT HI MDM: CPT

## 2018-02-07 PROCEDURE — 77030029684 HC NEB SM VOL KT MONA -A

## 2018-02-07 PROCEDURE — 84145 PROCALCITONIN (PCT): CPT | Performed by: INTERNAL MEDICINE

## 2018-02-07 PROCEDURE — 83605 ASSAY OF LACTIC ACID: CPT

## 2018-02-07 PROCEDURE — 93005 ELECTROCARDIOGRAM TRACING: CPT

## 2018-02-07 PROCEDURE — 87804 INFLUENZA ASSAY W/OPTIC: CPT | Performed by: EMERGENCY MEDICINE

## 2018-02-07 PROCEDURE — 74011000250 HC RX REV CODE- 250: Performed by: INTERNAL MEDICINE

## 2018-02-07 PROCEDURE — 74011250637 HC RX REV CODE- 250/637: Performed by: INTERNAL MEDICINE

## 2018-02-07 RX ORDER — SIMVASTATIN 20 MG/1
20 TABLET, FILM COATED ORAL
Status: DISCONTINUED | OUTPATIENT
Start: 2018-02-07 | End: 2018-02-10 | Stop reason: HOSPADM

## 2018-02-07 RX ORDER — SODIUM CHLORIDE 0.9 % (FLUSH) 0.9 %
5-10 SYRINGE (ML) INJECTION EVERY 8 HOURS
Status: DISCONTINUED | OUTPATIENT
Start: 2018-02-07 | End: 2018-02-10 | Stop reason: HOSPADM

## 2018-02-07 RX ORDER — GABAPENTIN 300 MG/1
300 CAPSULE ORAL 3 TIMES DAILY
Status: DISCONTINUED | OUTPATIENT
Start: 2018-02-07 | End: 2018-02-10 | Stop reason: HOSPADM

## 2018-02-07 RX ORDER — IPRATROPIUM BROMIDE AND ALBUTEROL SULFATE 2.5; .5 MG/3ML; MG/3ML
3 SOLUTION RESPIRATORY (INHALATION)
Status: COMPLETED | OUTPATIENT
Start: 2018-02-07 | End: 2018-02-07

## 2018-02-07 RX ORDER — CYCLOSPORINE 0.5 MG/ML
1 EMULSION OPHTHALMIC 2 TIMES DAILY
Status: DISCONTINUED | OUTPATIENT
Start: 2018-02-08 | End: 2018-02-10 | Stop reason: HOSPADM

## 2018-02-07 RX ORDER — SODIUM CHLORIDE 0.9 % (FLUSH) 0.9 %
5-10 SYRINGE (ML) INJECTION AS NEEDED
Status: DISCONTINUED | OUTPATIENT
Start: 2018-02-07 | End: 2018-02-10 | Stop reason: HOSPADM

## 2018-02-07 RX ORDER — AMLODIPINE BESYLATE 10 MG/1
10 TABLET ORAL DAILY
Status: DISCONTINUED | OUTPATIENT
Start: 2018-02-08 | End: 2018-02-08

## 2018-02-07 RX ORDER — ROPINIROLE 1 MG/1
.5-1 TABLET, FILM COATED ORAL
Status: DISCONTINUED | OUTPATIENT
Start: 2018-02-07 | End: 2018-02-10 | Stop reason: HOSPADM

## 2018-02-07 RX ORDER — TAMSULOSIN HYDROCHLORIDE 0.4 MG/1
0.4 CAPSULE ORAL DAILY
Status: DISCONTINUED | OUTPATIENT
Start: 2018-02-08 | End: 2018-02-10 | Stop reason: HOSPADM

## 2018-02-07 RX ORDER — MIRTAZAPINE 15 MG/1
30 TABLET, FILM COATED ORAL
Status: DISCONTINUED | OUTPATIENT
Start: 2018-02-07 | End: 2018-02-10 | Stop reason: HOSPADM

## 2018-02-07 RX ORDER — IPRATROPIUM BROMIDE AND ALBUTEROL SULFATE 2.5; .5 MG/3ML; MG/3ML
SOLUTION RESPIRATORY (INHALATION)
Status: DISPENSED
Start: 2018-02-07 | End: 2018-02-08

## 2018-02-07 RX ORDER — ASCORBIC ACID 250 MG
1000 TABLET ORAL DAILY
Status: DISCONTINUED | OUTPATIENT
Start: 2018-02-08 | End: 2018-02-10 | Stop reason: HOSPADM

## 2018-02-07 RX ORDER — TRAZODONE HYDROCHLORIDE 100 MG/1
100 TABLET ORAL
Status: DISCONTINUED | OUTPATIENT
Start: 2018-02-07 | End: 2018-02-10 | Stop reason: HOSPADM

## 2018-02-07 RX ORDER — SODIUM CHLORIDE 900 MG/100ML
INJECTION INTRAVENOUS
Status: DISPENSED
Start: 2018-02-07 | End: 2018-02-08

## 2018-02-07 RX ADMIN — SIMVASTATIN 20 MG: 20 TABLET, FILM COATED ORAL at 22:33

## 2018-02-07 RX ADMIN — MIRTAZAPINE 30 MG: 15 TABLET, FILM COATED ORAL at 21:18

## 2018-02-07 RX ADMIN — IPRATROPIUM BROMIDE AND ALBUTEROL SULFATE 3 ML: .5; 3 SOLUTION RESPIRATORY (INHALATION) at 21:18

## 2018-02-07 RX ADMIN — IPRATROPIUM BROMIDE AND ALBUTEROL SULFATE 3 ML: .5; 3 SOLUTION RESPIRATORY (INHALATION) at 13:03

## 2018-02-07 RX ADMIN — WATER 2 G: 1 INJECTION INTRAMUSCULAR; INTRAVENOUS; SUBCUTANEOUS at 14:32

## 2018-02-07 RX ADMIN — AZITHROMYCIN MONOHYDRATE 500 MG: 500 INJECTION, POWDER, LYOPHILIZED, FOR SOLUTION INTRAVENOUS at 15:25

## 2018-02-07 RX ADMIN — SODIUM CHLORIDE 1000 ML: 900 INJECTION, SOLUTION INTRAVENOUS at 14:24

## 2018-02-07 RX ADMIN — SODIUM CHLORIDE 1000 ML: 900 INJECTION, SOLUTION INTRAVENOUS at 14:29

## 2018-02-07 RX ADMIN — Medication 10 ML: at 21:25

## 2018-02-07 RX ADMIN — GABAPENTIN 300 MG: 300 CAPSULE ORAL at 22:33

## 2018-02-07 RX ADMIN — IPRATROPIUM BROMIDE AND ALBUTEROL SULFATE 3 ML: .5; 3 SOLUTION RESPIRATORY (INHALATION) at 15:40

## 2018-02-07 RX ADMIN — TRAZODONE HYDROCHLORIDE 100 MG: 100 TABLET ORAL at 22:32

## 2018-02-07 RX ADMIN — METHYLPREDNISOLONE SODIUM SUCCINATE 40 MG: 40 INJECTION, POWDER, FOR SOLUTION INTRAMUSCULAR; INTRAVENOUS at 21:25

## 2018-02-07 NOTE — ED PROVIDER NOTES
EMERGENCY DEPARTMENT HISTORY AND PHYSICAL EXAM    12:46 PM      Date: 2/7/2018  Patient Name: Sorin Narayanan    History of Presenting Illness     Chief Complaint   Patient presents with    Shortness of Breath         History Provided By: Patient    Chief Complaint: SOB  Duration: 2 Weeks  Timing:  Constant and Worsening  Location:   Quality:   Severity: Severe  Modifying Factors:  hospital admission and weight loss 2 months ago  Associated Symptoms: cough and fever. Denies nausea, emesis, or diarrhea      Additional History (Context): Sorin Narayanan is a 80 y.o. female with hx of osteoarthritis, glaucoma, diverticulosis, hyperlipidemia, sciatica, lumbago, spinal stenosis, DDD, restless leg syndrome, HTN, DM, and neuropathy who presents with c/o constant and worsening severe SOB onset 2 weeks. Pt reports associated sx of cough and fever. Denies nausea, emesis, or diarrhea. Pt states being admitted 2 months ago with weight loss that she has not been able to gain back. Denies hx of asthma or COPD. PCP: Garcia Dumont MD    Current Facility-Administered Medications   Medication Dose Route Frequency Provider Last Rate Last Dose    cefTRIAXone (ROCEPHIN) 2 g in sterile water (preservative free) 20 mL IV syringe  2 g IntraVENous Q24H Nataliia Hyde MD   2 g at 02/07/18 1432    azithromycin (ZITHROMAX) 500 mg in 0.9% sodium chloride (MBP/ADV) 250 mL adv  500 mg IntraVENous Q24H Nataliia Hyde  mL/hr at 02/07/18 1525 500 mg at 02/07/18 1525    0.9% sodium chloride (MBP/ADV) 0.9 % infusion              Current Outpatient Prescriptions   Medication Sig Dispense Refill    tamsulosin (FLOMAX) 0.4 mg capsule TAKE 1 CAPSULE BY MOUTH  DAILY 90 Cap 3    rOPINIRole (REQUIP) 1 mg tablet Take 0.5-1 Tabs by mouth nightly as needed (RLS) for up to 90 days. 90 Tab 5    tamsulosin (FLOMAX) 0.4 mg capsule Take 0.4 mg by mouth daily.       mirtazapine (REMERON) 30 mg tablet Take 1 Tab by mouth nightly. 30 Tab 2    HYDROmorphone (DILAUDID) 4 mg tablet 1/2 to one tab up to three times per day prn severe pain 90 Tab 0    HYDROmorphone (DILAUDID) 4 mg tablet 1/2 to one tab up to three times per day prn severe pain 90 Tab 0    HYDROmorphone (DILAUDID) 4 mg tablet 1/2 to one tab up to three times per day prn severe pain 90 Tab 0    gabapentin (NEURONTIN) 300 mg capsule Take 1 Cap by mouth three (3) times daily. 270 Cap 1    PARoxetine (PAXIL) 30 mg tablet Take 1 Tab by mouth daily. 90 Tab 3    metoprolol tartrate (LOPRESSOR) 25 mg tablet Take 1 Tab by mouth two (2) times a day. 120 Tab 3    naloxone 4 mg/actuation spry 4 mg by Nasal route as needed. Indications: OPIATE-INDUCED RESPIRATORY DEPRESSION 1 Package 0    amLODIPine (NORVASC) 10 mg tablet Take 1 Tab by mouth daily. 30 Tab 0    omega 3-dha-epa-fish oil (FISH OIL) 100-160-1,000 mg cap Take 1,000 mg by mouth daily.  LUMIGAN 0.01 % ophthalmic drops Administer 1 Drop to both eyes nightly.  ascorbic acid (VITAMIN C) 1,000 mg tablet Take 1,000 mg by mouth daily.  traZODone (DESYREL) 100 mg tablet Take 100 mg by mouth nightly. Patient takes one and half tabs at bedtime      cycloSPORINE (RESTASIS) 0.05 % ophthalmic emulsion Administer 1 Drop to both eyes two (2) times a day.  GL-EF-DH-Fe-Min-Lycopen-Lutein (CENTRUM) 0.4-162-18 mg Tab Take 1 Tab by mouth daily.  simvastatin (ZOCOR) 20 mg tablet Take 1 Tab by mouth nightly.  80 Tab 1       Past History     Past Medical History:  Past Medical History:   Diagnosis Date    Colon polyps     COPD 8-30-02    DDD (degenerative disc disease), lumbar 10/1/2014    Degeneration of lumbar or lumbosacral intervertebral disc     Depression     Diabetes ([facility] 7-19-05    Diverticulosis     DM [facility], [facility] 10/1/2014    MOORE (Dyspnea on Exertion) 4-19-02    echo: +mild LVH, CG46-68% w/ diastolic dysfxn    Glaucoma     HCAP (healthcare-associated pneumonia) 03/24/2017    Hemorrhoids 6-6-06    Hyperlipidemia 5/17/2011    Hypertension 4-16-02    LBP (low back pain) 4-13-04    Low back pain radiating to both legs 10/1/2014    Lumbago     Lumbar disc herniation 10/1/2014    Lumbar facet arthropathy 10/1/2014    Lumbosacral radiculopathy at L5 10/1/2014    Lumbosacral radiculopathy at S1 10/1/2014    Microscopic hematuria     OA (osteoarthritis)     Overactive bladder 5/17/2011    RLS (restless legs syndrome) 1/17/2013    Sciatica     Shortness of breath     Spinal stenosis, lumbar region, without neurogenic claudication     Spondylolisthesis of lumbar region 10/1/2014    Spondylolisthesis, grade 1 10/1/2014    Stress urinary incontinence     Syncope     -MRI brain    Urinary tract infection, site not specified        Past Surgical History:  Past Surgical History:   Procedure Laterality Date    HX APPENDECTOMY      HX CHOLECYSTECTOMY      HX HYSTERECTOMY      (+)DUB    HX POLYPECTOMY      OK COLONOSCOPY FLX DX W/COLLJ SPEC WHEN PFRMD  6-16-06    normal, Dr Julia Hanks    OK COLONOSCOPY FLX DX W/COLLJ [address] WHEN PFRMD      (+)polyp= tubular adenoma       Family History:  Family History   Problem Relation Age of Onset    Colon Cancer Sister     Heart Disease Brother     Seizures Son     Colon Cancer Maternal Aunt        Social History:  Social History   Substance Use Topics    Smoking status: Current Every Day Smoker     Packs/day: 0.25     Years: 50.00     Types: Cigarettes    Smokeless tobacco: Never Used    Alcohol use No       Allergies: Allergies   Allergen Reactions    Levaquin [Levofloxacin] Rash         Review of Systems       Review of Systems   Constitutional: Positive for fever. Negative for chills. Respiratory: Positive for cough and shortness of breath. Cardiovascular: Negative for chest pain. Gastrointestinal: Negative for diarrhea, nausea and vomiting.    All other systems reviewed and are negative. Physical Exam     Visit Vitals    /54    Pulse 60    Temp 97.7 °F (36.5 °C)    Resp 17    Ht 5' 5\" (1.651 m)    Wt 51.7 kg (113 lb 15.7 oz)    SpO2 96%    BMI 18.97 kg/m2         Physical Exam   Constitutional: She is oriented to person, place, and time. She appears well-developed and well-nourished. No distress. Mild confusion   HENT:   Head: Normocephalic and atraumatic. Eyes: Conjunctivae and EOM are normal. Right eye exhibits no discharge. Left eye exhibits no discharge. No scleral icterus. Neck: Normal range of motion. Neck supple. No tracheal deviation present. Cardiovascular: Normal rate, regular rhythm and normal heart sounds. No murmur heard. Pulmonary/Chest: Effort normal. No respiratory distress. She has no wheezes. She has rhonchi (diffuse in all lung fields). She has no rales. Abdominal: Soft. She exhibits no distension. There is no tenderness. There is no rebound and no guarding. Musculoskeletal: Normal range of motion. She exhibits no edema or deformity. Neurological: She is alert and oriented to person, place, and time. No cranial nerve deficit. Skin: Skin is warm and dry. She is not diaphoretic. Psychiatric: She has a normal mood and affect.  Her behavior is normal. Judgment and thought content normal.         Diagnostic Study Results     Labs -  Recent Results (from the past 12 hour(s))   EKG, 12 LEAD, INITIAL    Collection Time: 02/07/18 12:43 PM   Result Value Ref Range    Ventricular Rate 49 BPM    Atrial Rate 49 BPM    P-R Interval 148 ms    QRS Duration 114 ms    Q-T Interval 494 ms    QTC Calculation (Bezet) 446 ms    Calculated P Axis 82 degrees    Calculated R Axis -13 degrees    Calculated T Axis 51 degrees    Diagnosis       Marked sinus bradycardia  Possible Left atrial enlargement  Right bundle branch block  Abnormal ECG  When compared with ECG of 21-APR-2017 17:37,  Left anterior fascicular block is no longer present  T wave inversion less evident in Inferior leads  T wave inversion no longer evident in Anterior leads  QT has shortened     CBC WITH AUTOMATED DIFF    Collection Time: 02/07/18  1:00 PM   Result Value Ref Range    WBC 4.2 (L) 4.6 - 13.2 K/uL    RBC 4.63 4.20 - 5.30 M/uL    HGB 13.8 12.0 - 16.0 g/dL    HCT 43.3 35.0 - 45.0 %    MCV 93.5 74.0 - 97.0 FL    MCH 29.8 24.0 - 34.0 PG    MCHC 31.9 31.0 - 37.0 g/dL    RDW 14.2 11.6 - 14.5 %    PLATELET 257 465 - 958 K/uL    MPV 11.0 9.2 - 11.8 FL    NEUTROPHILS 53 40 - 73 %    LYMPHOCYTES 29 21 - 52 %    MONOCYTES 16 (H) 3 - 10 %    EOSINOPHILS 1 0 - 5 %    BASOPHILS 1 0 - 2 %    ABS. NEUTROPHILS 2.2 1.8 - 8.0 K/UL    ABS. LYMPHOCYTES 1.2 0.9 - 3.6 K/UL    ABS. MONOCYTES 0.7 0.05 - 1.2 K/UL    ABS. EOSINOPHILS 0.1 0.0 - 0.4 K/UL    ABS. BASOPHILS 0.0 0.0 - 0.06 K/UL    DF AUTOMATED     METABOLIC PANEL, COMPREHENSIVE    Collection Time: 02/07/18  1:00 PM   Result Value Ref Range    Sodium 139 136 - 145 mmol/L    Potassium 4.4 3.5 - 5.5 mmol/L    Chloride 99 (L) 100 - 108 mmol/L    CO2 39 (H) 21 - 32 mmol/L    Anion gap 1 (L) 3.0 - 18 mmol/L    Glucose 162 (H) 74 - 99 mg/dL    BUN 18 7.0 - 18 MG/DL    Creatinine 0.71 0.6 - 1.3 MG/DL    BUN/Creatinine ratio 25 (H) 12 - 20      GFR est AA >60 >60 ml/min/1.73m2    GFR est non-AA >60 >60 ml/min/1.73m2    Calcium 9.3 8.5 - 10.1 MG/DL    Bilirubin, total 0.2 0.2 - 1.0 MG/DL    ALT (SGPT) 22 13 - 56 U/L    AST (SGOT) 31 15 - 37 U/L    Alk.  phosphatase 61 45 - 117 U/L    Protein, total 7.3 6.4 - 8.2 g/dL    Albumin 3.5 3.4 - 5.0 g/dL    Globulin 3.8 2.0 - 4.0 g/dL    A-G Ratio 0.9 0.8 - 1.7     INFLUENZA A & B AG (RAPID TEST)    Collection Time: 02/07/18  1:20 PM   Result Value Ref Range    Influenza A Antigen NEGATIVE  NEG      Influenza B Antigen NEGATIVE  NEG     POC LACTIC ACID    Collection Time: 02/07/18  1:25 PM   Result Value Ref Range    Lactic Acid (POC) 0.7 0.4 - 2.0 mmol/L       Radiologic Studies -   XR CHEST PA LAT   Final Result IMPRESSION  Impression: Patchy right lower lung zone airspace opacity Please correlate with  physical examination findings regarding whether this may represent early  pneumonia. Medical Decision Making   I am the first provider for this patient. I reviewed the vital signs, available nursing notes, past medical history, past surgical history, family history and social history. Vital Signs-Reviewed the patient's vital signs. Pulse Oximetry Analysis -  2L of O2 via NC (Interpretation) 96%      EKG: Interpreted by the EP. Time Interpreted: 8113   Rate: 49 bpm   Rhythm: Sinus Bradycardia   Interpretation: RBBB. Non-specific t wave changes. No STEMI      Records Reviewed: Nursing Notes (Time of Review: 12:46 PM)    ED Course: Progress Notes, Reevaluation, and Consults:  2:56 PM Consult: I discussed care with Dr. Oliver Forman (Hospitalist). It was a standard discussion including patient history, chief complaint, available diagnostic results, and predicted treatment course. Will admit. Provider Notes (Medical Decision Making): Pt with PNA with oxygen requirement. Will be admitted at [facility]. For Hospitalized Patients:    1. Hospitalization Decision Time:  The decision to hospitalize the patient was made by Dr. Ange Smith at 2:56 PM on 2/7/2018      Diagnosis     Clinical Impression:   1. Pneumonia of right lower lobe due to infectious organism [facility])        Disposition: Admit    Follow-up Information     None           Patient's Medications   Start Taking    No medications on file   Continue Taking    AMLODIPINE (NORVASC) 10 MG TABLET    Take 1 Tab by mouth daily. ASCORBIC ACID (VITAMIN C) 1,000 MG TABLET    Take 1,000 mg by mouth daily. CYCLOSPORINE (RESTASIS) 0.05 % OPHTHALMIC EMULSION    Administer 1 Drop to both eyes two (2) times a day. GABAPENTIN (NEURONTIN) 300 MG CAPSULE    Take 1 Cap by mouth three (3) times daily.     HYDROMORPHONE (DILAUDID) 4 MG TABLET    1/2 to one tab up to three times per day prn severe pain    HYDROMORPHONE (DILAUDID) 4 MG TABLET    1/2 to one tab up to three times per day prn severe pain    HYDROMORPHONE (DILAUDID) 4 MG TABLET    1/2 to one tab up to three times per day prn severe pain    LUMIGAN 0.01 % OPHTHALMIC DROPS    Administer 1 Drop to both eyes nightly. METOPROLOL TARTRATE (LOPRESSOR) 25 MG TABLET    Take 1 Tab by mouth two (2) times a day. MIRTAZAPINE (REMERON) 30 MG TABLET    Take 1 Tab by mouth nightly. OT-QQ-IL-FE-MIN-LYCOPEN-LUTEIN (CENTRUM) 0.4-162-18 MG TAB    Take 1 Tab by mouth daily. NALOXONE 4 MG/ACTUATION SPRY    4 mg by Nasal route as needed. Indications: OPIATE-INDUCED RESPIRATORY DEPRESSION    OMEGA 3-DHA-EPA-FISH OIL (FISH OIL) 100-160-1,000 MG CAP    Take 1,000 mg by mouth daily. PAROXETINE (PAXIL) 30 MG TABLET    Take 1 Tab by mouth daily. ROPINIROLE (REQUIP) 1 MG TABLET    Take 0.5-1 Tabs by mouth nightly as needed (RLS) for up to 90 days. SIMVASTATIN (ZOCOR) 20 MG TABLET    Take 1 Tab by mouth nightly. TAMSULOSIN (FLOMAX) 0.4 MG CAPSULE    Take 0.4 mg by mouth daily. TAMSULOSIN (FLOMAX) 0.4 MG CAPSULE    TAKE 1 CAPSULE BY MOUTH  DAILY    TRAZODONE (DESYREL) 100 MG TABLET    Take 100 mg by mouth nightly. Patient takes one and half tabs at bedtime   These Medications have changed    No medications on file   Stop Taking    No medications on file     _______________________________    Attestations:  [id number] Elva Mcgovern acting as a scribe for and in the presence of Nelly Mosley MD      February 07, 2018 at 12:46 PM       Provider Attestation:      I personally performed the services described in the documentation, reviewed the documentation, as recorded by the scribe in my presence, and it accurately and completely records my words and actions.  February 07, 2018 at 12:46 PM - Nelly Mosley MD    _______________________________

## 2018-02-07 NOTE — MR AVS SNAPSHOT
[address] [facility] 
 
 
 [address], [address] [facility] 
[phone] Patient: Meredith Molina MRN:  [id number] Visit Information Date & Time Provider Department Dept. Phone Encounter #  
 2/7/2018 11:30 AM Sandrita Bhat MD [facility] [address] [place] [address] [phone] Your Appointments 3/5/2018  9:00 AM  
Office Visit with George Gould MD  
[facility] [facility]) Appt Note: check up [address] [address]  
[phone] [address] [address]  
  
    
 4/16/2018 11:00 AM  
Office Visit with Sandrita Bhat MD  
[facility] [address] [place], [place] [address] [facility]) Appt Note: 3 month f/u  
 [address] [facility], [address] [place]  
  
   
 [address], [address] Upcoming Health Maintenance Date Due  
 EYE EXAM RETINAL OR DILATED Q1 2/5/2015 GLAUCOMA SCREENING Q2Y 4/1/2016 Influenza Age 5 to Adult 8/1/2017 HEMOGLOBIN A1C Q6M 6/4/2018 MEDICARE YEARLY EXAM 9/14/2018 LIPID PANEL Q1 12/4/2018 MICROALBUMIN Q1 12/13/2018 DTaP/Tdap/Td series (2 - Td) 6/11/2024 Allergies as of 2/7/2018  Review Complete On: 2/7/2018 By: Sandrita Bhat MD  
  
 Severity Noted Reaction Type Reactions Levaquin [Levofloxacin]  05/17/2011    Rash Current Immunizations  Reviewed on 9/13/2017 Name Date Influenza High Dose Vaccine PF 10/13/2016 Influenza Vaccine 10/15/2014 Influenza Vaccine (Quad) PF 12/8/2015 Influenza Vaccine Split 11/22/2011, 11/11/2010 11:46 AM  
 Pneumococcal Conjugate (PCV-13) 12/8/2015 Pneumococcal Polysaccharide (PPSV-23) 9/20/2000 Tdap 6/11/2014 ZZZ-RETIRED (DO NOT USE) Pneumococcal Vaccine (Unspecified Type) 9/20/2000 Not reviewed this visit You Were Diagnosed With   
  
 Codes Comments Acute exacerbation of chronic obstructive pulmonary disease (COPD) ([facility]    -  Primary ICD-10-CM: J44.1 ICD-9-CM: 491.21 Panlobular emphysema ([facility]     ICD-10-CM: J43.1 ICD-9-CM: 492.8 Type 2 diabetes mellitus with diabetic neuropathy, without long-term current use of insulin (HCC)     ICD-10-CM: E11.40 ICD-9-CM: 250.60, 357.2 Essential hypertension with goal blood pressure less than 140/90     ICD-10-CM: I10 
ICD-9-CM: 401.9 Vitals BP Pulse Temp Height(growth percentile) Weight(growth percentile) SpO2  
 166/68 (BP 1 Location: Right arm, BP Patient Position: Sitting) (!) 58 98.5 °F (36.9 °C) (Oral) 5' 5\" (1.651 m) 114 lb (51.7 kg) (!) 86% BMI OB Status Smoking Status 18.97 kg/m2 Hysterectomy Current Every Day Smoker Vitals History BMI and BSA Data Body Mass Index Body Surface Area  
 18.97 kg/m 2 1.54 m 2 Preferred Pharmacy Pharmacy Name Phone [facility], [address] [place] [address] [address] [facility] Your Updated Medication List  
  
   
This list is accurate as of: 2/7/18 12:09 PM.  Always use your most recent med list. amLODIPine 10 mg tablet Commonly known as:  Mathew Free Take 1 Tab by mouth daily. CENTRUM 0.4-162-18 mg Tab Generic drug:  AZ-TM-OR-Fe-Min-Lycopen-Lutein Take 1 Tab by mouth daily. FISH -160-1,000 mg Cap Generic drug:  omega 3-dha-epa-fish oil Take 1,000 mg by mouth daily. * FLOMAX 0.4 mg capsule Generic drug:  tamsulosin Take 0.4 mg by mouth daily. * tamsulosin 0.4 mg capsule Commonly known as:  FLOMAX TAKE 1 CAPSULE BY MOUTH  DAILY  
  
 gabapentin 300 mg capsule Commonly known as:  NEURONTIN Take 1 Cap by mouth three (3) times daily. * HYDROmorphone 4 mg tablet Commonly known as:  DILAUDID  
1/2 to one tab up to three times per day prn severe pain  
  
 * HYDROmorphone 4 mg tablet Commonly known as:  DILAUDID  
 1/2 to one tab up to three times per day prn severe pain  
  
 * HYDROmorphone 4 mg tablet Commonly known as:  DILAUDID  
1/2 to one tab up to three times per day prn severe pain LUMIGAN 0.01 % ophthalmic drops Generic drug:  bimatoprost  
Administer 1 Drop to both eyes nightly. metoprolol tartrate 25 mg tablet Commonly known as:  LOPRESSOR Take 1 Tab by mouth two (2) times a day. mirtazapine 30 mg tablet Commonly known as:  Milagro Spare Take 1 Tab by mouth nightly. naloxone 4 mg/actuation nasal spray Commonly known as:  NARCAN  
4 mg by Nasal route as needed. Indications: OPIATE-INDUCED RESPIRATORY DEPRESSION PARoxetine 30 mg tablet Commonly known as:  PAXIL Take 1 Tab by mouth daily. RESTASIS 0.05 % ophthalmic emulsion Generic drug:  cycloSPORINE Administer 1 Drop to both eyes two (2) times a day. rOPINIRole 1 mg tablet Commonly known as:  Junaid Madyson Take 0.5-1 Tabs by mouth nightly as needed (RLS) for up to 90 days. simvastatin 20 mg tablet Commonly known as:  ZOCOR Take 1 Tab by mouth nightly. traZODone 100 mg tablet Commonly known as:  Larena Legato Take 100 mg by mouth nightly. Patient takes one and half tabs at bedtime VITAMIN C 1,000 mg tablet Generic drug:  ascorbic acid (vitamin C) Take 1,000 mg by mouth daily. * Notice: This list has 5 medication(s) that are the same as other medications prescribed for you. Read the directions carefully, and ask your doctor or other care provider to review them with you. Introducing \[facility] [place] [facility]\"" & HEALTH SERVICES! Dear Edy Sevilla: Thank you for requesting a [employer] account. Our records indicate that you have previously registered for a [employer] account but its currently inactive. Please call our [employer] support line at [phone]. Additional Information If you have questions, please visit the Frequently Asked Questions section of the [employer] website at [email or web address]. [employer]. [email or web address] Remember, [employer] is NOT to be used for urgent needs. For medical emergencies, dial 911. Now available from your iPhone and Android! Please provide this summary of care documentation to your next provider. Your primary care clinician is listed as Jazlyn Gann. Michelle Ferguson. If you have any questions after today's visit, please call [phone].

## 2018-02-07 NOTE — PROGRESS NOTES
1. Have you been to the ER, urgent care clinic or hospitalized since your last visit? NO.     2. Have you seen or consulted any other health care providers outside of the [address] [place] [address] since your last visit (Include any pap smears or colon screening)? NO      Do you have an Advanced Directive? YES    Would you like information on Advanced Directives?  NO

## 2018-02-07 NOTE — ED TRIAGE NOTES
Patient present with sob x 2 weeks and now its getting worse, patient was sent her from her PCP. Patient is confused.

## 2018-02-07 NOTE — IP AVS SNAPSHOT
[facility] 
 
 
 [address] [facility] Patient: Meek Sorto MRN: [id number] About your hospitalization You were admitted on:  February 7, 2018 You last received care in the:  [facility] [address] [facility] You were discharged on:  February 10, 2018 Why you were hospitalized Your primary diagnosis was:  Not on File Your diagnoses also included:  Pneumonia, Cap (Community Acquired Pneumonia) Follow-up Information Follow up With Details Comments Contact Info Mariam Oropeza MD On 2/16/2018 @ 10:30 [address] [place] [address] SUITE [facility] [address] [place] [address] 
[phone] Your Scheduled Appointments Friday February 16, 2018 10:30 AM EST TRANSITIONAL CARE MANAGEMENT with [facility] ([address] [place] [address]) [facility]  
[phone] Monday March 05, 2018  9:00 AM EST Office Visit with Jayme Gu MD  
[facility] [address] [place] [address]) [address] [place] [id number]  
[phone] Discharge Orders Procedure Order Date Status Priority Quantity Spec Type Associated Dx METABOLIC PANEL, BASIC 85/77/61 1028 Future Routine 1 Blood Comments:  Check BMP in 1 week, and call results to PCP. A check reggie indicates which time of day the medication should be taken. My Medications START taking these medications Instructions Each Dose to Equal  
 Morning Noon Evening Bedtime  
 albuterol-ipratropium 2.5 mg-0.5 mg/3 ml Nebu Commonly known as:  Juid Nelson Your last dose was: Your next dose is:    
   
   
 3 mL by Nebulization route every six (6) hours as needed. 3 mL  
    
   
   
   
  
 azithromycin 500 mg Tab Commonly known as:  Jan Montano Your last dose was: Your next dose is: Take 1 Tab by mouth daily. 500 mg  
    
   
   
   
  
 famotidine 20 mg tablet Commonly known as:  PEPCID Your last dose was: Your next dose is: Take 1 Tab by mouth nightly. 20 mg  
    
   
   
   
  
 fluticasone-vilanterol 100-25 mcg/dose inhaler Commonly known as:  BREO ELLIPTA Start taking on:  2/11/2018 Your last dose was: Your next dose is: Take 1 Puff by inhalation daily. 1 Puff  
    
   
   
   
  
 lisinopril 5 mg tablet Commonly known as:  Patsy Chaudhry Your last dose was: Your next dose is: Take 1 Tab by mouth daily. 5 mg Nebulizer & Compressor machine Commonly known as:  PORTABLE NEBULIZER SYSTEM Your last dose was: Your next dose is:    
   
   
 1 Each by Does Not Apply route every six (6) hours as needed. 1 Each  
    
   
   
   
  
 predniSONE 20 mg tablet Commonly known as:  Sandoval Montoya Start taking on:  2/11/2018 Your last dose was: Your next dose is: Take 2 Tabs by mouth daily (with breakfast). For 1 day, then 1 tab by mouth x 3 days, then 1/2 tabs by mouth for 3 days. 40 mg CHANGE how you take these medications Instructions Each Dose to Equal  
 Morning Noon Evening Bedtime FLOMAX 0.4 mg capsule Generic drug:  tamsulosin What changed:  Another medication with the same name was removed. Continue taking this medication, and follow the directions you see here. Your last dose was: Your next dose is: Take 0.4 mg by mouth daily. 0.4 mg HYDROmorphone 4 mg tablet Commonly known as:  DILAUDID What changed:  Another medication with the same name was removed. Continue taking this medication, and follow the directions you see here. Your last dose was: Your next dose is: 1/2 to one tab up to three times per day prn severe pain CONTINUE taking these medications Instructions Each Dose to Equal  
 Morning Noon Evening Bedtime  
 amLODIPine 10 mg tablet Commonly known as:  Irais Rider Your last dose was: Your next dose is: Take 1 Tab by mouth daily. 10 mg CENTRUM 0.4-162-18 mg Tab Generic drug:  JR-TE-HG-Fe-Min-Lycopen-Lutein Your last dose was: Your next dose is: Take 1 Tab by mouth daily. 1 Tab FISH -160-1,000 mg Cap Generic drug:  omega 3-dha-epa-fish oil Your last dose was: Your next dose is: Take 1,000 mg by mouth daily. 1000 mg  
    
   
   
   
  
 gabapentin 300 mg capsule Commonly known as:  NEURONTIN Your last dose was: Your next dose is: Take 1 Cap by mouth three (3) times daily. 300 mg  
    
   
   
   
  
 LUMIGAN 0.01 % ophthalmic drops Generic drug:  bimatoprost  
   
Your last dose was: Your next dose is:    
   
   
 Administer 1 Drop to both eyes nightly. 1 Drop  
    
   
   
   
  
 mirtazapine 30 mg tablet Commonly known as:  Vickie Wilson Your last dose was: Your next dose is: Take 1 Tab by mouth nightly. 30 mg  
    
   
   
   
  
 naloxone 4 mg/actuation nasal spray Commonly known as:  [employer] Your last dose was: Your next dose is:    
   
   
 4 mg by Nasal route as needed. Indications: OPIATE-INDUCED RESPIRATORY DEPRESSION  
 4 mg PARoxetine 30 mg tablet Commonly known as:  PAXIL Your last dose was: Your next dose is: Take 1 Tab by mouth daily. 30 mg RESTASIS 0.05 % ophthalmic emulsion Generic drug:  cycloSPORINE Your last dose was: Your next dose is: Administer 1 Drop to both eyes two (2) times a day. 1 Drop  
    
   
   
   
  
 rOPINIRole 1 mg tablet Commonly known as:  Rock Suazo Your last dose was: Your next dose is: Take 0.5-1 Tabs by mouth nightly as needed (RLS) for up to 90 days. 0.5-1 mg  
    
   
   
   
  
 simvastatin 20 mg tablet Commonly known as:  ZOCOR Your last dose was: Your next dose is: Take 1 Tab by mouth nightly. 20 mg  
    
   
   
   
  
 traZODone 100 mg tablet Commonly known as:  Imtiaz Rinne Your last dose was: Your next dose is: Take 100 mg by mouth nightly. Patient takes one and half tabs at bedtime 100 mg  
    
   
   
   
  
 VITAMIN C 1,000 mg tablet Generic drug:  ascorbic acid (vitamin C) Your last dose was: Your next dose is: Take 1,000 mg by mouth daily. 1000 mg  
    
   
   
   
  
  
STOP taking these medications   
 metoprolol tartrate 25 mg tablet Commonly known as:  LOPRESSOR Where to Get Your Medications Information on where to get these meds will be given to you by the nurse or doctor. ! Ask your nurse or doctor about these medications  
  albuterol-ipratropium 2.5 mg-0.5 mg/3 ml Nebu  
 azithromycin 500 mg Tab  
 famotidine 20 mg tablet  
 fluticasone-vilanterol 100-25 mcg/dose inhaler  
 lisinopril 5 mg tablet Nebulizer & Compressor machine  
 predniSONE 20 mg tablet Discharge Instructions Pneumonia: Care Instructions Your Care Instructions Pneumonia is an infection of the lungs. Most cases are caused by infections from bacteria or viruses. Pneumonia may be mild or very severe. If it is caused by bacteria, you will be treated with antibiotics. It may take a few weeks to a few months to recover fully from pneumonia, depending on how sick you were and whether your overall health is good. Follow-up care is a key part of your treatment and safety. Be sure to make and go to all appointments, and call your doctor if you are having problems. It's also a good idea to know your test results and keep a list of the medicines you take. How can you care for yourself at home? · Take your antibiotics exactly as directed. Do not stop taking the medicine just because you are feeling better. You need to take the full course of antibiotics. · Take your medicines exactly as prescribed. Call your doctor if you think you are having a problem with your medicine. · Get plenty of rest and sleep. You may feel weak and tired for a while, but your energy level will improve with time. · To prevent dehydration, drink plenty of fluids, enough so that your urine is light yellow or clear like water. Choose water and other caffeine-free clear liquids until you feel better. If you have kidney, heart, or liver disease and have to limit fluids, talk with your doctor before you increase the amount of fluids you drink. · Take care of your cough so you can rest. A cough that brings up mucus from your lungs is common with pneumonia. It is one way your body gets rid of the infection. But if coughing keeps you from resting or causes severe fatigue and chest-wall pain, talk to your doctor. He or she may suggest that you take a medicine to reduce the cough. · Use a vaporizer or humidifier to add moisture to your bedroom. Follow the directions for cleaning the machine. · Do not smoke or allow others to smoke around you. Smoke will make your cough last longer. If you need help quitting, talk to your doctor about stop-smoking programs and medicines. These can increase your chances of quitting for good. · Take an over-the-counter pain medicine, such as acetaminophen (Tylenol), ibuprofen (Advil, Motrin), or naproxen (Aleve). Read and follow all instructions on the label. · Do not take two or more pain medicines at the same time unless the doctor told you to. Many pain medicines have acetaminophen, which is Tylenol. Too much acetaminophen (Tylenol) can be harmful. · If you were given a spirometer to measure how well your lungs are working, use it as instructed. This can help your doctor tell how your recovery is going. · To prevent pneumonia in the future, talk to your doctor about getting a flu vaccine (once a year) and a pneumococcal vaccine (one time only for most people). When should you call for help? Call 911 anytime you think you may need emergency care. For example, call if: 
? · You have severe trouble breathing. ?Call your doctor now or seek immediate medical care if: 
? · You cough up dark brown or bloody mucus (sputum). ? · You have new or worse trouble breathing. ? · You are dizzy or lightheaded, or you feel like you may faint. ? Watch closely for changes in your health, and be sure to contact your doctor if: 
? · You have a new or higher fever. ? · You are coughing more deeply or more often. ? · You are not getting better after 2 days (48 hours). ? · You do not get better as expected. Where can you learn more? Go to http://shantell-leona.info/. Enter [id number] in the search box to learn more about \"Pneumonia: Care Instructions. \" Current as of: May 12, 2017 Content Version: 11.4 © 0326-3093 [employer]. Care instructions adapted under license by [employer] (which disclaims liability or warranty for this information). If you have questions about a medical condition or this instruction, always ask your healthcare professional. [place] [address] any warranty or liability for your use of this information. Learning About COPD and How to Prevent Lung Infections How do lung infections affect COPD?  
 
Lung infections like pneumonia and acute bronchitis are common causes of COPD flare-ups. And people who have COPD are more likely to get these lung infections, especially if they smoke. When you have COPD, it is important to know the symptoms of pneumonia and acute bronchitis and call your doctor if you have them. Symptoms include: · A cough that brings up more mucus than usual. 
· Fever. · Shortness of breath. What can you do to prevent these infections? Stay healthy · Get a flu shot every year. · Get a pneumococcal vaccine shot. If you have had one before, ask your doctor whether you need another dose. Two different types of pneumococcal vaccines are recommended for people ages 72 and older. · If you must be around people with colds or the flu, wash your hands often. · Do not smoke. This is the most important step you can take to prevent more damage to your lungs. If you need help quitting, talk to your doctor about stop-smoking programs and medicines. These can increase your chances of quitting for good. · Avoid secondhand smoke, air pollution, and high altitudes. Also avoid cold, dry air and hot, humid air. Stay at home with your windows closed when air pollution is bad. Exercise and eat well · If your doctor recommends it, get more exercise. Walking is a good choice. Bit by bit, increase the amount you walk every day. Try for at least 30 minutes on most days of the week. · Eat regular, well-balanced meals. Eating right keeps your energy levels up and helps your body fight infection. · Get plenty of rest and sleep. Follow-up care is a key part of your treatment and safety. Be sure to make and go to all appointments, and call your doctor if you are having problems. It's also a good idea to know your test results and keep a list of the medicines you take. Where can you learn more? Go to http://shantell-leona.info/. Enter W410 in the search box to learn more about \"Learning About COPD and How to Prevent Lung Infections. \" Current as of: May 12, 2017 Content Version: 11.4 © 9511-0691 MD [employer]. Care instructions adapted under license by [employer] (which disclaims liability or warranty for this information). If you have questions about a medical condition or this instruction, always ask your healthcare professional. [address] any warranty or liability for your use of this information. Patient armband removed and shredded DISCHARGE SUMMARY from Nurse PATIENT INSTRUCTIONS: 
 
 
F-face looks uneven A-arms unable to move or move unevenly S-speech slurred or non-existent T-time-call 911 as soon as signs and symptoms begin-DO NOT go Back to bed or wait to see if you get better-TIME IS BRAIN. Warning Signs of HEART ATTACK Call 911 if you have these symptoms: 
? Chest discomfort. Most heart attacks involve discomfort in the center of the chest that lasts more than a few minutes, or that goes away and comes back. It can feel like uncomfortable pressure, squeezing, fullness, or pain. ? Discomfort in other areas of the upper body. Symptoms can include pain or discomfort in one or both arms, the back, neck, jaw, or stomach. ? Shortness of breath with or without chest discomfort. ? Other signs may include breaking out in a cold sweat, nausea, or lightheadedness. Don't wait more than five minutes to call [address]! Fast action can save your life. Calling 911 is almost always the fastest way to get lifesaving treatment. Emergency Medical Services staff can begin treatment when they arrive  up to an hour sooner than if someone gets to the hospital by car. The discharge information has been reviewed with the patient. The patient verbalized understanding. Discharge medications reviewed with the patient and appropriate educational materials and side effects teaching were provided. ___________________________________________________________________________________________________________________________________ ACO Transitions of Care Introducing [facility] Kal[address] Yoni offers a voluntary care coordination program to provide high quality service and care to [facility] fee-for-service beneficiaries. Lester Valenzuela was designed to help you enhance your health and well-being through the following services: ? Transitions of Care  support for individuals who are transitioning from one care setting to another (example: Hospital to home). ? Chronic and Complex Care Coordination  support for individuals and caregivers of those with serious or chronic illnesses or with more than one chronic (ongoing) condition and those who take a number of different medications. If you meet specific medical criteria, a [facility] [address] may call you directly to coordinate your care with your primary care physician and your other care providers. For questions about the [facility] programs, please, contact your physicians office. For general questions or additional information about Accountable Care Organizations: 
Please visit www.medicare.gov/acos. html or call 1-800-MEDICARE (4-630.456.2255) TTY users should call 1-202.157.5313. [place] Announcement We are excited to announce that we are making your provider's discharge notes available to you in Imaxiohart. You will see these notes when they are completed and signed by the physician that discharged you from your recent hospital stay.   If you have any questions or concerns about any information you see in Bharat Matrimonyt, please call the Health Information Department where you were seen or reach out to your Primary Care Provider for more information about your plan of care. Introducing [facility] [place] [facility]! Dear Francisco Rich: Thank you for requesting a [employer] account. Our records indicate that you have previously registered for a [employer] account but its currently inactive. Please call our [employer] support line at [phone]. Additional Information If you have questions, please visit the Frequently Asked Questions section of the [employer] website at [email or web address] Remember, [employer] is NOT to be used for urgent needs. For medical emergencies, dial 911. Now available from your iPhone and Android! Unresulted Labs-Please follow up with your PCP about these lab tests Order Current Status CULTURE, BLOOD Preliminary result CULTURE, BLOOD Preliminary result Providers Seen During Your Hospitalization Provider Specialty Primary office phone Mai Fuentes MD Emergency Medicine [phone] Elizabeth Graf MD Internal Medicine [phone] Sarah Malloy MD Internal Medicine [phone] Montserrat Palacios MD Internal Medicine [phone] Your Primary Care Physician (PCP) Primary Care Physician Office Phone Office Fax Debbie Maurer [phone] You are allergic to the following Allergen Reactions Levaquin (Levofloxacin) Rash Recent Documentation Height Weight Breastfeeding? BMI OB Status Smoking Status 1.651 m 54.4 kg No 19.97 kg/m2 Hysterectomy Current Every Day Smoker Emergency Contacts Name Discharge Info Relation Home Work Mobile [address] [facility] CAREGIVER [3] Spouse [3] [phone] Iesha Rehman DISCHARGE CAREGIVER [3] Child [2] [phone] Patient Belongings The following personal items are in your possession at time of discharge: 
  Dental Appliances: None  Visual Aid: None      Home Medications: Sent home   Jewelry: None  Clothing: None    Other Valuables: None Please provide this summary of care documentation to your next provider. Signatures-by signing, you are acknowledging that this After Visit Summary has been reviewed with you and you have received a copy. Patient Signature:  ____________________________________________________________ Date:  ____________________________________________________________  
  
[facility] Provider Signature:  ____________________________________________________________ Date:  ____________________________________________________________

## 2018-02-07 NOTE — PROGRESS NOTES
Rochelle Wilson 9/22/1933, is a 80 y.o. female, who is seen today for evaluation of a one-week history of cough, chilliness at times, fever as high as 101.63 days ago. She is more dyspneic than usual, she is coughing a lot but not bringing up sputum. She has had no sore throat or head congestion. She has a history of COPD and has continued to smoke. Past Medical History:   Diagnosis Date    Colon polyps     COPD 8-30-02    DDD (degenerative disc disease), lumbar 10/1/2014    Degeneration of lumbar or lumbosacral intervertebral disc     Depression     Diabetes (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/2014    MOORE (Dyspnea on Exertion) 4-19-02    echo: +mild LVH, KR24-84% w/ diastolic dysfxn    Glaucoma     HCAP (healthcare-associated pneumonia) 03/24/2017    Hemorrhoids 6-6-06    Hyperlipidemia 5/17/2011    Hypertension 4-16-02    LBP (low back pain) 4-13-04    Low back pain radiating to both legs 10/1/2014    Lumbago     Lumbar disc herniation 10/1/2014    Lumbar facet arthropathy 10/1/2014    Lumbosacral radiculopathy at L5 10/1/2014    Lumbosacral radiculopathy at S1 10/1/2014    Microscopic hematuria     OA (osteoarthritis)     Overactive bladder 5/17/2011    RLS (restless legs syndrome) 1/17/2013    Sciatica     Shortness of breath     Spinal stenosis, lumbar region, without neurogenic claudication     Spondylolisthesis of lumbar region 10/1/2014    Spondylolisthesis, grade 1 10/1/2014    Stress urinary incontinence     Syncope     -MRI brain    Urinary tract infection, site not specified      Current Outpatient Prescriptions   Medication Sig Dispense Refill    tamsulosin (FLOMAX) 0.4 mg capsule TAKE 1 CAPSULE BY MOUTH  DAILY 90 Cap 3    rOPINIRole (REQUIP) 1 mg tablet Take 0.5-1 Tabs by mouth nightly as needed (RLS) for up to 90 days. 90 Tab 5    tamsulosin (FLOMAX) 0.4 mg capsule Take 0.4 mg by mouth daily.       mirtazapine (REMERON) 30 mg tablet Take 1 Tab by mouth nightly. 30 Tab 2    HYDROmorphone (DILAUDID) 4 mg tablet 1/2 to one tab up to three times per day prn severe pain 90 Tab 0    HYDROmorphone (DILAUDID) 4 mg tablet 1/2 to one tab up to three times per day prn severe pain 90 Tab 0    HYDROmorphone (DILAUDID) 4 mg tablet 1/2 to one tab up to three times per day prn severe pain 90 Tab 0    gabapentin (NEURONTIN) 300 mg capsule Take 1 Cap by mouth three (3) times daily. 270 Cap 1    PARoxetine (PAXIL) 30 mg tablet Take 1 Tab by mouth daily. 90 Tab 3    simvastatin (ZOCOR) 20 mg tablet Take 1 Tab by mouth nightly. 90 Tab 1    metoprolol tartrate (LOPRESSOR) 25 mg tablet Take 1 Tab by mouth two (2) times a day. 120 Tab 3    naloxone 4 mg/actuation spry 4 mg by Nasal route as needed. Indications: OPIATE-INDUCED RESPIRATORY DEPRESSION 1 Package 0    amLODIPine (NORVASC) 10 mg tablet Take 1 Tab by mouth daily. 30 Tab 0    omega 3-dha-epa-fish oil (FISH OIL) 100-160-1,000 mg cap Take 1,000 mg by mouth daily.  LUMIGAN 0.01 % ophthalmic drops Administer 1 Drop to both eyes nightly.  ascorbic acid (VITAMIN C) 1,000 mg tablet Take 1,000 mg by mouth daily.  traZODone (DESYREL) 100 mg tablet Take 100 mg by mouth nightly. Patient takes one and half tabs at bedtime      cycloSPORINE (RESTASIS) 0.05 % ophthalmic emulsion Administer 1 Drop to both eyes two (2) times a day.  ZL-VB-AI-Fe-Min-Lycopen-Lutein (CENTRUM) 0.4-162-18 mg Tab Take 1 Tab by mouth daily. Visit Vitals    /68 (BP 1 Location: Right arm, BP Patient Position: Sitting)    Pulse (!) 58    Temp 98.5 °F (36.9 °C) (Oral)    Ht 5' 5\" (1.651 m)    Wt 114 lb (51.7 kg)    SpO2 (!) 86%    BMI 18.97 kg/m2     Pharynx reveals no redness exudate or drainage. Neck reveals no adenopathy. Lungs are clear to percussion. Diminished breath sounds throughout with diffuse expiratory wheezing and some crackles in the bases.   Heart reveals a regular rhythm with no gallop click or rub. There is no clubbing cyanosis or edema. Assessment: Exacerbation of COPD, cannot rule out pneumonia, this could also be an exacerbation related to influenza with a fever that she has. In view of her dyspnea and low oxygen saturation of 86%, it was 95% just 2 weeks ago before she got sick, she is being sent to the emergency room. Her son is with her today and will take her now. I have explained to them that she probably needs nebulizer treatments and definitely needs a chest x-ray and other evaluation. She may need antibiotics depending on what they find. She may require hospital admission depending on what they find and how she responds to ER treatment. #2. Hypertension blood pressure much higher than usual, unclear if she is taking amlodipine at this time. We will follow-up on this in the future but right now we need to take care of her lung disease. Follow-up as previously planned or sooner if needed    Gaetano Shankar MD FACP    This visit lasted 25 minutes, greater than 50% of the time was spent counseling as above. Her son is easily convinced that she needs to be seen in the emergency room though she is not convinced and just wants to be better and go home. Please note: This document has been produced using voice recognition software. Unrecognized errors in transcription may be present.

## 2018-02-07 NOTE — IP AVS SNAPSHOT
Latisha Jenny 
 
 
 [address] Patient: Meek Sorto MRN: [id number] BLR:[id number] A check reggie indicates which time of day the medication should be taken. My Medications START taking these medications Instructions Each Dose to Equal  
 Morning Noon Evening Bedtime  
 albuterol-ipratropium 2.5 mg-0.5 mg/3 ml Nebu Commonly known as:  Judi Damon Your last dose was: Your next dose is:    
   
   
 3 mL by Nebulization route every six (6) hours as needed. 3 mL  
    
   
   
   
  
 azithromycin 500 mg Tab Commonly known as:  Jan Montano Your last dose was: Your next dose is: Take 1 Tab by mouth daily. 500 mg  
    
   
   
   
  
 famotidine 20 mg tablet Commonly known as:  PEPCID Your last dose was: Your next dose is: Take 1 Tab by mouth nightly. 20 mg  
    
   
   
   
  
 fluticasone-vilanterol 100-25 mcg/dose inhaler Commonly known as:  BREO ELLIPTA Start taking on:  2/11/2018 Your last dose was: Your next dose is: Take 1 Puff by inhalation daily. 1 Puff  
    
   
   
   
  
 lisinopril 5 mg tablet Commonly known as:  Chelle Curiel Your last dose was: Your next dose is: Take 1 Tab by mouth daily. 5 mg Nebulizer & Compressor machine Commonly known as:  PORTABLE NEBULIZER SYSTEM Your last dose was: Your next dose is:    
   
   
 1 Each by Does Not Apply route every six (6) hours as needed. 1 Each  
    
   
   
   
  
 predniSONE 20 mg tablet Commonly known as:  Justin Murray Start taking on:  2/11/2018 Your last dose was: Your next dose is: Take 2 Tabs by mouth daily (with breakfast). For 1 day, then 1 tab by mouth x 3 days, then 1/2 tabs by mouth for 3 days.   
 40 mg  
    
   
   
   
  
  
 CHANGE how you take these medications Instructions Each Dose to Equal  
 Morning Noon Evening Bedtime FLOMAX 0.4 mg capsule Generic drug:  tamsulosin What changed:  Another medication with the same name was removed. Continue taking this medication, and follow the directions you see here. Your last dose was: Your next dose is: Take 0.4 mg by mouth daily. 0.4 mg HYDROmorphone 4 mg tablet Commonly known as:  DILAUDID What changed:  Another medication with the same name was removed. Continue taking this medication, and follow the directions you see here. Your last dose was: Your next dose is:    
   
   
 1/2 to one tab up to three times per day prn severe pain CONTINUE taking these medications Instructions Each Dose to Equal  
 Morning Noon Evening Bedtime  
 amLODIPine 10 mg tablet Commonly known as:  Alize Chow Your last dose was: Your next dose is: Take 1 Tab by mouth daily. 10 mg CENTRUM 0.4-162-18 mg Tab Generic drug:  AC-XK-PM-Fe-Min-Lycopen-Lutein Your last dose was: Your next dose is: Take 1 Tab by mouth daily. 1 Tab FISH -160-1,000 mg Cap Generic drug:  omega 3-dha-epa-fish oil Your last dose was: Your next dose is: Take 1,000 mg by mouth daily. 1000 mg  
    
   
   
   
  
 gabapentin 300 mg capsule Commonly known as:  NEURONTIN Your last dose was: Your next dose is: Take 1 Cap by mouth three (3) times daily. 300 mg  
    
   
   
   
  
 LUMIGAN 0.01 % ophthalmic drops Generic drug:  bimatoprost  
   
Your last dose was: Your next dose is:    
   
   
 Administer 1 Drop to both eyes nightly. 1 Drop  
    
   
   
   
  
 mirtazapine 30 mg tablet Commonly known as:  Jeffrey Fields Your last dose was: Your next dose is: Take 1 Tab by mouth nightly. 30 mg  
    
   
   
   
  
 naloxone 4 mg/actuation nasal spray Commonly known as:  [employer] Your last dose was: Your next dose is:    
   
   
 4 mg by Nasal route as needed. Indications: OPIATE-INDUCED RESPIRATORY DEPRESSION  
 4 mg PARoxetine 30 mg tablet Commonly known as:  PAXIL Your last dose was: Your next dose is: Take 1 Tab by mouth daily. 30 mg RESTASIS 0.05 % ophthalmic emulsion Generic drug:  cycloSPORINE Your last dose was: Your next dose is:    
   
   
 Administer 1 Drop to both eyes two (2) times a day. 1 Drop  
    
   
   
   
  
 rOPINIRole 1 mg tablet Commonly known as:  Wandaledeana Pilling Your last dose was: Your next dose is: Take 0.5-1 Tabs by mouth nightly as needed (RLS) for up to 90 days. 0.5-1 mg  
    
   
   
   
  
 simvastatin 20 mg tablet Commonly known as:  ZOCOR Your last dose was: Your next dose is: Take 1 Tab by mouth nightly. 20 mg  
    
   
   
   
  
 traZODone 100 mg tablet Commonly known as:  [place] Your last dose was: Your next dose is: Take 100 mg by mouth nightly. Patient takes one and half tabs at bedtime 100 mg  
    
   
   
   
  
 VITAMIN C 1,000 mg tablet Generic drug:  ascorbic acid (vitamin C) Your last dose was: Your next dose is: Take 1,000 mg by mouth daily. 1000 mg  
    
   
   
   
  
  
STOP taking these medications   
 metoprolol tartrate 25 mg tablet Commonly known as:  LOPRESSOR Where to Get Your Medications Information on where to get these meds will be given to you by the nurse or doctor. ! Ask your nurse or doctor about these medications  
  albuterol-ipratropium 2.5 mg-0.5 mg/3 ml Nebu  
 azithromycin 500 mg Tab famotidine 20 mg tablet  
 fluticasone-vilanterol 100-25 mcg/dose inhaler  
 lisinopril 5 mg tablet Nebulizer & Compressor machine  
 predniSONE 20 mg tablet

## 2018-02-08 LAB
ANION GAP SERPL CALC-SCNC: 8 MMOL/L (ref 3–18)
BUN SERPL-MCNC: 8 MG/DL (ref 7–18)
BUN/CREAT SERPL: 17 (ref 12–20)
CALCIUM SERPL-MCNC: 8.5 MG/DL (ref 8.5–10.1)
CHLORIDE SERPL-SCNC: 95 MMOL/L (ref 100–108)
CO2 SERPL-SCNC: 35 MMOL/L (ref 21–32)
CREAT SERPL-MCNC: 0.46 MG/DL (ref 0.6–1.3)
GLUCOSE BLD STRIP.AUTO-MCNC: 155 MG/DL (ref 70–110)
GLUCOSE BLD STRIP.AUTO-MCNC: 157 MG/DL (ref 70–110)
GLUCOSE BLD STRIP.AUTO-MCNC: 176 MG/DL (ref 70–110)
GLUCOSE SERPL-MCNC: 165 MG/DL (ref 74–99)
POTASSIUM SERPL-SCNC: 4.4 MMOL/L (ref 3.5–5.5)
SODIUM SERPL-SCNC: 138 MMOL/L (ref 136–145)

## 2018-02-08 PROCEDURE — 74011250636 HC RX REV CODE- 250/636: Performed by: INTERNAL MEDICINE

## 2018-02-08 PROCEDURE — 74011000250 HC RX REV CODE- 250: Performed by: INTERNAL MEDICINE

## 2018-02-08 PROCEDURE — 74011000250 HC RX REV CODE- 250: Performed by: EMERGENCY MEDICINE

## 2018-02-08 PROCEDURE — 74011250637 HC RX REV CODE- 250/637: Performed by: INTERNAL MEDICINE

## 2018-02-08 PROCEDURE — 82962 GLUCOSE BLOOD TEST: CPT

## 2018-02-08 PROCEDURE — 74011250637 HC RX REV CODE- 250/637: Performed by: PHYSICIAN ASSISTANT

## 2018-02-08 PROCEDURE — 65270000029 HC RM PRIVATE

## 2018-02-08 PROCEDURE — 36415 COLL VENOUS BLD VENIPUNCTURE: CPT | Performed by: PHYSICIAN ASSISTANT

## 2018-02-08 PROCEDURE — 74011250636 HC RX REV CODE- 250/636: Performed by: EMERGENCY MEDICINE

## 2018-02-08 PROCEDURE — 74011636637 HC RX REV CODE- 636/637: Performed by: PHYSICIAN ASSISTANT

## 2018-02-08 PROCEDURE — 74011250636 HC RX REV CODE- 250/636: Performed by: PHYSICIAN ASSISTANT

## 2018-02-08 PROCEDURE — 94640 AIRWAY INHALATION TREATMENT: CPT

## 2018-02-08 PROCEDURE — 80048 BASIC METABOLIC PNL TOTAL CA: CPT | Performed by: PHYSICIAN ASSISTANT

## 2018-02-08 RX ORDER — IPRATROPIUM BROMIDE AND ALBUTEROL SULFATE 2.5; .5 MG/3ML; MG/3ML
3 SOLUTION RESPIRATORY (INHALATION)
Status: DISCONTINUED | OUTPATIENT
Start: 2018-02-08 | End: 2018-02-10

## 2018-02-08 RX ORDER — MAGNESIUM SULFATE 100 %
4 CRYSTALS MISCELLANEOUS AS NEEDED
Status: DISCONTINUED | OUTPATIENT
Start: 2018-02-08 | End: 2018-02-10 | Stop reason: HOSPADM

## 2018-02-08 RX ORDER — AMLODIPINE BESYLATE 10 MG/1
10 TABLET ORAL DAILY
Status: DISCONTINUED | OUTPATIENT
Start: 2018-02-08 | End: 2018-02-10 | Stop reason: HOSPADM

## 2018-02-08 RX ORDER — PREDNISONE 20 MG/1
40 TABLET ORAL
Status: DISCONTINUED | OUTPATIENT
Start: 2018-02-09 | End: 2018-02-10 | Stop reason: HOSPADM

## 2018-02-08 RX ORDER — METOPROLOL TARTRATE 25 MG/1
25 TABLET, FILM COATED ORAL 2 TIMES DAILY
Status: DISCONTINUED | OUTPATIENT
Start: 2018-02-08 | End: 2018-02-10 | Stop reason: HOSPADM

## 2018-02-08 RX ORDER — DEXTROSE 50 % IN WATER (D50W) INTRAVENOUS SYRINGE
25-50 AS NEEDED
Status: DISCONTINUED | OUTPATIENT
Start: 2018-02-08 | End: 2018-02-10 | Stop reason: HOSPADM

## 2018-02-08 RX ORDER — INSULIN LISPRO 100 [IU]/ML
INJECTION, SOLUTION INTRAVENOUS; SUBCUTANEOUS
Status: DISCONTINUED | OUTPATIENT
Start: 2018-02-08 | End: 2018-02-10 | Stop reason: HOSPADM

## 2018-02-08 RX ORDER — HYDRALAZINE HYDROCHLORIDE 20 MG/ML
10 INJECTION INTRAMUSCULAR; INTRAVENOUS
Status: DISCONTINUED | OUTPATIENT
Start: 2018-02-08 | End: 2018-02-10 | Stop reason: HOSPADM

## 2018-02-08 RX ADMIN — IPRATROPIUM BROMIDE AND ALBUTEROL SULFATE 3 ML: 2.5; .5 SOLUTION RESPIRATORY (INHALATION) at 15:06

## 2018-02-08 RX ADMIN — INSULIN LISPRO 2 UNITS: 100 INJECTION, SOLUTION INTRAVENOUS; SUBCUTANEOUS at 11:30

## 2018-02-08 RX ADMIN — METHYLPREDNISOLONE SODIUM SUCCINATE 40 MG: 40 INJECTION, POWDER, FOR SOLUTION INTRAMUSCULAR; INTRAVENOUS at 05:48

## 2018-02-08 RX ADMIN — AZITHROMYCIN MONOHYDRATE 500 MG: 500 INJECTION, POWDER, LYOPHILIZED, FOR SOLUTION INTRAVENOUS at 18:45

## 2018-02-08 RX ADMIN — IPRATROPIUM BROMIDE AND ALBUTEROL SULFATE 3 ML: 2.5; .5 SOLUTION RESPIRATORY (INHALATION) at 03:30

## 2018-02-08 RX ADMIN — INSULIN LISPRO 2 UNITS: 100 INJECTION, SOLUTION INTRAVENOUS; SUBCUTANEOUS at 21:55

## 2018-02-08 RX ADMIN — METHYLPREDNISOLONE SODIUM SUCCINATE 40 MG: 40 INJECTION, POWDER, FOR SOLUTION INTRAMUSCULAR; INTRAVENOUS at 16:59

## 2018-02-08 RX ADMIN — GABAPENTIN 300 MG: 300 CAPSULE ORAL at 21:54

## 2018-02-08 RX ADMIN — METOPROLOL TARTRATE 25 MG: 25 TABLET ORAL at 18:47

## 2018-02-08 RX ADMIN — Medication 1000 MG: at 11:41

## 2018-02-08 RX ADMIN — METHYLPREDNISOLONE SODIUM SUCCINATE 125 MG: 125 INJECTION, POWDER, FOR SOLUTION INTRAMUSCULAR; INTRAVENOUS at 03:33

## 2018-02-08 RX ADMIN — ROPINIROLE HYDROCHLORIDE 1 MG: 1 TABLET, FILM COATED ORAL at 21:55

## 2018-02-08 RX ADMIN — IPRATROPIUM BROMIDE AND ALBUTEROL SULFATE 3 ML: 2.5; .5 SOLUTION RESPIRATORY (INHALATION) at 21:15

## 2018-02-08 RX ADMIN — AMLODIPINE BESYLATE 10 MG: 10 TABLET ORAL at 03:36

## 2018-02-08 RX ADMIN — METHYLPREDNISOLONE SODIUM SUCCINATE 40 MG: 40 INJECTION, POWDER, FOR SOLUTION INTRAMUSCULAR; INTRAVENOUS at 21:55

## 2018-02-08 RX ADMIN — CYCLOSPORINE 1 DROP: 0.5 EMULSION OPHTHALMIC at 18:00

## 2018-02-08 RX ADMIN — TAMSULOSIN HYDROCHLORIDE 0.4 MG: 0.4 CAPSULE ORAL at 11:42

## 2018-02-08 RX ADMIN — GABAPENTIN 300 MG: 300 CAPSULE ORAL at 11:42

## 2018-02-08 RX ADMIN — PAROXETINE HYDROCHLORIDE 30 MG: 20 TABLET, FILM COATED ORAL at 11:42

## 2018-02-08 RX ADMIN — SIMVASTATIN 20 MG: 20 TABLET, FILM COATED ORAL at 21:54

## 2018-02-08 RX ADMIN — INSULIN LISPRO 2 UNITS: 100 INJECTION, SOLUTION INTRAVENOUS; SUBCUTANEOUS at 16:30

## 2018-02-08 RX ADMIN — GABAPENTIN 300 MG: 300 CAPSULE ORAL at 17:00

## 2018-02-08 RX ADMIN — WATER 2 G: 1 INJECTION INTRAMUSCULAR; INTRAVENOUS; SUBCUTANEOUS at 16:58

## 2018-02-08 RX ADMIN — Medication 10 ML: at 21:57

## 2018-02-08 RX ADMIN — MIRTAZAPINE 30 MG: 15 TABLET, FILM COATED ORAL at 21:55

## 2018-02-08 RX ADMIN — TRAZODONE HYDROCHLORIDE 100 MG: 100 TABLET ORAL at 21:55

## 2018-02-08 RX ADMIN — IPRATROPIUM BROMIDE AND ALBUTEROL SULFATE 3 ML: 2.5; .5 SOLUTION RESPIRATORY (INHALATION) at 08:18

## 2018-02-08 RX ADMIN — CYCLOSPORINE 1 DROP: 0.5 EMULSION OPHTHALMIC at 09:00

## 2018-02-08 RX ADMIN — Medication 10 ML: at 17:16

## 2018-02-08 RX ADMIN — METOPROLOL TARTRATE 25 MG: 25 TABLET ORAL at 06:39

## 2018-02-08 NOTE — H&P
History & Physical    Patient: Tamara Henao MRN: [id number]  CSN: [id number]    YOB: 1933  Age: 80 y.o. Sex: female      DOA: 2/7/2018    Chief Complaint:   Chief Complaint   Patient presents with    Shortness of Breath          HPI:     Tamara Henao is a 80 y.o.  female who presented to [place] with c/o of sob , cough and fever for 1 week. Also states having genralized weakness and chills, w/u showed possible pna in cxr and Er physicain afelt patient was having some AMS as well as initial sat was 89%. After abx and nebs/oxygen her MS and sat improved. Currently she staes feeling much better but can hear wheezing in ausculation. She denies weigh tloss, recent travel, sick contacts,no N/V/D. No headace/blurry vision.     Past Medical History:   Diagnosis Date    Colon polyps     COPD 8-30-02    DDD (degenerative disc disease), lumbar 10/1/2014    Degeneration of lumbar or lumbosacral intervertebral disc     Depression     Diabetes ([address] 7-19-05    Diverticulosis     DM neuropathy, painful ([address] 10/1/2014    MOORE (Dyspnea on Exertion) 4-19-02    echo: +mild LVH, SR75-75% w/ diastolic dysfxn    Glaucoma     HCAP (healthcare-associated pneumonia) 03/24/2017    Hemorrhoids 6-6-06    Hyperlipidemia 5/17/2011    Hypertension 4-16-02    LBP (low back pain) 4-13-04    Low back pain radiating to both legs 10/1/2014    Lumbago     Lumbar disc herniation 10/1/2014    Lumbar facet arthropathy 10/1/2014    Lumbosacral radiculopathy at L5 10/1/2014    Lumbosacral radiculopathy at S1 10/1/2014    Microscopic hematuria     OA (osteoarthritis)     Overactive bladder 5/17/2011    RLS (restless legs syndrome) 1/17/2013    Sciatica     Shortness of breath     Spinal stenosis, lumbar region, without neurogenic claudication     Spondylolisthesis of lumbar region 10/1/2014    Spondylolisthesis, grade 1 10/1/2014    Stress urinary incontinence     Syncope     -MRI brain    Urinary tract infection, site not specified        Past Surgical History:   Procedure Laterality Date    HX APPENDECTOMY      HX CHOLECYSTECTOMY      HX HYSTERECTOMY      (+)DUB    HX POLYPECTOMY      TX COLONOSCOPY FLX DX W/COLLJ SPEC WHEN PFRMD  6-16-06    normal, Dr Radha Matthews FLX DX W/COLLJ [address] PFRMD      (+)polyp= tubular adenoma       Family History   Problem Relation Age of Onset    Colon Cancer Sister     Heart Disease Brother     Seizures Son     Colon Cancer Maternal Aunt        Social History     Social History    Marital status:      Spouse name: N/A    Number of children: N/A    Years of education: N/A     Social History Main Topics    Smoking status: Current Every Day Smoker     Packs/day: 0.25     Years: 50.00     Types: Cigarettes    Smokeless tobacco: Never Used    Alcohol use No    Drug use: No    Sexual activity: Not Currently     Other Topics Concern    Not on file     Social History Narrative       Prior to Admission medications    Medication Sig Start Date End Date Taking? Authorizing Provider   tamsulosin (FLOMAX) 0.4 mg capsule TAKE 1 CAPSULE BY MOUTH  DAILY 1/29/18  Yes Mari Mccrary MD   rOPINIRole (REQUIP) 1 mg tablet Take 0.5-1 Tabs by mouth nightly as needed (RLS) for up to 90 days. 1/11/18 4/11/18 Yes Kay Garcia MD   tamsulosin (FLOMAX) 0.4 mg capsule Take 0.4 mg by mouth daily. Yes Historical Provider   mirtazapine (REMERON) 30 mg tablet Take 1 Tab by mouth nightly.  12/26/17  Yes Kay Garcia MD   HYDROmorphone (DILAUDID) 4 mg tablet 1/2 to one tab up to three times per day prn severe pain 10/10/17  Yes HETAL Bowen   HYDROmorphone (DILAUDID) 4 mg tablet 1/2 to one tab up to three times per day prn severe pain 11/9/17  Yes HETAL Bowen   HYDROmorphone (DILAUDID) 4 mg tablet 1/2 to one tab up to three times per day prn severe pain 12/8/17  Yes Luba Rush HETAL Ricardo   gabapentin (NEURONTIN) 300 mg capsule Take 1 Cap by mouth three (3) times daily. 9/19/17  Yes Ruth Raymond MD   PARoxetine (PAXIL) 30 mg tablet Take 1 Tab by mouth daily. 9/19/17  Yes Ruth Raymond MD   metoprolol tartrate (LOPRESSOR) 25 mg tablet Take 1 Tab by mouth two (2) times a day. 9/19/17  Yes Ruth Raymond MD   naloxone 4 mg/actuation spry 4 mg by Nasal route as needed. Indications: OPIATE-INDUCED RESPIRATORY DEPRESSION 8/7/17  Yes HETAL Forman   amLODIPine (NORVASC) 10 mg tablet Take 1 Tab by mouth daily. 2/10/17  Yes Sarah Otoole MD   omega 2-qwk-mam-fish oil (FISH OIL) 100-160-1,000 mg cap Take 1,000 mg by mouth daily. Yes Historical Provider   LUMIGAN 0.01 % ophthalmic drops Administer 1 Drop to both eyes nightly. 9/22/16  Yes Historical Provider   ascorbic acid (VITAMIN C) 1,000 mg tablet Take 1,000 mg by mouth daily. Yes Historical Provider   traZODone (DESYREL) 100 mg tablet Take 100 mg by mouth nightly. Patient takes one and half tabs at bedtime   Yes Historical Provider   cycloSPORINE (RESTASIS) 0.05 % ophthalmic emulsion Administer 1 Drop to both eyes two (2) times a day. Yes Historical Provider   EO-UM-LR-Fe-Min-Lycopen-Lutein (CENTRUM) 0.4-162-18 mg Tab Take 1 Tab by mouth daily. 9/7/10  Yes Historical Provider   simvastatin (ZOCOR) 20 mg tablet Take 1 Tab by mouth nightly. 9/19/17   Ruth Raymond MD       Allergies   Allergen Reactions    Levaquin [Levofloxacin] Rash         Review of Systems  GENERAL: Patient alert, awake and oriented times 3, able to communicate full sentences and not in distress. HEENT: No change in vision, no earache, tinnitus, sore throat or sinus congestion. NECK: No pain or stiffness. PULMONARY:  shortness of breath, cough  And wheeze. Cardiovascular: no pnd or orthopnea, no CP  GASTROINTESTINAL: No abdominal pain, nausea, vomiting or diarrhea, melena or bright red blood per rectum. GENITOURINARY: No urinary frequency, urgency, hesitancy or dysuria. MUSCULOSKELETAL: No joint or muscle pain, no back pain, no recent trauma. DERMATOLOGIC: No rash, no itching, no lesions. ENDOCRINE: No polyuria, polydipsia, no heat or cold intolerance. No recent change in weight. HEMATOLOGICAL: No anemia or easy bruising or bleeding. NEUROLOGIC: No headache, seizures, numbness, tingling or weakness. Physical Exam:     Physical Exam:  Visit Vitals    /72 (BP 1 Location: Left arm, BP Patient Position: At rest)    Pulse (!) 54    Temp 97.6 °F (36.4 °C)    Resp 16    Ht 5' 5\" (1.651 m)    Wt 51.7 kg (113 lb 15.7 oz)    SpO2 97%    BMI 18.97 kg/m2    O2 Flow Rate (L/min): 2 l/min O2 Device: Nasal cannula    Temp (24hrs), Av.9 °F (36.6 °C), Min:97.6 °F (36.4 °C), Max:98.5 °F (36.9 °C)             General:  Alert, cooperative, no distress, appears stated age. Head: Normocephalic, without obvious abnormality, atraumatic. Eyes:  Conjunctivae/corneas clear. PERRL, EOMs intact. Nose: Nares normal. No drainage or sinus tenderness. Neck: Supple, symmetrical, trachea midline, no adenopathy, thyroid: no enlargement, no carotid bruit and no JVD. Lungs:   Few crackles and scattered wheezing to auscultation bilaterally. Heart:  Regular rate and rhythm, S1, S2 normal.     Abdomen: Soft, non-tender. Bowel sounds normal.    Extremities: Extremities normal, atraumatic, no cyanosis or edema. Pulses: 2+ and symmetric all extremities. Skin:  No rashes or lesions   Neurologic: AAOx3, No focal motor or sensory deficit. Labs Reviewed:    Lab results reviewed. For significant abnormal values and values requiring intervention, see assessment and plan.   CXR and EKG    Procedures/imaging: see electronic medical records for all procedures/Xrays and details which were not copied into this note but were reviewed prior to creation of Plan      Assessment/Plan     Active Problems: Pneumonia (2/7/2018)      CAP (community acquired pneumonia) (2/7/2018)       1. SOB/cough/fever:- COPD exacerbation likely from viral illness now with developing pna- CAP. Cont ceftr/zithromax  Iv Solumedrol/ nebs/ oxygen  F/u cultures, check procal- deascalate abx accordingly    2. AMS/ Metabolic encephalopathy - now resolved- likely related to sepsis from pna and mild hypoxia from copd exacerbation. Currently back to baseline    3. HTN: mildy uncontrolled- resume home meds,    4. DDD/Restless leg syndrome: cont PT and home meds    5. Tobacco abuse: counslled abstinace- pt states cutting down on number of cigarettes, offered nicotine patch    DVT/GI Prophylaxis: SCD's    Discussed with patient and  and daughter at bedside about hospital admission and my plan care, they understood and agree with my plan care.     Dana Viveros MD  2/7/2018 9:09 PM

## 2018-02-08 NOTE — DIABETES MGMT
Glycemic Control Plan of Care    IV Solumedrol 40 mg every 8 hours. No POC BG report at time of review. Patient verbalized understanding of BG monitoring and correctional insulin while on steroids. Recommendation(s):  1.) Correctional lispro insulin ACHS while on steroids. Done. Obtained order from HETAL Bird with hospitalist.    Assessment:  Patient is 80year old with past medical history including COPD, tobacco abuse, diabetes mellitus (not on meds), diabetic neuropathy, diverticulosis, hyperlipidemia, hypertension, back pain, and restless leg syndrome - was admitted on 02/07/2018 with c/o cough, chills, and fever. Noted:  Pneumonia. COPD exacerbation. Resolved altered mental status. Diabetes mellitus with current A1c of 5.7% (12/04/2017)    Most recent blood glucose values:    No POC BG report yet at time of review. Obtained order. Current A1C: 5.7% (12/04/2017) is equivalent to average blood glucose of 111 mg/dL during the past 2-3 months. Current hospital diabetes medications:   Correctional lispro insulin ACHS. Normal sensitivity dose. Ordered 02/08/2018. Total daily dose insulin requirement previous day: 02/07/2018  None. Home diabetes medications: None. Patient stated that she's not on diabetes meds. Diet: No diet order at time of review. Goals:  Blood glucose will be within target range of  mg/dL by 02/11/2018.     Education:  ___  Refer to Diabetes Education Record             _X__  Education not indicated at this time    Trudy Cortez RN CCM

## 2018-02-08 NOTE — PROGRESS NOTES
Care Management Interventions  PCP Verified by CM: Yes (Dr. Belinda Arredondo)  Mode of Transport at Discharge: Other (see comment)  Transition of Care Consult (CM Consult): Home Health, Discharge Planning (agreeable to [facility])  Physical Therapy Consult: Yes  Occupational Therapy Consult: Yes  Current Support Network: Lives with Spouse, Own Home, Family Lives Nearby (Pt lives with her spouse who is also sick and has [facility]. Lots of family support)  Confirm Follow Up Transport: Family  Plan discussed with Pt/Family/Caregiver: Yes  Discharge Location  Discharge Placement: Home with home health    Patient is a 79 yo female admitted for COPD and PNA. Patient alert and oriented. She states she lives at home with her  who is currently ill and recovery from a recent stroke. He has [facility] currently for therapy. She uses a walker for ambulation and has the support of her children in the area to assist with cooking and cleaning. She is not currently on O2 at home. She stated her daughter will transport. Agreeable ot [facility], has not signed FOC at this time.

## 2018-02-08 NOTE — HOME CARE
This nurse visited patient in hospital room to inform of home care services available post discharge from [facility]. Noted the patient is homebound and would qualify for either home care of a [facility] due to having a core measure. Per patient, she lives with her spouse, Tc Reilly, who currently receives home care services thru [facility]. DME: has front wheeled walker at home. , Renita Malhotra, has been made aware the patient would benefit from home care services and the patient is agreeable to home care services post discharge. An informative brochure of [facility] services has been left with the patient - MÓNICA Palomares LPN

## 2018-02-08 NOTE — INTERDISCIPLINARY ROUNDS
Interdisciplinary Round Note   Patient Information:   Meredith Molina   [birth year]   Reason for Admission: Pneumonia  CAP (community acquired pneumonia)   Attending Provider:   Maggy Haynes MD  Primary Care Physician:       Sandrita Bhat MD       [phone]   Estimated discharge date:  2-   Hospital day: 1  [unfilled]  - - -  RRAT Score: High Risk            41       Total Score        3 Has Seen PCP in Last 6 Months (Yes=3, No=0)    2 . Living with Significant Other. Assisted Living. LTAC. SNF. or   Rehab    4 IP Visits Last 12 Months (1-3=4, 4=9, >4=11)    5 Pt.  Coverage (Medicare=5 , Medicaid, or Self-Pay=4)    27 Charlson Comorbidity Score (Age + Comorbid Conditions)        Criteria that do not apply:    Patient Length of Stay (>5 days = 3)            No                 Lines, Drains, & Airways  None       IV Antibiotics:    Current Antimicrobial Therapy (168h ago through future)    Ordered     Start Stop    02/07/18 1501  azithromycin (ZITHROMAX) 500 mg in 0.9% sodium chloride (MBP/ADV) 250 mL adv  500 mg,   IntraVENous,   EVERY 24 HOURS      02/07/18 1502 --    02/07/18 1353  cefTRIAXone (ROCEPHIN) 2 g in sterile water (preservative free) 20 mL IV syringe  2 g,   IntraVENous,   EVERY 24 HOURS     Comments:  First dose Stat    02/07/18 1400 --        GI Prophylaxis: GI Prophylaxis: no   Type:       Recent Glucose Results:   Lab Results   Component Value Date/Time     (H) 02/07/2018 01:00 PM      Activity Level:       Needs assistance with ADLs: no       Goals for Today: -less congestion                                                                                       -increase activity                                                                                       Recommendations:   Discharge Disposition: Home Independent  RT  Notify Specialist Physician of admission and Follow up phone call to assess:   follow up physician appointments    Needs for Discharge: none IDR Team:   Recommendations from [address] [place] [address] team:     Other Notes:

## 2018-02-08 NOTE — PROGRESS NOTES
[facility]  Progress Note    Patient: Sorin Narayanan Age: 80 y.o. : 1933 MR#: [id number] SSN: [id number]  Date: 2018     Subjective:     Pt reports feeling \"pretty good\" today and anxious to go home to . States she had worsening SOB and non-productive cough before admission but none currently. Denies any chest pain, N/V/D/C. Assessment/Plan:   1. CAP (community acquired pneumonia)  2. AMS/ Metabolic encephalopathy: resolved  3. HTN  4. DDD/Restless leg syndrome  5. COPD exacerbation   6. Hx of tobacco abuse  7. Hyperglycemia r/t steroids  8. Bradycardia    Plan:  Continue duoneb, azithromycin/ceftrixone. Switch IV solumedrol to PO in the am  Cont SSI   Cont. BP meds, Hold BB per parameters with HR < 60.    Goals of care: Full code  Disposition:  [x]PT/OT ordered   [x] Case management referral    Case discussed with:  [x]Patient  []Family  [x]Nursing  [x]Case Management  DVT Prophylaxis:  []Lovenox  []Hep SQ  [x]SCDs  []Coumadin   []On Heparin gtt    Objective:   VS:   Visit Vitals    /74 (BP 1 Location: Right arm, BP Patient Position: At rest)    Pulse (!) 50    Temp 97.2 °F (36.2 °C)    Resp 18    Ht 5' 5\" (1.651 m)    Wt 52.6 kg (116 lb)    SpO2 91%    BMI 19.3 kg/m2      Tmax/24hrs: Temp (24hrs), Av.4 °F (36.3 °C), Min:96.7 °F (35.9 °C), Max:98.7 °F (37.1 °C)    Intake/Output Summary (Last 24 hours) at 18 1740  Last data filed at 18 0339   Gross per 24 hour   Intake                0 ml   Output              400 ml   Net             -400 ml       General:  Awake, alert, NAD  Cardiovascular:  Bradycardic, regular rate  Pulmonary:  Coarse breath sounds with mild wheezes. GI:  NT, normal BS  Extremities:  No edema or cyanosis, moves ext spontaneously.    Neuro: AAOx2      Labs:    Recent Results (from the past 24 hour(s))   GLUCOSE, POC    Collection Time: 18 12:17 PM   Result Value Ref Range    Glucose (POC) 176 (H) 70 - 762 mg/dL   METABOLIC PANEL, BASIC    Collection Time: 02/08/18 12:39 PM   Result Value Ref Range    Sodium 138 136 - 145 mmol/L    Potassium 4.4 3.5 - 5.5 mmol/L    Chloride 95 (L) 100 - 108 mmol/L    CO2 35 (H) 21 - 32 mmol/L    Anion gap 8 3.0 - 18 mmol/L    Glucose 165 (H) 74 - 99 mg/dL    BUN 8 7.0 - 18 MG/DL    Creatinine 0.46 (L) 0.6 - 1.3 MG/DL    BUN/Creatinine ratio 17 12 - 20      GFR est AA >60 >60 ml/min/1.73m2    GFR est non-AA >60 >60 ml/min/1.73m2    Calcium 8.5 8.5 - 10.1 MG/DL   GLUCOSE, POC    Collection Time: 02/08/18  4:52 PM   Result Value Ref Range    Glucose (POC) 155 (H) 70 - 110 mg/dL       Signed By: Ruy Rizvi PA-C     February 8, 2018 5:40 PM

## 2018-02-08 NOTE — PROGRESS NOTES
Shift Progress Note:  Assumed care of patient from transport team Remains on 3 lpm and increased to 4 lpm Continues with expiratory wheeze. Able to rest without s/s of acute distress. Blood pressure elevated MD aware of status, added second blood pressure medication. Call bell within reach. Assisted to bathroom using walker.   Patient Vitals for the past 12 hrs:   Temp Pulse Resp BP SpO2   02/08/18 0343 98.7 °F (37.1 °C) 77 18 187/76 90 %   02/08/18 0000 97.6 °F (36.4 °C) 89 20 (!) 208/97 (!) 89 %   02/07/18 2004 97.6 °F (36.4 °C) (!) 54 16 166/72 97 %

## 2018-02-09 LAB
ANION GAP SERPL CALC-SCNC: 6 MMOL/L (ref 3–18)
BASOPHILS # BLD: 0 K/UL (ref 0–0.1)
BASOPHILS NFR BLD: 0 % (ref 0–2)
BUN SERPL-MCNC: 10 MG/DL (ref 7–18)
BUN/CREAT SERPL: 30 (ref 12–20)
CALCIUM SERPL-MCNC: 8.6 MG/DL (ref 8.5–10.1)
CHLORIDE SERPL-SCNC: 96 MMOL/L (ref 100–108)
CO2 SERPL-SCNC: 32 MMOL/L (ref 21–32)
CREAT SERPL-MCNC: 0.33 MG/DL (ref 0.6–1.3)
DIFFERENTIAL METHOD BLD: ABNORMAL
EOSINOPHIL # BLD: 0 K/UL (ref 0–0.4)
EOSINOPHIL NFR BLD: 0 % (ref 0–5)
ERYTHROCYTE [DISTWIDTH] IN BLOOD BY AUTOMATED COUNT: 13.4 % (ref 11.6–14.5)
GLUCOSE BLD STRIP.AUTO-MCNC: 153 MG/DL (ref 70–110)
GLUCOSE BLD STRIP.AUTO-MCNC: 219 MG/DL (ref 70–110)
GLUCOSE BLD STRIP.AUTO-MCNC: 267 MG/DL (ref 70–110)
GLUCOSE BLD STRIP.AUTO-MCNC: 329 MG/DL (ref 70–110)
GLUCOSE SERPL-MCNC: 157 MG/DL (ref 74–99)
HCT VFR BLD AUTO: 37.4 % (ref 35–45)
HGB BLD-MCNC: 12.2 G/DL (ref 12–16)
LYMPHOCYTES # BLD: 0.9 K/UL (ref 0.9–3.6)
LYMPHOCYTES NFR BLD: 19 % (ref 21–52)
MCH RBC QN AUTO: 29.5 PG (ref 24–34)
MCHC RBC AUTO-ENTMCNC: 32.6 G/DL (ref 31–37)
MCV RBC AUTO: 90.3 FL (ref 74–97)
MONOCYTES # BLD: 0.2 K/UL (ref 0.05–1.2)
MONOCYTES NFR BLD: 4 % (ref 3–10)
NEUTS SEG # BLD: 3.6 K/UL (ref 1.8–8)
NEUTS SEG NFR BLD: 77 % (ref 40–73)
PLATELET # BLD AUTO: 148 K/UL (ref 135–420)
PMV BLD AUTO: 10.9 FL (ref 9.2–11.8)
POTASSIUM SERPL-SCNC: 4.3 MMOL/L (ref 3.5–5.5)
PROCALCITONIN SERPL-MCNC: 0.04 NG/ML (ref 0–0.08)
RBC # BLD AUTO: 4.14 M/UL (ref 4.2–5.3)
SODIUM SERPL-SCNC: 134 MMOL/L (ref 136–145)
WBC # BLD AUTO: 4.6 K/UL (ref 4.6–13.2)

## 2018-02-09 PROCEDURE — 74011250636 HC RX REV CODE- 250/636: Performed by: EMERGENCY MEDICINE

## 2018-02-09 PROCEDURE — 74011000250 HC RX REV CODE- 250: Performed by: EMERGENCY MEDICINE

## 2018-02-09 PROCEDURE — 85025 COMPLETE CBC W/AUTO DIFF WBC: CPT | Performed by: PHYSICIAN ASSISTANT

## 2018-02-09 PROCEDURE — 74011250637 HC RX REV CODE- 250/637: Performed by: INTERNAL MEDICINE

## 2018-02-09 PROCEDURE — 74011000250 HC RX REV CODE- 250: Performed by: INTERNAL MEDICINE

## 2018-02-09 PROCEDURE — 36415 COLL VENOUS BLD VENIPUNCTURE: CPT | Performed by: PHYSICIAN ASSISTANT

## 2018-02-09 PROCEDURE — 97165 OT EVAL LOW COMPLEX 30 MIN: CPT

## 2018-02-09 PROCEDURE — 74011250637 HC RX REV CODE- 250/637: Performed by: PHYSICIAN ASSISTANT

## 2018-02-09 PROCEDURE — 80048 BASIC METABOLIC PNL TOTAL CA: CPT | Performed by: PHYSICIAN ASSISTANT

## 2018-02-09 PROCEDURE — 94640 AIRWAY INHALATION TREATMENT: CPT

## 2018-02-09 PROCEDURE — 65270000029 HC RM PRIVATE

## 2018-02-09 PROCEDURE — 77010033678 HC OXYGEN DAILY: Performed by: INTERNAL MEDICINE

## 2018-02-09 PROCEDURE — 97161 PT EVAL LOW COMPLEX 20 MIN: CPT

## 2018-02-09 PROCEDURE — 74011636637 HC RX REV CODE- 636/637: Performed by: PHYSICIAN ASSISTANT

## 2018-02-09 PROCEDURE — 82962 GLUCOSE BLOOD TEST: CPT

## 2018-02-09 PROCEDURE — 97116 GAIT TRAINING THERAPY: CPT

## 2018-02-09 RX ORDER — FLUTICASONE FUROATE AND VILANTEROL 100; 25 UG/1; UG/1
1 POWDER RESPIRATORY (INHALATION) DAILY
Status: DISCONTINUED | OUTPATIENT
Start: 2018-02-10 | End: 2018-02-10 | Stop reason: HOSPADM

## 2018-02-09 RX ORDER — ARFORMOTEROL TARTRATE 15 UG/2ML
15 SOLUTION RESPIRATORY (INHALATION)
Status: DISCONTINUED | OUTPATIENT
Start: 2018-02-09 | End: 2018-02-10 | Stop reason: HOSPADM

## 2018-02-09 RX ORDER — BUDESONIDE 0.5 MG/2ML
500 INHALANT ORAL
Status: DISCONTINUED | OUTPATIENT
Start: 2018-02-09 | End: 2018-02-10

## 2018-02-09 RX ADMIN — METOPROLOL TARTRATE 25 MG: 25 TABLET ORAL at 09:27

## 2018-02-09 RX ADMIN — BUDESONIDE 500 MCG: 0.5 INHALANT RESPIRATORY (INHALATION) at 20:27

## 2018-02-09 RX ADMIN — IPRATROPIUM BROMIDE AND ALBUTEROL SULFATE 3 ML: 2.5; .5 SOLUTION RESPIRATORY (INHALATION) at 20:27

## 2018-02-09 RX ADMIN — Medication 1000 MG: at 09:27

## 2018-02-09 RX ADMIN — GABAPENTIN 300 MG: 300 CAPSULE ORAL at 09:28

## 2018-02-09 RX ADMIN — TRAZODONE HYDROCHLORIDE 100 MG: 100 TABLET ORAL at 21:39

## 2018-02-09 RX ADMIN — CYCLOSPORINE 1 DROP: 0.5 EMULSION OPHTHALMIC at 16:06

## 2018-02-09 RX ADMIN — PAROXETINE HYDROCHLORIDE 30 MG: 20 TABLET, FILM COATED ORAL at 09:27

## 2018-02-09 RX ADMIN — INSULIN LISPRO 2 UNITS: 100 INJECTION, SOLUTION INTRAVENOUS; SUBCUTANEOUS at 09:28

## 2018-02-09 RX ADMIN — PREDNISONE 40 MG: 20 TABLET ORAL at 09:28

## 2018-02-09 RX ADMIN — ARFORMOTEROL TARTRATE 15 MCG: 15 SOLUTION RESPIRATORY (INHALATION) at 20:27

## 2018-02-09 RX ADMIN — MIRTAZAPINE 30 MG: 15 TABLET, FILM COATED ORAL at 21:40

## 2018-02-09 RX ADMIN — GABAPENTIN 300 MG: 300 CAPSULE ORAL at 21:39

## 2018-02-09 RX ADMIN — SIMVASTATIN 20 MG: 20 TABLET, FILM COATED ORAL at 21:40

## 2018-02-09 RX ADMIN — TAMSULOSIN HYDROCHLORIDE 0.4 MG: 0.4 CAPSULE ORAL at 09:28

## 2018-02-09 RX ADMIN — Medication 10 ML: at 21:43

## 2018-02-09 RX ADMIN — GABAPENTIN 300 MG: 300 CAPSULE ORAL at 16:06

## 2018-02-09 RX ADMIN — IPRATROPIUM BROMIDE AND ALBUTEROL SULFATE 3 ML: 2.5; .5 SOLUTION RESPIRATORY (INHALATION) at 01:59

## 2018-02-09 RX ADMIN — AMLODIPINE BESYLATE 10 MG: 10 TABLET ORAL at 09:28

## 2018-02-09 RX ADMIN — INSULIN LISPRO 8 UNITS: 100 INJECTION, SOLUTION INTRAVENOUS; SUBCUTANEOUS at 21:41

## 2018-02-09 RX ADMIN — IPRATROPIUM BROMIDE AND ALBUTEROL SULFATE 3 ML: 2.5; .5 SOLUTION RESPIRATORY (INHALATION) at 07:44

## 2018-02-09 RX ADMIN — Medication 10 ML: at 16:07

## 2018-02-09 RX ADMIN — WATER 2 G: 1 INJECTION INTRAMUSCULAR; INTRAVENOUS; SUBCUTANEOUS at 16:06

## 2018-02-09 RX ADMIN — Medication 10 ML: at 08:09

## 2018-02-09 RX ADMIN — IPRATROPIUM BROMIDE AND ALBUTEROL SULFATE 3 ML: 2.5; .5 SOLUTION RESPIRATORY (INHALATION) at 14:34

## 2018-02-09 NOTE — CONSULTS
[facility]  Pulmonary, Critical Care, and Sleep Medicine    Initial Patient Consult    Name: Alba Canales MRN: [id number]   : 1933 Hospital: [facility]   Date: 2018        IMPRESSION:   · Shortness of breath due to COPD exacerbation and pneumonia, community acquired, slow to improve. Pulmonary HTN possible due to long standing COPD and hypoxemia  · COPD, pre bronchodilator FEV1 51% with BP response in 2015  · Exercise Hypoxia requiring supplemental O2  · Acute febrile illness Rapid flu negative  · Serology positive for RA, KANA, SS A and SS B  · Chronic back pain  · HTN  · Glaucoma  · DM  · Depression  · Cachexia possibly due to combination of COPD and ?depression and poor po intake  · Noncompliance with MD follow up       RECOMMENDATIONS:   · Continue prn Albuterol  · Start LABA/ICS, will transition to [facility] prior to discharge. Pt unable to use MDI inhalers due to arthritis but will probably be able to use an Ellipta device like Breo  · Titrate FiO2, will need assessment of O2 prior to discharge  · Will need to set up home nebulizer machine for ANGÉLICA   · Instructed on purse lip breathing  · Discussed possibility of pulmonary rehab, although 's illness prevents pt from leaving the home. ?home based program may be considered  · PFT's may be done as outpatient  · Will arrange for follow up visit with Dr. Maryuri Claudio  · Will follow while in hospital. Thank you for consulting     Subjective: This patient has been seen and evaluated at the request of Dr. Sterling Brantley for COPD exacerbation. Patient is a 80 y.o. female with a history of COPD whose last FEV1 was 51% in . She was admitted to the hospital after 2 weeks of worsening shortness of breath and wheezing. Pt reports a non productive cough, subjective fevers without chills, and was noted to have pulmonary infiltrates on CXR. She denies chest pain prostration or myalgias, no sick contacts.  She has noted significant improvement during this admission but still C/o MOORE. She was noted to desaturate to the low 80's with walking. Other complaints include chronic back and joint pains, dry eye, and unexplained weight loss and inability to gain weight. Past Medical History:   Diagnosis Date    Colon polyps     COPD 8-30-02    DDD (degenerative disc disease), lumbar 10/1/2014    Degeneration of lumbar or lumbosacral intervertebral disc     Depression     Diabetes ([facility] [address] 7-19-05    Diverticulosis     DM neuropathy, painful ([facility] [address] 10/1/2014    MOORE (Dyspnea on Exertion) 4-19-02    echo: +mild LVH, PV76-48% w/ diastolic dysfxn    Glaucoma     HCAP (healthcare-associated pneumonia) 03/24/2017    Hemorrhoids 6-6-06    Hyperlipidemia 5/17/2011    Hypertension 4-16-02    LBP (low back pain) 4-13-04    Low back pain radiating to both legs 10/1/2014    Lumbago     Lumbar disc herniation 10/1/2014    Lumbar facet arthropathy 10/1/2014    Lumbosacral radiculopathy at L5 10/1/2014    Lumbosacral radiculopathy at S1 10/1/2014    Microscopic hematuria     OA (osteoarthritis)     Overactive bladder 5/17/2011    RLS (restless legs syndrome) 1/17/2013    Sciatica     Shortness of breath     Spinal stenosis, lumbar region, without neurogenic claudication     Spondylolisthesis of lumbar region 10/1/2014    Spondylolisthesis, grade 1 10/1/2014    Stress urinary incontinence     Syncope     -MRI brain    Urinary tract infection, site not specified       Past Surgical History:   Procedure Laterality Date    HX APPENDECTOMY      HX CHOLECYSTECTOMY      HX HYSTERECTOMY      (+)DUB    HX POLYPECTOMY      [place] COLONOSCOPY FLX DX W/COLLJ SPEC WHEN [facility]  6-16-06    normal, Dr Estevan Abarca    AZ COLONOSCOPY FLX DX W/COLLJ [address] P[facility]MD      (+)polyp= tubular adenoma      Prior to Admission medications    Medication Sig Start Date End Date Taking?  Authorizing Provider   tamsulosin (FLOMAX) 0.4 mg capsule TAKE 1 CAPSULE BY MOUTH  DAILY 1/29/18  Yes Marek Marcum MD   rOPINIRole (REQUIP) 1 mg tablet Take 0.5-1 Tabs by mouth nightly as needed (RLS) for up to 90 days. 1/11/18 4/11/18 Yes Zahida Polk MD   tamsulosin (FLOMAX) 0.4 mg capsule Take 0.4 mg by mouth daily. Yes Historical Provider   mirtazapine (REMERON) 30 mg tablet Take 1 Tab by mouth nightly. 12/26/17  Yes Zahida Polk MD   HYDROmorphone (DILAUDID) 4 mg tablet 1/2 to one tab up to three times per day prn severe pain 10/10/17  Yes HETAL Nieto   HYDROmorphone (DILAUDID) 4 mg tablet 1/2 to one tab up to three times per day prn severe pain 11/9/17  Yes HETAL Nieto   HYDROmorphone (DILAUDID) 4 mg tablet 1/2 to one tab up to three times per day prn severe pain 12/8/17  Yes HETAL Nieto   gabapentin (NEURONTIN) 300 mg capsule Take 1 Cap by mouth three (3) times daily. 9/19/17  Yes Zahida Polk MD   PARoxetine (PAXIL) 30 mg tablet Take 1 Tab by mouth daily. 9/19/17  Yes Zahida Polk MD   metoprolol tartrate (LOPRESSOR) 25 mg tablet Take 1 Tab by mouth two (2) times a day. 9/19/17  Yes Zahida Polk MD   naloxone 4 mg/actuation spry 4 mg by Nasal route as needed. Indications: OPIATE-INDUCED RESPIRATORY DEPRESSION 8/7/17  Yes HETAL Nieto   amLODIPine (NORVASC) 10 mg tablet Take 1 Tab by mouth daily. 2/10/17  Yes Isac Blanchard MD   omega 6-dyv-ijh-fish oil (FISH OIL) 100-160-1,000 mg cap Take 1,000 mg by mouth daily. Yes Historical Provider   LUMIGAN 0.01 % ophthalmic drops Administer 1 Drop to both eyes nightly. 9/22/16  Yes Historical Provider   ascorbic acid (VITAMIN C) 1,000 mg tablet Take 1,000 mg by mouth daily. Yes Historical Provider   traZODone (DESYREL) 100 mg tablet Take 100 mg by mouth nightly.  Patient takes one and half tabs at bedtime   Yes Historical Provider   cycloSPORINE (RESTASIS) 0.05 % ophthalmic emulsion Administer 1 Drop to both eyes two (2) times a day. Yes Historical Provider   PR-OO-VA-Fe-Min-Lycopen-Lutein (CENTRUM) 0.4-162-18 mg Tab Take 1 Tab by mouth daily. 9/7/10  Yes Historical Provider   simvastatin (ZOCOR) 20 mg tablet Take 1 Tab by mouth nightly. 9/19/17   Xiang Suh MD     Allergies   Allergen Reactions    Levaquin [Levofloxacin] Rash      Social History   Substance Use Topics    Smoking status: Current Every Day Smoker     Packs/day: 0.25     Years: 50.00     Types: Cigarettes    Smokeless tobacco: Never Used    Alcohol use No      Family History   Problem Relation Age of Onset    Colon Cancer Sister     Heart Disease Brother     Seizures Son     Colon Cancer Maternal Aunt         Current Facility-Administered Medications   Medication Dose Route Frequency    albuterol-ipratropium (DUO-NEB) 2.5 MG-0.5 MG/3 ML  3 mL Nebulization Q6H RT    amLODIPine (NORVASC) tablet 10 mg  10 mg Oral DAILY    metoprolol tartrate (LOPRESSOR) tablet 25 mg  25 mg Oral BID    insulin lispro (HUMALOG) injection   SubCUTAneous AC&HS    predniSONE (DELTASONE) tablet 40 mg  40 mg Oral DAILY WITH BREAKFAST    cefTRIAXone (ROCEPHIN) 2 g in sterile water (preservative free) 20 mL IV syringe  2 g IntraVENous Q24H    azithromycin (ZITHROMAX) 500 mg in 0.9% sodium chloride (MBP/ADV) 250 mL adv  500 mg IntraVENous Q24H    sodium chloride (NS) flush 5-10 mL  5-10 mL IntraVENous Q8H    ascorbic acid (vitamin C) (VITAMIN C) tablet 1,000 mg  1,000 mg Oral DAILY    cycloSPORINE (RESTASIS) 0.05 % ophthalmic emulsion 1 Drop  1 Drop Both Eyes BID    gabapentin (NEURONTIN) capsule 300 mg  300 mg Oral TID    mirtazapine (REMERON) tablet 30 mg  30 mg Oral QHS    PARoxetine (PAXIL) tablet 30 mg  30 mg Oral DAILY    simvastatin (ZOCOR) tablet 20 mg  20 mg Oral QHS    tamsulosin (FLOMAX) capsule 0.4 mg  0.4 mg Oral DAILY    traZODone (DESYREL) tablet 100 mg  100 mg Oral QHS       Review of Systems:  Pertinent items are noted in HPI.     Objective: Vital Signs:    Visit Vitals    /68 (BP 1 Location: Right arm, BP Patient Position: At rest)    Pulse (!) 51    Temp 97.8 °F (36.6 °C)    Resp 18    Ht 5' 5\" (1.651 m)    Wt 54.4 kg (120 lb)    SpO2 90%    Breastfeeding No    BMI 19.97 kg/m2       O2 Device: Nasal cannula   O2 Flow Rate (L/min): 3 l/min   Temp (24hrs), Av.1 °F (36.2 °C), Min:96.4 °F (35.8 °C), Max:97.8 °F (36.6 °C)       Intake/Output:   Last shift:         Last 3 shifts:  1901 -  0700  In: -   Out: 400 [Urine:400]  No intake or output data in the 24 hours ending 18 1606   Physical Exam:   General:  Alert, cooperative, no distress, appears stated age. Head:  Normocephalic, without obvious abnormality, atraumatic. Eyes:  Conjunctivae/corneas clear. PERRL, EOMs intact. Nose: Nares normal.  Mucosa normal. No drainage or sinus tenderness. Throat: Lips, mucosa, and tongue normal. Teeth and gums normal.   Neck: Supple, symmetrical, trachea midline, no adenopathy, thyroid: no enlargment/tenderness/nodules    Back:   Symmetric    Lungs:   Distant breath sounds, prolonged expiratory phase, diffuse expiratory wheezes, no rales   Chest wall:  No tenderness or deformity. Heart:  Regular rate and rhythm, S1, S2 normal, no murmur, click, rub or gallop. Abdomen:   Soft, non-tender. Bowel sounds normal. No masses,  No organomegaly. Extremities: Extremities cachectic, atraumatic, no cyanosis or edema. Pulses: 2+ and symmetric all extremities.    Skin: Skin color, texture, turgor normal. No rashes or lesions   Lymph nodes: Cervical nodes normal.   Neurologic: Grossly nonfocal     Data review:     Recent Results (from the past 24 hour(s))   GLUCOSE, POC    Collection Time: 18  4:52 PM   Result Value Ref Range    Glucose (POC) 155 (H) 70 - 110 mg/dL   GLUCOSE, POC    Collection Time: 18  9:50 PM   Result Value Ref Range    Glucose (POC) 157 (H) 70 - 110 mg/dL   CBC WITH AUTOMATED DIFF    Collection Time: 02/09/18  4:34 AM   Result Value Ref Range    WBC 4.6 4.6 - 13.2 K/uL    RBC 4.14 (L) 4.20 - 5.30 M/uL    HGB 12.2 12.0 - 16.0 g/dL    HCT 37.4 35.0 - 45.0 %    MCV 90.3 74.0 - 97.0 FL    MCH 29.5 24.0 - 34.0 PG    MCHC 32.6 31.0 - 37.0 g/dL    RDW 13.4 11.6 - 14.5 %    PLATELET 780 181 - 208 K/uL    MPV 10.9 9.2 - 11.8 FL    NEUTROPHILS 77 (H) 40 - 73 %    LYMPHOCYTES 19 (L) 21 - 52 %    MONOCYTES 4 3 - 10 %    EOSINOPHILS 0 0 - 5 %    BASOPHILS 0 0 - 2 %    ABS. NEUTROPHILS 3.6 1.8 - 8.0 K/UL    ABS. LYMPHOCYTES 0.9 0.9 - 3.6 K/UL    ABS. MONOCYTES 0.2 0.05 - 1.2 K/UL    ABS. EOSINOPHILS 0.0 0.0 - 0.4 K/UL    ABS. BASOPHILS 0.0 0.0 - 0.1 K/UL    DF AUTOMATED     METABOLIC PANEL, BASIC    Collection Time: 02/09/18  4:34 AM   Result Value Ref Range    Sodium 134 (L) 136 - 145 mmol/L    Potassium 4.3 3.5 - 5.5 mmol/L    Chloride 96 (L) 100 - 108 mmol/L    CO2 32 21 - 32 mmol/L    Anion gap 6 3.0 - 18 mmol/L    Glucose 157 (H) 74 - 99 mg/dL    BUN 10 7.0 - 18 MG/DL    Creatinine 0.33 (L) 0.6 - 1.3 MG/DL    BUN/Creatinine ratio 30 (H) 12 - 20      GFR est AA >60 >60 ml/min/1.73m2    GFR est non-AA >60 >60 ml/min/1.73m2    Calcium 8.6 8.5 - 10.1 MG/DL   GLUCOSE, POC    Collection Time: 02/09/18  8:09 AM   Result Value Ref Range    Glucose (POC) 153 (H) 70 - 110 mg/dL   GLUCOSE, POC    Collection Time: 02/09/18 12:38 PM   Result Value Ref Range    Glucose (POC) 219 (H) 70 - 110 mg/dL     XR Results (most recent):    Results from Hospital Encounter encounter on 02/07/18   XR CHEST PA LAT   Narrative Chest    Indication: sob     Comparison: April 21, 2017    Findings: Two views. No pneumothorax. No pleural effusion. Bilateral lower lung  zone atelectasis with patchy right lower lung zone airspace opacity. Cardiomediastinal silhouette and osseous structures grossly unchanged.          Impression Impression: Patchy right lower lung zone airspace opacity Please correlate with  physical examination findings regarding whether this may represent early  pneumonia. Imaging:  I have personally reviewed the patients radiographs and have reviewed the reports:  XR Results (most recent):    Results from Hospital Encounter encounter on 02/07/18   XR CHEST PA LAT   Narrative Chest    Indication: sob     Comparison: April 21, 2017    Findings: Two views. No pneumothorax. No pleural effusion. Bilateral lower lung  zone atelectasis with patchy right lower lung zone airspace opacity. Cardiomediastinal silhouette and osseous structures grossly unchanged. Impression Impression: Patchy right lower lung zone airspace opacity Please correlate with  physical examination findings regarding whether this may represent early  pneumonia.                  Sharee Labs, MD

## 2018-02-09 NOTE — DISCHARGE INSTRUCTIONS
Pneumonia: Care Instructions  Your Care Instructions    Pneumonia is an infection of the lungs. Most cases are caused by infections from bacteria or viruses. Pneumonia may be mild or very severe. If it is caused by bacteria, you will be treated with antibiotics. It may take a few weeks to a few months to recover fully from pneumonia, depending on how sick you were and whether your overall health is good. Follow-up care is a key part of your treatment and safety. Be sure to make and go to all appointments, and call your doctor if you are having problems. It's also a good idea to know your test results and keep a list of the medicines you take. How can you care for yourself at home? · Take your antibiotics exactly as directed. Do not stop taking the medicine just because you are feeling better. You need to take the full course of antibiotics. · Take your medicines exactly as prescribed. Call your doctor if you think you are having a problem with your medicine. · Get plenty of rest and sleep. You may feel weak and tired for a while, but your energy level will improve with time. · To prevent dehydration, drink plenty of fluids, enough so that your urine is light yellow or clear like water. Choose water and other caffeine-free clear liquids until you feel better. If you have kidney, heart, or liver disease and have to limit fluids, talk with your doctor before you increase the amount of fluids you drink. · Take care of your cough so you can rest. A cough that brings up mucus from your lungs is common with pneumonia. It is one way your body gets rid of the infection. But if coughing keeps you from resting or causes severe fatigue and chest-wall pain, talk to your doctor. He or she may suggest that you take a medicine to reduce the cough. · Use a vaporizer or humidifier to add moisture to your bedroom. Follow the directions for cleaning the machine. · Do not smoke or allow others to smoke around you.  Smoke will make your cough last longer. If you need help quitting, talk to your doctor about stop-smoking programs and medicines. These can increase your chances of quitting for good. · Take an over-the-counter pain medicine, such as acetaminophen (Tylenol), ibuprofen (Advil, Motrin), or naproxen (Aleve). Read and follow all instructions on the label. · Do not take two or more pain medicines at the same time unless the doctor told you to. Many pain medicines have acetaminophen, which is Tylenol. Too much acetaminophen (Tylenol) can be harmful. · If you were given a spirometer to measure how well your lungs are working, use it as instructed. This can help your doctor tell how your recovery is going. · To prevent pneumonia in the future, talk to your doctor about getting a flu vaccine (once a year) and a pneumococcal vaccine (one time only for most people). When should you call for help? Call 911 anytime you think you may need emergency care. For example, call if:  ? · You have severe trouble breathing. ?Call your doctor now or seek immediate medical care if:  ? · You cough up dark brown or bloody mucus (sputum). ? · You have new or worse trouble breathing. ? · You are dizzy or lightheaded, or you feel like you may faint. ? Watch closely for changes in your health, and be sure to contact your doctor if:  ? · You have a new or higher fever. ? · You are coughing more deeply or more often. ? · You are not getting better after 2 days (48 hours). ? · You do not get better as expected. Where can you learn more? Go to http://shantell-leona.info/. Enter [id number] in the search box to learn more about \"Pneumonia: Care Instructions. \"  Current as of: May 12, 2017  Content Version: 11.4  © 3773-4060 [employer]. Care instructions adapted under license by [employer] (which disclaims liability or warranty for this information).  If you have questions about a medical condition or this instruction, always ask your healthcare professional. [place] [address] any warranty or liability for your use of this information. Learning About COPD and How to Prevent Lung Infections  How do lung infections affect COPD? Lung infections like pneumonia and acute bronchitis are common causes of COPD flare-ups. And people who have COPD are more likely to get these lung infections, especially if they smoke. When you have COPD, it is important to know the symptoms of pneumonia and acute bronchitis and call your doctor if you have them. Symptoms include:  · A cough that brings up more mucus than usual.  · Fever. · Shortness of breath. What can you do to prevent these infections? Stay healthy  · Get a flu shot every year. · Get a pneumococcal vaccine shot. If you have had one before, ask your doctor whether you need another dose. Two different types of pneumococcal vaccines are recommended for people ages 72 and older. · If you must be around people with colds or the flu, wash your hands often. · Do not smoke. This is the most important step you can take to prevent more damage to your lungs. If you need help quitting, talk to your doctor about stop-smoking programs and medicines. These can increase your chances of quitting for good. · Avoid secondhand smoke, air pollution, and high altitudes. Also avoid cold, dry air and hot, humid air. Stay at home with your windows closed when air pollution is bad. Exercise and eat well  · If your doctor recommends it, get more exercise. Walking is a good choice. Bit by bit, increase the amount you walk every day. Try for at least 30 minutes on most days of the week. · Eat regular, well-balanced meals. Eating right keeps your energy levels up and helps your body fight infection. · Get plenty of rest and sleep. Follow-up care is a key part of your treatment and safety.  Be sure to make and go to all appointments, and call your doctor if you are having problems. It's also a good idea to know your test results and keep a list of the medicines you take. Where can you learn more? Go to http://shantell-leona.info/. Enter P511 in the search box to learn more about \"Learning About COPD and How to Prevent Lung Infections. \"  Current as of: May 12, 2017  Content Version: 11.4  © 7475-5575 [employer]. Care instructions adapted under license by [employer] (which disclaims liability or warranty for this information). If you have questions about a medical condition or this instruction, always ask your healthcare professional. [place] [address] any warranty or liability for your use of this information. Patient armband removed and shredded      DISCHARGE SUMMARY from Nurse    PATIENT INSTRUCTIONS:    After general anesthesia or intravenous sedation, for 24 hours or while taking prescription Narcotics:  · Limit your activities  · Do not drive and operate hazardous machinery  · Do not make important personal or business decisions  · Do  not drink alcoholic beverages  · If you have not urinated within 8 hours after discharge, please contact your surgeon on call. Report the following to your surgeon:  · Excessive pain, swelling, redness or odor of or around the surgical area  · Temperature over 100.5  · Nausea and vomiting lasting longer than 4 hours or if unable to take medications  · Any signs of decreased circulation or nerve impairment to extremity: change in color, persistent  numbness, tingling, coldness or increase pain  · Any questions    What to do at Home:  Recommended activity: Activity as tolerated    If you experience any of the following symptoms Nausea, vomiting, diarrhea, fever greater than 100.5, dizziness, severe headache, shortness of breath, chest pain, increased pain, please follow up with PCP.     *  Please give a list of your current medications to your Primary Care Provider. *  Please update this list whenever your medications are discontinued, doses are      changed, or new medications (including over-the-counter products) are added. *  Please carry medication information at all times in case of emergency situations. These are general instructions for a healthy lifestyle:    No smoking/ No tobacco products/ Avoid exposure to second hand smoke  Surgeon General's Warning:  Quitting smoking now greatly reduces serious risk to your health. Obesity, smoking, and sedentary lifestyle greatly increases your risk for illness    A healthy diet, regular physical exercise & weight monitoring are important for maintaining a healthy lifestyle    You may be retaining fluid if you have a history of heart failure or if you experience any of the following symptoms:  Weight gain of 3 pounds or more overnight or 5 pounds in a week, increased swelling in our hands or feet or shortness of breath while lying flat in bed. Please call your doctor as soon as you notice any of these symptoms; do not wait until your next office visit. Recognize signs and symptoms of STROKE:    F-face looks uneven    A-arms unable to move or move unevenly    S-speech slurred or non-existent    T-time-call 911 as soon as signs and symptoms begin-DO NOT go       Back to bed or wait to see if you get better-TIME IS BRAIN. Warning Signs of HEART ATTACK     Call 911 if you have these symptoms:   Chest discomfort. Most heart attacks involve discomfort in the center of the chest that lasts more than a few minutes, or that goes away and comes back. It can feel like uncomfortable pressure, squeezing, fullness, or pain.  Discomfort in other areas of the upper body. Symptoms can include pain or discomfort in one or both arms, the back, neck, jaw, or stomach.  Shortness of breath with or without chest discomfort.  Other signs may include breaking out in a cold sweat, nausea, or lightheadedness.   Don't wait more than five minutes to call 911 - MINUTES MATTER! Fast action can save your life. Calling 911 is almost always the fastest way to get lifesaving treatment. Emergency Medical Services staff can begin treatment when they arrive -- up to an hour sooner than if someone gets to the hospital by car. The discharge information has been reviewed with the patient. The patient verbalized understanding. Discharge medications reviewed with the patient and appropriate educational materials and side effects teaching were provided.   ___________________________________________________________________________________________________________________________________

## 2018-02-09 NOTE — PROGRESS NOTES
Problem: Self Care Deficits Care Plan (Adult)  Goal: *Acute Goals and Plan of Care (Insert Text)  Occupational Therapy Goals  Initiated 2/9/2018 within 7 day(s). 1.  Patient will perform grooming with modified independence   2. Patient will perform lower body dressing with modified independence. 3.  Patient will perform toileting with modified independence. 4.  Patient will perform toilet transfers with modified independence. 5.  Patient will participate in upper extremity therapeutic exercise/activities with modified independence for 10 minutes. 6.  Patient will utilize energy conservation techniques during functional activities with verbal cues. Outcome: Progressing Towards Goal  Occupational Therapy EVALUATION    Patient: Oralia Benavides (26 y.o. female)  Date: 2/9/2018  Primary Diagnosis: Pneumonia  CAP (community acquired pneumonia)        Precautions:   Fall, Other (comment) (O2 2L)    ASSESSMENT :  Based on the objective data described below, the patient presents with decreased ADL and functional transfer safety and independence. BIlat UE AROM WFL, and able to complete most ADL with CGA and heavy vc for safety. Pt is impulsive, confused, mildly unsteady with fww and frequently wraps herslef in her O2 lines. Sp02 93 seated prior to activity, and dips down to 83 following LB dressing seated EOB with significant cues to breathe in through her nose recovers to 92%. Recommend SNF at this time. Patient will benefit from skilled intervention to address the above impairments.   Patients rehabilitation potential is considered to be good   Factors which may influence rehabilitation potential include:   [x]             None noted  []             Mental ability/status  []             Medical condition  []             Home/family situation and support systems  []             Safety awareness  []             Pain tolerance/management  []             Other:      PLAN :  Recommendations and Planned Interventions:  [x]               Self Care Training                  [x]        Therapeutic Activities  [x]               Functional Mobility Training    [x]        Cognitive Retraining  [x]               Therapeutic Exercises           [x]        Endurance Activities  [x]               Balance Training                   []        Neuromuscular Re-Education  []               Visual/Perceptual Training     [x]   Home Safety Training  [x]               Patient Education                 [x]        Family Training/Education  []               Other (comment):    Frequency/Duration: Patient will be followed by occupational therapy 1-2 times per day/4-7 days per week to address goals. Discharge Recommendations: [facility] Almanza  Further Equipment Recommendations for Discharge: N/A     Barriers to Learning/Limitations: None  Compensate with: visual, verbal, tactile, kinesthetic cues/model     PATIENT COMPLEXITY      Eval Complexity: History: LOW Complexity : Brief history review ; Examination: LOW Complexity : 1-3 performance deficits relating to physical, cognitive , or psychosocial skils that result in activity limitations and / or participation restrictions ; Decision Making:LOW Complexity : No comorbidities that affect functional and no verbal or physical assistance needed to complete eval tasks  Assessment: low Complexity     G-CODES:     Self Care  Current  CJ= 20-39%   Goal  CI= 1-19%. The severity rating is based on the Level of Assistance required for Functional Mobility and ADLs. SUBJECTIVE:   Patient stated my  needs help but my son does it. 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    MOORE (Dyspnea on Exertion) 4-19-02    echo: +mild LVH, PZ08-62% w/ diastolic dysfxn    Glaucoma     HCAP (healthcare-associated pneumonia) 03/24/2017    Hemorrhoids 6-6-06    Hyperlipidemia 5/17/2011    Hypertension 4-16-02    LBP (low back pain) 4-13-04    Low back pain radiating to both legs 10/1/2014    Lumbago     Lumbar disc herniation 10/1/2014    Lumbar facet arthropathy 10/1/2014    Lumbosacral radiculopathy at L5 10/1/2014    Lumbosacral radiculopathy at S1 10/1/2014    Microscopic hematuria     OA (osteoarthritis)     Overactive bladder 5/17/2011    RLS (restless legs syndrome) 1/17/2013    Sciatica     Shortness of breath     Spinal stenosis, lumbar region, without neurogenic claudication     Spondylolisthesis of lumbar region 10/1/2014    Spondylolisthesis, grade 1 10/1/2014    Stress urinary incontinence     Syncope     -MRI brain    Urinary tract infection, site not specified      Past Surgical History:   Procedure Laterality Date    HX APPENDECTOMY      HX CHOLECYSTECTOMY      HX HYSTERECTOMY      (+)DUB    HX POLYPECTOMY      [place] COLONOSCOPY FLX DX W/COLLJ SPEC WHEN PFRMD  6-16-06    normal, Dr Sushil Abarca    [place] COLONOSCOPY FLX DX W/COLLJ [address] PFRMD      (+)polyp= tubular adenoma     Prior Level of Function/Home Situation: Independent  Home Situation  Home Environment: Private residence  # Steps to Enter: 4  Rails to Enter: No  One/Two Story Residence: One story  Living Alone: No  Support Systems: Spouse/Significant Other/Partner  Patient Expects to be Discharged to[de-identified] Private residence  Current DME Used/Available at Home: Cane, straight  []  Right hand dominant   []  Left hand dominant  Cognitive/Behavioral Status:  Neurologic State: Alert  Orientation Level: Disoriented to time  Cognition: Impulsive;Poor safety awareness       Skin: no noted concerns    Edema: no noted concerns    Vision/Perceptual:    Tracking:  (no noted concerns) Coordination:  Coordination: Generally decreased, functional (BUE)            Balance:  Sitting: Intact  Standing: Impaired;Pull to stand  Standing - Static: Good  Standing - Dynamic : Fair (PLUS)    Strength:    Strength: Generally decreased, functional (BUE)                Tone & Sensation:    Tone: Normal (BUE)  Sensation: Intact (BUE)                      Range of Motion:    AROM: Within functional limits (BUE)                         Functional Mobility and Transfers for ADLs:  Bed Mobility:  Rolling: Independent     Sit to Supine: Stand-by asssistance  Scooting: Independent  Transfers:  Sit to Stand: Supervision (cues to push up on armrest)     Bed to Chair: Supervision          Toilet Transfer : Contact guard assistance (vc for safety)                ADL Assessment:  Feeding: Setup    Oral Facial Hygiene/Grooming: Setup    Bathing: Minimum assistance    Upper Body Dressing: Setup    Lower Body Dressing: Contact guard assistance    Toileting: Contact guard assistance                ADL Intervention:         LB dressing, toileting with VC for safety, and CGA as needed         Therapeutic Exercise:  None this session    Pain:  Pt reports 0/10 pain or discomfort prior to treatment.    Pt reports 0/10 pain or discomfort post treatment. Activity Tolerance:   poor    Please refer to the flowsheet for vital signs taken during this treatment. After treatment:   [] Patient left in no apparent distress sitting up in chair  [x] Patient left in no apparent distress in bed  [x] Call bell left within reach  [x] Nursing notified  [] Caregiver present  [] Bed alarm activated    COMMUNICATION/EDUCATION:   [] Home safety education was provided and the patient/caregiver indicated understanding. [x] Patient/family have participated as able in goal setting and plan of care. [] Patient/family agree to work toward stated goals and plan of care.   [] Patient understands intent and goals of therapy, but is neutral about his/her participation. [] Patient is unable to participate in goal setting and plan of care.     Thank you for this referral.  Robyn Hurtado OT  Time Calculation: 20 mins

## 2018-02-09 NOTE — PROGRESS NOTES
Patient has a recommended SNF. Patient says she will only go home, not to any facility.   At this time she is having an oxygen qualifier walk test.

## 2018-02-09 NOTE — PROGRESS NOTES
Problem: Mobility Impaired (Adult and Pediatric)  Goal: *Acute Goals and Plan of Care (Insert Text)  Physical Therapy Goals  Initiated 2/9/2018 and to be accomplished within 5 day(s)  1. Patient will move from supine to sit and sit to supine , scoot up and down and roll side to side in bed with independence. 2.  Patient will transfer from bed to chair and chair to bed with modified independence using the least restrictive device. 3.  Patient will perform sit to stand with modified independence. 4.  Patient will ambulate with modified independence for 300 feet with the least restrictive device. 5.  Patient will ascend/descend 4 stairs with no handrail(s) with minimal assistance/contact guard assist.  physical Therapy EVALUATION    Patient: Aris Dominique (85 y.o. female)  Date: 2/9/2018  Primary Diagnosis: Pneumonia  CAP (community acquired pneumonia)        Precautions:  Fall, Other (comment) (O2 2L)    ASSESSMENT :  Based on the objective data described below, the patient presents with decreased activity tolerance, decreased safety awareness. Pt. Is impulsive and required multiple safety cues for following directions with walker management and while managing 02 tubing. Pt. At risk for falls. p02 levels pre gait at 90% and increased to 94% with deep breathing. Post gait P02 levels at 89%, recovered to 94% with rest and pursed lip breathing. Second attempt for gait PO2 at 90%-91%. SOB noted. Pt. States that she needed help from her  to negotiate the 4 steps/ no rails for home entry and that she has not been able to leave her home for quite a while since her  had the stroke due to her inability of safely negotiating steps alone. Discussed safety for home exit in case of fire and pt. Verbalized that she and her  would have difficulty leaving their home. Recommend SNF for short term rehab to work on steps, activity tolerance, safety training and gait.     Patient will benefit from skilled intervention to address the above impairments. Patients rehabilitation potential is considered to be Good  Factors which may influence rehabilitation potential include:   []         None noted  []         Mental ability/status  []         Medical condition  [x]         Home/family situation and support systems  [x]         Safety awareness  []         Pain tolerance/management  []         Other:      PLAN :  Recommendations and Planned Interventions:  []           Bed Mobility Training             [x]    Neuromuscular Re-Education  [x]           Transfer Training                   []    Orthotic/Prosthetic Training  [x]           Gait Training                          []    Modalities  [x]           Therapeutic Exercises          []    Edema Management/Control  [x]           Therapeutic Activities            [x]    Patient and Family Training/Education  []           Other (comment):    Frequency/Duration: Patient will be followed by physical therapy 1-2 times per day/4-7 days per week to address goals. Discharge Recommendations: [facility]  Further Equipment Recommendations for Discharge: rolling walker     SUBJECTIVE:   Patient stated i am ok. 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[facility] [address] 10/1/2014    MOORE (Dyspnea on Exertion) 4-19-02    echo: +mild LVH, ET55-35% w/ diastolic dysfxn    Glaucoma     HCAP (healthcare-associated pneumonia) 03/24/2017    Hemorrhoids 6-6-06    Hyperlipidemia 5/17/2011    Hypertension 4-16-02    LBP (low back pain) 4-13-04    Low back pain radiating to both legs 10/1/2014    Lumbago     Lumbar disc herniation 10/1/2014    Lumbar facet arthropathy 10/1/2014    Lumbosacral radiculopathy at L5 10/1/2014    Lumbosacral radiculopathy at S1 10/1/2014    Microscopic hematuria     OA (osteoarthritis)     Overactive bladder 5/17/2011    RLS (restless legs syndrome) 1/17/2013    Sciatica     Shortness of breath     Spinal stenosis, lumbar region, without neurogenic claudication     Spondylolisthesis of lumbar region 10/1/2014    Spondylolisthesis, grade 1 10/1/2014    Stress urinary incontinence     Syncope     -MRI brain    Urinary tract infection, site not specified      Past Surgical History:   Procedure Laterality Date    HX APPENDECTOMY      HX CHOLECYSTECTOMY      HX HYSTERECTOMY      (+)DUB    HX POLYPECTOMY      [place] COLONOSCOPY FLX DX W/COLLJ SPEC WHEN PFRMD  6-16-06    normal, Dr Fawn Matta FLX DX W/COLLJ SPEC WHEN PFRMD      (+)polyp= tubular adenoma     Barriers to Learning/Limitations: yes;  altered mental status (i.e.Sedation, Confusion)  Compensate with: visual, verbal, tactile, kinesthetic cues/model    Eval Complexity: History: MEDIUM  Complexity : 1-2 comorbidities / personal factors will impact the outcome/ POC Exam:MEDIUM Complexity : 3 Standardized tests and measures addressing body structure, function, activity limitation and / or participation in recreation  Presentation: LOW Complexity : Stable, uncomplicated  Clinical Decision Making:Low Complexity based on functional testing Overall Complexity:LOW     GCODES(GP):  Mobility  Current  CI= 1-19%   Goal  CI= 1-19%. The severity rating is based on the Other based on functional testing and ADL score    Prior Level of Function/Home Situation: pt. Lives with spouse who is disabled. Pt. Is unable to safely exit home.   Recommend HR bilateral for safe exti/entry to home  Home Situation  Home Environment: Private residence  # Steps to Enter: 4  Rails to Enter: No  One/Two Story Residence: One story  Living Alone: No  Support Systems: Spouse/Significant Other/Partner  Patient Expects to be Discharged to[de-identified] Private residence  Current DME Used/Available at Home: amber Weber  Critical Behavior:  Neurologic State: Alert  Orientation Level: Disoriented to time;Oriented to person;Oriented to place        Psychosocial  Patient Behaviors: Calm; Cooperative   Strength:    Strength: Within functional limits   Tone & Sensation:   Tone: Normal   Sensation: Intact     Range Of Motion:  AROM: Within functional limits     Functional Mobility:  Bed Mobility:  Rolling: Independent     Sit to Supine: Stand-by asssistance  Scooting: Independent  Transfers:  Sit to Stand: Supervision (cues to push up on armrest)  Stand to Sit: Supervision (poor safety awareness)        Bed to Chair: Supervision  Balance:   Sitting: Intact  Standing: Impaired;Pull to stand  Standing - Static: Good  Standing - Dynamic : Fair (PLUS)  Ambulation/Gait Training:  Distance (ft): 30 Feet (ft) (35')  Assistive Device: Walker, rolling  Ambulation - Level of Assistance: Supervision;Contact guard assistance     Gait Description (WDL): Exceptions to WDL  Base of Support: Narrowed     Activity Tolerance:   Fair-  Please refer to the flowsheet for vital signs taken during this treatment. After treatment:   []         Patient left in no apparent distress sitting up in chair  [x]         Patient left in no apparent distress in bed  [x]         Call bell left within reach  [x]         Nursing and MD notified re: P02 levels  []         Caregiver present  []         Bed alarm activated    COMMUNICATION/EDUCATION:   []         Fall prevention education was provided and the patient/caregiver indicated understanding. [x]         Patient/family have participated as able in goal setting and plan of care. []         Patient/family agree to work toward stated goals and plan of care. []         Patient understands intent and goals of therapy, but is neutral about his/her participation.   []         Patient is unable to participate in goal setting and plan of care.     Thank you for this referral.  Landon Lay, PT   Time Calculation: 27 mins

## 2018-02-09 NOTE — PROGRESS NOTES
I visited briefly with Ms. Clare Groves, and I have her a [address] and a greeting card. I also offered her support. She had no immediate spiritual concerns to share with me today. I invited her to call us at any time if she needs any spiritual or emotional support while she is here.      [address] [facility], M.Div, CPE Resident   Pager: [phone]  Phone: [phone]

## 2018-02-10 ENCOUNTER — HOME HEALTH ADMISSION (OUTPATIENT)
Dept: HOME HEALTH SERVICES | Facility: HOME HEALTH | Age: 83
End: 2018-02-10
Payer: MEDICARE

## 2018-02-10 VITALS
RESPIRATION RATE: 19 BRPM | SYSTOLIC BLOOD PRESSURE: 154 MMHG | OXYGEN SATURATION: 91 % | WEIGHT: 120 LBS | TEMPERATURE: 97.1 F | DIASTOLIC BLOOD PRESSURE: 66 MMHG | HEART RATE: 54 BPM | BODY MASS INDEX: 19.99 KG/M2 | HEIGHT: 65 IN

## 2018-02-10 LAB
ANION GAP SERPL CALC-SCNC: 6 MMOL/L (ref 3–18)
BASOPHILS # BLD: 0 K/UL (ref 0–0.1)
BASOPHILS NFR BLD: 0 % (ref 0–2)
BUN SERPL-MCNC: 15 MG/DL (ref 7–18)
BUN/CREAT SERPL: 30 (ref 12–20)
CALCIUM SERPL-MCNC: 8.7 MG/DL (ref 8.5–10.1)
CHLORIDE SERPL-SCNC: 98 MMOL/L (ref 100–108)
CO2 SERPL-SCNC: 33 MMOL/L (ref 21–32)
CREAT SERPL-MCNC: 0.5 MG/DL (ref 0.6–1.3)
DIFFERENTIAL METHOD BLD: ABNORMAL
EOSINOPHIL # BLD: 0 K/UL (ref 0–0.4)
EOSINOPHIL NFR BLD: 0 % (ref 0–5)
ERYTHROCYTE [DISTWIDTH] IN BLOOD BY AUTOMATED COUNT: 13.5 % (ref 11.6–14.5)
GLUCOSE BLD STRIP.AUTO-MCNC: 104 MG/DL (ref 70–110)
GLUCOSE BLD STRIP.AUTO-MCNC: 139 MG/DL (ref 70–110)
GLUCOSE SERPL-MCNC: 140 MG/DL (ref 74–99)
HCT VFR BLD AUTO: 37 % (ref 35–45)
HGB BLD-MCNC: 12.5 G/DL (ref 12–16)
LYMPHOCYTES # BLD: 1.5 K/UL (ref 0.9–3.6)
LYMPHOCYTES NFR BLD: 22 % (ref 21–52)
MCH RBC QN AUTO: 30.3 PG (ref 24–34)
MCHC RBC AUTO-ENTMCNC: 33.8 G/DL (ref 31–37)
MCV RBC AUTO: 89.6 FL (ref 74–97)
MONOCYTES # BLD: 0.6 K/UL (ref 0.05–1.2)
MONOCYTES NFR BLD: 10 % (ref 3–10)
NEUTS SEG # BLD: 4.6 K/UL (ref 1.8–8)
NEUTS SEG NFR BLD: 68 % (ref 40–73)
PLATELET # BLD AUTO: 171 K/UL (ref 135–420)
PMV BLD AUTO: 11 FL (ref 9.2–11.8)
POTASSIUM SERPL-SCNC: 3.9 MMOL/L (ref 3.5–5.5)
RBC # BLD AUTO: 4.13 M/UL (ref 4.2–5.3)
SODIUM SERPL-SCNC: 137 MMOL/L (ref 136–145)
WBC # BLD AUTO: 6.7 K/UL (ref 4.6–13.2)

## 2018-02-10 PROCEDURE — 94640 AIRWAY INHALATION TREATMENT: CPT

## 2018-02-10 PROCEDURE — 94760 N-INVAS EAR/PLS OXIMETRY 1: CPT

## 2018-02-10 PROCEDURE — 74011250637 HC RX REV CODE- 250/637: Performed by: INTERNAL MEDICINE

## 2018-02-10 PROCEDURE — 97530 THERAPEUTIC ACTIVITIES: CPT

## 2018-02-10 PROCEDURE — 74011636637 HC RX REV CODE- 636/637: Performed by: PHYSICIAN ASSISTANT

## 2018-02-10 PROCEDURE — 82962 GLUCOSE BLOOD TEST: CPT

## 2018-02-10 PROCEDURE — 74011000250 HC RX REV CODE- 250: Performed by: INTERNAL MEDICINE

## 2018-02-10 PROCEDURE — 74011250637 HC RX REV CODE- 250/637: Performed by: PHYSICIAN ASSISTANT

## 2018-02-10 PROCEDURE — 85025 COMPLETE CBC W/AUTO DIFF WBC: CPT | Performed by: PHYSICIAN ASSISTANT

## 2018-02-10 PROCEDURE — 80048 BASIC METABOLIC PNL TOTAL CA: CPT | Performed by: PHYSICIAN ASSISTANT

## 2018-02-10 PROCEDURE — 36415 COLL VENOUS BLD VENIPUNCTURE: CPT | Performed by: PHYSICIAN ASSISTANT

## 2018-02-10 RX ORDER — AZITHROMYCIN 500 MG/1
500 TABLET, FILM COATED ORAL DAILY
Qty: 2 TAB | Refills: 0 | Status: SHIPPED | OUTPATIENT
Start: 2018-02-10 | End: 2018-04-16 | Stop reason: ALTCHOICE

## 2018-02-10 RX ORDER — PREDNISONE 20 MG/1
40 TABLET ORAL
Qty: 7 TAB | Refills: 0 | Status: SHIPPED | OUTPATIENT
Start: 2018-02-11 | End: 2018-04-16 | Stop reason: ALTCHOICE

## 2018-02-10 RX ORDER — FAMOTIDINE 20 MG/1
20 TABLET, FILM COATED ORAL
Qty: 6 TAB | Refills: 0 | Status: SHIPPED | OUTPATIENT
Start: 2018-02-10 | End: 2018-08-10

## 2018-02-10 RX ORDER — IPRATROPIUM BROMIDE AND ALBUTEROL SULFATE 2.5; .5 MG/3ML; MG/3ML
3 SOLUTION RESPIRATORY (INHALATION)
Status: DISCONTINUED | OUTPATIENT
Start: 2018-02-10 | End: 2018-02-10 | Stop reason: HOSPADM

## 2018-02-10 RX ORDER — LISINOPRIL 5 MG/1
5 TABLET ORAL DAILY
Qty: 30 TAB | Refills: 0 | Status: SHIPPED | OUTPATIENT
Start: 2018-02-10 | End: 2018-07-09 | Stop reason: ALTCHOICE

## 2018-02-10 RX ORDER — IPRATROPIUM BROMIDE AND ALBUTEROL SULFATE 2.5; .5 MG/3ML; MG/3ML
3 SOLUTION RESPIRATORY (INHALATION)
Qty: 30 NEBULE | Refills: 0 | Status: SHIPPED | OUTPATIENT
Start: 2018-02-10 | End: 2018-07-18

## 2018-02-10 RX ORDER — NEBULIZER AND COMPRESSOR
1 EACH MISCELLANEOUS
Qty: 1 EACH | Refills: 0 | Status: SHIPPED | OUTPATIENT
Start: 2018-02-10

## 2018-02-10 RX ORDER — FLUTICASONE FUROATE AND VILANTEROL 100; 25 UG/1; UG/1
1 POWDER RESPIRATORY (INHALATION) DAILY
Qty: 1 INHALER | Refills: 0 | Status: SHIPPED | OUTPATIENT
Start: 2018-02-11 | End: 2018-07-09 | Stop reason: ALTCHOICE

## 2018-02-10 RX ADMIN — PREDNISONE 40 MG: 20 TABLET ORAL at 09:45

## 2018-02-10 RX ADMIN — METOPROLOL TARTRATE 25 MG: 25 TABLET ORAL at 09:42

## 2018-02-10 RX ADMIN — TAMSULOSIN HYDROCHLORIDE 0.4 MG: 0.4 CAPSULE ORAL at 09:39

## 2018-02-10 RX ADMIN — IPRATROPIUM BROMIDE AND ALBUTEROL SULFATE 3 ML: 2.5; .5 SOLUTION RESPIRATORY (INHALATION) at 08:06

## 2018-02-10 RX ADMIN — BUDESONIDE 500 MCG: 0.5 INHALANT RESPIRATORY (INHALATION) at 08:04

## 2018-02-10 RX ADMIN — AMLODIPINE BESYLATE 10 MG: 10 TABLET ORAL at 09:40

## 2018-02-10 RX ADMIN — GABAPENTIN 300 MG: 300 CAPSULE ORAL at 09:40

## 2018-02-10 RX ADMIN — FLUTICASONE FUROATE AND VILANTEROL TRIFENATATE 1 PUFF: 100; 25 POWDER RESPIRATORY (INHALATION) at 08:06

## 2018-02-10 RX ADMIN — PAROXETINE HYDROCHLORIDE 30 MG: 20 TABLET, FILM COATED ORAL at 09:40

## 2018-02-10 RX ADMIN — Medication 1000 MG: at 09:41

## 2018-02-10 RX ADMIN — ARFORMOTEROL TARTRATE 15 MCG: 15 SOLUTION RESPIRATORY (INHALATION) at 08:04

## 2018-02-10 NOTE — PROGRESS NOTES
Care Management Interventions  PCP Verified by CM: Yes (Dr. Kranthi Bautista)  Mode of Transport at Discharge: Other (see comment)  Transition of Care Consult (CM Consult): [address]: Yes  Physical Therapy Consult: Yes  Occupational Therapy Consult: Yes  Current Support Network: Own Home, Lives with Spouse, Family Lives Nearby (pt's son and daughter provide support as needed)  Confirm Follow Up Transport: Family  Plan discussed with Pt/Family/Caregiver: Yes  Freedom of Choice Offered: Yes  Discharge Location  Discharge Placement: Home with home health  Referral for home health placed in que and liaison contacted foc placed on this pt's chart.

## 2018-02-10 NOTE — PROGRESS NOTES
This writer met with this pt with her daughter and son-in-law present, this writer discussed the recommendation of pt being placed in SNF, family and pt continued to refused this recommendation, but family after explaining the purpose for Home Health agreed on [address] with [address] [place] [address], [address] [place] [address] placed on this pt's chart,after being signed. New home health order for home Nebulizer. Request for home delivery of this pt's Nebulizer, all necessary paperwork faxed to [facility]. Pt's daughter and son-in-law will transport this pt home. Daughter Liliana Muñoz [phone].

## 2018-02-10 NOTE — PROGRESS NOTES
The patient was discharged home this afternoon;the pt was wheeled to the front of the building accompanied by her daughter and son in law. Pt has an oxygen concentraror and theflow is set at 3l/mn.

## 2018-02-10 NOTE — PROGRESS NOTES
Progress Note  Pulmonary, Critical Care, and Sleep Medicine    Name: Leeann Wilson MRN: [id number]   : 1933 Hospital: [facility]   Date: 2/10/2018        IMPRESSION:   · Shortness of breath due to COPD exacerbation and pneumonia, community acquired, much improved. Pulmonary HTN possible due to long standing COPD and hypoxemia  · COPD, pre bronchodilator FEV1 51% with BP response in   · Exercise Hypoxia requiring supplemental O2  · Acute febrile illness Rapid flu negative  · Serology positive for RA, KANA, SS A and SS B  · Chronic back pain  · HTN  · Glaucoma  · DM  · Depression  · Cachexia possibly due to combination of COPD and ?depression and poor po intake  · Noncompliance with MD follow up        PLAN:   · LABA/ICS to start today, inhaler education prior to discharge  · Prn Albuterol  · Home Health with O2 and nebulizer as pt having difficulty with MDI due to arthritis  · Will refer to pulmonary rehab once personal and family issues stabilize  · Will arrange for follow up with Dr. Paula Lucas  · PFT's to be done in the office     Subjective/Interval History:   I have reviewed the flowsheet and previous days notes. Reviewed interval history. Discussed management with nursing staff. Feels much better today, wants to go home. No new respiratory complaints      ROS:Pertinent items are noted in HPI. Orders reviewed including medications. Changes made if indicated. Telemetry monitor reviewed at the bedside.   Objective:   Vital Signs:    Visit Vitals    /71 (BP 1 Location: Right arm, BP Patient Position: Sitting)    Pulse (!) 51    Temp 97.7 °F (36.5 °C)    Resp 18    Ht 5' 5\" (1.651 m)    Wt 54.4 kg (120 lb)    SpO2 92%    Breastfeeding No    BMI 19.97 kg/m2       O2 Device: Nasal cannula   O2 Flow Rate (L/min): 3 l/min   Temp (24hrs), Av.4 °F (36.3 °C), Min:96.6 °F (35.9 °C), Max:97.9 °F (36.6 °C)       Intake/Output:   Last shift:         Last 3 shifts:    No intake or output data in the 24 hours ending 02/10/18 1121     Physical Exam:    General:  Alert, cooperative, in no distress, appears stated age. Head:  Normocephalic, without obvious abnormality, atraumatic. Eyes:  ANicteric, PERRL,   Nose: Nares normal.  Mucosa normal. No drainage or sinus tenderness. Throat: Lips, mucosa, and tongue normal. NO Thrush   Neck: Supple, symmetrical, trachea midline, no adenopathy, thyroid: no enlargment/tenderness/nodules    Back:   Symmetric    Lungs:   Bilateral auscultation no rales or wheezes   Chest wall:  No tenderness or deformity. NO intercostal retractions   Heart:  Regular rate and rhythm, S1, S2 normal, no murmur, click, rub or gallop. Abdomen:   Soft, non-tender. Bowel sounds normal. No masses,  No organomegaly. NO paradoxical motion   Extremities: normal, atraumatic, no cyanosis or edema. Pulses: 2+ and symmetric all extremities. Skin: Skin color, texture, turgor normal. No rashes or lesions. NO clubbing   Lymph nodes: Cervical nodes normal.   Neurologic: Grossly nonfocal      :        Devices:             Drips:    DATA:  Labs:  Recent Labs      02/10/18   0354  02/09/18   0434  02/07/18   1300   WBC  6.7  4.6  4.2*   HGB  12.5  12.2  13.8   HCT  37.0  37.4  43.3   PLT  171  148  154     Recent Labs      02/10/18   0354  02/09/18   0434  02/08/18   1239  02/07/18   1300   NA  137  134*  138  139   K  3.9  4.3  4.4  4.4   CL  98*  96*  95*  99*   CO2  33*  32  35*  39*   GLU  140*  157*  165*  162*   BUN  15  10  8  18   CREA  0.50*  0.33*  0.46*  0.71   CA  8.7  8.6  8.5  9.3   ALB   --    --    --   3.5   SGOT   --    --    --   31   ALT   --    --    --   22     No results for input(s): PH, PCO2, PO2, HCO3, FIO2 in the last 72 hours.     Imaging:  []        I have personally reviewed the patients radiographs and reports  []         []        No change from prior, tubes and lines in adequate position  []        Improved   []        Worsening  High complexity decision making was performed during this consultation and evaluation.     Leon Mason MD

## 2018-02-10 NOTE — DISCHARGE SUMMARY
[facility]  [facility]       Patient: Giovanny Ferrer Age: 80 y.o. : 1933 MR#: [id number] [id number]: [id number]  PCP on record: Patt James MD  Admit date: 2018  Discharge date: 2/10/2018    Disposition:    []Home   [x]Home with Home Health   []SNF/NH   []Rehab   []Home with family   []Alternate Facility:____________________    Discharge Diagnoses:                             1. CAP (community acquired pneumonia)  2. AMS/ Metabolic encephalopathy: resolved  3. HTN  4. DDD/Restless leg syndrome  5. COPD exacerbation   6. Hx of tobacco abuse  7. Hyperglycemia r/t steroids  8. Bradycardia  9. Hypoxia requiring supplemental O2    Discharge Medications:     Current Discharge Medication List      START taking these medications    Details   fluticasone-vilanterol (BREO ELLIPTA) 100-25 mcg/dose inhaler Take 1 Puff by inhalation daily. Qty: 1 Inhaler, Refills: 0      albuterol-ipratropium (DUO-NEB) 2.5 mg-0.5 mg/3 ml nebu 3 mL by Nebulization route every six (6) hours as needed. Qty: 30 Nebule, Refills: 0      predniSONE (DELTASONE) 20 mg tablet Take 2 Tabs by mouth daily (with breakfast). For 1 day, then 1 tab by mouth x 3 days, then 1/2 tabs by mouth for 3 days. Qty: 7 Tab, Refills: 0      famotidine (PEPCID) 20 mg tablet Take 1 Tab by mouth nightly. Qty: 6 Tab, Refills: 0      lisinopril (PRINIVIL, ZESTRIL) 5 mg tablet Take 1 Tab by mouth daily. Qty: 30 Tab, Refills: 0      azithromycin (ZITHROMAX) 500 mg tab Take 1 Tab by mouth daily. Qty: 2 Tab, Refills: 0         CONTINUE these medications which have NOT CHANGED    Details   rOPINIRole (REQUIP) 1 mg tablet Take 0.5-1 Tabs by mouth nightly as needed (RLS) for up to 90 days. Qty: 90 Tab, Refills: 5    Associated Diagnoses: Sciatica, unspecified laterality; Low back pain radiating to both legs;  Polyneuropathy; RLS (restless legs syndrome)      tamsulosin (FLOMAX) 0.4 mg capsule Take 0.4 mg by mouth daily.      mirtazapine (REMERON) 30 mg tablet Take 1 Tab by mouth nightly. Qty: 30 Tab, Refills: 2      HYDROmorphone (DILAUDID) 4 mg tablet 1/2 to one tab up to three times per day prn severe pain  Qty: 90 Tab, Refills: 0      gabapentin (NEURONTIN) 300 mg capsule Take 1 Cap by mouth three (3) times daily. Qty: 270 Cap, Refills: 1      PARoxetine (PAXIL) 30 mg tablet Take 1 Tab by mouth daily. Qty: 90 Tab, Refills: 3      naloxone 4 mg/actuation spry 4 mg by Nasal route as needed. Indications: OPIATE-INDUCED RESPIRATORY DEPRESSION  Qty: 1 Package, Refills: 0      amLODIPine (NORVASC) 10 mg tablet Take 1 Tab by mouth daily. Qty: 30 Tab, Refills: 0      omega 3-dha-epa-fish oil (FISH OIL) 100-160-1,000 mg cap Take 1,000 mg by mouth daily. LUMIGAN 0.01 % ophthalmic drops Administer 1 Drop to both eyes nightly. ascorbic acid (VITAMIN C) 1,000 mg tablet Take 1,000 mg by mouth daily. traZODone (DESYREL) 100 mg tablet Take 100 mg by mouth nightly. Patient takes one and half tabs at bedtime      cycloSPORINE (RESTASIS) 0.05 % ophthalmic emulsion Administer 1 Drop to both eyes two (2) times a day. XT-IF-QA-Fe-Min-Lycopen-Lutein (CENTRUM) 0.4-162-18 mg Tab Take 1 Tab by mouth daily. simvastatin (ZOCOR) 20 mg tablet Take 1 Tab by mouth nightly. Qty: 90 Tab, Refills: 1    Associated Diagnoses: Hyperlipidemia, unspecified hyperlipidemia type         STOP taking these medications       metoprolol tartrate (LOPRESSOR) 25 mg tablet Comments:   Reason for Stopping:               Consults:  Pulmonary  -   Procedures: none  -     Significant Diagnostic Studies: CXR    CXR:   Findings: Two views. No pneumothorax. No pleural effusion. Bilateral lower lung  zone atelectasis with patchy right lower lung zone airspace opacity.   Cardiomediastinal silhouette and osseous structures grossly unchanged.     IMPRESSION  Impression: Patchy right lower lung zone airspace opacity Please correlate with  physical examination findings regarding whether this may represent early  pneumonia. Hospital Course by Problem   1. CAP (community acquired pneumonia): admitted with sob , cough and fever for 1 week. Managed with ceftr/zithromax, IV Solumedrol then switched to PO, nebs, and suppl oxygen. Pulmonary was consulted. Symptoms improved before discharge. 2. AMS/ Metabolic encephalopathy: resolved while inpt. AAOx3  3. HTN: managed with Amlodipine, and BB with hold parameteres due to bradycardia. Pt is discharged with Lisinopril and Amlodipine   4. DDD/Restless leg syndrome: managed with ropinirole as needed  5. COPD exacerbation: pulm followed patient, recommend Breo on discharge. Managed with bronchodilators. Amb O2 on RA 88%, and 92% with 3-4L NC. Pt is discharged with home O2.   6. Hx of tobacco abuse  7. Hyperglycemia r/t steroids: managed with SSI  8. Bradycardia: asymptomatic while inpt  9. Hypoxia requiring supplemental O2: managed with NC O2, and will be discharged with home O2.      Today's examination of the patient revealed:     Subjective:   Pt reports feeling okay. Denies any SOB while on NC O2. Denies any chest pain, N/V/D/C.   Objective:   VS:   Visit Vitals    /71 (BP 1 Location: Right arm, BP Patient Position: Sitting)    Pulse (!) 51    Temp 97.7 °F (36.5 °C)    Resp 18    Ht 5' 5\" (1.651 m)    Wt 54.4 kg (120 lb)    SpO2 92%    Breastfeeding No    BMI 19.97 kg/m2      Tmax/24hrs: Temp (24hrs), Av.4 °F (36.3 °C), Min:96.6 °F (35.9 °C), Max:97.9 °F (36.6 °C)     Input/Output: No intake or output data in the 24 hours ending 02/10/18 1030    General:  Alert, NAD  Cardiovascular:  RRR  Pulmonary:  Coarse breath sounds bilat, respiratory effort WNL  GI:  +BS in all four quadrants, soft, non-tender  Extremities:  No edema; or cyanosis      Labs:    Recent Results (from the past 24 hour(s))   GLUCOSE, POC    Collection Time: 18 12:38 PM   Result Value Ref Range    Glucose (POC) 219 (H) 70 - 110 mg/dL   GLUCOSE, POC    Collection Time: 02/09/18  5:52 PM   Result Value Ref Range    Glucose (POC) 267 (H) 70 - 110 mg/dL   GLUCOSE, POC    Collection Time: 02/09/18  9:38 PM   Result Value Ref Range    Glucose (POC) 329 (H) 70 - 110 mg/dL   CBC WITH AUTOMATED DIFF    Collection Time: 02/10/18  3:54 AM   Result Value Ref Range    WBC 6.7 4.6 - 13.2 K/uL    RBC 4.13 (L) 4.20 - 5.30 M/uL    HGB 12.5 12.0 - 16.0 g/dL    HCT 37.0 35.0 - 45.0 %    MCV 89.6 74.0 - 97.0 FL    MCH 30.3 24.0 - 34.0 PG    MCHC 33.8 31.0 - 37.0 g/dL    RDW 13.5 11.6 - 14.5 %    PLATELET 805 596 - 448 K/uL    MPV 11.0 9.2 - 11.8 FL    NEUTROPHILS 68 40 - 73 %    LYMPHOCYTES 22 21 - 52 %    MONOCYTES 10 3 - 10 %    EOSINOPHILS 0 0 - 5 %    BASOPHILS 0 0 - 2 %    ABS. NEUTROPHILS 4.6 1.8 - 8.0 K/UL    ABS. LYMPHOCYTES 1.5 0.9 - 3.6 K/UL    ABS. MONOCYTES 0.6 0.05 - 1.2 K/UL    ABS. EOSINOPHILS 0.0 0.0 - 0.4 K/UL    ABS. BASOPHILS 0.0 0.0 - 0.1 K/UL    DF AUTOMATED     METABOLIC PANEL, BASIC    Collection Time: 02/10/18  3:54 AM   Result Value Ref Range    Sodium 137 136 - 145 mmol/L    Potassium 3.9 3.5 - 5.5 mmol/L    Chloride 98 (L) 100 - 108 mmol/L    CO2 33 (H) 21 - 32 mmol/L    Anion gap 6 3.0 - 18 mmol/L    Glucose 140 (H) 74 - 99 mg/dL    BUN 15 7.0 - 18 MG/DL    Creatinine 0.50 (L) 0.6 - 1.3 MG/DL    BUN/Creatinine ratio 30 (H) 12 - 20      GFR est AA >60 >60 ml/min/1.73m2    GFR est non-AA >60 >60 ml/min/1.73m2    Calcium 8.7 8.5 - 10.1 MG/DL   GLUCOSE, POC    Collection Time: 02/10/18  8:30 AM   Result Value Ref Range    Glucose (POC) 104 70 - 110 mg/dL     Additional Data Reviewed:     Condition:   Follow-up Appointments:   1. Your PCP: Merlyn Perez MD, within 7-10days  2. Your Pulmonologist: Barry Crabtree MD in 10 days.     >30 minutes spent coordinating this discharge (review instructions/follow-up, prescriptions, preparing report for sign off)    Signed:  Laquita Montgomery PA-C  2/10/2018  10:30 AM

## 2018-02-10 NOTE — ROUTINE PROCESS
Bedside and Verbal shift change report given to Carli Forman RN (oncoming nurse) by [employer], RN (offgoing nurse). Report included the following information SBAR and Kardex.

## 2018-02-10 NOTE — PROGRESS NOTES
Problem: Mobility Impaired (Adult and Pediatric)  Goal: *Acute Goals and Plan of Care (Insert Text)  Physical Therapy Goals  Initiated 2/9/2018 and to be accomplished within 5 day(s)  1. Patient will move from supine to sit and sit to supine , scoot up and down and roll side to side in bed with independence. 2.  Patient will transfer from bed to chair and chair to bed with modified independence using the least restrictive device. 3.  Patient will perform sit to stand with modified independence. 4.  Patient will ambulate with modified independence for 300 feet with the least restrictive device. 5.  Patient will ascend/descend 4 stairs with no handrail(s) with minimal assistance/contact guard assist.   Outcome: Progressing Towards Goal  physical Therapy TREATMENT    Patient: Klaudia Kellogg (12 y.o. female)  Date: 2/10/2018  Diagnosis: Pneumonia  CAP (community acquired pneumonia) <principal problem not specified>       Precautions: Fall, Other (comment) (O2 2L)  Chart, physical therapy assessment, plan of care and goals were reviewed. ASSESSMENT:  Based on the objective data below patient was found supine in bed and was cooperative for PT intervention. She indicated she need to use the [facility]. She was assisted with CG SBA to [facility] using her RW. She then ambulated in the room to the bedside chair with SBA CG. She was left with her LEs elevated. She demonstrated no LOB throughout and PT provided VC for pacing with her mobility and safety with O2 tubing. Progression toward goals:  [x]      Improving appropriately and progressing toward goals  []      Improving slowly and progressing toward goals  []      Not making progress toward goals and plan of care will be adjusted     PLAN:  Patient continues to benefit from skilled intervention to address the above impairments. Continue treatment per established plan of care.   Discharge Recommendations:  Home Health  Further Equipment Recommendations for Discharge: N/A     G-CODES:     Mobility [id number] Current  CI= 1-19%   Goal  CI= 1-19%. The severity rating is based on the Level of Assistance required for Functional Mobility and ADLs. SUBJECTIVE:   Patient stated I guess I am going to use the oxygen at home now. Look what they left for me.     OBJECTIVE DATA SUMMARY:   Critical Behavior:  Neurologic State: Alert  Orientation Level: Oriented to person, Oriented to place, Oriented to situation  Cognition: Appropriate decision making, Follows commands, Appropriate for age attention/concentration, Appropriate safety awareness  Safety/Judgement: Fall prevention  Functional Mobility Training:  Bed Mobility:     Supine to Sit: Stand-by asssistance   Transfers:  Sit to Stand: Stand-by asssistance  Stand to Sit: Modified independent     Bed to Chair: Stand-by asssistance;Contact guard assistance   Balance:  Sitting: Intact  Standing: With support  Standing - Static: Good  Standing - Dynamic : Fair  Ambulation/Gait Training:  Distance (ft): 20 Feet (ft) (to [facility] then to chair)  Assistive Device: Walker, rolling  Ambulation - Level of Assistance: Stand-by asssistance;Contact guard assistance     Base of Support: Narrowed     Speed/Katarzyna: Fluctuations  Step Length: Left shortened;Right shortened     Stairs:   Neuro Re-Education:     Therapeutic Exercises:   Reviewed AROM in sitting   Pain:  Pt reports 0/10 pain or discomfort prior to treatment.    Pt reports 0/10 pain or discomfort post treatment. Activity Tolerance:   tran well  Please refer to the flowsheet for vital signs taken during this treatment.   After treatment:   [x] Patient left in no apparent distress sitting up in chair  [] Patient left in no apparent distress in bed  [x] Call bell left within reach  [x] Nursing notified  [] Caregiver present  [] Bed alarm activated      Zhanna Rodriguez, PT   Time Calculation: 11 mins

## 2018-02-12 ENCOUNTER — PATIENT OUTREACH (OUTPATIENT)
Dept: INTERNAL MEDICINE CLINIC | Age: 83
End: 2018-02-12

## 2018-02-12 ENCOUNTER — HOME CARE VISIT (OUTPATIENT)
Dept: HOME HEALTH SERVICES | Facility: HOME HEALTH | Age: 83
End: 2018-02-12

## 2018-02-12 ENCOUNTER — HOME CARE VISIT (OUTPATIENT)
Dept: SCHEDULING | Facility: HOME HEALTH | Age: 83
End: 2018-02-12
Payer: MEDICARE

## 2018-02-12 VITALS
DIASTOLIC BLOOD PRESSURE: 60 MMHG | RESPIRATION RATE: 18 BRPM | OXYGEN SATURATION: 94 % | TEMPERATURE: 97.5 F | SYSTOLIC BLOOD PRESSURE: 130 MMHG | HEART RATE: 60 BPM

## 2018-02-12 PROCEDURE — 3331090001 HH PPS REVENUE CREDIT

## 2018-02-12 PROCEDURE — 400013 HH SOC

## 2018-02-12 PROCEDURE — 3331090002 HH PPS REVENUE DEBIT

## 2018-02-12 PROCEDURE — G0299 HHS/HOSPICE OF RN EA 15 MIN: HCPCS

## 2018-02-12 NOTE — PROGRESS NOTES
Shannon Cedeño is a 80 y.o. female   This patient was received as a referral from inpatient admission     Contact made: within 2 business days post discharge    Patient admitted to [facility] on 2/7/2018 to 2/10/2018, for PNA. Patient verified two patient identifiers. Introduced self, role and reason for call. Unable to review, daughter manages medications. Patient presenting symptoms:   Cough  Fever  SOB    Patient denies:  Pain  Fever  Chills  Nausea  Vomiting  Dizziness  Lightheadedness   Weakness   Numbness  Tingling  Falls  Swelling  SOB    Patient and Spouse reports: On 3 liters of oxygen  Have not heard from [facility]  Do not have new prescriptions  Did not received Nebulizer  Daughter manages medications  Was not aware of [facility] appointment  Will try to attend and have daughter bring patient, if not they will cancel  Improvement in cough, no output  Will schedule pulmonary follow up     Patient's challenges to self management identified:  Possible disease knowledge    Medication Management:  Daughter manages    Summary of patients top three problems:     Problem 1: PNA: Azithromycin and Prednisone     Problem 2: COPD: Inhalers, Nebulizer, oxygen, and Pulmonary follow up      Problem 3: HTN: Lisinopril and Norvasc       Barriers:   Financial: None identified at this time   Patients comprehension of disease: Patient verbalizes understanding of discharge plan and special follow up. Transportation: Children    Resources:  Children  Spouse  [facility]     DME:  Joselito Rodriguez    ADL's:  Patient is independent of all ADL's. Patient reported the following on ADL's:  Feeds self: YES  Ambulates: YES      Self-grooming: YES  Toileting: YES    Plan of care:  1. Take medications as prescribed  2. Attend all follow up appointments  3. Hand hygiene  4. [facility] SN  5. Fall precaution  6. Oxygen  7.  Monitor for infection    Adherence to previous treatment and likelihood for follow up:  Patient verbalizes understanding of discharge plan and special follow up. Appointments:  [facility] [id number] 2/16/2018  at 1030 with Davina Mulligan PA-C  Need Pulmonary follow up      New medications prescribed:   Breo   Albuterol Nebulizer   Prednisone   Pepcid   Azithromycin     Advance Care Planning:   Not on file    Utilization in the past 12 months:  2 hospitalization and 0 ED    RRAT score:   41    Patient instructed to bring all medications with them to their next appointment. Patient was given the opportunity to ask questions. Contact information was provided for future reference or further questions. Nurse Navigator attempted to contact the patients daughter to follow up on prescriptions and nebulizer. Left message on the voicemail. Nurse Navigator contacted [facility] to follow up appointment. Spoke with Tigre Dupree who stated the patient is scheduled for admission today. Nurse Navigator contacted [facility], to follow up on nebulizer. Spoke with Jane who stated the nebulizer was delivered to the patients hospital room on 2/10/2018 and the patient signed for it. Contacted patient back, spoke with Mrs. Delia Izaguirre the patients spouse and informed him about the nebulizer. Mr. Delia Izaguirre said his daughter might have the nebulizer and prescription and he called her and left a message. Patient and or spouse is to follow up with Nurse Navigator regarding nebulizer and prescriptions, Mr. Delia Izaguirre verbalized understanding. Goals      Attends follow-up appointments as directed. Follow up with PCP and Pulmonary        Knowledge and adherence of prescribed medication (ie. action, side effects, missed dose, etc.). Medication adherence   Breo   Albuterol Nebulizer   Prednisone   Pepcid   Azithromycin          Prevent complications post hospitalization. No admissions within 30 days post discharge, 2/10/2018       Supportive resources in place to maintain patient in the community (ie.  Home Health, DME equipment, refer to, medication assistant plan, etc.)            [address] SN       Understands red flags post discharge.             Monitor for signs and symptoms of infection   Good hand hygiene

## 2018-02-12 NOTE — PROGRESS NOTES
Trent Real, the patients daughter returned writer's call. Mrs. Esau Morales stated she was not sure if she received the prescriptions, but do confirm receiving a stack of stapled papers. Writer  asked if she could look through the papers to see if the prescriptions are there. Mrs. Esau Morales stated she will call her spouse at home to look through them. Prescriptions in Epic stated printed; prescriptions could've been left at the hospital. Mrs. 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 Mrs. Esau Morales reports receiving a \"puffer\" at discharge and writer informed her of [facility] appointment. Mrs. Esau Morales said she will have her spouse bring the patient. Also their in the process of scheduling [facility] visit so either herself or her spouse could be there. Mrs. Rehman thanked writer for the follow up call.

## 2018-02-13 ENCOUNTER — HOME CARE VISIT (OUTPATIENT)
Dept: HOME HEALTH SERVICES | Facility: HOME HEALTH | Age: 83
End: 2018-02-13
Payer: MEDICARE

## 2018-02-13 ENCOUNTER — PATIENT OUTREACH (OUTPATIENT)
Dept: INTERNAL MEDICINE CLINIC | Age: 83
End: 2018-02-13

## 2018-02-13 ENCOUNTER — HOME CARE VISIT (OUTPATIENT)
Dept: SCHEDULING | Facility: HOME HEALTH | Age: 83
End: 2018-02-13
Payer: MEDICARE

## 2018-02-13 PROCEDURE — 3331090001 HH PPS REVENUE CREDIT

## 2018-02-13 PROCEDURE — 3331090002 HH PPS REVENUE DEBIT

## 2018-02-13 PROCEDURE — G0151 HHCP-SERV OF PT,EA 15 MIN: HCPCS

## 2018-02-14 VITALS
TEMPERATURE: 98.6 F | SYSTOLIC BLOOD PRESSURE: 145 MMHG | DIASTOLIC BLOOD PRESSURE: 80 MMHG | HEART RATE: 51 BPM | OXYGEN SATURATION: 94 %

## 2018-02-14 PROCEDURE — 3331090001 HH PPS REVENUE CREDIT

## 2018-02-14 PROCEDURE — 3331090002 HH PPS REVENUE DEBIT

## 2018-02-15 ENCOUNTER — HOME CARE VISIT (OUTPATIENT)
Dept: SCHEDULING | Facility: HOME HEALTH | Age: 83
End: 2018-02-15
Payer: MEDICARE

## 2018-02-15 VITALS
OXYGEN SATURATION: 94 % | TEMPERATURE: 98.6 F | HEART RATE: 54 BPM | SYSTOLIC BLOOD PRESSURE: 112 MMHG | DIASTOLIC BLOOD PRESSURE: 58 MMHG

## 2018-02-15 PROCEDURE — G0157 HHC PT ASSISTANT EA 15: HCPCS

## 2018-02-15 PROCEDURE — 3331090002 HH PPS REVENUE DEBIT

## 2018-02-15 PROCEDURE — 3331090001 HH PPS REVENUE CREDIT

## 2018-02-16 ENCOUNTER — OFFICE VISIT (OUTPATIENT)
Dept: INTERNAL MEDICINE CLINIC | Age: 83
End: 2018-02-16

## 2018-02-16 ENCOUNTER — PATIENT OUTREACH (OUTPATIENT)
Dept: INTERNAL MEDICINE CLINIC | Age: 83
End: 2018-02-16

## 2018-02-16 VITALS
WEIGHT: 117 LBS | TEMPERATURE: 97.8 F | HEART RATE: 53 BPM | RESPIRATION RATE: 16 BRPM | DIASTOLIC BLOOD PRESSURE: 62 MMHG | BODY MASS INDEX: 19.49 KG/M2 | HEIGHT: 65 IN | OXYGEN SATURATION: 91 % | SYSTOLIC BLOOD PRESSURE: 118 MMHG

## 2018-02-16 DIAGNOSIS — J44.1 ACUTE EXACERBATION OF CHRONIC OBSTRUCTIVE PULMONARY DISEASE (COPD) (HCC): ICD-10-CM

## 2018-02-16 DIAGNOSIS — I10 ESSENTIAL HYPERTENSION WITH GOAL BLOOD PRESSURE LESS THAN 140/90: ICD-10-CM

## 2018-02-16 DIAGNOSIS — R00.1 BRADYCARDIA: ICD-10-CM

## 2018-02-16 DIAGNOSIS — J18.9 COMMUNITY ACQUIRED PNEUMONIA, UNSPECIFIED LATERALITY: Primary | ICD-10-CM

## 2018-02-16 PROCEDURE — 3331090002 HH PPS REVENUE DEBIT

## 2018-02-16 PROCEDURE — 3331090001 HH PPS REVENUE CREDIT

## 2018-02-16 NOTE — PROGRESS NOTES
HPI/History  Justino Pack is a 80 y.o.  female who presents for hospital f/u, 2/7-2/10. NN contacted 2/12. Pt accompanied by son-in-law. Pt with extensive hx as noted below, including COPD. Admitted for CAP. Currently on oxygen but son-in-law has not yet learned to use in battery mode so not using while out but is using at home. Oxygen has caused some nasal desiccation. HH coming out but has not been informed of this; next [facility] visit early next week. Overall, reports steadily improving and no worsening or developments, including fevers. Does not have a f/u with pulmonary but has seen Dr. Romi Butler in the past. Of note, bradycardia during admission and metoprolol was to be stopped and lisinopril started. They are very uncertain of current meds but son-in-law will be taking over this and plans to rectify when he returns home today. They will call for reconciliation.      Patient Active Problem List   Diagnosis Code    Hyperlipidemia E78.5    Overactive bladder N32.81    Sciatica M54.30    Degeneration of lumbar or lumbosacral intervertebral disc M51.37    Encounter for long-term (current) use of other medications Z79.899    Depression F32.9    Unspecified vitamin D deficiency E55.9    Glucose intolerance (pre-diabetes) R73.03    RLS (restless legs syndrome) G25.81    Aortic dissection, abdominal (HCC) I71.02    Ataxic gait R26.0    Chronic neck pain M54.2, G89.29    Orthostatic hypotension I95.1    Paresthesia R20.2    Repeated falls R29.6    Chronic pain syndrome G89.4    Lumbosacral spondylosis without myelopathy M47.817    Cervical stenosis of spinal canal M48.02    Spinal stenosis in cervical region M48.02    Polyneuropathy G62.9    Lumbar stenosis M48.061    High risk medication use Z79.899    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    Thoracic or lumbosacral neuritis or radiculitis, unspecified [id number]    Low back pain radiating to both legs M54.5    DDD (degenerative disc disease), lumbar M51.36    Spondylolisthesis of lumbar region M43.16    Spondylolisthesis, grade 1 M43.10    Lumbar facet arthropathy M12.88    Lumbar disc herniation M51.26    Lumbosacral radiculopathy at L5 M54.17    Lumbosacral radiculopathy at S1 M54.17    DM neuropathy, painful ([facility]) E11.40    Acute exacerbation of chronic obstructive pulmonary disease (COPD) ([facility]) J44.1    Diabetic polyneuropathy associated with type 2 diabetes mellitus ([facility]) E11.42    Carotid artery stenosis I65.29    Atherosclerosis of native arteries of the extremities with intermittent claudication I70.219    MOORE (dyspnea on exertion) R06.09    SOB (shortness of breath) R06.02    Murmur, cardiac R01.1    Hyperlipidemia LDL goal <100 E78.5    Primary osteoarthritis involving multiple joints M15.0    Prediabetes R73.03    Restless leg syndrome G25.81    Essential hypertension with goal blood pressure less than 140/90 I10    Panlobular emphysema ([facility]) J43.1    Impaired fasting glucose R73.01    HCAP (healthcare-associated pneumonia) J18.9    Abnormal CT scan, chest R93.8    Hypercholesterolemia E78.00    Hypokalemia E87.6    Dizziness R42    CHI (closed head injury) S09. 90XA    Elevated troponin R74.8    Type 2 diabetes mellitus with diabetic neuropathy, without long-term current use of insulin ([facility]) E11.40    Primary insomnia F51.01    Major depression, chronic F32.9    Pneumonia J18.9    CAP (community acquired pneumonia) J18.9     Past Medical History:   Diagnosis Date    Colon polyps     COPD 8-30-02    DDD (degenerative disc disease), lumbar 10/1/2014    Degeneration of lumbar or lumbosacral intervertebral disc     Depression     Diabetes ([facility] 7-19-05    Diverticulosis     DM neuropathy, painful ([facility] 10/1/2014    MOORE (Dyspnea on Exertion) 4-19-02    echo: +mild LVH, NR85-54% w/ diastolic dysfxn    Glaucoma     HCAP (healthcare-associated pneumonia) 03/24/2017    Hemorrhoids 6-6-06   [facility] Hyperlipidemia 5/17/2011    Hypertension 4-16-02    LBP (low back pain) 4-13-04    Low back pain radiating to both legs 10/1/2014    Lumbago     Lumbar disc herniation 10/1/2014    Lumbar facet arthropathy 10/1/2014    Lumbosacral radiculopathy at L5 10/1/2014    Lumbosacral radiculopathy at S1 10/1/2014    Microscopic hematuria     OA (osteoarthritis)     Overactive bladder 5/17/2011    RLS (restless legs syndrome) 1/17/2013    Sciatica     Shortness of breath     Spinal stenosis, lumbar region, without neurogenic claudication     Spondylolisthesis of lumbar region 10/1/2014    Spondylolisthesis, grade 1 10/1/2014    Stress urinary incontinence     Syncope     -MRI brain    Urinary tract infection, site not specified      Past Surgical History:   Procedure Laterality Date    HX APPENDECTOMY      HX CHOLECYSTECTOMY      HX HYSTERECTOMY      (+)DUB    HX POLYPECTOMY      TN COLONOSCOPY FLX DX W/COLLJ SPEC WHEN PFRMD  6-16-06    normal, Dr Jeremi Maddox    TN COLONOSCOPY FLX DX W/COLLJ [address] PFRMD      (+)polyp= tubular adenoma     Social History     Social History    Marital status:      Spouse name: N/A    Number of children: N/A    Years of education: N/A     Occupational History    Not on file. Social History Main Topics    Smoking status: Current Every Day Smoker     Packs/day: 1.50     Years: 50.00     Types: Cigarettes    Smokeless tobacco: Never Used      Comment: pt has cut down recently to less than 1 ppd    Alcohol use No    Drug use: No    Sexual activity: Not Currently     Other Topics Concern    Not on file     Social History Narrative     Family History   Problem Relation Age of Onset    Colon Cancer Sister     Heart Disease Brother     Seizures Son     Colon Cancer Maternal Aunt      Current Outpatient Prescriptions   Medication Sig    fluticasone-vilanterol (BREO ELLIPTA) 100-25 mcg/dose inhaler Take 1 Puff by inhalation daily.  predniSONE (DELTASONE) 20 mg tablet Take 2 Tabs by mouth daily (with breakfast). For 1 day, then 1 tab by mouth x 3 days, then 1/2 tabs by mouth for 3 days.  famotidine (PEPCID) 20 mg tablet Take 1 Tab by mouth nightly.  lisinopril (PRINIVIL, ZESTRIL) 5 mg tablet Take 1 Tab by mouth daily.  rOPINIRole (REQUIP) 1 mg tablet Take 0.5-1 Tabs by mouth nightly as needed (RLS) for up to 90 days.  tamsulosin (FLOMAX) 0.4 mg capsule Take 0.4 mg by mouth daily.  mirtazapine (REMERON) 30 mg tablet Take 1 Tab by mouth nightly.  gabapentin (NEURONTIN) 300 mg capsule Take 1 Cap by mouth three (3) times daily.  PARoxetine (PAXIL) 30 mg tablet Take 1 Tab by mouth daily.  simvastatin (ZOCOR) 20 mg tablet Take 1 Tab by mouth nightly.  naloxone 4 mg/actuation spry 4 mg by Nasal route as needed. Indications: OPIATE-INDUCED RESPIRATORY DEPRESSION    amLODIPine (NORVASC) 10 mg tablet Take 1 Tab by mouth daily.  omega 3-dha-epa-fish oil (FISH OIL) 100-160-1,000 mg cap Take 1,000 mg by mouth daily.  LUMIGAN 0.01 % ophthalmic drops Administer 1 Drop to both eyes nightly.  traZODone (DESYREL) 100 mg tablet Take 100 mg by mouth nightly. Patient takes one and half tabs at bedtime    cycloSPORINE (RESTASIS) 0.05 % ophthalmic emulsion Administer 1 Drop to both eyes two (2) times a day.  UX-DY-WZ-Fe-Min-Lycopen-Lutein (CENTRUM) 0.4-162-18 mg Tab Take 1 Tab by mouth daily.  metoprolol succinate (TOPROL-XL) 25 mg XL tablet Take 25 mg by mouth two (2) times a day.  albuterol-ipratropium (DUO-NEB) 2.5 mg-0.5 mg/3 ml nebu 3 mL by Nebulization route every six (6) hours as needed.  azithromycin (ZITHROMAX) 500 mg tab Take 1 Tab by mouth daily.  Nebulizer & Compressor (PORTABLE NEBULIZER SYSTEM) machine 1 Each by Does Not Apply route every six (6) hours as needed.     HYDROmorphone (DILAUDID) 4 mg tablet 1/2 to one tab up to three times per day prn severe pain    ascorbic acid (VITAMIN C) 1,000 mg tablet Take 1,000 mg by mouth daily. No current facility-administered medications for this visit. Allergies   Allergen Reactions    Levaquin [Levofloxacin] Rash       Review of Systems  Aside from those included in HPI, remainder of complete ROS negative. Available notes/summaries, labs, and imaging reviewed. Physical Examination  Visit Vitals    /62 (BP 1 Location: Left arm, BP Patient Position: Sitting)    Pulse (!) 53    Temp 97.8 °F (36.6 °C) (Oral)    Resp 16    Ht 5' 5\" (1.651 m)    Wt 117 lb (53.1 kg)    SpO2 91%    BMI 19.47 kg/m2       General - Alert and in no acute distress. Pt thin and somewhat chronically ill appearing but acutely appears to be doing well. She appears comfortable and in good spirits. She is very pleasant and engaging. Nontoxic. Not anxious, non-diaphoretic. Mental status - Appropriate mood, behavior, speech content, dress, and thought processes. Eyes - Pupils equal and reactive, extraocular movements intact. No erythema or discharge. Nose - Few abrasions/lesions just inferior to nose. Slight desiccation of mucosa. No active bleeding. Pulm - No cough during visit today. Complete sentences. No tachypnea, retractions, or cyanosis. Fair respiratory effort. Difficult exam due to effort and diminished breath sounds bilat but grossly equal and with no appreciable wheeze, rales, or rhonchi. Cardiovascular - Bradycardic, regular rhythm. No appreciable murmurs or gallops. Pt ambulates very well with walker. Assessment and Plan  1. Hospital f/u for CAP - Doing well currently with steady improvement per report. She will continue pulmonary regimen including oxygen, which son-in-law will learn to use. Placed order to repeat CXR in 1 month. Discussed measures for the nasal desiccation until they can discuss with HH (humidifier, etc). I will place referral to Dr. Karyn Ramires. They agree to return to ED if worsening/relapse or ominous developments.   2. Bradycardia - was to stop metoprolol but uncertain if this has been done. Informed to stop and encouraged complete med reconciliation when able. 3. HTN - BP acceptable today. However, regimen changes as noted. Pt to schedule f/u with Dr. Allyson Gusman in 2wks, sooner if needed. They will call if any issues with pulm referral.  Further planning as warranted. Pt and guest happily agree with plan. PLEASE NOTE:   This document has been produced using voice recognition software. Unrecognized errors in transcription may be present.     Abigail Gamboa [employer] [place]  [phone]  2/16/2018

## 2018-02-16 NOTE — MR AVS SNAPSHOT
[address] [facility] 
 
 
 [address], [address] [facility] [address] 
[phone] Patient: Sorin Narayanan MRN:  [id number] Visit Information Date & Time Provider Department Dept. Phone Encounter #  
 2/16/2018 10:30 AM HETAL Ghotra [facility] [phone] [id number] Your Appointments 3/2/2018  3:30 PM  
Office Visit with Garcia uDmont MD  
[facility] [address] [place] [address]) Appt Note: 2 week f/u  
 [address] [facility], [address] [address] [address]  
  
   
 [address], [address]  
  
    
 3/5/2018  9:00 AM  
Office Visit with Aimee Boyd MD  
[facility] [address] [place] [address]) Appt Note: check up [address] [address]  
[phone] [phone]  
  
    
 4/16/2018 11:00 AM  
Office Visit with Garcia Dumont MD  
[facility] [address] [place] [address]) Appt Note: 3 month f/u  
 [address] [facility], [address] [facility] [address]  
[phone] Upcoming Health Maintenance Date Due  
 EYE EXAM RETINAL OR DILATED Q1 2/5/2015 GLAUCOMA SCREENING Q2Y 4/1/2016 Influenza Age 5 to Adult 8/1/2017 HEMOGLOBIN A1C Q6M 6/4/2018 MEDICARE YEARLY EXAM 9/14/2018 LIPID PANEL Q1 12/4/2018 MICROALBUMIN Q1 12/13/2018 DTaP/Tdap/Td series (2 - Td) 6/11/2024 Allergies as of 2/16/2018  Review Complete On: 2/16/2018 By: Gala Brasher LPN Severity Noted Reaction Type Reactions Levaquin [Levofloxacin]  05/17/2011    Rash Current Immunizations  Reviewed on 9/13/2017 Name Date Influenza High Dose Vaccine PF 10/13/2016 Influenza Vaccine 10/15/2014 Influenza Vaccine (Quad) PF 12/8/2015 Influenza Vaccine Split 11/22/2011, 11/11/2010 11:46 AM  
 Pneumococcal Conjugate (PCV-13) 12/8/2015 Pneumococcal Polysaccharide (PPSV-23) 9/20/2000 Tdap 6/11/2014 ZZZ-RETIRED (DO NOT USE) Pneumococcal Vaccine (Unspecified Type) 9/20/2000 Not reviewed this visit You Were Diagnosed With   
  
 Codes Comments Community acquired pneumonia, unspecified laterality    -  Primary ICD-10-CM: J18.9 ICD-9-CM: 843 Vitals BP Pulse Temp Resp Height(growth percentile) Weight(growth percentile)  
 118/62 (BP 1 Location: Left arm, BP Patient Position: Sitting) (!) 53 97.8 °F (36.6 °C) (Oral) 16 5' 5\" (1.651 m) 117 lb (53.1 kg) SpO2 BMI OB Status Smoking Status 91% 19.47 kg/m2 Hysterectomy Current Every Day Smoker Vitals History BMI and BSA Data Body Mass Index Body Surface Area  
 19.47 kg/m 2 1.56 m 2 Preferred Pharmacy Pharmacy Name Phone [facility], [address] [place] [address] [address] [facility] Your Updated Medication List  
  
   
This list is accurate as of: 2/16/18 11:12 AM.  Always use your most recent med list.  
  
  
  
  
 albuterol-ipratropium 2.5 mg-0.5 mg/3 ml Nebu Commonly known as:  DUO-NEB  
3 mL by Nebulization route every six (6) hours as needed. amLODIPine 10 mg tablet Commonly known as:  Deborha Cords Take 1 Tab by mouth daily. azithromycin 500 mg Tab Commonly known as:  Eugene Sly Take 1 Tab by mouth daily. CENTRUM 0.4-162-18 mg Tab Generic drug:  YW-MT-VK-Fe-Min-Lycopen-Lutein Take 1 Tab by mouth daily. famotidine 20 mg tablet Commonly known as:  PEPCID Take 1 Tab by mouth nightly. FISH -160-1,000 mg Cap Generic drug:  omega 3-dha-epa-fish oil Take 1,000 mg by mouth daily. FLOMAX 0.4 mg capsule Generic drug:  tamsulosin Take 0.4 mg by mouth daily. fluticasone-vilanterol 100-25 mcg/dose inhaler Commonly known as:  BREO ELLIPTA Take 1 Puff by inhalation daily. gabapentin 300 mg capsule Commonly known as:  NEURONTIN  
 Take 1 Cap by mouth three (3) times daily. HYDROmorphone 4 mg tablet Commonly known as:  DILAUDID  
1/2 to one tab up to three times per day prn severe pain  
  
 lisinopril 5 mg tablet Commonly known as:  Donaeliceo Givenszanicarlosmontana Take 1 Tab by mouth daily. LUMIGAN 0.01 % ophthalmic drops Generic drug:  bimatoprost  
Administer 1 Drop to both eyes nightly. metoprolol succinate 25 mg XL tablet Commonly known as:  TOPROL-XL Take 25 mg by mouth two (2) times a day. mirtazapine 30 mg tablet Commonly known as:  Patriciaen Kirillm Take 1 Tab by mouth nightly. naloxone 4 mg/actuation nasal spray Commonly known as:  NARCAN  
4 mg by Nasal route as needed. Indications: OPIATE-INDUCED RESPIRATORY DEPRESSION Nebulizer & Compressor machine Commonly known as:  PORTABLE NEBULIZER SYSTEM  
1 Each by Does Not Apply route every six (6) hours as needed. PARoxetine 30 mg tablet Commonly known as:  PAXIL Take 1 Tab by mouth daily. predniSONE 20 mg tablet Commonly known as:  Miriam Savers Take 2 Tabs by mouth daily (with breakfast). For 1 day, then 1 tab by mouth x 3 days, then 1/2 tabs by mouth for 3 days. RESTASIS 0.05 % ophthalmic emulsion Generic drug:  cycloSPORINE Administer 1 Drop to both eyes two (2) times a day. rOPINIRole 1 mg tablet Commonly known as:  Paty Leandro Take 0.5-1 Tabs by mouth nightly as needed (RLS) for up to 90 days. simvastatin 20 mg tablet Commonly known as:  ZOCOR Take 1 Tab by mouth nightly. traZODone 100 mg tablet Commonly known as:  Barber Darter Take 100 mg by mouth nightly. Patient takes one and half tabs at bedtime VITAMIN C 1,000 mg tablet Generic drug:  ascorbic acid (vitamin C) Take 1,000 mg by mouth daily. To-Do List   
 02/19/2018 To Be Determined Appointment with Claudean Crandall, PTA at [facility]  
  
 02/20/2018 To Be Determined Appointment with Sammi King OTR/[facility]ART at [address] SCHEDULING/INTAKE  
  
 02/22/2018 To Be Determined Appointment with Georgi Rojo PTA at [facility]  
  
 02/26/2018 To Be Determined Appointment with Georgi Rojo, PTA at [facility]  
  
 03/01/2018 To Be Determined Appointment with Georgi Rojo, PTA at [address] [facility]  
  
 03/05/2018 To Be Determined Appointment with Georgi Rojo PTA at [facility] Around 03/07/2018 Imaging:  XR CHEST PA LAT   
  
 03/08/2018 To Be Determined Appointment with Hans Fritz PT at [address] [place] [address] & [facility] SERVICES! Dear Juan Luis West: Thank you for requesting a [employer] account. Our records indicate that you have previously registered for a [employer] account but its currently inactive. Please call our [employer] support line at [phone]. Additional Information If you have questions, please visit the Frequently Asked Questions section of the [employer] website at [email or web address]/. Remember, [employer] is NOT to be used for urgent needs. For medical emergencies, dial 911. Now available from your iPhone and Android! Please provide this summary of care documentation to your next provider. Your primary care clinician is listed as Miriam Perez. If you have any questions after today's visit, please call [phone].

## 2018-02-16 NOTE — PROGRESS NOTES
1. Have you been to the ER, urgent care clinic or hospitalized since your last visit? YES. SO [facility] 2/9/18- Pneumonia    2. Have you seen or consulted any other health care providers outside of the [address] [place] [address] since your last visit (Include any pap smears or colon screening)? NO      Do you have an Advanced Directive? NO    Would you like information on Advanced Directives?  NO

## 2018-02-16 NOTE — PROGRESS NOTES
Goals Addressed             Most Recent     Attends follow-up appointments as directed. On track (2/16/2018)             Follow up with PCP and Pulmonary    Patient attended their post discharge follow up appointment with Jaelyn Collazo PA-C as scheduled.

## 2018-02-17 PROCEDURE — 3331090002 HH PPS REVENUE DEBIT

## 2018-02-17 PROCEDURE — 3331090001 HH PPS REVENUE CREDIT

## 2018-02-18 PROCEDURE — 3331090002 HH PPS REVENUE DEBIT

## 2018-02-18 PROCEDURE — 3331090001 HH PPS REVENUE CREDIT

## 2018-02-19 PROCEDURE — 3331090001 HH PPS REVENUE CREDIT

## 2018-02-19 PROCEDURE — 3331090002 HH PPS REVENUE DEBIT

## 2018-02-20 ENCOUNTER — HOME CARE VISIT (OUTPATIENT)
Dept: HOME HEALTH SERVICES | Facility: HOME HEALTH | Age: 83
End: 2018-02-20
Payer: MEDICARE

## 2018-02-20 ENCOUNTER — HOME CARE VISIT (OUTPATIENT)
Dept: SCHEDULING | Facility: HOME HEALTH | Age: 83
End: 2018-02-20
Payer: MEDICARE

## 2018-02-20 VITALS
HEART RATE: 64 BPM | SYSTOLIC BLOOD PRESSURE: 130 MMHG | OXYGEN SATURATION: 96 % | TEMPERATURE: 98.3 F | DIASTOLIC BLOOD PRESSURE: 70 MMHG

## 2018-02-20 PROCEDURE — 3331090002 HH PPS REVENUE DEBIT

## 2018-02-20 PROCEDURE — 3331090001 HH PPS REVENUE CREDIT

## 2018-02-20 PROCEDURE — G0157 HHC PT ASSISTANT EA 15: HCPCS

## 2018-02-21 PROCEDURE — 3331090001 HH PPS REVENUE CREDIT

## 2018-02-21 PROCEDURE — 3331090002 HH PPS REVENUE DEBIT

## 2018-02-22 ENCOUNTER — HOME CARE VISIT (OUTPATIENT)
Dept: SCHEDULING | Facility: HOME HEALTH | Age: 83
End: 2018-02-22
Payer: MEDICARE

## 2018-02-22 VITALS
DIASTOLIC BLOOD PRESSURE: 72 MMHG | OXYGEN SATURATION: 96 % | HEART RATE: 61 BPM | SYSTOLIC BLOOD PRESSURE: 144 MMHG | TEMPERATURE: 99.1 F

## 2018-02-22 PROCEDURE — 3331090001 HH PPS REVENUE CREDIT

## 2018-02-22 PROCEDURE — 3331090002 HH PPS REVENUE DEBIT

## 2018-02-22 PROCEDURE — G0157 HHC PT ASSISTANT EA 15: HCPCS

## 2018-02-23 PROCEDURE — 3331090002 HH PPS REVENUE DEBIT

## 2018-02-23 PROCEDURE — 3331090001 HH PPS REVENUE CREDIT

## 2018-02-24 ENCOUNTER — HOME CARE VISIT (OUTPATIENT)
Dept: SCHEDULING | Facility: HOME HEALTH | Age: 83
End: 2018-02-24
Payer: MEDICARE

## 2018-02-24 PROCEDURE — 3331090002 HH PPS REVENUE DEBIT

## 2018-02-24 PROCEDURE — 3331090001 HH PPS REVENUE CREDIT

## 2018-02-24 PROCEDURE — G0299 HHS/HOSPICE OF RN EA 15 MIN: HCPCS

## 2018-02-25 PROCEDURE — 3331090002 HH PPS REVENUE DEBIT

## 2018-02-25 PROCEDURE — 3331090001 HH PPS REVENUE CREDIT

## 2018-02-26 VITALS
SYSTOLIC BLOOD PRESSURE: 110 MMHG | HEART RATE: 72 BPM | RESPIRATION RATE: 18 BRPM | DIASTOLIC BLOOD PRESSURE: 60 MMHG | TEMPERATURE: 98.2 F | OXYGEN SATURATION: 97 %

## 2018-02-26 PROCEDURE — 3331090001 HH PPS REVENUE CREDIT

## 2018-02-26 PROCEDURE — 3331090002 HH PPS REVENUE DEBIT

## 2018-02-27 ENCOUNTER — HOME CARE VISIT (OUTPATIENT)
Dept: SCHEDULING | Facility: HOME HEALTH | Age: 83
End: 2018-02-27
Payer: MEDICARE

## 2018-02-27 VITALS
TEMPERATURE: 99 F | HEART RATE: 76 BPM | SYSTOLIC BLOOD PRESSURE: 130 MMHG | OXYGEN SATURATION: 93 % | DIASTOLIC BLOOD PRESSURE: 75 MMHG

## 2018-02-27 PROCEDURE — 3331090001 HH PPS REVENUE CREDIT

## 2018-02-27 PROCEDURE — G0157 HHC PT ASSISTANT EA 15: HCPCS

## 2018-02-27 PROCEDURE — G0152 HHCP-SERV OF OT,EA 15 MIN: HCPCS

## 2018-02-27 PROCEDURE — 3331090002 HH PPS REVENUE DEBIT

## 2018-02-28 VITALS
SYSTOLIC BLOOD PRESSURE: 120 MMHG | DIASTOLIC BLOOD PRESSURE: 56 MMHG | OXYGEN SATURATION: 96 % | HEART RATE: 74 BPM | TEMPERATURE: 99.1 F

## 2018-02-28 PROCEDURE — 3331090002 HH PPS REVENUE DEBIT

## 2018-02-28 PROCEDURE — 3331090001 HH PPS REVENUE CREDIT

## 2018-03-01 ENCOUNTER — HOME CARE VISIT (OUTPATIENT)
Dept: SCHEDULING | Facility: HOME HEALTH | Age: 83
End: 2018-03-01
Payer: MEDICARE

## 2018-03-01 VITALS
DIASTOLIC BLOOD PRESSURE: 66 MMHG | HEART RATE: 66 BPM | SYSTOLIC BLOOD PRESSURE: 140 MMHG | TEMPERATURE: 98.8 F | OXYGEN SATURATION: 97 %

## 2018-03-01 PROCEDURE — 3331090001 HH PPS REVENUE CREDIT

## 2018-03-01 PROCEDURE — G0157 HHC PT ASSISTANT EA 15: HCPCS

## 2018-03-01 PROCEDURE — 3331090002 HH PPS REVENUE DEBIT

## 2018-03-02 PROCEDURE — 3331090001 HH PPS REVENUE CREDIT

## 2018-03-02 PROCEDURE — 3331090002 HH PPS REVENUE DEBIT

## 2018-03-03 PROCEDURE — 3331090001 HH PPS REVENUE CREDIT

## 2018-03-03 PROCEDURE — 3331090002 HH PPS REVENUE DEBIT

## 2018-03-04 PROCEDURE — 3331090001 HH PPS REVENUE CREDIT

## 2018-03-04 PROCEDURE — 3331090002 HH PPS REVENUE DEBIT

## 2018-03-05 ENCOUNTER — PATIENT OUTREACH (OUTPATIENT)
Dept: INTERNAL MEDICINE CLINIC | Age: 83
End: 2018-03-05

## 2018-03-05 ENCOUNTER — HOME CARE VISIT (OUTPATIENT)
Dept: SCHEDULING | Facility: HOME HEALTH | Age: 83
End: 2018-03-05
Payer: MEDICARE

## 2018-03-05 VITALS
OXYGEN SATURATION: 97 % | SYSTOLIC BLOOD PRESSURE: 130 MMHG | HEART RATE: 63 BPM | DIASTOLIC BLOOD PRESSURE: 78 MMHG | TEMPERATURE: 99.4 F

## 2018-03-05 PROCEDURE — 3331090001 HH PPS REVENUE CREDIT

## 2018-03-05 PROCEDURE — G0157 HHC PT ASSISTANT EA 15: HCPCS

## 2018-03-05 PROCEDURE — 3331090002 HH PPS REVENUE DEBIT

## 2018-03-05 NOTE — PROGRESS NOTES
Nurse Lozano contacted patient for post SHAILA and assess for any needs. Patient verified two patient identifiers. Introduced self, role and reason for call.      Patient denies:  Pain  Fever  Chills  Nausea  Vomiting  Dizziness  Lightheadedness   Weakness   Numbness  Tingling  Falls  Swelling  SOB     Patient and Spouse reports: On 3 liters of oxygen  Believe today might have been her last visit with [address]  \"I'm doing just fine\"  Denies any need or assistance at this time.     Both patient and spouse thanked writer for follow up and if further assistance is needed will contact the office.

## 2018-03-06 PROCEDURE — 3331090001 HH PPS REVENUE CREDIT

## 2018-03-06 PROCEDURE — 3331090002 HH PPS REVENUE DEBIT

## 2018-03-07 PROCEDURE — 3331090002 HH PPS REVENUE DEBIT

## 2018-03-07 PROCEDURE — 3331090001 HH PPS REVENUE CREDIT

## 2018-03-08 PROCEDURE — 3331090001 HH PPS REVENUE CREDIT

## 2018-03-08 PROCEDURE — 3331090002 HH PPS REVENUE DEBIT

## 2018-03-09 ENCOUNTER — HOME CARE VISIT (OUTPATIENT)
Dept: SCHEDULING | Facility: HOME HEALTH | Age: 83
End: 2018-03-09
Payer: MEDICARE

## 2018-03-09 PROCEDURE — G0151 HHCP-SERV OF PT,EA 15 MIN: HCPCS

## 2018-03-09 PROCEDURE — 3331090002 HH PPS REVENUE DEBIT

## 2018-03-09 PROCEDURE — 3331090001 HH PPS REVENUE CREDIT

## 2018-03-10 PROCEDURE — 3331090001 HH PPS REVENUE CREDIT

## 2018-03-10 PROCEDURE — 3331090002 HH PPS REVENUE DEBIT

## 2018-03-11 VITALS
HEART RATE: 78 BPM | TEMPERATURE: 98.8 F | OXYGEN SATURATION: 98 % | SYSTOLIC BLOOD PRESSURE: 140 MMHG | DIASTOLIC BLOOD PRESSURE: 70 MMHG

## 2018-03-11 PROCEDURE — 3331090001 HH PPS REVENUE CREDIT

## 2018-03-11 PROCEDURE — 3331090002 HH PPS REVENUE DEBIT

## 2018-03-12 PROCEDURE — 3331090002 HH PPS REVENUE DEBIT

## 2018-03-12 PROCEDURE — 3331090001 HH PPS REVENUE CREDIT

## 2018-03-13 PROCEDURE — 3331090002 HH PPS REVENUE DEBIT

## 2018-03-13 PROCEDURE — 3331090001 HH PPS REVENUE CREDIT

## 2018-03-14 ENCOUNTER — PATIENT OUTREACH (OUTPATIENT)
Dept: INTERNAL MEDICINE CLINIC | Age: 83
End: 2018-03-14

## 2018-03-14 PROCEDURE — 3331090002 HH PPS REVENUE DEBIT

## 2018-03-14 PROCEDURE — 3331090001 HH PPS REVENUE CREDIT

## 2018-03-14 NOTE — PROGRESS NOTES
Floor Goals Addressed             Most Recent     COMPLETED: Attends follow-up appointments as directed. On track (3/14/2018)             Follow up with PCP and Pulmonary    Patient attended their post discharge follow up appointment with Jaelyn Collazo PA-C as scheduled.  COMPLETED: Prevent complications post hospitalization. On track (3/14/2018)             No admissions within 30 days post discharge, 2/10/2018  Episode closed: no hospitalization or ED admission post 30 days from discharge.

## 2018-03-19 RX ORDER — MIRTAZAPINE 30 MG/1
TABLET, FILM COATED ORAL
Qty: 30 TAB | Refills: 2 | Status: SHIPPED | OUTPATIENT
Start: 2018-03-19 | End: 2018-04-16 | Stop reason: SDUPTHER

## 2018-03-23 RX ORDER — TAMSULOSIN HYDROCHLORIDE 0.4 MG/1
0.4 CAPSULE ORAL DAILY
Qty: 15 CAP | Refills: 0 | Status: SHIPPED | OUTPATIENT
Start: 2018-03-23 | End: 2018-10-29 | Stop reason: SDUPTHER

## 2018-03-23 NOTE — TELEPHONE ENCOUNTER
RBV per Dr Malu Medel tamsulosin (Flomax) 0.4 mg capsule #15 with 0 refills, take one capsule by mouth daily, sent to [facility].

## 2018-04-05 DIAGNOSIS — E78.5 HYPERLIPIDEMIA, UNSPECIFIED HYPERLIPIDEMIA TYPE: ICD-10-CM

## 2018-04-06 RX ORDER — GABAPENTIN 300 MG/1
CAPSULE ORAL
Qty: 270 CAP | Refills: 1 | Status: SHIPPED | OUTPATIENT
Start: 2018-04-06 | End: 2018-10-22 | Stop reason: DRUGHIGH

## 2018-04-06 RX ORDER — SIMVASTATIN 20 MG/1
TABLET, FILM COATED ORAL
Qty: 90 TAB | Refills: 1 | Status: SHIPPED | OUTPATIENT
Start: 2018-04-06 | End: 2018-10-16 | Stop reason: SDUPTHER

## 2018-04-16 ENCOUNTER — OFFICE VISIT (OUTPATIENT)
Dept: INTERNAL MEDICINE CLINIC | Age: 83
End: 2018-04-16

## 2018-04-16 VITALS
DIASTOLIC BLOOD PRESSURE: 56 MMHG | HEIGHT: 65 IN | BODY MASS INDEX: 21.33 KG/M2 | SYSTOLIC BLOOD PRESSURE: 140 MMHG | HEART RATE: 81 BPM | TEMPERATURE: 98.5 F | OXYGEN SATURATION: 90 % | RESPIRATION RATE: 14 BRPM | WEIGHT: 128 LBS

## 2018-04-16 DIAGNOSIS — F51.01 PRIMARY INSOMNIA: ICD-10-CM

## 2018-04-16 DIAGNOSIS — E11.40 TYPE 2 DIABETES MELLITUS WITH DIABETIC NEUROPATHY, WITHOUT LONG-TERM CURRENT USE OF INSULIN (HCC): Primary | ICD-10-CM

## 2018-04-16 DIAGNOSIS — F32.9 MAJOR DEPRESSION, CHRONIC: ICD-10-CM

## 2018-04-16 DIAGNOSIS — J43.1 PANLOBULAR EMPHYSEMA (HCC): ICD-10-CM

## 2018-04-16 RX ORDER — MIRTAZAPINE 30 MG/1
30 TABLET, FILM COATED ORAL
Qty: 30 TAB | Refills: 2 | Status: SHIPPED | OUTPATIENT
Start: 2018-04-16 | End: 2018-09-10 | Stop reason: SDUPTHER

## 2018-04-16 NOTE — PROGRESS NOTES
Cyndi Bella 9/22/1933, is a 80 y.o. female, who is seen today for reevaluation of COPD, fairly recent pneumonia, chronic depression, hypertension and diabetes. She is off mirtazapine and not sleeping well, she has difficulty getting to sleep and staying asleep. It is unclear how long she has been off mirtazapine. She uses paroxetine regularly. She is here with her son-in-law. She has been on oxygen at home but has not used that for the last week. She is breathing better. She was hospitalized 2 months ago with pneumonia and I have not seen her since then. She is following her diet and has cut her smoking back to one quarter pack per day.     Past Medical History:   Diagnosis Date    Colon polyps     COPD 8-30-02    DDD (degenerative disc disease), lumbar 10/1/2014    Degeneration of lumbar or lumbosacral intervertebral disc     Depression     Diabetes ([facility] 7-19-05    Diverticulosis     DM neuropathy, painful ([facility] 10/1/2014    MOORE (Dyspnea on Exertion) 4-19-02    echo: +mild LVH, TW13-52% w/ diastolic dysfxn    Glaucoma     HCAP (healthcare-associated pneumonia) 03/24/2017    Hemorrhoids 6-6-06    Hyperlipidemia 5/17/2011    Hypertension 4-16-02    LBP (low back pain) 4-13-04    Low back pain radiating to both legs 10/1/2014    Lumbago     Lumbar disc herniation 10/1/2014    Lumbar facet arthropathy ([facility] 10/1/2014    Lumbosacral radiculopathy at L5 10/1/2014    Lumbosacral radiculopathy at S1 10/1/2014    Microscopic hematuria     OA (osteoarthritis)     Overactive bladder 5/17/2011    RLS (restless legs syndrome) 1/17/2013    Sciatica     Shortness of breath     Spinal stenosis, lumbar region, without neurogenic claudication     Spondylolisthesis of lumbar region 10/1/2014    Spondylolisthesis, grade 1 10/1/2014    Stress urinary incontinence     Syncope     -MRI brain    Urinary tract infection, site not specified      Current Outpatient Prescriptions   Medication Sig Dispense Refill    gabapentin (NEURONTIN) 300 mg capsule TAKE 1 CAPSULE BY MOUTH 3  TIMES DAILY 270 Cap 1    simvastatin (ZOCOR) 20 mg tablet TAKE 1 TABLET BY MOUTH  NIGHTLY 90 Tab 1    tamsulosin (FLOMAX) 0.4 mg capsule Take 1 Cap by mouth daily. 15 Cap 0    mirtazapine (REMERON) 30 mg tablet TAKE ONE TABLET BY MOUTH NIGHTLY 30 Tab 2    OXYGEN-AIR DELIVERY SYSTEMS 3 L by Nasal route continuous.  OXYGEN-AIR DELIVERY SYSTEMS 3 L by Nasal route continuous.  metoprolol succinate (TOPROL-XL) 25 mg XL tablet Take 25 mg by mouth two (2) times a day.  fluticasone-vilanterol (BREO ELLIPTA) 100-25 mcg/dose inhaler Take 1 Puff by inhalation daily. 1 Inhaler 0    albuterol-ipratropium (DUO-NEB) 2.5 mg-0.5 mg/3 ml nebu 3 mL by Nebulization route every six (6) hours as needed. 30 Nebule 0    famotidine (PEPCID) 20 mg tablet Take 1 Tab by mouth nightly. 6 Tab 0    lisinopril (PRINIVIL, ZESTRIL) 5 mg tablet Take 1 Tab by mouth daily. 30 Tab 0    Nebulizer & Compressor (PORTABLE NEBULIZER SYSTEM) machine 1 Each by Does Not Apply route every six (6) hours as needed. 1 Each 0    HYDROmorphone (DILAUDID) 4 mg tablet 1/2 to one tab up to three times per day prn severe pain 90 Tab 0    PARoxetine (PAXIL) 30 mg tablet Take 1 Tab by mouth daily. 90 Tab 3    naloxone 4 mg/actuation spry 4 mg by Nasal route as needed. Indications: OPIATE-INDUCED RESPIRATORY DEPRESSION 1 Package 0    amLODIPine (NORVASC) 10 mg tablet Take 1 Tab by mouth daily. 30 Tab 0    omega 3-dha-epa-fish oil (FISH OIL) 100-160-1,000 mg cap Take 1,000 mg by mouth daily.  LUMIGAN 0.01 % ophthalmic drops Administer 1 Drop to both eyes nightly.  ascorbic acid (VITAMIN C) 1,000 mg tablet Take 1,000 mg by mouth daily.  traZODone (DESYREL) 100 mg tablet Take 100 mg by mouth nightly as needed for Sleep (insomia).  Patient takes one and half tabs at bedtime       cycloSPORINE (RESTASIS) 0.05 % ophthalmic emulsion Administer 1 Drop to both eyes two (2) times a day.  RF-YW-ZR-Fe-Min-Lycopen-Lutein (CENTRUM) 0.4-162-18 mg Tab Take 1 Tab by mouth daily. Visit Vitals    /56    Pulse 81    Temp 98.5 °F (36.9 °C) (Oral)    Resp 14    Ht 5' 5\" (1.651 m)    Wt 128 lb (58.1 kg)    SpO2 90%    BMI 21.3 kg/m2     Carotids are 2+ without bruits. No adenopathy or thyromegaly. Lungs are clear to percussion. Diminished breath sounds with no wheezing or crackles. Heart reveals a regular rhythm with normal S1 and S2 no murmur gallop click or rub. Apical impulse is not palpable. Abdomen is soft and nontender with no hepatosplenomegaly or masses and no bruits. Extremities reveal no clubbing cyanosis or edema. Pulses are intact but slightly diminished in the feet. Assessment: #1.  Diabetes has been doing well, she will continue diabetic diet and we will check glucose in about 2 months. #2.  Severe COPD breathing better with much less smoking. I have encouraged her to quit altogether and certainly not increase from her current quarter pack per day. #3.  Chronic depression and insomnia, will restart mirtazapine 30 mg daily and she will continue fluoxetine 20 mg daily. #4.  Hypertension is controlled. She will continue amlodipine 10 mg daily. #5. Hyperlipidemia has been doing well. She will continue simvastatin 20 mg each evening. Gaetano Medellin MD FACP    Please note: This document has been produced using voice recognition software. Unrecognized errors in transcription may be present.   Follow-up in 2 months with lab

## 2018-04-16 NOTE — MR AVS SNAPSHOT
[facility] 
 
 
 [address], [address] 
[phone] Patient: Jose Eduardo Tineo MRN:  [id number] Visit Information Date & Time Provider Department Dept. Phone Encounter #  
 4/16/2018 11:00 AM Renay Walker MD [facility] [address] [place] [address] [phone] [id number] Your Appointments 6/18/2018 10:00 AM  
Office Visit with Renay Walker MD  
[facility] [address] [place] [address] [facility]) Appt Note: 2 month f/u  
 [address] [address] [address] [facility] Upcoming Health Maintenance Date Due  
 EYE EXAM RETINAL OR DILATED Q1 2/5/2015 GLAUCOMA SCREENING Q2Y 4/1/2016 Influenza Age 5 to Adult 8/1/2017 HEMOGLOBIN A1C Q6M 6/4/2018 MEDICARE YEARLY EXAM 9/14/2018 LIPID PANEL Q1 12/4/2018 MICROALBUMIN Q1 12/13/2018 DTaP/Tdap/Td series (2 - Td) 6/11/2024 Allergies as of 4/16/2018  Review Complete On: 4/16/2018 By: Renay Walker MD  
  
 Severity Noted Reaction Type Reactions Levaquin [Levofloxacin]  05/17/2011    Rash Current Immunizations  Reviewed on 9/13/2017 Name Date Influenza High Dose Vaccine PF 10/13/2016 Influenza Vaccine 10/15/2014 Influenza Vaccine (Quad) PF 12/8/2015 Influenza Vaccine Split 11/22/2011, 11/11/2010 11:46 AM  
 Pneumococcal Conjugate (PCV-13) 12/8/2015 Pneumococcal Polysaccharide (PPSV-23) 9/20/2000 Tdap 6/11/2014 ZZZ-RETIRED (DO NOT USE) Pneumococcal Vaccine (Unspecified Type) 9/20/2000 Not reviewed this visit You Were Diagnosed With   
  
 Codes Comments Type 2 diabetes mellitus with diabetic neuropathy, without long-term current use of insulin (HCC)    -  Primary ICD-10-CM: E11.40 ICD-9-CM: 250.60, 357.2 Panlobular emphysema ([address]     ICD-10-CM: J43.1 ICD-9-CM: 492.8 Major depression, chronic     ICD-10-CM: F34.1 ICD-9-CM: 296.20 Primary insomnia     ICD-10-CM: F51.01 
ICD-9-CM: 307.42 Vitals BP Pulse Temp Resp Height(growth percentile) Weight(growth percentile) 140/56 81 98.5 °F (36.9 °C) (Oral) 14 5' 5\" (1.651 m) 128 lb (58.1 kg) SpO2 BMI OB Status Smoking Status 90% 21.3 kg/m2 Hysterectomy Current Every Day Smoker Vitals History BMI and BSA Data Body Mass Index Body Surface Area  
 21.3 kg/m 2 1.63 m 2 Preferred Pharmacy Pharmacy Name Phone [facility], [address] [address] [place] [address] [address] Your Updated Medication List  
  
   
This list is accurate as of 4/16/18 11:24 AM.  Always use your most recent med list.  
  
  
  
  
 albuterol-ipratropium 2.5 mg-0.5 mg/3 ml Nebu Commonly known as:  DUO-NEB  
3 mL by Nebulization route every six (6) hours as needed. amLODIPine 10 mg tablet Commonly known as:  Sherrye Acron Take 1 Tab by mouth daily. CENTRUM 0.4-162-18 mg Tab Generic drug:  DW-PW-RN-Fe-Min-Lycopen-Lutein Take 1 Tab by mouth daily. famotidine 20 mg tablet Commonly known as:  PEPCID Take 1 Tab by mouth nightly. FISH -160-1,000 mg Cap Generic drug:  omega 3-dha-epa-fish oil Take 1,000 mg by mouth daily. fluticasone-vilanterol 100-25 mcg/dose inhaler Commonly known as:  BREO ELLIPTA Take 1 Puff by inhalation daily. gabapentin 300 mg capsule Commonly known as:  NEURONTIN  
TAKE 1 CAPSULE BY MOUTH 3  TIMES DAILY HYDROmorphone 4 mg tablet Commonly known as:  DILAUDID  
1/2 to one tab up to three times per day prn severe pain  
  
 lisinopril 5 mg tablet Commonly known as:  Aung Little Take 1 Tab by mouth daily. LUMIGAN 0.01 % ophthalmic drops Generic drug:  bimatoprost  
Administer 1 Drop to both eyes nightly. metoprolol succinate 25 mg XL tablet Commonly known as:  TOPROL-XL  
 Take 25 mg by mouth two (2) times a day. mirtazapine 30 mg tablet Commonly known as:  REMERON  
TAKE ONE TABLET BY MOUTH NIGHTLY  
  
 naloxone 4 mg/actuation nasal spray Commonly known as:  NARCAN  
4 mg by Nasal route as needed. Indications: OPIATE-INDUCED RESPIRATORY DEPRESSION Nebulizer & Compressor machine Commonly known as:  PORTABLE NEBULIZER SYSTEM  
1 Each by Does Not Apply route every six (6) hours as needed. * OXYGEN-AIR DELIVERY SYSTEMS  
3 L by Nasal route continuous. * OXYGEN-AIR DELIVERY SYSTEMS  
3 L by Nasal route continuous. PARoxetine 30 mg tablet Commonly known as:  PAXIL Take 1 Tab by mouth daily. RESTASIS 0.05 % ophthalmic emulsion Generic drug:  cycloSPORINE Administer 1 Drop to both eyes two (2) times a day. simvastatin 20 mg tablet Commonly known as:  ZOCOR  
TAKE 1 TABLET BY MOUTH  NIGHTLY  
  
 tamsulosin 0.4 mg capsule Commonly known as:  FLOMAX Take 1 Cap by mouth daily. traZODone 100 mg tablet Commonly known as:  Samule Grills Take 100 mg by mouth nightly as needed for Sleep (insomia). Patient takes one and half tabs at bedtime VITAMIN C 1,000 mg tablet Generic drug:  ascorbic acid (vitamin C) Take 1,000 mg by mouth daily. * Notice: This list has 2 medication(s) that are the same as other medications prescribed for you. Read the directions carefully, and ask your doctor or other care provider to review them with you. To-Do List   
 Around 06/21/2018 Lab:  HEMOGLOBIN A1C WITH EAG Around 06/21/2018 Lab:  LIPID PANEL Around 06/21/2018 Lab:  METABOLIC PANEL, COMPREHENSIVE Introducing [facility] & HEALTH SERVICES! [facility] introduces [employer] patient portal. Now you can access parts of your medical record, email your doctor's office, and request medication refills online. 1. In your internet browser, go to [email or web address] 2. Click on the First Time User? Click Here link in the Sign In box. You will see the New Member Sign Up page. 3. Enter your [employer] Access Code exactly as it appears below. You will not need to use this code after youve completed the sign-up process. If you do not sign up before the expiration date, you must request a new code. · [employer] Access Code: [email or web address] Expires: 6/17/2018  6:10 PM 
 
4. Enter the last four digits of your Social Security Number (xxxx) and Date of Birth (mm/dd/yyyy) as indicated and click Submit. You will be taken to the next sign-up page. 5. Create a [employer] ID. This will be your [employer] login ID and cannot be changed, so think of one that is secure and easy to remember. 6. Create a [employer] password. You can change your password at any time. 7. Enter your Password Reset Question and Answer. This can be used at a later time if you forget your password. 8. Enter your e-mail address. You will receive e-mail notification when new information is available in [address] 9. Click Sign Up. You can now view and download portions of your medical record. 10. Click the Download Summary menu link to download a portable copy of your medical information. If you have questions, please visit the Frequently Asked Questions section of the [employer] website. Remember, [employer] is NOT to be used for urgent needs. For medical emergencies, dial 911. Now available from your iPhone and Android! Please provide this summary of care documentation to your next provider. Your primary care clinician is listed as Angelo Cummins. If you have any questions after today's visit, please call [phone].

## 2018-04-18 RX ORDER — AMLODIPINE BESYLATE 10 MG/1
10 TABLET ORAL DAILY
Qty: 90 TAB | Refills: 0 | Status: SHIPPED | OUTPATIENT
Start: 2018-04-18 | End: 2018-06-06 | Stop reason: SDUPTHER

## 2018-04-25 ENCOUNTER — TELEPHONE (OUTPATIENT)
Dept: INTERNAL MEDICINE CLINIC | Age: 83
End: 2018-04-25

## 2018-04-25 NOTE — TELEPHONE ENCOUNTER
pts daughter calling asking if  to prescribe rx for sleeping.   Pt is having a difficult time with  being in nursing home

## 2018-04-26 ENCOUNTER — OFFICE VISIT (OUTPATIENT)
Dept: INTERNAL MEDICINE CLINIC | Age: 83
End: 2018-04-26

## 2018-04-26 VITALS
HEART RATE: 78 BPM | BODY MASS INDEX: 20.83 KG/M2 | RESPIRATION RATE: 12 BRPM | SYSTOLIC BLOOD PRESSURE: 130 MMHG | DIASTOLIC BLOOD PRESSURE: 78 MMHG | TEMPERATURE: 98.2 F | WEIGHT: 125 LBS | HEIGHT: 65 IN | OXYGEN SATURATION: 93 %

## 2018-04-26 DIAGNOSIS — F32.9 MAJOR DEPRESSION, CHRONIC: ICD-10-CM

## 2018-04-26 DIAGNOSIS — J43.1 PANLOBULAR EMPHYSEMA (HCC): ICD-10-CM

## 2018-04-26 DIAGNOSIS — E11.40 TYPE 2 DIABETES MELLITUS WITH DIABETIC NEUROPATHY, WITHOUT LONG-TERM CURRENT USE OF INSULIN (HCC): ICD-10-CM

## 2018-04-26 DIAGNOSIS — I10 ESSENTIAL HYPERTENSION WITH GOAL BLOOD PRESSURE LESS THAN 140/90: ICD-10-CM

## 2018-04-26 DIAGNOSIS — F51.04 CHRONIC INSOMNIA: Primary | ICD-10-CM

## 2018-04-26 NOTE — MR AVS SNAPSHOT
[facility] 
 
 
 [address], [address] [facility] 
[phone] Patient: Ellie Patel MRN:  [id number] Visit Information Date & Time Provider Department Dept. Phone Encounter #  
 4/26/2018 10:30 AM Ryan Ochoa MD [facility] [address] [place] [address] [phone] Your Appointments 6/11/2018 10:15 AM  
LAB with [facility] NURSE VISIT [facility] [address] [place] [address] ([facility]) Appt Note: lab  
 [address], [address] [place] [address] [facility] [address]  
  
   
 [address], [address]  
  
    
 6/18/2018 10:00 AM  
Office Visit with Ryan Ochoa MD  
[facility] [address] [place] [address]) Appt Note: 2 month f/u  
 [id number] [place] [address] [address]  
  
   
 [address], [address] Upcoming Health Maintenance Date Due  
 EYE EXAM RETINAL OR DILATED Q1 2/5/2015 GLAUCOMA SCREENING Q2Y 4/1/2016 Influenza Age 5 to Adult 8/1/2017 HEMOGLOBIN A1C Q6M 6/4/2018 MEDICARE YEARLY EXAM 9/14/2018 LIPID PANEL Q1 12/4/2018 MICROALBUMIN Q1 12/13/2018 DTaP/Tdap/Td series (2 - Td) 6/11/2024 Allergies as of 4/26/2018  Review Complete On: 4/26/2018 By: Ryan Ochoa MD  
  
 Severity Noted Reaction Type Reactions Levaquin [Levofloxacin]  05/17/2011    Rash Current Immunizations  Reviewed on 9/13/2017 Name Date Influenza High Dose Vaccine PF 10/13/2016 Influenza Vaccine 10/15/2014 Influenza Vaccine (Quad) PF 12/8/2015 Influenza Vaccine Split 11/22/2011, 11/11/2010 11:46 AM  
 Pneumococcal Conjugate (PCV-13) 12/8/2015 Pneumococcal Polysaccharide (PPSV-23) 9/20/2000 Tdap 6/11/2014 ZZZ-RETIRED (DO NOT USE) Pneumococcal Vaccine (Unspecified Type) 9/20/2000 Not reviewed this visit Vitals BP Pulse Temp Resp Height(growth percentile) Weight(growth percentile) 130/78 78 98.2 °F (36.8 °C) (Oral) 12 5' 5\" (1.651 m) 125 lb (56.7 kg) SpO2 BMI OB Status Smoking Status 93% 20.8 kg/m2 Hysterectomy Current Every Day Smoker Vitals History BMI and BSA Data Body Mass Index Body Surface Area  
 20.8 kg/m 2 1.61 m 2 Preferred Pharmacy Pharmacy Name Phone [facility], [address] [place] [address] [address] Your Updated Medication List  
  
   
This list is accurate as of 4/26/18 11:38 AM.  Always use your most recent med list.  
  
  
  
  
 albuterol-ipratropium 2.5 mg-0.5 mg/3 ml Nebu Commonly known as:  DUO-NEB  
3 mL by Nebulization route every six (6) hours as needed. amLODIPine 10 mg tablet Commonly known as:  Ramirez Inks Take 1 Tab by mouth daily. CENTRUM 0.4-162-18 mg Tab Generic drug:  KR-MP-WR-Fe-Min-Lycopen-Lutein Take 1 Tab by mouth daily. famotidine 20 mg tablet Commonly known as:  PEPCID Take 1 Tab by mouth nightly. FISH -160-1,000 mg Cap Generic drug:  omega 3-dha-epa-fish oil Take 1,000 mg by mouth daily. fluticasone-vilanterol 100-25 mcg/dose inhaler Commonly known as:  BREO ELLIPTA Take 1 Puff by inhalation daily. gabapentin 300 mg capsule Commonly known as:  NEURONTIN  
TAKE 1 CAPSULE BY MOUTH 3  TIMES DAILY HYDROmorphone 4 mg tablet Commonly known as:  DILAUDID  
1/2 to one tab up to three times per day prn severe pain  
  
 lisinopril 5 mg tablet Commonly known as:  Angelique Drought Take 1 Tab by mouth daily. LUMIGAN 0.01 % ophthalmic drops Generic drug:  bimatoprost  
Administer 1 Drop to both eyes nightly. metoprolol succinate 25 mg XL tablet Commonly known as:  TOPROL-XL Take 25 mg by mouth two (2) times a day. mirtazapine 30 mg tablet Commonly known as:  Swarthmore Cooks Take 1 Tab by mouth nightly. naloxone 4 mg/actuation nasal spray Commonly known as:  NARCAN  
4 mg by Nasal route as needed. Indications: OPIATE-INDUCED RESPIRATORY DEPRESSION Nebulizer & Compressor machine Commonly known as:  PORTABLE NEBULIZER SYSTEM  
1 Each by Does Not Apply route every six (6) hours as needed. * OXYGEN-AIR DELIVERY SYSTEMS  
3 L by Nasal route continuous. * OXYGEN-AIR DELIVERY SYSTEMS  
3 L by Nasal route continuous. PARoxetine 30 mg tablet Commonly known as:  PAXIL Take 1 Tab by mouth daily. RESTASIS 0.05 % ophthalmic emulsion Generic drug:  cycloSPORINE Administer 1 Drop to both eyes two (2) times a day. simvastatin 20 mg tablet Commonly known as:  ZOCOR  
TAKE 1 TABLET BY MOUTH  NIGHTLY  
  
 tamsulosin 0.4 mg capsule Commonly known as:  FLOMAX Take 1 Cap by mouth daily. traZODone 100 mg tablet Commonly known as:  Claudine Hesselbach Take 100 mg by mouth nightly as needed for Sleep (insomia). Patient takes one and half tabs at bedtime VITAMIN C 1,000 mg tablet Generic drug:  ascorbic acid (vitamin C) Take 1,000 mg by mouth daily. * Notice: This list has 2 medication(s) that are the same as other medications prescribed for you. Read the directions carefully, and ask your doctor or other care provider to review them with you. Introducing \[facility]\"" & HEALTH SERVICES! [employer] introduces [employer] patient portal. Now you can access parts of your medical record, email your doctor's office, and request medication refills online. 1. In your internet browser, go to [email or web address] 2. Click on the First Time User? Click Here link in the Sign In box. You will see the New Member Sign Up page. 3. Enter your [employer] Access Code exactly as it appears below. You will not need to use this code after youve completed the sign-up process. If you do not sign up before the expiration date, you must request a new code. · [employer] Access Code: [email or web address] Expires: 6/17/2018  6:10 PM 
 
4. Enter the last four digits of your Social Security Number (xxxx) and Date of Birth (mm/dd/yyyy) as indicated and click Submit. You will be taken to the next sign-up page. 5. Create a [employer] ID. This will be your [employer] login ID and cannot be changed, so think of one that is secure and easy to remember. 6. Create a [employer] password. You can change your password at any time. 7. Enter your Password Reset Question and Answer. This can be used at a later time if you forget your password. 8. Enter your e-mail address. You will receive e-mail notification when new information is available in [address] 9. Click Sign Up. You can now view and download portions of your medical record. 10. Click the Download Summary menu link to download a portable copy of your medical information. If you have questions, please visit the Frequently Asked Questions section of the [employer] website. Remember, [employer] is NOT to be used for urgent needs. For medical emergencies, dial 911. Now available from your iPhone and Android! Please provide this summary of care documentation to your next provider. Your primary care clinician is listed as Emanuel Dempsey. Anson Reyez. If you have any questions after today's visit, please call [phone].

## 2018-04-26 NOTE — PROGRESS NOTES
Valeria Conway 9/22/1933, is a 80 y.o. female, who is seen today for evaluation of chronic insomnia which is gotten worse, severe COPD, depression and other issues. The patient notes that she is having ongoing problems with insomnia despite all the medicine she uses. In questioning her it looks like she is taking everything she is supposed to be taking. She does not feel depressed at this point but just cannot sleep. She thinks some of it stress related to worry about her  other things. She did not sleep at all last night and around 3 AM became quite dyspneic and after her son helped her with her nebulizer her breathing improved as remains better than it was at 3 AM.  She uses her regular inhalers correctly but has not been using oxygen much lately day or night. She is supposed to be on 3 L at least through the night. She sees a pulmonary specialist periodically.     Past Medical History:   Diagnosis Date    Colon polyps     COPD 8-30-02    DDD (degenerative disc disease), lumbar 10/1/2014    Degeneration of lumbar or lumbosacral intervertebral disc     Depression     Diabetes ([address] 7-19-05    Diverticulosis     DM neuropathy, painful ([address] 10/1/2014    MOORE (Dyspnea on Exertion) 4-19-02    echo: +mild LVH, YO95-31% w/ diastolic dysfxn    Glaucoma     HCAP (healthcare-associated pneumonia) 03/24/2017    Hemorrhoids 6-6-06    Hyperlipidemia 5/17/2011    Hypertension 4-16-02    LBP (low back pain) 4-13-04    Low back pain radiating to both legs 10/1/2014    Lumbago     Lumbar disc herniation 10/1/2014    Lumbar facet arthropathy ([address] 10/1/2014    Lumbosacral radiculopathy at L5 10/1/2014    Lumbosacral radiculopathy at S1 10/1/2014    Microscopic hematuria     OA (osteoarthritis)     Overactive bladder 5/17/2011    RLS (restless legs syndrome) 1/17/2013    Sciatica     Shortness of breath     Spinal stenosis, lumbar region, without neurogenic claudication     Spondylolisthesis of lumbar region 10/1/2014    Spondylolisthesis, grade 1 10/1/2014    Stress urinary incontinence     Syncope     -MRI brain    Urinary tract infection, site not specified      Current Outpatient Prescriptions   Medication Sig Dispense Refill    amLODIPine (NORVASC) 10 mg tablet Take 1 Tab by mouth daily. 90 Tab 0    mirtazapine (REMERON) 30 mg tablet Take 1 Tab by mouth nightly. 30 Tab 2    gabapentin (NEURONTIN) 300 mg capsule TAKE 1 CAPSULE BY MOUTH 3  TIMES DAILY 270 Cap 1    simvastatin (ZOCOR) 20 mg tablet TAKE 1 TABLET BY MOUTH  NIGHTLY 90 Tab 1    tamsulosin (FLOMAX) 0.4 mg capsule Take 1 Cap by mouth daily. 15 Cap 0    OXYGEN-AIR DELIVERY SYSTEMS 3 L by Nasal route continuous.  OXYGEN-AIR DELIVERY SYSTEMS 3 L by Nasal route continuous.  metoprolol succinate (TOPROL-XL) 25 mg XL tablet Take 25 mg by mouth two (2) times a day.  fluticasone-vilanterol (BREO ELLIPTA) 100-25 mcg/dose inhaler Take 1 Puff by inhalation daily. 1 Inhaler 0    albuterol-ipratropium (DUO-NEB) 2.5 mg-0.5 mg/3 ml nebu 3 mL by Nebulization route every six (6) hours as needed. 30 Nebule 0    famotidine (PEPCID) 20 mg tablet Take 1 Tab by mouth nightly. 6 Tab 0    lisinopril (PRINIVIL, ZESTRIL) 5 mg tablet Take 1 Tab by mouth daily. 30 Tab 0    Nebulizer & Compressor (PORTABLE NEBULIZER SYSTEM) machine 1 Each by Does Not Apply route every six (6) hours as needed. 1 Each 0    HYDROmorphone (DILAUDID) 4 mg tablet 1/2 to one tab up to three times per day prn severe pain 90 Tab 0    PARoxetine (PAXIL) 30 mg tablet Take 1 Tab by mouth daily. 90 Tab 3    naloxone 4 mg/actuation spry 4 mg by Nasal route as needed. Indications: OPIATE-INDUCED RESPIRATORY DEPRESSION 1 Package 0    omega 3-dha-epa-fish oil (FISH OIL) 100-160-1,000 mg cap Take 1,000 mg by mouth daily.  LUMIGAN 0.01 % ophthalmic drops Administer 1 Drop to both eyes nightly.       ascorbic acid (VITAMIN C) 1,000 mg tablet Take 1,000 mg by mouth daily.  traZODone (DESYREL) 100 mg tablet Take 100 mg by mouth nightly as needed for Sleep (insomia). Patient takes one and half tabs at bedtime       cycloSPORINE (RESTASIS) 0.05 % ophthalmic emulsion Administer 1 Drop to both eyes two (2) times a day.  VY-UR-EQ-Fe-Min-Lycopen-Lutein (CENTRUM) 0.4-162-18 mg Tab Take 1 Tab by mouth daily. Visit Vitals    /78    Pulse 78    Temp 98.2 °F (36.8 °C) (Oral)    Resp 12    Ht 5' 5\" (1.651 m)    Wt 125 lb (56.7 kg)    SpO2 93%    BMI 20.8 kg/m2     No JVD or HJR. Carotids are 2+ without bruits. She is not using accessory muscles respiration. Lungs are clear to percussion. Diminished breath sounds throughout with only minimal end expiratory wheeze and no crackles. Heart reveals a regular rhythm with normal S1 and S2 no murmur gallop click or rub. Apical impulse is not palpable and heart tones are somewhat distant. Abdomen is soft and nontender with no hepatosplenomegaly or masses and no bruits. Extremities reveal no clubbing cyanosis or edema. Pulses are 2+ except greatly diminished in the feet and ankles. Assessment: #1. Chronic insomnia despite trazodone 150 mg at bedtime, mirtazapine 30 mg at bedtime and paroxetine 30 mg daily. I talked to her and her son at great length about insomnia and its management. I told her it would be definitely dangerous to try sedative agents such as Xanax Valium zolpidem and other similar drugs. I recommended she try to keep her nerves under control with her 3 and depression antidepressant agents and if not doing better we may need to make adjustments. I have explained to her that particularly with her severe COPD adding sedating agent is just not a good idea and furthermore she might fall and fractured her hip or have other issues related to the sedative nature of those drugs. Also they are very unlikely to be helpful in the long-term.   #2.  Severe emphysema, she will continue her current inhalers and use her nebulizer when needed. Current oxygen level at rest off oxygen is 93% but have encouraged her to use her oxygen at home especially at night. #3. She is to smoke well over a pack per day and has cut down to a quarter pack per day and have encouraged her to continue to try to cut back and quit smoking. Follow-up as previously planned    Emanuel Dempsey. Greg Posada MD FACP    Please note: This document has been produced using voice recognition software. Unrecognized errors in transcription may be present. This visit lasted 25 minutes, greater than 50% of the time was spent counseling as above, it seems that she and her son understand what I have told them but I do not think she is particularly happy about the outcome of not receiving a prescription for a \"sleeping pill\".

## 2018-06-03 RX ORDER — METOPROLOL TARTRATE 25 MG/1
TABLET, FILM COATED ORAL
Qty: 180 TAB | Refills: 3 | Status: SHIPPED | OUTPATIENT
Start: 2018-06-03 | End: 2018-07-09 | Stop reason: ALTCHOICE

## 2018-06-06 RX ORDER — AMLODIPINE BESYLATE 10 MG/1
TABLET ORAL
Qty: 90 TAB | Refills: 1 | Status: SHIPPED | OUTPATIENT
Start: 2018-06-06 | End: 2018-07-09 | Stop reason: ALTCHOICE

## 2018-06-12 ENCOUNTER — APPOINTMENT (OUTPATIENT)
Dept: INTERNAL MEDICINE CLINIC | Age: 83
End: 2018-06-12

## 2018-06-12 ENCOUNTER — HOSPITAL ENCOUNTER (OUTPATIENT)
Dept: LAB | Age: 83
Discharge: HOME | End: 2018-06-12
Payer: MEDICARE

## 2018-06-12 DIAGNOSIS — E11.40 TYPE 2 DIABETES MELLITUS WITH DIABETIC NEUROPATHY, WITHOUT LONG-TERM CURRENT USE OF INSULIN (HCC): ICD-10-CM

## 2018-06-12 LAB
ALBUMIN SERPL-MCNC: 4 G/DL (ref 3.4–5)
ALBUMIN/GLOB SERPL: 1.3 {RATIO} (ref 0.8–1.7)
ALP SERPL-CCNC: 68 U/L (ref 45–117)
ALT SERPL-CCNC: 14 U/L (ref 13–56)
ANION GAP SERPL CALC-SCNC: 7 MMOL/L (ref 3–18)
AST SERPL-CCNC: 13 U/L (ref 15–37)
BILIRUB SERPL-MCNC: 0.3 MG/DL (ref 0.2–1)
BUN SERPL-MCNC: 12 MG/DL (ref 7–18)
BUN/CREAT SERPL: 16 (ref 12–20)
CALCIUM SERPL-MCNC: 9.1 MG/DL (ref 8.5–10.1)
CHLORIDE SERPL-SCNC: 97 MMOL/L (ref 100–108)
CHOLEST SERPL-MCNC: 132 MG/DL
CO2 SERPL-SCNC: 34 MMOL/L (ref 21–32)
CREAT SERPL-MCNC: 0.77 MG/DL (ref 0.6–1.3)
EST. AVERAGE GLUCOSE BLD GHB EST-MCNC: 131 MG/DL
GLOBULIN SER CALC-MCNC: 3 G/DL (ref 2–4)
GLUCOSE SERPL-MCNC: 102 MG/DL (ref 74–99)
HBA1C MFR BLD: 6.2 % (ref 4.2–5.6)
HDLC SERPL-MCNC: 43 MG/DL (ref 40–60)
HDLC SERPL: 3.1 {RATIO} (ref 0–5)
LDLC SERPL CALC-MCNC: 72 MG/DL (ref 0–100)
LIPID PROFILE,FLP: NORMAL
POTASSIUM SERPL-SCNC: 4.5 MMOL/L (ref 3.5–5.5)
PROT SERPL-MCNC: 7 G/DL (ref 6.4–8.2)
SODIUM SERPL-SCNC: 138 MMOL/L (ref 136–145)
TRIGL SERPL-MCNC: 85 MG/DL (ref ?–150)
VLDLC SERPL CALC-MCNC: 17 MG/DL

## 2018-06-12 PROCEDURE — 36415 COLL VENOUS BLD VENIPUNCTURE: CPT | Performed by: INTERNAL MEDICINE

## 2018-06-12 PROCEDURE — 80061 LIPID PANEL: CPT | Performed by: INTERNAL MEDICINE

## 2018-06-12 PROCEDURE — 80053 COMPREHEN METABOLIC PANEL: CPT | Performed by: INTERNAL MEDICINE

## 2018-06-12 PROCEDURE — 83036 HEMOGLOBIN GLYCOSYLATED A1C: CPT | Performed by: INTERNAL MEDICINE

## 2018-06-18 ENCOUNTER — OFFICE VISIT (OUTPATIENT)
Dept: INTERNAL MEDICINE CLINIC | Age: 83
End: 2018-06-18

## 2018-06-18 VITALS
OXYGEN SATURATION: 87 % | TEMPERATURE: 98.5 F | WEIGHT: 127 LBS | HEIGHT: 65 IN | RESPIRATION RATE: 16 BRPM | SYSTOLIC BLOOD PRESSURE: 114 MMHG | DIASTOLIC BLOOD PRESSURE: 70 MMHG | BODY MASS INDEX: 21.16 KG/M2 | HEART RATE: 70 BPM

## 2018-06-18 DIAGNOSIS — F32.9 MAJOR DEPRESSION, CHRONIC: ICD-10-CM

## 2018-06-18 DIAGNOSIS — E55.9 HYPOVITAMINOSIS D: ICD-10-CM

## 2018-06-18 DIAGNOSIS — J43.1 PANLOBULAR EMPHYSEMA (HCC): Primary | ICD-10-CM

## 2018-06-18 DIAGNOSIS — F51.01 PRIMARY INSOMNIA: ICD-10-CM

## 2018-06-18 DIAGNOSIS — E11.40 TYPE 2 DIABETES MELLITUS WITH DIABETIC NEUROPATHY, WITHOUT LONG-TERM CURRENT USE OF INSULIN (HCC): ICD-10-CM

## 2018-06-18 NOTE — MR AVS SNAPSHOT
[address] [facility] 
 
 
 [address] [facility] 
[phone] Patient: Keri Ruiz MRN:  [id number] Visit Information Date & Time Provider Department Dept. Phone Encounter #  
 6/18/2018 10:00 AM Randy Esquivel MD [facility] [phone] [id number] Your Appointments 6/18/2018 10:00 AM  
Office Visit with Randy Esquivel MD  
[facility] [address] [place] [address]) Appt Note: 2 month f/u; 2 month f/u; 2 month f/u  
 [address] [facility], [address]  
  
   
 [address] [facility]  
  
    
 10/22/2018  8:30 AM  
Office Visit with Randy Esquivel MD  
[facility] [address] [place] [address]) Appt Note: 4 month f/u  
 [address] [facility], [facility] [address] [facility] [address]  
[phone] Upcoming Health Maintenance Date Due  
 EYE EXAM RETINAL OR DILATED Q1 2/5/2015 GLAUCOMA SCREENING Q2Y 4/1/2016 Influenza Age 5 to Adult 8/1/2018 MEDICARE YEARLY EXAM 9/14/2018 HEMOGLOBIN A1C Q6M 12/12/2018 MICROALBUMIN Q1 12/13/2018 LIPID PANEL Q1 6/12/2019 DTaP/Tdap/Td series (2 - Td) 6/11/2024 Allergies as of 6/18/2018  Review Complete On: 6/18/2018 By: Randy Esquivel MD  
  
 Severity Noted Reaction Type Reactions Levaquin [Levofloxacin]  05/17/2011    Rash Current Immunizations  Reviewed on 9/13/2017 Name Date Influenza High Dose Vaccine PF 10/13/2016 Influenza Vaccine 10/15/2014 Influenza Vaccine (Quad) PF 12/8/2015 Influenza Vaccine Split 11/22/2011, 11/11/2010 11:46 AM  
 Pneumococcal Conjugate (PCV-13) 12/8/2015 Pneumococcal Polysaccharide (PPSV-23) 9/20/2000 Tdap 6/11/2014 ZZZ-RETIRED (DO NOT USE) Pneumococcal Vaccine (Unspecified Type) 9/20/2000 Not reviewed this visit You Were Diagnosed With   
  
 Codes Comments Panlobular emphysema ([facility]    -  Primary ICD-10-CM: J43.1 ICD-9-CM: 492.8 Type 2 diabetes mellitus with diabetic neuropathy, without long-term current use of insulin (HCC)     ICD-10-CM: E11.40 ICD-9-CM: 250.60, 357.2 Major depression, chronic     ICD-10-CM: F34.1 ICD-9-CM: 296.20 Primary insomnia     ICD-10-CM: F51.01 
ICD-9-CM: 307.42 Hypovitaminosis D     ICD-10-CM: E55.9 ICD-9-CM: 268.9 Vitals BP Pulse Temp Resp Height(growth percentile) Weight(growth percentile) 114/70 70 98.5 °F (36.9 °C) (Oral) 16 5' 5\" (1.651 m) 127 lb (57.6 kg) SpO2 BMI OB Status Smoking Status (!) 87% 21.13 kg/m2 Hysterectomy Current Every Day Smoker Vitals History BMI and BSA Data Body Mass Index Body Surface Area  
 21.13 kg/m 2 1.63 m 2 Preferred Pharmacy Pharmacy Name Phone [facility], [address] [place] [address] [address] [facility] Your Updated Medication List  
  
   
This list is accurate as of 6/18/18  9:33 AM.  Always use your most recent med list.  
  
  
  
  
 albuterol-ipratropium 2.5 mg-0.5 mg/3 ml Nebu Commonly known as:  DUO-NEB  
3 mL by Nebulization route every six (6) hours as needed. amLODIPine 10 mg tablet Commonly known as:  Pauline Grebe TAKE 1 TABLET BY MOUTH  DAILY CENTRUM 0.4-162-18 mg Tab Generic drug:  ET-YW-NJ-Fe-Min-Lycopen-Lutein Take 1 Tab by mouth daily. famotidine 20 mg tablet Commonly known as:  PEPCID Take 1 Tab by mouth nightly. FISH -160-1,000 mg Cap Generic drug:  omega 3-dha-epa-fish oil Take 1,000 mg by mouth daily. fluticasone-vilanterol 100-25 mcg/dose inhaler Commonly known as:  BREO ELLIPTA Take 1 Puff by inhalation daily. gabapentin 300 mg capsule Commonly known as:  NEURONTIN  
TAKE 1 CAPSULE BY MOUTH 3  TIMES DAILY HYDROmorphone 4 mg tablet Commonly known as:  DILAUDID  
 1/2 to one tab up to three times per day prn severe pain  
  
 lisinopril 5 mg tablet Commonly known as:  Velora Jas Take 1 Tab by mouth daily. LUMIGAN 0.01 % ophthalmic drops Generic drug:  bimatoprost  
Administer 1 Drop to both eyes nightly. metoprolol succinate 25 mg XL tablet Commonly known as:  TOPROL-XL Take 25 mg by mouth two (2) times a day. metoprolol tartrate 25 mg tablet Commonly known as:  LOPRESSOR  
TAKE 1 TABLET BY MOUTH TWO  TIMES DAILY  
  
 mirtazapine 30 mg tablet Commonly known as:  [place] Take 1 Tab by mouth nightly. naloxone 4 mg/actuation nasal spray Commonly known as:  NARCAN  
4 mg by Nasal route as needed. Indications: OPIATE-INDUCED RESPIRATORY DEPRESSION Nebulizer & Compressor machine Commonly known as:  PORTABLE NEBULIZER SYSTEM  
1 Each by Does Not Apply route every six (6) hours as needed. * OXYGEN-AIR DELIVERY SYSTEMS  
3 L by Nasal route continuous. * OXYGEN-AIR DELIVERY SYSTEMS  
3 L by Nasal route continuous. PARoxetine 30 mg tablet Commonly known as:  PAXIL Take 1 Tab by mouth daily. RESTASIS 0.05 % ophthalmic emulsion Generic drug:  cycloSPORINE Administer 1 Drop to both eyes two (2) times a day. simvastatin 20 mg tablet Commonly known as:  ZOCOR  
TAKE 1 TABLET BY MOUTH  NIGHTLY  
  
 tamsulosin 0.4 mg capsule Commonly known as:  FLOMAX Take 1 Cap by mouth daily. traZODone 100 mg tablet Commonly known as:  Claudine Hesselbach Take 100 mg by mouth nightly as needed for Sleep (insomia). Patient takes one and half tabs at bedtime VITAMIN C 1,000 mg tablet Generic drug:  ascorbic acid (vitamin C) Take 1,000 mg by mouth daily. * Notice: This list has 2 medication(s) that are the same as other medications prescribed for you. Read the directions carefully, and ask your doctor or other care provider to review them with you. Introducing [facility]! [employer] introduces [employer] patient portal. Now you can access parts of your medical record, email your doctor's office, and request medication refills online. 1. In your internet browser, go to [email or web address] 2. Click on the First Time User? Click Here link in the Sign In box. You will see the New Member Sign Up page. 3. Enter your [employer] Access Code exactly as it appears below. You will not need to use this code after youve completed the sign-up process. If you do not sign up before the expiration date, you must request a new code. · [employer] Access Code: [email or web address] 
Expires: 9/16/2018  8:18 AM 
 
4. Enter the last four digits of your Social Security Number (xxxx) and Date of Birth (mm/dd/yyyy) as indicated and click Submit. You will be taken to the next sign-up page. 5. Create a [employer] ID. This will be your [employer] login ID and cannot be changed, so think of one that is secure and easy to remember. 6. Create a [employer] password. You can change your password at any time. 7. Enter your Password Reset Question and Answer. This can be used at a later time if you forget your password. 8. Enter your e-mail address. You will receive e-mail notification when new information is available in [address] 9. Click Sign Up. You can now view and download portions of your medical record. 10. Click the Download Summary menu link to download a portable copy of your medical information. If you have questions, please visit the Frequently Asked Questions section of the [employer] website. Remember, [employer] is NOT to be used for urgent needs. For medical emergencies, dial 911. Now available from your iPhone and Android! Please provide this summary of care documentation to your next provider. Your primary care clinician is listed as Amberly Johnston. If you have any questions after today's visit, please call [phone].

## 2018-06-18 NOTE — PROGRESS NOTES
Lázaro Bias 9/22/1933, is a 80 y.o. female, who is seen today for reevaluation of pan lobular emphysema diabetes depression hyperlipidemia hypertension chronic insomnia. She sleeps pretty well but requires trazodone. She does not feel particularly depressed. She eats all the time according to her and her son who is with her today. Weight is stable. She does not use her oxygen most of the time and does not have it with her today. She does not think her breathing is gotten any worse and she has been using her nebulizer either once or twice a day lately. She does avoid sweets in her diet and takes all of her medicine correctly.     Past Medical History:   Diagnosis Date    Colon polyps     COPD 8-30-02    DDD (degenerative disc disease), lumbar 10/1/2014    Degeneration of lumbar or lumbosacral intervertebral disc     Depression     Diabetes ([facility] 7-19-05    Diverticulosis     DM neuropathy, painful ([facility] 10/1/2014    MOORE (Dyspnea on Exertion) 4-19-02    echo: +mild LVH, IH74-77% w/ diastolic dysfxn    Glaucoma     HCAP (healthcare-associated pneumonia) 03/24/2017    Hemorrhoids 6-6-06    Hyperlipidemia 5/17/2011    Hypertension 4-16-02    LBP (low back pain) 4-13-04    Low back pain radiating to both legs 10/1/2014    Lumbago     Lumbar disc herniation 10/1/2014    Lumbar facet arthropathy ([facility] 10/1/2014    Lumbosacral radiculopathy at L5 10/1/2014    Lumbosacral radiculopathy at S1 10/1/2014    Microscopic hematuria     OA (osteoarthritis)     Overactive bladder 5/17/2011    RLS (restless legs syndrome) 1/17/2013    Sciatica     Shortness of breath     Spinal stenosis, lumbar region, without neurogenic claudication     Spondylolisthesis of lumbar region 10/1/2014    Spondylolisthesis, grade 1 10/1/2014    Stress urinary incontinence     Syncope     -MRI brain    Urinary tract infection, site not specified      Current Outpatient Prescriptions Medication Sig Dispense Refill    amLODIPine (NORVASC) 10 mg tablet TAKE 1 TABLET BY MOUTH  DAILY 90 Tab 1    metoprolol tartrate (LOPRESSOR) 25 mg tablet TAKE 1 TABLET BY MOUTH TWO  TIMES DAILY 180 Tab 3    mirtazapine (REMERON) 30 mg tablet Take 1 Tab by mouth nightly. 30 Tab 2    gabapentin (NEURONTIN) 300 mg capsule TAKE 1 CAPSULE BY MOUTH 3  TIMES DAILY 270 Cap 1    simvastatin (ZOCOR) 20 mg tablet TAKE 1 TABLET BY MOUTH  NIGHTLY 90 Tab 1    tamsulosin (FLOMAX) 0.4 mg capsule Take 1 Cap by mouth daily. 15 Cap 0    OXYGEN-AIR DELIVERY SYSTEMS 3 L by Nasal route continuous.  OXYGEN-AIR DELIVERY SYSTEMS 3 L by Nasal route continuous.  metoprolol succinate (TOPROL-XL) 25 mg XL tablet Take 25 mg by mouth two (2) times a day.  fluticasone-vilanterol (BREO ELLIPTA) 100-25 mcg/dose inhaler Take 1 Puff by inhalation daily. 1 Inhaler 0    albuterol-ipratropium (DUO-NEB) 2.5 mg-0.5 mg/3 ml nebu 3 mL by Nebulization route every six (6) hours as needed. 30 Nebule 0    famotidine (PEPCID) 20 mg tablet Take 1 Tab by mouth nightly. 6 Tab 0    lisinopril (PRINIVIL, ZESTRIL) 5 mg tablet Take 1 Tab by mouth daily. 30 Tab 0    Nebulizer & Compressor (PORTABLE NEBULIZER SYSTEM) machine 1 Each by Does Not Apply route every six (6) hours as needed. 1 Each 0    HYDROmorphone (DILAUDID) 4 mg tablet 1/2 to one tab up to three times per day prn severe pain 90 Tab 0    PARoxetine (PAXIL) 30 mg tablet Take 1 Tab by mouth daily. 90 Tab 3    naloxone 4 mg/actuation spry 4 mg by Nasal route as needed. Indications: OPIATE-INDUCED RESPIRATORY DEPRESSION 1 Package 0    omega 3-dha-epa-fish oil (FISH OIL) 100-160-1,000 mg cap Take 1,000 mg by mouth daily.  LUMIGAN 0.01 % ophthalmic drops Administer 1 Drop to both eyes nightly.  ascorbic acid (VITAMIN C) 1,000 mg tablet Take 1,000 mg by mouth daily.  traZODone (DESYREL) 100 mg tablet Take 100 mg by mouth nightly as needed for Sleep (insomia).  Patient takes one and half tabs at bedtime       cycloSPORINE (RESTASIS) 0.05 % ophthalmic emulsion Administer 1 Drop to both eyes two (2) times a day.  LD-KR-IW-Fe-Min-Lycopen-Lutein (CENTRUM) 0.4-162-18 mg Tab Take 1 Tab by mouth daily. Visit Vitals    /70    Pulse 70    Temp 98.5 °F (36.9 °C) (Oral)    Resp 16    Ht 5' 5\" (1.651 m)    Wt 127 lb (57.6 kg)    SpO2 (!) 87%    BMI 21.13 kg/m2     Carotids are 2+ without bruits. Lungs are clear to percussion. Diminished breath sounds with no wheezing or crackles. Heart reveals a regular rhythm with normal S1 and S2 no murmur gallop click or rub. Apical impulse is not palpable. Abdomen is soft and nontender with no hepatosplenomegaly or masses and no bruits. Extremities reveal no clubbing cyanosis or edema. Pulses are 2+ except 1+ in the feet and ankles. Results for orders placed or performed during the hospital encounter of 06/12/18   LIPID PANEL   Result Value Ref Range    LIPID PROFILE          Cholesterol, total 132 <200 MG/DL    Triglyceride 85 <150 MG/DL    HDL Cholesterol 43 40 - 60 MG/DL    LDL, calculated 72 0 - 100 MG/DL    VLDL, calculated 17 MG/DL    CHOL/HDL Ratio 3.1 0 - 5.0     HEMOGLOBIN A1C WITH EAG   Result Value Ref Range    Hemoglobin A1c 6.2 (H) 4.2 - 5.6 %    Est. average glucose 897 mg/dL   METABOLIC PANEL, COMPREHENSIVE   Result Value Ref Range    Sodium 138 136 - 145 mmol/L    Potassium 4.5 3.5 - 5.5 mmol/L    Chloride 97 (L) 100 - 108 mmol/L    CO2 34 (H) 21 - 32 mmol/L    Anion gap 7 3.0 - 18 mmol/L    Glucose 102 (H) 74 - 99 mg/dL    BUN 12 7.0 - 18 MG/DL    Creatinine 0.77 0.6 - 1.3 MG/DL    BUN/Creatinine ratio 16 12 - 20      GFR est AA >60 >60 ml/min/1.73m2    GFR est non-AA >60 >60 ml/min/1.73m2    Calcium 9.1 8.5 - 10.1 MG/DL    Bilirubin, total 0.3 0.2 - 1.0 MG/DL    ALT (SGPT) 14 13 - 56 U/L    AST (SGOT) 13 (L) 15 - 37 U/L    Alk.  phosphatase 68 45 - 117 U/L    Protein, total 7.0 6.4 - 8.2 g/dL Albumin 4.0 3.4 - 5.0 g/dL    Globulin 3.0 2.0 - 4.0 g/dL    A-G Ratio 1.3 0.8 - 1.7       Assessment: #1. Pan lobular emphysema stable, she will continue current inhalation therapy and I told her she must not be smoking should be using oxygen to keep her oxygen level above 89%. Explained to her and her son the importance of adequate oxygenation. #2.  Diabetes well controlled. She will continue diabetic diet but try to eat enough to keep her weight up. #3. Hyperlipidemia doing well. She will continue simvastatin 20 mg each evening. #4.  Chronic depression not suicidal, she will continue paroxetine 30 mg daily. #5. She is sleeping pretty well, she will continue trazodone 150 mg daily at bedtime. #6.  History of chronic arthritic pain, she will continue current medication under the direction of her pain specialist.  #7. Hypertension very well controlled. She will continue amlodipine 10 mg daily lisinopril 5 mg daily and metoprolol 25 mg twice a day. Follow-up in 4 months with complete lab or sooner if needed    Gaetano Apodaca MD FACP    Please note: This document has been produced using voice recognition software. Unrecognized errors in transcription may be present.

## 2018-07-09 ENCOUNTER — TELEPHONE (OUTPATIENT)
Dept: INTERNAL MEDICINE CLINIC | Age: 83
End: 2018-07-09

## 2018-07-09 ENCOUNTER — OFFICE VISIT (OUTPATIENT)
Dept: INTERNAL MEDICINE CLINIC | Age: 83
End: 2018-07-09

## 2018-07-09 VITALS
OXYGEN SATURATION: 66 % | BODY MASS INDEX: 22.66 KG/M2 | DIASTOLIC BLOOD PRESSURE: 80 MMHG | RESPIRATION RATE: 24 BRPM | TEMPERATURE: 98.2 F | WEIGHT: 136 LBS | HEART RATE: 73 BPM | SYSTOLIC BLOOD PRESSURE: 144 MMHG | HEIGHT: 65 IN

## 2018-07-09 DIAGNOSIS — J44.1 ACUTE EXACERBATION OF CHRONIC OBSTRUCTIVE PULMONARY DISEASE (COPD) (HCC): Primary | ICD-10-CM

## 2018-07-09 DIAGNOSIS — Z91.14 NONCOMPLIANCE WITH MEDICATIONS: ICD-10-CM

## 2018-07-09 DIAGNOSIS — J43.1 PANLOBULAR EMPHYSEMA (HCC): ICD-10-CM

## 2018-07-09 RX ORDER — PREDNISONE 20 MG/1
TABLET ORAL
Qty: 10 TAB | Refills: 0 | Status: SHIPPED | OUTPATIENT
Start: 2018-07-09 | End: 2018-07-18

## 2018-07-09 NOTE — TELEPHONE ENCOUNTER
Pt's daughter called and stated tht she is concerned tht pt is still smoking, she has been complaining of sob, chest tightness and wheezing, she said if you need to spk to her she is at [phone], RM

## 2018-07-09 NOTE — PROGRESS NOTES
Dale Speaker 9/22/1933, is a 80 y.o. female, who is seen today for primarily increased dyspnea. She has not been using her nebulizer until yesterday, still smokes 5 cigarettes a day, has stopped taking a lot of her other medication and is gotten more short of breath last few days. She uses oxygen intermittently at home. Sometimes in the daytime, often not at night. She is not coughing. She has known severe emphysema. She has high blood pressure and is not taking medicine now. She has insomnia and is not clear if she is taking trazodone but still taking Paxil.     Past Medical History:   Diagnosis Date    Colon polyps     COPD 8-30-02    DDD (degenerative disc disease), lumbar 10/1/2014    Degeneration of lumbar or lumbosacral intervertebral disc     Depression     Diabetes ([facility] 7-19-05    Diverticulosis     DM neuropathy, painful ([facility] 10/1/2014    MOORE (Dyspnea on Exertion) 4-19-02    echo: +mild LVH, PU85-69% w/ diastolic dysfxn    Glaucoma     HCAP (healthcare-associated pneumonia) 03/24/2017    Hemorrhoids 6-6-06    Hyperlipidemia 5/17/2011    Hypertension 4-16-02    LBP (low back pain) 4-13-04    Low back pain radiating to both legs 10/1/2014    Lumbago     Lumbar disc herniation 10/1/2014    Lumbar facet arthropathy ([facility] 10/1/2014    Lumbosacral radiculopathy at L5 10/1/2014    Lumbosacral radiculopathy at S1 10/1/2014    Microscopic hematuria     OA (osteoarthritis)     Overactive bladder 5/17/2011    RLS (restless legs syndrome) 1/17/2013    Sciatica     Shortness of breath     Spinal stenosis, lumbar region, without neurogenic claudication     Spondylolisthesis of lumbar region 10/1/2014    Spondylolisthesis, grade 1 10/1/2014    Stress urinary incontinence     Syncope     -MRI brain    Urinary tract infection, site not specified      Current Outpatient Prescriptions   Medication Sig Dispense Refill    gabapentin (NEURONTIN) 300 mg capsule TAKE 1 CAPSULE BY MOUTH 3  TIMES DAILY 270 Cap 1    simvastatin (ZOCOR) 20 mg tablet TAKE 1 TABLET BY MOUTH  NIGHTLY 90 Tab 1    tamsulosin (FLOMAX) 0.4 mg capsule Take 1 Cap by mouth daily. 15 Cap 0    famotidine (PEPCID) 20 mg tablet Take 1 Tab by mouth nightly. 6 Tab 0    PARoxetine (PAXIL) 30 mg tablet Take 1 Tab by mouth daily. 90 Tab 3    traZODone (DESYREL) 100 mg tablet Take 100 mg by mouth nightly as needed for Sleep (insomia). Patient takes one and half tabs at bedtime       mirtazapine (REMERON) 30 mg tablet Take 1 Tab by mouth nightly. 30 Tab 2    OXYGEN-AIR DELIVERY SYSTEMS 3 L by Nasal route continuous.  OXYGEN-AIR DELIVERY SYSTEMS 3 L by Nasal route continuous.  albuterol-ipratropium (DUO-NEB) 2.5 mg-0.5 mg/3 ml nebu 3 mL by Nebulization route every six (6) hours as needed. 30 Nebule 0    Nebulizer & Compressor (PORTABLE NEBULIZER SYSTEM) machine 1 Each by Does Not Apply route every six (6) hours as needed. 1 Each 0     Visit Vitals    /80 (BP 1 Location: Left arm, BP Patient Position: Sitting)    Pulse 73    Temp 98.2 °F (36.8 °C) (Oral)    Resp 24    Ht 5' 5\" (1.651 m)    Wt 136 lb (61.7 kg)    SpO2 (!) 66%    BMI 22.63 kg/m2     No JVD or HJR. Lungs are clear to percussion. Diminished breath sounds throughout with end expiratory wheeze with forced expiration. No crackles. Heart reveals a regular rhythm with normal S1-S2 no murmur gallop click or rub. Apical impulse is not palpable. Abdomen is soft and nontender with no hepatosplenomegaly or masses and no bruits. Extremities reveal no clubbing cyanosis or edema. Pulses are 2+. Assessment: #1. Severe COPD doing much worse than she was just a few weeks ago. She is now wheezing and more short of breath. She declines to go to the emergency room.   She is to use her oxygen all the time, she is to start using DuoNeb 4 times a day on a routine basis, she will be started on prednisone 40 mg daily for 5 doses and I had recommended that she be seen in 4 days but her son does the driving and he really cannot bring her back for a week. I will see her in 1 week. We will go over her medicines in detail than what she is taking and what she is not taking but I did discontinue a lot of meds that she says she is not taking today. That will be further clarified next week. This visit lasted 25 minutes, greater than 50% of the time spent counseling above on the need for regular use of nebulizer, oxygen, the need for prednisone at this time and possible side effects, and the need to not smoke. Follow-up 1 week or sooner if needed. If she is breathing even worse she should be taken to the emergency room. Gaetano Montgomery MD FACP    Please note: This document has been produced using voice recognition software. Unrecognized errors in transcription may be present.

## 2018-07-09 NOTE — PROGRESS NOTES
Chief Complaint   Patient presents with    Breathing Problem     Patient states she has been having breathing problems for longer than a year and it has progressively gotten worse. Patient states she uses oxygen at night but also still smokes cigarettes. ROOM 10     1. Have you been to the ER, urgent care clinic or hospitalized since your last visit? NO.     2. Have you seen or consulted any other health care providers outside of the [address] [place] [address] since your last visit (Include any pap smears or colon screening)?  NO

## 2018-07-09 NOTE — MR AVS SNAPSHOT
[facility] 
 
 
 [address], [address] 
[phone] Patient: Zhanna Finn MRN:  [id number] Visit Information Date & Time Provider Department Dept. Phone Encounter #  
 7/9/2018 11:00 AM Rosalva Burns MD [facility] [id number] Follow-up Instructions Return in about 1 week (around 7/16/2018). Your Appointments 7/16/2018  1:30 PM  
Office Visit with Rosalva Burns MD  
[facility]) Appt Note: 1 week f/u  
 [address] [facility], [address] [id number] [address]  
  
   
 [address] [facility]  
  
    
 10/15/2018  9:25 AM  
LAB with [facility] NURSE VISIT [facility] ([facility]) Appt Note: lab  
 [address], [address] [facility] [address]  
  
   
 [address], [facility]  
  
    
 10/22/2018  8:30 AM  
Office Visit with Rosalva Burns MD  
[facility]) Appt Note: 4 month f/u  
 [address] [facility], [address] [facility]  
[phone] Upcoming Health Maintenance Date Due  
 EYE EXAM RETINAL OR DILATED Q1 2/5/2015 GLAUCOMA SCREENING Q2Y 4/1/2016 Influenza Age 5 to Adult 8/1/2018 MEDICARE YEARLY EXAM 9/14/2018 HEMOGLOBIN A1C Q6M 12/12/2018 MICROALBUMIN Q1 12/13/2018 LIPID PANEL Q1 6/12/2019 DTaP/Tdap/Td series (2 - Td) 6/11/2024 Allergies as of 7/9/2018  Review Complete On: 7/9/2018 By: Rosalva Burns MD  
  
 Severity Noted Reaction Type Reactions Levaquin [Levofloxacin]  05/17/2011    Rash Current Immunizations  Reviewed on 6/18/2018 Name Date Influenza High Dose Vaccine PF 10/13/2016 Influenza Vaccine 10/15/2014 Influenza Vaccine (Quad) PF 12/8/2015 Influenza Vaccine Split 11/22/2011, 11/11/2010 11:46 AM  
 Pneumococcal Conjugate (PCV-13) 12/8/2015 Pneumococcal Polysaccharide (PPSV-23) 9/20/2000 Tdap 6/11/2014 ZZZ-RETIRED (DO NOT USE) Pneumococcal Vaccine (Unspecified Type) 9/20/2000 Not reviewed this visit Vitals BP Pulse Temp Resp Height(growth percentile) Weight(growth percentile) 144/80 (BP 1 Location: Left arm, BP Patient Position: Sitting) 73 98.2 °F (36.8 °C) (Oral) 24 5' 5\" (1.651 m) 136 lb (61.7 kg) SpO2 BMI OB Status Smoking Status (!) 66% 22.63 kg/m2 Hysterectomy Current Every Day Smoker Vitals History BMI and BSA Data Body Mass Index Body Surface Area  
 22.63 kg/m 2 1.68 m 2 Preferred Pharmacy Pharmacy Name Phone [facility], [address] [place] [address] [address] [facility] Your Updated Medication List  
  
   
This list is accurate as of 7/9/18 11:43 AM.  Always use your most recent med list.  
  
  
  
  
 albuterol-ipratropium 2.5 mg-0.5 mg/3 ml Nebu Commonly known as:  DUO-NEB  
3 mL by Nebulization route every six (6) hours as needed. amLODIPine 10 mg tablet Commonly known as:  [place] TAKE 1 TABLET BY MOUTH  DAILY CENTRUM 0.4-162-18 mg Tab Generic drug:  EY-XW-DW-Fe-Min-Lycopen-Lutein Take 1 Tab by mouth daily. famotidine 20 mg tablet Commonly known as:  PEPCID Take 1 Tab by mouth nightly. FISH -160-1,000 mg Cap Generic drug:  omega 3-dha-epa-fish oil Take 1,000 mg by mouth daily. fluticasone-vilanterol 100-25 mcg/dose inhaler Commonly known as:  BREO ELLIPTA Take 1 Puff by inhalation daily. gabapentin 300 mg capsule Commonly known as:  NEURONTIN  
TAKE 1 CAPSULE BY MOUTH 3  TIMES DAILY HYDROmorphone 4 mg tablet Commonly known as:  DILAUDID  
1/2 to one tab up to three times per day prn severe pain  
  
 lisinopril 5 mg tablet Commonly known as:  Yasmin Escort  
 Take 1 Tab by mouth daily. LUMIGAN 0.01 % ophthalmic drops Generic drug:  bimatoprost  
Administer 1 Drop to both eyes nightly. metoprolol succinate 25 mg XL tablet Commonly known as:  TOPROL-XL Take 25 mg by mouth two (2) times a day. metoprolol tartrate 25 mg tablet Commonly known as:  LOPRESSOR  
TAKE 1 TABLET BY MOUTH TWO  TIMES DAILY  
  
 mirtazapine 30 mg tablet Commonly known as:  Annette Costain Take 1 Tab by mouth nightly. naloxone 4 mg/actuation nasal spray Commonly known as:  NARCAN  
4 mg by Nasal route as needed. Indications: OPIATE-INDUCED RESPIRATORY DEPRESSION Nebulizer & Compressor machine Commonly known as:  PORTABLE NEBULIZER SYSTEM  
1 Each by Does Not Apply route every six (6) hours as needed. * OXYGEN-AIR DELIVERY SYSTEMS  
3 L by Nasal route continuous. * OXYGEN-AIR DELIVERY SYSTEMS  
3 L by Nasal route continuous. PARoxetine 30 mg tablet Commonly known as:  PAXIL Take 1 Tab by mouth daily. RESTASIS 0.05 % ophthalmic emulsion Generic drug:  cycloSPORINE Administer 1 Drop to both eyes two (2) times a day. simvastatin 20 mg tablet Commonly known as:  ZOCOR  
TAKE 1 TABLET BY MOUTH  NIGHTLY  
  
 tamsulosin 0.4 mg capsule Commonly known as:  FLOMAX Take 1 Cap by mouth daily. traZODone 100 mg tablet Commonly known as:  Junella Yadav Take 100 mg by mouth nightly as needed for Sleep (insomia). Patient takes one and half tabs at bedtime VITAMIN C 1,000 mg tablet Generic drug:  ascorbic acid (vitamin C) Take 1,000 mg by mouth daily. * Notice: This list has 2 medication(s) that are the same as other medications prescribed for you. Read the directions carefully, and ask your doctor or other care provider to review them with you. Follow-up Instructions Return in about 1 week (around 7/16/2018). Introducing [facility] SERVICES! Dami Merino introduces amaysim patient portal. Now you can access parts of your medical record, email your doctor's office, and request medication refills online. 1. In your internet browser, go to [email or web address] 2. Click on the First Time User? Click Here link in the Sign In box. You will see the New Member Sign Up page. 3. Enter your amaysim Access Code exactly as it appears below. You will not need to use this code after youve completed the sign-up process. If you do not sign up before the expiration date, you must request a new code. · amaysim Access Code: [email or web address] 
Expires: 9/16/2018  8:18 AM 
 
4. Enter the last four digits of your Social Security Number (xxxx) and Date of Birth (mm/dd/yyyy) as indicated and click Submit. You will be taken to the next sign-up page. 5. Create a amaysim ID. This will be your amaysim login ID and cannot be changed, so think of one that is secure and easy to remember. 6. Create a amaysim password. You can change your password at any time. 7. Enter your Password Reset Question and Answer. This can be used at a later time if you forget your password. 8. Enter your e-mail address. You will receive e-mail notification when new information is available in [address] 9. Click Sign Up. You can now view and download portions of your medical record. 10. Click the Download Summary menu link to download a portable copy of your medical information. If you have questions, please visit the Frequently Asked Questions section of the amaysim website. Remember, amaysim is NOT to be used for urgent needs. For medical emergencies, dial 911. Now available from your iPhone and Android! Please provide this summary of care documentation to your next provider. Your primary care clinician is listed as Morene Hodgkin. Caridad Hoar. If you have any questions after today's visit, please call [phone].

## 2018-07-10 ENCOUNTER — TELEPHONE (OUTPATIENT)
Dept: INTERNAL MEDICINE CLINIC | Age: 83
End: 2018-07-10

## 2018-07-11 NOTE — TELEPHONE ENCOUNTER
Spoke with pt daughter, she said she was concerned the pt is not taking lisinopril, actos, toprol and her BP was high yesterday at her appt. She really wants a call from , the list of medications on the AVS she got was not updated (I explained it would not update until after  updated the chart which was probably after they left unfortunately) but she just wants to clarify things with . She had multiple questions I could not answer by his last OV note, she will be doing a test of her own between 12-1pm and may not be avail.      Yecenia [facility] [phone]

## 2018-07-13 ENCOUNTER — APPOINTMENT (OUTPATIENT)
Dept: GENERAL RADIOLOGY | Age: 83
DRG: 189 | End: 2018-07-13
Attending: PHYSICIAN ASSISTANT
Payer: MEDICARE

## 2018-07-13 ENCOUNTER — TELEPHONE (OUTPATIENT)
Dept: INTERNAL MEDICINE CLINIC | Age: 83
End: 2018-07-13

## 2018-07-13 ENCOUNTER — APPOINTMENT (OUTPATIENT)
Dept: GENERAL RADIOLOGY | Age: 83
DRG: 189 | End: 2018-07-13
Attending: STUDENT IN AN ORGANIZED HEALTH CARE EDUCATION/TRAINING PROGRAM
Payer: MEDICARE

## 2018-07-13 ENCOUNTER — HOSPITAL ENCOUNTER (INPATIENT)
Age: 83
LOS: 5 days | Discharge: HOME HEALTH | DRG: 189 | End: 2018-07-18
Attending: EMERGENCY MEDICINE | Admitting: INTERNAL MEDICINE
Payer: MEDICARE

## 2018-07-13 DIAGNOSIS — J44.1 COPD EXACERBATION (HCC): Primary | ICD-10-CM

## 2018-07-13 DIAGNOSIS — J18.9 COMMUNITY ACQUIRED PNEUMONIA, UNSPECIFIED LATERALITY: ICD-10-CM

## 2018-07-13 LAB
ARTERIAL PATENCY WRIST A: YES
ARTERIAL PATENCY WRIST A: YES
BASE EXCESS BLD CALC-SCNC: 10 MMOL/L
BASE EXCESS BLD CALC-SCNC: 11 MMOL/L
BASOPHILS # BLD: 0 K/UL (ref 0–0.1)
BASOPHILS NFR BLD: 0 % (ref 0–2)
BDY SITE: ABNORMAL
BDY SITE: ABNORMAL
BNP SERPL-MCNC: 3289 PG/ML (ref 0–1800)
CK MB CFR SERPL CALC: 3.9 % (ref 0–4)
CK MB SERPL-MCNC: 3 NG/ML (ref 5–25)
CK SERPL-CCNC: 77 U/L (ref 26–192)
DIFFERENTIAL METHOD BLD: ABNORMAL
EOSINOPHIL # BLD: 0.1 K/UL (ref 0–0.4)
EOSINOPHIL NFR BLD: 1 % (ref 0–5)
ERYTHROCYTE [DISTWIDTH] IN BLOOD BY AUTOMATED COUNT: 17 % (ref 11.6–14.5)
EST. AVERAGE GLUCOSE BLD GHB EST-MCNC: 134 MG/DL
GAS FLOW.O2 O2 DELIVERY SYS: ABNORMAL L/MIN
GAS FLOW.O2 O2 DELIVERY SYS: ABNORMAL L/MIN
GAS FLOW.O2 SETTING OXYMISER: 15 L/M
HBA1C MFR BLD: 6.3 % (ref 4.2–5.6)
HCO3 BLD-SCNC: 39.8 MMOL/L (ref 22–26)
HCO3 BLD-SCNC: 41.5 MMOL/L (ref 22–26)
HCT VFR BLD AUTO: 45.8 % (ref 35–45)
HGB BLD-MCNC: 14.9 G/DL (ref 12–16)
LYMPHOCYTES # BLD: 1.2 K/UL (ref 0.9–3.6)
LYMPHOCYTES NFR BLD: 15 % (ref 21–52)
MCH RBC QN AUTO: 29.3 PG (ref 24–34)
MCHC RBC AUTO-ENTMCNC: 32.5 G/DL (ref 31–37)
MCV RBC AUTO: 90.2 FL (ref 74–97)
MONOCYTES # BLD: 0.9 K/UL (ref 0.05–1.2)
MONOCYTES NFR BLD: 11 % (ref 3–10)
NEUTS SEG # BLD: 5.9 K/UL (ref 1.8–8)
NEUTS SEG NFR BLD: 73 % (ref 40–73)
O2/TOTAL GAS SETTING VFR VENT: 35 %
PCO2 BLD: 77.2 MMHG (ref 35–45)
PCO2 BLD: 85.8 MMHG (ref 35–45)
PEEP RESPIRATORY: 6 CMH2O
PH BLD: 7.29 [PH] (ref 7.35–7.45)
PH BLD: 7.32 [PH] (ref 7.35–7.45)
PIP ISTAT,IPIP: 15
PLATELET # BLD AUTO: 188 K/UL (ref 135–420)
PMV BLD AUTO: 9.8 FL (ref 9.2–11.8)
PO2 BLD: 104 MMHG (ref 80–100)
PO2 BLD: 59 MMHG (ref 80–100)
PRESSURE SUPPORT SETTING VENT: 9 CMH2O
RBC # BLD AUTO: 5.08 M/UL (ref 4.2–5.3)
SAO2 % BLD: 86 % (ref 92–97)
SAO2 % BLD: 97 % (ref 92–97)
SERVICE CMNT-IMP: ABNORMAL
SERVICE CMNT-IMP: ABNORMAL
SPECIMEN TYPE: ABNORMAL
SPECIMEN TYPE: ABNORMAL
SPONTANEOUS TIMED, IST: YES
TOTAL RESP. RATE, ITRR: 17
TROPONIN I SERPL-MCNC: 0.02 NG/ML (ref 0–0.04)
WBC # BLD AUTO: 8.1 K/UL (ref 4.6–13.2)

## 2018-07-13 PROCEDURE — 74011250636 HC RX REV CODE- 250/636: Performed by: INTERNAL MEDICINE

## 2018-07-13 PROCEDURE — 96374 THER/PROPH/DIAG INJ IV PUSH: CPT

## 2018-07-13 PROCEDURE — 99284 EMERGENCY DEPT VISIT MOD MDM: CPT

## 2018-07-13 PROCEDURE — 65660000004 HC RM CVT STEPDOWN

## 2018-07-13 PROCEDURE — 94660 CPAP INITIATION&MGMT: CPT

## 2018-07-13 PROCEDURE — 84484 ASSAY OF TROPONIN QUANT: CPT

## 2018-07-13 PROCEDURE — 74011000250 HC RX REV CODE- 250: Performed by: INTERNAL MEDICINE

## 2018-07-13 PROCEDURE — 93005 ELECTROCARDIOGRAM TRACING: CPT

## 2018-07-13 PROCEDURE — 74011250637 HC RX REV CODE- 250/637: Performed by: INTERNAL MEDICINE

## 2018-07-13 PROCEDURE — 83880 ASSAY OF NATRIURETIC PEPTIDE: CPT

## 2018-07-13 PROCEDURE — 36600 WITHDRAWAL OF ARTERIAL BLOOD: CPT

## 2018-07-13 PROCEDURE — 77030029684 HC NEB SM VOL KT MONA -A

## 2018-07-13 PROCEDURE — 5A09357 ASSISTANCE WITH RESPIRATORY VENTILATION, LESS THAN 24 CONSECUTIVE HOURS, CONTINUOUS POSITIVE AIRWAY PRESSURE: ICD-10-PCS | Performed by: INTERNAL MEDICINE

## 2018-07-13 PROCEDURE — 74011000250 HC RX REV CODE- 250: Performed by: STUDENT IN AN ORGANIZED HEALTH CARE EDUCATION/TRAINING PROGRAM

## 2018-07-13 PROCEDURE — 96375 TX/PRO/DX INJ NEW DRUG ADDON: CPT

## 2018-07-13 PROCEDURE — 94640 AIRWAY INHALATION TREATMENT: CPT

## 2018-07-13 PROCEDURE — 82803 BLOOD GASES ANY COMBINATION: CPT

## 2018-07-13 PROCEDURE — 71045 X-RAY EXAM CHEST 1 VIEW: CPT

## 2018-07-13 PROCEDURE — 74011250636 HC RX REV CODE- 250/636: Performed by: STUDENT IN AN ORGANIZED HEALTH CARE EDUCATION/TRAINING PROGRAM

## 2018-07-13 PROCEDURE — 85025 COMPLETE CBC W/AUTO DIFF WBC: CPT

## 2018-07-13 PROCEDURE — 77030013033 HC MSK BPAP/CPAP MMKA -B

## 2018-07-13 PROCEDURE — 83036 HEMOGLOBIN GLYCOSYLATED A1C: CPT | Performed by: INTERNAL MEDICINE

## 2018-07-13 RX ORDER — IPRATROPIUM BROMIDE AND ALBUTEROL SULFATE 2.5; .5 MG/3ML; MG/3ML
3 SOLUTION RESPIRATORY (INHALATION)
Status: DISCONTINUED | OUTPATIENT
Start: 2018-07-13 | End: 2018-07-15

## 2018-07-13 RX ORDER — GABAPENTIN 300 MG/1
300 CAPSULE ORAL 3 TIMES DAILY
Status: DISCONTINUED | OUTPATIENT
Start: 2018-07-13 | End: 2018-07-18 | Stop reason: HOSPADM

## 2018-07-13 RX ORDER — IPRATROPIUM BROMIDE AND ALBUTEROL SULFATE 2.5; .5 MG/3ML; MG/3ML
3 SOLUTION RESPIRATORY (INHALATION)
Status: DISPENSED | OUTPATIENT
Start: 2018-07-13 | End: 2018-07-13

## 2018-07-13 RX ORDER — MAGNESIUM SULFATE 100 %
4 CRYSTALS MISCELLANEOUS AS NEEDED
Status: DISCONTINUED | OUTPATIENT
Start: 2018-07-13 | End: 2018-07-18 | Stop reason: HOSPADM

## 2018-07-13 RX ORDER — INSULIN LISPRO 100 [IU]/ML
INJECTION, SOLUTION INTRAVENOUS; SUBCUTANEOUS
Status: DISCONTINUED | OUTPATIENT
Start: 2018-07-14 | End: 2018-07-16

## 2018-07-13 RX ORDER — CEFTRIAXONE 250 MG/8ML
1000 INJECTION, POWDER, FOR SOLUTION INTRAMUSCULAR; INTRAVENOUS ONCE
Status: DISCONTINUED | OUTPATIENT
Start: 2018-07-13 | End: 2018-07-13 | Stop reason: RX

## 2018-07-13 RX ORDER — PAROXETINE HYDROCHLORIDE 20 MG/1
30 TABLET, FILM COATED ORAL DAILY
Status: DISCONTINUED | OUTPATIENT
Start: 2018-07-14 | End: 2018-07-18 | Stop reason: HOSPADM

## 2018-07-13 RX ORDER — MIRTAZAPINE 15 MG/1
30 TABLET, FILM COATED ORAL
Status: DISCONTINUED | OUTPATIENT
Start: 2018-07-13 | End: 2018-07-18 | Stop reason: HOSPADM

## 2018-07-13 RX ORDER — DEXTROSE 50 % IN WATER (D50W) INTRAVENOUS SYRINGE
25-50 AS NEEDED
Status: DISCONTINUED | OUTPATIENT
Start: 2018-07-13 | End: 2018-07-18 | Stop reason: HOSPADM

## 2018-07-13 RX ORDER — HEPARIN SODIUM 5000 [USP'U]/ML
5000 INJECTION, SOLUTION INTRAVENOUS; SUBCUTANEOUS EVERY 8 HOURS
Status: DISCONTINUED | OUTPATIENT
Start: 2018-07-13 | End: 2018-07-18 | Stop reason: HOSPADM

## 2018-07-13 RX ORDER — SIMVASTATIN 20 MG/1
20 TABLET, FILM COATED ORAL
Status: DISCONTINUED | OUTPATIENT
Start: 2018-07-13 | End: 2018-07-18 | Stop reason: HOSPADM

## 2018-07-13 RX ORDER — HYDROCODONE BITARTRATE AND ACETAMINOPHEN 5; 325 MG/1; MG/1
1 TABLET ORAL
Status: DISCONTINUED | OUTPATIENT
Start: 2018-07-13 | End: 2018-07-18 | Stop reason: HOSPADM

## 2018-07-13 RX ORDER — SODIUM CHLORIDE 0.9 % (FLUSH) 0.9 %
5-10 SYRINGE (ML) INJECTION AS NEEDED
Status: DISCONTINUED | OUTPATIENT
Start: 2018-07-13 | End: 2018-07-18 | Stop reason: HOSPADM

## 2018-07-13 RX ORDER — SODIUM CHLORIDE 0.9 % (FLUSH) 0.9 %
5-10 SYRINGE (ML) INJECTION EVERY 8 HOURS
Status: DISCONTINUED | OUTPATIENT
Start: 2018-07-13 | End: 2018-07-18 | Stop reason: HOSPADM

## 2018-07-13 RX ORDER — ACETAMINOPHEN 325 MG/1
650 TABLET ORAL
Status: DISCONTINUED | OUTPATIENT
Start: 2018-07-13 | End: 2018-07-18 | Stop reason: HOSPADM

## 2018-07-13 RX ORDER — TAMSULOSIN HYDROCHLORIDE 0.4 MG/1
0.4 CAPSULE ORAL DAILY
Status: DISCONTINUED | OUTPATIENT
Start: 2018-07-14 | End: 2018-07-18 | Stop reason: HOSPADM

## 2018-07-13 RX ADMIN — AZITHROMYCIN MONOHYDRATE 500 MG: 500 INJECTION, POWDER, LYOPHILIZED, FOR SOLUTION INTRAVENOUS at 22:09

## 2018-07-13 RX ADMIN — IPRATROPIUM BROMIDE AND ALBUTEROL SULFATE 3 ML: .5; 3 SOLUTION RESPIRATORY (INHALATION) at 15:15

## 2018-07-13 RX ADMIN — SIMVASTATIN 20 MG: 20 TABLET, FILM COATED ORAL at 21:58

## 2018-07-13 RX ADMIN — CEFTRIAXONE 1000 MG: 250 INJECTION, POWDER, FOR SOLUTION INTRAMUSCULAR; INTRAVENOUS at 14:37

## 2018-07-13 RX ADMIN — GABAPENTIN 300 MG: 300 CAPSULE ORAL at 21:58

## 2018-07-13 RX ADMIN — CEFTRIAXONE SODIUM 1 G: 1 INJECTION, POWDER, FOR SOLUTION INTRAMUSCULAR; INTRAVENOUS at 15:16

## 2018-07-13 RX ADMIN — Medication 10 ML: at 21:58

## 2018-07-13 RX ADMIN — MIRTAZAPINE 30 MG: 15 TABLET, FILM COATED ORAL at 21:58

## 2018-07-13 RX ADMIN — HEPARIN SODIUM 5000 UNITS: 5000 INJECTION, SOLUTION INTRAVENOUS; SUBCUTANEOUS at 21:59

## 2018-07-13 RX ADMIN — IPRATROPIUM BROMIDE AND ALBUTEROL SULFATE 3 ML: 2.5; .5 SOLUTION RESPIRATORY (INHALATION) at 22:06

## 2018-07-13 RX ADMIN — METHYLPREDNISOLONE SODIUM SUCCINATE 125 MG: 125 INJECTION, POWDER, FOR SOLUTION INTRAMUSCULAR; INTRAVENOUS at 15:16

## 2018-07-13 NOTE — ED NOTES
I performed a brief evaluation, including history and physical, of the patient here in triage and I have determined that pt will need further treatment and evaluation from the main side ER physician. I have placed initial orders to help in expediting patients care. July 13, 2018 at 2:18 PM - Frandy Roni, [address]        02 saturation in the 60's. Family member states she recently refused hospital admission for pneumonia, reports on oxygen at home intermittently.

## 2018-07-13 NOTE — H&P
Hospitalist Admission Note NAME: La Garduno :  1933 MRN:  [id number] Date/Time of admission:  2018 4:42 PM 
 
Patient PCP: Wild Becerra MD 
________________________________________________________________________ My assessment of this patient's clinical condition and my plan of care is as follows. Assessment / Plan: 1. Acute on chronic hypoxic/hypercapnic respiratory failure 2. Acute exacerbation of COPD 3. Acute respiratory acidosis 4. Chronic emphysema 5. DDD, lumbar 6. Type 2 DM, uncontrolled d/t #7 
7. Diabetic neuropathy 1. Admit to stepdown for bipap wean 2. Steroids, nebs, abx 3. RT therapy 4. Repeat abg in am 
5. Correctional coverage for DM, particularly while on high dose steroids 6. Continue home meds otherwise Code Status: full Surrogate Decision Maker: pt 
 
DVT Prophylaxis: sc hep GI Prophylaxis: not indicated Subjective: CHIEF COMPLAINT: sob HISTORY OF PRESENT ILLNESS:    
Jayleen Green is a 80 y.o.  female who presents with progressive sob. Pt has the pmhx of emphysema, DM, and DDD of spine. She is on home O2 \"off and on\" and was at her baseline state of health when she developed progressive sob over the past few days. She came to the hospital and was noted to be hypoxic and in respiratory distress. Placed on bipap and abg showed acute respiratory failure with CO2 retention. Given nebs x 3, steroid, abx. Not able to wean off of bipap. Medicine was called. We were asked to admit for work up and evaluation of the above problems. Past Medical History:  
Diagnosis Date  Colon polyps  COPD 02  DDD (degenerative disc disease), lumbar 10/1/2014  Degeneration of lumbar or lumbosacral intervertebral disc  Depression  Diabetes ([facility] [address] 05  Diverticulosis   DM neuropathy, painful ([facility] [address] 10/1/2014  MOORE (Dyspnea on Exertion) 02  
 echo: +mild LVH, EF70-75% w/ diastolic dysfxn  Glaucoma  HCAP (healthcare-associated pneumonia) 03/24/2017  Hemorrhoids 6-6-06  Hyperlipidemia 5/17/2011  Hypertension 4-16-02  LBP (low back pain) 4-13-04  Low back pain radiating to both legs 10/1/2014  Lumbago  Lumbar disc herniation 10/1/2014  Lumbar facet arthropathy ([address] 10/1/2014  Lumbosacral radiculopathy at L5 10/1/2014  Lumbosacral radiculopathy at S1 10/1/2014  Microscopic hematuria  OA (osteoarthritis)  Overactive bladder 5/17/2011  RLS (restless legs syndrome) 1/17/2013  Sciatica  Shortness of breath  Spinal stenosis, lumbar region, without neurogenic claudication  Spondylolisthesis of lumbar region 10/1/2014  Spondylolisthesis, grade 1 10/1/2014  Stress urinary incontinence  Syncope   
 -MRI brain  Urinary tract infection, site not specified Past Surgical History:  
Procedure Laterality Date  HX APPENDECTOMY  HX CHOLECYSTECTOMY    HX HYSTERECTOMY    
 (+)DUB  HX POLYPECTOMY  WA COLONOSCOPY FLX DX W/COLLJ SPEC WHEN PFRMD  6-16-06  
 normal, Dr Saray Lincoln  WA COLONOSCOPY FLX DX W/COLLJ SPEC WHEN PFRMD    
 (+)polyp= tubular adenoma Social History Substance Use Topics  Smoking status: Current Every Day Smoker Packs/day: 0.25 Years: 50.00 Types: Cigarettes  Smokeless tobacco: Never Used Comment: pt has cut down recently to less than 1 ppd  Alcohol use No  
  
 
Family History Problem Relation Age of Onset  Colon Cancer Sister  Heart Disease Brother  Seizures Son  Colon Cancer Maternal Aunt Allergies Allergen Reactions  Levaquin [Levofloxacin] Rash Prior to Admission medications Medication Sig Start Date End Date Taking?  Authorizing Provider  
predniSONE (DELTASONE) 20 mg tablet 2 tablets by mouth daily for 5 days 7/9/18   Isamar Boogie MD  
mirtazapine (REMERON) 30 mg tablet Take 1 Tab by mouth nightly. 4/16/18   Cory Phan MD  
gabapentin (NEURONTIN) 300 mg capsule TAKE 1 CAPSULE BY MOUTH 3  TIMES DAILY 4/6/18   Cory Phan MD  
simvastatin (ZOCOR) 20 mg tablet TAKE 1 TABLET BY MOUTH  NIGHTLY 4/6/18   Cory Phan MD  
tamsulosin [facility]) 0.4 mg capsule Take 1 Cap by mouth daily. 3/23/18   Jake Cantu MD  
OXYGEN-AIR DELIVERY SYSTEMS 3 L by Nasal route continuous. Historical Provider OXYGEN-AIR DELIVERY SYSTEMS 3 L by Nasal route continuous. Historical Provider  
albuterol-ipratropium (DUO-NEB) 2.5 mg-0.5 mg/3 ml nebu 3 mL by Nebulization route every six (6) hours as needed. 2/10/18   Mayra Awad PA-C  
famotidine (PEPCID) 20 mg tablet Take 1 Tab by mouth nightly. 2/10/18   Mayra Awad PA-C Nebulizer & Compressor (PORTABLE NEBULIZER SYSTEM) machine 1 Each by Does Not Apply route every six (6) hours as needed. 2/10/18   Mayra Awad PA-C  
PARoxetine (PAXIL) 30 mg tablet Take 1 Tab by mouth daily. 9/19/17   Cory Phan MD  
traZODone (DESYREL) 100 mg tablet Take 100 mg by mouth nightly as needed for Sleep (insomia). Patient takes one and half tabs at bedtime     Historical Provider REVIEW OF SYSTEMS:    
I am not able to complete the review of systems because: The patient is intubated and sedated The patient has altered mental status due to his acute medical problems The patient has baseline aphasia from prior stroke(s) The patient has baseline dementia and is not reliable historian The patient is in acute medical distress and unable to provide information Total of 12 systems reviewed as follows:   
   POSITIVE= bolded text  Negative = text not underlined General:  fever, chills, sweats, generalized weakness, weight loss/gain,  
   loss of appetite Eyes:    blurred vision, eye pain, loss of vision, double vision ENT:    rhinorrhea, pharyngitis Respiratory:   cough, sputum production, SOB, MOORE, wheezing, pleuritic pain  
Cardiology:   chest pain, palpitations, orthopnea, PND, edema, syncope Gastrointestinal:  abdominal pain , N/V, diarrhea, dysphagia, constipation, bleeding Genitourinary:  frequency, urgency, dysuria, hematuria, incontinence Muskuloskeletal :  arthralgia, myalgia, back pain Hematology:  easy bruising, nose or gum bleeding, lymphadenopathy Dermatological: rash, ulceration, pruritis, color change / jaundice Endocrine:   hot flashes or polydipsia Neurological:  headache, dizziness, confusion, focal weakness, paresthesia, Speech difficulties, memory loss, gait difficulty Psychological: Feelings of anxiety, depression, agitation Objective: VITALS:   
Visit Vitals  Pulse 65  Temp 98.6 °F (37 °C)  Resp 16  SpO2 94% PHYSICAL EXAM: 
 
General:    Alert, cooperative, moderate respiratory distress, appears stated age. HEENT: Atraumatic, anicteric sclerae, pink conjunctivae No oral ulcers, mucosa moist, throat clear, dentition fair Neck:  Supple, symmetrical,  thyroid: non tender Lungs:   Clear to auscultation bilaterally. Pronounced wheezing throughout; no Rhonchi. No rales. Chest wall:  No tenderness  No Accessory muscle use. Heart:   Regular  rhythm,  No  murmur   No edema Abdomen:   Soft, non-tender. Not distended. Bowel sounds normal 
Extremities: No cyanosis. No clubbing,   
  Skin turgor normal, Capillary refill normal, Radial dial pulse 2+ Skin:     Not pale. Not Jaundiced  No rashes Psych:  Good insight. Not depressed. Not anxious or agitated. Neurologic: EOMs intact. No facial asymmetry. No aphasia or slurred speech. Symmetrical strength, Sensation grossly intact. Alert and oriented X 4.  
 
_______________________________________________________________________ Care Plan discussed with: 
  Comments Patient x Family RN x Care Manager                Consultant: _______________________________________________________________________ Expected  Disposition:  
Home with Family HH/PT/OT/RN x  
SNF/LTC   
MARIA ISABEL   
________________________________________________________________________ TOTAL TIME:  50 Minutes Critical Care Provided     Minutes non procedure based Comments  
 x Reviewed previous records  
>50% of visit spent in counseling and coordination of care x Discussion with patient and/or family and questions answered 
  
 
________________________________________________________________________ Procedures: see electronic medical records for all procedures/Xrays and details which were not copied into this note but were reviewed prior to creation of Plan. LAB DATA REVIEWED:   
Recent Results (from the past 24 hour(s)) CBC WITH AUTOMATED DIFF Collection Time: 07/13/18  2:32 PM  
Result Value Ref Range WBC 8.1 4.6 - 13.2 K/uL  
 RBC 5.08 4.20 - 5.30 M/uL  
 HGB 14.9 12.0 - 16.0 g/dL HCT 45.8 (H) 35.0 - 45.0 % MCV 90.2 74.0 - 97.0 FL  
 MCH 29.3 24.0 - 34.0 PG  
 MCHC 32.5 31.0 - 37.0 g/dL  
 RDW 17.0 (H) 11.6 - 14.5 % PLATELET 564 302 - 791 K/uL MPV 9.8 9.2 - 11.8 FL  
 NEUTROPHILS 73 40 - 73 % LYMPHOCYTES 15 (L) 21 - 52 % MONOCYTES 11 (H) 3 - 10 % EOSINOPHILS 1 0 - 5 % BASOPHILS 0 0 - 2 %  
 ABS. NEUTROPHILS 5.9 1.8 - 8.0 K/UL  
 ABS. LYMPHOCYTES 1.2 0.9 - 3.6 K/UL  
 ABS. MONOCYTES 0.9 0.05 - 1.2 K/UL  
 ABS. EOSINOPHILS 0.1 0.0 - 0.4 K/UL  
 ABS. BASOPHILS 0.0 0.0 - 0.1 K/UL  
 DF AUTOMATED    
NT-PRO BNP Collection Time: 07/13/18  2:32 PM  
Result Value Ref Range NT pro-BNP 3289 (H) 0 - 1800 PG/ML  
CARDIAC PANEL,(CK, CKMB & TROPONIN) Collection Time: 07/13/18  2:32 PM  
Result Value Ref Range CK 77 26 - 192 U/L  
 CK - MB 3.0 <3.6 ng/ml CK-MB Index 3.9 0.0 - 4.0 % Troponin-I, Qt. 0.02 0.0 - 0.045 NG/ML  
POC G3 Collection Time: 07/13/18  2:34 PM  
Result Value Ref Range  Device: Non rebreather Flow rate (POC) 15 L/M  
 pH (POC) 7.293 (L) 7.35 - 7.45    
 pCO2 (POC) 85.8 (H) 35.0 - 45.0 MMHG  
 pO2 (POC) 104 (H) 80 - 100 MMHG  
 HCO3 (POC) 41.5 (H) 22 - 26 MMOL/L  
 sO2 (POC) 97 92 - 97 % Base excess (POC) 11 mmol/L Allens test (POC) YES Site RIGHT RADIAL Specimen type (POC) ARTERIAL Performed by [employer] POC G3 Collection Time: 07/13/18  4:01 PM  
Result Value Ref Range Device: BIPAP    
 FIO2 (POC) 35 % pH (POC) 7.320 (L) 7.35 - 7.45    
 pCO2 (POC) 77.2 (H) 35.0 - 45.0 MMHG  
 pO2 (POC) 59 (L) 80 - 100 MMHG  
 HCO3 (POC) 39.8 (H) 22 - 26 MMOL/L  
 sO2 (POC) 86 (L) 92 - 97 % Base excess (POC) 10 mmol/L  
 PEEP/CPAP (POC) 6 cmH2O  
 PIP (POC) 15 Pressure support 9 cmH2O Allens test (POC) YES Total resp. rate 17 Site RIGHT RADIAL Specimen type (POC) ARTERIAL Performed by [employer] Spontaneous timed YES Aura Jones MD 
Internal Medicine Hospitalist Division

## 2018-07-13 NOTE — TELEPHONE ENCOUNTER
Patient daughter notified to get patient to the ER as soon as possible she verbalized an understanding

## 2018-07-13 NOTE — Clinical Note
Status[de-identified] Inpatient [101] Type of Bed: Stepdown [17] Inpatient Hospitalization Certified Necessary for the Following Reasons: 1. Patient Failed outpatient treatment (further clarification in H&P documentation) Admitting Diagnosis: COPD exacerbation ([facility] [[id number]] Admitting Physician: Curt Williamson [[id number]] Attending Physician: Curt Williamson [[id number]] Estimated Length of Stay: 2 Midnights Discharge Plan[de-identified] Home with Office Follow-up

## 2018-07-13 NOTE — IP AVS SNAPSHOT
[address] [facility] [address] 
[phone] Patient: Randal Stauffer MRN: [id number] XLP:[id number] About your hospitalization You were admitted on:  July 13, 2018 You last received care in the:  [facility] 2 CV STEPDOWN You were discharged on:  July 18, 2018 Why you were hospitalized Your primary diagnosis was:  Not on File Your diagnoses also included:  Copd Exacerbation (Hcc) Follow-up Information Follow up With Details Comments Contact Info Lois Prieto MD On 7/20/2018 @ 10am 7[address] [facility] [address] [facility] [address] [facility] [address] 
[phone] Drew Sales MD On 8/10/2018 @ 3:30 [address] [place] [address] [facility] [address] 
[phone] Marcelina Smith MD On 7/27/2018 @ 12 noon [address] [facility] [address] [address] 
[phone] Your Scheduled Appointments Friday July 20, 2018 10:00 AM EDT TRANSITIONAL CARE MANAGEMENT with Lois Prieto MD  
[facility]ists of [address] [place] [address] [address] [facility]) [address] [facility], [address] [facility] [address]  
[phone] Friday July 27, 2018 12:00 PM EDT TRANSITIONAL CARE MANAGEMENT with Marcelina Smith MD  
[address] ([address]) [address] [facility], [address] [address]  
[phone] Friday August 10, 2018 10:30 AM EDT Office Visit with Bob Carl MD  
[facility] [address] [facility] 102 [facility] [address]  
[phone] Discharge Orders None A check reggie indicates which time of day the medication should be taken. My Medications START taking these medications Instructions Each Dose to Equal  
 Morning Noon Evening Bedtime  
 amLODIPine 5 mg tablet Commonly known as:  Gregor De La Cruzle Start taking on:  7/19/2018 Take 1 Tab by mouth daily. 5 mg  
    
  
   
   
   
  
 azithromycin 500 mg Tab Commonly known as:  Nayeli Sibley Take 1 Tab by mouth daily for 5 days. 500 mg  
    
  
   
   
   
  
 budesonide-formoterol 80-4.5 mcg/actuation Hfaa Commonly known as:  SYMBICORT Take 2 Puffs by inhalation two (2) times a day. 2 Puff  
    
  
   
   
   
  
  
 cefpodoxime 200 mg tablet Commonly known as:  Talia Zelaya Take 1 Tab by mouth every twelve (12) hours. 200 mg CHANGE how you take these medications Instructions Each Dose to Equal  
 Morning Noon Evening Bedtime  
 predniSONE 10 mg tablet Commonly known as:  Yuliya Deutsch What changed:   
- medication strength 
- additional instructions Notes to Patient:  FOLLOW INSTRUCTIONS  
   
 1 tab po TID for 1 week , then 1 tab po BID for 1 week then 1 tab po daily for 1 week CONTINUE taking these medications Instructions Each Dose to Equal  
 Morning Noon Evening Bedtime  
 albuterol-ipratropium 2.5 mg-0.5 mg/3 ml Nebu Commonly known as:  DUO-NEB  
   
 3 mL by Nebulization route every six (6) hours as needed. 3 mL  
    
   
   
   
  
 famotidine 20 mg tablet Commonly known as:  PEPCID Take 1 Tab by mouth nightly. 20 mg  
    
   
   
   
  
  
 gabapentin 300 mg capsule Commonly known as:  NEURONTIN  
   
 TAKE 1 CAPSULE BY MOUTH 3  TIMES DAILY  
     
  
   
   
  
   
  
  
 mirtazapine 30 mg tablet Commonly known as:  Wilmar Garay Take 1 Tab by mouth nightly. 30 mg Nebulizer & Compressor machine Commonly known as:  PORTABLE NEBULIZER SYSTEM  
   
 1 Each by Does Not Apply route every six (6) hours as needed. 1 Each  
    
   
   
   
  
 * OXYGEN-AIR DELIVERY SYSTEMS  
   
 3 L by Nasal route continuous. 3 L  
    
   
   
   
  
 * OXYGEN-AIR DELIVERY SYSTEMS  
   
 3 L by Nasal route continuous.   
 3 L  
    
   
   
   
  
 PARoxetine 30 mg tablet Commonly known as:  PAXIL Take 1 Tab by mouth daily. 30 mg  
    
  
   
   
   
  
 simvastatin 20 mg tablet Commonly known as:  ZOCOR  
   
 TAKE 1 TABLET BY MOUTH  NIGHTLY  
     
   
   
   
  
  
 tamsulosin 0.4 mg capsule Commonly known as:  FLOMAX Take 1 Cap by mouth daily. 0.4 mg  
    
  
   
   
   
  
 * Notice: This list has 2 medication(s) that are the same as other medications prescribed for you. Read the directions carefully, and ask your doctor or other care provider to review them with you. STOP taking these medications   
 traZODone 100 mg tablet Commonly known as:  Gabriel Mcghee Where to Get Your Medications Information on where to get these meds will be given to you by the nurse or doctor. ! Ask your nurse or doctor about these medications  
  albuterol-ipratropium 2.5 mg-0.5 mg/3 ml Nebu  
 amLODIPine 5 mg tablet  
 azithromycin 500 mg Tab  
 budesonide-formoterol 80-4.5 mcg/actuation Hfaa  
 cefpodoxime 200 mg tablet  
 predniSONE 10 mg tablet Discharge Instructions Chronic Obstructive Pulmonary Disease (COPD): Care Instructions Your Care Instructions Chronic obstructive pulmonary disease (COPD) is a general term for a group of lung diseases, including emphysema and chronic bronchitis. People with COPD have decreased airflow in and out of the lungs, which makes it hard to breathe. The airways also can get clogged with thick mucus. Cigarette smoking is a major cause of COPD. Although there is no cure for COPD, you can slow its progress. Following your treatment plan and taking care of yourself can help you feel better and live longer. Follow-up care is a key part of your treatment and safety. Be sure to make and go to all appointments, and call your doctor if you are having problems. It's also a good idea to know your test results and keep a list of the medicines you take. How can you care for yourself at home? 
 Staying healthy 
  · Do not smoke. This is the most important step you can take to prevent more damage to your lungs. If you need help quitting, talk to your doctor about stop-smoking programs and medicines. These can increase your chances of quitting for good.  
  · Avoid colds and flu. Get a pneumococcal vaccine shot. If you have had one before, ask your doctor whether you need a second dose. Get the flu vaccine every fall. If you must be around people with colds or the flu, wash your hands often.  
  · Avoid secondhand smoke, air pollution, and high altitudes. Also avoid cold, dry air and hot, humid air. Stay at home with your windows closed when air pollution is bad.  
 Medicines and oxygen therapy 
  · Take your medicines exactly as prescribed. Call your doctor if you think you are having a problem with your medicine.  
  · You may be taking medicines such as: ¨ Bronchodilators. These help open your airways and make breathing easier. Bronchodilators are either short-acting (work for 6 to 9 hours) or long-acting (work for 24 hours). You inhale most bronchodilators, so they start to act quickly. Always carry your quick-relief inhaler with you in case you need it while you are away from home. ¨ Corticosteroids (prednisone, budesonide). These reduce airway inflammation. They come in pill or inhaled form. You must take these medicines every day for them to work well.  
  · A spacer may help you get more inhaled medicine to your lungs. Ask your doctor or pharmacist if a spacer is right for you. If it is, ask how to use it properly.  
  · Do not take any vitamins, over-the-counter medicine, or herbal products without talking to your doctor first.  
  · If your doctor prescribed antibiotics, take them as directed. Do not stop taking them just because you feel better. You need to take the full course of antibiotics.   · Oxygen therapy boosts the amount of oxygen in your blood and helps you breathe easier. Use the flow rate your doctor has recommended, and do not change it without talking to your doctor first.  
Activity 
  · Get regular exercise. Walking is an easy way to get exercise. Start out slowly, and walk a little more each day.  
  · Pay attention to your breathing. You are exercising too hard if you cannot talk while you are exercising.  
  · Take short rest breaks when doing household chores and other activities.  
  · Learn breathing methods-such as breathing through pursed lips-to help you become less short of breath.  
  · If your doctor has not set you up with a pulmonary rehabilitation program, talk to him or her about whether rehab is right for you. Rehab includes exercise programs, education about your disease and how to manage it, help with diet and other changes, and emotional support. Diet 
  · Eat regular, healthy meals. Use bronchodilators about 1 hour before you eat to make it easier to eat. Eat several small meals instead of three large ones. Drink beverages at the end of the meal. Avoid foods that are hard to chew.  
  · Eat foods that contain protein so that you do not lose muscle mass.  
  · Talk with your doctor if you gain too much weight or if you lose weight without trying.  
 Mental health 
  · Talk to your family, friends, or a therapist about your feelings. It is normal to feel frightened, angry, hopeless, helpless, and even guilty. Talking openly about bad feelings can help you cope. If these feelings last, talk to your doctor. When should you call for help? Call 911 anytime you think you may need emergency care. For example, call if: 
  · You have severe trouble breathing.  
 Call your doctor now or seek immediate medical care if: 
  · You have new or worse trouble breathing.  
  · You cough up blood.  
  · You have a fever.  Watch closely for changes in your health, and be sure to contact your doctor if: 
  · You cough more deeply or more often, especially if you notice more mucus or a change in the color of your mucus.  
  · You have new or worse swelling in your legs or belly.  
  · You are not getting better as expected. Where can you learn more? Go to http://shantell-leona.info/. Estiven Reap in the search box to learn more about \"Chronic Obstructive Pulmonary Disease (COPD): Care Instructions. \" Current as of: December 6, 2017 Content Version: 11.7 © 8336-0776 [employer]. Care instructions adapted under license by [employer] (which disclaims liability or warranty for this information). If you have questions about a medical condition or this instruction, always ask your healthcare professional. [address] any warranty or liability for your use of this information. COPD Exacerbation Plan: Care Instructions Your Care Instructions If you have chronic obstructive pulmonary disease (COPD), your usual shortness of breath could suddenly get worse. You may start coughing more and have more mucus. This flare-up is called a COPD exacerbation (say \"fg-DOG-vv-BAY-un\"). A lung infection or air pollution could set off an exacerbation. Sometimes it can happen after a quick change in temperature or being around chemicals. Work with your doctor to make a plan for dealing with an exacerbation. You can better manage it if you plan ahead. Follow-up care is a key part of your treatment and safety. Be sure to make and go to all appointments, and call your doctor if you are having problems. It's also a good idea to know your test results and keep a list of the medicines you take. How can you care for yourself at home? During an exacerbation · Do not panic if you start to have one.  Quick treatment at home may help you prevent serious breathing problems. If you have a COPD exacerbation plan that you developed with your doctor, follow it. · Take your medicines exactly as your doctor tells you. ¨ Use your inhaler as directed by your doctor. If your symptoms do not get better after you use your medicine, have someone take you to the emergency room. Call an ambulance if necessary. ¨ With inhaled medicines, a spacer or a nebulizer may help you get more medicine to your lungs. Ask your doctor or pharmacist how to use them properly. Practice using the spacer in front of a mirror before you have an exacerbation. This may help you get the medicine into your lungs quickly. ¨ If your doctor has given you steroid pills, take them as directed. ¨ Your doctor may have given you a prescription for antibiotics, which you can fill if you need it. ¨ Talk to your doctor if you have any problems with your medicine. And call your doctor if you have to use your antibiotic or steroid pills. Preventing an exacerbation · Do not smoke. This is the most important step you can take to prevent more damage to your lungs and prevent problems. If you already smoke, it is never too late to stop. If you need help quitting, talk to your doctor about stop-smoking programs and medicines. These can increase your chances of quitting for good. · Take your daily medicines as prescribed. · Avoid colds and flu. ¨ Get a pneumococcal vaccine. ¨ Get a flu vaccine each year, as soon as it is available. Ask those you live or work with to do the same, so they will not get the flu and infect you. ¨ Try to stay away from people with colds or the flu. ¨ Wash your hands often. · Avoid secondhand smoke; air pollution; cold, dry air; hot, humid air; and high altitudes. Stay at home with your windows closed when air pollution is bad.  
· Learn breathing techniques for COPD, such as breathing through pursed lips. These techniques can help you breathe easier during an exacerbation. When should you call for help? Call 911 anytime you think you may need emergency care. For example, call if: 
  · You have severe trouble breathing.  
  · You have severe chest pain.  
 Call your doctor now or seek immediate medical care if: 
  · You have new or worse shortness of breath.  
  · You develop new chest pain.  
  · You are coughing more deeply or more often, especially if you notice more mucus or a change in the color of your mucus.  
  · You cough up blood.  
  · You have new or increased swelling in your legs or belly.  
  · You have a fever.  
 Watch closely for changes in your health, and be sure to contact your doctor if: 
  · You need to use your antibiotic or steroid pills.  
  · Your symptoms are getting worse. Where can you learn more? Go to http://shantell-leona.info/. Enter Z874 in the search box to learn more about \"COPD Exacerbation Plan: Care Instructions. \" Current as of: December 6, 2017 Content Version: 11.7 © 6887-7695 [employer]. Care instructions adapted under license by [employer] (which disclaims liability or warranty for this information). If you have questions about a medical condition or this instruction, always ask your healthcare professional. [address] any warranty or liability for your use of this information. Pneumonia: Care Instructions Your Care Instructions Pneumonia is an infection of the lungs. Most cases are caused by infections from bacteria or viruses. Pneumonia may be mild or very severe. If it is caused by bacteria, you will be treated with antibiotics. It may take a few weeks to a few months to recover fully from pneumonia, depending on how sick you were and whether your overall health is good. Follow-up care is a key part of your treatment and safety.  Be sure to make and go to all appointments, and call your doctor if you are having problems. It's also a good idea to know your test results and keep a list of the medicines you take. How can you care for yourself at home? · Take your antibiotics exactly as directed. Do not stop taking the medicine just because you are feeling better. You need to take the full course of antibiotics. · Take your medicines exactly as prescribed. Call your doctor if you think you are having a problem with your medicine. · Get plenty of rest and sleep. You may feel weak and tired for a while, but your energy level will improve with time. · To prevent dehydration, drink plenty of fluids, enough so that your urine is light yellow or clear like water. Choose water and other caffeine-free clear liquids until you feel better. If you have kidney, heart, or liver disease and have to limit fluids, talk with your doctor before you increase the amount of fluids you drink. · Take care of your cough so you can rest. A cough that brings up mucus from your lungs is common with pneumonia. It is one way your body gets rid of the infection. But if coughing keeps you from resting or causes severe fatigue and chest-wall pain, talk to your doctor. He or she may suggest that you take a medicine to reduce the cough. · Use a vaporizer or humidifier to add moisture to your bedroom. Follow the directions for cleaning the machine. · Do not smoke or allow others to smoke around you. Smoke will make your cough last longer. If you need help quitting, talk to your doctor about stop-smoking programs and medicines. These can increase your chances of quitting for good. · Take an over-the-counter pain medicine, such as acetaminophen (Tylenol), ibuprofen (Advil, Motrin), or naproxen (Aleve). Read and follow all instructions on the label. · Do not take two or more pain medicines at the same time unless the doctor told you to.  Many pain medicines have acetaminophen, which is Tylenol. Too much acetaminophen (Tylenol) can be harmful. · If you were given a spirometer to measure how well your lungs are working, use it as instructed. This can help your doctor tell how your recovery is going. · To prevent pneumonia in the future, talk to your doctor about getting a flu vaccine (once a year) and a pneumococcal vaccine (one time only for most people). When should you call for help? Call 911 anytime you think you may need emergency care. For example, call if: 
  · You have severe trouble breathing.  
 Call your doctor now or seek immediate medical care if: 
  · You cough up dark brown or bloody mucus (sputum).  
  · You have new or worse trouble breathing.  
  · You are dizzy or lightheaded, or you feel like you may faint.  
 Watch closely for changes in your health, and be sure to contact your doctor if: 
  · You have a new or higher fever.  
  · You are coughing more deeply or more often.  
  · You are not getting better after 2 days (48 hours).  
  · You do not get better as expected. Where can you learn more? Go to http://shantell-leona.info/. Enter [id number] in the search box to learn more about \"Pneumonia: Care Instructions. \" Current as of: December 6, 2017 Content Version: 11.7 © 0796-5672 [employer] (now [employer]). Care instructions adapted under license by [employer] (which disclaims liability or warranty for this information). If you have questions about a medical condition or this instruction, always ask your healthcare professional. [address] [address] any warranty or liability for your use of this information. DISCHARGE SUMMARY from Nurse PATIENT INSTRUCTIONS: 
 
What to do at Home: 
Recommended activity: Activity as tolerated. *  Please give a list of your current medications to your Primary Care Provider.  
 
*  Please update this list whenever your medications are discontinued, doses are 
 changed, or new medications (including over-the-counter products) are added. *  Please carry medication information at all times in case of emergency situations. These are general instructions for a healthy lifestyle: No smoking/ No tobacco products/ Avoid exposure to second hand smoke Surgeon General's Warning:  Quitting smoking now greatly reduces serious risk to your health. Obesity, smoking, and sedentary lifestyle greatly increases your risk for illness A healthy diet, regular physical exercise & weight monitoring are important for maintaining a healthy lifestyle You may be retaining fluid if you have a history of heart failure or if you experience any of the following symptoms:  Weight gain of 3 pounds or more overnight or 5 pounds in a week, increased swelling in our hands or feet or shortness of breath while lying flat in bed. Please call your doctor as soon as you notice any of these symptoms; do not wait until your next office visit. Recognize signs and symptoms of STROKE: 
 
F-face looks uneven A-arms unable to move or move unevenly S-speech slurred or non-existent T-time-call 911 as soon as signs and symptoms begin-DO NOT go Back to bed or wait to see if you get better-TIME IS BRAIN. Warning Signs of HEART ATTACK Call 911 if you have these symptoms: 
? Chest discomfort. Most heart attacks involve discomfort in the center of the chest that lasts more than a few minutes, or that goes away and comes back. It can feel like uncomfortable pressure, squeezing, fullness, or pain. ? Discomfort in other areas of the upper body. Symptoms can include pain or discomfort in one or both arms, the back, neck, jaw, or stomach. ? Shortness of breath with or without chest discomfort. ? Other signs may include breaking out in a cold sweat, nausea, or lightheadedness. Don't wait more than five minutes to call [address]!  Fast action can save your life. Calling 911 is almost always the fastest way to get lifesaving treatment. Emergency Medical Services staff can begin treatment when they arrive  up to an hour sooner than if someone gets to the hospital by car. The discharge information has been reviewed with the patient. The patient verbalized understanding. Discharge medications reviewed with the patient and appropriate educational materials and side effects teaching were provided. ___________________________________________________________________________________________________________________________________ ACO Transitions of Care Introducing [employer] [address] offers a voluntary care coordination program to provide high quality service and care to [facility] fee-for-service beneficiaries. Fermin Curtis was designed to help you enhance your health and well-being through the following services: ? Transitions of Care  support for individuals who are transitioning from one care setting to another (example: Hospital to home). ? Chronic and Complex Care Coordination  support for individuals and caregivers of those with serious or chronic illnesses or with more than one chronic (ongoing) condition and those who take a number of different medications. If you meet specific medical criteria, a [address] [place] [address] Rd may call you directly to coordinate your care with your primary care physician and your other care providers. For questions about the [facility] programs, please, contact your physicians office. For general questions or additional information about Accountable Care Organizations: 
Please visit www.medicare.gov/acos. html or call 1-800-MEDICARE (7-805.410.4575) TTY users should call 0-684.208.3992. MyChart Announcement  We are excited to announce that we are making your provider's discharge notes available to you in [employer]. You will see these notes when they are completed and signed by the physician that discharged you from your recent hospital stay. If you have any questions or concerns about any information you see in [employer], please call the Health Information Department where you were seen or reach out to your Primary Care Provider for more information about your plan of care. Introducing [facility]! [employer] introduces [employer] patient portal. Now you can access parts of your medical record, email your doctor's office, and request medication refills online. 1. In your internet browser, go to [email or web address] 2. Click on the First Time User? Click Here link in the Sign In box. You will see the New Member Sign Up page. 3. Enter your [employer] Access Code exactly as it appears below. You will not need to use this code after youve completed the sign-up process. If you do not sign up before the expiration date, you must request a new code. · [employer] Access Code: [email or web address] 
Expires: 9/16/2018  8:18 AM 
 
4. Enter the last four digits of your Social Security Number (xxxx) and Date of Birth (mm/dd/yyyy) as indicated and click Submit. You will be taken to the next sign-up page. 5. Create a Raum[employer] ID. This will be your [employer] login ID and cannot be changed, so think of one that is secure and easy to remember. 6. Create a [employer] password. You can change your password at any time. 7. Enter your Password Reset Question and Answer. This can be used at a later time if you forget your password. 8. Enter your e-mail address. You will receive e-mail notification when new information is available in [address] 9. Click Sign Up. You can now view and download portions of your medical record. 10. Click the Download Summary menu link to download a portable copy of your medical information. If you have questions, please visit the Frequently Asked Questions section of the MyChart website. Remember, [employer] is NOT to be used for urgent needs. For medical emergencies, dial 911. Now available from your iPhone and Android! Introducing Ruben Sanchez As a [address] [place] [address] patient, I wanted to make you aware of our electronic visit tool called Ruben Sanchez. [facility] [address] [facility] allows you to connect within minutes with a medical provider 24 hours a day, seven days a week via a mobile device or tablet or logging into a secure website from your computer. You can access Ruben Sanchez from anywhere in the [place]. A virtual visit might be right for you when you have a simple condition and feel like you just dont want to get out of bed, or cant get away from work for an appointment, when your regular [address] [place] [address] provider is not available (evenings, weekends or holidays), or when youre out of town and need minor care. Electronic visits cost only $49 and if the [facility] [address] [facility] provider determines a prescription is needed to treat your condition, one can be electronically transmitted to a nearby pharmacy*. Please take a moment to enroll today if you have not already done so. The enrollment process is free and takes just a few minutes. To enroll, please download the [facility] 24[facility] ilan to your tablet or phone, or visit [email or web address] to enroll on your computer. And, as an [address] [place] [address] patient with a [employer] account, the results of your visits will be scanned into your electronic medical record and your primary care provider will be able to view the scanned results. We urge you to continue to see your regular [address] [place] [address] provider for your ongoing medical care.   And while your primary care provider may not be the one available when you seek a Ruben Sanchez virtual visit, the peace of mind you get from getting a real diagnosis real time can be priceless. For more information on Ruben Sanchez, view our Frequently Asked Questions (FAQs) at [email or web address]. Sincerely, 
 
Brayan Zuniga MD 
Chief Medical Officer [employer] *:  certain medications cannot be prescribed via Ruben Sanchez Providers Seen During Your Hospitalization Provider Specialty Primary office phone Leatha Arnold MD Emergency Medicine [phone] Ibeth Hickey MD Internal Medicine [phone] Betina Lee MD Internal Medicine [phone] Your Primary Care Physician (PCP) Primary Care Physician Office Phone Office Fax Daksha Logan [phone] You are allergic to the following Allergen Reactions Levaquin (Levofloxacin) Rash Recent Documentation Height Weight BMI OB Status Smoking Status 1.651 m 57.1 kg 20.95 kg/m2 Hysterectomy Current Every Day Smoker Emergency Contacts Name Discharge Info Relation Home Work Mobile [address] [facility] CAREGIVER [3] Spouse [3] [phone] JuanIesha pitts DISCHARGE CAREGIVER [3] Child [2] [phone] Patient Belongings The following personal items are in your possession at time of discharge: 
  Dental Appliances: Uppers, Lowers         Home Medications: None   Jewelry: Watch  Clothing: At bedside    Other Valuables: None Discharge Instructions Attachments/References AZITHROMYCIN (BY MOUTH) (ENGLISH) ALBUTEROL (BY BREATHING) (ENGLISH) AMLODIPINE (BY MOUTH) (ENGLISH) CEFPODOXIME PROXETIL (BY MOUTH) (ENGLISH) PREDNISONE (BY MOUTH) (ENGLISH) MEFS - BUDESONIDE (PULMICORT FLEX, PULMICORT FLEXHALER, PULMICORT RESPULES) - (BY BREATHING) (ENGLISH) Patient Handouts Azithromycin (By mouth) Azithromycin (fh-cyhy-xbf-MYE-sin) Treats infections. This medicine is a macrolide antibiotic. Brand Name(s): Zithromax, Zithromax Tri-Latrell, Zithromax Z-Latrell, Zmax There may be other brand names for this medicine. When This Medicine Should Not Be Used: This medicine is not right for everyone. Do not use it if you had an allergic reaction to azithromycin, erythromycin, or similar medicines, or you have a history of liver problems caused by azithromycin. How to Use This Medicine:  
Capsule, Liquid, Packet, Powder, Tablet · Your doctor will tell you how much medicine to use. Do not use more than directed. · Take all of the medicine in your prescription to clear up your infection, even if you feel better after the first few doses. · Read and follow the patient instructions that come with this medicine. Talk to your doctor or pharmacist if you have any questions. · Multiple dose (Zithromax® oral liquid or tablets): ¨ You may take this medicine with or without food. ¨ Shake the bottle well before you measure the medicine. Measure the oral liquid medicine with a marked measuring spoon, oral syringe, or medicine cup. · Single dose (Zmax® extended-release oral liquid or powder):  
¨ Liquid: § Take this medicine on an empty stomach at least 1 hour before you eat, or 2 hours after you eat. § Call your doctor right away if you vomit within 1 hour after you take the medicine. § You must take the liquid within 12 hours after the pharmacist gives it to you. § Shake the bottle well before you measure the medicine. Measure your dose with a marked measuring spoon, cup, or syringe. ¨ Powder:  
§ Open 1 packet and pour all of the medicine into a glass with about 2 ounces (¼ cup) of water. Mix well and drink the medicine right away. Pour another 2 ounces of water into the same glass, and drink the remaining medicine. · Missed dose: If you are taking multiple doses, take the dose as soon as you remember.  If it is almost time for your next dose, wait until then to take a regular dose. Do not use extra medicine to make up for a missed dose. · Store the medicine in a closed container at room temperature, away from heat, moisture, and direct light. · Extended-release oral liquid: Do not refrigerate or freeze. · Oral liquid for 1 dose only: Store at room temperature. Do not store in the refrigerator or allow the medicine to freeze. · Oral liquid for multiple doses: Store at room temperature or in the refrigerator. Use it within 10 days of filling the prescription. Drugs and Foods to Avoid: Ask your doctor or pharmacist before using any other medicine, including over-the-counter medicines, vitamins, and herbal products. · Some medicines can affect how azithromycin works. Tell your doctor if you are also using any of the following: ¨ Cyclosporine, digoxin, nelfinavir, or phenytoin ¨ Blood thinner ¨ Ergot medicine ¨ Medicine for a heart rhythm problem (including amiodarone, dofetilide, procainamide, quinidine, sotalol) · Zithromax® for multiple doses: Do not take an antacid that contains magnesium or aluminum at the same time you take Zithromax®. An antacid will affect how the medicine works. Antacids will not affect Zmax® for single dose. Warnings While Using This Medicine: · Tell your doctor if you are pregnant or breastfeeding, or if you have kidney disease, liver disease, heart disease, heart rhythm problems, heart failure, or myasthenia gravis. Tell your doctor if anyone in your family has heart rhythm problems. · This medicine may cause the following problems:  
¨ Serious skin reactions ¨ Liver problems ¨ Infantile hypertrophic pyloric stenosis ¨ Heart rhythm problems · This medicine can cause diarrhea. Call your doctor if the diarrhea becomes severe, does not stop, or is bloody. Do not take any medicine to stop diarrhea until you have talked to your doctor. Diarrhea can occur 2 months or more after you stop taking this medicine.  It may occur 2 months or more after you stop using this medicine. · Call your doctor if your symptoms do not improve or if they get worse. · Keep all medicine out of the reach of children. Never share your medicine with anyone. Possible Side Effects While Using This Medicine:  
Call your doctor right away if you notice any of these side effects: · Allergic reaction: Itching or hives, swelling in your face or hands, swelling or tingling in your mouth or throat, chest tightness, trouble breathing · Blistering, peeling, red skin rash · Dark urine, pale stools, nausea, vomiting, loss of appetite, stomach pain, yellow skin or eyes · Double vision, tiredness or weakness · Fainting, dizziness, lightheadedness · Fast, pounding, or uneven heartbeat, chest pain · Feeling irritable or vomits after feeding (in babies) · Severe diarrhea that may contain blood, stomach cramps, fever If you notice these less serious side effects, talk with your doctor: · Mild diarrhea, nausea, vomiting, stomach pain If you notice other side effects that you think are caused by this medicine, tell your doctor. Call your doctor for medical advice about side effects. You may report side effects to FDA at 2-805-FDA-0478 © 2017 [facility] Information is for End User's use only and may not be sold, redistributed or otherwise used for commercial purposes. The above information is an  only. It is not intended as medical advice for individual conditions or treatments. Talk to your doctor, nurse or pharmacist before following any medical regimen to see if it is safe and effective for you. Albuterol (By breathing) Albuterol (al-BUE-ter-ol) Treats or prevents bronchospasm. Brand Name(s): AccuNeb, ProAir HFA, ProAir RespiClick, Proventil HFA, Ventolin HFA There may be other brand names for this medicine. When This Medicine Should Not Be Used: This medicine is not right for everyone.  Do not use it if you had an allergic reaction to albuterol or milk proteins. How to Use This Medicine:  
Aerosol, Powder Under Pressure, Suspension, Solution, Powder · Your doctor will tell you how much medicine to use. Do not use more than directed. Ask your doctor or pharmacist if you have questions about how to use your inhaler, nebulizer, or medicine. · Solution:  
¨ You will use this medicine with an inhaler device called a nebulizer. The nebulizer turns the medicine into a fine mist that you breathe in through your mouth and to your lungs. Your caregiver will show you how to use your nebulizer. ¨ Store unopened vials of this medicine at room temperature, away from heat and direct light. Do not freeze. An open vial of medicine must be used right away. · Aerosol inhaler: ¨ Shake the inhaler well just before each use. Avoid spraying this medicine into your eyes. ¨ Test spray in the air before using for the first time or if the inhaler has not been used for a while. ¨ If you are supposed to use more than one puff, wait 1 to 2 minutes before inhaling the second puff. Repeat these steps for the next puff, starting with shaking the inhaler. ¨ Clean the inhaler mouthpiece at least once a week with warm running water for 30 seconds. Let it air dry completely. ¨ Store the canister at room temperature, away from heat and direct light. Do not freeze. Do not keep this medicine inside a car where it could be exposed to extreme heat or cold. Do not poke holes in the canister or throw it into a fire, even if the canister is empty. · ProAir® Respiclick® inhaler: ¨ Make sure the cap is closed. Do not open the cap unless you are going to use the medicine. ¨ Hold the inhaler upright. Open the cap fully until you hear a click. Your inhaler is now ready to use. ¨ Close the cap firmly over the mouthpiece after each use. ¨ Keep the inhaler clean and dry at all times. Do not wash or put any part of the inhaler in water. ¨ To clean the mouthpiece, wipe it gently with a dry cloth or tissue. ¨ Keep the medicine in the foil pouch until you are ready to use it. Store at room temperature, away from heat and direct light. Do not freeze. · Read and follow the patient instructions that come with this medicine. Talk to your doctor or pharmacist if you have any questions. · Missed dose: Take a dose as soon as you remember. If it is almost time for your next dose, wait until then and take a regular dose. Do not take extra medicine to make up for a missed dose. Drugs and Foods to Avoid: Ask your doctor or pharmacist before using any other medicine, including over-the-counter medicines, vitamins, and herbal products. · Some foods and medicines can affect how albuterol works. Tell your doctor if you are using any of the following: ¨ Digoxin ¨ Beta-blocker medicine ¨ Diuretic (water pill) ¨ Medicine for depression or an MAO inhibitor within the past 2 weeks Warnings While Using This Medicine: · Tell your doctor if you are pregnant or breastfeeding, or if you have kidney disease, diabetes, heart disease, heart rhythm problems, high blood pressure, overactive thyroid, or a history of seizures. · This medicine may cause the following problems:  
¨ Paradoxical bronchospasm (increased trouble breathing right after use) ¨ Low potassium levels in the blood · If you use a corticosteroid medicine to control your asthma, keep using it as instructed by your doctor. · Call your doctor if your symptoms do not improve or if they get worse. · If any of your asthma medicines do not seem to be working as well as usual, call your doctor right away. Do not change your doses or stop using your medicines without asking your doctor. · Your doctor will check your progress and the effects of this medicine at regular visits. Keep all appointments. · Keep all medicine out of the reach of children. Never share your medicine with anyone. Possible Side Effects While Using This Medicine:  
Call your doctor right away if you notice any of these side effects: · Allergic reaction: Itching or hives, swelling in your face or hands, swelling or tingling in your mouth or throat, chest tightness, trouble breathing · Dry mouth, increased thirst, muscle cramps, nausea, vomiting · Fast, pounding, or uneven heartbeat, chest pain · Lightheadedness, dizziness, or fainting · Trouble breathing, increased wheezing, cough, chest tightness If you notice these less serious side effects, talk with your doctor: · Cough, sore throat, runny or stuffy nose · Headache · Tremors, nervousness If you notice other side effects that you think are caused by this medicine, tell your doctor. Call your doctor for medical advice about side effects. You may report side effects to FDA at 1-675-FDA-8971 © 2017 [address]  Information is for End User's use only and may not be sold, redistributed or otherwise used for commercial purposes. The above information is an  only. It is not intended as medical advice for individual conditions or treatments. Talk to your doctor, nurse or pharmacist before following any medical regimen to see if it is safe and effective for you. Amlodipine (By mouth) Amlodipine (ro-NNP-iy-peen) Treats high blood pressure and angina (chest pain). This medicine is a calcium channel blocker. Brand Name(s): Norvasc There may be other brand names for this medicine. When This Medicine Should Not Be Used: This medicine is not right for everyone. Do not use it if you had an allergic reaction to amlodipine. How to Use This Medicine:  
Tablet, Dissolving Tablet · Take your medicine as directed. Your dose may need to be changed several times to find what works best for you. Take this medicine at the same time each day. · Read and follow the patient instructions that come with this medicine. Talk to your doctor or pharmacist if you have any questions. · Missed dose: Take a dose as soon as you remember. If it has been more than 12 hours since you were supposed to take your dose, skip the missed dose and take your next regular dose at the regular time. · Store the medicine in a closed container at room temperature, away from heat, moisture, and direct light. Drugs and Foods to Avoid: Ask your doctor or pharmacist before using any other medicine, including over-the-counter medicines, vitamins, and herbal products. · Some medicines can affect how amlodipine works. Tell your doctor if you are also using any of the following: ¨ Clarithromycin, cyclosporine, diltiazem, itraconazole, ritonavir, sildenafil, simvastatin, tacrolimus Warnings While Using This Medicine: · Tell your doctor if you are pregnant or breastfeeding, or if you have liver disease, heart disease, coronary artery disease, or aortic stenosis. · This medicine could lower your blood pressure too much, especially when you first use it or if you are dehydrated. Stand or sit up slowly if you feel lightheaded or dizzy. · Your doctor will check your progress and the effects of this medicine at regular visits. Keep all appointments. · Do not stop using this medicine without asking your doctor, even if you feel well. This medicine will not cure high blood pressure, but it will help keep it in normal range. You may have to take blood pressure medicine for the rest of your life. · Keep all medicine out of the reach of children. Never share your medicine with anyone. Possible Side Effects While Using This Medicine:  
Call your doctor right away if you notice any of these side effects: · Allergic reaction: Itching or hives, swelling in your face or hands, swelling or tingling in your mouth or throat, chest tightness, trouble breathing · Lightheadedness, dizziness · New or worsening chest pain · Swelling in your hands, ankles, or legs · Trouble breathing, nausea, unusual sweating, fainting If you notice other side effects that you think are caused by this medicine, tell your doctor. Call your doctor for medical advice about side effects. You may report side effects to FDA at 7-457-JMM-5493 © 2017 [facility] [place] Information is for End User's use only and may not be sold, redistributed or otherwise used for commercial purposes. The above information is an  only. It is not intended as medical advice for individual conditions or treatments. Talk to your doctor, nurse or pharmacist before following any medical regimen to see if it is safe and effective for you. Cefpodoxime Proxetil (By mouth) Cefpodoxime Proxetil (upu-eov-BRW-eem PROX-e-til) Treats bacterial infections. This medicine is a cephalosporin antibiotic. Brand Name(s):  
There may be other brand names for this medicine. When This Medicine Should Not Be Used: You should not use this medicine if you have ever had an allergic reaction to cephalosporin medicine such as Keflex®, Ceclor®, Velosef®, or Suprax®. How to Use This Medicine:  
Tablet, Liquid · Your doctor will tell you how much to take and how often. · Keep taking your medicine unless your doctor tells you to stop, even if you feel better. If you stop taking this medicine too soon, your infection may come back. · Take cefpodoxime with food. · Shake the oral liquid well just before using. Measure your medicine with a measuring spoon or medicine cup. If a dose is missed: · Take your medicine as soon as you remember that you have missed your dose. · If it is almost time for your next dose, wait until then to take the medicine and skip the missed dose. · You should not use two doses at one time.  
How to Store and Dispose of This Medicine:  
· Keep your tablets at room temperature in a closed container, away from heat, moisture, and direct light. Keep the oral liquid in the refrigerator. Do not freeze. Throw away any unused liquid that is more than 15days old. · Keep all medicine out of the reach of children. Drugs and Foods to Avoid: Ask your doctor or pharmacist before using any other medicine, including over-the-counter medicines, vitamins, and herbal products. · Make sure your doctor knows if you are taking birth control pills or probenecid (Benemid®, ColBENEMID®) while taking cefpodoxime. Warnings While Using This Medicine: · If you are pregnant or breastfeeding, talk to your doctor before taking this medicine. · Before taking this medicine, let your doctor know if you have ever had an allergic reaction to penicillin medicines, or if you have stomach or kidney problems. · If you have severe diarrhea while taking cefpodoxime, check with your doctor before taking medicine to stop the diarrhea. Possible Side Effects While Using This Medicine:  
Call your doctor right away if you notice any of these side effects: 
· Rash, hives, or blistering of the skin · Swelling · Wheezing or trouble breathing · Severe diarrhea (watery or bloody) · Severe stomach pain, vomiting If you notice these less serious side effects, talk with your doctor: · Mild diarrhea · Nausea · Sore mouth or tongue · Vaginal itching or discharge If you notice other side effects that you think are caused by this medicine, tell your doctor. Call your doctor for medical advice about side effects. You may report side effects to FDA at 1-677-FDA-3524 © 2017 [address]  Information is for End User's use only and may not be sold, redistributed or otherwise used for commercial purposes. The above information is an  only. It is not intended as medical advice for individual conditions or treatments.  Talk to your doctor, nurse or pharmacist before following any medical regimen to see if it is safe and effective for you. Prednisone (By mouth) Prednisone (PRED-ni-sone) Treats many diseases and conditions, especially problems related to inflammation. This medicine is a corticosteroid. Brand Name(s): Contrast Allergy PreMed Pack, Viri, predniSONE Intensol There may be other brand names for this medicine. When This Medicine Should Not Be Used: This medicine is not right for everyone. Do not use if you had an allergic reaction to prednisone or if you are pregnant. How to Use This Medicine:  
Liquid, Tablet, Delayed Release Tablet · Take your medicine as directed. Your dose may need to be changed several times to find what works best for you. · It is best to take this medicine with food or milk. · Swallow the delayed-release tablet whole. Do not crush, break, or chew it. · Measure the oral liquid medicine with a marked measuring spoon, oral syringe, or medicine cup. · Missed dose: Take a dose as soon as you remember. If it is almost time for your next dose, wait until then and take a regular dose. Do not take extra medicine to make up for a missed dose. · Store the medicine in a closed container at room temperature, away from heat, moisture, and direct light. Do not freeze the oral liquid. Drugs and Foods to Avoid: Ask your doctor or pharmacist before using any other medicine, including over-the-counter medicines, vitamins, and herbal products. · Tell your doctor if you use any of the following: ¨ Aminoglutethimide, amphotericin B, carbamazepine, cholestyramine, cyclosporine, digoxin, isoniazid, ketoconazole, phenobarbital, phenytoin, or rifampin ¨ Blood thinner, such as warfarin ¨ NSAID pain or arthritis medicine, such as aspirin, diclofenac, ibuprofen, naproxen, celecoxib ¨ Diuretic (water pill) ¨ Diabetes medicine ¨ Macrolide antibiotic, such as azithromycin, clarithromycin, erythromycin ¨ Estrogen, including birth control pills or hormone replacement therapy · This medicine may interfere with vaccines. Ask your doctor before you get a flu shot or any other vaccines. Warnings While Using This Medicine: · It is not safe to take this medicine during pregnancy. It could harm an unborn baby. Tell your doctor right away if you become pregnant. · Tell your doctor if you are breastfeeding or if you have kidney problems, heart failure, high blood pressure, a recent heart attack, diabetes, glaucoma, osteoporosis, or thyroid problems. Tell your doctor about any infection you have. Also tell your doctor if you have had mental or emotional problems (such as depression) or stomach or bowel problems (such as an ulcer or diverticulitis). · This medicine may cause the following problems: ¨ Mood or behavior changes ¨ Higher blood pressure, retaining water, changes in salt or potassium levels in your body ¨ Cataracts or glaucoma (with long-term use) ¨ Weak bones or osteoporosis (with long-term use) ¨ Slow growth in children (with long-term use) ¨ Muscle problems (with high doses, especially if you have myasthenia gravis or similar nerve and muscle problems) · Do not stop using this medicine suddenly. Your doctor will need to slowly decrease your dose before you stop it completely. · This medicine could cause you to get infections more easily. Tell your doctor right away if you are exposed to chicken pox, measles, or other serious infection. Tell your doctor if you had a serious infection in the past, such as tuberculosis or herpes. · Tell your doctor about any extra stress or anxiety in your life. Your dose might need to be changed for a short time. · Tell any doctor or dentist who treats you that you are using this medicine. This medicine may affect certain medical test results. · Keep all medicine out of the reach of children. Never share your medicine with anyone. Possible Side Effects While Using This Medicine: Call your doctor right away if you notice any of these side effects: · Allergic reaction: Itching or hives, swelling in your face or hands, swelling or tingling in your mouth or throat, chest tightness, trouble breathing · Dark freckles, skin color changes, coldness, weakness, tiredness, nausea, vomiting, weight loss · Depression, unusual thoughts, feelings, or behaviors, trouble sleeping · Fever, chills, cough, sore throat, and body aches · Muscle pain or weakness · Rapid weight gain, swelling in your hands, ankles, or feet · Severe stomach pain, nausea, vomiting, or red or black stools · Skin changes or growths · Trouble seeing, eye pain, headache If you notice these less serious side effects, talk with your doctor: · Increased appetite · Round, puffy face · Weight gain around your neck, upper back, breast, face, or waist 
If you notice other side effects that you think are caused by this medicine, tell your doctor. Call your doctor for medical advice about side effects. You may report side effects to FDA at 7-378-FDA-9167 © 2017 [employer] Information is for End User's use only and may not be sold, redistributed or otherwise used for commercial purposes. The above information is an  only. It is not intended as medical advice for individual conditions or treatments. Talk to your doctor, nurse or pharmacist before following any medical regimen to see if it is safe and effective for you. Budesonide (Pulmicort Flex, Pulmicort Flexhaler, Pulmicort Respules) - (By breathing) Why this medicine is used:  
Prevents asthma attacks. Contact a nurse or doctor right away if you have: · Fever, chills, cough, sore throat, body aches · Eye pain, trouble seeing Common side effects: · Sores or white patches in your mouth or throat · Headache © 2017 [employer] Information is for End User's use only and may not be sold, redistributed or otherwise used for commercial purposes. Please provide this summary of care documentation to your next provider. Signatures-by signing, you are acknowledging that this After Visit Summary has been reviewed with you and you have received a copy. Patient Signature:  ____________________________________________________________ Date:  ____________________________________________________________  
  
Nataliia Koch Provider Signature:  ____________________________________________________________ Date:  ____________________________________________________________

## 2018-07-13 NOTE — IP AVS SNAPSHOT
[address] [id number] 
[phone] Patient: Elisa Villafuerte MRN: [id number] A check reggie indicates which time of day the medication should be taken. My Medications START taking these medications Instructions Each Dose to Equal  
 Morning Noon Evening Bedtime  
 amLODIPine 5 mg tablet Commonly known as:  Nickie Darting Start taking on:  7/19/2018 Take 1 Tab by mouth daily. 5 mg  
    
  
   
   
   
  
 azithromycin 500 mg Tab Commonly known as:  Kyle Sleight Take 1 Tab by mouth daily for 5 days. 500 mg  
    
  
   
   
   
  
 budesonide-formoterol 80-4.5 mcg/actuation Hfaa Commonly known as:  SYMBICORT Take 2 Puffs by inhalation two (2) times a day. 2 Puff  
    
  
   
   
   
  
  
 cefpodoxime 200 mg tablet Commonly known as:  Whitefield Chimera Take 1 Tab by mouth every twelve (12) hours. 200 mg CHANGE how you take these medications Instructions Each Dose to Equal  
 Morning Noon Evening Bedtime  
 predniSONE 10 mg tablet Commonly known as:  Speedy Sánchez What changed:   
- medication strength 
- additional instructions Notes to Patient:  FOLLOW INSTRUCTIONS  
   
 1 tab po TID for 1 week , then 1 tab po BID for 1 week then 1 tab po daily for 1 week CONTINUE taking these medications Instructions Each Dose to Equal  
 Morning Noon Evening Bedtime  
 albuterol-ipratropium 2.5 mg-0.5 mg/3 ml Nebu Commonly known as:  DUO-NEB  
   
 3 mL by Nebulization route every six (6) hours as needed. 3 mL  
    
   
   
   
  
 famotidine 20 mg tablet Commonly known as:  PEPCID Take 1 Tab by mouth nightly. 20 mg  
    
   
   
   
  
  
 gabapentin 300 mg capsule Commonly known as:  NEURONTIN  
   
 TAKE 1 CAPSULE BY MOUTH 3  TIMES DAILY  
     
  
   
   
  
   
  
  
 mirtazapine 30 mg tablet Commonly known as:  Martin Brightly Take 1 Tab by mouth nightly. 30 mg Nebulizer & Compressor machine Commonly known as:  PORTABLE NEBULIZER SYSTEM  
   
 1 Each by Does Not Apply route every six (6) hours as needed. 1 Each  
    
   
   
   
  
 * OXYGEN-AIR DELIVERY SYSTEMS  
   
 3 L by Nasal route continuous. 3 L  
    
   
   
   
  
 * OXYGEN-AIR DELIVERY SYSTEMS  
   
 3 L by Nasal route continuous. 3 L PARoxetine 30 mg tablet Commonly known as:  PAXIL Take 1 Tab by mouth daily. 30 mg  
    
  
   
   
   
  
 simvastatin 20 mg tablet Commonly known as:  ZOCOR  
   
 TAKE 1 TABLET BY MOUTH  NIGHTLY  
     
   
   
   
  
  
 tamsulosin 0.4 mg capsule Commonly known as:  FLOMAX Take 1 Cap by mouth daily. 0.4 mg  
    
  
   
   
   
  
 * Notice: This list has 2 medication(s) that are the same as other medications prescribed for you. Read the directions carefully, and ask your doctor or other care provider to review them with you. STOP taking these medications   
 traZODone 100 mg tablet Commonly known as:  Alden Ziegler Where to Get Your Medications Information on where to get these meds will be given to you by the nurse or doctor. ! Ask your nurse or doctor about these medications  
  albuterol-ipratropium 2.5 mg-0.5 mg/3 ml Nebu  
 amLODIPine 5 mg tablet  
 azithromycin 500 mg Tab  
 budesonide-formoterol 80-4.5 mcg/actuation Hfaa  
 cefpodoxime 200 mg tablet  
 predniSONE 10 mg tablet

## 2018-07-13 NOTE — PROGRESS NOTES
Patient came to ER SOB. Rec'd patient on NRB. Sp02 98%. ABG's drawn and patient placed on Bipap. Will continue to monitor.

## 2018-07-13 NOTE — TELEPHONE ENCOUNTER
Patient's daughter called and cancelled appt. Stating her Mom wont get out of bed and come because she knows you will probably send her to the hospital. She is very SOB and wheezing. She said if she gets worse she guess she will take her to the ER.

## 2018-07-14 LAB
ARTERIAL PATENCY WRIST A: YES
ATRIAL RATE: 56 BPM
BASE EXCESS BLD CALC-SCNC: 17 MMOL/L
BDY SITE: ABNORMAL
CALCULATED P AXIS, ECG09: 69 DEGREES
CALCULATED R AXIS, ECG10: 121 DEGREES
CALCULATED T AXIS, ECG11: 17 DEGREES
DIAGNOSIS, 93000: NORMAL
GAS FLOW.O2 O2 DELIVERY SYS: ABNORMAL L/MIN
GLUCOSE BLD STRIP.AUTO-MCNC: 120 MG/DL (ref 70–110)
GLUCOSE BLD STRIP.AUTO-MCNC: 173 MG/DL (ref 70–110)
GLUCOSE BLD STRIP.AUTO-MCNC: 187 MG/DL (ref 70–110)
GLUCOSE BLD STRIP.AUTO-MCNC: 215 MG/DL (ref 70–110)
HCO3 BLD-SCNC: 42.6 MMOL/L (ref 22–26)
O2/TOTAL GAS SETTING VFR VENT: 50 %
P-R INTERVAL, ECG05: 146 MS
PCO2 BLD: 75.1 MMHG (ref 35–45)
PEEP RESPIRATORY: 6 CMH2O
PH BLD: 7.36 [PH] (ref 7.35–7.45)
PIP ISTAT,IPIP: 15
PO2 BLD: 100 MMHG (ref 80–100)
PRESSURE SUPPORT SETTING VENT: 9 CMH2O
Q-T INTERVAL, ECG07: 490 MS
QRS DURATION, ECG06: 120 MS
QTC CALCULATION (BEZET), ECG08: 472 MS
SAO2 % BLD: 97 % (ref 92–97)
SERVICE CMNT-IMP: 50 L/MIN
SERVICE CMNT-IMP: ABNORMAL
SPECIMEN TYPE: ABNORMAL
SPONTANEOUS TIMED, IST: YES
TOTAL RESP. RATE, ITRR: 16
VENTRICULAR RATE, ECG03: 56 BPM

## 2018-07-14 PROCEDURE — 74011636637 HC RX REV CODE- 636/637: Performed by: INTERNAL MEDICINE

## 2018-07-14 PROCEDURE — 82803 BLOOD GASES ANY COMBINATION: CPT

## 2018-07-14 PROCEDURE — 74011000250 HC RX REV CODE- 250: Performed by: INTERNAL MEDICINE

## 2018-07-14 PROCEDURE — 94660 CPAP INITIATION&MGMT: CPT

## 2018-07-14 PROCEDURE — 36600 WITHDRAWAL OF ARTERIAL BLOOD: CPT

## 2018-07-14 PROCEDURE — 94640 AIRWAY INHALATION TREATMENT: CPT

## 2018-07-14 PROCEDURE — 82962 GLUCOSE BLOOD TEST: CPT

## 2018-07-14 PROCEDURE — 74011250636 HC RX REV CODE- 250/636: Performed by: INTERNAL MEDICINE

## 2018-07-14 PROCEDURE — 65660000004 HC RM CVT STEPDOWN

## 2018-07-14 PROCEDURE — 74011250637 HC RX REV CODE- 250/637: Performed by: INTERNAL MEDICINE

## 2018-07-14 RX ADMIN — MIRTAZAPINE 30 MG: 15 TABLET, FILM COATED ORAL at 22:04

## 2018-07-14 RX ADMIN — Medication 10 ML: at 14:47

## 2018-07-14 RX ADMIN — METHYLPREDNISOLONE SODIUM SUCCINATE 60 MG: 125 INJECTION, POWDER, FOR SOLUTION INTRAMUSCULAR; INTRAVENOUS at 14:47

## 2018-07-14 RX ADMIN — IPRATROPIUM BROMIDE AND ALBUTEROL SULFATE 3 ML: 2.5; .5 SOLUTION RESPIRATORY (INHALATION) at 08:47

## 2018-07-14 RX ADMIN — IPRATROPIUM BROMIDE AND ALBUTEROL SULFATE 3 ML: 2.5; .5 SOLUTION RESPIRATORY (INHALATION) at 00:07

## 2018-07-14 RX ADMIN — HEPARIN SODIUM 5000 UNITS: 5000 INJECTION, SOLUTION INTRAVENOUS; SUBCUTANEOUS at 22:04

## 2018-07-14 RX ADMIN — INSULIN LISPRO 2 UNITS: 100 INJECTION, SOLUTION INTRAVENOUS; SUBCUTANEOUS at 17:19

## 2018-07-14 RX ADMIN — IPRATROPIUM BROMIDE AND ALBUTEROL SULFATE 3 ML: 2.5; .5 SOLUTION RESPIRATORY (INHALATION) at 15:33

## 2018-07-14 RX ADMIN — IPRATROPIUM BROMIDE AND ALBUTEROL SULFATE 3 ML: 2.5; .5 SOLUTION RESPIRATORY (INHALATION) at 19:25

## 2018-07-14 RX ADMIN — AZITHROMYCIN MONOHYDRATE 500 MG: 500 INJECTION, POWDER, LYOPHILIZED, FOR SOLUTION INTRAVENOUS at 22:15

## 2018-07-14 RX ADMIN — SIMVASTATIN 20 MG: 20 TABLET, FILM COATED ORAL at 22:04

## 2018-07-14 RX ADMIN — GABAPENTIN 300 MG: 300 CAPSULE ORAL at 17:04

## 2018-07-14 RX ADMIN — TAMSULOSIN HYDROCHLORIDE 0.4 MG: 0.4 CAPSULE ORAL at 10:44

## 2018-07-14 RX ADMIN — IPRATROPIUM BROMIDE AND ALBUTEROL SULFATE 3 ML: 2.5; .5 SOLUTION RESPIRATORY (INHALATION) at 04:48

## 2018-07-14 RX ADMIN — PAROXETINE HYDROCHLORIDE 30 MG: 20 TABLET, FILM COATED ORAL at 10:44

## 2018-07-14 RX ADMIN — HEPARIN SODIUM 5000 UNITS: 5000 INJECTION, SOLUTION INTRAVENOUS; SUBCUTANEOUS at 14:47

## 2018-07-14 RX ADMIN — Medication 10 ML: at 05:34

## 2018-07-14 RX ADMIN — METHYLPREDNISOLONE SODIUM SUCCINATE 60 MG: 125 INJECTION, POWDER, FOR SOLUTION INTRAMUSCULAR; INTRAVENOUS at 01:00

## 2018-07-14 RX ADMIN — GABAPENTIN 300 MG: 300 CAPSULE ORAL at 10:44

## 2018-07-14 RX ADMIN — IPRATROPIUM BROMIDE AND ALBUTEROL SULFATE 3 ML: 2.5; .5 SOLUTION RESPIRATORY (INHALATION) at 11:40

## 2018-07-14 RX ADMIN — METHYLPREDNISOLONE SODIUM SUCCINATE 60 MG: 125 INJECTION, POWDER, FOR SOLUTION INTRAMUSCULAR; INTRAVENOUS at 05:28

## 2018-07-14 RX ADMIN — GABAPENTIN 300 MG: 300 CAPSULE ORAL at 22:04

## 2018-07-14 RX ADMIN — Medication 10 ML: at 22:15

## 2018-07-14 RX ADMIN — CEFTRIAXONE SODIUM 1 G: 1 INJECTION, POWDER, FOR SOLUTION INTRAMUSCULAR; INTRAVENOUS at 22:04

## 2018-07-14 NOTE — CONSULTS
[facility]  Pulmonary, Critical Care, and Sleep Medicine    Initial Patient Consult    Name: Elisa Villafuerte MRN: [id number]   : 1933 Hospital: [address] [place] [address]   Date: 2018        IMPRESSION:   · Acute on chronic hypoxic and hypercapnic respiratory failure due to COPD exacerbation and pneumonia, community acquired, possible mucus plugging  · New bilateral infiltrates likely pneumonia  · Pulmonary HTN Grp 3 due to long standing COPD and hypoxemia  · COPD, pre bronchodilator FEV1 51% with BP response in . Suspect lung function much worse   · Hypoxia requiring supplemental O2 at home since last admission  · Serology positive for RA, KANA, SS A and SS B  · Chronic back pain  · HTN  · Glaucoma  · DM  · Depression  · Cachexia possibly due to combination of COPD and ?depression and poor po intake  · Noncompliance with MD follow up, controller medications and O2       RECOMMENDATIONS:   · Continue BIPAP HS and prn. Although still hypercapnic pt is compensated  · Start LABA/ICS via nebulizer, [place] [facility] [place] Pt unable to use MDI inhalers due to arthritis but will probably be able to use an Ellipta device like Breo  · Titrate FiO2, will need reassessment of O2 prior to discharge  · Instructed pt on purse lip breathing  · Caution with opiates and other medications with potential for respiratory suppression  · Discussed possibility of pulmonary rehab, although 's illness prevents pt from leaving the home. ?home based program may be considered  · PFT's to be done as outpatient  · Will consider home BIPAP although noncompliance may be a problem  · Will follow while in hospital. Thank you for consulting     Subjective: This patient has been seen and evaluated at the request of Dr. Caleb Echevarria for COPD exacerbation. Patient is a 80 y.o. female with a history of COPD whose last FEV1 was 51% in .  She was admitted to the hospital after 2 days of worsening shortness of breath and wheezing. Pt reports a non productive cough and SOB similar to previous exacerbations. She denies chest pain, fever, chills, prostration or myalgias, no sick contacts. Last admission was in Feb 2018 for COPD exacerbation and LRTI. Pt was discharged on O2 which she admits to infrequent use. She also admits to poor adherence to COPD maintenance medications. Pt was treated with antibiotics, steroids and started on BIPAP with significant improvement in SOB overnight. Other complaints include chronic back and joint pains, dry eye, and unexplained weight loss and inability to gain weight.         Past Medical History:   Diagnosis Date    Colon polyps     COPD 8-30-02    DDD (degenerative disc disease), lumbar 10/1/2014    Degeneration of lumbar or lumbosacral intervertebral disc     Depression     Diabetes ([facility] [address] 7-19-05    Diverticulosis     DM neuropathy, painful ([facility] [address] 10/1/2014    MOORE (Dyspnea on Exertion) 4-19-02    echo: +mild LVH, FP59-98% w/ diastolic dysfxn    Glaucoma     HCAP (healthcare-associated pneumonia) 03/24/2017    Hemorrhoids 6-6-06    Hyperlipidemia 5/17/2011    Hypertension 4-16-02    LBP (low back pain) 4-13-04    Low back pain radiating to both legs 10/1/2014    Lumbago     Lumbar disc herniation 10/1/2014    Lumbar facet arthropathy ([facility] [address] 10/1/2014    Lumbosacral radiculopathy at L5 10/1/2014    Lumbosacral radiculopathy at S1 10/1/2014    Microscopic hematuria     OA (osteoarthritis)     Overactive bladder 5/17/2011    RLS (restless legs syndrome) 1/17/2013    Sciatica     Shortness of breath     Spinal stenosis, lumbar region, without neurogenic claudication     Spondylolisthesis of lumbar region 10/1/2014    Spondylolisthesis, grade 1 10/1/2014    Stress urinary incontinence     Syncope     -MRI brain    Urinary tract infection, site not specified       Past Surgical History:   Procedure Laterality Date    HX APPENDECTOMY      HX CHOLECYSTECTOMY      HX HYSTERECTOMY      (+)DUB    HX POLYPECTOMY      HI COLONOSCOPY FLX DX W/COLLJ SPEC WHEN P[facility]MD  6-16-06    normal, Dr Marleni Corbett FLX DX W/COLLJ [address] PFRMD      (+)polyp= tubular adenoma      Prior to Admission medications    Medication Sig Start Date End Date Taking? Authorizing Provider   mirtazapine (REMERON) 30 mg tablet Take 1 Tab by mouth nightly. 4/16/18  Yes Lida Shelton MD   gabapentin (NEURONTIN) 300 mg capsule TAKE 1 CAPSULE BY MOUTH 3  TIMES DAILY 4/6/18  Yes Lida Shelton MD   simvastatin (ZOCOR) 20 mg tablet TAKE 1 TABLET BY MOUTH  NIGHTLY 4/6/18  Yes Lida Shelton MD   tamsulosin (FLOMAX) 0.4 mg capsule Take 1 Cap by mouth daily. 3/23/18  Yes Carrie Cantu MD   OXYGEN-AIR DELIVERY SYSTEMS 3 L by Nasal route continuous. Yes Historical Provider   albuterol-ipratropium (DUO-NEB) 2.5 mg-0.5 mg/3 ml nebu 3 mL by Nebulization route every six (6) hours as needed. 2/10/18  Yes Mayra Aawd PA-C   famotidine (PEPCID) 20 mg tablet Take 1 Tab by mouth nightly. 2/10/18  Yes Mayra Awad PA-C   Nebulizer & Compressor (PORTABLE NEBULIZER SYSTEM) machine 1 Each by Does Not Apply route every six (6) hours as needed. 2/10/18  Yes Mayra Awad PA-C   PARoxetine (PAXIL) 30 mg tablet Take 1 Tab by mouth daily. 9/19/17  Yes Lida Shelton MD   traZODone (DESYREL) 100 mg tablet Take 100 mg by mouth nightly as needed for Sleep (insomia). Patient takes one and half tabs at bedtime    Yes Historical Provider   predniSONE (DELTASONE) 20 mg tablet 2 tablets by mouth daily for 5 days 7/9/18   Lida Shelton MD   OXYGEN-AIR DELIVERY SYSTEMS 3 L by Nasal route continuous.     Historical Provider     Allergies   Allergen Reactions    Levaquin [Levofloxacin] Rash      Social History   Substance Use Topics    Smoking status: Current Every Day Smoker     Packs/day: 0.25     Years: 50.00     Types: Cigarettes    Smokeless tobacco: Never Used      Comment: pt has cut down recently to less than 1 ppd    Alcohol use No      Family History   Problem Relation Age of Onset    Colon Cancer Sister     Heart Disease Brother     Seizures Son     Colon Cancer Maternal Aunt         Current Facility-Administered Medications   Medication Dose Route Frequency    albuterol-ipratropium (DUO-NEB) 2.5 MG-0.5 MG/3 ML  3 mL Nebulization Q4H RT    gabapentin (NEURONTIN) capsule 300 mg  300 mg Oral TID    mirtazapine (REMERON) tablet 30 mg  30 mg Oral QHS    PARoxetine (PAXIL) tablet 30 mg  30 mg Oral DAILY    simvastatin (ZOCOR) tablet 20 mg  20 mg Oral QHS    tamsulosin (FLOMAX) capsule 0.4 mg  0.4 mg Oral DAILY    sodium chloride (NS) flush 5-10 mL  5-10 mL IntraVENous Q8H    methylPREDNISolone (PF) (SOLU-MEDROL) injection 60 mg  60 mg IntraVENous Q6H    azithromycin (ZITHROMAX) 500 mg in 0.9% sodium chloride (MBP/ADV) 250 mL adv  500 mg IntraVENous Q24H    cefTRIAXone (ROCEPHIN) 1 g in sterile water (preservative free) 10 mL IV syringe  1 g IntraVENous Q24H    heparin (porcine) injection 5,000 Units  5,000 Units SubCUTAneous Q8H    insulin lispro (HUMALOG) injection   SubCUTAneous ACB&D       Review of Systems:  Pertinent items are noted in HPI.     Objective:   Vital Signs:    Visit Vitals    /54    Pulse 72    Temp 98.4 °F (36.9 °C)    Resp 20    Wt 61.9 kg (136 lb 8 oz)    SpO2 97%    BMI 22.71 kg/m2       O2 Device: Nasal cannula   O2 Flow Rate (L/min): 3 l/min   Temp (24hrs), Av.5 °F (36.9 °C), Min:98 °F (36.7 °C), Max:98.9 °F (37.2 °C)       Intake/Output:   Last shift:      701 - 1900  In: -   Out: 600 [Urine:600]  Last 3 shifts: 1901 -  07  In: 0   Out: 200 [Urine:200]    Intake/Output Summary (Last 24 hours) at 18 4246  Last data filed at 18 1200   Gross per 24 hour   Intake                0 ml   Output              800 ml   Net             -800 ml Physical Exam:   General:  Alert, cooperative, mild distress, appears stated age. Asthenic    Head:  Normocephalic, without obvious abnormality, atraumatic. Eyes:  Conjunctivae/corneas clear. PERRL, EOMs intact. Nose: Nares normal. No drainage or sinus tenderness. Throat: Lips, mucosa, and tongue normal. Teeth and gums normal.   Neck: Supple, symmetrical, trachea midline, no adenopathy, thyroid: no enlargment/tenderness/nodules    Back:   Symmetric    Lungs:   Diminished  breath sounds, prolonged expiratory phase, diffuse expiratory wheezes, no rales   Chest wall:  No tenderness or deformity. Heart:  Regular rate and rhythm, S1, S2 normal, no murmur, click, rub or gallop. Abdomen:   Soft, non-tender. Bowel sounds normal. No masses,  No organomegaly. Extremities: Extremities cachectic, atraumatic, no cyanosis or edema. Pulses: 2+ and symmetric all extremities.    Skin: Skin color, texture, turgor normal. No rashes or lesions   Lymph nodes: Cervical nodes normal.   Neurologic: Grossly nonfocal     Data review:     Recent Results (from the past 24 hour(s))   EKG, 12 LEAD, INITIAL    Collection Time: 07/13/18  6:13 PM   Result Value Ref Range    Ventricular Rate 56 BPM    Atrial Rate 56 BPM    P-R Interval 146 ms    QRS Duration 120 ms    Q-T Interval 490 ms    QTC Calculation (Bezet) 472 ms    Calculated P Axis 69 degrees    Calculated R Axis 121 degrees    Calculated T Axis 17 degrees    Diagnosis       Sinus bradycardia  Possible Left atrial enlargement  Right bundle branch block  Left posterior fascicular block  Bifascicular block  Abnormal ECG  When compared with ECG of 07-FEB-2018 12:43,  Left posterior fascicular block is now present  Nonspecific T wave abnormality now evident in Anterior leads  Confirmed by Francine Martin ([id number]) on 7/14/2018 3:49:33 PM     GLUCOSE, POC    Collection Time: 07/14/18  8:33 AM   Result Value Ref Range    Glucose (POC) 120 (H) 70 - 110 mg/dL   POC G3 Collection Time: 07/14/18  9:12 AM   Result Value Ref Range    Device: BIPAP      FIO2 (POC) 50 %    pH (POC) 7.362 7.35 - 7.45      pCO2 (POC) 75.1 (H) 35.0 - 45.0 MMHG    pO2 (POC) 100 80 - 100 MMHG    HCO3 (POC) 42.6 (H) 22 - 26 MMOL/L    sO2 (POC) 97 92 - 97 %    Base excess (POC) 17 mmol/L    PEEP/CPAP (POC) 6 cmH2O    PIP (POC) 15      Pressure support 9 cmH2O    Allens test (POC) YES      Bleed In 50 L/min    Total resp. rate 16      Site RIGHT RADIAL      Specimen type (POC) ARTERIAL      Performed by Kayt Anderson     Spontaneous timed YES     GLUCOSE, POC    Collection Time: 07/14/18 12:18 PM   Result Value Ref Range    Glucose (POC) 187 (H) 70 - 110 mg/dL   GLUCOSE, POC    Collection Time: 07/14/18  3:52 PM   Result Value Ref Range    Glucose (POC) 173 (H) 70 - 110 mg/dL     XR Results (most recent):    Results from Hospital Encounter encounter on 07/13/18   XR CHEST PORT   Narrative PROCEDURE:  Chest Portable    CPT code 93900    INDICATION:  COPD exacerbation     COMPARISON:  2/7/18. FINDINGS:     - New hazy streaky densities are noted at the lung bases. - Slight right costophrenic angle blunting.  - Bandlike density in the right mid lung zone, suggestive of focal scarring vs.  subsegmental atelectasis. - Hyperinflation with patchy areas of increased lung lucency. No pneumothorax. - Slight prominence of the cardiac silhouette. Impression IMPRESSION:    1. New hazy streaky densities at the lung bases bilaterally. Suggestive of  infiltrate vs. atelectatic changes with pleural effusion. 2.  Slight prominence of the cardiac silhouette. 3.  Underlying COPD. 4.  Right mid lung zone with focal scarring vs. less likely subsegmental  atelectasis.           Imaging:  I have personally reviewed the patients radiographs and have reviewed the reports:  XR Results (most recent):    Results from Hospital Encounter encounter on 07/13/18   XR CHEST PORT   Narrative PROCEDURE:  Chest Portable    CPT code 82326    INDICATION:  COPD exacerbation     COMPARISON:  2/7/18. FINDINGS:     - New hazy streaky densities are noted at the lung bases. - Slight right costophrenic angle blunting.  - Bandlike density in the right mid lung zone, suggestive of focal scarring vs.  subsegmental atelectasis. - Hyperinflation with patchy areas of increased lung lucency. No pneumothorax. - Slight prominence of the cardiac silhouette. Impression IMPRESSION:    1. New hazy streaky densities at the lung bases bilaterally. Suggestive of  infiltrate vs. atelectatic changes with pleural effusion. 2.  Slight prominence of the cardiac silhouette. 3.  Underlying COPD. 4.  Right mid lung zone with focal scarring vs. less likely subsegmental  atelectasis.                 Adam Luna MD

## 2018-07-14 NOTE — PROGRESS NOTES
Bedside and Verbal shift change report given to Kayleigh (oncoming nurse) by Saji Betancourt RN (offgoing nurse). Report included the following information SBAR and Recent Results.

## 2018-07-14 NOTE — PROGRESS NOTES
conducted an initial consultation and Spiritual Assessment for Mercedes Christensen, who is a 80 y.o.,female. Patients Primary Language is: [place].    According to the patients EMR Mormonism Affiliation is: [facility].     The reason the Patient came to the hospital is:   Patient Active Problem List    Diagnosis Date Noted    COPD exacerbation ([address] 07/13/2018    Pneumonia 02/07/2018    CAP (community acquired pneumonia) 02/07/2018    Major depression, chronic 12/06/2017    Primary insomnia 12/04/2017    Type 2 diabetes mellitus with diabetic neuropathy, without long-term current use of insulin ([address] 09/13/2017    Hypokalemia 04/21/2017    Dizziness 04/21/2017    CHI (closed head injury) 04/21/2017    Elevated troponin 04/21/2017    HCAP (healthcare-associated pneumonia) 02/07/2017    Abnormal CT scan, chest 02/07/2017    Panlobular emphysema ([address] 12/13/2016    Impaired fasting glucose 12/13/2016    Essential hypertension with goal blood pressure less than 140/90 08/15/2016    Hyperlipidemia LDL goal <100 04/12/2016    Primary osteoarthritis involving multiple joints 04/12/2016    Prediabetes 04/12/2016    Restless leg syndrome 04/12/2016    MOORE (dyspnea on exertion) 09/09/2015    SOB (shortness of breath) 09/09/2015    Murmur, cardiac 09/09/2015    Carotid artery stenosis 08/18/2015    Atherosclerosis of native arteries of the extremities with intermittent claudication 08/18/2015    Acute exacerbation of chronic obstructive pulmonary disease (COPD) ([address] 08/11/2015    Diabetic polyneuropathy associated with type 2 diabetes mellitus ([address] 08/11/2015    Low back pain radiating to both legs 10/01/2014    DDD (degenerative disc disease), lumbar 10/01/2014    Spondylolisthesis of lumbar region 10/01/2014    Spondylolisthesis, grade 1 10/01/2014    Lumbar facet arthropathy ([address] 10/01/2014    Lumbar disc herniation 10/01/2014    Lumbosacral radiculopathy at L5 10/01/2014    Lumbosacral radiculopathy at S1 10/01/2014    DM neuropathy, painful ([facility] 10/01/2014    Thoracic or lumbosacral neuritis or radiculitis, unspecified 07/14/2014    Spinal stenosis in cervical region 10/11/2013    Polyneuropathy 10/11/2013    Lumbar stenosis 10/11/2013    High risk medication use 10/11/2013    Ulnar neuropathy at elbow 10/11/2013    Chronic pain syndrome 09/16/2013    Lumbosacral spondylosis without myelopathy 09/16/2013    Cervical stenosis of spinal canal 09/16/2013    Repeated falls 08/07/2013    Ataxic gait 07/22/2013    Chronic neck pain 07/22/2013    Orthostatic hypotension 07/22/2013    Paresthesia 07/22/2013    Aortic dissection, abdominal (HCC) 02/05/2013    RLS (restless legs syndrome) 01/17/2013    Glucose intolerance (pre-diabetes) 05/24/2012    Unspecified vitamin D deficiency 04/04/2012    Depression 09/20/2011    Sciatica     Degeneration of lumbar or lumbosacral intervertebral disc     Encounter for long-term (current) use of other medications     Hyperlipidemia 05/17/2011    Overactive bladder 05/17/2011    Hypercholesterolemia 04/01/2008        The  provided the following Interventions:  Initiated a relationship of care and support. Explored issues of ki, belief, spirituality and Adventist/ritual needs while hospitalized. Listened empathically. Provided information about Spiritual Care Services. Offered prayer and assurance of continued prayers on patient's behalf. Chart reviewed. The following outcomes where achieved:  Patient shared limited information about both their medical narrative and spiritual journey/beliefs. Patient processed feeling about current hospitalization. Patient expressed gratitude for 's visit. Assessment:  Patient does not have any Adventist/cultural needs that will affect patients preferences in health care. There are no spiritual or Adventist issues which require intervention at this time. Plan:  Chaplains will continue to follow and will provide pastoral care on an as needed/requested basis.  recommends bedside caregivers page  on duty if patient shows signs of acute spiritual or emotional distress.       [address] [facility]   [phone]

## 2018-07-14 NOTE — PROGRESS NOTES
Discussed BiPAP use with pt. She refuses at this time. No distress notes. Pt aware BiPAP is available if needed.

## 2018-07-14 NOTE — PROGRESS NOTES
[facility] Hospitalist Group  Progress Note    Patient: Jane Otoole Age: 80 y.o. : 1933 MR#: [id number] SSN: [id number]  Date/Time: 2018     Subjective:     Review of systems  Less SOB   No NVD   No Cough   No CP     Assessment/Plan:   1. COPD acute   2 Acute on chronic diastolic heart failure - with elevated BNP    3 HTN   4 DM 2 with neuropathy   5 CAP Vs atelectasis  6 Acute on chronic hypoxic respiratory failure - o2 sats on admission - 65% with need for BiPAP      PLAN   - continue Duo - Neb , and steroids - decrease dose of solumedrol   - continue Zithro + Rocephin for possible CAP on chest x ray   - Diastolic Heart failure - BP control and BB use if ok with pulmonary - current lawanda fluctuating   - Cardiology consult - and pulmonary consult - patient will also need close out patient  Follow ups   - D/w patient and family in room with her in details regarding management plan     Case discussed with:  [x]Patient  [x]Family  []Nursing  []Case Management  DVT Prophylaxis:  []Lovenox  []Hep SQ  []SCDs  []Coumadin   []On Heparin gtt    Chest X ray :  IMPRESSION  IMPRESSION:     1. New hazy streaky densities at the lung bases bilaterally. Suggestive of  infiltrate vs. atelectatic changes with pleural effusion. 2.  Slight prominence of the cardiac silhouette. 3.  Underlying COPD. 4.  Right mid lung zone with focal scarring vs. less likely subsegmental  atelectasis.       Objective:   VS:   Visit Vitals    /72    Pulse 76    Temp 98 °F (36.7 °C)    Resp 18    Wt 61.9 kg (136 lb 8 oz)    SpO2 95%    BMI 22.71 kg/m2      Tmax/24hrs: Temp (24hrs), Av.5 °F (36.9 °C), Min:98 °F (36.7 °C), Max:98.9 °F (37.2 °C)  IOBRIEF  Intake/Output Summary (Last 24 hours) at 18 1351  Last data filed at 18 0400   Gross per 24 hour   Intake                0 ml   Output              200 ml   Net             -200 ml       General:  Alert, cooperative, no acute distress Cardiovascular: S1S2 - regular , No Murmur   Pulmonary: Equal expansion , No Use of accessory muscles ,Bilateral extensive wheezing with few bilateral basal rales   GI:  +BS in all four quadrants, soft, non-tender  Extremities:  No edema; 2+ dorsalis pedis pulses bilaterally  Neuro: Alert and oriented X 2. Medications:   Current Facility-Administered Medications   Medication Dose Route Frequency    albuterol-ipratropium (DUO-NEB) 2.5 MG-0.5 MG/3 ML  3 mL Nebulization Q4H RT    gabapentin (NEURONTIN) capsule 300 mg  300 mg Oral TID    mirtazapine (REMERON) tablet 30 mg  30 mg Oral QHS    PARoxetine (PAXIL) tablet 30 mg  30 mg Oral DAILY    simvastatin (ZOCOR) tablet 20 mg  20 mg Oral QHS    tamsulosin (FLOMAX) capsule 0.4 mg  0.4 mg Oral DAILY    sodium chloride (NS) flush 5-10 mL  5-10 mL IntraVENous Q8H    sodium chloride (NS) flush 5-10 mL  5-10 mL IntraVENous PRN    methylPREDNISolone (PF) (SOLU-MEDROL) injection 60 mg  60 mg IntraVENous Q6H    azithromycin (ZITHROMAX) 500 mg in 0.9% sodium chloride (MBP/ADV) 250 mL adv  500 mg IntraVENous Q24H    cefTRIAXone (ROCEPHIN) 1 g in sterile water (preservative free) 10 mL IV syringe  1 g IntraVENous Q24H    acetaminophen (TYLENOL) tablet 650 mg  650 mg Oral Q4H PRN    HYDROcodone-acetaminophen (NORCO) 5-325 mg per tablet 1 Tab  1 Tab Oral Q4H PRN    heparin (porcine) injection 5,000 Units  5,000 Units SubCUTAneous Q8H    insulin lispro (HUMALOG) injection   SubCUTAneous ACB&D    glucose chewable tablet 16 g  4 Tab Oral PRN    glucagon (GLUCAGEN) injection 1 mg  1 mg IntraMUSCular PRN    dextrose (D50W) injection syrg 12.5-25 g  25-50 mL IntraVENous PRN       Labs:    Recent Labs      07/13/18   1432   WBC  8.1   HGB  14.9   HCT  45.8*   PLT  188     No results for input(s): NA, K, CL, CO2, GLU, BUN, CREA, CA, MG, PHOS, ALB, TBIL, SGOT, ALT, INR in the last 72 hours.     No lab exists for component: INREXT      Signed By: Juan Carlos Goins MD July 14, 2018

## 2018-07-15 LAB
BASOPHILS # BLD: 0 K/UL (ref 0–0.1)
BASOPHILS NFR BLD: 0 % (ref 0–2)
DIFFERENTIAL METHOD BLD: ABNORMAL
EOSINOPHIL # BLD: 0 K/UL (ref 0–0.4)
EOSINOPHIL NFR BLD: 0 % (ref 0–5)
ERYTHROCYTE [DISTWIDTH] IN BLOOD BY AUTOMATED COUNT: 17.5 % (ref 11.6–14.5)
GLUCOSE BLD STRIP.AUTO-MCNC: 116 MG/DL (ref 70–110)
GLUCOSE BLD STRIP.AUTO-MCNC: 145 MG/DL (ref 70–110)
GLUCOSE BLD STRIP.AUTO-MCNC: 152 MG/DL (ref 70–110)
GLUCOSE BLD STRIP.AUTO-MCNC: 229 MG/DL (ref 70–110)
HCT VFR BLD AUTO: 42.8 % (ref 35–45)
HGB BLD-MCNC: 14.1 G/DL (ref 12–16)
LYMPHOCYTES # BLD: 0.4 K/UL (ref 0.9–3.6)
LYMPHOCYTES NFR BLD: 3 % (ref 21–52)
MCH RBC QN AUTO: 29.3 PG (ref 24–34)
MCHC RBC AUTO-ENTMCNC: 32.9 G/DL (ref 31–37)
MCV RBC AUTO: 89 FL (ref 74–97)
MONOCYTES # BLD: 0.7 K/UL (ref 0.05–1.2)
MONOCYTES NFR BLD: 6 % (ref 3–10)
NEUTS SEG # BLD: 11 K/UL (ref 1.8–8)
NEUTS SEG NFR BLD: 91 % (ref 40–73)
PLATELET # BLD AUTO: 176 K/UL (ref 135–420)
PMV BLD AUTO: 9.7 FL (ref 9.2–11.8)
RBC # BLD AUTO: 4.81 M/UL (ref 4.2–5.3)
WBC # BLD AUTO: 12.1 K/UL (ref 4.6–13.2)

## 2018-07-15 PROCEDURE — 74011250636 HC RX REV CODE- 250/636: Performed by: INTERNAL MEDICINE

## 2018-07-15 PROCEDURE — 74011250637 HC RX REV CODE- 250/637: Performed by: INTERNAL MEDICINE

## 2018-07-15 PROCEDURE — 82962 GLUCOSE BLOOD TEST: CPT

## 2018-07-15 PROCEDURE — 85025 COMPLETE CBC W/AUTO DIFF WBC: CPT | Performed by: INTERNAL MEDICINE

## 2018-07-15 PROCEDURE — 74011636637 HC RX REV CODE- 636/637: Performed by: INTERNAL MEDICINE

## 2018-07-15 PROCEDURE — 74011000250 HC RX REV CODE- 250: Performed by: INTERNAL MEDICINE

## 2018-07-15 PROCEDURE — 36415 COLL VENOUS BLD VENIPUNCTURE: CPT | Performed by: INTERNAL MEDICINE

## 2018-07-15 PROCEDURE — 65660000004 HC RM CVT STEPDOWN

## 2018-07-15 PROCEDURE — 94640 AIRWAY INHALATION TREATMENT: CPT

## 2018-07-15 RX ORDER — ASPIRIN 81 MG/1
81 TABLET ORAL DAILY
Status: DISCONTINUED | OUTPATIENT
Start: 2018-07-15 | End: 2018-07-18 | Stop reason: HOSPADM

## 2018-07-15 RX ORDER — IPRATROPIUM BROMIDE AND ALBUTEROL SULFATE 2.5; .5 MG/3ML; MG/3ML
3 SOLUTION RESPIRATORY (INHALATION)
Status: DISCONTINUED | OUTPATIENT
Start: 2018-07-15 | End: 2018-07-18 | Stop reason: HOSPADM

## 2018-07-15 RX ORDER — BUDESONIDE AND FORMOTEROL FUMARATE DIHYDRATE 80; 4.5 UG/1; UG/1
2 AEROSOL RESPIRATORY (INHALATION)
Status: DISCONTINUED | OUTPATIENT
Start: 2018-07-15 | End: 2018-07-18 | Stop reason: HOSPADM

## 2018-07-15 RX ORDER — AMLODIPINE BESYLATE 5 MG/1
5 TABLET ORAL DAILY
Status: DISCONTINUED | OUTPATIENT
Start: 2018-07-15 | End: 2018-07-18 | Stop reason: HOSPADM

## 2018-07-15 RX ORDER — FUROSEMIDE 10 MG/ML
20 INJECTION INTRAMUSCULAR; INTRAVENOUS 2 TIMES DAILY
Status: DISCONTINUED | OUTPATIENT
Start: 2018-07-15 | End: 2018-07-16

## 2018-07-15 RX ADMIN — GABAPENTIN 300 MG: 300 CAPSULE ORAL at 21:17

## 2018-07-15 RX ADMIN — Medication 10 ML: at 15:57

## 2018-07-15 RX ADMIN — GABAPENTIN 300 MG: 300 CAPSULE ORAL at 15:57

## 2018-07-15 RX ADMIN — PAROXETINE HYDROCHLORIDE 30 MG: 20 TABLET, FILM COATED ORAL at 10:07

## 2018-07-15 RX ADMIN — FUROSEMIDE 20 MG: 10 INJECTION, SOLUTION INTRAMUSCULAR; INTRAVENOUS at 18:01

## 2018-07-15 RX ADMIN — Medication 10 ML: at 06:08

## 2018-07-15 RX ADMIN — ASPIRIN 81 MG: 81 TABLET, COATED ORAL at 15:57

## 2018-07-15 RX ADMIN — AMLODIPINE BESYLATE 5 MG: 5 TABLET ORAL at 10:46

## 2018-07-15 RX ADMIN — IPRATROPIUM BROMIDE AND ALBUTEROL SULFATE 3 ML: 2.5; .5 SOLUTION RESPIRATORY (INHALATION) at 00:37

## 2018-07-15 RX ADMIN — HEPARIN SODIUM 5000 UNITS: 5000 INJECTION, SOLUTION INTRAVENOUS; SUBCUTANEOUS at 12:07

## 2018-07-15 RX ADMIN — SIMVASTATIN 20 MG: 20 TABLET, FILM COATED ORAL at 21:17

## 2018-07-15 RX ADMIN — GABAPENTIN 300 MG: 300 CAPSULE ORAL at 10:07

## 2018-07-15 RX ADMIN — CEFTRIAXONE SODIUM 1 G: 1 INJECTION, POWDER, FOR SOLUTION INTRAMUSCULAR; INTRAVENOUS at 21:20

## 2018-07-15 RX ADMIN — MIRTAZAPINE 30 MG: 15 TABLET, FILM COATED ORAL at 21:17

## 2018-07-15 RX ADMIN — METHYLPREDNISOLONE SODIUM SUCCINATE 60 MG: 125 INJECTION, POWDER, FOR SOLUTION INTRAMUSCULAR; INTRAVENOUS at 06:07

## 2018-07-15 RX ADMIN — IPRATROPIUM BROMIDE AND ALBUTEROL SULFATE 3 ML: 2.5; .5 SOLUTION RESPIRATORY (INHALATION) at 09:57

## 2018-07-15 RX ADMIN — METHYLPREDNISOLONE SODIUM SUCCINATE 60 MG: 125 INJECTION, POWDER, FOR SOLUTION INTRAMUSCULAR; INTRAVENOUS at 12:07

## 2018-07-15 RX ADMIN — HEPARIN SODIUM 5000 UNITS: 5000 INJECTION, SOLUTION INTRAVENOUS; SUBCUTANEOUS at 21:27

## 2018-07-15 RX ADMIN — AZITHROMYCIN MONOHYDRATE 500 MG: 500 INJECTION, POWDER, LYOPHILIZED, FOR SOLUTION INTRAVENOUS at 21:32

## 2018-07-15 RX ADMIN — METHYLPREDNISOLONE SODIUM SUCCINATE 60 MG: 125 INJECTION, POWDER, FOR SOLUTION INTRAMUSCULAR; INTRAVENOUS at 00:37

## 2018-07-15 RX ADMIN — BUDESONIDE AND FORMOTEROL FUMARATE DIHYDRATE 2 PUFF: 80; 4.5 AEROSOL RESPIRATORY (INHALATION) at 20:06

## 2018-07-15 RX ADMIN — INSULIN LISPRO 2 UNITS: 100 INJECTION, SOLUTION INTRAVENOUS; SUBCUTANEOUS at 15:57

## 2018-07-15 RX ADMIN — Medication 10 ML: at 21:36

## 2018-07-15 RX ADMIN — TAMSULOSIN HYDROCHLORIDE 0.4 MG: 0.4 CAPSULE ORAL at 10:07

## 2018-07-15 RX ADMIN — HEPARIN SODIUM 5000 UNITS: 5000 INJECTION, SOLUTION INTRAVENOUS; SUBCUTANEOUS at 06:08

## 2018-07-15 RX ADMIN — METHYLPREDNISOLONE SODIUM SUCCINATE 60 MG: 125 INJECTION, POWDER, FOR SOLUTION INTRAMUSCULAR; INTRAVENOUS at 21:20

## 2018-07-15 RX ADMIN — INSULIN LISPRO 4 UNITS: 100 INJECTION, SOLUTION INTRAVENOUS; SUBCUTANEOUS at 12:09

## 2018-07-15 NOTE — CONSULTS
[facility], [facility]  [address], [address]  [place], [address]  [address] [facility]  Fax: [phone]  Cardiology Consult Note   Admission Date & Time  7/13/2018  2:19 PM    Requesting Physician: Dr. Esvin Thomas    Reason for Consult: Dyspnea    HPI: Name:     Elliot Queen          Age:         80 y.o. Gender:   female          Ethnicity:      Ms. Rodriguez Haile c/o dyspnea, cough, non productive. She denies c/o chest pain, orthopnea, paroxysmal nocturnal dyspnea, subjective palpitations, or syncope. The clinical work up including EKG showed SB HR 56 RBBB NDST T change; CXR: 1.  New hazy streaky densities at the lung bases bilaterally. Suggestive of infiltrate vs. atelectatic changes with pleural effusion. 2.  Slight prominence of the cardiac silhouette. 3.  Underlying COPD. 4.  Right mid lung zone with focal scarring vs. less likely subsegmental Atelectasis. peak CK 77 MB 3.0 Troponin I 0.02 pro BNP 3289  WBC 12.1 Hgb 14.1 Hct 42.8 Plt 176.        Current Medications:  Current Facility-Administered Medications   Medication Dose Route Frequency    amLODIPine (NORVASC) tablet 5 mg  5 mg Oral DAILY    methylPREDNISolone (PF) (SOLU-MEDROL) injection 60 mg  60 mg IntraVENous Q8H    [START ON 7/16/2018] tiotropium (SPIRIVA) inhalation capsule 18 mcg  1 Cap Inhalation DAILY    budesonide-formoterol (SYMBICORT) 80-4.5 mcg inhaler  2 Puff Inhalation BID RT    albuterol-ipratropium (DUO-NEB) 2.5 MG-0.5 MG/3 ML  3 mL Nebulization Q6H PRN    gabapentin (NEURONTIN) capsule 300 mg  300 mg Oral TID    mirtazapine (REMERON) tablet 30 mg  30 mg Oral QHS    PARoxetine (PAXIL) tablet 30 mg  30 mg Oral DAILY    simvastatin (ZOCOR) tablet 20 mg  20 mg Oral QHS    tamsulosin (FLOMAX) capsule 0.4 mg  0.4 mg Oral DAILY    sodium chloride (NS) flush 5-10 mL  5-10 mL IntraVENous Q8H    sodium chloride (NS) flush 5-10 mL  5-10 mL IntraVENous PRN    azithromycin (ZITHROMAX) 500 mg in 0.9% sodium chloride (MBP/ADV) 250 mL adv  500 mg IntraVENous Q24H    cefTRIAXone (ROCEPHIN) 1 g in sterile water (preservative free) 10 mL IV syringe  1 g IntraVENous Q24H    acetaminophen (TYLENOL) tablet 650 mg  650 mg Oral Q4H PRN    HYDROcodone-acetaminophen (NORCO) 5-325 mg per tablet 1 Tab  1 Tab Oral Q4H PRN    heparin (porcine) injection 5,000 Units  5,000 Units SubCUTAneous Q8H    insulin lispro (HUMALOG) injection   SubCUTAneous ACB&D    glucose chewable tablet 16 g  4 Tab Oral PRN    glucagon (GLUCAGEN) injection 1 mg  1 mg IntraMUSCular PRN    dextrose (D50W) injection syrg 12.5-25 g  25-50 mL IntraVENous PRN          Allergies:   Allergies   Allergen Reactions    Levaquin [Levofloxacin] Rash         Past History:  Past Medical History:   Diagnosis Date    Colon polyps     COPD 8-30-02    DDD (degenerative disc disease), lumbar 10/1/2014    Degeneration of lumbar or lumbosacral intervertebral disc     Depression     Diabetes ([facility] 7-19-05    Diverticulosis     DM neuropathy, painful ([facility] 10/1/2014    MOORE (Dyspnea on Exertion) 4-19-02    echo: +mild LVH, XQ03-78% w/ diastolic dysfxn    Glaucoma     HCAP (healthcare-associated pneumonia) 03/24/2017    Hemorrhoids 6-6-06    Hyperlipidemia 5/17/2011    Hypertension 4-16-02    LBP (low back pain) 4-13-04    Low back pain radiating to both legs 10/1/2014    Lumbago     Lumbar disc herniation 10/1/2014    Lumbar facet arthropathy ([facility] 10/1/2014    Lumbosacral radiculopathy at L5 10/1/2014    Lumbosacral radiculopathy at S1 10/1/2014    Microscopic hematuria     OA (osteoarthritis)     Overactive bladder 5/17/2011    RLS (restless legs syndrome) 1/17/2013    Sciatica     Shortness of breath     Spinal stenosis, lumbar region, without neurogenic claudication     Spondylolisthesis of lumbar region 10/1/2014    Spondylolisthesis, grade 1 10/1/2014    Stress urinary incontinence     Syncope     -MRI brain    Urinary tract infection, site not specified        Past Surgical History:   Procedure Laterality Date    HX APPENDECTOMY      HX CHOLECYSTECTOMY      HX HYSTERECTOMY      (+)DUB    HX POLYPECTOMY      [place] COLONOSCOPY FLX DX W/COLLJ SPEC WHEN PFRMD  6-16-06    normal, Dr Elisa Betts FLX DX W/COLLJ [address] PFRMD      (+)polyp= tubular adenoma       Family History   Problem Relation Age of Onset    Colon Cancer Sister     Heart Disease Brother     Seizures Son     Colon Cancer Maternal Aunt        Social History     Social History    Marital status:      Spouse name: N/A    Number of children: N/A    Years of education: N/A     Social History Main Topics    Smoking status: Current Every Day Smoker     Packs/day: 0.25     Years: 50.00     Types: Cigarettes    Smokeless tobacco: Never Used      Comment: pt has cut down recently to less than 1 ppd    Alcohol use No    Drug use: No    Sexual activity: Not Currently     Other Topics Concern    Not on file     Social History Narrative         Review of Symptoms:      Constitutional: Negative fever, chills. Head: Negative headache. Eyes: Negative blurred vision, diplopia. ENT: Negative epistaxis. Respiratory: No hemoptysis. Cardiovascular: See HPI. Gastrointestinal: Negative hematochezia, hematemesis. Genitourinary: Negative hematuria. Musculoskeletal: Negative for joint pain or muscle pain. Neurological: Negative focal weakness. Skin: Negative rash. Vital Signs:    Visit Vitals    /75 (BP 1 Location: Right arm, BP Patient Position: At rest)    Pulse 82    Temp 98.1 °F (36.7 °C)    Resp 18    Wt 60.7 kg (133 lb 14.4 oz)    SpO2 95%    BMI 22.28 kg/m2         Physical Assessment:    General: Mildly labored respiration. Skin: Warm and dry. HEENT: Head is normocephalic and atraumatic. Neck: Supple. No thyromegaly or jugular venous distension.     Chest: Symmetric    Back: No presacral edema. Lungs: Course. Expiratory wheeze. Heart:  S1 and S2 are normal.     Abdomen: Soft. Genitourinary: Deferred. Extremities: No edema. Neurologic: Cranial nerves II-XII are grossly intact. Musculoskeletal: No obvious muscle or joint deformities. Psychiatric: Pt. Is awake, alert and oriented X3. Affect seems normal.      Laboratory Data:    Labs: Results:       Chemistry No results for input(s): GLU, NA, K, CL, CO2, BUN, CREA, CA, MG, PHOS, AGAP, BUCR, TBIL, GPT, AP, TP, ALB, GLOB, AGRAT in the last 72 hours. CBC w/Diff Recent Labs      07/15/18   1145  07/13/18   1432   WBC  12.1  8.1   RBC  4.81  5.08   HGB  14.1  14.9   HCT  42.8  45.8*   PLT  176  188   GRANS  91*  73   LYMPH  3*  15*   EOS  0  1      Cardiac Enzymes Recent Labs      07/13/18   1432   CPK  77   CKND1  3.9      Coagulation No results for input(s): PTP, INR, APTT in the last 72 hours. No lab exists for component: INREXT    Lipid Panel Lab Results   Component Value Date/Time    Cholesterol, total 132 06/12/2018 09:08 AM    HDL Cholesterol 43 06/12/2018 09:08 AM    LDL, calculated 72 06/12/2018 09:08 AM    VLDL, calculated 17 06/12/2018 09:08 AM    Triglyceride 85 06/12/2018 09:08 AM    CHOL/HDL Ratio 3.1 06/12/2018 09:08 AM      BNP No results for input(s): BNPP in the last 72 hours. Liver Enzymes No results for input(s): TP, ALB, TBIL, AP, SGOT, GPT in the last 72 hours. No lab exists for component: DBIL   Digoxin    Thyroid Studies Lab Results   Component Value Date/Time    T4, Total 7.6 04/04/2012 08:38 AM    TSH 0.85 12/04/2017 03:53 PM          4/22/17 Echocardiogram:  Left ventricle: Systolic function was normal by visual assessment. Ejection fraction was estimated in the range of 60 % to 65 %. No obvious  wall motion abnormalities identified in the views obtained. Wall thickness  was mildly increased. Impressions:   Dyspnea with elevated pro BNP.  Suspect non cardiac edema  COPD exacerbation   Pneumonia  Hypertension  Hyperlipidemia  Diabetes mellitus  Plan:   Telemetry  Amlodipine/Statin/IV Steroids  ECASA 81 mg every day  Furosemide 20 mg IV q12 X 3 doses  Check BMP, Mg  Echocardiogram pending    Thank you for calling. Petros Drake, [address] [place] [address], [address] [place] [address] Office  [phone] Pager  7/15/2018, 1:17 PM

## 2018-07-15 NOTE — PROGRESS NOTES
Progress Note  Pulmonary, Critical Care, and Sleep Medicine    Name: Mariella Santoro MRN: [id number]   : 1933 Hospital: [facility]   Date: 7/15/2018        IMPRESSION:   · Acute on chronic hypoxic hypercapnic respiratory failure due to COPD exacerbation and atypical pneumonia  · Bilateral pulmonary infiltrates  · COPD on LTOT FEV1 51% in   · Serology  positive for KANA, RA, SSA, SSB  · HTN  · Glaucoma  · DM  · Depression  · Chronic back pain  · Cachexia  · Poor compliance with medications and follow up visits      PLAN:   · Taper O2 to home dose of 3 LNC  · BIPAP HS and prn  · Started LABA/ICS. Will not start LAMA in light of Glaucoma  · Consider home based Pulmonary rehab  · PFT's as outpatient  · DVT and GI prophylaxis  · Pt instructed on pursed lip breathing     Subjective/Interval History:   I have reviewed the flowsheet and previous days notes. Reviewed interval history. Discussed management with nursing staff. Dyspnea much improved, although not quite to baseline. Denies chest pain. Pt wants to go home      ROS:Pertinent items are noted in HPI. Orders reviewed including medications. Changes made if indicated. Telemetry monitor reviewed at the bedside.   Objective:   Vital Signs:    Visit Vitals    /75 (BP 1 Location: Right arm, BP Patient Position: At rest)    Pulse 82    Temp 98.1 °F (36.7 °C)    Resp 18    Wt 60.7 kg (133 lb 14.4 oz)    SpO2 95%    BMI 22.28 kg/m2       O2 Device: Nasal cannula   O2 Flow Rate (L/min): 2 l/min   Temp (24hrs), Av.3 °F (36.8 °C), Min:98.1 °F (36.7 °C), Max:98.4 °F (36.9 °C)       Intake/Output:   Last shift:      07/15 0701 - 07/15 1900  In: 354 [P.O.:354]  Out: -   Last 3 shifts: 1901 - 07/15 07  In: 0   Out: 1000 [Urine:1000]    Intake/Output Summary (Last 24 hours) at 07/15/18 1317  Last data filed at 07/15/18 0919   Gross per 24 hour   Intake              354 ml   Output              200 ml   Net              154 ml Physical Exam:    General:  Alert, cooperative, in no distress, appears stated age. In good spirits   Head:  Normocephalic, without obvious abnormality, atraumatic. Eyes:  ANicteric, PERRL,   Nose: Nares normal.  Mucosa normal. No drainage or sinus tenderness. Throat: Lips, mucosa, and tongue normal.  NO Thrush   Neck: Supple, symmetrical, trachea midline, no adenopathy, thyroid: no enlargment/tenderness/nodules    Back:   Symmetric    Lungs:   Bilateral auscultation diminished breath sounds, minimal wheezes LULF, no rales   Chest wall:  No tenderness or deformity. NO intercostal retractions   Heart:  Regular rate and rhythm, S1, S2 normal, no murmur, click, rub or gallop. Abdomen:   Soft, non-tender. Bowel sounds normal. No masses,  No organomegaly. NO paradoxical motion   Extremities: normal, atraumatic, no cyanosis, trace pedal edema   Pulses: 2+ and symmetric all extremities. Skin: Skin color, texture, turgor normal. No rashes or lesions. NO clubbing   Lymph nodes: Cervical nodes normal.   Neurologic: Grossly nonfocal      :        Devices:             Drips:    DATA:  Labs:  Recent Labs      07/15/18   1145  07/13/18   1432   WBC  12.1  8.1   HGB  14.1  14.9   HCT  42.8  45.8*   PLT  176  188     No results for input(s): NA, K, CL, CO2, GLU, BUN, CREA, CA, MG, PHOS, ALB, TBIL, SGOT, ALT, INR in the last 72 hours. No lab exists for component: INREXT  No results for input(s): PH, PCO2, PO2, HCO3, FIO2 in the last 72 hours. Imaging:  []        I have personally reviewed the patients radiographs and reports  []         []        No change from prior, tubes and lines in adequate position  []        Improved   []        Worsening  High complexity decision making was performed during this consultation and evaluation.     Dillon Ortiz MD

## 2018-07-15 NOTE — ROUTINE PROCESS
Bedside shift change report given to [address] (oncoming nurse) by Darryl Schmidt RN (offgoing nurse). Report included the following information SBAR, Kardex, Procedure Summary, Intake/Output, MAR, Accordion, Recent Results, Med Rec Status, Cardiac Rhythm NSR and Alarm Parameters .

## 2018-07-15 NOTE — PROGRESS NOTES
[facility] Hospitalist Group  Progress Note    Patient: Estefany Kidney Age: 80 y.o. : 1933 MR#: [id number] SSN: [id number]  Date/Time: 7/15/2018     Subjective:     Review of systems  Feels \" great \", wants to go home   Refused use of BiPAP ,   No CP  No NVD  No distress at rest     Assessment/Plan:   1. 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 Deescalate antibiotics - afebrile. - Today s labs pending     Case discussed with:  [x]Patient  [x]Family  []Nursing  []Case Management  DVT Prophylaxis:  []Lovenox  []Hep SQ  []SCDs  []Coumadin   []On Heparin gtt    Chest X ray :  IMPRESSION  IMPRESSION:     1. New hazy streaky densities at the lung bases bilaterally. Suggestive of  infiltrate vs. atelectatic changes with pleural effusion. 2.  Slight prominence of the cardiac silhouette. 3.  Underlying COPD. 4.  Right mid lung zone with focal scarring vs. less likely subsegmental  atelectasis.       Objective:   VS:   Visit Vitals    /75 (BP 1 Location: Right arm, BP Patient Position: At rest)    Pulse 82    Temp 98.1 °F (36.7 °C)    Resp 18    Wt 60.7 kg (133 lb 14.4 oz)    SpO2 95%    BMI 22.28 kg/m2      Tmax/24hrs: Temp (24hrs), Av.3 °F (36.8 °C), Min:98 °F (36.7 °C), Max:98.4 °F (36.9 °C)  IOBRIEF    Intake/Output Summary (Last 24 hours) at 07/15/18 Πάνου 90 filed at 07/15/18 0919   Gross per 24 hour   Intake              354 ml   Output              600 ml   Net             -246 ml       General:  Alert, cooperative, no acute distress   Cardiovascular: S1S2 - regular , No Murmur   Pulmonary: Equal expansion , No Use of accessory muscles ,Bilateral extensive wheezing with few bilateral basal rales   GI:  +BS in all four quadrants, soft, non-tender  Extremities:  No edema; 2+ dorsalis pedis pulses bilaterally  Neuro: Alert and oriented X 2.        Medications:   Current Facility-Administered Medications   Medication Dose Route Frequency    amLODIPine (NORVASC) tablet 5 mg  5 mg Oral DAILY    albuterol-ipratropium (DUO-NEB) 2.5 MG-0.5 MG/3 ML  3 mL Nebulization Q4H RT    gabapentin (NEURONTIN) capsule 300 mg  300 mg Oral TID    mirtazapine (REMERON) tablet 30 mg  30 mg Oral QHS    PARoxetine (PAXIL) tablet 30 mg  30 mg Oral DAILY    simvastatin (ZOCOR) tablet 20 mg  20 mg Oral QHS    tamsulosin (FLOMAX) capsule 0.4 mg  0.4 mg Oral DAILY    sodium chloride (NS) flush 5-10 mL  5-10 mL IntraVENous Q8H    sodium chloride (NS) flush 5-10 mL  5-10 mL IntraVENous PRN    methylPREDNISolone (PF) (SOLU-MEDROL) injection 60 mg  60 mg IntraVENous Q6H    azithromycin (ZITHROMAX) 500 mg in 0.9% sodium chloride (MBP/ADV) 250 mL adv  500 mg IntraVENous Q24H    cefTRIAXone (ROCEPHIN) 1 g in sterile water (preservative free) 10 mL IV syringe  1 g IntraVENous Q24H    acetaminophen (TYLENOL) tablet 650 mg  650 mg Oral Q4H PRN    HYDROcodone-acetaminophen (NORCO) 5-325 mg per tablet 1 Tab  1 Tab Oral Q4H PRN    heparin (porcine) injection 5,000 Units  5,000 Units SubCUTAneous Q8H    insulin lispro (HUMALOG) injection   SubCUTAneous ACB&D    glucose chewable tablet 16 g  4 Tab Oral PRN    glucagon (GLUCAGEN) injection 1 mg  1 mg IntraMUSCular PRN    dextrose (D50W) injection syrg 12.5-25 g  25-50 mL IntraVENous PRN       Labs:    Recent Labs      07/13/18 1432   WBC  8.1   HGB  14.9   HCT  45.8*   PLT  188     No results for input(s): NA, K, CL, CO2, GLU, BUN, CREA, CA, MG, PHOS, ALB, TBIL, SGOT, ALT, INR in the last 72 hours.     No lab exists for component: Pb [place]      Signed By: Juan Carlos Goins MD     July 15, 2018

## 2018-07-15 NOTE — ROUTINE PROCESS
Bedside and Verbal shift change report given to Salvatore Almeida (oncoming nurse) by Jose Osman RN (offgoing nurse). Report included the following information SBAR, Kardex, ED Summary, Procedure Summary, Intake/Output, MAR, Recent Results and Med Rec Status.

## 2018-07-15 NOTE — PROGRESS NOTES
Problem: Falls - Risk of  Goal: *Absence of Falls  Document Edson Fall Risk and appropriate interventions in the flowsheet.    Outcome: Progressing Towards Goal  Fall Risk Interventions:  Mobility Interventions: Bed/chair exit alarm         Medication Interventions: Bed/chair exit alarm, Patient to call before getting OOB, Teach patient to arise slowly    Elimination Interventions: Call light in reach    History of Falls Interventions: Bed/chair exit alarm, Door open when patient unattended, Investigate reason for fall

## 2018-07-16 ENCOUNTER — APPOINTMENT (OUTPATIENT)
Dept: NON INVASIVE DIAGNOSTICS | Age: 83
DRG: 189 | End: 2018-07-16
Attending: INTERNAL MEDICINE
Payer: MEDICARE

## 2018-07-16 LAB
ANION GAP SERPL CALC-SCNC: 3 MMOL/L (ref 3–18)
BASOPHILS # BLD: 0 K/UL (ref 0–0.1)
BASOPHILS NFR BLD: 0 % (ref 0–2)
BUN SERPL-MCNC: 11 MG/DL (ref 7–18)
BUN/CREAT SERPL: 20 (ref 12–20)
CALCIUM SERPL-MCNC: 8.7 MG/DL (ref 8.5–10.1)
CHLORIDE SERPL-SCNC: 97 MMOL/L (ref 100–108)
CO2 SERPL-SCNC: 40 MMOL/L (ref 21–32)
CREAT SERPL-MCNC: 0.55 MG/DL (ref 0.6–1.3)
DIFFERENTIAL METHOD BLD: ABNORMAL
ECHO LA AREA 4C: 16.9 CM2
ECHO LA VOL 2C: 64.51 ML (ref 22–52)
ECHO LA VOL 4C: 42.49 ML (ref 22–52)
ECHO LA VOLUME INDEX A2C: 39.04 ML/M2
ECHO LA VOLUME INDEX A4C: 25.71 ML/M2
ECHO LV INTERNAL DIMENSION DIASTOLIC: 4.36 CM (ref 3.9–5.3)
ECHO LV INTERNAL DIMENSION SYSTOLIC: 2.61 CM
ECHO LV IVSD: 1.22 CM (ref 0.6–0.9)
ECHO LV MASS 2D: 235.3 G (ref 67–162)
ECHO LV MASS INDEX 2D: 142.4 G/M2
ECHO LV POSTERIOR WALL DIASTOLIC: 1.27 CM (ref 0.6–0.9)
ECHO LVOT DIAM: 1.97 CM
ECHO LVOT PEAK GRADIENT: 4.8 MMHG
ECHO LVOT PEAK VELOCITY: 109.02 CM/S
ECHO LVOT VTI: 25.28 CM
ECHO MV A VELOCITY: 93.3 CM/S
ECHO MV E DECELERATION TIME (DT): 207.7 MS
ECHO MV E VELOCITY: 1.09 CM/S
ECHO MV E/A RATIO: 1.2
ECHO TV REGURGITANT MAX VELOCITY: 267.54 CM/S
ECHO TV REGURGITANT PEAK GRADIENT: 28.6 MMHG
EOSINOPHIL # BLD: 0 K/UL (ref 0–0.4)
EOSINOPHIL NFR BLD: 0 % (ref 0–5)
ERYTHROCYTE [DISTWIDTH] IN BLOOD BY AUTOMATED COUNT: 17.8 % (ref 11.6–14.5)
GLUCOSE BLD STRIP.AUTO-MCNC: 139 MG/DL (ref 70–110)
GLUCOSE BLD STRIP.AUTO-MCNC: 166 MG/DL (ref 70–110)
GLUCOSE BLD STRIP.AUTO-MCNC: 191 MG/DL (ref 70–110)
GLUCOSE BLD STRIP.AUTO-MCNC: 228 MG/DL (ref 70–110)
GLUCOSE SERPL-MCNC: 177 MG/DL (ref 74–99)
HCT VFR BLD AUTO: 45.3 % (ref 35–45)
HGB BLD-MCNC: 15.2 G/DL (ref 12–16)
LYMPHOCYTES # BLD: 0.5 K/UL (ref 0.9–3.6)
LYMPHOCYTES NFR BLD: 5 % (ref 21–52)
MAGNESIUM SERPL-MCNC: 2.1 MG/DL (ref 1.6–2.6)
MCH RBC QN AUTO: 29.4 PG (ref 24–34)
MCHC RBC AUTO-ENTMCNC: 33.6 G/DL (ref 31–37)
MCV RBC AUTO: 87.6 FL (ref 74–97)
MONOCYTES # BLD: 0.4 K/UL (ref 0.05–1.2)
MONOCYTES NFR BLD: 4 % (ref 3–10)
NEUTS SEG # BLD: 10.6 K/UL (ref 1.8–8)
NEUTS SEG NFR BLD: 91 % (ref 40–73)
PLATELET # BLD AUTO: 200 K/UL (ref 135–420)
PMV BLD AUTO: 10 FL (ref 9.2–11.8)
POTASSIUM SERPL-SCNC: 3.8 MMOL/L (ref 3.5–5.5)
RBC # BLD AUTO: 5.17 M/UL (ref 4.2–5.3)
SODIUM SERPL-SCNC: 140 MMOL/L (ref 136–145)
WBC # BLD AUTO: 11.6 K/UL (ref 4.6–13.2)

## 2018-07-16 PROCEDURE — 77010033678 HC OXYGEN DAILY

## 2018-07-16 PROCEDURE — 74011250637 HC RX REV CODE- 250/637: Performed by: NURSE PRACTITIONER

## 2018-07-16 PROCEDURE — 74011250637 HC RX REV CODE- 250/637: Performed by: INTERNAL MEDICINE

## 2018-07-16 PROCEDURE — 82962 GLUCOSE BLOOD TEST: CPT

## 2018-07-16 PROCEDURE — 80048 BASIC METABOLIC PNL TOTAL CA: CPT | Performed by: INTERNAL MEDICINE

## 2018-07-16 PROCEDURE — 65660000004 HC RM CVT STEPDOWN

## 2018-07-16 PROCEDURE — 94640 AIRWAY INHALATION TREATMENT: CPT

## 2018-07-16 PROCEDURE — 74011250636 HC RX REV CODE- 250/636: Performed by: INTERNAL MEDICINE

## 2018-07-16 PROCEDURE — 74011636637 HC RX REV CODE- 636/637: Performed by: INTERNAL MEDICINE

## 2018-07-16 PROCEDURE — 85025 COMPLETE CBC W/AUTO DIFF WBC: CPT | Performed by: INTERNAL MEDICINE

## 2018-07-16 PROCEDURE — 74011000250 HC RX REV CODE- 250: Performed by: INTERNAL MEDICINE

## 2018-07-16 PROCEDURE — 36415 COLL VENOUS BLD VENIPUNCTURE: CPT | Performed by: INTERNAL MEDICINE

## 2018-07-16 PROCEDURE — 93306 TTE W/DOPPLER COMPLETE: CPT

## 2018-07-16 PROCEDURE — 83735 ASSAY OF MAGNESIUM: CPT | Performed by: INTERNAL MEDICINE

## 2018-07-16 RX ORDER — ACETAZOLAMIDE 250 MG/1
250 TABLET ORAL 2 TIMES DAILY
Status: COMPLETED | OUTPATIENT
Start: 2018-07-16 | End: 2018-07-18

## 2018-07-16 RX ORDER — INSULIN LISPRO 100 [IU]/ML
INJECTION, SOLUTION INTRAVENOUS; SUBCUTANEOUS
Status: DISCONTINUED | OUTPATIENT
Start: 2018-07-16 | End: 2018-07-16

## 2018-07-16 RX ORDER — ACETAZOLAMIDE 250 MG/1
250 TABLET ORAL 2 TIMES DAILY
Status: DISCONTINUED | OUTPATIENT
Start: 2018-07-16 | End: 2018-07-16

## 2018-07-16 RX ORDER — PREDNISONE 20 MG/1
20 TABLET ORAL 2 TIMES DAILY WITH MEALS
Status: DISCONTINUED | OUTPATIENT
Start: 2018-07-16 | End: 2018-07-18 | Stop reason: HOSPADM

## 2018-07-16 RX ORDER — INSULIN LISPRO 100 [IU]/ML
INJECTION, SOLUTION INTRAVENOUS; SUBCUTANEOUS
Status: DISCONTINUED | OUTPATIENT
Start: 2018-07-16 | End: 2018-07-18 | Stop reason: HOSPADM

## 2018-07-16 RX ADMIN — SIMVASTATIN 20 MG: 20 TABLET, FILM COATED ORAL at 22:01

## 2018-07-16 RX ADMIN — CEFTRIAXONE SODIUM 1 G: 1 INJECTION, POWDER, FOR SOLUTION INTRAMUSCULAR; INTRAVENOUS at 22:04

## 2018-07-16 RX ADMIN — Medication 10 ML: at 06:00

## 2018-07-16 RX ADMIN — PAROXETINE HYDROCHLORIDE 30 MG: 20 TABLET, FILM COATED ORAL at 10:49

## 2018-07-16 RX ADMIN — GABAPENTIN 300 MG: 300 CAPSULE ORAL at 22:02

## 2018-07-16 RX ADMIN — GABAPENTIN 300 MG: 300 CAPSULE ORAL at 10:49

## 2018-07-16 RX ADMIN — HEPARIN SODIUM 5000 UNITS: 5000 INJECTION, SOLUTION INTRAVENOUS; SUBCUTANEOUS at 12:41

## 2018-07-16 RX ADMIN — TAMSULOSIN HYDROCHLORIDE 0.4 MG: 0.4 CAPSULE ORAL at 10:50

## 2018-07-16 RX ADMIN — Medication 10 ML: at 22:22

## 2018-07-16 RX ADMIN — ACETAZOLAMIDE 250 MG: 250 TABLET ORAL at 18:20

## 2018-07-16 RX ADMIN — AMLODIPINE BESYLATE 5 MG: 5 TABLET ORAL at 10:49

## 2018-07-16 RX ADMIN — INSULIN LISPRO 6 UNITS: 100 INJECTION, SOLUTION INTRAVENOUS; SUBCUTANEOUS at 14:08

## 2018-07-16 RX ADMIN — PREDNISONE 20 MG: 20 TABLET ORAL at 18:21

## 2018-07-16 RX ADMIN — MIRTAZAPINE 30 MG: 15 TABLET, FILM COATED ORAL at 22:01

## 2018-07-16 RX ADMIN — PREDNISONE 20 MG: 20 TABLET ORAL at 10:49

## 2018-07-16 RX ADMIN — BUDESONIDE AND FORMOTEROL FUMARATE DIHYDRATE 2 PUFF: 80; 4.5 AEROSOL RESPIRATORY (INHALATION) at 22:12

## 2018-07-16 RX ADMIN — AZITHROMYCIN MONOHYDRATE 500 MG: 500 INJECTION, POWDER, LYOPHILIZED, FOR SOLUTION INTRAVENOUS at 22:13

## 2018-07-16 RX ADMIN — METHYLPREDNISOLONE SODIUM SUCCINATE 60 MG: 125 INJECTION, POWDER, FOR SOLUTION INTRAMUSCULAR; INTRAVENOUS at 05:42

## 2018-07-16 RX ADMIN — HEPARIN SODIUM 5000 UNITS: 5000 INJECTION, SOLUTION INTRAVENOUS; SUBCUTANEOUS at 05:02

## 2018-07-16 RX ADMIN — Medication 10 ML: at 14:09

## 2018-07-16 RX ADMIN — ASPIRIN 81 MG: 81 TABLET, COATED ORAL at 10:50

## 2018-07-16 RX ADMIN — BUDESONIDE AND FORMOTEROL FUMARATE DIHYDRATE 2 PUFF: 80; 4.5 AEROSOL RESPIRATORY (INHALATION) at 09:02

## 2018-07-16 RX ADMIN — GABAPENTIN 300 MG: 300 CAPSULE ORAL at 18:21

## 2018-07-16 RX ADMIN — HEPARIN SODIUM 5000 UNITS: 5000 INJECTION, SOLUTION INTRAVENOUS; SUBCUTANEOUS at 22:06

## 2018-07-16 RX ADMIN — FUROSEMIDE 20 MG: 10 INJECTION, SOLUTION INTRAMUSCULAR; INTRAVENOUS at 10:51

## 2018-07-16 RX ADMIN — INSULIN LISPRO 3 UNITS: 100 INJECTION, SOLUTION INTRAVENOUS; SUBCUTANEOUS at 22:08

## 2018-07-16 NOTE — PROGRESS NOTES
Progress Note Pulmonary, Critical Care, and Sleep Medicine Name: Juany Landry MRN: [id number] : 1933 Hospital: [address] [place] [address] Dr Date: 2018 IMPRESSION:  
· Acute on chronic hypoxic hypercapnic respiratory failure due to COPD exacerbation and atypical pneumonia: Improving · Bilateral pulmonary infiltrates · COPD with excacerbation on LTOT FEV1 51% in 2015 · Serology  positive for KANA, RA, SSA, SSB 
· HTN 
· Glaucoma · DM · Depression · Chronic back pain · Cachexia · Poor compliance with medications and follow up visits PLAN:  
· Pt tapered to home dose of O2 -- counseled pt regarding compliance of oxygen (pt not wearing on exertion at home) · ABx per primary service -- can consider switching to PO cefpodaxime and azithromycin to complete a 10 day course of therapy · Wean steroids over the next 7-10 days · BIPAP HS and prn · Started LABA/ICS. Will not start LAMA in light of Glaucoma. Continue Symbicort with spacer on discharge. Pt using duonebs PRN at home, can continue · Consider home based Pulmonary rehab · PFT's as outpatient · DVT and GI prophylaxis · Pt instructed on pursed lip breathing · OOB to chair, ambulation as tolerated, PT/OT including disposition planning Pt will need followup in [facility] for AXEL [facility]UIZ appointment in 3-14 days post discharge -- message sent to COPD nurse navigator. Subjective/Interval History:  
I have reviewed the flowsheet and previous days notes. Reviewed interval history. Discussed management with nursing staff. Dyspnea much improved,  Pt back to baseline. Pt reports she doesn't wear her oxygen regularly at home. Denies N/V/D, chest pain. ROS:A comprehensive review of systems was negative except for that written in the HPI. Orders reviewed including medications. Changes made if indicated. Telemetry monitor reviewed at the bedside. Objective:  
Vital Signs:   
Visit Vitals  BP (!) 127/110  Pulse 80  Temp 98.5 °F (36.9 °C)  Resp 16  Wt 59.8 kg (131 lb 12.8 oz)  SpO2 91%  BMI 21.93 kg/m2 O2 Device: Nasal cannula O2 Flow Rate (L/min): 2 l/min Temp (24hrs), Av.1 °F (36.7 °C), Min:97.6 °F (36.4 °C), Max:98.5 °F (36.9 °C) Intake/Output:  
Last shift:       07 -  190 In: 510 [P.O.:510] Out: 1181 [JRARJ:[id number]] Last 3 shifts:  190 -  0700 In: [address] [P.O.:1254] Out: 5400 [MQDBH:0268] Intake/Output Summary (Last 24 hours) at 18 1239 Last data filed at 18 1136 Gross per 24 hour Intake              990 ml Output             5900 ml Net            -4910 ml Physical Exam: 
 
General:  Alert, cooperative, in no distress, appears stated age. In good spirits, not wearing nasal cannula in bed Head:  Normocephalic, without obvious abnormality, atraumatic. Eyes:  ANicteric, PERRL, EOMI Nose: Nares normal.  Mucosa normal. No drainage or sinus tenderness. Throat: Lips, mucosa, and tongue normal.  NO Thrush Neck: Supple, symmetrical, trachea midline, no adenopathy, thyroid: no enlargment/tenderness/nodules Back:   Symmetric Lungs:   Bilateral auscultation diminished breath sounds, no wheezes/rales/rhonchi throughout Chest wall:  No tenderness or deformity. NO intercostal retractions Heart:  Regular rate and rhythm, S1, S2 normal, no murmur, click, rub or gallop. Abdomen:   Soft, non-tender. Bowel sounds normal. No masses,  No organomegaly. NO paradoxical motion Extremities: normal, atraumatic, no cyanosis, trace pedal edema Pulses: 2+ and symmetric all extremities. Skin: Skin color, texture, turgor normal. No rashes or lesions. NO clubbing Lymph nodes: Cervical nodes normal.  
Neurologic: Grossly nonfocal, normal strength and coordination grossly, AAOx3 DATA: 
Labs: 
Recent Labs  
   18 
 0544  07/15/18 
 1145  18 
 1432 WBC  11.6  12.1  8.1 HGB  15.2  14.1  14.9 HCT  45.3*  42.8  45.8* PLT  200  176  188 Recent Labs  
   07/16/18 
 0544 NA  140  
K  3.8 CL  97* CO2  40* GLU  177* BUN  11  
CREA  0.55* CA  8.7 MG  2.1 No results for input(s): PH, PCO2, PO2, HCO3, FIO2 in the last 72 hours. Imaging: 
[x]        I have personally reviewed the patients radiographs and reports 
[]         []        No change from prior, tubes and lines in adequate position 
[]        Improved   []        Worsening CT Results (most recent): 
 
Results from [facility] Encounter encounter on 04/21/17 CT SPINE CERV WO CONT Narrative CT scan of cervical spine, without contrast: 
 
 
 
INDICATION: 
 
Trauma due to fall. Trauma to back of head and back. Dizziness. History of hypertension, diabetes and COPD. TECHNIQUE: 
 
Contiguous 2.5 mm the axial sections of cervical spine, T1, T2 and T3 levels are 
obtained without intravenous contrast. 
 
Coronal and sagittal images reformatted. All CT scans at this facility are performed using dose optimization technique as 
appropriate to a  performed  examination, to include automated exposer control, 
adjustment mA and / or  KV according to patient size (including appropriate 
matching  for site specific examination), or use  of iterative  reconstruction 
technique. COMPARISON STUDY: On MRI of cervical spine on 8/1/2013. FINDINGS: 
 
In lower cervical spine there is evidence of loss of lordosis with development 
of mild kyphotic cardiac, as also noted on previous MRI study in 2013. There is no evidence of acute fracture, dislocation or subluxation in cervical 
spine. There is minimal C4 anterolisthesis, similar to previous MRI study. The cervical facets are normally aligned bilaterally without evidence of 
fracture. The odontoid process appears to be intact. The craniovertebral junction is 
intact. In the visualized apices of lungs there is no definable acute process.  There are 
mild-to-moderate fibrotic changes noted in the bases of lungs bilaterally. At C1 level there is no evidence of fracture. At C2-C3 level there are findings of mild DDD and bicipital marked facet 
osteoarthritis. No significant central stenosis or neural foraminal stenosis. At C3-C4 level mild DDD and right-sided severe facet osteoarthritis. No 
significant central stenosis. Severe stenosis of right neural foramen. At C4-C5 level mild to moderate DDD, right sided marked facet osteoarthritis, 
without obvious central stenosis. Moderate to severe stenosis of right neural 
foramen. Moderate stenosis of left neural foramen. At C5-C6 level severe degenerative disc disease with mild central stenosis. Moderate left neural foraminal stenosis. At C6-C7 level moderate to severe DDD. No significant stenosis. At C7-T1 level mild DDD changes with minimal C7 anterolisthesis, similar to 
previous MRI study. -------------------------------------------- 
 
IMPRESSIONS: 
 
 
 
No evidence of acute fracture or dislocation in cervical spine. There are findings of multilevel moderate-to-severe spondylosis including 
degenerative disc disease and facet osteoarthritis in cervical spine, as 
described above. The findings are grossly unchanged as compared to previous MRI 
of cervical spine on 8/1/2013. XR Results (most recent): 
 
Results from Hospital Encounter encounter on 07/13/18 XR CHEST PORT Narrative PROCEDURE:  Chest Portable CPT code [id number] INDICATION:  COPD exacerbation COMPARISON:  2/7/18. FINDINGS:  
 
- New hazy streaky densities are noted at the lung bases. - Slight right costophrenic angle blunting. 
- Bandlike density in the right mid lung zone, suggestive of focal scarring vs. 
subsegmental atelectasis. - Hyperinflation with patchy areas of increased lung lucency. No pneumothorax. - Slight prominence of the cardiac silhouette. Impression IMPRESSION: 
 
1.   New hazy streaky densities at the lung bases bilaterally. Suggestive of 
infiltrate vs. atelectatic changes with pleural effusion. 2.  Slight prominence of the cardiac silhouette. 3.  Underlying COPD. 4.  Right mid lung zone with focal scarring vs. less likely subsegmental 
atelectasis. Ceferino Sánchez MD/MPH Pulmonary, Critical Care Medicine [facility]

## 2018-07-16 NOTE — PROGRESS NOTES
CMS went to speak with the pt in regards to the [employer] from Medicare About Your Rights. \"  Pt was able to review, declined to sign the documents provided. No family were present at bedside. Signed documents can be found in the chart for review; additional copies were left with pt for review.

## 2018-07-16 NOTE — PROGRESS NOTES
[facility] Hospitalist Group  Progress Note    Patient: Hilda Hankins Age: 80 y.o. : 1933 MR#: [id number] SSN: [id number]  Date/Time: 2018     Subjective:     Review of systems  Still SOB   No CP  No NVD  No Fever /chills     Assessment/Plan:   1. 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 New hazy streaky densities at the lung bases bilaterally. Suggestive of  infiltrate vs. atelectatic changes with pleural effusion. 2.  Slight prominence of the cardiac silhouette. 3.  Underlying COPD. 4.  Right mid lung zone with focal scarring vs. less likely subsegmental  atelectasis.       Objective:   VS:   Visit Vitals    BP (!) 120/94    Pulse 80    Temp 98.7 °F (37.1 °C)    Resp 21    Wt 59.8 kg (131 lb 12.8 oz)    SpO2 92%    BMI 21.93 kg/m2      Tmax/24hrs: Temp (24hrs), Av.2 °F (36.8 °C), Min:97.6 °F (36.4 °C), Max:98.7 °F (37.1 °C)  IOBRIEF    Intake/Output Summary (Last 24 hours) at 18 1540  Last data filed at 18 1346   Gross per 24 hour   Intake             1230 ml   Output             6500 ml   Net            -5270 ml       General:  Alert, cooperative, no acute distress   Cardiovascular: S1S2 - regular , No Murmur   Pulmonary: Equal expansion , No Use of accessory muscles ,Bilateral extensive wheezing with few bilateral basal rales   GI:  +BS in all four quadrants, soft, non-tender  Extremities:  No edema; 2+ dorsalis pedis pulses bilaterally  Neuro: Alert and oriented X 2.        Medications:   Current Facility-Administered Medications   Medication Dose Route Frequency    predniSONE (DELTASONE) tablet 20 mg  20 mg Oral BID WITH MEALS    inhalational spacing device   Does Not Apply BID RT    acetaZOLAMIDE (DIAMOX) tablet 250 mg  250 mg Oral BID    insulin lispro (HUMALOG) injection   SubCUTAneous AC&HS    amLODIPine (NORVASC) tablet 5 mg  5 mg Oral DAILY    budesonide-formoterol (SYMBICORT) 80-4.5 mcg inhaler  2 Puff Inhalation BID RT    albuterol-ipratropium (DUO-NEB) 2.5 MG-0.5 MG/3 ML  3 mL Nebulization Q6H PRN    aspirin delayed-release tablet 81 mg  81 mg Oral DAILY    gabapentin (NEURONTIN) capsule 300 mg  300 mg Oral TID    mirtazapine (REMERON) tablet 30 mg  30 mg Oral QHS    PARoxetine (PAXIL) tablet 30 mg  30 mg Oral DAILY    simvastatin (ZOCOR) tablet 20 mg  20 mg Oral QHS    tamsulosin (FLOMAX) capsule 0.4 mg  0.4 mg Oral DAILY    sodium chloride (NS) flush 5-10 mL  5-10 mL IntraVENous Q8H    sodium chloride (NS) flush 5-10 mL  5-10 mL IntraVENous PRN    azithromycin (ZITHROMAX) 500 mg in 0.9% sodium chloride (MBP/ADV) 250 mL adv  500 mg IntraVENous Q24H    cefTRIAXone (ROCEPHIN) 1 g in sterile water (preservative free) 10 mL IV syringe  1 g IntraVENous Q24H    acetaminophen (TYLENOL) tablet 650 mg  650 mg Oral Q4H PRN    HYDROcodone-acetaminophen (NORCO) 5-325 mg per tablet 1 Tab  1 Tab Oral Q4H PRN    heparin (porcine) injection 5,000 Units  5,000 Units SubCUTAneous Q8H    glucose chewable tablet 16 g  4 Tab Oral PRN    glucagon (GLUCAGEN) injection 1 mg  1 mg IntraMUSCular PRN    dextrose (D50W) injection syrg 12.5-25 g  25-50 mL IntraVENous PRN       Labs:    Recent Labs      07/16/18   0544  07/15/18   1145   WBC  11.6  12.1   HGB  15.2  14.1   HCT  45.3* 42.8   PLT  200  176     Recent Labs      07/16/18   0544   NA  140   K  3.8   CL  97*   CO2  40*   GLU  177*   BUN  11   CREA  0.55*   CA  8.7   MG  2.1         Signed By: Marga Galloway MD     July 16, 2018

## 2018-07-16 NOTE — PROGRESS NOTES
Pt not on her bipap at this time. Pt is waiting on RN to administer evening meds. Pt will take bipap when evening meds are adminisered.

## 2018-07-16 NOTE — PROGRESS NOTES
Problem: Falls - Risk of  Goal: *Absence of Falls  Document Edson Fall Risk and appropriate interventions in the flowsheet.    Outcome: Progressing Towards Goal  Fall Risk Interventions:  Mobility Interventions: Communicate number of staff needed for ambulation/transfer, Patient to call before getting OOB    Mentation Interventions: Adequate sleep, hydration, pain control, Door open when patient unattended, Evaluate medications/consider consulting pharmacy, More frequent rounding, Reorient patient, Room close to nurse's station    Medication Interventions: Evaluate medications/consider consulting pharmacy, Patient to call before getting OOB, Teach patient to arise slowly    Elimination Interventions: Call light in reach, Patient to call for help with toileting needs    History of Falls Interventions: Evaluate medications/consider consulting pharmacy, Door open when patient unattended, Room close to nurse's station

## 2018-07-16 NOTE — DIABETES MGMT
Glycemic Control Plan of Care    T2DM with current A1c of 6.3% (07/13/2018). Home diabetes med: None. POC BG range on 07/15/2018: 116-229 mg/dL. POC BG report on 07/16/2018 at time of review: 166 mg/dL. Noted pattern of elevated BG before lunch. Patient stated that she's getting fruit juice with breakfast tray. She does not drink fruit juice at home because of diabetes. Prednisone 20 mg BID. Recommendation(s):  1.) Modified correctional lispro insulin to very resistant dose. 2.) Modified diabetic diet by including no concentrated sweets. Assessment:  Patient is 80year old with past medical history including type diabetes mellitus not on medication, COPD, hypertension, hyperlipidemia, diverticulosis, chronic back pain with lumbar disease and sciatica - was admitted on 07/13/2018 with report of shortness of breath. Noted:  Acute on chronic respiratory failure due to COPD. Bilateral pulmonary infiltrates. COPD exacerbation. T2DM with current A1c of 6.3% (07/13/2018). Most recent blood glucose values:    Results for Roxanne Parikh (MRN [id number]) as of 7/16/2018 14:07   Ref. Range 7/15/2018 08:01 7/15/2018 11:30 7/15/2018 15:49 7/15/2018 21:05   GLUCOSE,FAST - POC Latest Ref Range: 70 - 110 mg/dL 145 (H) 229 (H) 152 (H) 116 (H)     Results for Roxanne Parikh (MRN [id number]) as of 7/16/2018 14:07   Ref. Range 7/16/2018 08:14 7/16/2018 11:33   GLUCOSE,FAST - POC Latest Ref Range: 70 - 110 mg/dL 166 (H) 228 (H)     Current A1C: 6.3% (07/13/2018) which is equivalent to estimated average blood glucose of 134 mg/dL during the past 2-3 months. Current hospital diabetes medications:  Correctional lispro insulin ACHS. Very resistant dose. Total daily dose insulin requirement previous day: 07/15/2018  Lispro: 6 units    Home diabetes medications: None. Patient not on diabetes med at home. Diet: Diabetic consistent carb; no concentrated sweets.     Goals:  Blood glucose will be within target range of  mg/dL by 07/19/2018.     Education:  ___  Refer to Diabetes Education Record             _X__  Education not indicated at this time    Russell Alva RN [facility]  Pager: [phone]

## 2018-07-16 NOTE — PROGRESS NOTES
[facility]      CARDIOLOGY PROGRESS NOTE  RECS:      1. Acute congestive heart failure-Dyspnea with elevated pro BNP. No peripheral edema seen. Good diuresis. D/c lasix. Monitor CO2  Added diamox 250 mg bid x 2 days. Follow echo report to reassess lvf, rvf or any wma. 2. COPD exacerbation - on O2 inhaler and antibiotics by pulmonary   3. Pneumonia- on antibiotics per pulmonary   4. Hypertension- slightly elevated. On Norvacs   5. Hyperlipidemia- continue zocor  6. Diabetes mellitus- medications per medical team     Patient with CHF- diuresing well - agree with diamox. Continue current meds      Echo 2017  Left ventricle: Systolic function was normal by visual assessment. Ejection fraction was estimated in the range of 60 % to 65 %. No obvious  wall motion abnormalities identified in the views obtained. Wall thickness  was mildly increased. ASSESSMENT:  Hospital Problems  Date Reviewed: 7/9/2018          Codes Class Noted POA    COPD exacerbation ([facility] [address] ICD-10-CM: J44.1  ICD-9-CM: 491.21  7/13/2018 Unknown                SUBJECTIVE:    No CP  Improving SOB    OBJECTIVE:    VS:   Visit Vitals    BP (!) 127/110    Pulse 80    Temp 98.5 °F (36.9 °C)    Resp 16    Wt 59.8 kg (131 lb 12.8 oz)    SpO2 91%    BMI 21.93 kg/m2         Intake/Output Summary (Last 24 hours) at 07/16/18 1206  Last data filed at 07/16/18 1136   Gross per 24 hour   Intake             1410 ml   Output             5900 ml   Net            -4490 ml     TELE: normal sinus rhythm    General: alert, well developed, pleasant and in no apparent distress  HENT: Normocephalic, atraumatic. Normal external eye. Neck :  no bruit, no JVD  Cardiac:  regular rate and rhythm, S1, S2 normal, no S3 or S4, no click, no rub  Lungs: wheezes and diminished breath sounds b/l.    Abdomen: Soft, nontender, no masses  Extremities:  No c/c/e, peripheral pulses present      Labs: Results:       Chemistry Recent Labs      07/16/18 0544   GLU  177*   NA  140   K  3.8   CL  97*   CO2  40*   BUN  11   CREA  0.55*   CA  8.7   AGAP  3   BUCR  20      CBC w/Diff Recent Labs      07/16/18   0544  07/15/18   1145  07/13/18   1432   WBC  11.6  12.1  8.1   RBC  5.17  4.81  5.08   HGB  15.2  14.1  14.9   HCT  45.3*  42.8  45.8*   PLT  200  176  188   GRANS  91*  91*  73   LYMPH  5*  3*  15*   EOS  0  0  1      Cardiac Enzymes Recent Labs      07/13/18   1432   CPK  77   CKND1  3.9      Coagulation No results for input(s): PTP, INR, APTT in the last 72 hours. No lab exists for component: INREXT    Lipid Panel Lab Results   Component Value Date/Time    Cholesterol, total 132 06/12/2018 09:08 AM    HDL Cholesterol 43 06/12/2018 09:08 AM    LDL, calculated 72 06/12/2018 09:08 AM    VLDL, calculated 17 06/12/2018 09:08 AM    Triglyceride 85 06/12/2018 09:08 AM    CHOL/HDL Ratio 3.1 06/12/2018 09:08 AM      BNP No results for input(s): BNPP in the last 72 hours. Liver Enzymes No results for input(s): TP, ALB, TBIL, AP, SGOT, GPT in the last 72 hours. No lab exists for component: DBIL   Thyroid Studies Lab Results   Component Value Date/Time    T4, Total 7.6 04/04/2012 08:38 AM    TSH 0.85 12/04/2017 03:53 PM              Opal jones    I have independently evaluated and examined the patient. All relevant labs and testing data's are reviewed. Care plan discussed and updated after review.     Erika Del Toro MD

## 2018-07-17 LAB
ANION GAP SERPL CALC-SCNC: 3 MMOL/L (ref 3–18)
BASOPHILS # BLD: 0 K/UL (ref 0–0.1)
BASOPHILS NFR BLD: 0 % (ref 0–2)
BUN SERPL-MCNC: 10 MG/DL (ref 7–18)
BUN/CREAT SERPL: 17 (ref 12–20)
CALCIUM SERPL-MCNC: 8.4 MG/DL (ref 8.5–10.1)
CHLORIDE SERPL-SCNC: 98 MMOL/L (ref 100–108)
CO2 SERPL-SCNC: 38 MMOL/L (ref 21–32)
CREAT SERPL-MCNC: 0.58 MG/DL (ref 0.6–1.3)
DIFFERENTIAL METHOD BLD: ABNORMAL
EOSINOPHIL # BLD: 0 K/UL (ref 0–0.4)
EOSINOPHIL NFR BLD: 0 % (ref 0–5)
ERYTHROCYTE [DISTWIDTH] IN BLOOD BY AUTOMATED COUNT: 17.7 % (ref 11.6–14.5)
GLUCOSE BLD STRIP.AUTO-MCNC: 111 MG/DL (ref 70–110)
GLUCOSE BLD STRIP.AUTO-MCNC: 117 MG/DL (ref 70–110)
GLUCOSE BLD STRIP.AUTO-MCNC: 147 MG/DL (ref 70–110)
GLUCOSE BLD STRIP.AUTO-MCNC: 155 MG/DL (ref 70–110)
GLUCOSE SERPL-MCNC: 113 MG/DL (ref 74–99)
HCT VFR BLD AUTO: 45.5 % (ref 35–45)
HGB BLD-MCNC: 15.1 G/DL (ref 12–16)
LYMPHOCYTES # BLD: 0.9 K/UL (ref 0.9–3.6)
LYMPHOCYTES NFR BLD: 9 % (ref 21–52)
MCH RBC QN AUTO: 29.1 PG (ref 24–34)
MCHC RBC AUTO-ENTMCNC: 33.2 G/DL (ref 31–37)
MCV RBC AUTO: 87.7 FL (ref 74–97)
MONOCYTES # BLD: 0.9 K/UL (ref 0.05–1.2)
MONOCYTES NFR BLD: 9 % (ref 3–10)
NEUTS SEG # BLD: 8.3 K/UL (ref 1.8–8)
NEUTS SEG NFR BLD: 82 % (ref 40–73)
PLATELET # BLD AUTO: 176 K/UL (ref 135–420)
PMV BLD AUTO: 9.9 FL (ref 9.2–11.8)
POTASSIUM SERPL-SCNC: 3.7 MMOL/L (ref 3.5–5.5)
RBC # BLD AUTO: 5.19 M/UL (ref 4.2–5.3)
SODIUM SERPL-SCNC: 139 MMOL/L (ref 136–145)
WBC # BLD AUTO: 10.1 K/UL (ref 4.6–13.2)

## 2018-07-17 PROCEDURE — 82962 GLUCOSE BLOOD TEST: CPT

## 2018-07-17 PROCEDURE — 74011250637 HC RX REV CODE- 250/637: Performed by: INTERNAL MEDICINE

## 2018-07-17 PROCEDURE — 74011000250 HC RX REV CODE- 250: Performed by: INTERNAL MEDICINE

## 2018-07-17 PROCEDURE — 65660000004 HC RM CVT STEPDOWN

## 2018-07-17 PROCEDURE — 74011000270 HC RX REV CODE- 270: Performed by: INTERNAL MEDICINE

## 2018-07-17 PROCEDURE — 85025 COMPLETE CBC W/AUTO DIFF WBC: CPT | Performed by: INTERNAL MEDICINE

## 2018-07-17 PROCEDURE — 74011636637 HC RX REV CODE- 636/637: Performed by: INTERNAL MEDICINE

## 2018-07-17 PROCEDURE — 74011250637 HC RX REV CODE- 250/637: Performed by: NURSE PRACTITIONER

## 2018-07-17 PROCEDURE — 80048 BASIC METABOLIC PNL TOTAL CA: CPT | Performed by: INTERNAL MEDICINE

## 2018-07-17 PROCEDURE — 74011250636 HC RX REV CODE- 250/636: Performed by: INTERNAL MEDICINE

## 2018-07-17 PROCEDURE — 36415 COLL VENOUS BLD VENIPUNCTURE: CPT | Performed by: INTERNAL MEDICINE

## 2018-07-17 RX ORDER — CEFPODOXIME PROXETIL 200 MG/1
200 TABLET, FILM COATED ORAL EVERY 12 HOURS
Status: DISCONTINUED | OUTPATIENT
Start: 2018-07-18 | End: 2018-07-18 | Stop reason: HOSPADM

## 2018-07-17 RX ORDER — AZITHROMYCIN 250 MG/1
500 TABLET, FILM COATED ORAL DAILY
Status: DISCONTINUED | OUTPATIENT
Start: 2018-07-18 | End: 2018-07-18 | Stop reason: HOSPADM

## 2018-07-17 RX ADMIN — INSULIN LISPRO 3 UNITS: 100 INJECTION, SOLUTION INTRAVENOUS; SUBCUTANEOUS at 16:30

## 2018-07-17 RX ADMIN — HEPARIN SODIUM 5000 UNITS: 5000 INJECTION, SOLUTION INTRAVENOUS; SUBCUTANEOUS at 21:18

## 2018-07-17 RX ADMIN — BUDESONIDE AND FORMOTEROL FUMARATE DIHYDRATE 2 PUFF: 80; 4.5 AEROSOL RESPIRATORY (INHALATION) at 20:21

## 2018-07-17 RX ADMIN — PAROXETINE HYDROCHLORIDE 30 MG: 20 TABLET, FILM COATED ORAL at 08:33

## 2018-07-17 RX ADMIN — PREDNISONE 20 MG: 20 TABLET ORAL at 17:21

## 2018-07-17 RX ADMIN — Medication 10 ML: at 22:23

## 2018-07-17 RX ADMIN — AMLODIPINE BESYLATE 5 MG: 5 TABLET ORAL at 08:33

## 2018-07-17 RX ADMIN — BUDESONIDE AND FORMOTEROL FUMARATE DIHYDRATE 2 PUFF: 80; 4.5 AEROSOL RESPIRATORY (INHALATION) at 08:36

## 2018-07-17 RX ADMIN — AZITHROMYCIN MONOHYDRATE 500 MG: 500 INJECTION, POWDER, LYOPHILIZED, FOR SOLUTION INTRAVENOUS at 20:30

## 2018-07-17 RX ADMIN — SIMVASTATIN 20 MG: 20 TABLET, FILM COATED ORAL at 22:17

## 2018-07-17 RX ADMIN — ACETAZOLAMIDE 250 MG: 250 TABLET ORAL at 08:33

## 2018-07-17 RX ADMIN — HEPARIN SODIUM 5000 UNITS: 5000 INJECTION, SOLUTION INTRAVENOUS; SUBCUTANEOUS at 13:00

## 2018-07-17 RX ADMIN — ACETAZOLAMIDE 250 MG: 250 TABLET ORAL at 17:21

## 2018-07-17 RX ADMIN — MIRTAZAPINE 30 MG: 15 TABLET, FILM COATED ORAL at 22:17

## 2018-07-17 RX ADMIN — Medication: at 08:00

## 2018-07-17 RX ADMIN — ASPIRIN 81 MG: 81 TABLET, COATED ORAL at 08:33

## 2018-07-17 RX ADMIN — PREDNISONE 20 MG: 20 TABLET ORAL at 08:00

## 2018-07-17 RX ADMIN — Medication 10 ML: at 06:09

## 2018-07-17 RX ADMIN — Medication: at 20:00

## 2018-07-17 RX ADMIN — GABAPENTIN 300 MG: 300 CAPSULE ORAL at 22:17

## 2018-07-17 RX ADMIN — GABAPENTIN 300 MG: 300 CAPSULE ORAL at 17:21

## 2018-07-17 RX ADMIN — GABAPENTIN 300 MG: 300 CAPSULE ORAL at 08:32

## 2018-07-17 RX ADMIN — TAMSULOSIN HYDROCHLORIDE 0.4 MG: 0.4 CAPSULE ORAL at 08:33

## 2018-07-17 RX ADMIN — HEPARIN SODIUM 5000 UNITS: 5000 INJECTION, SOLUTION INTRAVENOUS; SUBCUTANEOUS at 06:08

## 2018-07-17 RX ADMIN — Medication 10 ML: at 14:00

## 2018-07-17 RX ADMIN — CEFTRIAXONE SODIUM 1 G: 1 INJECTION, POWDER, FOR SOLUTION INTRAMUSCULAR; INTRAVENOUS at 22:18

## 2018-07-17 NOTE — PROGRESS NOTES
[facility]      CARDIOLOGY PROGRESS NOTE  RECS:      1. Acute congestive heart failure-Dyspnea with elevated pro BNP. No peripheral edema seen. Good diuresis. Resume lasix once CO2 improves. 2. COPD exacerbation - on O2 inhaler and antibiotics by pulmonary   3. Pneumonia- on antibiotics per pulmonary   4. Hypertension- slightly elevated. On Norvasc   5. Hyperlipidemia- continue zocor  6. Diabetes mellitus- medications per medical team       Echo 2017  Left ventricle: Systolic function was normal by visual assessment. Ejection fraction was estimated in the range of 60 % to 65 %. No obvious  wall motion abnormalities identified in the views obtained. Wall thickness  was mildly increased. ASSESSMENT:  Hospital Problems  Date Reviewed: 7/9/2018          Codes Class Noted POA    [facility] ICD-10-CM: J44.1  ICD-9-CM: 491.21  7/13/2018 Unknown                SUBJECTIVE:    No CP  Improving SOB    OBJECTIVE:    VS:   Visit Vitals    BP (!) 140/93 (BP 1 Location: Right arm, BP Patient Position: At rest)    Pulse 76    Temp 98.2 °F (36.8 °C)    Resp 12    Ht 5' 5\" (1.651 m)    Wt 57.8 kg (127 lb 6.8 oz)    SpO2 92%    BMI 21.2 kg/m2         Intake/Output Summary (Last 24 hours) at 07/17/18 1243  Last data filed at 07/17/18 1017   Gross per 24 hour   Intake             1340 ml   Output             3000 ml   Net            -1660 ml     TELE: normal sinus rhythm    General: alert, well developed, pleasant and in no apparent distress  HENT: Normocephalic, atraumatic. Normal external eye. Neck :  no bruit, no JVD  Cardiac:  regular rate and rhythm, S1, S2 normal, no S3 or S4, no click, no rub  Lungs: wheezes and diminished breath sounds b/l.    Abdomen: Soft, nontender, no masses  Extremities:  No c/c/e, peripheral pulses present      Labs: Results:       Chemistry Recent Labs      07/17/18   0508  07/16/18   0544   GLU  113*  177*   NA  139  140   K  3.7  3.8   CL  98* 97*   CO2  38*  40*   BUN  10  11   CREA  0.58*  0.55*   CA  8.4*  8.7   AGAP  3  3   BUCR  17  20      CBC w/Diff Recent Labs      07/17/18   0508  07/16/18   0544  07/15/18   1145   WBC  10.1  11.6  12.1   RBC  5.19  5.17  4.81   HGB  15.1  15.2  14.1   HCT  45.5*  45.3*  42.8   PLT  176  200  176   GRANS  82*  91*  91*   LYMPH  9*  5*  3*   EOS  0  0  0      Cardiac Enzymes No results for input(s): CPK, CKND1, BRY in the last 72 hours. No lab exists for component: CKRMB, TROIP   Coagulation No results for input(s): PTP, INR, APTT in the last 72 hours. No lab exists for component: INREXT, INREXT    Lipid Panel Lab Results   Component Value Date/Time    Cholesterol, total 132 06/12/2018 09:08 AM    HDL Cholesterol 43 06/12/2018 09:08 AM    LDL, calculated 72 06/12/2018 09:08 AM    VLDL, calculated 17 06/12/2018 09:08 AM    Triglyceride 85 06/12/2018 09:08 AM    CHOL/HDL Ratio 3.1 06/12/2018 09:08 AM      BNP No results for input(s): BNPP in the last 72 hours. Liver Enzymes No results for input(s): TP, ALB, TBIL, AP, SGOT, GPT in the last 72 hours.     No lab exists for component: DBIL   Thyroid Studies Lab Results   Component Value Date/Time    T4, Total 7.6 04/04/2012 08:38 AM    TSH 0.85 12/04/2017 03:53 PM              Ted Rowley MD

## 2018-07-17 NOTE — PROGRESS NOTES
F/U Progress Note Pulmonary, Critical Care, and Sleep Medicine Name: Adeel Vasquez MRN: [id number] : 1933 Hospital: [facility] Date: 2018 IMPRESSION:  
· Acute on chronic hypoxic hypercapnic respiratory failure due to COPD exacerbation and atypical pneumonia: Improving · Bilateral pulmonary infiltrates · COPD with excacerbation on LTOT FEV1 51% in 2015 · Serology  positive for KANA, RA, SSA, SSB 
· HTN 
· Glaucoma · DM · Depression · Chronic back pain · Cachexia · Poor compliance with medications and follow up visits PLAN:  
· Continue supplemental oxygen -- counseled pt regarding compliance of oxygen (pt not wearing on exertion at home) · ABx per primary service -- switch to PO cefpodaxime and azithromycin to complete a 10 day course of therapy · Wean steroids over the next 7-10 days · BIPAP HS and prn · Started LABA/ICS. Will not start LAMA in light of Glaucoma. Continue Symbicort with spacer on discharge. Pt using duonebs PRN at home, can continue · Consider home based Pulmonary rehab · PFT's as outpatient · DVT and GI prophylaxis · Pt instructed on pursed lip breathing · OOB to chair, ambulation as tolerated, PT/OT (orders placed today) including disposition planning · Counseled the patient regarding cessation of smoking. I reviewed health risks of tobacco use including increased risk of MI, stroke, cancer, etc.  We reviewed various approaches to cessation. Pt declined cessation assistance at this time. I also strongly advised patient to avoid smoking with oxygen use due to fire/explosion risk. Pt expressed understanding. ·  
 
Pt will need followup in [facility]  cl[facility] for AXEL RUIZ appointment in 3-14 days post discharge -- message sent to COPD nurse navigator. We will sign off at this time Subjective/Interval History:  
I have reviewed the flowsheet and previous days notes. Reviewed interval history.  Discussed management with nursing staff. 
 
Dyspnea much improved,  Pt back to baseline. Pt expresses concern - she wants to be discharged and is concerned about her  at home alone. Denies N/V/D, chest pain. ROS:A comprehensive review of systems was negative except for that written in the HPI. Orders reviewed including medications. Changes made if indicated. Telemetry monitor reviewed at the bedside. Objective:  
Vital Signs:   
Visit Vitals  BP (!) 140/93 (BP 1 Location: Right arm, BP Patient Position: At rest)  Pulse 76  Temp 98.2 °F (36.8 °C)  Resp 12  Ht 5' 5\" (1.651 m)  Wt 57.8 kg (127 lb 6.8 oz)  SpO2 92%  BMI 21.2 kg/m2 O2 Device: Nasal cannula O2 Flow Rate (L/min): 4 l/min Temp (24hrs), Av °F (36.7 °C), Min:96.6 °F (35.9 °C), Max:98.7 °F (37.1 °C) Intake/Output:  
Last shift:       07 -  1900 In: 360 [P.O.:360] Out: 400 [Urine:400] Last 3 shifts: 07/15 190 -  0700 In: 0807 [P.O.:1350; I.V.:260] Out: 6950 [[id number]] Intake/Output Summary (Last 24 hours) at 18 1427 Last data filed at 18 1017 Gross per 24 hour Intake             1100 ml Output             2400 ml Net            -1300 ml Physical Exam: 
 
General:  Alert, cooperative, in no distress, appears stated age. In good spirits,sitting up in chair Head:  Normocephalic, without obvious abnormality, atraumatic. Eyes:  ANicteric, PERRL, EOMI Nose: Nares normal.  Mucosa normal. No drainage or sinus tenderness. Throat: Lips, mucosa, and tongue normal.  NO Thrush Neck: Supple, symmetrical, trachea midline, no adenopathy, thyroid: no enlargment/tenderness/nodules Back:   Symmetric Lungs:   Bilateral auscultation diminished breath sounds, faint wheezes and rhonchi on the left, no rales Chest wall:  No tenderness or deformity. NO intercostal retractions Heart:  Regular rate and rhythm, S1, S2 normal, no murmur, click, rub or gallop.   
Abdomen:   Soft, non-tender. Bowel sounds normal. No masses,  No organomegaly. NO paradoxical motion Extremities: normal, atraumatic, no cyanosis, trace pedal edema Pulses: 2+ and symmetric all extremities. Skin: Skin color, texture, turgor normal. No rashes or lesions. NO clubbing Lymph nodes: Cervical nodes normal.  
Neurologic: Grossly nonfocal, normal strength and coordination grossly, AAOx3 DATA: 
Labs: 
Recent Labs  
   07/17/18 
 0508  07/16/18 
 0544  07/15/18 
 1145 WBC  10.1  11.6  12.1 HGB  15.1  15.2  14.1 HCT  45.5*  45.3*  42.8 PLT  176  200  176 Recent Labs  
   07/17/18 
 0508  07/16/18 
 0544 NA  139  140  
K  3.7  3.8 CL  98*  97* CO2  38*  40* GLU  113*  177* BUN  10  11 CREA  0.58*  0.55* CA  8.4*  8.7 MG   --   2.1 No results for input(s): PH, PCO2, PO2, HCO3, FIO2 in the last 72 hours. Imaging: 
[x]        I have personally reviewed the patients radiographs and reports 
[]         []        No change from prior, tubes and lines in adequate position 
[]        Improved   []        Worsening CT Results (most recent): 
 
Results from [facility] Encounter encounter on 04/21/17 CT SPINE CERV WO CONT Narrative CT scan of cervical spine, without contrast: 
 
 
 
INDICATION: 
 
Trauma due to fall. Trauma to back of head and back. Dizziness. History of hypertension, diabetes and COPD. TECHNIQUE: 
 
Contiguous 2.5 mm the axial sections of cervical spine, T1, T2 and T3 levels are 
obtained without intravenous contrast. 
 
Coronal and sagittal images reformatted. All CT scans at this facility are performed using dose optimization technique as 
appropriate to a  performed  examination, to include automated exposer control, 
adjustment mA and / or  KV according to patient size (including appropriate 
matching  for site specific examination), or use  of iterative  reconstruction 
technique.  
 
COMPARISON STUDY: On MRI of cervical spine on 8/1/2013. FINDINGS: 
 
In lower cervical spine there is evidence of loss of lordosis with development 
of mild kyphotic cardiac, as also noted on previous MRI study in 2013. There is no evidence of acute fracture, dislocation or subluxation in cervical 
spine. There is minimal C4 anterolisthesis, similar to previous MRI study. The cervical facets are normally aligned bilaterally without evidence of 
fracture. The odontoid process appears to be intact. The craniovertebral junction is 
intact. In the visualized apices of lungs there is no definable acute process. There are 
mild-to-moderate fibrotic changes noted in the bases of lungs bilaterally. At C1 level there is no evidence of fracture. At C2-C3 level there are findings of mild DDD and bicipital marked facet 
osteoarthritis. No significant central stenosis or neural foraminal stenosis. At C3-C4 level mild DDD and right-sided severe facet osteoarthritis. No 
significant central stenosis. Severe stenosis of right neural foramen. At C4-C5 level mild to moderate DDD, right sided marked facet osteoarthritis, 
without obvious central stenosis. Moderate to severe stenosis of right neural 
foramen. Moderate stenosis of left neural foramen. At C5-C6 level severe degenerative disc disease with mild central stenosis. Moderate left neural foraminal stenosis. At C6-C7 level moderate to severe DDD. No significant stenosis. At C7-T1 level mild DDD changes with minimal C7 anterolisthesis, similar to 
previous MRI study. -------------------------------------------- 
 
IMPRESSIONS: 
 
 
 
No evidence of acute fracture or dislocation in cervical spine. There are findings of multilevel moderate-to-severe spondylosis including 
degenerative disc disease and facet osteoarthritis in cervical spine, as 
described above. The findings are grossly unchanged as compared to previous MRI 
of cervical spine on 8/1/2013.    
 
XR Results (most recent): 
 
Results from [facility] Encounter encounter on 07/13/18 XR CHEST PORT Narrative PROCEDURE:  Chest Portable CPT code [id number] INDICATION:  COPD exacerbation COMPARISON:  2/7/18. FINDINGS:  
 
- New hazy streaky densities are noted at the lung bases. - Slight right costophrenic angle blunting. 
- Bandlike density in the right mid lung zone, suggestive of focal scarring vs. 
subsegmental atelectasis. - Hyperinflation with patchy areas of increased lung lucency. No pneumothorax. - Slight prominence of the cardiac silhouette. Impression IMPRESSION: 
 
1. New hazy streaky densities at the lung bases bilaterally. Suggestive of 
infiltrate vs. atelectatic changes with pleural effusion. 2.  Slight prominence of the cardiac silhouette. 3.  Underlying COPD. 4.  Right mid lung zone with focal scarring vs. less likely subsegmental 
atelectasis. Radha Florez MD/MPH Pulmonary, Critical Care Medicine [facility]

## 2018-07-17 NOTE — ROUTINE PROCESS
Bedside and Verbal shift change report given to Willie Hinkle RN (oncoming nurse) by Ijeoma Ceron RN (offgoing nurse). Report included the following information Kardex, Intake/Output, MAR and Recent Results.

## 2018-07-17 NOTE — PROGRESS NOTES
[facility] Hospitalist Group  Progress Note    Patient: Elliot Queen Age: 80 y.o. : 1933 MR#: [id number] SSN: [id number]  Date/Time: 2018     Subjective:     Review of systems    Now sitting in chair   No distress at rest   No NVD  No cough   No SOB at rest     Persistent hypoxia - on 4 L NC , prn BiPAP - Still refusing to use     Assessment/Plan:   1. 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 Both wishes to go home with home health   - ECHO report reviewed - ejection fraction is 56 - 60%. Diastolic dysfunction - stable       Case discussed with:  [x]Patient  [x]Family  []Nursing  []Case Management  DVT Prophylaxis:  []Lovenox  []Hep SQ  []SCDs  []Coumadin   []On Heparin gtt    Chest X ray :  IMPRESSION  IMPRESSION:     1. New hazy streaky densities at the lung bases bilaterally. Suggestive of  infiltrate vs. atelectatic changes with pleural effusion. 2.  Slight prominence of the cardiac silhouette. 3.  Underlying COPD. 4.  Right mid lung zone with focal scarring vs. less likely subsegmental  atelectasis.       Objective:   VS:   Visit Vitals    BP (!) 140/93 (BP 1 Location: Right arm, BP Patient Position: At rest)    Pulse 76    Temp 98.2 °F (36.8 °C)    Resp 12    Ht 5' 5\" (1.651 m)    Wt 57.8 kg (127 lb 6.8 oz)    SpO2 92%    BMI 21.2 kg/m2      Tmax/24hrs: Temp (24hrs), Av °F (36.7 °C), Min:96.6 °F (35.9 °C), Max:98.7 °F (37.1 °C)  IOBRIEF    Intake/Output Summary (Last 24 hours) at 07/17/18 1243  Last data filed at 07/17/18 1017   Gross per 24 hour   Intake             1340 ml   Output             3000 ml   Net            -1660 ml       General:  Alert, cooperative, no acute distress   Cardiovascular: S1S2 - regular , No Murmur   Pulmonary: Equal expansion , No Use of accessory muscles ,Bilateral extensive wheezing with few bilateral basal rales   GI:  +BS in all four quadrants, soft, non-tender  Extremities:  No edema; 2+ dorsalis pedis pulses bilaterally  Neuro: Alert and oriented X 2.        Medications:   Current Facility-Administered Medications   Medication Dose Route Frequency    predniSONE (DELTASONE) tablet 20 mg  20 mg Oral BID WITH MEALS    inhalational spacing device   Does Not Apply BID RT    acetaZOLAMIDE (DIAMOX) tablet 250 mg  250 mg Oral BID    insulin lispro (HUMALOG) injection   SubCUTAneous AC&HS    amLODIPine (NORVASC) tablet 5 mg  5 mg Oral DAILY    budesonide-formoterol (SYMBICORT) 80-4.5 mcg inhaler  2 Puff Inhalation BID RT    albuterol-ipratropium (DUO-NEB) 2.5 MG-0.5 MG/3 ML  3 mL Nebulization Q6H PRN    aspirin delayed-release tablet 81 mg  81 mg Oral DAILY    gabapentin (NEURONTIN) capsule 300 mg  300 mg Oral TID    mirtazapine (REMERON) tablet 30 mg  30 mg Oral QHS    PARoxetine (PAXIL) tablet 30 mg  30 mg Oral DAILY    simvastatin (ZOCOR) tablet 20 mg  20 mg Oral QHS    tamsulosin (FLOMAX) capsule 0.4 mg  0.4 mg Oral DAILY    sodium chloride (NS) flush 5-10 mL  5-10 mL IntraVENous Q8H    sodium chloride (NS) flush 5-10 mL  5-10 mL IntraVENous PRN    azithromycin (ZITHROMAX) 500 mg in 0.9% sodium chloride (MBP/ADV) 250 mL adv  500 mg IntraVENous Q24H    cefTRIAXone (ROCEPHIN) 1 g in sterile water (preservative free) 10 mL IV syringe  1 g IntraVENous Q24H    acetaminophen (TYLENOL) tablet 650 mg  650 mg Oral Q4H PRN    HYDROcodone-acetaminophen (NORCO) 5-325 mg per tablet 1 Tab  1 Tab Oral Q4H PRN    heparin (porcine) injection 5,000 Units  5,000 Units SubCUTAneous Q8H    glucose chewable tablet 16 g  4 Tab Oral PRN    glucagon (GLUCAGEN) injection 1 mg  1 mg IntraMUSCular PRN    dextrose (D50W) injection syrg 12.5-25 g  25-50 mL IntraVENous PRN       Labs:    Recent Labs      07/17/18   0508  07/16/18   0544  07/15/18   1145   WBC  10.1  11.6  12.1   HGB  15.1  15.2  14.1   HCT  45.5*  45.3*  42.8   PLT  176  200  176     Recent Labs      07/17/18   0508  07/16/18   0544   NA  139  140   K  3.7  3.8   CL  98*  97*   CO2  38*  40*   GLU  113*  177*   BUN  10  11   CREA  0.58*  0.55*   CA  8.4*  8.7   MG   --   2.1         Signed By: Frank Ignacio MD     July 17, 2018

## 2018-07-17 NOTE — ROUTINE PROCESS
0700-Bedside shift change report given to [address] (oncoming nurse) by Ana Cristina Retana RN (offgoing nurse). Report included the following information SBAR, Kardex and MAR. No distress noted.

## 2018-07-18 ENCOUNTER — HOME HEALTH ADMISSION (OUTPATIENT)
Dept: HOME HEALTH SERVICES | Facility: HOME HEALTH | Age: 83
End: 2018-07-18
Payer: MEDICARE

## 2018-07-18 VITALS
OXYGEN SATURATION: 95 % | BODY MASS INDEX: 20.98 KG/M2 | DIASTOLIC BLOOD PRESSURE: 78 MMHG | SYSTOLIC BLOOD PRESSURE: 128 MMHG | HEIGHT: 65 IN | WEIGHT: 125.9 LBS | TEMPERATURE: 99.2 F | HEART RATE: 78 BPM | RESPIRATION RATE: 18 BRPM

## 2018-07-18 LAB
ANION GAP SERPL CALC-SCNC: 3 MMOL/L (ref 3–18)
BASOPHILS # BLD: 0 K/UL (ref 0–0.1)
BASOPHILS NFR BLD: 0 % (ref 0–2)
BUN SERPL-MCNC: 14 MG/DL (ref 7–18)
BUN/CREAT SERPL: 21 (ref 12–20)
CALCIUM SERPL-MCNC: 8.8 MG/DL (ref 8.5–10.1)
CHLORIDE SERPL-SCNC: 102 MMOL/L (ref 100–108)
CO2 SERPL-SCNC: 33 MMOL/L (ref 21–32)
CREAT SERPL-MCNC: 0.68 MG/DL (ref 0.6–1.3)
DIFFERENTIAL METHOD BLD: ABNORMAL
EOSINOPHIL # BLD: 0 K/UL (ref 0–0.4)
EOSINOPHIL NFR BLD: 0 % (ref 0–5)
ERYTHROCYTE [DISTWIDTH] IN BLOOD BY AUTOMATED COUNT: 17.5 % (ref 11.6–14.5)
GLUCOSE BLD STRIP.AUTO-MCNC: 106 MG/DL (ref 70–110)
GLUCOSE SERPL-MCNC: 135 MG/DL (ref 74–99)
HCT VFR BLD AUTO: 48.9 % (ref 35–45)
HGB BLD-MCNC: 15.9 G/DL (ref 12–16)
LYMPHOCYTES # BLD: 1 K/UL (ref 0.9–3.6)
LYMPHOCYTES NFR BLD: 10 % (ref 21–52)
MCH RBC QN AUTO: 28.3 PG (ref 24–34)
MCHC RBC AUTO-ENTMCNC: 32.5 G/DL (ref 31–37)
MCV RBC AUTO: 87.2 FL (ref 74–97)
MONOCYTES # BLD: 0.8 K/UL (ref 0.05–1.2)
MONOCYTES NFR BLD: 8 % (ref 3–10)
NEUTS SEG # BLD: 7.7 K/UL (ref 1.8–8)
NEUTS SEG NFR BLD: 82 % (ref 40–73)
PLATELET # BLD AUTO: 163 K/UL (ref 135–420)
PMV BLD AUTO: 9.4 FL (ref 9.2–11.8)
POTASSIUM SERPL-SCNC: 4.7 MMOL/L (ref 3.5–5.5)
RBC # BLD AUTO: 5.61 M/UL (ref 4.2–5.3)
SODIUM SERPL-SCNC: 138 MMOL/L (ref 136–145)
WBC # BLD AUTO: 9.5 K/UL (ref 4.6–13.2)

## 2018-07-18 PROCEDURE — 74011250636 HC RX REV CODE- 250/636: Performed by: INTERNAL MEDICINE

## 2018-07-18 PROCEDURE — 74011250637 HC RX REV CODE- 250/637: Performed by: NURSE PRACTITIONER

## 2018-07-18 PROCEDURE — 97530 THERAPEUTIC ACTIVITIES: CPT

## 2018-07-18 PROCEDURE — 97116 GAIT TRAINING THERAPY: CPT

## 2018-07-18 PROCEDURE — 74011250637 HC RX REV CODE- 250/637: Performed by: INTERNAL MEDICINE

## 2018-07-18 PROCEDURE — 80048 BASIC METABOLIC PNL TOTAL CA: CPT | Performed by: INTERNAL MEDICINE

## 2018-07-18 PROCEDURE — 97162 PT EVAL MOD COMPLEX 30 MIN: CPT

## 2018-07-18 PROCEDURE — 74011636637 HC RX REV CODE- 636/637: Performed by: INTERNAL MEDICINE

## 2018-07-18 PROCEDURE — 94660 CPAP INITIATION&MGMT: CPT

## 2018-07-18 PROCEDURE — 36415 COLL VENOUS BLD VENIPUNCTURE: CPT | Performed by: INTERNAL MEDICINE

## 2018-07-18 PROCEDURE — 82962 GLUCOSE BLOOD TEST: CPT

## 2018-07-18 PROCEDURE — 85025 COMPLETE CBC W/AUTO DIFF WBC: CPT | Performed by: INTERNAL MEDICINE

## 2018-07-18 RX ORDER — BUDESONIDE AND FORMOTEROL FUMARATE DIHYDRATE 80; 4.5 UG/1; UG/1
2 AEROSOL RESPIRATORY (INHALATION) 2 TIMES DAILY
Qty: 1 INHALER | Refills: 0 | Status: SHIPPED | OUTPATIENT
Start: 2018-07-18 | End: 2019-04-15 | Stop reason: ALTCHOICE

## 2018-07-18 RX ORDER — AZITHROMYCIN 500 MG/1
500 TABLET, FILM COATED ORAL DAILY
Qty: 5 TAB | Refills: 0 | Status: SHIPPED | OUTPATIENT
Start: 2018-07-18 | End: 2018-07-23

## 2018-07-18 RX ORDER — PREDNISONE 10 MG/1
TABLET ORAL
Qty: 40 TAB | Refills: 0 | Status: SHIPPED | OUTPATIENT
Start: 2018-07-18 | End: 2018-08-24 | Stop reason: ALTCHOICE

## 2018-07-18 RX ORDER — CEFPODOXIME PROXETIL 200 MG/1
200 TABLET, FILM COATED ORAL EVERY 12 HOURS
Qty: 20 TAB | Refills: 0 | Status: SHIPPED | OUTPATIENT
Start: 2018-07-18 | End: 2018-08-10 | Stop reason: ALTCHOICE

## 2018-07-18 RX ORDER — IPRATROPIUM BROMIDE AND ALBUTEROL SULFATE 2.5; .5 MG/3ML; MG/3ML
3 SOLUTION RESPIRATORY (INHALATION)
Qty: 30 NEBULE | Refills: 0 | Status: SHIPPED | OUTPATIENT
Start: 2018-07-18 | End: 2019-02-22 | Stop reason: SDUPTHER

## 2018-07-18 RX ORDER — AMLODIPINE BESYLATE 5 MG/1
5 TABLET ORAL DAILY
Qty: 10 TAB | Refills: 0 | Status: SHIPPED | OUTPATIENT
Start: 2018-07-19 | End: 2018-10-29 | Stop reason: SDUPTHER

## 2018-07-18 RX ORDER — FUROSEMIDE 20 MG/1
20 TABLET ORAL DAILY
Status: DISCONTINUED | OUTPATIENT
Start: 2018-07-18 | End: 2018-07-18 | Stop reason: HOSPADM

## 2018-07-18 RX ADMIN — GABAPENTIN 300 MG: 300 CAPSULE ORAL at 08:08

## 2018-07-18 RX ADMIN — ASPIRIN 81 MG: 81 TABLET, COATED ORAL at 08:08

## 2018-07-18 RX ADMIN — ACETAZOLAMIDE 250 MG: 250 TABLET ORAL at 08:08

## 2018-07-18 RX ADMIN — Medication 10 ML: at 06:00

## 2018-07-18 RX ADMIN — AZITHROMYCIN 500 MG: 250 TABLET, FILM COATED ORAL at 08:08

## 2018-07-18 RX ADMIN — AMLODIPINE BESYLATE 5 MG: 5 TABLET ORAL at 08:09

## 2018-07-18 RX ADMIN — Medication: at 08:00

## 2018-07-18 RX ADMIN — CEFPODOXIME PROXETIL 200 MG: 200 TABLET, FILM COATED ORAL at 09:00

## 2018-07-18 RX ADMIN — PAROXETINE HYDROCHLORIDE 30 MG: 20 TABLET, FILM COATED ORAL at 08:08

## 2018-07-18 RX ADMIN — BUDESONIDE AND FORMOTEROL FUMARATE DIHYDRATE 2 PUFF: 80; 4.5 AEROSOL RESPIRATORY (INHALATION) at 08:13

## 2018-07-18 RX ADMIN — HEPARIN SODIUM 5000 UNITS: 5000 INJECTION, SOLUTION INTRAVENOUS; SUBCUTANEOUS at 05:00

## 2018-07-18 RX ADMIN — TAMSULOSIN HYDROCHLORIDE 0.4 MG: 0.4 CAPSULE ORAL at 08:09

## 2018-07-18 RX ADMIN — PREDNISONE 20 MG: 20 TABLET ORAL at 08:08

## 2018-07-18 NOTE — DISCHARGE INSTRUCTIONS
Chronic Obstructive Pulmonary Disease (COPD): Care Instructions  Your Care Instructions    Chronic obstructive pulmonary disease (COPD) is a general term for a group of lung diseases, including emphysema and chronic bronchitis. People with COPD have decreased airflow in and out of the lungs, which makes it hard to breathe. The airways also can get clogged with thick mucus. Cigarette smoking is a major cause of COPD. Although there is no cure for COPD, you can slow its progress. Following your treatment plan and taking care of yourself can help you feel better and live longer. Follow-up care is a key part of your treatment and safety. Be sure to make and go to all appointments, and call your doctor if you are having problems. It's also a good idea to know your test results and keep a list of the medicines you take. How can you care for yourself at home?   Staying healthy    · Do not smoke. This is the most important step you can take to prevent more damage to your lungs. If you need help quitting, talk to your doctor about stop-smoking programs and medicines. These can increase your chances of quitting for good.     · Avoid colds and flu. Get a pneumococcal vaccine shot. If you have had one before, ask your doctor whether you need a second dose. Get the flu vaccine every fall. If you must be around people with colds or the flu, wash your hands often.     · Avoid secondhand smoke, air pollution, and high altitudes. Also avoid cold, dry air and hot, humid air. Stay at home with your windows closed when air pollution is bad.    Medicines and oxygen therapy    · Take your medicines exactly as prescribed. Call your doctor if you think you are having a problem with your medicine.     · You may be taking medicines such as:  ¨ Bronchodilators. These help open your airways and make breathing easier. Bronchodilators are either short-acting (work for 6 to 9 hours) or long-acting (work for 24 hours).  You inhale most bronchodilators, so they start to act quickly. Always carry your quick-relief inhaler with you in case you need it while you are away from home. ¨ Corticosteroids (prednisone, budesonide). These reduce airway inflammation. They come in pill or inhaled form. You must take these medicines every day for them to work well.     · A spacer may help you get more inhaled medicine to your lungs. Ask your doctor or pharmacist if a spacer is right for you. If it is, ask how to use it properly.     · Do not take any vitamins, over-the-counter medicine, or herbal products without talking to your doctor first.     · If your doctor prescribed antibiotics, take them as directed. Do not stop taking them just because you feel better. You need to take the full course of antibiotics.     · Oxygen therapy boosts the amount of oxygen in your blood and helps you breathe easier. Use the flow rate your doctor has recommended, and do not change it without talking to your doctor first.   Activity    · Get regular exercise. Walking is an easy way to get exercise. Start out slowly, and walk a little more each day.     · Pay attention to your breathing. You are exercising too hard if you cannot talk while you are exercising.     · Take short rest breaks when doing household chores and other activities.     · Learn breathing methods-such as breathing through pursed lips-to help you become less short of breath.     · If your doctor has not set you up with a pulmonary rehabilitation program, talk to him or her about whether rehab is right for you. Rehab includes exercise programs, education about your disease and how to manage it, help with diet and other changes, and emotional support. Diet    · Eat regular, healthy meals. Use bronchodilators about 1 hour before you eat to make it easier to eat. Eat several small meals instead of three large ones.  Drink beverages at the end of the meal. Avoid foods that are hard to chew.     · Eat foods that contain protein so that you do not lose muscle mass.     · Talk with your doctor if you gain too much weight or if you lose weight without trying.    Mental health    · Talk to your family, friends, or a therapist about your feelings. It is normal to feel frightened, angry, hopeless, helpless, and even guilty. Talking openly about bad feelings can help you cope. If these feelings last, talk to your doctor. When should you call for help? Call 911 anytime you think you may need emergency care. For example, call if:    · You have severe trouble breathing.    Call your doctor now or seek immediate medical care if:    · You have new or worse trouble breathing.     · You cough up blood.     · You have a fever.    Watch closely for changes in your health, and be sure to contact your doctor if:    · You cough more deeply or more often, especially if you notice more mucus or a change in the color of your mucus.     · You have new or worse swelling in your legs or belly.     · You are not getting better as expected. Where can you learn more? Go to http://shantell-leona.info/. Zeus Grijalva in the search box to learn more about \"Chronic Obstructive Pulmonary Disease (COPD): Care Instructions. \"  Current as of: December 6, 2017  Content Version: 11.7  © 7563-3559 [employer]. Care instructions adapted under license by [employer] (which disclaims liability or warranty for this information). If you have questions about a medical condition or this instruction, always ask your healthcare professional. [place] [address] any warranty or liability for your use of this information. COPD Exacerbation Plan: Care Instructions  Your Care Instructions    If you have chronic obstructive pulmonary disease (COPD), your usual shortness of breath could suddenly get worse. You may start coughing more and have more mucus.  This flare-up is called a COPD exacerbation (say \"ed-SPE-ry-BAY-shun\"). A lung infection or air pollution could set off an exacerbation. Sometimes it can happen after a quick change in temperature or being around chemicals. Work with your doctor to make a plan for dealing with an exacerbation. You can better manage it if you plan ahead. Follow-up care is a key part of your treatment and safety. Be sure to make and go to all appointments, and call your doctor if you are having problems. It's also a good idea to know your test results and keep a list of the medicines you take. How can you care for yourself at home? During an exacerbation  · Do not panic if you start to have one. Quick treatment at home may help you prevent serious breathing problems. If you have a COPD exacerbation plan that you developed with your doctor, follow it. · Take your medicines exactly as your doctor tells you. ¨ Use your inhaler as directed by your doctor. If your symptoms do not get better after you use your medicine, have someone take you to the emergency room. Call an ambulance if necessary. ¨ With inhaled medicines, a spacer or a nebulizer may help you get more medicine to your lungs. Ask your doctor or pharmacist how to use them properly. Practice using the spacer in front of a mirror before you have an exacerbation. This may help you get the medicine into your lungs quickly. ¨ If your doctor has given you steroid pills, take them as directed. ¨ Your doctor may have given you a prescription for antibiotics, which you can fill if you need it. ¨ Talk to your doctor if you have any problems with your medicine. And call your doctor if you have to use your antibiotic or steroid pills. Preventing an exacerbation  · Do not smoke. This is the most important step you can take to prevent more damage to your lungs and prevent problems. If you already smoke, it is never too late to stop. If you need help quitting, talk to your doctor about stop-smoking programs and medicines.  These can increase your chances of quitting for good. · Take your daily medicines as prescribed. · Avoid colds and flu. ¨ Get a pneumococcal vaccine. ¨ Get a flu vaccine each year, as soon as it is available. Ask those you live or work with to do the same, so they will not get the flu and infect you. ¨ Try to stay away from people with colds or the flu. ¨ Wash your hands often. · Avoid secondhand smoke; air pollution; cold, dry air; hot, humid air; and high altitudes. Stay at home with your windows closed when air pollution is bad. · Learn breathing techniques for COPD, such as breathing through pursed lips. These techniques can help you breathe easier during an exacerbation. When should you call for help? Call 911 anytime you think you may need emergency care. For example, call if:    · You have severe trouble breathing.     · You have severe chest pain.    Call your doctor now or seek immediate medical care if:    · You have new or worse shortness of breath.     · You develop new chest pain.     · You are coughing more deeply or more often, especially if you notice more mucus or a change in the color of your mucus.     · You cough up blood.     · You have new or increased swelling in your legs or belly.     · You have a fever.    Watch closely for changes in your health, and be sure to contact your doctor if:    · You need to use your antibiotic or steroid pills.     · Your symptoms are getting worse. Where can you learn more? Go to http://shantell-leona.info/. Enter G069 in the search box to learn more about \"COPD Exacerbation Plan: Care Instructions. \"  Current as of: December 6, 2017  Content Version: 11.7  © 7776-6881 [employer]. Care instructions adapted under license by [employer] (which disclaims liability or warranty for this information).  If you have questions about a medical condition or this instruction, always ask your healthcare professional. Healthwise, [facility] disclaims any warranty or liability for your use of this information. Pneumonia: Care Instructions  Your Care Instructions    Pneumonia is an infection of the lungs. Most cases are caused by infections from bacteria or viruses. Pneumonia may be mild or very severe. If it is caused by bacteria, you will be treated with antibiotics. It may take a few weeks to a few months to recover fully from pneumonia, depending on how sick you were and whether your overall health is good. Follow-up care is a key part of your treatment and safety. Be sure to make and go to all appointments, and call your doctor if you are having problems. It's also a good idea to know your test results and keep a list of the medicines you take. How can you care for yourself at home? · Take your antibiotics exactly as directed. Do not stop taking the medicine just because you are feeling better. You need to take the full course of antibiotics. · Take your medicines exactly as prescribed. Call your doctor if you think you are having a problem with your medicine. · Get plenty of rest and sleep. You may feel weak and tired for a while, but your energy level will improve with time. · To prevent dehydration, drink plenty of fluids, enough so that your urine is light yellow or clear like water. Choose water and other caffeine-free clear liquids until you feel better. If you have kidney, heart, or liver disease and have to limit fluids, talk with your doctor before you increase the amount of fluids you drink. · Take care of your cough so you can rest. A cough that brings up mucus from your lungs is common with pneumonia. It is one way your body gets rid of the infection. But if coughing keeps you from resting or causes severe fatigue and chest-wall pain, talk to your doctor. He or she may suggest that you take a medicine to reduce the cough. · Use a vaporizer or humidifier to add moisture to your bedroom.  Follow the directions for cleaning the machine. · Do not smoke or allow others to smoke around you. Smoke will make your cough last longer. If you need help quitting, talk to your doctor about stop-smoking programs and medicines. These can increase your chances of quitting for good. · Take an over-the-counter pain medicine, such as acetaminophen (Tylenol), ibuprofen (Advil, Motrin), or naproxen (Aleve). Read and follow all instructions on the label. · Do not take two or more pain medicines at the same time unless the doctor told you to. Many pain medicines have acetaminophen, which is Tylenol. Too much acetaminophen (Tylenol) can be harmful. · If you were given a spirometer to measure how well your lungs are working, use it as instructed. This can help your doctor tell how your recovery is going. · To prevent pneumonia in the future, talk to your doctor about getting a flu vaccine (once a year) and a pneumococcal vaccine (one time only for most people). When should you call for help? Call 911 anytime you think you may need emergency care. For example, call if:    · You have severe trouble breathing.    Call your doctor now or seek immediate medical care if:    · You cough up dark brown or bloody mucus (sputum).     · You have new or worse trouble breathing.     · You are dizzy or lightheaded, or you feel like you may faint.    Watch closely for changes in your health, and be sure to contact your doctor if:    · You have a new or higher fever.     · You are coughing more deeply or more often.     · You are not getting better after 2 days (48 hours).     · You do not get better as expected. Where can you learn more? Go to http://shantell-leona.info/. Enter [id number] in the search box to learn more about \"Pneumonia: Care Instructions. \"  Current as of: December 6, 2017  Content Version: 11.7  © 5354-3589 [employer]  Care instructions adapted under license by [employer] (which disclaims liability or warranty for this information). If you have questions about a medical condition or this instruction, always ask your healthcare professional. [address] any warranty or liability for your use of this information. DISCHARGE SUMMARY from Nurse    PATIENT INSTRUCTIONS:    What to do at Home:  Recommended activity: Activity as tolerated. *  Please give a list of your current medications to your Primary Care Provider. *  Please update this list whenever your medications are discontinued, doses are      changed, or new medications (including over-the-counter products) are added. *  Please carry medication information at all times in case of emergency situations. These are general instructions for a healthy lifestyle:    No smoking/ No tobacco products/ Avoid exposure to second hand smoke  Surgeon General's Warning:  Quitting smoking now greatly reduces serious risk to your health. Obesity, smoking, and sedentary lifestyle greatly increases your risk for illness    A healthy diet, regular physical exercise & weight monitoring are important for maintaining a healthy lifestyle    You may be retaining fluid if you have a history of heart failure or if you experience any of the following symptoms:  Weight gain of 3 pounds or more overnight or 5 pounds in a week, increased swelling in our hands or feet or shortness of breath while lying flat in bed. Please call your doctor as soon as you notice any of these symptoms; do not wait until your next office visit. Recognize signs and symptoms of STROKE:    F-face looks uneven  A-arms unable to move or move unevenly  S-speech slurred or non-existent  T-time-call 911 as soon as signs and symptoms begin-DO NOT go       Back to bed or wait to see if you get better-TIME IS BRAIN. Warning Signs of HEART ATTACK     Call 911 if you have these symptoms:   Chest discomfort.  Most heart attacks involve discomfort in the center of the chest that lasts more than a few minutes, or that goes away and comes back. It can feel like uncomfortable pressure, squeezing, fullness, or pain.  Discomfort in other areas of the upper body. Symptoms can include pain or discomfort in one or both arms, the back, neck, jaw, or stomach.  Shortness of breath with or without chest discomfort.  Other signs may include breaking out in a cold sweat, nausea, or lightheadedness. Don't wait more than five minutes to call 911 - MINUTES MATTER! Fast action can save your life. Calling 911 is almost always the fastest way to get lifesaving treatment. Emergency Medical Services staff can begin treatment when they arrive -- up to an hour sooner than if someone gets to the hospital by car. The discharge information has been reviewed with the patient. The patient verbalized understanding. Discharge medications reviewed with the patient and appropriate educational materials and side effects teaching were provided.   ___________________________________________________________________________________________________________________________________

## 2018-07-18 NOTE — DISCHARGE SUMMARY
Discharge Summary     Patient ID:  Nick Atkins  [id number]  89 y.o.  9/22/1933  Body mass index is 20.95 kg/(m^2). 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  female who presents with progressive sob. Pt has the pmhx of emphysema, DM, and DDD of spine. She is on home O2 \"off and on\" and was at her baseline state of health when she developed progressive sob over the past few days. She came to the hospital and was noted to be hypoxic and in respiratory distress. Placed on bipap and abg showed acute respiratory failure with CO2 retention. Given nebs x 3, steroid, abx. Not able to wean off of bipap. Medicine was called.      We were asked to admit for work up and evaluation of the above problems. \"    Admitted with acute respiratory failure - prolong treatment for COPD exacerbation , she also had CAP - x ray suggestive of bilateral infiltrates - improved clinically . Discharge in stable condition . Refused placement . o2 for home use given     Compliance to meds and med follow up d/w patient till her understanding and she agreed            Patient seen and examined by me on discharge day.   Pertinent Findings:  Stable       Pertinent Lab Data:  Recent Labs      07/18/18   0555  07/17/18   0508  07/16/18   0544   WBC  9.5  10.1  11.6   HGB  15.9  15.1  15.2   HCT  48.9*  45.5*  45.3*   PLT  163  176  200     Recent Labs      07/18/18   0555  07/17/18   0508  07/16/18   0544   NA  138  139  140   K  4.7  3.7  3.8   CL  102  98*  97*   CO2 33*  38*  40*   GLU  135*  113*  177*   BUN  14  10  11   CREA  0.68  0.58*  0.55*   CA  8.8  8.4*  8.7   MG   --    --   2.1       DISCHARGE MEDICATIONS:   @  Current Discharge Medication List      START taking these medications    Details   amLODIPine (NORVASC) 5 mg tablet Take 1 Tab by mouth daily. Qty: 10 Tab, Refills: 0      budesonide-formoterol (SYMBICORT) 80-4.5 mcg/actuation HFAA Take 2 Puffs by inhalation two (2) times a day. Qty: 1 Inhaler, Refills: 0      azithromycin (ZITHROMAX) 500 mg tab Take 1 Tab by mouth daily for 5 days. Qty: 5 Tab, Refills: 0      cefpodoxime (VANTIN) 200 mg tablet Take 1 Tab by mouth every twelve (12) hours. Qty: 20 Tab, Refills: 0         CONTINUE these medications which have CHANGED    Details   albuterol-ipratropium (DUO-NEB) 2.5 mg-0.5 mg/3 ml nebu 3 mL by Nebulization route every six (6) hours as needed. Qty: 30 Nebule, Refills: 0      predniSONE (DELTASONE) 10 mg tablet 1 tab po TID for 1 week , then 1 tab po BID for 1 week then 1 tab po daily for 1 week  Qty: 40 Tab, Refills: 0         CONTINUE these medications which have NOT CHANGED    Details   mirtazapine (REMERON) 30 mg tablet Take 1 Tab by mouth nightly. Qty: 30 Tab, Refills: 2    Comments: Please consider 90 day supplies to promote better adherence      gabapentin (NEURONTIN) 300 mg capsule TAKE 1 CAPSULE BY MOUTH 3  TIMES DAILY  Qty: 270 Cap, Refills: 1      simvastatin (ZOCOR) 20 mg tablet TAKE 1 TABLET BY MOUTH  NIGHTLY  Qty: 90 Tab, Refills: 1    Associated Diagnoses: Hyperlipidemia, unspecified hyperlipidemia type      tamsulosin (FLOMAX) 0.4 mg capsule Take 1 Cap by mouth daily. Qty: 15 Cap, Refills: 0      !! OXYGEN-AIR DELIVERY SYSTEMS 3 L by Nasal route continuous. famotidine (PEPCID) 20 mg tablet Take 1 Tab by mouth nightly. Qty: 6 Tab, Refills: 0      Nebulizer & Compressor (PORTABLE NEBULIZER SYSTEM) machine 1 Each by Does Not Apply route every six (6) hours as needed.   Qty: 1 Each, Refills: 0      PARoxetine (PAXIL) 30 mg tablet Take 1 Tab by mouth daily. Qty: 90 Tab, Refills: 3      !! OXYGEN-AIR DELIVERY SYSTEMS 3 L by Nasal route continuous. !! - Potential duplicate medications found. Please discuss with provider. STOP taking these medications       traZODone (DESYREL) 100 mg tablet Comments:   Reason for Stopping:                 My Recommended Diet, Activity, Wound Care, and follow-up labs are listed in the patient's Discharge Insturctions which I have personally completed and reviewed. Disposition:     [] Home with family     [] [facility] PT/RN   [] SNF/NH   [] Inpatient Rehab/[facility]  Condition at Discharge:  Stable    Follow up with:   PCP : Hema Nesbitt MD      Please follow-up tests/labs that are still pendin.  None  2.    >30 minutes spent coordinating this discharge (review instructions/follow-up, prescriptions, preparing report for sign off)    Signed:  Diane Blank MD  2018  10:03 AM

## 2018-07-18 NOTE — NURSE NAVIGATOR
The objective of todays visit was to review the transitional care plan with the patient. We discussed the importance of transitional care as it relates to reducing the likelihood of readmission. We reviewed the goals for the first 30 days following hospital discharge. The patient and I discussed wrap around services including nurse navigation, care coordination, home health, transitional care appointments with their primary care provider and specialist team. Patient education focused on readmission zones as described as  The Red Zone: High risk for readmission, days 1-21  The Yellow Zone: Moderate risk for readmission, days 22-29  The Green Zone: Lower risk for readmission, days 30 and after    [place] of choice for home health given patient selected [facility] as she enjoyed the services in the past.     The patient was instructed on worrisome symptoms for worsening of their medical conditions and sepsis. Learning was assessed using teach back in the form of role playing. The patient identified appropriate wrap around services during this interaction. A written individual care plan was reviewed with the patient as well today. Inhaler and spacer education reviewed. The patient expressed the following specific concerns regarding their discharge  None     NN has concerns for:  Lack of understanding of their condition; continued smoking-NN reviewed smoking cessation. Mrs. Stephany Chin stated \"I have smoked for fifty years and gone down to 5 cigarettes a day for the past year\", \"Thats good to me\". NN encouraged patient and asked if she thought she could consider going even less than 5 and patient stated \"no\". NN reviewed importance of O2-, open flame and smoking. Patient stated \"I got outside and don't where my oxygen when I smoke\".

## 2018-07-18 NOTE — PROGRESS NOTES
Bedside turnover given to me by ABDULKADIR Wright. Pt is on the hardwire cardiac telemetry monitor. Call bell is within reach on her bedside table. Unremarkable night, no complaints and no pain. Patient assisted to restroom 3 times during the night. 0715 bedside turnover given to Cristobal. Pt is still in bed on the cardiac telemetry monitor watching tv. Her vitals are at her baseline. Call bell is within reach.

## 2018-07-18 NOTE — PROGRESS NOTES
[facility]      CARDIOLOGY PROGRESS NOTE  RECS:      1. Acute congestive heart failure-Dyspnea with elevated pro BNP. No peripheral edema seen. Good diuresis. Resumed  lasix today   2. COPD exacerbation - on O2 inhaler and antibiotics by pulmonary   3. Pneumonia- on antibiotics per pulmonary   4. Hypertension- controlled On Norvasc   5. Hyperlipidemia- continue zocor  6. Diabetes mellitus- medications per medical team       Echo 2017  Left ventricle: Systolic function was normal by visual assessment. Ejection fraction was estimated in the range of 60 % to 65 %. No obvious  wall motion abnormalities identified in the views obtained. Wall thickness  was mildly increased. ASSESSMENT:  Hospital Problems  Date Reviewed: 7/9/2018          Codes Class Noted POA    COPD exacerbation ([facility] ICD-10-CM: J44.1  ICD-9-CM: 491.21  7/13/2018 Unknown                SUBJECTIVE:    No CP  Improving SOB    OBJECTIVE:    VS:   Visit Vitals    /78    Pulse 78    Temp 99.2 °F (37.3 °C)    Resp 18    Ht 5' 5\" (1.651 m)    Wt 57.1 kg (125 lb 14.4 oz)    SpO2 95%    BMI 20.95 kg/m2         Intake/Output Summary (Last 24 hours) at 07/18/18 1003  Last data filed at 07/18/18 0802   Gross per 24 hour   Intake             1440 ml   Output             1200 ml   Net              240 ml     TELE: normal sinus rhythm    General: alert, well developed, pleasant and in no apparent distress  HENT: Normocephalic, atraumatic. Normal external eye. Neck :  no bruit, no JVD  Cardiac:  regular rate and rhythm, S1, S2 normal, no S3 or S4, no click, no rub  Lungs: clear to auscultation .    Abdomen: Soft, nontender, no masses  Extremities:  No c/c/e, peripheral pulses present      Labs: Results:       Chemistry Recent Labs      07/18/18   0555  07/17/18   0508  07/16/18   0544   GLU  135*  113*  177*   NA  138  139  140   K  4.7  3.7  3.8   CL  102  98*  97*   CO2  33*  38*  40*   BUN  14  10  11   CREA  0.68 0.58*  0.55*   CA  8.8  8.4*  8.7   AGAP  3  3  3   BUCR  21*  17  20      CBC w/Diff Recent Labs      07/18/18   0555  07/17/18   0508  07/16/18   0544   WBC  9.5  10.1  11.6   RBC  5.61*  5.19  5.17   HGB  15.9  15.1  15.2   HCT  48.9*  45.5*  45.3*   PLT  163  176  200   GRANS  82*  82*  91*   LYMPH  10*  9*  5*   EOS  0  0  0      Cardiac Enzymes No results for input(s): CPK, CKND1, BRY in the last 72 hours. No lab exists for component: CKRMB, TROIP   Coagulation No results for input(s): PTP, INR, APTT in the last 72 hours. No lab exists for component: INREXT, INREXT    Lipid Panel Lab Results   Component Value Date/Time    Cholesterol, total 132 06/12/2018 09:08 AM    HDL Cholesterol 43 06/12/2018 09:08 AM    LDL, calculated 72 06/12/2018 09:08 AM    VLDL, calculated 17 06/12/2018 09:08 AM    Triglyceride 85 06/12/2018 09:08 AM    CHOL/HDL Ratio 3.1 06/12/2018 09:08 AM      BNP No results for input(s): BNPP in the last 72 hours. Liver Enzymes No results for input(s): TP, ALB, TBIL, AP, SGOT, GPT in the last 72 hours.     No lab exists for component: DBIL   Thyroid Studies Lab Results   Component Value Date/Time    T4, Total 7.6 04/04/2012 08:38 AM    TSH 0.85 12/04/2017 03:53 PM            Opal MCCALLUM

## 2018-07-18 NOTE — PROGRESS NOTES
Reason for Admission:   COPD exacerbation               RRAT Score:   36               Resources/supports as identified by patient/family:                   Top Challenges facing patient (as identified by patient/family and CM): Patient or daughter Suraj Boo                     Finances/Medication cost?                    Transportation? Support system or lack thereof? Living arrangements? Self-care/ADLs/Cognition? Alert and oriented. Current Advanced Directive/Advance Care Plan:                            Plan for utilizing home health:    Yes [facility]                      Likelihood of readmission: Red/High                 Transition of Care Plan:     Home with home health         Transition of Care (SHAILA) Plan:  Home with home health    [facility] Transportation:       How is patient being transported at discharge? Pt's daughter will assist her with transportation home. When?  07/18/2018     Is transport scheduled? Follow-up appointment and transportation:     PCP? Meredith Quinones MD     Specialist? Yonatan Jiang MD,  Adam Culver MD     Time/Date? 10:00am on 7/201/2018, 1530 on 8/10/2018, 1200 on 7/27/2018     Who is transporting to the follow-up appointment? Is transport for follow up appointment scheduled? Communication plan (with patient/family): Who is being called? Patient or Next of Kin? Responsible party? Patient or daughter      What number(s) is to be used? What service provider is calling for [facility] services? [facility], follow up appts      When are they calling? Readmission Risk? (Green/Low; Yellow/Moderate; Red/High):  Red/High    Care Management Interventions  PCP Verified by CM:  Yes  Transition of Care Consult (CM Consult): [address]: Yes  Physical Therapy Consult: Yes  Current Support Network: Lives with Spouse  Confirm Follow Up Transport: Family  Plan discussed with Pt/Family/Caregiver: Yes  Freedom of Choice Offered: Yes (For home health)  Discharge Location  Discharge Placement: Home with home health      Pt chooses [facility]. Agency Ian Pardo) contacted and referral submitted.

## 2018-07-18 NOTE — PROGRESS NOTES
conducted a Follow up consultation and Spiritual Assessment for Adeel Vasquez, who is a 80 y.o.,female. The  provided the following Interventions:  Continued the relationship of care and support. Listened empathically. Offered prayer and assurance of continued prayer on patients behalf. Chart reviewed. The following outcomes were achieved:  Patient expressed gratitude for 's visit. Assessment:  There are no further spiritual or Jain issues which require Spiritual Care Services interventions at this time. Plan:  Chaplains will continue to follow and will provide pastoral care on an as needed/requested basis.  recommends bedside caregivers page  on duty if patient shows signs of acute spiritual or emotional distress.        [phone]

## 2018-07-18 NOTE — HOME CARE
[facility] received referral for SN/TH/PT/OT - noted patient has discharged to home - lives with spouse - noted the home number is out of service and the spouse's vm box is full - DME noted in EPIC. *ACO Patient* Referral called to Milan Alvarez in central intake for completion and visit scheduling - S. Reyes Levin LPN

## 2018-07-18 NOTE — PROGRESS NOTES
Problem: Mobility Impaired (Adult and Pediatric)  Goal: *Acute Goals and Plan of Care (Insert Text)  Physical Therapy Goals  Initiated 7/18/2018 and to be accomplished within 7 day(s)  1. Patient will move from supine to sit and sit to supine , scoot up and down and roll side to side in bed with modified independence. 2.  Patient will transfer from bed to chair and chair to bed with modified independence using the least restrictive device. 3.  Patient will perform sit to stand with modified independence. 4.  Patient will ambulate with supervision/set-up for >300 feet with the least restrictive device maintaining SaO2>90%. 5.  Patient will ascend/descend 5 stairs with 1 handrail(s) with supervision/set-up maintaining SaO2 >90%. Outcome: Progressing Towards Goal  physical Therapy EVALUATION    Patient: Elisa Villafuerte (1 y.o. female)  Date: 7/18/2018  Primary Diagnosis: COPD exacerbation (HCC)  COPD exacerbation ([facility]        Precautions:    Fall    ASSESSMENT :  PT orders received and patient cleared by nursing to participate with therapy. Patient is a 80 y.o. female admitted to the hospital due to SOB. Patient consents to PT evaluation and treatment. Pt seen twice this morning to finish evaluation and treatment ([id number] and [id number] total 40 minutes). Did not finish initially due to pt taking a personal phone call and requesting for therapy to come back. Pt performed supine to sit SBA and scooting to EOB supervision. Sit to stands CGA for safety especially from rollator. Ambulated total of 250 feet with rollator CGA/min A for safety with seated rest breaks. Pt practiced stairs 5 steps 1 HRA with CGA/min A for safety. Pt's SaO2 decreases to 86% while on 2L with gait and stairs requiring rest breaks and cues for breathing technique. Educated pt on energy conservation and rest breaks during activities. Pt ended therapy sitting in chair with lunch and all needs met.      Patient will benefit from skilled intervention to address the above impairments and increase functional independence. Patients rehabilitation potential is considered to be Good  Factors which may influence rehabilitation potential include:   []         None noted  []         Mental ability/status  [x]         Medical condition  []         Home/family situation and support systems  []         Safety awareness  []         Pain tolerance/management  []         Other:      PLAN :  Recommendations and Planned Interventions:  [x]           Bed Mobility Training             [x]    Neuromuscular Re-Education  [x]           Transfer Training                   []    Orthotic/Prosthetic Training  [x]           Gait Training                          []    Modalities  [x]           Therapeutic Exercises          []    Edema Management/Control  [x]           Therapeutic Activities            [x]    Patient and Family Training/Education  []           Other (comment):    Frequency/Duration: Patient will be followed by physical therapy 1-2 times per day to address goals. Discharge Recommendations: Home Health  Further Equipment Recommendations for Discharge: Pt has RW and rollator     SUBJECTIVE:   Patient stated I'm concerned about my . 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    MOORE (Dyspnea on Exertion) 4-19-02    echo: +mild LVH, TS44-79% w/ diastolic dysfxn    Glaucoma     HCAP (healthcare-associated pneumonia) 03/24/2017    Hemorrhoids 6-6-06    Hyperlipidemia 5/17/2011    Hypertension 4-16-02    LBP (low back pain) 4-13-04    Low back pain radiating to both legs 10/1/2014    Lumbago     Lumbar disc herniation 10/1/2014    Lumbar facet arthropathy ([facility] 10/1/2014    Lumbosacral radiculopathy at L5 10/1/2014    Lumbosacral radiculopathy at S1 10/1/2014    Microscopic hematuria     OA (osteoarthritis)     Overactive bladder 5/17/2011    RLS (restless legs syndrome) 1/17/2013    Sciatica     Shortness of breath     Spinal stenosis, lumbar region, without neurogenic claudication     Spondylolisthesis of lumbar region 10/1/2014    Spondylolisthesis, grade 1 10/1/2014    Stress urinary incontinence     Syncope     -MRI brain    Urinary tract infection, site not specified      Past Surgical History:   Procedure Laterality Date    HX APPENDECTOMY      HX CHOLECYSTECTOMY      HX HYSTERECTOMY      (+)DUB    HX POLYPECTOMY      ND COLONOSCOPY FLX DX W/COLLJ SPEC WHEN PFRMD  6-16-06    normal, Dr Milli Magdaleno FLX DX W/COLLJ SPEC WHEN PFRMD      (+)polyp= tubular adenoma     Barriers to Learning/Limitations: None  Compensate with: N/A  Prior Level of Function/Home Situation: Independent with mobility including gait using RW but will use the rollator at the store sometimes. Home Situation  Home Environment: Private residence  # Steps to Enter: 5  Rails to Enter: Yes  Hand Rails : Bilateral  One/Two Story Residence: One story  Living Alone: No  Support Systems: Spouse/Significant Other/Partner  Patient Expects to be Discharged to[de-identified] Private residence  Current DME Used/Available at Home: [address], rolling, Walker, rollator, [address] chair, Grab bars, Commode, bedside  Critical Behavior:  Neurologic State: Alert  Psychosocial  Patient Behaviors: Calm; Cooperative  Strength:    Strength: Generally decreased, functional (B LE)  Tone & Sensation:   Tone: Normal (B LE)  Sensation: Intact (B LE)   Range Of Motion:  AROM: Within functional limits (B LE)  Functional Mobility:  Bed Mobility:  Supine to Sit: Stand-by assistance  Scooting: Supervision  Transfers:  Sit to Stand: Contact guard assistance  Stand to Sit: Stand-by assistance  Balance:   Sitting: Intact  Standing: Impaired; With support  Standing - Static: Good  Standing - Dynamic : Fair  Ambulation/Gait Training:  Distance (ft): 250 Feet (ft) (seated rest breaks)  Assistive Device: Walker, rollator  Ambulation - Level of Assistance: Contact guard assistance;Minimal assistance  Base of Support: Center of gravity altered  Speed/Katarzyna: Pace decreased (<100 feet/min)  Stairs:  Number of Stairs Trained: 5  Stairs - Level of Assistance: Minimum assistance;Contact guard assistance  Rail Use: Left   Pain:  Pre: 0 /10    Post: 0/10    Activity Tolerance:   fair  Please refer to the flowsheet for vital signs taken during this treatment. After treatment:   [x] Patient left in no apparent distress sitting up in chair  [] Patient left sitting on EOB  [] Patient left in no apparent distress in bed  [] Patient declined to be OOB at this time due to    [x] Call bell left within reach  [x] Nursing notified(Caterina)  [] Caregiver present  [] Bed alarm activated  [x] Personal items in reach     COMMUNICATION/EDUCATION:   [x]         Fall prevention education was provided and the patient/caregiver indicated understanding. [x]         Patient/family have participated as able in goal setting and plan of care. [x]         Patient/family agree to work toward stated goals and plan of care. []         Patient understands intent and goals of therapy, but is neutral about his/her participation. []         Patient is unable to participate in goal setting and plan of care. Thank you for this referral.  Selina Novak, PT, DPT  Time Calculation: 40 minutes    Mobility  Current  CJ= 20-39%   Goal  CI= 1-19%. The severity rating is based on the Level of Assistance required for Functional Mobility and ADLs.     Eval Complexity: History: MEDIUM  Complexity : 1-2 comorbidities / personal factors will impact the outcome/ POC Exam:HIGH Complexity : 4+ Standardized tests and measures addressing body structure, function, activity limitation and / or participation in recreation  Presentation: MEDIUM Complexity : Evolving with changing characteristics  Clinical Decision Making:Medium Complexity   Overall Complexity:MEDIUM

## 2018-07-18 NOTE — PROGRESS NOTES
Discussed BiPAP use with pt. She states that she does not want to use. .. \"I am claustrophobic. \"  No distress noted. Device at bedside. Pt aware that device is available if needed.

## 2018-07-19 ENCOUNTER — PATIENT OUTREACH (OUTPATIENT)
Dept: PULMONOLOGY | Age: 83
End: 2018-07-19

## 2018-07-19 NOTE — PROGRESS NOTES
Contacted  Patient. for follow up. No answer. Left message introducing self, role and reason for call. Requested return call.  Contact information provided

## 2018-07-20 ENCOUNTER — OFFICE VISIT (OUTPATIENT)
Dept: INTERNAL MEDICINE CLINIC | Age: 83
End: 2018-07-20

## 2018-07-20 ENCOUNTER — HOME CARE VISIT (OUTPATIENT)
Dept: HOME HEALTH SERVICES | Facility: HOME HEALTH | Age: 83
End: 2018-07-20

## 2018-07-20 ENCOUNTER — PATIENT OUTREACH (OUTPATIENT)
Dept: PULMONOLOGY | Age: 83
End: 2018-07-20

## 2018-07-20 VITALS
OXYGEN SATURATION: 94 % | BODY MASS INDEX: 20.99 KG/M2 | HEART RATE: 65 BPM | SYSTOLIC BLOOD PRESSURE: 148 MMHG | TEMPERATURE: 97.8 F | HEIGHT: 65 IN | RESPIRATION RATE: 16 BRPM | WEIGHT: 126 LBS | DIASTOLIC BLOOD PRESSURE: 80 MMHG

## 2018-07-20 DIAGNOSIS — F32.9 MAJOR DEPRESSION, CHRONIC: ICD-10-CM

## 2018-07-20 DIAGNOSIS — I10 ESSENTIAL HYPERTENSION WITH GOAL BLOOD PRESSURE LESS THAN 140/90: ICD-10-CM

## 2018-07-20 DIAGNOSIS — E11.40 TYPE 2 DIABETES MELLITUS WITH DIABETIC NEUROPATHY, WITHOUT LONG-TERM CURRENT USE OF INSULIN (HCC): ICD-10-CM

## 2018-07-20 DIAGNOSIS — J44.1 COPD EXACERBATION (HCC): Primary | ICD-10-CM

## 2018-07-20 NOTE — PROGRESS NOTES
Dandy Walker 9/22/1933, is a 80 y.o. female, who is seen today for reevaluation after recent hospitalization for acute on chronic COPD and multiple other issues. Her breathing continued to get worse and recommended that she go to the emergency room and finally her family took her to the emergency room and she was admitted. She is breathing much better currently here in the office without oxygen but she wears it all the time at home. She is taking all of her medicine correctly. She is on 2 antibiotics at this point. She felt a little chilly during the night and thought she might have a fever but did not check her temperature. She coughs only occasionally at this point. She was discharged in the hospital July 17 and attempts were made to reach her at home by our nurse the next day and the following day, unsuccessful. She has not smoked since she was in the hospital after over 50 years of smoking.     Past Medical History:   Diagnosis Date    Colon polyps     COPD 8-30-02    DDD (degenerative disc disease), lumbar 10/1/2014    Degeneration of lumbar or lumbosacral intervertebral disc     Depression     Diabetes ([address] 7-19-05    Diverticulosis     DM neuropathy, painful ([address] 10/1/2014    MOORE (Dyspnea on Exertion) 4-19-02    echo: +mild LVH, NX35-66% w/ diastolic dysfxn    Glaucoma     HCAP (healthcare-associated pneumonia) 03/24/2017    Hemorrhoids 6-6-06    Hyperlipidemia 5/17/2011    Hypertension 4-16-02    LBP (low back pain) 4-13-04    Low back pain radiating to both legs 10/1/2014    Lumbago     Lumbar disc herniation 10/1/2014    Lumbar facet arthropathy ([address] 10/1/2014    Lumbosacral radiculopathy at L5 10/1/2014    Lumbosacral radiculopathy at S1 10/1/2014    Microscopic hematuria     OA (osteoarthritis)     Overactive bladder 5/17/2011    RLS (restless legs syndrome) 1/17/2013    Sciatica     Shortness of breath     Spinal stenosis, lumbar region, without neurogenic claudication     Spondylolisthesis of lumbar region 10/1/2014    Spondylolisthesis, grade 1 10/1/2014    Stress urinary incontinence     Syncope     -MRI brain    Urinary tract infection, site not specified      Current Outpatient Prescriptions   Medication Sig Dispense Refill    albuterol-ipratropium (DUO-NEB) 2.5 mg-0.5 mg/3 ml nebu 3 mL by Nebulization route every six (6) hours as needed. 30 Nebule 0    predniSONE (DELTASONE) 10 mg tablet 1 tab po TID for 1 week , then 1 tab po BID for 1 week then 1 tab po daily for 1 week 40 Tab 0    amLODIPine (NORVASC) 5 mg tablet Take 1 Tab by mouth daily. 10 Tab 0    budesonide-formoterol (SYMBICORT) 80-4.5 mcg/actuation HFAA Take 2 Puffs by inhalation two (2) times a day. 1 Inhaler 0    azithromycin (ZITHROMAX) 500 mg tab Take 1 Tab by mouth daily for 5 days. 5 Tab 0    cefpodoxime (VANTIN) 200 mg tablet Take 1 Tab by mouth every twelve (12) hours. 20 Tab 0    mirtazapine (REMERON) 30 mg tablet Take 1 Tab by mouth nightly. 30 Tab 2    gabapentin (NEURONTIN) 300 mg capsule TAKE 1 CAPSULE BY MOUTH 3  TIMES DAILY 270 Cap 1    simvastatin (ZOCOR) 20 mg tablet TAKE 1 TABLET BY MOUTH  NIGHTLY 90 Tab 1    tamsulosin (FLOMAX) 0.4 mg capsule Take 1 Cap by mouth daily. 15 Cap 0    OXYGEN-AIR DELIVERY SYSTEMS 3 L by Nasal route continuous.  OXYGEN-AIR DELIVERY SYSTEMS 3 L by Nasal route continuous.  famotidine (PEPCID) 20 mg tablet Take 1 Tab by mouth nightly. 6 Tab 0    Nebulizer & Compressor (PORTABLE NEBULIZER SYSTEM) machine 1 Each by Does Not Apply route every six (6) hours as needed. 1 Each 0    PARoxetine (PAXIL) 30 mg tablet Take 1 Tab by mouth daily. 90 Tab 3     Visit Vitals    /80    Pulse 65    Temp 97.8 °F (36.6 °C) (Oral)    Resp 16    Ht 5' 5\" (1.651 m)    Wt 126 lb (57.2 kg)    SpO2 94%    BMI 20.97 kg/m2       Carotids are 2+ without bruits. No JVD or HJR. Lungs are clear to percussion.   Manage breath sounds throughout with no wheezing or crackles. Heart reveals a regular rhythm with normal S1 and S2 no murmur gallop click or rub. Apical impulse is not palpable. Abdomen is soft and nontender with no hepatosplenomegaly or masses and no bruits. Extremities reveal no clubbing cyanosis or edema. But absent in the feet. Assessment: #1. Severe COPD with recent acute exacerbation and very low oxygen levels, into the 70s. O2 saturation is much better now she is breathing better, I told her she needs to continue all of her medicine and never smoke a cigarette again. She should not have any cigarettes in the house. #2. Hypertension is fairly well controlled. We will recheck her in a month and she will continue amlodipine 5 mg daily. #3.  Major depression doing somewhat better, she will continue mirtazapine 30 mg at bedtime that also helps her sleep. #4. Hyperlipidemia has been doing well. She will continue simvastatin 20 mg each evening. Follow-up in 1 month or sooner if needed. She also will follow up with her pulmonary specialist, she is not sure what date that is scheduled for right now. She has a family member with her and she will check with the patient's daughter on that. Gaetano Khan MD FACP    Please note: This document has been produced using voice recognition software. Unrecognized errors in transcription may be present.

## 2018-07-20 NOTE — PROGRESS NOTES
Contacted  Patient and Emeg. Contact #  for SHAILA COPD follow up. No answer. Unable to leave message on patient's # d/t VM full. Left call back # on [employer]. Contact # due to message left on voicemail on yesterday self, role and reason for call. Requested return call. Contact information provided on yesterday. NN spoke with Norris Ibarra RN, NN at [facility] via [employer] . This writer asked [facility] NN to speak with patient ( Patient in Dr. Allan Guy' office for f/u, re: this writer has been attempting to contact her. NN received message from [facility] NN that she spoke with patient and pt. Aware that Pulmonary NN has attempted to contact her and  will reach out to her again.

## 2018-07-20 NOTE — MR AVS SNAPSHOT
[address] [facility] [address] 
 
 
 [address], [address] [place] [address] [facility] 
[phone] Patient: Halima Lucio MRN:  [id number] Visit Information Date & Time Provider Department Dept. Phone Encounter #  
 7/20/2018 10:00 AM Huma Barros MD [facility] [phone] Follow-up Instructions Return in about 1 month (around 8/20/2018). Your Appointments 8/10/2018 10:30 AM  
Office Visit with Tami Reyes MD  
[facility] Appt Note: h/f  
 [address] [facility], [address] [facility] [address]  
[address], [address] [address]  
  
    
 8/21/2018 11:00 AM  
Office Visit with Huma Barros MD  
[facility] [address] [facility]) Appt Note: 1 month follow up  
 [address], [address] [address] [address] [place]  
  
   
 [address], [address]  
  
    
 10/15/2018  9:25 AM  
LAB with [facility] Santos CABRAL [facility] NURSE VISIT [facility] ([address]) Appt Note: lab  
 [address], [address] [place] [address] [address] [place]  
  
   
 [address], [address]  
  
    
 10/22/2018  8:30 AM  
Office Visit with Huma Barros MD  
[facility] [address]) Appt Note: 4 month f/u  
 [address] [facility], [address] 706 [facility]  
[phone]  
  
    
  
 7/27/2018 12:00 PM  
TRANSITIONAL CARE MANAGEMENT with Mary Mcdonough MD  
[address] ([address]) Appt Note: Per Brea Davidson; disch [facility] 7/18/2018; saw HP and LZ in [address], [address] [address]  
[phone]  
  
   
 [address] [place] [address], [address] [facility] [facility] [address] [place] [address] Upcoming Health Maintenance Date Due  
 EYE EXAM RETINAL OR DILATED Q1 2/5/2015 GLAUCOMA SCREENING Q2Y 4/1/2016 Influenza Age 5 to Adult 8/1/2018 MEDICARE YEARLY EXAM 9/14/2018 MICROALBUMIN Q1 12/13/2018 HEMOGLOBIN A1C Q6M 1/13/2019 LIPID PANEL Q1 6/12/2019 DTaP/Tdap/Td series (2 - Td) 6/11/2024 Allergies as of 7/20/2018  Review Complete On: 7/20/2018 By: Michael Gallardo MD  
  
 Severity Noted Reaction Type Reactions Levaquin [Levofloxacin]  05/17/2011    Rash Current Immunizations  Reviewed on 6/18/2018 Name Date Influenza High Dose Vaccine PF 10/13/2016 Influenza Vaccine 10/15/2014 Influenza Vaccine (Quad) PF 12/8/2015 Influenza Vaccine Split 11/22/2011, 11/11/2010 11:46 AM  
 Pneumococcal Conjugate (PCV-13) 12/8/2015 Pneumococcal Polysaccharide (PPSV-23) 9/20/2000 Tdap 6/11/2014 ZZZ-RETIRED (DO NOT USE) Pneumococcal Vaccine (Unspecified Type) 9/20/2000 Not reviewed this visit You Were Diagnosed With   
  
 Codes Comments COPD exacerbation ([facility]    -  Primary ICD-10-CM: J44.1 ICD-9-CM: 491.21 Type 2 diabetes mellitus with diabetic neuropathy, without long-term current use of insulin (HCC)     ICD-10-CM: E11.40 ICD-9-CM: 250.60, 357.2 Major depression, chronic     ICD-10-CM: F34.1 ICD-9-CM: 296.20 Essential hypertension with goal blood pressure less than 140/90     ICD-10-CM: I10 
ICD-9-CM: 401.9 Vitals BP Pulse Temp Resp Height(growth percentile) Weight(growth percentile) 148/80 65 97.8 °F (36.6 °C) (Oral) 16 5' 5\" (1.651 m) 126 lb (57.2 kg) SpO2 BMI OB Status Smoking Status 94% 20.97 kg/m2 Hysterectomy Current Every Day Smoker Vitals History BMI and BSA Data Body Mass Index Body Surface Area  
 20.97 kg/m 2 1.62 m 2 Preferred Pharmacy Pharmacy Name Phone [facility], [address] [place] [address] [address] Your Updated Medication List  
  
   
This list is accurate as of 7/20/18 10:41 AM.  Always use your most recent med list.  
  
  
  
  
 albuterol-ipratropium 2.5 mg-0.5 mg/3 ml Nebu Commonly known as:  DUO-NEB  
3 mL by Nebulization route every six (6) hours as needed. amLODIPine 5 mg tablet Commonly known as:  Cristal Solano Take 1 Tab by mouth daily. azithromycin 500 mg Tab Commonly known as:  Rachel Ax Take 1 Tab by mouth daily for 5 days. budesonide-formoterol 80-4.5 mcg/actuation Hfaa Commonly known as:  SYMBICORT Take 2 Puffs by inhalation two (2) times a day. cefpodoxime 200 mg tablet Commonly known as:  Jose Eduardo Mandes Take 1 Tab by mouth every twelve (12) hours. famotidine 20 mg tablet Commonly known as:  PEPCID Take 1 Tab by mouth nightly.  
  
 gabapentin 300 mg capsule Commonly known as:  NEURONTIN  
TAKE 1 CAPSULE BY MOUTH 3  TIMES DAILY  
  
 mirtazapine 30 mg tablet Commonly known as:  Tootie Kristopher Take 1 Tab by mouth nightly. Nebulizer & Compressor machine Commonly known as:  PORTABLE NEBULIZER SYSTEM  
1 Each by Does Not Apply route every six (6) hours as needed. * OXYGEN-AIR DELIVERY SYSTEMS  
3 L by Nasal route continuous. * OXYGEN-AIR DELIVERY SYSTEMS  
3 L by Nasal route continuous. PARoxetine 30 mg tablet Commonly known as:  PAXIL Take 1 Tab by mouth daily. predniSONE 10 mg tablet Commonly known as:  DELTASONE  
1 tab po TID for 1 week , then 1 tab po BID for 1 week then 1 tab po daily for 1 week  
  
 simvastatin 20 mg tablet Commonly known as:  ZOCOR  
TAKE 1 TABLET BY MOUTH  NIGHTLY  
  
 tamsulosin 0.4 mg capsule Commonly known as:  FLOMAX Take 1 Cap by mouth daily. * Notice: This list has 2 medication(s) that are the same as other medications prescribed for you. Read the directions carefully, and ask your doctor or other care provider to review them with you. Follow-up Instructions Return in about 1 month (around 8/20/2018). Introducing [facility] SERVICES! [employer] introduces Joy[employer]ound patient portal. Now you can access parts of your medical record, email your doctor's office, and request medication refills online. 1. In your internet browser, go to [email or web address] 2. Click on the First Time User? Click Here link in the Sign In box. You will see the New Member Sign Up page. 3. Enter your Joyhound Access Code exactly as it appears below. You will not need to use this code after youve completed the sign-up process. If you do not sign up before the expiration date, you must request a new code. · Joyhound Access Code: [email or web address] 
Expires: 9/16/2018  8:18 AM 
 
4. Enter the last four digits of your Social Security Number (xxxx) and Date of Birth (mm/dd/yyyy) as indicated and click Submit. You will be taken to the next sign-up page. 5. Create a Joyhound ID. This will be your Joyhound login ID and cannot be changed, so think of one that is secure and easy to remember. 6. Create a Joyhound password. You can change your password at any time. 7. Enter your Password Reset Question and Answer. This can be used at a later time if you forget your password. 8. Enter your e-mail address. You will receive e-mail notification when new information is available in [address] 9. Click Sign Up. You can now view and download portions of your medical record. 10. Click the Download Summary menu link to download a portable copy of your medical information. If you have questions, please visit the Frequently Asked Questions section of the Joyhound website. Remember, Joyhound is NOT to be used for urgent needs. For medical emergencies, dial 911. Now available from your iPhone and Android! Please provide this summary of care documentation to your next provider. Your primary care clinician is listed as Damari Simmons. If you have any questions after today's visit, please call [phone].

## 2018-07-21 ENCOUNTER — HOME CARE VISIT (OUTPATIENT)
Dept: SCHEDULING | Facility: HOME HEALTH | Age: 83
End: 2018-07-21
Payer: MEDICARE

## 2018-07-21 PROCEDURE — 3331090001 HH PPS REVENUE CREDIT

## 2018-07-21 PROCEDURE — G0299 HHS/HOSPICE OF RN EA 15 MIN: HCPCS

## 2018-07-21 PROCEDURE — 400013 HH SOC

## 2018-07-21 PROCEDURE — 3331090002 HH PPS REVENUE DEBIT

## 2018-07-22 PROCEDURE — 3331090002 HH PPS REVENUE DEBIT

## 2018-07-22 PROCEDURE — 3331090001 HH PPS REVENUE CREDIT

## 2018-07-23 ENCOUNTER — HOME CARE VISIT (OUTPATIENT)
Dept: SCHEDULING | Facility: HOME HEALTH | Age: 83
End: 2018-07-23
Payer: MEDICARE

## 2018-07-23 ENCOUNTER — PATIENT OUTREACH (OUTPATIENT)
Dept: PULMONOLOGY | Age: 83
End: 2018-07-23

## 2018-07-23 VITALS
SYSTOLIC BLOOD PRESSURE: 125 MMHG | RESPIRATION RATE: 17 BRPM | TEMPERATURE: 97.5 F | DIASTOLIC BLOOD PRESSURE: 80 MMHG | HEART RATE: 70 BPM

## 2018-07-23 PROCEDURE — 3331090001 HH PPS REVENUE CREDIT

## 2018-07-23 PROCEDURE — 3331090002 HH PPS REVENUE DEBIT

## 2018-07-23 PROCEDURE — G0151 HHCP-SERV OF PT,EA 15 MIN: HCPCS

## 2018-07-23 NOTE — Clinical Note
Spoke with patient's daughter on yesterday. I have been unable to reach patient due to no one answering phone. Lives her Spouse but family in and out providing care. Unable to get a clear picture of how patient is doing. Per her daughter she is doing well and patient reports to her that her breathing is better than it has been in a while. I am unsure of her neb. Use and she doesn't have a rescue inhaler. [place] nurse saw pt. on 7/21/18 and  Sp02 96% on 3 L /HR 58. Last PFT done completed on 8/31/15 with predicted pre BD FEV 1 < 51 % and post BD < 61%. SHAILA COPD f/u with you on 7/27/18 @ 12:00. (AXEL RUIZ  [id number] )  Thanks.

## 2018-07-24 ENCOUNTER — HOME CARE VISIT (OUTPATIENT)
Dept: HOME HEALTH SERVICES | Facility: HOME HEALTH | Age: 83
End: 2018-07-24
Payer: MEDICARE

## 2018-07-24 ENCOUNTER — HOME CARE VISIT (OUTPATIENT)
Dept: SCHEDULING | Facility: HOME HEALTH | Age: 83
End: 2018-07-24
Payer: MEDICARE

## 2018-07-24 VITALS
HEART RATE: 58 BPM | TEMPERATURE: 98 F | RESPIRATION RATE: 20 BRPM | DIASTOLIC BLOOD PRESSURE: 60 MMHG | SYSTOLIC BLOOD PRESSURE: 130 MMHG | OXYGEN SATURATION: 96 %

## 2018-07-24 PROCEDURE — G0299 HHS/HOSPICE OF RN EA 15 MIN: HCPCS

## 2018-07-24 PROCEDURE — 3331090002 HH PPS REVENUE DEBIT

## 2018-07-24 PROCEDURE — 3331090001 HH PPS REVENUE CREDIT

## 2018-07-24 NOTE — PROGRESS NOTES
Chronic Obstructive Pulmonary Disease Initial Follow Up Call    David Landry RN, Nurse Navigator (NN), contacted Charles Moreno, Spoke with her Daughter, Chi Macdonald, ( listed on PMI )   by telephone to perform COPD Transitions of Care. Verified Name and  as identifiers. Iesha reports: That Mom  is home with spouse but both are frequently confused so to call her for updates. Reported speaking with pateint this morning and she was doing okay     Took dinner to her last night and noted patient with improved appetite. Her mom reported that her breathing is clearer and longer breath now. Using walker or rollator ( encouraged Iesha to have her use rollator as much as possible due to [address] ) voiced understanding. She has cigarettes by her bed but denies smoking and Iesha stated, \" I count them when I go there.)  NN instructed Suraj Boo on smoking cessation and resources , ie. [id number], that might assist her in assisting her mother with cessation of smoking. )     Family rotate checking on parents everyday and Rick Campbell, her daughter in law provides med. Management, setting up meds for administration. Her son, who is retired checks duing the day to see if she needs a breathing treatment. Her MD told her to take the resp. Medications as needed. ( NN educated Ruelaslamberto Crystalers on maintenance vs. Rescue( PRN) inhalers/nebs. NN instructed Iesha that patient is to routinely use her Symbicort inhaler 2 puffs twice a day and rinse mouth after each use, and the albuterol-ipratropium nebulizer is given every 6 hrs as needed for SOB/Wheezing. Iesha voiced understanding. Using her oxygen at 2 L NC     Occasional cough but denies mucous     Wheezing every now and  then      Suraj Boo Denies:      SOB at rest   Chest Pain  Increased HR/papiltations  N/V   Fever/chills      NN discussed with Iesha, COPD Red Flags and Zones for family to report to MD ASAP to prevent complications.   She voiced understanding with adequate teach back. Noted Priorities:   Smoking cessation adherence due to patient continues to have cigarettes in house and daughter reported that patient is allergic to the patches/itching. Are you using a rescue inhaler? no, TypeNA, frequency NA   Nebulizer? yes/Duoneb , frequency ordered for every 6 hrs but daughter is unsure how often she has used since being home. Zone:(Pt Reported)  per Daughter, Zayra Carter is in the [facility]. Provider Notified: no      Barriers to care? Potential for ineffective communication due to family rotating care /patient and spouse lives alone and NN asked to speak with DaughterMitchel on [employer] . Needs addressed from pathway:   24-48 Hours/Initial   Review/Verification:    ? Identify Care Team  ? Disposition (Home, )  ? DC Instructions, Education, and COPD Zones  ? [facility] in place. LONG TERM ACUTE CARE [facility])   ? Evaluate DME needs; arrange home equipment   ? portable tanks to get to appointments  ? Type of monitoring  ? ? Identify smoking status            ? Identify transportation    Med Rec*   ? ZANDRA  ? ANGÉLICA  ? LABA/ICS (  rinse mouth)  ? Steroids ( Tapered Prednisone )  ? LTOT  ? Antibiotics      SHAILA Documentation:  ? Goals  ? Challenges/Plan  ? Symptoms    Education Disease Management:    ? Comorbidity Management  ? Cornet/ Flute   ? TCRS- DB  ?  Abdominal/ purse lip breathing           Pulmonologist consult while IP: yes    Palliative consult while IP:    no      PCP/Specialist follow up: Future Appointments  Date Time Provider Brandon Morgan   7/27/2018 To Be Determined Joan Perez RN [address] [facility] [place] [address] [place] [address]   7/27/2018 12:00 PM Katya Vyas MD [address]   7/31/2018 To Be Determined Joan Perez RN [address] [facility] [place] [address] [facility] [address]   8/3/2018 To Be Determined Joan Perez RN [address] [facility] [place] [address] [facility] [address]   8/7/2018 To Be Determined ABDULKADIR Shaffer[address] [facility] [place] [address] [place] [address]   8/10/2018 To Be Determined [facility] Perez [facility]   8/10/2018 10:30 AM Batsheva Perez Alexa Estevez MD [facility]   8/21/2018 11:00 AM Kolby Coughlin MD [address]   10/15/2018 9:25 AM [facility] NURSE VISIT [facility] [address]   10/22/2018 8:30 AM Kolby Coughlin MD [address]       Transportation:  Family      COPD Assessment Test (CAT) Per Daughter Huma Gann. I never cough 0 1 2 3  X 4 5 I cough all the time   I have no phelgm (mucus) 0 1  X 2 3 4 5 My chest is completely full of phelgm (mucus)   My chest does not feel tight at all    0  X 1 2 3 4 5 My chest feels tight   When I walk up a hill or one flight of stairs I am not breathless   0 1 2 3 4 5  X When I walk up a hill or one flight of stairs I am very breathless   I am not limited doing any activities at home   0 1 2 3 4 5 I am very limited doing activities at home    I am confident leaving my home despite my condition  0  X 1 2 3 4 5 I am not at all confident leaving my home because of my lung condition   I sleep soundly  0  X 1 2 3 4 5 I don't sleep soundly because of my lung condition   I have lots of energy   0 1 2 3  X 4 5 I have no energy at all     TOTAL: 12                GOLD class- 1, 2, 3, 4  FEV1  ? GOLD 1(mild)=FEV1 ?80%   predicted  ? GOLD 2 (Moderate)=50% ? FEV 1< 80%  predicted  ? GOLD 3(severe)= 30% ? FEV1 < 50% predicted  ? GOLD 4 (very severe)=<30% predicted      COPD Medications: ZANDRA; ANGÉLICA; LAMA; LABA; ICS; PDE4 ; Macrolides ; O2 @ 2L NC continuously     Group LABA LAMA ANGÉLICA  prn ZANDRA  prn LABA+ICS LAMA+ICS LABA+LAMA LABA+LAMA+ICS PDE-4 Inhibitor  Chronic bronchitis, FEV1<50% predicted   A.       X x        B       X x   X     C    x X  XDuo   Neb X  X Symbicort  X     D  x     X     X X     Cat score >10 and one exacerbation this year excluding recent hospital admission.       Disposition of patient:  Home, Home Health     HH Services:  Home health  SN/PT    [facility] and Contact information: Romayne Duster @ [phone]      DME: O2: nebulizer: walker and rollator   DME Company and Contact Information: unsure per Claritza Marie    Social support: Family who doesn't live with patient but provides assistance with meals/ADLs and medication management daily and as needed. Does patient have an Advance Directive:  not on file     Advance Directive scanned into patients chart:  no     Future Appointments:      /27/2018 12:00 PM Jose Borden [phone] [address]   Other Appointments Today      Provider Brandon Morgan   7/27/2018 To Be Determined Héctor Delacruz, PTA BON [address] [place] [address] CTR HR [facility] [address]   7/27/2018 To Be Determined Randy Kraus, ABDULKADIR HOLLIDAY [facility] HOME CARE SCHEDULING/INTAKE HR  [address]       NN contacted Arslan Carey, patient's daughter in law for medication reconciliation. No answer. Left message introducing self, role and reason for call. Requested return call. Contact information provided    NN contacted [facility]. Spoke with Benjamin Crowley. Verified 2 patient identifiers. Introduced self, role and reason for call. Benjamin Crowley reported the NN in chart for [facility] visit on 7/21/18 isn't complete but will have the nurse, Yecenia Rodriguez,  that saw her contact you . Benjamin Crowley also reported that patient will be evaluated by the therapist today. Ana PANCHAL with [address] contacted NN. Verified 2 patient identifiers. Introduced self, role and reason for call  To . Lorenza Torres reported:      Patient was okay during her visit. ( NN discussed pt. With 58 HR was a concern , verify oxygen level and if she noted any potential barriers. )     Spoke with family, Claritza Marie. Family all participate in care including patient's spouse who has Dementia and Arslan Carey is setting up her meds. She spoke with patient re: her medications and patient told her she knows when and what to take. Can't remember her vitals and currently driving.       NN thanked Lorenza Torres for return call and discussed future coordination of patient's care due to this writer's  inability to speak with patient . Voiced understanding. Goals      Attends follow-up appointments as directed. Target Date:  7/27/18 7/20/18 Attended SHAILA f/u appt. With Dr. Santa Alanis on today.  Knowledge and adherence of medication (ie. action, side effects, missed dose, etc.). Target Date:  10/18/18      Prevent complications post hospitalization. Target Dated:  8/20/18           Plan:    Iesha/and or family will contact MD with any COPD Red/Flags or if patient is consistently in yellow zone . Patient will attend all scheduled f/u appts. NN will coordinate care pt's  with Navi Lombardo as needed. NN will continue to monitor patient for barriers to care. NN routed message to Dr. Darby Kate     Patient reminded that there are physicians on call 24 hours a day / 7 days a week (M-F 5pm to 8am and from Friday 5pm until Monday 8a for the weekend) should the patient have questions or concerns. Patient reminded to call 911 if situation is emergent or patient feels the situation is emergent. Pt verbalizes understanding.

## 2018-07-25 ENCOUNTER — PATIENT OUTREACH (OUTPATIENT)
Dept: PULMONOLOGY | Age: 83
End: 2018-07-25

## 2018-07-25 PROCEDURE — 3331090002 HH PPS REVENUE DEBIT

## 2018-07-25 PROCEDURE — 3331090001 HH PPS REVENUE CREDIT

## 2018-07-25 NOTE — PROGRESS NOTES
Sangeetha Reeys, Patient's daughter in law returned call to Nurse Navigator ( NN ) to review medications and update patient's home phone number. She requested that no one tell the patient the new changed number due to phone scams recently. 2 patient identifiers given by [address] [place] [address]. [facility] reports:     Saw her today and she was doing well     All medications given and used her nebulizer twice today due nurse telling her to use it. ( NN discussed that her nebulizer is for as needed every 6 hrs for SOB/Wheezing (used as rescue treatment between maintenance inhalers. She voiced understanding. Using her oxygen at 3 L NC and oxygen level was 98% today. MOORE    Taking Trazodone nightly but not on her sheet of meds. ( NN reviewed patient's current and d/annette meds listed in chart and informed Ryan [address] that med. Was stopped in Feb. 2018 while in the hospital and she should check with the MD that prescribed medication to verify use.)    She reported that her mother-in-law has to take the medications every night to rest.    [address] [place] [address] also report that pt. Has a bottle of metoprolol but nurse told her not to give it since she was on the Norvasc. NN reviewed patient's medications ( current and d/annette ) and noted that metoprolol was d/annette on 6-3-18 by Dr. Kristine Gimenez. [address] [place] [address] reports she will remove it from current medications. [address] [place] [address] Denies:     Wheezing  SOB at rest  Dizziness   N/V  Fever/chills    NN preformed medication reconciliation with [address] [place] [address] Andrea. NN instructed [address] [place] [address] to have patient rinse mouth after using the Symbicort to prevent Trush. She stated, \" no one ever told us that. \"  Voiced understanding to all above. NN thanked [address] [place] [address] for return call. NN updated patient' s phone number in [place] ( [phone] )     NN will continue to monitor.

## 2018-07-26 ENCOUNTER — HOME CARE VISIT (OUTPATIENT)
Dept: HOME HEALTH SERVICES | Facility: HOME HEALTH | Age: 83
End: 2018-07-26
Payer: MEDICARE

## 2018-07-26 PROCEDURE — 3331090002 HH PPS REVENUE DEBIT

## 2018-07-26 PROCEDURE — 3331090001 HH PPS REVENUE CREDIT

## 2018-07-27 ENCOUNTER — HOME CARE VISIT (OUTPATIENT)
Dept: HOME HEALTH SERVICES | Facility: HOME HEALTH | Age: 83
End: 2018-07-27
Payer: MEDICARE

## 2018-07-27 ENCOUNTER — OFFICE VISIT (OUTPATIENT)
Dept: PULMONOLOGY | Age: 83
End: 2018-07-27

## 2018-07-27 VITALS
SYSTOLIC BLOOD PRESSURE: 110 MMHG | OXYGEN SATURATION: 88 % | HEART RATE: 46 BPM | HEIGHT: 65 IN | DIASTOLIC BLOOD PRESSURE: 60 MMHG | BODY MASS INDEX: 21.99 KG/M2 | WEIGHT: 132 LBS | TEMPERATURE: 97.3 F | RESPIRATION RATE: 18 BRPM

## 2018-07-27 DIAGNOSIS — G25.81 RLS (RESTLESS LEGS SYNDROME): ICD-10-CM

## 2018-07-27 DIAGNOSIS — J43.1 PANLOBULAR EMPHYSEMA (HCC): ICD-10-CM

## 2018-07-27 DIAGNOSIS — J44.9 STAGE 4 VERY SEVERE COPD BY GOLD CLASSIFICATION (HCC): Primary | ICD-10-CM

## 2018-07-27 PROCEDURE — 3331090001 HH PPS REVENUE CREDIT

## 2018-07-27 PROCEDURE — 3331090002 HH PPS REVENUE DEBIT

## 2018-07-27 NOTE — PROGRESS NOTES
[facility] Pulmonary, Critical Care, and Sleep Medicine Pulmonary Office Progress Notes Name: Ravi Burgos : 1933 Date: 2018 Subjective:  
 
Patient is a 80 y.o. female is here for follow up for: Problems-COPD 
SHAILA visit:  
Date of discharge- 18 Patient here today for evaluation after recent hospitalization for acute on chronic COPD and multiple other issues. She is breathing much better currently here in the office without oxygen but she states she wears it all the time at home. She is taking all of her medicine correctly. Asking for spacer for inhalers She completed antibiotics and steroid taper She coughs only occasionally at this point. Taking care of spouse who has dementia. She was discharged in the hospital  and contacted by [facility]. She has not smoked since she was in the hospital after over 50 years of smoking. Past Medical History:  
Diagnosis Date  Colon polyps  COPD 02  DDD (degenerative disc disease), lumbar 10/1/2014  Degeneration of lumbar or lumbosacral intervertebral disc  Depression  Diabetes ([address] 05  Diverticulosis   DM neuropathy, painful ([address] 10/1/2014  MOORE (Dyspnea on Exertion) 02  
 echo: +mild LVH, BI52-29% w/ diastolic dysfxn  Glaucoma  HCAP (healthcare-associated pneumonia) 2017  Hemorrhoids -  Hyperlipidemia 2011  Hypertension 02  LBP (low back pain) 04  Low back pain radiating to both legs 10/1/2014  Lumbago  Lumbar disc herniation 10/1/2014  Lumbar facet arthropathy ([facility] [address] 10/1/2014  Lumbosacral radiculopathy at L5 10/1/2014  Lumbosacral radiculopathy at S1 10/1/2014  Microscopic hematuria  OA (osteoarthritis)  Overactive bladder 2011  RLS (restless legs syndrome) 2013  Sciatica  Shortness of breath  Spinal stenosis, lumbar region, without neurogenic claudication  Spondylolisthesis of lumbar region 10/1/2014  Spondylolisthesis, grade 1 10/1/2014  Stress urinary incontinence  Syncope   
 -MRI brain  Urinary tract infection, site not specified Allergies Allergen Reactions  Levaquin [Levofloxacin] Rash Current Outpatient Prescriptions Medication Sig Dispense Refill  albuterol-ipratropium (DUO-NEB) 2.5 mg-0.5 mg/3 ml nebu 3 mL by Nebulization route every six (6) hours as needed. 30 Nebule 0  
 amLODIPine (NORVASC) 5 mg tablet Take 1 Tab by mouth daily. 10 Tab 0  
 budesonide-formoterol (SYMBICORT) 80-4.5 mcg/actuation HFAA Take 2 Puffs by inhalation two (2) times a day. 1 Inhaler 0  
 mirtazapine (REMERON) 30 mg tablet Take 1 Tab by mouth nightly. 30 Tab 2  
 gabapentin (NEURONTIN) 300 mg capsule TAKE 1 CAPSULE BY MOUTH 3  TIMES DAILY 270 Cap 1  simvastatin (ZOCOR) 20 mg tablet TAKE 1 TABLET BY MOUTH  NIGHTLY 90 Tab 1  
 tamsulosin (FLOMAX) 0.4 mg capsule Take 1 Cap by mouth daily. 15 Cap 0  
 OXYGEN-AIR DELIVERY SYSTEMS 3 L by Nasal route continuous.  Nebulizer & Compressor (PORTABLE NEBULIZER SYSTEM) machine 1 Each by Does Not Apply route every six (6) hours as needed. 1 Each 0  
 PARoxetine (PAXIL) 30 mg tablet Take 1 Tab by mouth daily. 90 Tab 3  predniSONE (DELTASONE) 10 mg tablet 1 tab po TID for 1 week , then 1 tab po BID for 1 week then 1 tab po daily for 1 week 40 Tab 0  
 cefpodoxime (VANTIN) 200 mg tablet Take 1 Tab by mouth every twelve (12) hours. 20 Tab 0  
 OXYGEN-AIR DELIVERY SYSTEMS 3 L by Nasal route continuous.  famotidine (PEPCID) 20 mg tablet Take 1 Tab by mouth nightly. 6 Tab 0 Review of Systems: 
HEENT: No epistaxis, no nasal drainage, no difficulty in swallowing, no redness in eyes Respiratory: as above Cardiovascular: no chest pain, no palpitations, no chronic leg edema, no syncope Gastrointestinal: no abd pain, no vomiting, no diarrhea, no bleeding symptoms Genitourinary: No urinary symptoms or hematuria Integument/breast: No ulcers or rashes Musculoskeletal:Neg 
Neurological: No focal weakness, no seizures, no headaches Behvioral/Psych: No anxiety, no depression Constitutional: No fever, no chills, no weight loss, no night sweats Objective:  
 
Visit Vitals  Pulse (!) 46  Temp 97.3 °F (36.3 °C) (Oral)  Ht 5' 5\" (1.651 m)  Wt 59.9 kg (132 lb)  SpO2 (!) 88% Comment: RA rest  
 BMI 21.97 kg/m2 Physical Exam:  
General: comfortable, no acute distress HEENT: pupils reactive, sclera anicteric, EOM intact Neck: No adenopathy or thyroid swelling, no JVD, supple CVS: S1S2 no murmurs RS: Mod AE bilaterally, no tactile fremitus or egophony, no accessory muscle use Abd: soft, non tender, no hepatosplenomegaly Neuro: non focal, awake, alert Extrm: no leg edema, clubbing or cyanosis Skin: no rash Data review:  
 
Admission on 07/13/2018, Discharged on 07/18/2018 No results displayed because visit has over 200 results. Hospital Outpatient Visit on 06/12/2018 Component Date Value Ref Range Status  LIPID PROFILE 06/12/2018        Final  
 Cholesterol, total 06/12/2018 132  <200 MG/DL Final  
 Triglyceride 06/12/2018 85  <150 MG/DL Final  
 Comment: The drugs N-acetylcysteine (NAC) and 
Metamiszole have been found to cause falsely 
low results in this chemical assay. Please 
be sure to submit blood samples obtained BEFORE administration of either of these 
drugs to assure correct results.  HDL Cholesterol 06/12/2018 43  40 - 60 MG/DL Final  
 LDL, calculated 06/12/2018 72  0 - 100 MG/DL Final  
 VLDL, calculated 06/12/2018 17  MG/DL Final  
 CHOL/HDL Ratio 06/12/2018 3.1  0 - 5.0   Final  
 Hemoglobin A1c 06/12/2018 6.2* 4.2 - 5.6 % Final  
 Comment: (NOTE) HbA1C Interpretive Ranges <5.7              Normal 
5.7 - 6.4         Consider Prediabetes >6.5              Consider Diabetes  Est. average glucose 06/12/2018 131  mg/dL Final  
 Comment: (NOTE) The eAG should be interpreted with patient characteristics in mind  
since ethnicity, interindividual differences, red cell lifespan,  
variation in rates of glycation, etc. may affect the validity of the  
calculation.  Sodium 06/12/2018 138  136 - 145 mmol/L Final  
 Potassium 06/12/2018 4.5  3.5 - 5.5 mmol/L Final  
 Chloride 06/12/2018 97* 100 - 108 mmol/L Final  
 CO2 06/12/2018 34* 21 - 32 mmol/L Final  
 Anion gap 06/12/2018 7  3.0 - 18 mmol/L Final  
 Glucose 06/12/2018 102* 74 - 99 mg/dL Final  
 BUN 06/12/2018 12  7.0 - 18 MG/DL Final  
 Creatinine 06/12/2018 0.77  0.6 - 1.3 MG/DL Final  
 BUN/Creatinine ratio 06/12/2018 16  12 - 20   Final  
 GFR est AA 06/12/2018 >60  >60 ml/min/1.73m2 Final  
 GFR est non-AA 06/12/2018 >60  >60 ml/min/1.73m2 Final  
 Comment: (NOTE) Estimated GFR is calculated using the Modification of Diet in Renal  
Disease (MDRD) Study equation, reported for both  Americans [facility]) and non- Americans (GFRNA), and normalized to 1.73m2  
body surface area. The physician must decide which value applies to  
the patient. The MDRD study equation should only be used in  
individuals age 25 or older. It has not been validated for the  
following: pregnant women, patients with serious comorbid conditions,  
or on certain medications, or persons with extremes of body size,  
muscle mass, or nutritional status.  Calcium 06/12/2018 9.1  8.5 - 10.1 MG/DL Final  
 Bilirubin, total 06/12/2018 0.3  0.2 - 1.0 MG/DL Final  
 ALT (SGPT) 06/12/2018 14  13 - 56 U/L Final  
 AST (SGOT) 06/12/2018 13* 15 - 37 U/L Final  
 Alk.  phosphatase 06/12/2018 68  45 - 117 U/L Final  
 Protein, total 06/12/2018 7.0  6.4 - 8.2 g/dL Final  
 Albumin 06/12/2018 4.0  3.4 - 5.0 g/dL Final  
 Globulin 06/12/2018 3.0  2.0 - 4.0 g/dL Final  
 A-G Ratio 06/12/2018 1.3  0.8 - 1.7   Final  
Admission on 02/07/2018, Discharged on 02/10/2018 Component Date Value Ref Range Status  Ventricular Rate 02/07/2018 49  BPM Final  
 Atrial Rate 02/07/2018 49  BPM Final  
 P-R Interval 02/07/2018 148  ms Final  
 QRS Duration 02/07/2018 114  ms Final  
 Q-T Interval 02/07/2018 494  ms Final  
 QTC Calculation (Bezet) 02/07/2018 446  ms Final  
 Calculated P Axis 02/07/2018 82  degrees Final  
 Calculated R Axis 02/07/2018 -13  degrees Final  
 Calculated T Axis 02/07/2018 51  degrees Final  
 Diagnosis 02/07/2018    Final  
                 Value:Marked sinus bradycardia Possible Left atrial enlargement Right bundle branch block Abnormal ECG When compared with ECG of 21-APR-2017 17:37, Left anterior fascicular block is no longer present T wave inversion less evident in Inferior leads T wave inversion no longer evident in Anterior leads QT has shortened Confirmed by Kiet Hannah MD, Dino Carcamo ([id number]) on 2/7/2018 3:50:27 PM 
  
 WBC 02/07/2018 4.2* 4.6 - 13.2 K/uL Final  
 RBC 02/07/2018 4.63  4.20 - 5.30 M/uL Final  
 HGB 02/07/2018 13.8  12.0 - 16.0 g/dL Final  
 HCT 02/07/2018 43.3  35.0 - 45.0 % Final  
 MCV 02/07/2018 93.5  74.0 - 97.0 FL Final  
 MCH 02/07/2018 29.8  24.0 - 34.0 PG Final  
 MCHC 02/07/2018 31.9  31.0 - 37.0 g/dL Final  
 RDW 02/07/2018 14.2  11.6 - 14.5 % Final  
 PLATELET 23/82/1528 311  135 - 420 K/uL Final  
 MPV 02/07/2018 11.0  9.2 - 11.8 FL Final  
 NEUTROPHILS 02/07/2018 53  40 - 73 % Final  
 LYMPHOCYTES 02/07/2018 29  21 - 52 % Final  
 MONOCYTES 02/07/2018 16* 3 - 10 % Final  
 EOSINOPHILS 02/07/2018 1  0 - 5 % Final  
 BASOPHILS 02/07/2018 1  0 - 2 % Final  
 ABS. NEUTROPHILS 02/07/2018 2.2  1.8 - 8.0 K/UL Final  
 ABS. LYMPHOCYTES 02/07/2018 1.2  0.9 - 3.6 K/UL Final  
 ABS. MONOCYTES 02/07/2018 0.7  0.05 - 1.2 K/UL Final  
 ABS. EOSINOPHILS 02/07/2018 0.1  0.0 - 0.4 K/UL Final  
 ABS.  BASOPHILS 02/07/2018 0.0  0.0 - 0.06 K/UL Final  
 DF 02/07/2018 AUTOMATED    Final  Sodium 02/07/2018 139  136 - 145 mmol/L Final  
 Potassium 02/07/2018 4.4  3.5 - 5.5 mmol/L Final  
 Chloride 02/07/2018 99* 100 - 108 mmol/L Final  
 CO2 02/07/2018 39* 21 - 32 mmol/L Final  
 Anion gap 02/07/2018 1* 3.0 - 18 mmol/L Final  
 Glucose 02/07/2018 162* 74 - 99 mg/dL Final  
 BUN 02/07/2018 18  7.0 - 18 MG/DL Final  
 Creatinine 02/07/2018 0.71  0.6 - 1.3 MG/DL Final  
 BUN/Creatinine ratio 02/07/2018 25* 12 - 20   Final  
 GFR est AA 02/07/2018 >60  >60 ml/min/1.73m2 Final  
 GFR est non-AA 02/07/2018 >60  >60 ml/min/1.73m2 Final  
 Comment: (NOTE) Estimated GFR is calculated using the Modification of Diet in Renal  
Disease (MDRD) Study equation, reported for both  Americans [facility]) and non- Americans (GFRNA), and normalized to 1.73m2  
body surface area. The physician must decide which value applies to  
the patient. The MDRD study equation should only be used in  
individuals age 25 or older. It has not been validated for the  
following: pregnant women, patients with serious comorbid conditions,  
or on certain medications, or persons with extremes of body size,  
muscle mass, or nutritional status.  Calcium 02/07/2018 9.3  8.5 - 10.1 MG/DL Final  
 Bilirubin, total 02/07/2018 0.2  0.2 - 1.0 MG/DL Final  
 ALT (SGPT) 02/07/2018 22  13 - 56 U/L Final  
 AST (SGOT) 02/07/2018 31  15 - 37 U/L Final  
 Alk. phosphatase 02/07/2018 61  45 - 117 U/L Final  
 Protein, total 02/07/2018 7.3  6.4 - 8.2 g/dL Final  
 Albumin 02/07/2018 3.5  3.4 - 5.0 g/dL Final  
 Globulin 02/07/2018 3.8  2.0 - 4.0 g/dL Final  
 A-G Ratio 02/07/2018 0.9  0.8 - 1.7   Final  
 Influenza A Antigen 02/07/2018 NEGATIVE   NEG   Final  
 A negative result does not exclude influenza virus infection, seasonal or H1N1 due to suboptimal sensitivity.  If influenza is circulating in your community, a diagnosis of influenza should be considered based on a patients clinical presentation and empiric antiviral treatment should be considered, if indicated.  Influenza B Antigen 02/07/2018 NEGATIVE   NEG   Final  
 Lactic Acid (POC) 02/07/2018 0.7  0.4 - 2.0 mmol/L Final  
 Special Requests: 02/07/2018 NO SPECIAL REQUESTS    Final  
 Culture result: 02/07/2018 NO GROWTH 6 DAYS    Final  
 Special Requests: 02/07/2018 NO SPECIAL REQUESTS    Final  
 Culture result: 02/07/2018 NO GROWTH 6 DAYS    Final  
 Procalcitonin 02/07/2018 0.04  0.00 - 0.08 ng/mL Final  
 Comment: (NOTE) The change of PCT concentration over time provides 
prognostic information about the risk of mortality within 28 days for patients diagnosed with severe sepsis or septic 
shock coming from the emergency department, ICU, other 
medical wards, or directly from outside the hospital. Data 
supports the use of PCT determinations from the day severe 
sepsis or septic shock is first diagnosed (Day 0) or the day 
thereafter (Day 1) and the fourth day after diagnosis (Day 4) for the classification of patients into higher and 
lower risk for mortality within 28 days according to the 
workflow below: PCT Day 0(or Day 1) - PCT Day 4 
delta PCT  = ________________________________  X 100% PCT Day 0 (or Day 1) A decrease of PCT levels below or equal to 80% defines a 
positive delta PCT test result representing a higher 
risk for 28-day all-cause mortality of patients diagnosed 
with severe sepsis or septic shock. A decreased of PCT levels of more than 80%   
                        defines a 
negative delta PCT result representing a lower risk for 28-day all-cause mortality of patients diagnosed with 
severe sepsis or septic shock. To determine delta PCT results from the absolute PCT 
concentrations of a patient obtained on the day severe 
sepsis or septic shock was first diagnosed (or 24 hours 
later) and on Day 4, go to [email or web address]. Performed At: [facility] [address] [place], [place] [id number] Saúl Flanagan MD [id number]  Sodium 02/08/2018 138  136 - 145 mmol/L Final  
 Potassium 02/08/2018 4.4  3.5 - 5.5 mmol/L Final  
 Chloride 02/08/2018 95* 100 - 108 mmol/L Final  
 CO2 02/08/2018 35* 21 - 32 mmol/L Final  
 Anion gap 02/08/2018 8  3.0 - 18 mmol/L Final  
 Glucose 02/08/2018 165* 74 - 99 mg/dL Final  
 BUN 02/08/2018 8  7.0 - 18 MG/DL Final  
 Creatinine 02/08/2018 0.46* 0.6 - 1.3 MG/DL Final  
 BUN/Creatinine ratio 02/08/2018 17  12 - 20   Final  
 GFR est AA 02/08/2018 >60  >60 ml/min/1.73m2 Final  
 GFR est non-AA 02/08/2018 >60  >60 ml/min/1.73m2 Final  
 Calcium 02/08/2018 8.5  8.5 - 10.1 MG/DL Final  
 Glucose (POC) 02/08/2018 176* 70 - 110 mg/dL Final  
 Comment: (NOTE) The FDA has indicated that no capillary point of care blood glucose  
monitoring systems are approved for use in \"critically ill\" patients,  
however they have not defined this population. The College of  
American Pathologists has recommended that these devices should not  
be used in cases such as severe hypotension, dehydration, shock, and  
hyperglycemic-hyperosmolar state, amongst others. Venous or arterial  
collection is the recommended specimen for testing these patients.  Glucose (POC) 02/08/2018 155* 70 - 110 mg/dL Final  
 Comment: (NOTE) The FDA has indicated that no capillary point of care blood glucose  
monitoring systems are approved for use in \"critically ill\" patients,  
however they have not defined this population. The College of  
American Pathologists has recommended that these devices should not  
be used in cases such as severe hypotension, dehydration, shock, and  
hyperglycemic-hyperosmolar state, amongst others. Venous or arterial  
collection is the recommended specimen for testing these patients.  Glucose (POC) 02/08/2018 157* 70 - 110 mg/dL Final  
 Comment: (NOTE) The FDA has indicated that no capillary point of care blood glucose monitoring systems are approved for use in \"critically ill\" patients,  
however they have not defined this population. The College of  
American Pathologists has recommended that these devices should not  
be used in cases such as severe hypotension, dehydration, shock, and  
hyperglycemic-hyperosmolar state, amongst others. Venous or arterial  
collection is the recommended specimen for testing these patients.  WBC 02/09/2018 4.6  4.6 - 13.2 K/uL Final  
 RBC 02/09/2018 4.14* 4.20 - 5.30 M/uL Final  
 HGB 02/09/2018 12.2  12.0 - 16.0 g/dL Final  
 HCT 02/09/2018 37.4  35.0 - 45.0 % Final  
 MCV 02/09/2018 90.3  74.0 - 97.0 FL Final  
 MCH 02/09/2018 29.5  24.0 - 34.0 PG Final  
 MCHC 02/09/2018 32.6  31.0 - 37.0 g/dL Final  
 RDW 02/09/2018 13.4  11.6 - 14.5 % Final  
 PLATELET 18/64/9948 098  135 - 420 K/uL Final  
 MPV 02/09/2018 10.9  9.2 - 11.8 FL Final  
 NEUTROPHILS 02/09/2018 77* 40 - 73 % Final  
 LYMPHOCYTES 02/09/2018 19* 21 - 52 % Final  
 MONOCYTES 02/09/2018 4  3 - 10 % Final  
 EOSINOPHILS 02/09/2018 0  0 - 5 % Final  
 BASOPHILS 02/09/2018 0  0 - 2 % Final  
 ABS. NEUTROPHILS 02/09/2018 3.6  1.8 - 8.0 K/UL Final  
 ABS. LYMPHOCYTES 02/09/2018 0.9  0.9 - 3.6 K/UL Final  
 ABS. MONOCYTES 02/09/2018 0.2  0.05 - 1.2 K/UL Final  
 ABS. EOSINOPHILS 02/09/2018 0.0  0.0 - 0.4 K/UL Final  
 ABS.  BASOPHILS 02/09/2018 0.0  0.0 - 0.1 K/UL Final  
 DF 02/09/2018 AUTOMATED    Final  
 Sodium 02/09/2018 134* 136 - 145 mmol/L Final  
 Potassium 02/09/2018 4.3  3.5 - 5.5 mmol/L Final  
 Chloride 02/09/2018 96* 100 - 108 mmol/L Final  
 CO2 02/09/2018 32  21 - 32 mmol/L Final  
 Anion gap 02/09/2018 6  3.0 - 18 mmol/L Final  
 Glucose 02/09/2018 157* 74 - 99 mg/dL Final  
 BUN 02/09/2018 10  7.0 - 18 MG/DL Final  
 Creatinine 02/09/2018 0.33* 0.6 - 1.3 MG/DL Final  
 BUN/Creatinine ratio 02/09/2018 30* 12 - 20   Final  
 GFR est AA 02/09/2018 >60  >60 ml/min/1.73m2 Final  
 GFR est non-AA 02/09/2018 >60  >60 ml/min/1.73m2 Final  
 Calcium 02/09/2018 8.6  8.5 - 10.1 MG/DL Final  
 Glucose (POC) 02/09/2018 153* 70 - 110 mg/dL Final  
 Comment: (NOTE) The FDA has indicated that no capillary point of care blood glucose  
monitoring systems are approved for use in \"critically ill\" patients,  
however they have not defined this population. The College of  
American Pathologists has recommended that these devices should not  
be used in cases such as severe hypotension, dehydration, shock, and  
hyperglycemic-hyperosmolar state, amongst others. Venous or arterial  
collection is the recommended specimen for testing these patients.  Glucose (POC) 02/09/2018 219* 70 - 110 mg/dL Final  
 Comment: (NOTE) The FDA has indicated that no capillary point of care blood glucose  
monitoring systems are approved for use in \"critically ill\" patients,  
however they have not defined this population. The College of  
American Pathologists has recommended that these devices should not  
be used in cases such as severe hypotension, dehydration, shock, and  
hyperglycemic-hyperosmolar state, amongst others. Venous or arterial  
collection is the recommended specimen for testing these patients.  Glucose (POC) 02/09/2018 267* 70 - 110 mg/dL Final  
 Comment: (NOTE) The FDA has indicated that no capillary point of care blood glucose  
monitoring systems are approved for use in \"critically ill\" patients,  
however they have not defined this population. The College of  
American Pathologists has recommended that these devices should not  
be used in cases such as severe hypotension, dehydration, shock, and  
hyperglycemic-hyperosmolar state, amongst others. Venous or arterial  
collection is the recommended specimen for testing these patients.  Glucose (POC) 02/09/2018 329* 70 - 110 mg/dL Final  
 Comment: (NOTE) The FDA has indicated that no capillary point of care blood glucose  
monitoring systems are approved for use in \"critically ill\" patients,  
however they have not defined this population. The College of  
American Pathologists has recommended that these devices should not  
be used in cases such as severe hypotension, dehydration, shock, and  
hyperglycemic-hyperosmolar state, amongst others. Venous or arterial  
collection is the recommended specimen for testing these patients.  WBC 02/10/2018 6.7  4.6 - 13.2 K/uL Final  
 RBC 02/10/2018 4.13* 4.20 - 5.30 M/uL Final  
 HGB 02/10/2018 12.5  12.0 - 16.0 g/dL Final  
 HCT 02/10/2018 37.0  35.0 - 45.0 % Final  
 MCV 02/10/2018 89.6  74.0 - 97.0 FL Final  
 MCH 02/10/2018 30.3  24.0 - 34.0 PG Final  
 MCHC 02/10/2018 33.8  31.0 - 37.0 g/dL Final  
 RDW 02/10/2018 13.5  11.6 - 14.5 % Final  
 PLATELET 67/22/8625 473  135 - 420 K/uL Final  
 MPV 02/10/2018 11.0  9.2 - 11.8 FL Final  
 NEUTROPHILS 02/10/2018 68  40 - 73 % Final  
 LYMPHOCYTES 02/10/2018 22  21 - 52 % Final  
 MONOCYTES 02/10/2018 10  3 - 10 % Final  
 EOSINOPHILS 02/10/2018 0  0 - 5 % Final  
 BASOPHILS 02/10/2018 0  0 - 2 % Final  
 ABS. NEUTROPHILS 02/10/2018 4.6  1.8 - 8.0 K/UL Final  
 ABS. LYMPHOCYTES 02/10/2018 1.5  0.9 - 3.6 K/UL Final  
 ABS. MONOCYTES 02/10/2018 0.6  0.05 - 1.2 K/UL Final  
 ABS. EOSINOPHILS 02/10/2018 0.0  0.0 - 0.4 K/UL Final  
 ABS.  BASOPHILS 02/10/2018 0.0  0.0 - 0.1 K/UL Final  
 DF 02/10/2018 AUTOMATED    Final  
 Sodium 02/10/2018 137  136 - 145 mmol/L Final  
 Potassium 02/10/2018 3.9  3.5 - 5.5 mmol/L Final  
 Chloride 02/10/2018 98* 100 - 108 mmol/L Final  
 CO2 02/10/2018 33* 21 - 32 mmol/L Final  
 Anion gap 02/10/2018 6  3.0 - 18 mmol/L Final  
 Glucose 02/10/2018 140* 74 - 99 mg/dL Final  
 BUN 02/10/2018 15  7.0 - 18 MG/DL Final  
 Creatinine 02/10/2018 0.50* 0.6 - 1.3 MG/DL Final  
 BUN/Creatinine ratio 02/10/2018 30* 12 - 20   Final  
 GFR est AA 02/10/2018 >60  >60 ml/min/1.73m2 Final  
 GFR est non-AA 02/10/2018 >60  >60 ml/min/1.73m2 Final  
 Calcium 02/10/2018 8.7  8.5 - 10.1 MG/DL Final  
 Glucose (POC) 02/10/2018 104  70 - 110 mg/dL Final  
 Comment: (NOTE) The FDA has indicated that no capillary point of care blood glucose  
monitoring systems are approved for use in \"critically ill\" patients,  
however they have not defined this population. The College of  
American Pathologists has recommended that these devices should not  
be used in cases such as severe hypotension, dehydration, shock, and  
hyperglycemic-hyperosmolar state, amongst others. Venous or arterial  
collection is the recommended specimen for testing these patients.  Glucose (POC) 02/10/2018 139* 70 - 110 mg/dL Final  
 Comment: (NOTE) The FDA has indicated that no capillary point of care blood glucose  
monitoring systems are approved for use in \"critically ill\" patients,  
however they have not defined this population. The College of  
American Pathologists has recommended that these devices should not  
be used in cases such as severe hypotension, dehydration, shock, and  
hyperglycemic-hyperosmolar state, amongst others. Venous or arterial  
collection is the recommended specimen for testing these patients. Date FVC FEV1  FEV1/FVC [id number] TLC RV RV/TLC VC DLCO  
8/2015 53 51 decreased 33 73  120  58 Imaging: 
I have personally reviewed the patients radiographs and have reviewed the reports: 
7/13/18: 
1. New hazy streaky densities at the lung bases bilaterally. Suggestive of 
infiltrate vs. atelectatic changes with pleural effusion. 2.  Slight prominence of the cardiac silhouette. 3.  Underlying COPD. 4.  Right mid lung zone with focal scarring vs. less likely subsegmental 
atelectasis. Patient Active Problem List  
Diagnosis Code  Hyperlipidemia E78.5  Overactive bladder N32.81  
 Sciatica M54.30  Degeneration of lumbar or lumbosacral intervertebral disc M51.37  
 Encounter for long-term (current) use of other medications Z79.899  Depression F32.9  
 Unspecified vitamin D deficiency E55.9  Glucose intolerance (pre-diabetes) R73.03  
 RLS (restless legs syndrome) G25.81  
 Aortic dissection, abdominal (HCC) I71.02  
 Ataxic gait R26.0  Chronic neck pain M54.2, G89.29  
 Orthostatic hypotension I95.1  Paresthesia R20.2  Repeated falls R29.6  Chronic pain syndrome G89.4  Lumbosacral spondylosis without myelopathy M47.817  Cervical stenosis of spinal canal M48.02  Spinal stenosis in cervical region M48.02  
 Polyneuropathy G62.9  Lumbar stenosis M48.061  
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  
 Lumbar disc herniation M51.26  
 Lumbosacral radiculopathy at L5 M54.17  
 Lumbosacral radiculopathy at S1 M54.17  
 DM neuropathy, painful (HCC) E11.40  Acute exacerbation of chronic obstructive pulmonary disease (COPD) ([facility] J44.1  Diabetic polyneuropathy associated with type 2 diabetes mellitus (HCC) E11.42  
 Carotid artery stenosis I65.29  
 Atherosclerosis of native arteries of the extremities with intermittent claudication I70.219  
 MOORE (dyspnea on exertion) R06.09  
 SOB (shortness of breath) R06.02  
 Murmur, cardiac R01.1  Hyperlipidemia LDL goal <100 E78.5  Primary osteoarthritis involving multiple joints M15.0  Prediabetes R73.03  
 Restless leg syndrome G25.81  
 Essential hypertension with goal blood pressure less than 140/90 I10  [facility] emph[facility]sema [facility] J43.1  Impaired fasting glucose R73.01  
 HCAP (healthcare-associated pneumonia) J18.9  Abnormal CT scan, chest R93.8  Hypercholesterolemia E78.00  Hypokalemia E87.6  Dizziness R42  CHI (closed head injury) S09. 90XA  Elevated troponin R74.8  Type 2 diabetes mellitus with diabetic neuropathy, without long-term current use of insulin ([facility]) E11.40  Primary insomnia F51.01  
 Major depression, chronic F34.1  Pneumonia J18.9  
 CAP (community acquired pneumonia) J18.9  
 COPD exacerbation ([facility] J44.1 IMPRESSION:  
· Acute on chronic hypoxic hypercapnic respiratory failure due to COPD exacerbation and atypical pneumonia: Improving · Bilateral pulmonary infiltrates- will need f/u to clearing · COPD with excacerbation on LTOT FEV1 51% in 2015- improving · Serology  positive for KANA, RA, SSA, SSB 
· HTN 
· Glaucoma · DM · Depression · Chronic back pain · Cachexia RECOMMENDATIONS:  
· . Continue supplemental oxygen -- counseled pt regarding compliance of oxygen (pt not wearing on exertion at home) · Started LABA/ICS. Will not start LAMA in light of Glaucoma. Continue Symbicort with spacer. Pt using duonebs PRN at home, can continue · Consider home based Pulmonary rehab · PFT's - next visit · Pt instructed on pursed lip breathing · Incremental ambulation as tolerated · Healthy weight and nutrition Counseled the patient regarding cessation of smoking. I reviewed health risks of tobacco use including increased risk of MI, stroke, cancer, etc.  We reviewed various approaches to cessation. Pt declined cessation assistance at this time. I also strongly advised patient to avoid smoking with oxygen use due to fire/explosion risk. Pt expressed understanding.   
 
  
Jimena Collins MD

## 2018-07-27 NOTE — PATIENT INSTRUCTIONS
Using a Metered-Dose Inhaler: Care Instructions Your Care Instructions A metered-dose inhaler lets you breathe medicine into your lungs quickly. Inhaled medicine works faster than the same medicine in a pill. An inhaler allows you to take less medicine than you would need if you took it as a pill. \"Metered-dose\" means that the inhaler gives a measured amount of medicine each time you use it. A metered-dose inhaler gives medicine in the form of a liquid mist. 
Your doctor may want you to use a spacer with your inhaler. A spacer is a chamber that you attach to the inhaler. The chamber holds the medicine before you inhale it. That way, you can inhale the medicine in as many breaths as you need. Doctors recommend using a spacer with most metered-dose inhalers, especially those with corticosteroid medicines. Follow-up care is a key part of your treatment and safety. Be sure to make and go to all appointments, and call your doctor if you are having problems. It's also a good idea to know your test results and keep a list of the medicines you take. How can you care for yourself at home? To get started using your inhaler · Talk with your health care provider to be sure you are using your inhaler the right way. It might help if you practice using it in front of a mirror. Use the inhaler exactly as prescribed. · Check that you have the correct medicine. If you use more than one inhaler, put a label on each one. This will let you know which one to use at the right time. · Keep track of how much medicine is in the inhaler. Check the label to see how many doses are in the container. If you know how many puffs you can take, you can replace the inhaler before you run out. Ask your health care provider how you can keep track of how much medicine is left. · Use a spacer if you have problems pressing the inhaler and breathing in at the same time.  You also may need a spacer if you are using corticosteroid medicines. · If you are using a corticosteroid inhaler, gargle and rinse out your mouth with water after use. Do not swallow the water. Swallowing the water will increase the chance that the medicine will get into your bloodstream. This may make it more likely that you will have side effects. To use a spacer with an inhaler 1. Shake the inhaler. Remove the inhaler cap, and place the mouthpiece of the inhaler into the spacer. Check the inhaler instructions to see if you need to prime your inhaler before you use it. If it needs priming, follow the instructions on how to prime your inhaler. 2. Remove the cap from the spacer. 3. Hold the inhaler upright with the mouthpiece at the bottom. 4. Tilt your head back a little, and breathe out slowly and completely. 5. Place the spacer's mouthpiece in your mouth. 6. Press down on the inhaler to spray one puff of medicine into the spacer, and then start breathing in slowly. Wait to inhale until after you have pressed down on the inhaler. Some spacers have a whistle. If you hear it, you should breathe in more slowly. 7. Hold your breath for 10 seconds. This will let the medicine settle in your lungs. 8. If you need to take a second dose, wait 30 to 60 seconds to allow the inhaler valve to refill. To use an inhaler without a spacer 1. Shake the inhaler as directed. Remove the cap. Check the instructions to see if you need to prime your inhaler before you use it. If it needs priming, follow the instructions on how to prime your inhaler. 2. Hold the inhaler upright with the mouthpiece at the bottom. 3. Tilt your head back a little, and breathe out slowly and completely. 4. Position the inhaler in one of two ways: 
¨ You can place the inhaler in your mouth. This is easier for most people. And it lowers the risk that any of the medicine will get into your eyes. ¨ Or you can place the inhaler 1 to 2 inches in front of your open mouth, without closing your lips over it. Try to open your mouth as wide as you can. Placing the inhaler in front of your open mouth may be better for getting the medicine into your lungs. But some people may find this too hard to do. 5. Start taking slow, even breaths through your mouth. Press down on the inhaler once, then inhale fully. 6. Hold your breath for 10 seconds. This will let the medicine settle in your lungs. 7. If you need to take a second dose, wait 30 to 60 seconds to allow the inhaler valve to refill. Where can you learn more? Go to http://shantell-leona.info/. Enter K111 in the search box to learn more about \"Using a Metered-Dose Inhaler: Care Instructions. \" Current as of: November 12, 2017 Content Version: 11.7 © 1864-3380 Healthwise, [employer]. Care instructions adapted under license by [employer] (which disclaims liability or warranty for this information). If you have questions about a medical condition or this instruction, always ask your healthcare professional. [place] [address] any warranty or liability for your use of this information.

## 2018-07-27 NOTE — MR AVS SNAPSHOT
[address] [facility], [address] [facility] [address] 
[phone] Patient: Kai Ang MRN: [id number] [id number] Visit Information Date & Time Provider Department Dept. Phone Encounter #  
 7/27/2018 12:00 PM Ras Sinclair MD [facility] Brandon Padilla [id number] Follow-up Instructions Return in about 4 weeks (around 8/24/2018). Your Appointments 8/10/2018 10:30 AM  
Office Visit with Jorge Gutierrez MD  
[facility]) Appt Note: h/f  
 [address] [facility], [address] [place] [address]  
[address], [address] [place] [address] Road [facility]  
  
    
 8/21/2018 11:00 AM  
Office Visit with Eris Mckeon MD  
[facility] [address]) Appt Note: 1 month follow up  
 [address] [facility] [address] [address] [address] [place]  
  
   
 [address], [address]  
  
    
 10/15/2018  9:25 AM  
LAB with [facility] NURSE VISIT [facility] ([address]) Appt Note: lab  
 [address], [address] [facility] [address]  
  
   
 [address], [address]  
  
    
 10/22/2018  8:30 AM  
Office Visit with Eris Mckeon MD  
[facility] [address]) Appt Note: 4 month f/u  
 [address] [facility], [address] [facility]  
[phone] Upcoming Health Maintenance Date Due  
 EYE EXAM RETINAL OR DILATED Q1 2/5/2015 GLAUCOMA SCREENING Q2Y 4/1/2016 Influenza Age 5 to Adult 8/1/2018 MEDICARE YEARLY EXAM 9/14/2018 MICROALBUMIN Q1 12/13/2018 HEMOGLOBIN A1C Q6M 1/13/2019 LIPID PANEL Q1 6/12/2019 DTaP/Tdap/Td series (2 - Td) 6/11/2024 Allergies as of 7/27/2018  Review Complete On: 7/27/2018 By: Ras Sinclair MD  
  
 Severity Noted Reaction Type Reactions Levaquin [Levofloxacin]  05/17/2011    Rash Current Immunizations  Reviewed on 6/18/2018 Name Date Influenza High Dose Vaccine PF 10/13/2016 Influenza Vaccine 10/15/2014 Influenza Vaccine (Quad) PF 12/8/2015 Influenza Vaccine Split 11/22/2011, 11/11/2010 11:46 AM  
 Pneumococcal Conjugate (PCV-13) 12/8/2015 Pneumococcal Polysaccharide (PPSV-23) 9/20/2000 Tdap 6/11/2014 ZZZ-RETIRED (DO NOT USE) Pneumococcal Vaccine (Unspecified Type) 9/20/2000 Not reviewed this visit You Were Diagnosed With   
  
 Codes Comments Stage 4 very severe COPD by GOLD classification ([facility]    -  Primary ICD-10-CM: J44.9 ICD-9-CM: 175 Panlobular emphysema ([facility]     ICD-10-CM: J43.1 ICD-9-CM: 492.8   
 RLS (restless legs syndrome)     ICD-10-CM: G25.81 ICD-9-CM: 333.94 Vitals BP Pulse Temp Resp Height(growth percentile) Weight(growth percentile) 110/60 (BP 1 Location: Left arm, BP Patient Position: Sitting) (!) 46 97.3 °F (36.3 °C) (Oral) 18 5' 5\" (1.651 m) 132 lb (59.9 kg) SpO2 BMI OB Status Smoking Status (!) 88% 21.97 kg/m2 Hysterectomy Former Smoker Vitals History BMI and BSA Data Body Mass Index Body Surface Area  
 21.97 kg/m 2 1.66 m 2 Preferred Pharmacy Pharmacy Name Phone [address] [facility], [address] [place] [address] [address] [facility] 134 Your Updated Medication List  
  
   
This list is accurate as of 7/27/18 12:40 PM.  Always use your most recent med list.  
  
  
  
  
 albuterol-ipratropium 2.5 mg-0.5 mg/3 ml Nebu Commonly known as:  DUO-NEB  
3 mL by Nebulization route every six (6) hours as needed. amLODIPine 5 mg tablet Commonly known as:  Alexia Calhoun Take 1 Tab by mouth daily. budesonide-formoterol 80-4.5 mcg/actuation Hfaa Commonly known as:  SYMBICORT Take 2 Puffs by inhalation two (2) times a day. cefpodoxime 200 mg tablet Commonly known as:  Jerral Runner  
 Take 1 Tab by mouth every twelve (12) hours. famotidine 20 mg tablet Commonly known as:  PEPCID Take 1 Tab by mouth nightly.  
  
 gabapentin 300 mg capsule Commonly known as:  NEURONTIN  
TAKE 1 CAPSULE BY MOUTH 3  TIMES DAILY  
  
 inhalational spacing device Commonly known as:  AEROCHAMBER MV  
1 Each by Does Not Apply route as needed. mirtazapine 30 mg tablet Commonly known as:  Billey Sieving Take 1 Tab by mouth nightly. Nebulizer & Compressor machine Commonly known as:  PORTABLE NEBULIZER SYSTEM  
1 Each by Does Not Apply route every six (6) hours as needed. * OXYGEN-AIR DELIVERY SYSTEMS  
3 L by Nasal route continuous. * OXYGEN-AIR DELIVERY SYSTEMS  
3 L by Nasal route continuous. PARoxetine 30 mg tablet Commonly known as:  PAXIL Take 1 Tab by mouth daily. predniSONE 10 mg tablet Commonly known as:  DELTASONE  
1 tab po TID for 1 week , then 1 tab po BID for 1 week then 1 tab po daily for 1 week  
  
 simvastatin 20 mg tablet Commonly known as:  ZOCOR  
TAKE 1 TABLET BY MOUTH  NIGHTLY  
  
 tamsulosin 0.4 mg capsule Commonly known as:  FLOMAX Take 1 Cap by mouth daily. * Notice: This list has 2 medication(s) that are the same as other medications prescribed for you. Read the directions carefully, and ask your doctor or other care provider to review them with you. Prescriptions Sent to Pharmacy Refills  
 inhalational spacing device (AEROCHAMBER MV) 2 Si Each by Does Not Apply route as needed. Class: Normal  
 Pharmacy: [facility]  #: [phone] Route: Does Not Apply Follow-up Instructions Return in about 4 weeks (around 2018). To-Do List   
 2018 To Be Determined Appointment with Barbara Bond PTA at [address] [facility] CTR  
  
 2018 Procedures:   AMB POC PFT COMPLETE W/BRONCHODILATOR   
  
 07/28/2018 Procedures: AMB POC PFT COMPLETE W/O BRONCHODILATOR   
  
 07/28/2018 Procedures:  DIFFUSING CAPACITY   
  
 07/28/2018 Procedures:  GAS DILUT/WASHOUT LUNG VOL W/WO DISTRIB VENT&VOL   
  
 07/31/2018 To Be Determined Appointment with Tigre Carreon PTA at [facility]  
  
 07/31/2018 To Be Determined Appointment with Isidro Mcghee RN at [address] [place] [address] REG MED CTR  
  
 08/02/2018 To Be Determined Appointment with Tigre Carreon PTA at [address] [place] [address] MED CTR  
  
 08/03/2018 To Be Determined Appointment with Isidro Mcghee RN at [address] [place] [address] MED CTR  
  
 08/07/2018 To Be Determined Appointment with Tigre Carreon PTA at [address] [place] [address] MED CTR  
  
 08/07/2018 To Be Determined Appointment with Isidro Mcghee RN at [address] [place] [address] MED CTR  
  
 08/09/2018 To Be Determined Appointment with 13[address] Clarisse Monroy, PT at [address] [place] [address] SCHEDULING/INTAKE  
  
 08/10/2018 To Be Determined Appointment with Isidro Mcghee RN at [address] [place] [address] Patient Instructions Using a Metered-Dose Inhaler: Care Instructions Your Care Instructions A metered-dose inhaler lets you breathe medicine into your lungs quickly. Inhaled medicine works faster than the same medicine in a pill. An inhaler allows you to take less medicine than you would need if you took it as a pill. \"Metered-dose\" means that the inhaler gives a measured amount of medicine each time you use it. A metered-dose inhaler gives medicine in the form of a liquid mist. 
Your doctor may want you to use a spacer with your inhaler. A spacer is a chamber that you attach to the inhaler.  The chamber holds the medicine before you inhale it. That way, you can inhale the medicine in as many breaths as you need. Doctors recommend using a spacer with most metered-dose inhalers, especially those with corticosteroid medicines. Follow-up care is a key part of your treatment and safety. Be sure to make and go to all appointments, and call your doctor if you are having problems. It's also a good idea to know your test results and keep a list of the medicines you take. How can you care for yourself at home? To get started using your inhaler · Talk with your health care provider to be sure you are using your inhaler the right way. It might help if you practice using it in front of a mirror. Use the inhaler exactly as prescribed. · Check that you have the correct medicine. If you use more than one inhaler, put a label on each one. This will let you know which one to use at the right time. · Keep track of how much medicine is in the inhaler. Check the label to see how many doses are in the container. If you know how many puffs you can take, you can replace the inhaler before you run out. Ask your health care provider how you can keep track of how much medicine is left. · Use a spacer if you have problems pressing the inhaler and breathing in at the same time. You also may need a spacer if you are using corticosteroid medicines. · If you are using a corticosteroid inhaler, gargle and rinse out your mouth with water after use. Do not swallow the water. Swallowing the water will increase the chance that the medicine will get into your bloodstream. This may make it more likely that you will have side effects. To use a spacer with an inhaler 1. Shake the inhaler. Remove the inhaler cap, and place the mouthpiece of the inhaler into the spacer. Check the inhaler instructions to see if you need to prime your inhaler before you use it. If it needs priming, follow the instructions on how to prime your inhaler. 2. Remove the cap from the spacer. 3. Hold the inhaler upright with the mouthpiece at the bottom. 4. Tilt your head back a little, and breathe out slowly and completely. 5. Place the spacer's mouthpiece in your mouth. 6. Press down on the inhaler to spray one puff of medicine into the spacer, and then start breathing in slowly. Wait to inhale until after you have pressed down on the inhaler. Some spacers have a whistle. If you hear it, you should breathe in more slowly. 7. Hold your breath for 10 seconds. This will let the medicine settle in your lungs. 8. If you need to take a second dose, wait 30 to 60 seconds to allow the inhaler valve to refill. To use an inhaler without a spacer 1. Shake the inhaler as directed. Remove the cap. Check the instructions to see if you need to prime your inhaler before you use it. If it needs priming, follow the instructions on how to prime your inhaler. 2. Hold the inhaler upright with the mouthpiece at the bottom. 3. Tilt your head back a little, and breathe out slowly and completely. 4. Position the inhaler in one of two ways: 
¨ You can place the inhaler in your mouth. This is easier for most people. And it lowers the risk that any of the medicine will get into your eyes. ¨ Or you can place the inhaler 1 to 2 inches in front of your open mouth, without closing your lips over it. Try to open your mouth as wide as you can. Placing the inhaler in front of your open mouth may be better for getting the medicine into your lungs. But some people may find this too hard to do. 5. Start taking slow, even breaths through your mouth. Press down on the inhaler once, then inhale fully. 6. Hold your breath for 10 seconds. This will let the medicine settle in your lungs. 7. If you need to take a second dose, wait 30 to 60 seconds to allow the inhaler valve to refill. Where can you learn more? Go to http://shantell-leona.info/. Enter K111 in the search box to learn more about \"Using a Metered-Dose Inhaler: Care Instructions. \" Current as of: November 12, 2017 Content Version: 11.7 © 5316-9083 [employer]. Care instructions adapted under license by [employer] (which disclaims liability or warranty for this information). If you have questions about a medical condition or this instruction, always ask your healthcare professional. [address] any warranty or liability for your use of this information. Introducing \[facility]\"" & HEALTH SERVICES! [facility] introduces [employer] patient portal. Now you can access parts of your medical record, email your doctor's office, and request medication refills online. 1. In your internet browser, go to [email or web address] [employer]. [employer] 2. Click on the First Time User? Click Here link in the Sign In box. You will see the New Member Sign Up page. 3. Enter your [employer] Access Code exactly as it appears below. You will not need to use this code after youve completed the sign-up process. If you do not sign up before the expiration date, you must request a new code. · [employer] Access Code: [email or web address] 
Expires: 9/16/2018  8:18 AM 
 
4. Enter the last four digits of your Social Security Number (xxxx) and Date of Birth (mm/dd/yyyy) as indicated and click Submit. You will be taken to the next sign-up page. 5. Create a [employer] ID. This will be your [employer] login ID and cannot be changed, so think of one that is secure and easy to remember. 6. Create a [employer] password. You can change your password at any time. 7. Enter your Password Reset Question and Answer. This can be used at a later time if you forget your password. 8. Enter your e-mail address. You will receive e-mail notification when new information is available in [address] 9. Click Sign Up. You can now view and download portions of your medical record. 10. Click the Download Summary menu link to download a portable copy of your medical information. If you have questions, please visit the Frequently Asked Questions section of the [facility] website. Remember, [facility] is NOT to be used for urgent needs. For medical emergencies, dial 911. Now available from your iPhone and Android! Please provide this summary of care documentation to your next provider. Your primary care clinician is listed as Beni Randall. If you have any questions after today's visit, please call [phone].

## 2018-07-28 ENCOUNTER — HOME CARE VISIT (OUTPATIENT)
Dept: SCHEDULING | Facility: HOME HEALTH | Age: 83
End: 2018-07-28
Payer: MEDICARE

## 2018-07-28 VITALS
RESPIRATION RATE: 20 BRPM | OXYGEN SATURATION: 96 % | HEART RATE: 48 BPM | TEMPERATURE: 97.7 F | DIASTOLIC BLOOD PRESSURE: 86 MMHG | SYSTOLIC BLOOD PRESSURE: 100 MMHG

## 2018-07-28 PROCEDURE — 3331090002 HH PPS REVENUE DEBIT

## 2018-07-28 PROCEDURE — G0157 HHC PT ASSISTANT EA 15: HCPCS

## 2018-07-28 PROCEDURE — 3331090001 HH PPS REVENUE CREDIT

## 2018-07-29 PROCEDURE — 3331090002 HH PPS REVENUE DEBIT

## 2018-07-29 PROCEDURE — 3331090001 HH PPS REVENUE CREDIT

## 2018-07-30 ENCOUNTER — HOME CARE VISIT (OUTPATIENT)
Dept: HOME HEALTH SERVICES | Facility: HOME HEALTH | Age: 83
End: 2018-07-30
Payer: MEDICARE

## 2018-07-30 VITALS
TEMPERATURE: 98.7 F | HEART RATE: 84 BPM | RESPIRATION RATE: 20 BRPM | SYSTOLIC BLOOD PRESSURE: 167 MMHG | DIASTOLIC BLOOD PRESSURE: 87 MMHG | OXYGEN SATURATION: 97 %

## 2018-07-30 VITALS
BODY MASS INDEX: 21.55 KG/M2 | SYSTOLIC BLOOD PRESSURE: 133 MMHG | OXYGEN SATURATION: 90 % | WEIGHT: 129.5 LBS | DIASTOLIC BLOOD PRESSURE: 74 MMHG | HEART RATE: 68 BPM

## 2018-07-30 PROCEDURE — 3331090001 HH PPS REVENUE CREDIT

## 2018-07-30 PROCEDURE — 3331090002 HH PPS REVENUE DEBIT

## 2018-07-30 PROCEDURE — G0495 RN CARE TRAIN/EDU IN HH: HCPCS

## 2018-07-31 ENCOUNTER — HOME CARE VISIT (OUTPATIENT)
Dept: SCHEDULING | Facility: HOME HEALTH | Age: 83
End: 2018-07-31
Payer: MEDICARE

## 2018-07-31 ENCOUNTER — PATIENT OUTREACH (OUTPATIENT)
Dept: PULMONOLOGY | Age: 83
End: 2018-07-31

## 2018-07-31 PROCEDURE — 3331090001 HH PPS REVENUE CREDIT

## 2018-07-31 PROCEDURE — 3331090002 HH PPS REVENUE DEBIT

## 2018-07-31 PROCEDURE — G0299 HHS/HOSPICE OF RN EA 15 MIN: HCPCS

## 2018-07-31 NOTE — PROGRESS NOTES
Loraine Lopez, RN contacted Aliza Woo Nurse Navigator (NN) by telephone to perform COPD Transitions of Care. Verified Name and  as identifiers. Patient reports:   
 
Feeling like a million dollars  
 
david has increased Taking her medications and using oxygen at 3 L as ordered Sp02 98% on 2 L NC 
 
 nurse is here to see me Patient denies:   
Coughing Wheezing Edema SOB at rest or with activity Smoking ( NN was told by [facility] nurse, Susanna Tate, that patient does go outside to smoke and has educated her to not smoke while she has her oxygen on. \"   
 
Patient placed [address], Susanna Tate , on phone to speak with NN. Susanna Tate reported that she had just arrived ( No VS taken yet )  but patient looks good and possible nursing Discharge soon. She also reported that Patient has been going outside to smoke . NN thanked Susanna Tate for update. NN also discussed patient's visit with Dr. Yadiel Colindres on 18 to include Spacer given to use with her Symbicort. Noted Priorities:  Smoking Cessation when patient admits to smoking Are you using a rescue inhaler? Type no  Nebulizer? yes, frequency denies using since seeing Dr. Yadiel Colindres on last week Zone: green Signs/Symptoms:  SOB ( Patient  Denies) ; orthopnea 1-2 pillows Barriers to care? Honest communication from patient, multiple caregivers /family , level of motivation Week 1-4    ( Week 3 ) Provide Daily Disease Management 
(NN initiated) ? Daily Zone Identification ? Comorbidity Management ? Confirm follow-up appointments/transportation. Reschedule if needed. ? Smoking status ? GOLD Stage ? Pulmonary Rehab ( per Dr. Overton Folds note on 18 [facility] home based Pulm rehab) Additional assessment ? CAT score ? Activity tolerance assessment ? Energy conservation management (balance activity with rest) ? Medication Therapy ? Diet/appetite assessment 
? ED/Hospital utilization ? Immunizations up to date      Pneumonia:   Due 8/1/18 Flu  UTD Shingles  UTD ? Transportation  assistance ? Medication assistance ? Home health services ? Spirometry testing results ( Dated 8/31/15 ) Pre-  51 % < Was post performed-  61% < Per Dr. Shawn Barroso notes: plan for PFT at next visit ? Psychosocial: Reassurance/emotional support Monitoring: ? Home health Education: 
 
? Support system identification ? Health literacy for COPD ? Abdominal/ purse lip breathing COPD Assessment Test (CAT) I never cough 0 1 X 2 3 4 5 I cough all the time I have no phelgm (mucus) 0 X 1 2 3 4 5 My chest is completely full of phelgm (mucus) My chest does not feel tight at all  
 0 X 1 2 3 4 5 My chest feels tight When I walk up a hill or one flight of stairs I am not breathless 0 1 2 3 4 5 X When I walk up a hill or one flight of stairs I am very breathless I am not limited doing any activities at home 0 1 2 3 X 4 5 I am very limited doing activities at home I am confident leaving my home despite my condition  
 0 X 1 2 3 
 4 5 I am not at all confident leaving my home because of my lung condition I sleep soundly  0 X 1 2 3 4 5 I don't sleep soundly because of my lung condition I have lots of energy 0 1 2 X 3 
 4 5 I have no energy at all TOTAL:  11   
 
       
 
GOLD class- 1, 2, 3, 4 FEV1 
? GOLD 1(mild)=FEV1 ?80%  
predicted ? GOLD 2 (Moderate)=50% ? FEV 1< 80% ( PFT noted in chart on 8/31/15 FEV 1  51% predicted )  
predicted ? GOLD 3(severe)= 30% ? FEV1 < 50% predicted ? GOLD 4 (very severe)=<30% predicted ( per Dr. Shawn Barroso documentation on 7/27/18 ) COPD Medications: ZANDRA; ANGÉLICA; LAMA; LABA; ICS; PDE4 ; O2 
 
Group LABA LAMA ANGÉLICA prn ZANDRA prn LABA+ICS LAMA+ICS LABA+LAMA LABA+LAMA+ICS PDE-4 Inhibitor Chronic bronchitis, FEV1<50% predicted A.   
 
 X x B X x   X    
C  x X 
X Albuterol   X 
X Symbicort   X    
D  x X     X X Per MD note:  Patient was not placed on LAMA due to Dx. Glaucoma. Have you been to an ER/Hospital since discharge from the hospital?   {no 
 
Have you followed up with Pulmonology/ PCP?  ? yes Social support:  Multiple family assistance daily  With meals/med. Management and ADLS as needed. Future appointments:   
 
8/10/2018 10:30 AM Olaf Greene MD [facility] [address]  
8/21/2018 11:00 AM Michael Gallardo MD [facility] [address] [place] [address] Transportation:   
Family Activity/ADLs:  
Patient ambulates with Rollator and provides own ADLS . Home health:  Company:  
 
Continues to receive Saad CLARKE ( SN and PT ) Plan: NN will continue to monitor patient and provide smoking cessation resources to patient when receptive. Patient reminded that there is a physician on call 24 hours a day / 7 days a week (M-F 5pm to 8am and from Friday 5pm until Monday 8a for the weekend) should the patient have questions or concerns. Patient reminded to call 911 if situation is emergent or patient feels the situation is emergent. Pt verbalizes understanding.

## 2018-08-01 ENCOUNTER — HOME CARE VISIT (OUTPATIENT)
Dept: SCHEDULING | Facility: HOME HEALTH | Age: 83
End: 2018-08-01
Payer: MEDICARE

## 2018-08-01 PROCEDURE — 3331090002 HH PPS REVENUE DEBIT

## 2018-08-01 PROCEDURE — G0157 HHC PT ASSISTANT EA 15: HCPCS

## 2018-08-01 PROCEDURE — 3331090001 HH PPS REVENUE CREDIT

## 2018-08-02 ENCOUNTER — HOME CARE VISIT (OUTPATIENT)
Dept: SCHEDULING | Facility: HOME HEALTH | Age: 83
End: 2018-08-02
Payer: MEDICARE

## 2018-08-02 PROCEDURE — 3331090001 HH PPS REVENUE CREDIT

## 2018-08-02 PROCEDURE — G0300 HHS/HOSPICE OF LPN EA 15 MIN: HCPCS

## 2018-08-02 PROCEDURE — 3331090002 HH PPS REVENUE DEBIT

## 2018-08-03 ENCOUNTER — HOME CARE VISIT (OUTPATIENT)
Dept: SCHEDULING | Facility: HOME HEALTH | Age: 83
End: 2018-08-03
Payer: MEDICARE

## 2018-08-03 VITALS
RESPIRATION RATE: 20 BRPM | SYSTOLIC BLOOD PRESSURE: 133 MMHG | TEMPERATURE: 98.4 F | OXYGEN SATURATION: 96 % | HEART RATE: 75 BPM | DIASTOLIC BLOOD PRESSURE: 58 MMHG

## 2018-08-03 PROCEDURE — G0157 HHC PT ASSISTANT EA 15: HCPCS

## 2018-08-03 PROCEDURE — 3331090002 HH PPS REVENUE DEBIT

## 2018-08-03 PROCEDURE — 3331090001 HH PPS REVENUE CREDIT

## 2018-08-04 VITALS
TEMPERATURE: 97.3 F | SYSTOLIC BLOOD PRESSURE: 108 MMHG | DIASTOLIC BLOOD PRESSURE: 72 MMHG | RESPIRATION RATE: 20 BRPM | OXYGEN SATURATION: 96 % | HEART RATE: 72 BPM

## 2018-08-04 PROCEDURE — 3331090002 HH PPS REVENUE DEBIT

## 2018-08-04 PROCEDURE — 3331090001 HH PPS REVENUE CREDIT

## 2018-08-05 PROCEDURE — 3331090001 HH PPS REVENUE CREDIT

## 2018-08-05 PROCEDURE — 3331090002 HH PPS REVENUE DEBIT

## 2018-08-06 ENCOUNTER — HOME CARE VISIT (OUTPATIENT)
Dept: HOME HEALTH SERVICES | Facility: HOME HEALTH | Age: 83
End: 2018-08-06
Payer: MEDICARE

## 2018-08-06 PROCEDURE — 3331090001 HH PPS REVENUE CREDIT

## 2018-08-06 PROCEDURE — G0157 HHC PT ASSISTANT EA 15: HCPCS

## 2018-08-06 PROCEDURE — 3331090002 HH PPS REVENUE DEBIT

## 2018-08-07 ENCOUNTER — HOME CARE VISIT (OUTPATIENT)
Dept: SCHEDULING | Facility: HOME HEALTH | Age: 83
End: 2018-08-07
Payer: MEDICARE

## 2018-08-07 VITALS
HEART RATE: 79 BPM | SYSTOLIC BLOOD PRESSURE: 144 MMHG | RESPIRATION RATE: 19 BRPM | TEMPERATURE: 98.1 F | TEMPERATURE: 98.8 F | HEART RATE: 76 BPM | OXYGEN SATURATION: 90 % | HEART RATE: 71 BPM | OXYGEN SATURATION: 94 % | SYSTOLIC BLOOD PRESSURE: 125 MMHG | DIASTOLIC BLOOD PRESSURE: 63 MMHG | TEMPERATURE: 98.9 F | OXYGEN SATURATION: 95 % | BODY MASS INDEX: 21.3 KG/M2 | WEIGHT: 128 LBS | RESPIRATION RATE: 18 BRPM | SYSTOLIC BLOOD PRESSURE: 148 MMHG | DIASTOLIC BLOOD PRESSURE: 76 MMHG | RESPIRATION RATE: 19 BRPM | DIASTOLIC BLOOD PRESSURE: 60 MMHG

## 2018-08-07 PROCEDURE — G0299 HHS/HOSPICE OF RN EA 15 MIN: HCPCS

## 2018-08-07 PROCEDURE — 3331090001 HH PPS REVENUE CREDIT

## 2018-08-07 PROCEDURE — 3331090002 HH PPS REVENUE DEBIT

## 2018-08-08 VITALS
OXYGEN SATURATION: 98 % | SYSTOLIC BLOOD PRESSURE: 120 MMHG | DIASTOLIC BLOOD PRESSURE: 68 MMHG | TEMPERATURE: 98.2 F | RESPIRATION RATE: 20 BRPM | HEART RATE: 88 BPM

## 2018-08-08 PROCEDURE — 3331090001 HH PPS REVENUE CREDIT

## 2018-08-08 PROCEDURE — 3331090002 HH PPS REVENUE DEBIT

## 2018-08-09 ENCOUNTER — HOME CARE VISIT (OUTPATIENT)
Dept: SCHEDULING | Facility: HOME HEALTH | Age: 83
End: 2018-08-09
Payer: MEDICARE

## 2018-08-09 VITALS
SYSTOLIC BLOOD PRESSURE: 165 MMHG | TEMPERATURE: 97 F | HEART RATE: 84 BPM | OXYGEN SATURATION: 91 % | DIASTOLIC BLOOD PRESSURE: 108 MMHG

## 2018-08-09 PROCEDURE — G0152 HHCP-SERV OF OT,EA 15 MIN: HCPCS

## 2018-08-09 PROCEDURE — 3331090002 HH PPS REVENUE DEBIT

## 2018-08-09 PROCEDURE — 3331090001 HH PPS REVENUE CREDIT

## 2018-08-10 ENCOUNTER — HOME CARE VISIT (OUTPATIENT)
Dept: SCHEDULING | Facility: HOME HEALTH | Age: 83
End: 2018-08-10
Payer: MEDICARE

## 2018-08-10 ENCOUNTER — OFFICE VISIT (OUTPATIENT)
Dept: CARDIOLOGY CLINIC | Age: 83
End: 2018-08-10

## 2018-08-10 VITALS
WEIGHT: 127 LBS | HEART RATE: 69 BPM | HEIGHT: 65 IN | DIASTOLIC BLOOD PRESSURE: 59 MMHG | SYSTOLIC BLOOD PRESSURE: 146 MMHG | BODY MASS INDEX: 21.16 KG/M2

## 2018-08-10 DIAGNOSIS — E78.5 HYPERLIPIDEMIA LDL GOAL <100: ICD-10-CM

## 2018-08-10 DIAGNOSIS — I50.33 DIASTOLIC CHF, ACUTE ON CHRONIC (HCC): Primary | ICD-10-CM

## 2018-08-10 DIAGNOSIS — I45.2 RBBB (RIGHT BUNDLE BRANCH BLOCK WITH LEFT ANTERIOR FASCICULAR BLOCK): ICD-10-CM

## 2018-08-10 DIAGNOSIS — J44.9 CHRONIC OBSTRUCTIVE PULMONARY DISEASE, UNSPECIFIED COPD TYPE (HCC): ICD-10-CM

## 2018-08-10 DIAGNOSIS — I65.23 BILATERAL CAROTID ARTERY STENOSIS: ICD-10-CM

## 2018-08-10 DIAGNOSIS — I10 ESSENTIAL HYPERTENSION WITH GOAL BLOOD PRESSURE LESS THAN 140/90: ICD-10-CM

## 2018-08-10 PROCEDURE — 3331090002 HH PPS REVENUE DEBIT

## 2018-08-10 PROCEDURE — 3331090001 HH PPS REVENUE CREDIT

## 2018-08-10 PROCEDURE — G0151 HHCP-SERV OF PT,EA 15 MIN: HCPCS

## 2018-08-10 RX ORDER — ASPIRIN 81 MG/1
81 TABLET ORAL DAILY
Qty: 100 TAB | Refills: 1
Start: 2018-08-10

## 2018-08-10 RX ORDER — TRAMADOL HYDROCHLORIDE 50 MG/1
50 TABLET ORAL
COMMUNITY
End: 2018-08-24 | Stop reason: DRUGHIGH

## 2018-08-10 NOTE — LETTER
Juany Turnermauricio 9/22/1933 
 
8/10/2018 Dear MD Marcela Higgins MD 
 
I had the pleasure of evaluating  Ms. Blake Wilcox in office today. Below are the relevant portions of my assessment and plan of care. ICD-10-CM ICD-9-CM 1. Diastolic CHF, acute on chronic (HCC) I50.33 428.33   
  428.0 Recent admission. Shortness of breath combination of COPD and CHF. CHF appears much improved. Continue therapy 2. Essential hypertension with goal blood pressure less than 140/90 I10 401.9 Elevated systolic pressure. Continue to monitor 3. Hyperlipidemia LDL goal <100 E78.5 272.4 LDL 72 in June 2018 continue statin 4. Chronic obstructive pulmonary disease, unspecified COPD type ([facility] [address] J44.9 496 Improving after recent exacerbation 5. RBBB (right bundle branch block with left anterior fascicular block) I45.2 426.52 Noted on recent EKG. Continue to monitor 6. Bilateral carotid artery stenosis I65.23 433.10   
  433.30 Moderate left carotid stenosis mild right carotid stenosis. In 2013. Would recommend aspirin 81 mg a day Current Outpatient Prescriptions Medication Sig Dispense Refill  traMADol (ULTRAM) 50 mg tablet Take 50 mg by mouth nightly.  aspirin delayed-release 81 mg tablet Take 1 Tab by mouth daily. 100 Tab 1  
 inhalational spacing device (AEROCHAMBER MV) 1 Each by Does Not Apply route as needed. 1 Device 2  
 albuterol-ipratropium (DUO-NEB) 2.5 mg-0.5 mg/3 ml nebu 3 mL by Nebulization route every six (6) hours as needed. 30 Nebule 0  
 predniSONE (DELTASONE) 10 mg tablet 1 tab po TID for 1 week , then 1 tab po BID for 1 week then 1 tab po daily for 1 week 40 Tab 0  
 amLODIPine (NORVASC) 5 mg tablet Take 1 Tab by mouth daily. 10 Tab 0  
 budesonide-formoterol (SYMBICORT) 80-4.5 mcg/actuation HFAA Take 2 Puffs by inhalation two (2) times a day.  1 Inhaler 0  
  mirtazapine (REMERON) 30 mg tablet Take 1 Tab by mouth nightly. 30 Tab 2  
 gabapentin (NEURONTIN) 300 mg capsule TAKE 1 CAPSULE BY MOUTH 3  TIMES DAILY 270 Cap 1  simvastatin (ZOCOR) 20 mg tablet TAKE 1 TABLET BY MOUTH  NIGHTLY 90 Tab 1  
 tamsulosin (FLOMAX) 0.4 mg capsule Take 1 Cap by mouth daily. 15 Cap 0  
 OXYGEN-AIR DELIVERY SYSTEMS 3 L by Nasal route continuous.  Nebulizer & Compressor (PORTABLE NEBULIZER SYSTEM) machine 1 Each by Does Not Apply route every six (6) hours as needed. 1 Each 0  
 PARoxetine (PAXIL) 30 mg tablet Take 1 Tab by mouth daily. 90 Tab 3 Orders Placed This Encounter  traMADol (ULTRAM) 50 mg tablet Sig: Take 50 mg by mouth nightly.  aspirin delayed-release 81 mg tablet Sig: Take 1 Tab by mouth daily. Dispense:  100 Tab Refill:  1 If you have questions, please do not hesitate to call me. I look forward to following Ms. Tanisha Kimbrough along with you. Sincerely, Niki Yeung MD

## 2018-08-10 NOTE — MR AVS SNAPSHOT
[facility] 
 
 
 [address] [facility], [address] [facility] [address] 
[phone] Patient: Aliza Woo MRN: [id number] [id number] Visit Information Date & Time Provider Department Dept. Phone Encounter #  
 8/10/2018 10:30 AM Brain Boyd MD [facility] [address] [place] [address] [id number] Follow-up Instructions Return in about 6 months (around 2/10/2019). Your Appointments 8/21/2018 11:00 AM  
Office Visit with Haroon Dela Cruz MD  
[facility] [address] [place] [address]) Appt Note: 1 month follow up  
 [address], [address] [id number] [address] [place]  
  
   
 [address], [address]  
  
    
 10/15/2018  9:25 AM  
LAB with [facility] NURSE VISIT [facility] ([address]) Appt Note: lab  
 [address], [address] [facility] [address] [place]  
  
   
 [address], [address]  
  
    
 10/22/2018  8:30 AM  
Office Visit with Haroon Dela Cruz MD  
[facility] [address] [place] [address]) Appt Note: 4 month f/u  
 [address] [facility], [address] [facility]  
[phone]  
  
    
 2/22/2019 11:00 AM  
Office Visit with Brain Boyd MD  
[facility]) Appt Note: 6 months [address] [facility], [address] [facility] [address]  
[address], [address] [place] [address] Upcoming Health Maintenance Date Due  
 EYE EXAM RETINAL OR DILATED Q1 2/5/2015 GLAUCOMA SCREENING Q2Y 4/1/2016 Influenza Age 5 to Adult 8/1/2018 MEDICARE YEARLY EXAM 9/14/2018 MICROALBUMIN Q1 12/13/2018 HEMOGLOBIN A1C Q6M 1/13/2019 LIPID PANEL Q1 6/12/2019 DTaP/Tdap/Td series (2 - Td) 6/11/2024 Allergies as of 8/10/2018  Review Complete On: 8/10/2018 By: Kelly Blake Severity Noted Reaction Type Reactions Levaquin [Levofloxacin]  05/17/2011    Rash Current Immunizations  Reviewed on 6/18/2018 Name Date Influenza High Dose Vaccine PF 10/13/2016 Influenza Vaccine 10/15/2014 Influenza Vaccine (Quad) PF 12/8/2015 Influenza Vaccine Split 11/22/2011, 11/11/2010 11:46 AM  
 Pneumococcal Conjugate (PCV-13) 12/8/2015 Pneumococcal Polysaccharide (PPSV-23) 9/20/2000 Tdap 6/11/2014 ZZZ-RETIRED (DO NOT USE) Pneumococcal Vaccine (Unspecified Type) 9/20/2000 Not reviewed this visit You Were Diagnosed With   
  
 Codes Comments Diastolic CHF, acute on chronic (HCC)    -  Primary ICD-10-CM: I50.33 ICD-9-CM: 428.33, 428.0 Recent admission. Shortness of breath combination of COPD and CHF. CHF appears much improved. Continue therapy Essential hypertension with goal blood pressure less than 140/90     ICD-10-CM: I10 
ICD-9-CM: 476.4 Elevated systolic pressure. Continue to monitor Hyperlipidemia LDL goal <100     ICD-10-CM: E78.5 ICD-9-CM: 272.4  LDL 72 in June 2018 continue statin [facility] pulmonary disease, [facility]     ICD-10-CM: J44.9 ICD-9-CM: 496 Improving after recent exacerbation RBBB (right bundle branch block with left anterior fascicular block)     ICD-10-CM: I45.2 ICD-9-CM: 426.52 Noted on recent EKG. Continue to monitor Bilateral carotid artery stenosis     ICD-10-CM: I65.23 ICD-9-CM: 433.10, 433.30 Moderate left carotid stenosis mild right carotid stenosis. In 2013. Would recommend aspirin 81 mg a day Vitals BP Pulse Height(growth percentile) Weight(growth percentile) BMI OB Status 146/59 69 5' 5\" (1.651 m) 127 lb (57.6 kg) 21.13 kg/m2 Hysterectomy Smoking Status Former Smoker Vitals History BMI and BSA Data Body Mass Index Body Surface Area  
 21.13 kg/m 2 1.63 m 2 Preferred Pharmacy Pharmacy Name Phone [facility], [address] [place] [address] [address] N[address] Your Updated Medication List  
  
   
This list is accurate as of 8/10/18 11:14 AM.  Always use your most recent med list.  
  
  
  
  
 albuterol-ipratropium 2.5 mg-0.5 mg/3 ml Nebu Commonly known as:  DUO-NEB  
3 mL by Nebulization route every six (6) hours as needed. amLODIPine 5 mg tablet Commonly known as:  Monika Citron Take 1 Tab by mouth daily. aspirin delayed-release 81 mg tablet Take 1 Tab by mouth daily. budesonide-formoterol 80-4.5 mcg/actuation Hfaa Commonly known as:  SYMBICORT Take 2 Puffs by inhalation two (2) times a day.  
  
 gabapentin 300 mg capsule Commonly known as:  NEURONTIN  
TAKE 1 CAPSULE BY MOUTH 3  TIMES DAILY  
  
 inhalational spacing device Commonly known as:  AEROCHAMBER MV  
1 Each by Does Not Apply route as needed. mirtazapine 30 mg tablet Commonly known as:  [place] Take 1 Tab by mouth nightly. Nebulizer & Compressor machine Commonly known as:  PORTABLE NEBULIZER SYSTEM  
1 Each by Does Not Apply route every six (6) hours as needed. OXYGEN-AIR DELIVERY SYSTEMS  
3 L by Nasal route continuous. PARoxetine 30 mg tablet Commonly known as:  PAXIL Take 1 Tab by mouth daily. predniSONE 10 mg tablet Commonly known as:  DELTASONE  
1 tab po TID for 1 week , then 1 tab po BID for 1 week then 1 tab po daily for 1 week  
  
 simvastatin 20 mg tablet Commonly known as:  ZOCOR  
TAKE 1 TABLET BY MOUTH  NIGHTLY  
  
 tamsulosin 0.4 mg capsule Commonly known as:  FLOMAX Take 1 Cap by mouth daily. traMADol 50 mg tablet Commonly known as:  ULTRAM  
Take 50 mg by mouth nightly. Follow-up Instructions Return in about 6 months (around 2/10/2019). Introducing [facility]!    
 [facility] introduces View Medical patient portal. Now you can access parts of your medical record, email your doctor's office, and request medication refills online. 1. In your internet browser, go to [email or web address] 2. Click on the First Time User? Click Here link in the Sign In box. You will see the New Member Sign Up page. 3. Enter your Ploonge Access Code exactly as it appears below. You will not need to use this code after youve completed the sign-up process. If you do not sign up before the expiration date, you must request a new code. · Ploonge Access Code: [email or web address] 
Expires: 9/16/2018  8:18 AM 
 
4. Enter the last four digits of your Social Security Number (xxxx) and Date of Birth (mm/dd/yyyy) as indicated and click Submit. You will be taken to the next sign-up page. 5. Create a Ploonge ID. This will be your Ploonge login ID and cannot be changed, so think of one that is secure and easy to remember. 6. Create a Ploonge password. You can change your password at any time. 7. Enter your Password Reset Question and Answer. This can be used at a later time if you forget your password. 8. Enter your e-mail address. You will receive e-mail notification when new information is available in [address] 9. Click Sign Up. You can now view and download portions of your medical record. 10. Click the Download Summary menu link to download a portable copy of your medical information. If you have questions, please visit the Frequently Asked Questions section of the Ploonge website. Remember, Ploonge is NOT to be used for urgent needs. For medical emergencies, dial 911. Now available from your iPhone and Android! Please provide this summary of care documentation to your next provider. Your primary care clinician is listed as Giuliano Gonzalez. Dee Dee Farmer. If you have any questions after today's visit, please call [phone].

## 2018-08-10 NOTE — PROGRESS NOTES
HISTORY OF PRESENT ILLNESS  Charles Moreno is a 80 y.o. female. HPI Comments: Patient admitted 7/2018 with acute shortness of breath. Patient had acute COPD and acute diastolic heart failure. Echocardiogram exam showed normal LV systolic function with no significant valvular pathology. Patient had bifascicular heart block on EKG. Since discharge she is improving. Shortness of breath is improving remain short of breath on exertion. Hospital Follow Up   The history is provided by the patient. Associated symptoms include shortness of breath. Pertinent negatives include no chest pain, no abdominal pain and no headaches. CHF   The history is provided by the patient. This is a new problem. The problem occurs constantly. The problem has been gradually improving. Associated symptoms include shortness of breath. Pertinent negatives include no chest pain, no abdominal pain and no headaches. The symptoms are relieved by rest.   Hypertension   The history is provided by the patient. This is a chronic problem. The problem occurs constantly. The problem has not changed since onset. Associated symptoms include shortness of breath. Pertinent negatives include no chest pain, no abdominal pain and no headaches. COPD   The history is provided by the patient. This is a chronic problem. The problem occurs constantly. The problem has been gradually improving. Associated symptoms include shortness of breath. Pertinent negatives include no chest pain, no abdominal pain and no headaches. The symptoms are aggravated by exertion. Nothing relieves the symptoms. Review of Systems   Constitutional: Negative for chills and fever. HENT: Negative for nosebleeds. Eyes: Negative for blurred vision and double vision. Respiratory: Positive for shortness of breath. Negative for cough, hemoptysis, sputum production and wheezing.     Cardiovascular: Negative for chest pain, palpitations, orthopnea, claudication, leg swelling and PND. Gastrointestinal: Negative for abdominal pain, heartburn, nausea and vomiting. Musculoskeletal: Negative for myalgias. Skin: Negative for rash. Neurological: Negative for dizziness, weakness and headaches. Endo/Heme/Allergies: Does not bruise/bleed easily.      Family History   Problem Relation Age of Onset    Colon Cancer Sister     Heart Disease Brother     Seizures Son     Colon Cancer Maternal Aunt        Past Medical History:   Diagnosis Date    Colon polyps     COPD 8-30-02    DDD (degenerative disc disease), lumbar 10/1/2014    Degeneration of lumbar or lumbosacral intervertebral disc     Depression     Diabetes ([facility] 7-19-05    Diverticulosis     DM neuropathy, painful ([facility] [address] 10/1/2014    MOORE (Dyspnea on Exertion) 4-19-02    echo: +mild LVH, HS78-72% w/ diastolic dysfxn    Glaucoma     HCAP (healthcare-associated pneumonia) 03/24/2017    Hemorrhoids 6-6-06    Hyperlipidemia 5/17/2011    Hypertension 4-16-02    LBP (low back pain) 4-13-04    Low back pain radiating to both legs 10/1/2014    Lumbago     Lumbar disc herniation 10/1/2014    Lumbar facet arthropathy ([facility] [address] [facility] 10/1/2014    Lumbosacral radiculopathy at L5 10/1/2014    Lumbosacral radiculopathy at S1 10/1/2014    Microscopic hematuria     OA (osteoarthritis)     Overactive bladder 5/17/2011    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    RLS (restless legs syndrome) 1/17/2013    Sciatica     Shortness of breath     Spinal stenosis, lumbar region, without neurogenic claudication     Spondylolisthesis of lumbar region 10/1/2014    Spondylolisthesis, grade 1 10/1/2014    Stress urinary incontinence     Syncope     -MRI brain    Urinary tract infection, site not specified        Past Surgical History:   Procedure Laterality Date    HX APPENDECTOMY      HX CHOLECYSTECTOMY      HX HYSTERECTOMY      (+)DUB    HX POLYPECTOMY      NM COLONOSCOPY FLX DX W/COLLJ SPEC WHEN PFRMD  6-16-06    normal, Dr Gibbs Camera FLX DX W/COLLJ [address] 1978 PFRMD      (+)polyp= tubular adenoma       Social History   Substance Use Topics    Smoking status: Former Smoker     Packs/day: 0.25     Years: 50.00     Types: Cigarettes     Quit date: 7/17/2018    Smokeless tobacco: Never Used      Comment: pt has cut down recently to less than 1 ppd    Alcohol use No       Allergies   Allergen Reactions    Levaquin [Levofloxacin] Rash       Prior to Admission medications    Medication Sig Start Date End Date Taking? Authorizing Provider   traMADol (ULTRAM) 50 mg tablet Take 50 mg by mouth nightly. Yes Historical Provider   aspirin delayed-release 81 mg tablet Take 1 Tab by mouth daily. 8/10/18  Yes Deborah Dale MD   inhalational spacing device (AEROCHAMBER MV) 1 Each by Does Not Apply route as needed. 7/27/18  Yes Lee Jameson MD   albuterol-ipratropium (DUO-NEB) 2.5 mg-0.5 mg/3 ml nebu 3 mL by Nebulization route every six (6) hours as needed. 7/18/18  Yes Dillon Evans MD   predniSONE (DELTASONE) 10 mg tablet 1 tab po TID for 1 week , then 1 tab po BID for 1 week then 1 tab po daily for 1 week 7/18/18  Yes Dillon Evans MD   amLODIPine (NORVASC) 5 mg tablet Take 1 Tab by mouth daily. 7/19/18  Yes Dillon Evans MD   budesonide-formoterol (SYMBICORT) 80-4.5 mcg/actuation HFAA Take 2 Puffs by inhalation two (2) times a day. 7/18/18  Yes Dillon Evans MD   mirtazapine (REMERON) 30 mg tablet Take 1 Tab by mouth nightly. 4/16/18  Yes Toni Waterman MD   gabapentin (NEURONTIN) 300 mg capsule TAKE 1 CAPSULE BY MOUTH 3  TIMES DAILY 4/6/18  Yes Toni Waterman MD   simvastatin (ZOCOR) 20 mg tablet TAKE 1 TABLET BY MOUTH  NIGHTLY 4/6/18  Yes Toni Waterman MD   tamsulosin (FLOMAX) 0.4 mg capsule Take 1 Cap by mouth daily. 3/23/18  Yes Jean Pierre Jackson MD   OXYGEN-AIR DELIVERY SYSTEMS 3 L by Nasal route continuous.    Yes Historical Provider   Nebulizer & Compressor (PORTABLE NEBULIZER SYSTEM) machine 1 Each by Does Not Apply route every six (6) hours as needed. 2/10/18  Yes Mayra Awad PA-C   PARoxetine (PAXIL) 30 mg tablet Take 1 Tab by mouth daily. 9/19/17  Yes Marlon Solorzano MD         Visit Vitals    /59    Pulse 69    Ht 5' 5\" (1.651 m)    Wt 57.6 kg (127 lb)    BMI 21.13 kg/m2         Physical Exam   Constitutional: She is oriented to person, place, and time. She appears well-developed and well-nourished. HENT:   Head: Normocephalic and atraumatic. Eyes: Conjunctivae are normal.   Neck: Neck supple. No JVD present. No tracheal deviation present. No thyromegaly present. Cardiovascular: Normal rate and regular rhythm. PMI is not displaced. Exam reveals no gallop, no S3 and no decreased pulses. No murmur heard. Pulmonary/Chest: No respiratory distress. She has no wheezes. She has no rales. She exhibits no tenderness. Abdominal: Soft. There is no tenderness. Musculoskeletal: She exhibits no edema. Neurological: She is alert and oriented to person, place, and time. Skin: Skin is warm. Psychiatric: She has a normal mood and affect. Ms. Rylee Oliveros has a reminder for a \"due or due soon\" health maintenance. I have asked that she contact her primary care provider for follow-up on this health maintenance. 2013  pvl  Moderate left carotid stenosis,mild rt stenosis  I have personally reviewed patient's records available from hospital and other providers and incorporated findings in patient care. I Have personally reviewed recent relevant labs available and discussed with patient  7/2018-ekg  DiagnosisFinal Sinus bradycardia   Possible Left atrial enlargement   Right bundle branch block   Left posterior fascicular block   Bifascicular block   Abnormal ECG   Interpretation Summary 7/2018-echo  · Estimated left ventricular ejection fraction is 56 - 60%.  Left ventricular mild concentric hypertrophy observed. Inconclusive left ventricular diastolic function. · Mild tricuspid valve regurgitation is present. Pulmonary arterial systolic pressure is 37 mmHg. There is no evidence of pulmonary hypertension. · Moderately elevated central venous pressure (10-15 mmHg); IVC diameter is larger than 21 mm and collapses more than 50% with respiration. · Moderate mitral annular calcification. No significant regurgitation. · Aortic valve sclerosis with no significant stenosis. · There is a right pleural effusion. Assessment         ICD-10-CM [id number]    1. Diastolic CHF, acute on chronic (HCC) I50.33 428.33      428.0     Recent admission. Shortness of breath combination of COPD and CHF. CHF appears much improved. Continue therapy   2. Essential hypertension with goal blood pressure less than 140/90 I10 401.9     Elevated systolic pressure. Continue to monitor   3. Hyperlipidemia LDL goal <100 E78.5 272.4      LDL 72 in June 2018 continue statin   4. Chronic obstructive pulmonary disease, unspecified COPD type ([facility] [address] J44.9 496     Improving after recent exacerbation   5. RBBB (right bundle branch block with left anterior fascicular block) I45.2 426.52     Noted on recent EKG. Continue to monitor   6. Bilateral carotid artery stenosis I65.23 433.10      433.30     Moderate left carotid stenosis mild right carotid stenosis. In 2013. Would recommend aspirin 81 mg a day     8/2018  . COPD and CHF appear compensated. Would not use diuretic as there is no significant fluid overload. Recent labs noted. Advise aspirin 81 mg a day for carotid stenosis  Medications Discontinued During This Encounter   Medication Reason    cefpodoxime (VANTIN) 200 mg tablet Therapy Completed    OXYGEN-AIR DELIVERY SYSTEMS Duplicate Order    famotidine (PEPCID) 20 mg tablet Not A Current Medication       Orders Placed This Encounter    aspirin delayed-release 81 mg tablet     Sig: Take 1 Tab by mouth daily.      Dispense: 100 Tab     Refill:  1       Follow-up Disposition:  Return in about 6 months (around 2/10/2019).

## 2018-08-11 PROCEDURE — 3331090001 HH PPS REVENUE CREDIT

## 2018-08-11 PROCEDURE — 3331090002 HH PPS REVENUE DEBIT

## 2018-08-12 PROCEDURE — 3331090001 HH PPS REVENUE CREDIT

## 2018-08-12 PROCEDURE — 3331090002 HH PPS REVENUE DEBIT

## 2018-08-13 VITALS
OXYGEN SATURATION: 96 % | TEMPERATURE: 98.4 F | HEART RATE: 84 BPM | DIASTOLIC BLOOD PRESSURE: 79 MMHG | SYSTOLIC BLOOD PRESSURE: 115 MMHG

## 2018-08-13 PROCEDURE — 3331090002 HH PPS REVENUE DEBIT

## 2018-08-13 PROCEDURE — 3331090001 HH PPS REVENUE CREDIT

## 2018-08-14 ENCOUNTER — PATIENT OUTREACH (OUTPATIENT)
Dept: PULMONOLOGY | Age: 83
End: 2018-08-14

## 2018-08-14 PROCEDURE — 3331090001 HH PPS REVENUE CREDIT

## 2018-08-14 PROCEDURE — 3331090002 HH PPS REVENUE DEBIT

## 2018-08-14 NOTE — PROGRESS NOTES
Contacted  Patient on home and mobile #  for follow up. No answer. Unable to leave messages due to mailbox being full. Nurse Navigator ( NN ) Contacted patient's daughter, Rinku Jones, since writer unable to reach patient. Verified 2 patient identifiers. Introduced self, role and reason for call. NN explained to  that Claudetta Sara is unable to speak with her Mom at previous home number given due to out of service.  reported that is a long story and gave writer new number [phone] but asked writer not let her parents know of new number. Iesha reports: We suspect that she is smoking due to she goes outside a lot now but she denies smoking when asked. ( Nn informed  that patient also denies to NN of smoking but her [facility] nurse reported that she is smoking but Claudetta Sara is unsure how she knows other that she reported that patient goes outside to smoke. Don't think she is using her oxygen like she should because when I go over there, it is on but not on her face. ( NN discussed oxygen safety with Iesha who voiced understanding. Today is there 76 yr. Anniversary and family is taking them out to eat this evening. NN thanked  for # and update. Shameka Najera RN,  Nurse Navigator (NN) , contacted Marlo Mendoza , by telephone to perform COPD Transitions of Care. Spoke with her spouse. Verified 2 patient identifiers. Introduced self, role and reason for call. Her Spouse reported that patient is out getting her hair done and to call back in 1 1/2 hrs. NN asked her spouse to let her know that NN called and would return call to her today. He stated, \" okay. \"      NN contacted patient for f/u. Verified 2 patient identifiers. Introduced self, role and reason for call. NN wished patient a happy anniversary and patient stated, \" how did you know. \"      Patient reports:      Doing [place] out to have her hair done. Used Cane for mobility and did well.   She denied taking her Oxygen with her. She stated, \" I am tired of it . I use it all the time  And my doctor didn't say I need it on all the time. \"  ( NN discussed with patient  Dr. David Saba documented POC from her last office visit re:  . Continue supplemental oxygen -- counseled pt regarding compliance of oxygen (pt not wearing on exertion at home). NN asked patient what her Sp02 is today. Patient stated, \" I haven't checked it. \"  NN asked when was it last checked and pateintn staqted, \" day before yesterday and it was in the 90's on RA\"  NN asked patient to check it now for NN to record. Patient reported it was 80 . NN asked for the HR and patient stated, \" I have taken it off then patient admitted that she had never taken it . She reports that she hasn't put the pulse ox together. \"  NN encouraged her to monitor her oxygen level freddie. If she is not using her oxygen conintuously. She voiced understanding. Hasn't smoked since being home. Taking her medications as directed. Home health has d/annette her     Patient denies:    Coughing   Wheezing  Edema  SOB at rest or with activity  Smoking      Noted Priorities:  Smoking Cessation when patient admits to smoking     Are you using a rescue inhaler? Type no ;  Nebulizer? yes, frequency every day because she thought that is how she was suppose to use it. NN  Discussed with patent that order reads to use every 6 hrs as needed for SOB or wheezing. She voiced understanding. Zone: green     Signs/Symptoms:  SOB ( Patient  Denies) ; orthopnea 1-2 pillows     Barriers to care? Honest communication from patient, multiple caregivers /family , level of motivation      Week 5-8   ( Week 5 )    Provide Daily Disease Management (patient/caregiver initiated)  ? Daily Zone Identification   ? Comorbidity Management  ? Confirm follow-up appointments/transportation. ? Smoking status  ? GOLD stage=      Additional Assessments  ? CAT score    ?  Activity tolerance assessment ? Medication Therapy *as directed  ? Diet/appetite assessment  ? ED/Hospital utilization      Psychosocial: Reassurance and emotional support; depression screening. Education:  ?   ? Support system identification   ? Health literacy for COPD        COPD Assessment Test (CAT)    I never cough 0 1  X 2 3 4 5 I cough all the time   I have no phelgm (mucus) 0  X 1 2 3 4 5 My chest is completely full of phelgm (mucus)   My chest does not feel tight at all    0  X 1 2 3 4 5 My chest feels tight   When I walk up a hill or one flight of stairs I am not breathless   0 1 2 3 4 5  X When I walk up a hill or one flight of stairs I am very breathless   I am not limited doing any activities at home   0 1 2 3  X 4 5 I am very limited doing activities at home    I am confident leaving my home despite my condition    0  X 1 2 3   4 5 I am not at all confident leaving my home because of my lung condition   I sleep soundly  0  X 1 2 3 4 5 I don't sleep soundly because of my lung condition   I have lots of energy   0 1 2  X 3   4 5 I have no energy at all     TOTAL:  11                GOLD class- 1, 2, 3, 4  FEV1  ? GOLD 1(mild)=FEV1 ?80%   predicted  ? GOLD 2 (Moderate)=50% ? FEV 1< 80% ( PFT noted in chart on 8/31/15 FEV 1  51% predicted )   predicted  ? GOLD 3(severe)= 30% ? FEV1 < 50% predicted  ? GOLD 4 (very severe)=<30% predicted ( per Dr. Alexander Diver documentation on 7/27/18 )       COPD Medications: ZANDRA; ANGÉLICA; LAMA; LABA; ICS; PDE4 ; O2    Group LABA LAMA ANGÉLICA  prn ZANDRA  prn LABA+ICS LAMA+ICS LABA+LAMA LABA+LAMA+ICS PDE-4 Inhibitor  Chronic bronchitis, FEV1<50% predicted   A.       X x        B       X x   X     C  x     X  X Albuterol   X  X  Symbicort   X     D  x     X     X X     Per MD note:  Patient was not placed on LAMA due to Dx. Glaucoma. Have you been to an ER/Hospital since discharge from the hospital?   {no    Have you followed up with Pulmonology/ PCP?      yes    Social support:  Multiple family assistance daily  With meals/med. Management and ADLS as needed. DME: O2: nebulizer: walker , cane. Social support: multiple family /children       Future appointments:      8/21/2018 11:00 AM Abel Flynn MD [facility] [id number]   10/15/2018 9:25 AM [facility] NURSE VISIT [facility] [address]   10/22/2018 8:30 AM Abel Flynn MD [facility] [address]   2/22/2019 11:00 AM Jose Juan Carrington MD [facility] [address]       Transportation: Family transport patent to appointments. Activity/ADLs:  Provides own ADL care. Ambulates with Walker or cane as needed. Denies SOB while ambulating. Home health:  Company/Completion: SN d/annette patient  on 8/7/18 and PT on 8/10/18     Goals      Attends follow-up appointments as directed. Target Date:  7/27/18 7/20/18 Attended [facility] f/u appt. With Dr. Maurizio Zavala on today. 7/31/18 Attended f/u appt. With Dr. Chas Franks on 7/27/18 .  Knowledge and adherence of medication (ie. action, side effects, missed dose, etc.). Target Date:  10/18/18            8/14/18 Patient's daughter in law, Alessandra , manages patient's meds by setting up in pill box for patient to give to self. Patient  Has  Limited knowledge of her meds. But reports taking them as given to her.  Prevent complications post hospitalization. Target Dated:  8/20/18 8/13/18 Patient progressing well. No significant events since hospital discharge. Plan:      Iesha/and or family will contact MD with any COPD Red/Flags or if patient is consistently in yellow zone . NN will continue to monitor patient for barriers to care. NN will continue to encourage patient on each outreach  to wear her oxygen and monitor he oxygen levels daily or as needed.         Patient reminded that there is a physician on call 24 hours a day / 7 days a week (M-F 5pm to 8am and from Friday 5pm until Monday 8a for the weekend) should the patient have questions or concerns. Patient reminded to call 911 if situation is emergent or patient feels the situation is emergent. Pt verbalizes understanding.

## 2018-08-24 ENCOUNTER — OFFICE VISIT (OUTPATIENT)
Dept: INTERNAL MEDICINE CLINIC | Age: 83
End: 2018-08-24

## 2018-08-24 VITALS
HEIGHT: 65 IN | WEIGHT: 135 LBS | TEMPERATURE: 98.8 F | RESPIRATION RATE: 16 BRPM | SYSTOLIC BLOOD PRESSURE: 120 MMHG | OXYGEN SATURATION: 93 % | DIASTOLIC BLOOD PRESSURE: 60 MMHG | BODY MASS INDEX: 22.49 KG/M2 | HEART RATE: 72 BPM

## 2018-08-24 DIAGNOSIS — M15.9 PRIMARY OSTEOARTHRITIS INVOLVING MULTIPLE JOINTS: ICD-10-CM

## 2018-08-24 DIAGNOSIS — E78.5 HYPERLIPIDEMIA LDL GOAL <100: ICD-10-CM

## 2018-08-24 DIAGNOSIS — E11.40 TYPE 2 DIABETES MELLITUS WITH DIABETIC NEUROPATHY, WITHOUT LONG-TERM CURRENT USE OF INSULIN (HCC): Primary | ICD-10-CM

## 2018-08-24 DIAGNOSIS — F51.01 PRIMARY INSOMNIA: ICD-10-CM

## 2018-08-24 DIAGNOSIS — J43.1 PANLOBULAR EMPHYSEMA (HCC): ICD-10-CM

## 2018-08-24 DIAGNOSIS — F32.9 MAJOR DEPRESSION, CHRONIC: ICD-10-CM

## 2018-08-24 RX ORDER — TRAMADOL HYDROCHLORIDE 50 MG/1
50 TABLET ORAL
Qty: 90 TAB | Refills: 2 | Status: SHIPPED | OUTPATIENT
Start: 2018-08-24 | End: 2019-01-29 | Stop reason: SDUPTHER

## 2018-08-24 NOTE — PROGRESS NOTES
Danisha Lim 9/22/1933, is a 80 y.o. female, who is seen today for reevaluation of severe COPD with emphysema, chronic depression, chronic insomnia, long-term cigarette smoker, diabetes with neuropathy and dyslipidemia. She complains over the last few weeks she has had more pain in her left leg from the thigh anteriorly and laterally down into the calf and less so on the right side. In reviewing her records she has had neuropathy for at least 3 years and the pain is just achy and sometimes a little burning, worse at night keeping her awake somewhat. She does use tramadol at bedtime in the past she cannot remember how much that helped. She is still on mirtazapine and paroxetine and did finally quit smoking after her last hospitalization several weeks ago. She says she is breathing quite a bit better since she quit smoking and is not sure if she needs her inhaler at this point. No exertional chest discomfort. No syncope or near syncope.     Past Medical History:   Diagnosis Date    Colon polyps     COPD 8-30-02    DDD (degenerative disc disease), lumbar 10/1/2014    Degeneration of lumbar or lumbosacral intervertebral disc     Depression     Diabetes ([address] 7-19-05    Diverticulosis     DM neuropathy, painful ([address] 10/1/2014    MOORE (Dyspnea on Exertion) 4-19-02    echo: +mild LVH, DO06-09% w/ diastolic dysfxn    Glaucoma     HCAP (healthcare-associated pneumonia) 03/24/2017    Hemorrhoids 6-6-06    Hyperlipidemia 5/17/2011    Hypertension 4-16-02    LBP (low back pain) 4-13-04    Low back pain radiating to both legs 10/1/2014    Lumbago     Lumbar disc herniation 10/1/2014    Lumbar facet arthropathy ([address] 10/1/2014    Lumbosacral radiculopathy at L5 10/1/2014    Lumbosacral radiculopathy at S1 10/1/2014    Microscopic hematuria     OA (osteoarthritis)     Overactive bladder 5/17/2011    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    RLS (restless legs syndrome) 1/17/2013    Sciatica     Shortness of breath     Spinal stenosis, lumbar region, without neurogenic claudication     Spondylolisthesis of lumbar region 10/1/2014    Spondylolisthesis, grade 1 10/1/2014    Stress urinary incontinence     Syncope     -MRI brain    Urinary tract infection, site not specified      Current Outpatient Prescriptions   Medication Sig Dispense Refill    traMADol (ULTRAM) 50 mg tablet Take 1 Tab by mouth every eight (8) hours as needed for Pain. Max Daily Amount: 150 mg. 90 Tab 2    aspirin delayed-release 81 mg tablet Take 1 Tab by mouth daily. 100 Tab 1    inhalational spacing device (AEROCHAMBER MV) 1 Each by Does Not Apply route as needed. 1 Device 2    albuterol-ipratropium (DUO-NEB) 2.5 mg-0.5 mg/3 ml nebu 3 mL by Nebulization route every six (6) hours as needed. 30 Nebule 0    amLODIPine (NORVASC) 5 mg tablet Take 1 Tab by mouth daily. 10 Tab 0    budesonide-formoterol (SYMBICORT) 80-4.5 mcg/actuation HFAA Take 2 Puffs by inhalation two (2) times a day. 1 Inhaler 0    mirtazapine (REMERON) 30 mg tablet Take 1 Tab by mouth nightly. 30 Tab 2    gabapentin (NEURONTIN) 300 mg capsule TAKE 1 CAPSULE BY MOUTH 3  TIMES DAILY 270 Cap 1    simvastatin (ZOCOR) 20 mg tablet TAKE 1 TABLET BY MOUTH  NIGHTLY 90 Tab 1    tamsulosin (FLOMAX) 0.4 mg capsule Take 1 Cap by mouth daily. 15 Cap 0    OXYGEN-AIR DELIVERY SYSTEMS 3 L by Nasal route continuous.  Nebulizer & Compressor (PORTABLE NEBULIZER SYSTEM) machine 1 Each by Does Not Apply route every six (6) hours as needed. 1 Each 0    PARoxetine (PAXIL) 30 mg tablet Take 1 Tab by mouth daily. 90 Tab 3     Visit Vitals    /60    Pulse 72    Temp 98.8 °F (37.1 °C) (Oral)    Resp 16    Ht 5' 5\" (1.651 m)    Wt 135 lb (61.2 kg)    SpO2 93%    BMI 22.47 kg/m2     Carotids are 2+ without bruits. No JVD or HJR. Lungs are clear to percussion.   Diminished breath sounds with crackles in the right base. No wheezing. Heart reveals a regular rhythm with normal S1 and S2 no murmur gallop click or rub. Apical impulse is not palpable. Abdomen is soft and nontender with no hepatosplenomegaly or masses and no bruits. Strong these reveal no clubbing or cyanosis, she has 1+ lower leg edema with elastic from her socks cuts into her ankle slightly. Pulses are not palpable in the feet but are 2+ elsewhere. No tenderness of the thighs or legs. Good range of motion of the knees and some limitation of range of motion of the left hip but no pain in the inguinal or lateral hip or back region. Normal straight leg raising. Assessment: #1. Severe COPD with emphysema, finally not smoking. I have encouraged her to never smoke again use her oxygen at night she could try stopping her bronchodilator and see if she really needs it. #2.  Diabetic peripheral neuropathy is the likely cause for her current pain, I do not think it just came on from looking at the old records, we will try her on tramadol 50 mg up to every 8 hours as needed. #3.  Diabetes has been well controlled, continue diabetic diet and will check glucose and A1c in about 3 months. #4.  Long history of depression and insomnia, for now she will continue paroxetine 30 mg daily and mirtazapine 30 mg daily but we may cut back on mirtazapine in the future. She says she does like the fact that she has gained several pounds over the last 2 months. That is almost undoubtedly from quitting smoking and she certainly is not overweight. We will recheck her in 3 months. #5. Hypertension is controlled. For now she will continue amlodipine 5 mg daily but if the edema worsens we could substitute amlodipine in the future. #6.  Peripheral arterial disease, no pulses in the feet but no intermittent claudication symptoms. Follow-up 3 months for complete evaluation and we will check lab when she is here to save her son a trip. Gaetano Fontenot, MD FACP    Please note: This document has been produced using voice recognition software. Unrecognized errors in transcription may be present.

## 2018-08-27 ENCOUNTER — TELEPHONE (OUTPATIENT)
Dept: INTERNAL MEDICINE CLINIC | Age: 83
End: 2018-08-27

## 2018-08-27 NOTE — TELEPHONE ENCOUNTER
Daughter in law calling, say pt was given Ultram at visit. Says patient actually had this med from another doctor and it does not work for her. Says pt was asking about a cream that is for leg pain but she is not sure of the name. She is going to call a friend that uses it and call back with the name of the medication. Is there a cream that RM knows of that may work?

## 2018-08-30 ENCOUNTER — TELEPHONE (OUTPATIENT)
Dept: INTERNAL MEDICINE CLINIC | Age: 83
End: 2018-08-30

## 2018-08-30 NOTE — TELEPHONE ENCOUNTER
EDIL- Daughter Suraj Boo called- she had done her mothers meds for tomorrow after they had already taken this mornings meds- she went to get lunch and when she got back she had taken tomorrow mornings meds- not sure which meds. This happened about 30 minutes ago.     Per RD watch for dizziness and lightheadness- if she is concerned about how she is acting she can take her to ER

## 2018-09-10 ENCOUNTER — TELEPHONE (OUTPATIENT)
Dept: INTERNAL MEDICINE CLINIC | Age: 83
End: 2018-09-10

## 2018-09-10 ENCOUNTER — PATIENT OUTREACH (OUTPATIENT)
Dept: PULMONOLOGY | Age: 83
End: 2018-09-10

## 2018-09-10 RX ORDER — MIRTAZAPINE 30 MG/1
30 TABLET, FILM COATED ORAL
Qty: 90 TAB | Refills: 0 | Status: SHIPPED | OUTPATIENT
Start: 2018-09-10 | End: 2018-10-22 | Stop reason: DRUGHIGH

## 2018-09-10 RX ORDER — ROPINIROLE 1 MG/1
.5-1 TABLET, FILM COATED ORAL
Qty: 90 TAB | Refills: 0 | Status: SHIPPED | OUTPATIENT
Start: 2018-09-10 | End: 2019-03-15

## 2018-09-10 RX ORDER — PAROXETINE 30 MG/1
TABLET, FILM COATED ORAL
Qty: 90 TAB | Refills: 1 | Status: SHIPPED | OUTPATIENT
Start: 2018-09-10 | End: 2019-01-29 | Stop reason: SDUPTHER

## 2018-09-10 NOTE — Clinical Note
EDIL:  What she told me differs from Dr. Vicente Brumfield note:  See Below. Assessment: #1. Severe COPD with emphysema, finally not smoking. I have encouraged her to never smoke again use her oxygen at night she could try stopping her bronchodilator and see if she really needs it.

## 2018-09-10 NOTE — TELEPHONE ENCOUNTER
Patient's daughter called concern about patient's mediation. She wanted to know if she could take Trazodone and Tramadol together? I let her know that the Trazodone is used for depression and sleep. The Tramadol is used for pain. That combination should be fine. Patient's daughter was advised to watch her close for abnormal side effect I.e. sickness, hallucinations or any other concerning behavior. If that occurs please stop the medicine and call her provider.

## 2018-09-10 NOTE — TELEPHONE ENCOUNTER
Insurance requires a minimum fill for 90 days. If appropriate, please sign pended medication order. If not, please notify me. Last Visit: 08/24/2018 with MD Marilyn Carrera    Next Appointment: 10/22/2018 with MD Marilyn Carrera     Requested Prescriptions     Pending Prescriptions Disp Refills    mirtazapine (REMERON) 30 mg tablet 90 Tab 0     Sig: Take 1 Tab by mouth nightly.  rOPINIRole (REQUIP) 1 mg tablet 90 Tab 0     Sig: Take 0.5-1 Tabs by mouth nightly.  Indications: Restless Legs Syndrome

## 2018-09-10 NOTE — PROGRESS NOTES
Lee Ann Ackerman, RNNurse Navigator (NN) contacted Jose Rafael Jordan ,by telephone to perform COPD Transitions of Care Week 9. Verified Name and  as identifiers. Patient reports:   
 
I am fantastic ( Patient laughing ) Not using oxygen from a few weeks now. Patient stated, \" Dr. Jaleesa Riley told me to use it when I needed to. \" When NN asked what her oxygen levels have been. Patient stated, \" I don't have one of those things; then stated, \" I don't know where it it. \"  NN asked patient to be honest if she has a pulse ox. At home and patient stated, \" no, I was pulling your leg when I told you I had one. \"   
 
Stopped smoking.  ( NN asked her if she really is not smoking) patient stated, \" really, I am not. I am going crazy and eating everything in the house. \"   ( NN encouraged her to chew gum or suck on sugar free candy. ) patient stated, \" gum makes me hungry and I need a all day sucker. \" Patient laughing. Went out to have her hair done. Used Cane for mobility and did well. She denied taking her Oxygen with her. She stated, \" I am tired of it . I use it all the time  And my doctor didn't say I need it on all the time. \"  ( NN discussed with patient  Dr. Sunita Navarro documented POC from her last office visit re:  . Continue supplemental oxygen -- counseled pt regarding compliance of oxygen (pt not wearing on exertion at home). Taking medications as given to her by Claudette Goldsmith ( daughter in law who manages her meds. ) ( NN discuss new med. Tramadol with patient .)  Patient stated, \" it is not working . My legs and feet hurt all the time. \"  ( NN discussed that she can take tramadol every 8 hrs but discuss with Claudette Goldsmith to try giving it to you at least twice daily to see if that will help. ) Patient stated, \" I am going to call her as soon as I hang up. \"   
 
 
Patient denies:   
Coughing Wheezing Edema SOB at rest or with activity Smoking Chest pain Dizziness Are you using a rescue inhaler? Type no ;  Nebulizer? Yes but hasn't use it in a while. Zone: green Signs/Symptoms:  SOB ( Patient  Denies) ; orthopnea 1-2 pillows Barriers to care? Honest communication from patient. Week 9-12  ( wk 9 ) Provide Daily Disease Management 
(patient/caregiver initiated) Daily Zone Identification (symptom management; increased mucus or discolored, fever, increased cough, SOB, activity/sleep changes, BLE)-notify provider as identified ? Confirm follow-up Appointments/transportation. Reschedule if needed ? Smoking status ? GOLD stage= Additional Assessments ? CAT score ? Activity tolerance assessment  
(eg: Vital signs; level of consciousness; dyspnea on exertion; pillow usage; recliner vs bed) ? Energy conservation management (balance activity w/ rest) ? Medication Therapy *as directed ? Diet/appetite assessment 
? ED/Hospital utilization Psychosocial:  Reassurance and emotional support ? Purse lip/ abdominal breathing Education/Discharge Planning ? Arrange discharge follow-up appointments/transportation ? Patient COPD education literacy ? Patient/Caregiver verifies support systems (meals/medication/transportation needs,  
? community resources ? TCRS-DB ? Abdominal/ purse lip breathing Surgery: ? If had surgery- address NA Confirm Discharge plan and/or next level of care *Plan transition to LTC or Hospice if not progressing ? If requires further than 90 SHAILA consider Lung Cancer Screening.  
 ** (inbox note) COPD Assessment Test (CAT) I never cough 0 X 1 
 2 3 4 5 I cough all the time I have no phelgm (mucus) 0 X 1 2 3 4 5 My chest is completely full of phelgm (mucus) My chest does not feel tight at all  
 0 X 1 2 3 4 5 My chest feels tight When I walk up a hill or one flight of stairs I am not breathless 0 1 2 3 4 5 X When I walk up a hill or one flight of stairs I am very breathless I am not limited doing any activities at home 0 1 2 3 X 4 5 I am very limited doing activities at home I am confident leaving my home despite my condition  
 0 X 1 2 3 
 4 5 I am not at all confident leaving my home because of my lung condition I sleep soundly  0 X 1 2 3 4 5 I don't sleep soundly because of my lung condition I have lots of energy 0 1 2 X 3 
 4 5 I have no energy at all TOTAL:  10   
 
       
 
GOLD class- 1, 2, 3, 4 FEV1 
? GOLD 1(mild)=FEV1 ?80%  
predicted ? GOLD 2 (Moderate)=50% ? FEV 1< 80% ( PFT noted in chart on 8/31/15 FEV 1  51% predicted )  
predicted ? GOLD 3(severe)= 30% ? FEV1 < 50% predicted ? GOLD 4 (very severe)=<30% predicted ( per Dr. Essence Naranjo documentation on 7/27/18 ) COPD Medications: ZANDRA; ANGÉLICA; LAMA; LABA; ICS; PDE4 ; O2 
 
Group LABA LAMA ANGÉLICA prn ZANDRA prn LABA+ICS LAMA+ICS LABA+LAMA LABA+LAMA+ICS PDE-4 Inhibitor Chronic bronchitis, FEV1<50% predicted A.   
 
 X x B X x   X    
C  x X 
X Albuterol   X 
X Symbicort   X    
D  x X     X X Per MD note:  Patient was not placed on LAMA due to Dx. Glaucoma. Have you been to an ER/Hospital since discharge from the hospital?   {no 
 
Have you followed up with Pulmonology/ PCP? yes Social support:  Multiple family assistance daily  With meals/med. Management and ADLS as needed. DME: O2: nebulizer: walker , cane. Social support: multiple family /children Future appointments:   
 
10/15/2018 9:25 AM [facility] NURSE VISIT [facility] [address]   
10/22/2018 8:30 AM Stormy Mojica MD [facility] [address]   
12/7/2018 9:15 AM Stormy Mojica MD [facility] [address]   
2/22/2019 11:00 AM Jared Reed MD [facility] Transportation: Family transport patent to appointments. NN discussed f/u appt.  Needed with Dr. Gurwinder Garg and patient stated, \" no one has called me. \"  NN informed her that Dr. Dandre Kenney also wanting her to do some lung studies during next f/u visit and NN would have the PSR call to schedule visit with her. She voiced understanding. Activity/ADLs:  Provides own ADL care. Ambulates with Walker or cane as needed. Denies SOB while ambulating. Home health:  Company/Completion: SN d/annette patient  on 8/7/18 and PT on 8/10/18 Goals  Knowledge and adherence of medication (ie. action, side effects, missed dose, etc.). Target Date:  10/18/18   
     
  8/14/18 Patient's daughter in law, Marleen Collado , manages patient's meds by setting up in pill box for patient to give to self. Patient  Has  Limited knowledge of her meds. But reports taking them as given to her.  Prevent complications post hospitalization. Target Dated:  8/20/18 8/13/18 Patient progressing well. No significant events since hospital discharge. 9/10/18  Patient without any complications post hospital stay. Plan:   
 
Iesha/and or family will contact MD with any COPD Red/Flags or if patient is consistently in yellow zone . NN will continue to monitor patient for barriers to care. NN will continue to monitor patient every 2 wks or as needed NN will consult with PSR to f/u with patient to make appt. With Dr. Dandre Kenney for visit and full PFT. Patient aware of change in POC to every 2 wks and in agreement. Patient reminded that there is a physician on call 24 hours a day / 7 days a week (M-F 5pm to 8am and from Friday 5pm until Monday 8a for the weekend) should the patient have questions or concerns. Patient reminded to call 911 if situation is emergent or patient feels the situation is emergent. Pt verbalizes understanding.

## 2018-09-10 NOTE — Clinical Note
FYI:   Patient using oxygen PRN. She reports Dr. Jose Kilgore instructed her to use it as needed. Now reports not having a pulse oximetry to check her levels vs.reported having one. No f/u appt. scheduled with you but I will have PSR schedule her one due to you ordered complete PFT with next visit. She reports that she is doing great and not smoking but eating everything in view. Thanks.

## 2018-09-11 ENCOUNTER — PATIENT OUTREACH (OUTPATIENT)
Dept: PULMONOLOGY | Age: 83
End: 2018-09-11

## 2018-09-11 ENCOUNTER — TELEPHONE (OUTPATIENT)
Dept: INTERNAL MEDICINE CLINIC | Age: 83
End: 2018-09-11

## 2018-09-20 NOTE — TELEPHONE ENCOUNTER
Ilsa Craft MD   [facility] Nurses 17 hours ago (5:38 PM)                 She is on several medicines including several that are sedating and I do not recommend adding trazodone.  All of these medicines together increase the risk of falls, it is just not safe to keep adding medicine, she will get enough sleep eventually.  (Routing comment)

## 2018-09-26 ENCOUNTER — PATIENT OUTREACH (OUTPATIENT)
Dept: PULMONOLOGY | Age: 83
End: 2018-09-26

## 2018-09-26 NOTE — PROGRESS NOTES
Contacted  Patient  for follow up. No answer. Left message introducing self, role and reason for call. Requested return call. Contact information provided

## 2018-10-02 ENCOUNTER — PATIENT OUTREACH (OUTPATIENT)
Dept: PULMONOLOGY | Age: 83
End: 2018-10-02

## 2018-10-15 ENCOUNTER — OFFICE VISIT (OUTPATIENT)
Dept: INTERNAL MEDICINE CLINIC | Age: 83
End: 2018-10-15

## 2018-10-15 VITALS
OXYGEN SATURATION: 90 % | SYSTOLIC BLOOD PRESSURE: 128 MMHG | WEIGHT: 146.6 LBS | RESPIRATION RATE: 12 BRPM | TEMPERATURE: 98.4 F | HEIGHT: 65 IN | HEART RATE: 76 BPM | BODY MASS INDEX: 24.43 KG/M2 | DIASTOLIC BLOOD PRESSURE: 68 MMHG

## 2018-10-15 DIAGNOSIS — Z23 ENCOUNTER FOR IMMUNIZATION: ICD-10-CM

## 2018-10-15 DIAGNOSIS — M54.32 SCIATICA OF LEFT SIDE: Primary | ICD-10-CM

## 2018-10-15 RX ORDER — DEXAMETHASONE 4 MG/1
TABLET ORAL
Qty: 20 TAB | Refills: 0 | Status: SHIPPED | OUTPATIENT
Start: 2018-10-15 | End: 2019-01-07 | Stop reason: ALTCHOICE

## 2018-10-15 NOTE — MR AVS SNAPSHOT
[address] [facility] [address] 
 
 
 [address], [address] 
[phone] Patient: Nomi Roberts MRN:  [id number] Visit Information Date & Time Provider Department Dept. Phone Encounter #  
 10/15/2018  3:00 PM Stephan Bartholomew MD [facility] [address] [place] [address] [phone] [id number] Follow-up Instructions Follow-up and Disposition History Your Appointments 10/16/2018  9:00 AM  
LAB with IOC NURSE VISIT [facility] [address] [place] [address] ([address]) Appt Note: lab; pt r/s from 10/15/18  
 [address] [facility], [address] [facility] [address] [place]  
  
   
 [address], [address]  
  
    
 10/22/2018  8:30 AM  
Office Visit with Stephan Bartholomew MD  
[facility] [address] [place] [address] [address] [facility]) Appt Note: 4 month f/u  
 [address] [facility], [address] [facility] [address] [place]  
  
   
 [address], [address]  
  
    
 11/8/2018 10:00 AM  
Nurse Visit with PPA SPIROMETRY [address] ([address]) Appt Note: [email or web address] [email or web address]  
 [address] [place] [address], [address] [address]  
[phone]  
  
   
 [address] [place] [address], [address] [place] [address],[address] [place] [address]  
  
    
 11/8/2018 11:00 AM  
Nurse Visit with PPA SPIROMETRY [address] ([address]) Appt Note: 12 Kelin Lozano - [email or web address]  
 [address] [place] [address], [address] [address]  
[phone]  
  
    
 11/8/2018 11:30 AM  
Follow Up with Ilya Gunn MD  
[address] ([address]) Appt Note: FROM [email or web address] [address] [place] [address], [address] [address]  
[phone]  
  
   
 [address] [place] [address], [address] [facility],[address] [place] [address]  
  
    
 12/7/2018  9:15 AM  
PHYSICAL with Stephan Bartholomew MD  
[facility] [address] [place], [place] [address] [facility]) Appt Note: rpe  
 [address], [address] [place] [address] [place]  
  
   
 [address], [address]  
  
    
 2/22/2019 11:00 AM  
Office Visit with Brenda Hewitt MD  
[facility]) Appt Note: 6 months [address] [facility], [address] [facility] [address]  
[address], [address] [place] [address] [facility] Upcoming Health Maintenance Date Due Shingrix Vaccine Age 50> (1 of 2) 9/22/1983 EYE EXAM RETINAL OR DILATED Q1 2/5/2015 GLAUCOMA SCREENING Q2Y 4/1/2016 Influenza Age 5 to Adult 8/1/2018 MEDICARE YEARLY EXAM 10/12/2018 MICROALBUMIN Q1 12/13/2018 HEMOGLOBIN A1C Q6M 1/13/2019 LIPID PANEL Q1 6/12/2019 DTaP/Tdap/Td series (2 - Td) 6/11/2024 Allergies as of 10/15/2018  Review Complete On: 10/15/2018 By: Anju Valentin MD  
  
 Severity Noted Reaction Type Reactions Levaquin [Levofloxacin]  05/17/2011    Rash Current Immunizations  Reviewed on 6/18/2018 Name Date Influenza High Dose Vaccine PF 10/13/2016 Influenza Vaccine 10/15/2014 Influenza Vaccine (Quad) PF 12/8/2015 Influenza Vaccine (Tri) Adjuvanted 10/15/2018  3:18 PM  
 Influenza Vaccine Split 11/22/2011, 11/11/2010 11:46 AM  
 Pneumococcal Conjugate (PCV-13) 12/8/2015 Pneumococcal Polysaccharide (PPSV-23) 9/20/2000 Tdap 6/11/2014 ZZZ-RETIRED (DO NOT USE) Pneumococcal Vaccine (Unspecified Type) 9/20/2000 Not reviewed this visit You Were Diagnosed With   
  
 Codes Comments Sciatica of left side    -  Primary ICD-10-CM: M54.32 
ICD-9-CM: 724.3 Encounter for immunization     ICD-10-CM: U79 ICD-9-CM: V03.89 Vitals BP Pulse Temp Resp Height(growth percentile) Weight(growth percentile) 128/68 76 98.4 °F (36.9 °C) (Oral) 12 5' 5\" (1.651 m) 146 lb 9.6 oz (66.5 kg) SpO2 BMI OB Status Smoking Status 90% 24.4 kg/m2 Hysterectomy Former Smoker Vitals History BMI and BSA Data Body Mass Index Body Surface Area  
 24.4 kg/m 2 1.75 m 2 Preferred Pharmacy Pharmacy Name Phone [address] [place] [address]. [phone] Your Updated Medication List  
  
   
This list is accurate as of 10/15/18  3:26 PM.  Always use your most recent med list.  
  
  
  
  
 albuterol-ipratropium 2.5 mg-0.5 mg/3 ml Nebu Commonly known as:  DUO-NEB  
3 mL by Nebulization route every six (6) hours as needed. amLODIPine 5 mg tablet Commonly known as:  Jillian Pilsner Take 1 Tab by mouth daily. aspirin delayed-release 81 mg tablet Take 1 Tab by mouth daily. budesonide-formoterol 80-4.5 mcg/actuation Hfaa Commonly known as:  SYMBICORT Take 2 Puffs by inhalation two (2) times a day. dexamethasone 4 mg tablet Commonly known as:  DECADRON  
1 tablet by mouth twice a day  
  
 gabapentin 300 mg capsule Commonly known as:  NEURONTIN  
TAKE 1 CAPSULE BY MOUTH 3  TIMES DAILY  
  
 inhalational spacing device Commonly known as:  AEROCHAMBER MV  
1 Each by Does Not Apply route as needed. mirtazapine 30 mg tablet Commonly known as:  Delma Miko Take 1 Tab by mouth nightly. Nebulizer & Compressor machine Commonly known as:  PORTABLE NEBULIZER SYSTEM  
1 Each by Does Not Apply route every six (6) hours as needed. OXYGEN-AIR DELIVERY SYSTEMS  
3 L by Nasal route continuous. PARoxetine 30 mg tablet Commonly known as:  PAXIL TAKE 1 TABLET BY MOUTH  DAILY  
  
 rOPINIRole 1 mg tablet Commonly known as:  Jon Dupmode Take 0.5-1 Tabs by mouth nightly. Indications: Restless Legs Syndrome  
  
 simvastatin 20 mg tablet Commonly known as:  ZOCOR  
TAKE 1 TABLET BY MOUTH  NIGHTLY  
  
 tamsulosin 0.4 mg capsule Commonly known as:  FLOMAX Take 1 Cap by mouth daily. traMADol 50 mg tablet Commonly known as:  Terrial Divers  
 Take 1 Tab by mouth every eight (8) hours as needed for Pain. Max Daily Amount: 150 mg.  
  
  
  
  
Prescriptions Sent to Pharmacy Refills  
 dexamethasone (DECADRON) 4 mg tablet 0 Si tablet by mouth twice a day Class: Normal  
 Pharmacy: [address] [place] [address] [address] [place] [place] [address].  #: [phone] We Performed the Following INFLUENZA VACCINE INACTIVATED (IIV), SUBUNIT, ADJUVANTED, IM [id number] CPT(R)] Introducing [facility]! [facility] introduces [employer] patient portal. Now you can access parts of your medical record, email your doctor's office, and request medication refills online. 1. In your internet browser, go to [email or web address] 2. Click on the First Time User? Click Here link in the Sign In box. You will see the New Member Sign Up page. 3. Enter your Localo Access Code exactly as it appears below. You will not need to use this code after youve completed the sign-up process. If you do not sign up before the expiration date, you must request a new code. · Localo Access Code: [email or web address] Expires: 2019  2:53 PM 
 
4. Enter the last four digits of your Social Security Number (xxxx) and Date of Birth (mm/dd/yyyy) as indicated and click Submit. You will be taken to the next sign-up page. 5. Create a Localo ID. This will be your Localo login ID and cannot be changed, so think of one that is secure and easy to remember. 6. Create a Localo password. You can change your password at any time. 7. Enter your Password Reset Question and Answer. This can be used at a later time if you forget your password. 8. Enter your e-mail address. You will receive e-mail notification when new information is available in [address] 9. Click Sign Up. You can now view and download portions of your medical record. 10. Click the Download Summary menu link to download a portable copy of your medical information. If you have questions, please visit the Frequently Asked Questions section of the [employer] website. Remember, [employer] is NOT to be used for urgent needs. For medical emergencies, dial 911. Now available from your iPhone and Android! Please provide this summary of care documentation to your next provider. Your primary care clinician is listed as Luis Glasgow. Nadja Johnson. If you have any questions after today's visit, please call [phone].

## 2018-10-15 NOTE — PROGRESS NOTES
Diane Brito 9/22/1933, is a 80 y.o. female, who is seen today for a 4-month history of pain in the left buttock and down her leg into the calf, it keeps her from sleeping at night, she has been on gabapentin for a long time but the pain is gotten worse. Past Medical History:  
Diagnosis Date  Colon polyps  COPD 8-30-02  DDD (degenerative disc disease), lumbar 10/1/2014  Degeneration of lumbar or lumbosacral intervertebral disc  Depression  Diabetes ([facility] [address] 7-19-05  Diverticulosis   DM neuropathy, painful ([facility] [address] 10/1/2014  MOORE (Dyspnea on Exertion) 4-19-02  
 echo: +mild LVH, KS62-91% w/ diastolic dysfxn  Glaucoma  HCAP (healthcare-associated pneumonia) 03/24/2017  Hemorrhoids 6-6-06  Hyperlipidemia 5/17/2011  Hypertension 4-16-02  LBP (low back pain) 4-13-04  Low back pain radiating to both legs 10/1/2014  Lumbago  Lumbar disc herniation 10/1/2014  Lumbar facet arthropathy ([facility] [address] 10/1/2014  Lumbosacral radiculopathy at L5 10/1/2014  Lumbosacral radiculopathy at S1 10/1/2014  Microscopic hematuria  OA (osteoarthritis)  Overactive bladder 5/17/2011  
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   
 -MRI brain  Urinary tract infection, site not specified Current Outpatient Prescriptions Medication Sig Dispense Refill  PARoxetine (PAXIL) 30 mg tablet TAKE 1 TABLET BY MOUTH  DAILY 90 Tab 1  
 mirtazapine (REMERON) 30 mg tablet Take 1 Tab by mouth nightly. 90 Tab 0  
 rOPINIRole (REQUIP) 1 mg tablet Take 0.5-1 Tabs by mouth nightly.  Indications: Restless Legs Syndrome 90 Tab 0  
  traMADol (ULTRAM) 50 mg tablet Take 1 Tab by mouth every eight (8) hours as needed for Pain. Max Daily Amount: 150 mg. 90 Tab 2  
 aspirin delayed-release 81 mg tablet Take 1 Tab by mouth daily. 100 Tab 1  
 inhalational spacing device (AEROCHAMBER MV) 1 Each by Does Not Apply route as needed. 1 Device 2  
 albuterol-ipratropium (DUO-NEB) 2.5 mg-0.5 mg/3 ml nebu 3 mL by Nebulization route every six (6) hours as needed. 30 Nebule 0  
 amLODIPine (NORVASC) 5 mg tablet Take 1 Tab by mouth daily. 10 Tab 0  
 budesonide-formoterol (SYMBICORT) 80-4.5 mcg/actuation HFAA Take 2 Puffs by inhalation two (2) times a day. 1 Inhaler 0  
 gabapentin (NEURONTIN) 300 mg capsule TAKE 1 CAPSULE BY MOUTH 3  TIMES DAILY 270 Cap 1  simvastatin (ZOCOR) 20 mg tablet TAKE 1 TABLET BY MOUTH  NIGHTLY 90 Tab 1  
 tamsulosin (FLOMAX) 0.4 mg capsule Take 1 Cap by mouth daily. 15 Cap 0  
 OXYGEN-AIR DELIVERY SYSTEMS 3 L by Nasal route continuous.  Nebulizer & Compressor (PORTABLE NEBULIZER SYSTEM) machine 1 Each by Does Not Apply route every six (6) hours as needed. 1 Each 0 Visit Vitals  /68  Pulse 76  Temp 98.4 °F (36.9 °C) (Oral)  Resp 12  Ht 5' 5\" (1.651 m)  Wt 146 lb 9.6 oz (66.5 kg)  SpO2 90%  BMI 24.4 kg/m2 There is no tenderness in the back or buttocks or leg. Basically normal straight leg raising. It hurts worse when she walks on it. There is no difference in reflex from right to left at the knees or ankles. Assessment: Probable sciatica, will try dexamethasone 4 mg twice a day for 10 days. She will continue gabapentin. She will call if not improving. Follow-up as previously planned Gaetano Asher, [address] Please note: This document has been produced using voice recognition software. Unrecognized errors in transcription may be present.

## 2018-10-16 ENCOUNTER — HOSPITAL ENCOUNTER (OUTPATIENT)
Dept: LAB | Age: 83
Discharge: HOME | End: 2018-10-16
Payer: MEDICARE

## 2018-10-16 ENCOUNTER — APPOINTMENT (OUTPATIENT)
Dept: INTERNAL MEDICINE CLINIC | Age: 83
End: 2018-10-16

## 2018-10-16 DIAGNOSIS — J43.1 PANLOBULAR EMPHYSEMA (HCC): ICD-10-CM

## 2018-10-16 DIAGNOSIS — E78.5 HYPERLIPIDEMIA, UNSPECIFIED HYPERLIPIDEMIA TYPE: ICD-10-CM

## 2018-10-16 DIAGNOSIS — E11.40 TYPE 2 DIABETES MELLITUS WITH DIABETIC NEUROPATHY, WITHOUT LONG-TERM CURRENT USE OF INSULIN (HCC): ICD-10-CM

## 2018-10-16 DIAGNOSIS — F51.01 PRIMARY INSOMNIA: ICD-10-CM

## 2018-10-16 DIAGNOSIS — F32.9 MAJOR DEPRESSION, CHRONIC: ICD-10-CM

## 2018-10-16 DIAGNOSIS — E55.9 HYPOVITAMINOSIS D: ICD-10-CM

## 2018-10-16 LAB
ALBUMIN SERPL-MCNC: 4.1 G/DL (ref 3.4–5)
ALBUMIN/GLOB SERPL: 1.4 {RATIO} (ref 0.8–1.7)
ALP SERPL-CCNC: 91 U/L (ref 45–117)
ALT SERPL-CCNC: 14 U/L (ref 13–56)
ANION GAP SERPL CALC-SCNC: 7 MMOL/L (ref 3–18)
AST SERPL-CCNC: 12 U/L (ref 15–37)
BASOPHILS # BLD: 0 K/UL (ref 0–0.1)
BASOPHILS NFR BLD: 1 % (ref 0–2)
BILIRUB SERPL-MCNC: 0.4 MG/DL (ref 0.2–1)
BUN SERPL-MCNC: 11 MG/DL (ref 7–18)
BUN/CREAT SERPL: 17 (ref 12–20)
CALCIUM SERPL-MCNC: 8.9 MG/DL (ref 8.5–10.1)
CHLORIDE SERPL-SCNC: 102 MMOL/L (ref 100–108)
CHOLEST SERPL-MCNC: 150 MG/DL
CO2 SERPL-SCNC: 33 MMOL/L (ref 21–32)
CREAT SERPL-MCNC: 0.64 MG/DL (ref 0.6–1.3)
DIFFERENTIAL METHOD BLD: ABNORMAL
EOSINOPHIL # BLD: 0.2 K/UL (ref 0–0.4)
EOSINOPHIL NFR BLD: 3 % (ref 0–5)
ERYTHROCYTE [DISTWIDTH] IN BLOOD BY AUTOMATED COUNT: 16.4 % (ref 11.6–14.5)
EST. AVERAGE GLUCOSE BLD GHB EST-MCNC: 131 MG/DL
GLOBULIN SER CALC-MCNC: 2.9 G/DL (ref 2–4)
GLUCOSE SERPL-MCNC: 101 MG/DL (ref 74–99)
HBA1C MFR BLD: 6.2 % (ref 4.2–5.6)
HCT VFR BLD AUTO: 44 % (ref 35–45)
HDLC SERPL-MCNC: 62 MG/DL (ref 40–60)
HDLC SERPL: 2.4 {RATIO} (ref 0–5)
HGB BLD-MCNC: 13.6 G/DL (ref 12–16)
LDLC SERPL CALC-MCNC: 72.2 MG/DL (ref 0–100)
LIPID PROFILE,FLP: ABNORMAL
LYMPHOCYTES # BLD: 1.4 K/UL (ref 0.9–3.6)
LYMPHOCYTES NFR BLD: 24 % (ref 21–52)
MCH RBC QN AUTO: 29.3 PG (ref 24–34)
MCHC RBC AUTO-ENTMCNC: 30.9 G/DL (ref 31–37)
MCV RBC AUTO: 94.8 FL (ref 74–97)
MONOCYTES # BLD: 0.4 K/UL (ref 0.05–1.2)
MONOCYTES NFR BLD: 7 % (ref 3–10)
NEUTS SEG # BLD: 4 K/UL (ref 1.8–8)
NEUTS SEG NFR BLD: 65 % (ref 40–73)
PLATELET # BLD AUTO: 208 K/UL (ref 135–420)
PMV BLD AUTO: 10.7 FL (ref 9.2–11.8)
POTASSIUM SERPL-SCNC: 4.4 MMOL/L (ref 3.5–5.5)
PROT SERPL-MCNC: 7 G/DL (ref 6.4–8.2)
RBC # BLD AUTO: 4.64 M/UL (ref 4.2–5.3)
SODIUM SERPL-SCNC: 142 MMOL/L (ref 136–145)
T4 FREE SERPL-MCNC: 0.9 NG/DL (ref 0.7–1.5)
TRIGL SERPL-MCNC: 79 MG/DL (ref ?–150)
TSH SERPL DL<=0.05 MIU/L-ACNC: 1.4 UIU/ML (ref 0.36–3.74)
VLDLC SERPL CALC-MCNC: 15.8 MG/DL
WBC # BLD AUTO: 6.1 K/UL (ref 4.6–13.2)

## 2018-10-16 PROCEDURE — 82043 UR ALBUMIN QUANTITATIVE: CPT | Performed by: INTERNAL MEDICINE

## 2018-10-16 PROCEDURE — 84439 ASSAY OF FREE THYROXINE: CPT | Performed by: INTERNAL MEDICINE

## 2018-10-16 PROCEDURE — 80061 LIPID PANEL: CPT | Performed by: INTERNAL MEDICINE

## 2018-10-16 PROCEDURE — 80053 COMPREHEN METABOLIC PANEL: CPT | Performed by: INTERNAL MEDICINE

## 2018-10-16 PROCEDURE — 83036 HEMOGLOBIN GLYCOSYLATED A1C: CPT | Performed by: INTERNAL MEDICINE

## 2018-10-16 PROCEDURE — 85025 COMPLETE CBC W/AUTO DIFF WBC: CPT | Performed by: INTERNAL MEDICINE

## 2018-10-16 PROCEDURE — 82306 VITAMIN D 25 HYDROXY: CPT | Performed by: INTERNAL MEDICINE

## 2018-10-16 PROCEDURE — 84443 ASSAY THYROID STIM HORMONE: CPT | Performed by: INTERNAL MEDICINE

## 2018-10-16 PROCEDURE — 36415 COLL VENOUS BLD VENIPUNCTURE: CPT | Performed by: INTERNAL MEDICINE

## 2018-10-16 RX ORDER — SIMVASTATIN 20 MG/1
20 TABLET, FILM COATED ORAL
Qty: 90 TAB | Refills: 3 | Status: SHIPPED | OUTPATIENT
Start: 2018-10-16 | End: 2020-01-01

## 2018-10-16 NOTE — TELEPHONE ENCOUNTER
Last Visit: 10/15/2018 with MD Alana Welch    Next Appointment: 10/22/2018 with MD Alana Welch   Previous Refill Encounters: 04/06/2018 per MD Alana Welch #90 with 1 refill    Requested Prescriptions     Pending Prescriptions Disp Refills    simvastatin (ZOCOR) 20 mg tablet 90 Tab 3     Sig: Take 1 Tab by mouth nightly.

## 2018-10-17 LAB
25(OH)D3 SERPL-MCNC: 20.5 NG/ML (ref 30–100)
CREAT UR-MCNC: 42.77 MG/DL (ref 30–125)
MICROALBUMIN UR-MCNC: 12.2 MG/DL (ref 0–3)
MICROALBUMIN/CREAT UR-RTO: 285 MG/G (ref 0–30)

## 2018-10-22 ENCOUNTER — OFFICE VISIT (OUTPATIENT)
Dept: INTERNAL MEDICINE CLINIC | Age: 83
End: 2018-10-22

## 2018-10-22 ENCOUNTER — TELEPHONE (OUTPATIENT)
Dept: INTERNAL MEDICINE CLINIC | Age: 83
End: 2018-10-22

## 2018-10-22 VITALS
TEMPERATURE: 97.5 F | HEART RATE: 80 BPM | WEIGHT: 143.2 LBS | OXYGEN SATURATION: 97 % | HEIGHT: 65 IN | BODY MASS INDEX: 23.86 KG/M2 | RESPIRATION RATE: 16 BRPM | SYSTOLIC BLOOD PRESSURE: 138 MMHG | DIASTOLIC BLOOD PRESSURE: 72 MMHG

## 2018-10-22 DIAGNOSIS — F32.9 MAJOR DEPRESSION, CHRONIC: ICD-10-CM

## 2018-10-22 DIAGNOSIS — E78.5 HYPERLIPIDEMIA, UNSPECIFIED HYPERLIPIDEMIA TYPE: ICD-10-CM

## 2018-10-22 DIAGNOSIS — M54.32 SCIATICA OF LEFT SIDE: ICD-10-CM

## 2018-10-22 DIAGNOSIS — E11.21 TYPE 2 DIABETES WITH NEPHROPATHY (HCC): Primary | ICD-10-CM

## 2018-10-22 DIAGNOSIS — F51.01 PRIMARY INSOMNIA: ICD-10-CM

## 2018-10-22 RX ORDER — GABAPENTIN 300 MG/1
CAPSULE ORAL
Qty: 360 CAP | Refills: 1 | OUTPATIENT
Start: 2018-10-22

## 2018-10-22 RX ORDER — SIMVASTATIN 20 MG/1
20 TABLET, FILM COATED ORAL
Qty: 90 TAB | Refills: 3 | Status: CANCELLED | OUTPATIENT
Start: 2018-10-22

## 2018-10-22 RX ORDER — MIRTAZAPINE 15 MG/1
15 TABLET, FILM COATED ORAL
Qty: 90 TAB | OUTPATIENT
Start: 2018-10-22

## 2018-10-22 RX ORDER — MIRTAZAPINE 15 MG/1
15 TABLET, FILM COATED ORAL
Qty: 30 TAB | Refills: 1 | Status: SHIPPED | OUTPATIENT
Start: 2018-10-22 | End: 2018-11-05 | Stop reason: SDUPTHER

## 2018-10-22 RX ORDER — GABAPENTIN 300 MG/1
CAPSULE ORAL
Qty: 120 CAP | Refills: 5 | Status: SHIPPED | OUTPATIENT
Start: 2018-10-22 | End: 2019-04-28

## 2018-10-22 NOTE — PATIENT INSTRUCTIONS
Please take gabapentin 300 mg in the morning, 300 mg at midday and 600 mg at bedtime, that means taking 2 of the 300 mg capsules at bedtime. I will also send a prescription for mirtazapine to be decreased to 15 mg at bedtime.

## 2018-10-22 NOTE — PROGRESS NOTES
1. Have you been to the ER, urgent care clinic or hospitalized since your last visit? NO 
      
 
2. Have you seen or consulted any other health care providers outside of the [facility] since your last visit (Include any pap smears or colon screening)? NO Do you have an Advanced Directive? NO Would you like information on Advanced Directives?  NO

## 2018-10-22 NOTE — TELEPHONE ENCOUNTER
Patient's daughter called and wanted to see if two prescriptions that were requested earlier could be sent in to [facility] order.

## 2018-10-22 NOTE — TELEPHONE ENCOUNTER
Pharmacy Technician with [address] reports the patient received a 90 d/s of the requested medication (Zocor) on 10/16/2018. No further action required.

## 2018-10-22 NOTE — PROGRESS NOTES
Saige Adams 9/22/1933, is a 80 y.o. female, who is seen today for reevaluation of anxiety and depression, chronic insomnia, chronic lumbar pain on the left and into the left leg, recent substantial weight gain. She had lost weight previously and was depressed and was started on mirtazapine and the dose eventually increased to 30 mg at bedtime. Now she still is not sleeping well and is still having about the same amount of pain as when I saw her a week ago and started her on dexamethasone. She has gained a great deal of weight since she has been on mirtazapine, now is gaining weight too quickly. Mirtazapine is not helping her sleep as much as I had hoped. Breathing is about the same, she wears oxygen at home but not here in the office. She is using her inhalers regularly. Past Medical History:  
Diagnosis Date  Colon polyps  COPD 8-30-02  DDD (degenerative disc disease), lumbar 10/1/2014  Degeneration of lumbar or lumbosacral intervertebral disc  Depression  Diabetes ([facility] [address] 7-19-05  Diverticulosis   DM neuropathy, painful ([facility] [address] 10/1/2014  MOORE (Dyspnea on Exertion) 4-19-02  
 echo: +mild LVH, MG08-06% w/ diastolic dysfxn  Glaucoma  HCAP (healthcare-associated pneumonia) 03/24/2017  Hemorrhoids 6-6-06  Hyperlipidemia 5/17/2011  Hypertension 4-16-02  LBP (low back pain) 4-13-04  Low back pain radiating to both legs 10/1/2014  Lumbago  Lumbar disc herniation 10/1/2014  Lumbar facet arthropathy 10/1/2014  Lumbosacral radiculopathy at L5 10/1/2014  Lumbosacral radiculopathy at S1 10/1/2014  Microscopic hematuria  OA (osteoarthritis)  Overactive bladder 5/17/2011  
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   
 -MRI brain  Urinary tract infection, site not specified Current Outpatient Medications Medication Sig Dispense Refill  simvastatin (ZOCOR) 20 mg tablet Take 1 Tab by mouth nightly. 90 Tab 3  
 dexamethasone (DECADRON) 4 mg tablet 1 tablet by mouth twice a day 20 Tab 0  
 PARoxetine (PAXIL) 30 mg tablet TAKE 1 TABLET BY MOUTH  DAILY 90 Tab 1  
 mirtazapine (REMERON) 30 mg tablet Take 1 Tab by mouth nightly. 90 Tab 0  
 rOPINIRole (REQUIP) 1 mg tablet Take 0.5-1 Tabs by mouth nightly. Indications: Restless Legs Syndrome 90 Tab 0  
 traMADol (ULTRAM) 50 mg tablet Take 1 Tab by mouth every eight (8) hours as needed for Pain. Max Daily Amount: 150 mg. 90 Tab 2  
 aspirin delayed-release 81 mg tablet Take 1 Tab by mouth daily. 100 Tab 1  
 inhalational spacing device (AEROCHAMBER MV) 1 Each by Does Not Apply route as needed. 1 Device 2  
 albuterol-ipratropium (DUO-NEB) 2.5 mg-0.5 mg/3 ml nebu 3 mL by Nebulization route every six (6) hours as needed. 30 Nebule 0  
 amLODIPine (NORVASC) 5 mg tablet Take 1 Tab by mouth daily. 10 Tab 0  
 budesonide-formoterol (SYMBICORT) 80-4.5 mcg/actuation HFAA Take 2 Puffs by inhalation two (2) times a day. 1 Inhaler 0  
 gabapentin (NEURONTIN) 300 mg capsule TAKE 1 CAPSULE BY MOUTH 3  TIMES DAILY 270 Cap 1  
 tamsulosin (FLOMAX) 0.4 mg capsule Take 1 Cap by mouth daily. 15 Cap 0  
 OXYGEN-AIR DELIVERY SYSTEMS 3 L by Nasal route continuous.  Nebulizer & Compressor (PORTABLE NEBULIZER SYSTEM) machine 1 Each by Does Not Apply route every six (6) hours as needed. 1 Each 0 Visit Vitals /72 (BP 1 Location: Left arm, BP Patient Position: Sitting) Pulse 80 Temp 97.5 °F (36.4 °C) (Oral) Resp 16 Ht 5' 5\" (1.651 m) Wt 143 lb 3.2 oz (65 kg) SpO2 97% BMI 23.83 kg/m² Carotids are 2+ without bruits. Lungs are clear to percussion. Somewhat diminished breath sounds throughout with no wheezing or crackles. Heart reveals a regular rhythm with normal S1 and S2 no murmur gallop click or rub. Apical impulse is not palpable. Abdomen is soft and nontender with no hepatosplenomegaly or masses and no bruits. Extremities reveal no clubbing cyanosis or edema. Pulses are 2+ except absent dorsalis pedis pulses and 1+ posterior tibialis pulses. Assessment: #1. Chronic insomnia but also with sciatic type pain. She did not improve with dexamethasone and will stop that now. We will increase gabapentin to 600 mg at bedtime and continue 300 mg in the morning and midday. #2.  Rapid weight gain, will decrease mirtazapine to 15 mg at bedtime and probably stop this at next visit. #3.  COPD stable, she will continue Symbicort. #4.  Diabetes mellitus well controlled but with albuminuria. That will be monitored periodically. We checked the lab just last week. Follow-up 1 month Gaetano Rees, [address] Please note: This document has been produced using voice recognition software. Unrecognized errors in transcription may be present.

## 2018-10-29 RX ORDER — AMLODIPINE BESYLATE 5 MG/1
5 TABLET ORAL DAILY
Qty: 90 TAB | Refills: 3 | Status: SHIPPED | OUTPATIENT
Start: 2018-10-29 | End: 2019-04-15 | Stop reason: ALTCHOICE

## 2018-10-29 RX ORDER — TAMSULOSIN HYDROCHLORIDE 0.4 MG/1
0.4 CAPSULE ORAL DAILY
Qty: 90 CAP | Refills: 3 | Status: SHIPPED | OUTPATIENT
Start: 2018-10-29

## 2018-10-29 NOTE — TELEPHONE ENCOUNTER
Insurance requires a minimum fill for 90 days. If appropriate, please sign pended medication order. If not, please notify me. Last Visit: 10/22/2018 with MD Charlotte Chen    Next Appointment: 12/07/2018 with MD Charlotte Chen     Requested Prescriptions     Pending Prescriptions Disp Refills    amLODIPine (NORVASC) 5 mg tablet 90 Tab 3     Sig: Take 1 Tab by mouth daily.  tamsulosin (FLOMAX) 0.4 mg capsule 90 Cap 3     Sig: Take 1 Cap by mouth daily.

## 2018-11-05 RX ORDER — MIRTAZAPINE 15 MG/1
15 TABLET, FILM COATED ORAL
Qty: 90 TAB | Refills: 0 | Status: SHIPPED | OUTPATIENT
Start: 2018-11-05 | End: 2019-04-28

## 2018-11-15 ENCOUNTER — TELEPHONE (OUTPATIENT)
Dept: INTERNAL MEDICINE CLINIC | Age: 83
End: 2018-11-15

## 2018-11-15 NOTE — TELEPHONE ENCOUNTER
Daughter calling says pt was to get an rx to help her sleep. Kylee Jorden says they never got it. Please call in and advise daughter the name of the med.

## 2018-11-16 NOTE — TELEPHONE ENCOUNTER
Daughter calling again.   Please call her at [phone] once script has been sent to [facility]

## 2018-11-16 NOTE — TELEPHONE ENCOUNTER
Jeremiah Lim MD   [facility] Yesterday (8:54 AM)      I did not recommend any new medication for her, when I saw her October 22 I recommended that she increase gabapentin to 600 mg at bedtime and continue 300 mg in the morning and 300 today.  Stronger medicine to help her sleep will cause more side effects than benefits     Spoke with pt daughter, gave her message from Dr Yadira Araujo.  She verbalized understanding and will try it

## 2018-11-26 ENCOUNTER — TELEPHONE (OUTPATIENT)
Dept: INTERNAL MEDICINE CLINIC | Age: 83
End: 2018-11-26

## 2018-11-26 NOTE — TELEPHONE ENCOUNTER
Pt no longer needs oxygen needs something faxed to [address] [place] [address] in order for elias to pick it up  Fax [phone]

## 2018-11-27 ENCOUNTER — TELEPHONE (OUTPATIENT)
Dept: INTERNAL MEDICINE CLINIC | Age: 83
End: 2018-11-27

## 2018-11-27 NOTE — TELEPHONE ENCOUNTER
Daughter Melissa Fontanez called re:Decadron. Is she supposed to be taking this? She is not taking this- she saw  It on her med list and didn't know about it.       QUestion about Generic for Remeron- when it was filled in Aug it [id number]- when she got it filled in October it was 15mg- please advise

## 2018-11-27 NOTE — TELEPHONE ENCOUNTER
Spoke with patients daughter she is aware that the remeron was increased and not to take the decadron. Patients daughter states that she will call back when she starts to run low for a new rx.

## 2018-12-18 ENCOUNTER — OFFICE VISIT (OUTPATIENT)
Dept: UROLOGY | Age: 83
End: 2018-12-18

## 2018-12-18 VITALS
DIASTOLIC BLOOD PRESSURE: 80 MMHG | OXYGEN SATURATION: 90 % | SYSTOLIC BLOOD PRESSURE: 120 MMHG | WEIGHT: 146 LBS | BODY MASS INDEX: 24.32 KG/M2 | HEART RATE: 76 BPM | HEIGHT: 65 IN

## 2018-12-18 DIAGNOSIS — R35.0 FREQUENCY OF URINATION: Primary | ICD-10-CM

## 2018-12-18 RX ORDER — SULFAMETHOXAZOLE AND TRIMETHOPRIM 800; 160 MG/1; MG/1
1 TABLET ORAL 2 TIMES DAILY
Qty: 20 TAB | Refills: 2 | Status: SHIPPED | OUTPATIENT
Start: 2018-12-18 | End: 2018-12-28

## 2018-12-18 NOTE — PROGRESS NOTES
Ms. Joanna Cruz has a reminder for a \"due or due soon\" health maintenance. I have asked that she contact her primary care provider for follow-up on this health maintenance.

## 2018-12-18 NOTE — PATIENT INSTRUCTIONS
Frequent Urination: Care Instructions  Your Care Instructions  An urge to urinate frequently but usually passing only small amounts of urine is a common symptom of urinary problems, such as urinary tract infections. The bladder may become inflamed. This can cause the urge to urinate. You may try to urinate more often than usual to try to soothe that urge. Frequent urination also may be caused by sexually transmitted infections (STIs) or kidney stones. Or it may happen when something irritates the tube that carries urine from the bladder to the outside of the body (urethra). It may also be a sign of diabetes. The cause may be hard to find. You may need tests. Follow-up care is a key part of your treatment and safety. Be sure to make and go to all appointments, and call your doctor if you are having problems. It's also a good idea to know your test results and keep a list of the medicines you take. How can you care for yourself at home? · Drink extra water for the next day or two. This will help make the urine less concentrated. (If you have kidney, heart, or liver disease and have to limit fluids, talk with your doctor before you increase the amount of fluids you drink.)  · Avoid drinks that are carbonated or have caffeine. They can irritate the bladder. For women:  · Urinate right after you have sex. · After you go to the bathroom, wipe from front to back. · Avoid douches, bubble baths, and feminine hygiene sprays. And avoid other feminine hygiene products that have deodorants. When should you call for help? Call your doctor now or seek immediate medical care if:    · You have new symptoms, such as fever, nausea, or vomiting.     · You have new or worse symptoms of a urinary problem. For example:  ? You have blood or pus in your urine. ? You have chills or body aches. ? It hurts to urinate. ? You have groin or belly pain. ? You have pain in your back just below your rib cage (the flank area).  Watch closely for changes in your health, and be sure to contact your doctor if you feel thirstier than usual.  Where can you learn more? Go to http://shantell-leona.info/. Enter 498 [id number] in the search box to learn more about \"Frequent Urination: Care Instructions. \"  Current as of: March 21, 2018  Content Version: 11.8  © 8757-4250 [employer]. Care instructions adapted under license by [employer] (which disclaims liability or warranty for this information). If you have questions about a medical condition or this instruction, always ask your healthcare professional. [place] [address] any warranty or liability for your use of this information.

## 2018-12-19 NOTE — PROGRESS NOTES
Yari Perry 80 y.o. female     Ms. Doug Briscoe seen today for routine follow-up recurrent urinary tract infections  Patient is doing well on short course of directed antibiotic Rx utilizing Septra DS twice daily times 2-3 days when symptomatic with UTI patient has had no episodes during the past year of  Febrile UTI no gross hematuria no flank pain    Patient has history of recurrent urinary tract infections with cystoscopy showing chronic cystitis cystica  Patient has chronic pain syndrome on daily narcotic analgesic therapy with Dilaudid  Patient has history of recurrent UTIs usually responding promptly to short course antibiotic therapy      Normal cystoscopic findings in February 2014, and except for cystitis cystica      UTI November 2015 100,000 Klebsiella sensitive to Septra  UTI May 2016 100,000 E. Coli     cc in December 2018         Review of Systems:    CNS: depression/syncope    Respiratory: COPD and reactive airway disease    Cardiovascular:hypertension/dyspnea on exertion    Intestinal:colonic polyps    Urinary: stress urinary incontinence, recurrent urinary tract infections    Skeletal: sciatica/lumbar disc disease/spinal stenosis    Endocrine:diabetes    Other:glaucoma,restless leg syndrome      Allergies: Allergies   Allergen Reactions    Levaquin [Levofloxacin] Rash      Medications:    Current Outpatient Medications   Medication Sig Dispense Refill    trimethoprim-sulfamethoxazole (BACTRIM DS, SEPTRA DS) 160-800 mg per tablet Take 1 Tab by mouth two (2) times a day for 10 days. 20 Tab 2    mirtazapine (REMERON) 15 mg tablet Take 1 Tab by mouth nightly. 90 Tab 0    amLODIPine (NORVASC) 5 mg tablet Take 1 Tab by mouth daily. 90 Tab 3    tamsulosin (FLOMAX) 0.4 mg capsule Take 1 Cap by mouth daily.  90 Cap 3    gabapentin (NEURONTIN) 300 mg capsule 1 capsule by mouth morning, 1 capsule at midday and 2 capsules at bedtime 120 Cap 5    simvastatin (ZOCOR) 20 mg tablet Take 1 Tab by mouth nightly. 90 Tab 3    dexamethasone (DECADRON) 4 mg tablet 1 tablet by mouth twice a day 20 Tab 0    PARoxetine (PAXIL) 30 mg tablet TAKE 1 TABLET BY MOUTH  DAILY 90 Tab 1    rOPINIRole (REQUIP) 1 mg tablet Take 0.5-1 Tabs by mouth nightly. Indications: Restless Legs Syndrome 90 Tab 0    traMADol (ULTRAM) 50 mg tablet Take 1 Tab by mouth every eight (8) hours as needed for Pain. Max Daily Amount: 150 mg. 90 Tab 2    aspirin delayed-release 81 mg tablet Take 1 Tab by mouth daily. 100 Tab 1    inhalational spacing device (AEROCHAMBER MV) 1 Each by Does Not Apply route as needed. 1 Device 2    albuterol-ipratropium (DUO-NEB) 2.5 mg-0.5 mg/3 ml nebu 3 mL by Nebulization route every six (6) hours as needed. 30 Nebule 0    budesonide-formoterol (SYMBICORT) 80-4.5 mcg/actuation HFAA Take 2 Puffs by inhalation two (2) times a day. 1 Inhaler 0    OXYGEN-AIR DELIVERY SYSTEMS 3 L by Nasal route continuous.  Nebulizer & Compressor (PORTABLE NEBULIZER SYSTEM) machine 1 Each by Does Not Apply route every six (6) hours as needed.  1 Each 0       Past Medical History:   Diagnosis Date    Colon polyps     COPD 8-30-02    DDD (degenerative disc disease), lumbar 10/1/2014    Degeneration of lumbar or lumbosacral intervertebral disc     Depression     Diabetes ([facility] [address] 7-19-05    Diverticulosis     DM neuropathy, painful ([facility] 10/1/2014    MOORE (Dyspnea on Exertion) 4-19-02    echo: +mild LVH, BB37-78% w/ diastolic dysfxn    Glaucoma     HCAP (healthcare-associated pneumonia) 03/24/2017    Hemorrhoids 6-6-06    Hyperlipidemia 5/17/2011    Hypertension 4-16-02    LBP (low back pain) 4-13-04    Low back pain radiating to both legs 10/1/2014    Lumbago     Lumbar disc herniation 10/1/2014    Lumbar facet arthropathy 10/1/2014    Lumbosacral radiculopathy at L5 10/1/2014    Lumbosacral radiculopathy at S1 10/1/2014    Microscopic hematuria     OA (osteoarthritis)     Overactive bladder 5/17/2011  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    RLS (restless legs syndrome) 1/17/2013    Sciatica     Shortness of breath     Spinal stenosis, lumbar region, without neurogenic claudication     Spondylolisthesis of lumbar region 10/1/2014    Spondylolisthesis, grade 1 10/1/2014    Stress urinary incontinence     Syncope     -MRI brain    Urinary tract infection, site not specified       Past Surgical History:   Procedure Laterality Date    HX APPENDECTOMY      HX CHOLECYSTECTOMY      HX HYSTERECTOMY      (+)DUB    HX POLYPECTOMY      [place] COLONOSCOPY FLX DX W/COLLJ SPEC WHEN [facility]  6-16-06    normal, Dr Hannah Darling    [place] COLONOSCOPY FLX DX W/COLLJ [address] [facility]      (+)polyp= tubular adenoma     Social History     Socioeconomic History    Marital status:      Spouse name: Not on file    Number of children: Not on file    Years of education: Not on file    Highest education level: Not on file   Social Needs    Financial resource strain: Not on file    Food insecurity - worry: Not on file    Food insecurity - inability: Not on file   Mr. Number needs - medical: Not on file   Mr. Number needs - non-medical: Not on file   Occupational History    Not on file   Tobacco Use    Smoking status: Former Smoker     Packs/day: 1.00     Years: 50.00     Pack years: 50.00     Types: Cigarettes    Smokeless tobacco: Never Used    Tobacco comment: pt has cut down recently to less than 1 ppd   Substance and Sexual Activity    Alcohol use: No    Drug use: No    Sexual activity: Not Currently   Other Topics Concern    Not on file   Social History Narrative    Not on file      Family History   Problem Relation Age of Onset    Colon Cancer Sister     Heart Disease Brother     Seizures Son     Colon Cancer Maternal Aunt         Physical Examination: Well-nourished mature female in no apparent distress     PVR today 187 cc        Impression: Recurrent urinary tract infection responding favorably to short course of directed antibiotic Rx        Plan: Bactrim DS twice daily times 2-3 days as needed symptoms of UTI-C&S urine if symptoms persist after 2 days of antibiotic Rx    RTC 1 year      More than 1/2 of this 15 minute visit was spent in counselling and coordination of care, as described above. Fartun Pratt MD  -electronically signed-    PLEASE NOTE:  This document has been produced using voice recognition software. Unrecognized errors in transcription may be present.

## 2018-12-31 ENCOUNTER — OFFICE VISIT (OUTPATIENT)
Dept: PULMONOLOGY | Age: 83
End: 2018-12-31

## 2018-12-31 ENCOUNTER — TELEPHONE (OUTPATIENT)
Dept: INTERNAL MEDICINE CLINIC | Age: 83
End: 2018-12-31

## 2018-12-31 VITALS
HEART RATE: 59 BPM | TEMPERATURE: 97.9 F | OXYGEN SATURATION: 87 % | RESPIRATION RATE: 23 BRPM | SYSTOLIC BLOOD PRESSURE: 150 MMHG | BODY MASS INDEX: 23.99 KG/M2 | HEIGHT: 65 IN | DIASTOLIC BLOOD PRESSURE: 74 MMHG | WEIGHT: 144 LBS

## 2018-12-31 DIAGNOSIS — J96.11 CHRONIC RESPIRATORY FAILURE WITH HYPOXIA (HCC): ICD-10-CM

## 2018-12-31 DIAGNOSIS — E11.21 TYPE 2 DIABETES WITH NEPHROPATHY (HCC): ICD-10-CM

## 2018-12-31 DIAGNOSIS — J44.9 STAGE 4 VERY SEVERE COPD BY GOLD CLASSIFICATION (HCC): Primary | ICD-10-CM

## 2018-12-31 DIAGNOSIS — J43.1 PANLOBULAR EMPHYSEMA (HCC): ICD-10-CM

## 2018-12-31 NOTE — PROGRESS NOTES
[facility] 
  [address] [place] [address], [address] [place], [address] [address] SIMPLE PULMONARY STRESS TEST - 6 MINUTE WALK PATIENT NAME: Helena Ferrer    DATE: 12/31/2018 YOB: 1933     AGE: 80 y.o. DIAGNOSIS:  
Encounter Diagnoses Name Primary?  Chronic obstructive pulmonary disease, unspecified COPD type ([facility] [address] Yes  Panlobular emphysema ([facility] [address] TECHNICIAN: Zoe Bartlett, RT      
 
PHYSICIAN: Dr Deb Sorenson MD 
 
 
Visit Vitals /74 (BP 1 Location: Right arm, BP Patient Position: At rest) Pulse (!) 59 Temp 97.9 °F (36.6 °C) (Oral) Resp 23 Ht 5' 5\" (1.651 m) Wt 144 lb (65.3 kg) SpO2 (!) 87% Comment: on room air at rest  
BMI 23.96 kg/m² RESTING DATA:  Dyspnea Scale (1-10):    3 SOB EXERCISE DATA:   6 MINUTE WALK - HALLWAY (34 METERS) 1   RA    85 % SAT  59 HR   3 SOB       1 Laps x 34m = 34m 
2   2L    91 % SAT   61 HR   3 SOB    1.5 Laps x 34m = 51m 
3   2L    93 % SAT   72 HR   3 SOB       2 Laps x 34m = 68m 
4   2L    88 % SAT   78 HR   4 SOB       2 Laps x 34m = 68m 5   3L    93 % SAT   66 HR   5 SOB    1.5 Laps x 34m = 51m 
6   3L    92 % SAT   60 HR   5 SOB       1 Laps x 34m = 34m TOTAL DISTANCE:   306 M 
 
RECOVERY DATA:   
 
1   3L    94 % SAT   57 HR   25 RR   150/80 BP   3 SOB 
2   RA   86 % SAT   53 HR   23 RR   150/80 BP   3 SOB 
 
 
TECHNICIAN COMMENTS:Patient began on room air and at rest with an 02 saturation level at 87% and heart rate of 59 bpm. After first minute 02 saturation level decreased to 85%, Patient placed on nasal 02 at 2/lpm and after 1 minute 02 saturation increased to 91% and gradually increasing to 93% before decreasing to 88%, heart rate increased to 78 bpm during this time. 02 increased to 3/lpm and 02 saturation level increased to 93% dropping to 92% for remainder of walk.  At rest on 3/lpm 02 saturation level stable at 94% and heart rate decreased to 57 bpm. Patient placed on room air and 02 saturation level decreased to 86%.

## 2018-12-31 NOTE — PROGRESS NOTES
Chief Complaint Patient presents with  COPD 1. Have you been to the ER, urgent care clinic since your last visit? Hospitalized since your last visit? No 
 
2. Have you seen or consulted any other health care providers outside of the [address] [place] [address] since your last visit? Include any pap smears or colon screening.  No

## 2018-12-31 NOTE — TELEPHONE ENCOUNTER
Daughter calling, was going over med list and there is a med they are not sure if she should still be on : Decadron. Please call daughter and advise. Doesn't know what it is for.

## 2019-01-01 PROBLEM — J96.11 CHRONIC RESPIRATORY FAILURE WITH HYPOXIA (HCC): Status: ACTIVE | Noted: 2019-01-01

## 2019-01-01 NOTE — PROGRESS NOTES
[facility] Pulmonary, Critical Care, and Sleep Medicine Pulmonary Office Progress Notes Name: Kailyn Carmen : 1933 Date: 2019 Subjective:  
 
Patient is a 80 y.o. female is here for follow up for: Problems-COPD 
 
 
18 Patient here today for evaluation -chronic COPD and multiple other issues. She is breathing much better currently here in the office without oxygen but she states she wears it all the time at home, although reluctant to wear it all the time. She is taking all of her medicine correctly. She coughs only occasionally at this point. Taking care of spouse who has dementia. She has not smoked since she was in the hospital after over 50 years of smoking. Past Medical History:  
Diagnosis Date  Chronic lung disease  Colon polyps  COPD 02  DDD (degenerative disc disease), lumbar 10/1/2014  Degeneration of lumbar or lumbosacral intervertebral disc  Depression  Diabetes ([facility] [address] 05  Diabetes mellitus ([facility] [address]  Diverticulosis   DM neuropathy, painful ([facility] [address] 10/1/2014  MOORE (Dyspnea on Exertion) 02  
 echo: +mild LVH, SD60-52% w/ diastolic dysfxn  Glaucoma  HCAP (healthcare-associated pneumonia) 2017  Hemorrhoids 06  Hyperlipidemia 2011  Hypertension 02  LBP (low back pain) 04  Low back pain radiating to both legs 10/1/2014  Lumbago  Lumbar disc herniation 10/1/2014  Lumbar facet arthropathy 10/1/2014  Lumbosacral radiculopathy at L5 10/1/2014  Lumbosacral radiculopathy at S1 10/1/2014  Microscopic hematuria  OA (osteoarthritis)  Overactive bladder 2011  
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   
 -MRI brain  Urinary tract infection, site not specified Allergies Allergen Reactions  Levaquin [Levofloxacin] Rash Current Outpatient Medications Medication Sig Dispense Refill  mirtazapine (REMERON) 15 mg tablet Take 1 Tab by mouth nightly. 90 Tab 0  
 amLODIPine (NORVASC) 5 mg tablet Take 1 Tab by mouth daily. 90 Tab 3  
 tamsulosin (FLOMAX) 0.4 mg capsule Take 1 Cap by mouth daily. 90 Cap 3  
 gabapentin (NEURONTIN) 300 mg capsule 1 capsule by mouth morning, 1 capsule at midday and 2 capsules at bedtime 120 Cap 5  
 simvastatin (ZOCOR) 20 mg tablet Take 1 Tab by mouth nightly. 90 Tab 3  
 PARoxetine (PAXIL) 30 mg tablet TAKE 1 TABLET BY MOUTH  DAILY 90 Tab 1  
 rOPINIRole (REQUIP) 1 mg tablet Take 0.5-1 Tabs by mouth nightly. Indications: Restless Legs Syndrome 90 Tab 0  
 traMADol (ULTRAM) 50 mg tablet Take 1 Tab by mouth every eight (8) hours as needed for Pain. Max Daily Amount: 150 mg. 90 Tab 2  
 aspirin delayed-release 81 mg tablet Take 1 Tab by mouth daily. 100 Tab 1  
 budesonide-formoterol (SYMBICORT) 80-4.5 mcg/actuation HFAA Take 2 Puffs by inhalation two (2) times a day. 1 Inhaler 0  
 Nebulizer & Compressor (PORTABLE NEBULIZER SYSTEM) machine 1 Each by Does Not Apply route every six (6) hours as needed. 1 Each 0  
 dexamethasone (DECADRON) 4 mg tablet 1 tablet by mouth twice a day 20 Tab 0  
 inhalational spacing device (AEROCHAMBER MV) 1 Each by Does Not Apply route as needed. 1 Device 2  
 albuterol-ipratropium (DUO-NEB) 2.5 mg-0.5 mg/3 ml nebu 3 mL by Nebulization route every six (6) hours as needed. 30 Nebule 0  
 OXYGEN-AIR DELIVERY SYSTEMS 3 L by Nasal route continuous. Review of Systems: 
HEENT: No epistaxis, no nasal drainage, no difficulty in swallowing, no redness in eyes Respiratory: as above Cardiovascular: no chest pain, no palpitations, no chronic leg edema, no syncope Gastrointestinal: no abd pain, no vomiting, no diarrhea, no bleeding symptoms Genitourinary: No urinary symptoms or hematuria Integument/breast: No ulcers or rashes Musculoskeletal:Neg 
Neurological: No focal weakness, no seizures, no headaches Behvioral/Psych: No anxiety, no depression Constitutional: No fever, no chills, no weight loss, no night sweats Objective:  
 
Visit Vitals /74 (BP 1 Location: Right arm, BP Patient Position: At rest) Pulse (!) 59 Temp 97.9 °F (36.6 °C) (Oral) Resp 23 Ht 5' 5\" (1.651 m) Wt 65.3 kg (144 lb) SpO2 (!) 87% Comment: on room air at rest  
BMI 23.96 kg/m² Physical Exam:  
General: comfortable, no acute distress HEENT: pupils reactive, sclera anicteric, EOM intact Neck: No adenopathy or thyroid swelling, no JVD, supple CVS: S1S2 no murmurs RS: Mod AE bilaterally, no tactile fremitus or egophony, no accessory muscle use Abd: soft, non tender, no hepatosplenomegaly Neuro: non focal, awake, alert Extrm: no leg edema, clubbing or cyanosis Skin: no rash Data review: Hospital Outpatient Visit on 10/16/2018 Component Date Value Ref Range Status  WBC 10/16/2018 6.1  4.6 - 13.2 K/uL Final  
 RBC 10/16/2018 4.64  4.20 - 5.30 M/uL Final  
 HGB 10/16/2018 13.6  12.0 - 16.0 g/dL Final  
 HCT 10/16/2018 44.0  35.0 - 45.0 % Final  
 MCV 10/16/2018 94.8  74.0 - 97.0 FL Final  
 MCH 10/16/2018 29.3  24.0 - 34.0 PG Final  
 MCHC 10/16/2018 30.9* 31.0 - 37.0 g/dL Final  
 RDW 10/16/2018 16.4* 11.6 - 14.5 % Final  
 PLATELET 84/99/0699 573  135 - 420 K/uL Final  
 MPV 10/16/2018 10.7  9.2 - 11.8 FL Final  
 NEUTROPHILS 10/16/2018 65  40 - 73 % Final  
 LYMPHOCYTES 10/16/2018 24  21 - 52 % Final  
 MONOCYTES 10/16/2018 7  3 - 10 % Final  
 EOSINOPHILS 10/16/2018 3  0 - 5 % Final  
 BASOPHILS 10/16/2018 1  0 - 2 % Final  
  ABS. NEUTROPHILS 10/16/2018 4.0  1.8 - 8.0 K/UL Final  
 ABS. LYMPHOCYTES 10/16/2018 1.4  0.9 - 3.6 K/UL Final  
 ABS. MONOCYTES 10/16/2018 0.4  0.05 - 1.2 K/UL Final  
 ABS. EOSINOPHILS 10/16/2018 0.2  0.0 - 0.4 K/UL Final  
 ABS. BASOPHILS 10/16/2018 0.0  0.0 - 0.1 K/UL Final  
 DF 10/16/2018 AUTOMATED    Final  
 LIPID PROFILE 10/16/2018        Final  
 Cholesterol, total 10/16/2018 150  <200 MG/DL Final  
 Triglyceride 10/16/2018 79  <150 MG/DL Final  
 Comment: The drugs N-acetylcysteine (NAC) and 
Metamiszole have been found to cause falsely 
low results in this chemical assay. Please 
be sure to submit blood samples obtained BEFORE administration of either of these 
drugs to assure correct results.  HDL Cholesterol 10/16/2018 62* 40 - 60 MG/DL Final  
 LDL, calculated 10/16/2018 72.2  0 - 100 MG/DL Final  
 VLDL, calculated 10/16/2018 15.8  MG/DL Final  
 CHOL/HDL Ratio 10/16/2018 2.4  0 - 5.0   Final  
 Hemoglobin A1c 10/16/2018 6.2* 4.2 - 5.6 % Final  
 Comment: (NOTE) HbA1C Interpretive Ranges <5.7              Normal 
5.7 - 6.4         Consider Prediabetes >6.5              Consider Diabetes  Est. average glucose 10/16/2018 131  mg/dL Final  
 Comment: (NOTE) The eAG should be interpreted with patient characteristics in mind  
since ethnicity, interindividual differences, red cell lifespan,  
variation in rates of glycation, etc. may affect the validity of the  
calculation.  
  
 Sodium 10/16/2018 142  136 - 145 mmol/L Final  
 Potassium 10/16/2018 4.4  3.5 - 5.5 mmol/L Final  
 Chloride 10/16/2018 102  100 - 108 mmol/L Final  
 CO2 10/16/2018 33* 21 - 32 mmol/L Final  
 Anion gap 10/16/2018 7  3.0 - 18 mmol/L Final  
 Glucose 10/16/2018 101* 74 - 99 mg/dL Final  
 BUN 10/16/2018 11  7.0 - 18 MG/DL Final  
 Creatinine 10/16/2018 0.64  0.6 - 1.3 MG/DL Final  
 BUN/Creatinine ratio 10/16/2018 17  12 - 20   Final  
 GFR est AA 10/16/2018 >60  >60 ml/min/1.73m2 Final  
  GFR est non-AA 10/16/2018 >60  >60 ml/min/1.73m2 Final  
 Comment: (NOTE) Estimated GFR is calculated using the Modification of Diet in Renal  
Disease (MDRD) Study equation, reported for both  Americans [facility]) and non- Americans (GFRNA), and normalized to 1.73m2  
body surface area. The physician must decide which value applies to  
the patient. The MDRD study equation should only be used in  
individuals age 25 or older. It has not been validated for the  
following: pregnant women, patients with serious comorbid conditions,  
or on certain medications, or persons with extremes of body size,  
muscle mass, or nutritional status.  Calcium 10/16/2018 8.9  8.5 - 10.1 MG/DL Final  
 Bilirubin, total 10/16/2018 0.4  0.2 - 1.0 MG/DL Final  
 ALT (SGPT) 10/16/2018 14  13 - 56 U/L Final  
 AST (SGOT) 10/16/2018 12* 15 - 37 U/L Final  
 Alk. phosphatase 10/16/2018 91  45 - 117 U/L Final  
 Protein, total 10/16/2018 7.0  6.4 - 8.2 g/dL Final  
 Albumin 10/16/2018 4.1  3.4 - 5.0 g/dL Final  
 Globulin 10/16/2018 2.9  2.0 - 4.0 g/dL Final  
 A-G Ratio 10/16/2018 1.4  0.8 - 1.7   Final  
 Microalbumin,urine random 10/16/2018 12.20* 0 - 3.0 MG/DL Final  
 Creatinine, urine 10/16/2018 42.77  30 - 125 mg/dL Final  
 Microalbumin/Creat ratio (mg/g cre* 10/16/2018 285* 0 - 30 mg/g Final  
 TSH 10/16/2018 1.40  0.36 - 3.74 uIU/mL Final  
 T4, Free 10/16/2018 0.9  0.7 - 1.5 NG/DL Final  
 Vitamin D 25-Hydroxy 10/16/2018 20.5* 30 - 100 ng/mL Final  
 Comment: (NOTE) Deficiency               <20 ng/mL Insufficiency          20-30 ng/mL Sufficient             ng/mL Possible toxicity       >100 ng/mL The Method used is [facility] currently standardized to a Center of Disease Control and Prevention (CDC) certified reference  
method. Samples containing fluorescein dye can produce falsely  
elevated values when tested with the [employer] Centaur Vitamin D Assay. It is recommended that results in the toxic range, >100 ng/mL, be  
retested 72 hours post fluorescein exposure. Admission on 07/13/2018, Discharged on 07/18/2018 No results displayed because visit has over 200 results. Date FVC FEV1  FEV1/FVC [id number] TLC RV RV/TLC VC DLCO  
8/2015 53 51 decreased 33 73  120  58  
12/31/18 44 33 [id number]  nl nl nl  36 Imaging: 
I have personally reviewed the patients radiographs and have reviewed the reports: 
7/13/18: 
1. New hazy streaky densities at the lung bases bilaterally. Suggestive of 
infiltrate vs. atelectatic changes with pleural effusion. 2.  Slight prominence of the cardiac silhouette. 3.  Underlying COPD. 4.  Right mid lung zone with focal scarring vs. less likely subsegmental 
atelectasis. Patient Active Problem List  
Diagnosis Code  Hyperlipidemia E78.5  Overactive bladder N32.81  
 Sciatica M54.30  Degeneration of lumbar or lumbosacral intervertebral disc M51.37  
 Encounter for long-term (current) use of other medications Z79.899  Depression F32.9  
 Unspecified vitamin D deficiency E55.9  Glucose intolerance (pre-diabetes) R73.03  
 RLS (restless legs syndrome) G25.81  
 Aortic dissection, abdominal (HCC) I71.02  
 Ataxic gait R26.0  Chronic neck pain M54.2, G89.29  
 Orthostatic hypotension I95.1  Paresthesia R20.2  Repeated falls R29.6  Chronic pain syndrome G89.4  Lumbosacral spondylosis without myelopathy M47.817  Cervical stenosis of spinal canal M48.02  Spinal stenosis in cervical region M48.02  
 Polyneuropathy G62.9  Lumbar stenosis M48.061  
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  
  Spondylolisthesis, grade 1 M43.10  Lumbar facet arthropathy M47.816  Lumbar disc herniation M51.26  
 Lumbosacral radiculopathy at L5 M54.17  
 Lumbosacral radiculopathy at S1 M54.17  
 DM neuropathy, painful ([facility]) E11.40  Acute exacerbation of chronic obstructive pulmonary disease (COPD) ([facility] [address] J44.1  Carotid artery stenosis I65.29  
 Atherosclerosis of native arteries of the extremities with intermittent claudication I70.219  
 MOORE (dyspnea on exertion) R06.09  
 SOB (shortness of breath) R06.02  
 Murmur, cardiac R01.1  Hyperlipidemia LDL goal <100 E78.5  Primary osteoarthritis involving multiple joints M15.0  Prediabetes R73.03  
 Restless leg syndrome G25.81  
 Essential hypertension with goal blood pressure less than 140/90 I10  Panlobular emphysema ([facility] [address] J43.1  Impaired fasting glucose R73.01  
 HCAP (healthcare-associated pneumonia) J18.9  Abnormal CT scan, chest R93.89  
 Hypercholesterolemia E78.00  Hypokalemia E87.6  Dizziness R42  CHI (closed head injury) S09. 90XA  Elevated troponin R74.8  Type 2 diabetes mellitus with diabetic neuropathy, without long-term current use of insulin ([facility]) E11.40  Primary insomnia F51.01  
 Major depression, chronic F34.1  Pneumonia J18.9  
 CAP (community acquired pneumonia) J18.9  
 COPD exacerbation ([facility] [address] J44.1  RBBB (right bundle branch block with left anterior fascicular block) I45.2  Type 2 diabetes with nephropathy ([facility]) E11.21 IMPRESSION:  
  
· Very Severe COPD- GOLD 4 Group B. PFT declined since 2015. Needs group/stage appropriate treatment and long term supplemental Oxygen as she is hypoxic at rest and desaturates with activity . 3 L needed to correct saturations. · S/P-Acute on chronic hypoxic hypercapnic respiratory failure due to COPD exacerbation and atypical pneumonia: Improved · Bilateral pulmonary infiltrates- will need f/u to clearing · HTN 
· Glaucoma · DM · Depression · Chronic back pain · Cachexia RECOMMENDATIONS:  
· . Continue supplemental oxygen -- counseled pt regarding compliance of oxygen (pt not wearing on exertion at home). Will place new order for 3 L 
· Continue  LABA/ICS. Will not start LAMA in light of Glaucoma. Continue Symbicort with spacer. Pt using duonebs PRN at home, can continue · Consider home based Pulmonary rehab · PFT's - discussed results with patient · Pt instructed on pursed lip breathing · Incremental ambulation as tolerated · Healthy weight and nutrition Counseled the patient regarding cessation of smoking. I reviewed health risks of tobacco use including increased risk of MI, stroke, cancer, etc.  We reviewed various approaches to cessation. Pt declined cessation assistance at this time. I also strongly advised patient to avoid smoking with oxygen use due to fire/explosion risk. Pt expressed understanding.   
 
  
Mauricio Arriaga MD

## 2019-01-02 NOTE — TELEPHONE ENCOUNTER
Patient's daughter called back to inquire and I advised of Dr. Christina Mata note back stating Decadron was only a short term medication. Daughter verbalized understanding and had no other questions.

## 2019-01-07 ENCOUNTER — OFFICE VISIT (OUTPATIENT)
Dept: INTERNAL MEDICINE CLINIC | Age: 84
End: 2019-01-07

## 2019-01-07 VITALS
HEART RATE: 62 BPM | DIASTOLIC BLOOD PRESSURE: 64 MMHG | WEIGHT: 147.6 LBS | RESPIRATION RATE: 16 BRPM | HEIGHT: 65 IN | BODY MASS INDEX: 24.59 KG/M2 | SYSTOLIC BLOOD PRESSURE: 148 MMHG | TEMPERATURE: 97.8 F | OXYGEN SATURATION: 90 %

## 2019-01-07 DIAGNOSIS — I10 PRIMARY HYPERTENSION: ICD-10-CM

## 2019-01-07 DIAGNOSIS — E11.21 TYPE 2 DIABETES WITH NEPHROPATHY (HCC): Primary | ICD-10-CM

## 2019-01-07 DIAGNOSIS — E78.5 HYPERLIPIDEMIA LDL GOAL <100: ICD-10-CM

## 2019-01-07 DIAGNOSIS — F51.01 PRIMARY INSOMNIA: ICD-10-CM

## 2019-01-07 DIAGNOSIS — F32.9 MAJOR DEPRESSION, CHRONIC: ICD-10-CM

## 2019-01-07 DIAGNOSIS — J43.1 PANLOBULAR EMPHYSEMA (HCC): ICD-10-CM

## 2019-01-07 NOTE — PROGRESS NOTES
Chetna Sheffield 9/22/1933, is a 80 y.o. female, who is seen today for reevaluation of diabetes with neuropathy and nephropathy, hypertension, emphysema, hyperlipidemia, major depression, chronic insomnia, lumbar spinal stenosis. She is taking her medicine correctly as far as I can tell from discussing this with her today but not using oxygen despite Dr. Cata Doan recommendation that she do so. She has chronic dyspnea on exertion which is no worse and she cannot really tell me why she is not using oxygen at home. She is following a healthy diet. Still not sleeping well despite current medicine. Past Medical History:  
Diagnosis Date  Chronic lung disease  Colon polyps  COPD 8-30-02  DDD (degenerative disc disease), lumbar 10/1/2014  Degeneration of lumbar or lumbosacral intervertebral disc  Depression  Diabetes ([facility] [address] 7-19-05  Diabetes mellitus ([facility] [address]  Diverticulosis   DM neuropathy, painful ([facility] [address] 10/1/2014  MOORE (Dyspnea on Exertion) 4-19-02  
 echo: +mild LVH, JE93-52% w/ diastolic dysfxn  Glaucoma  HCAP (healthcare-associated pneumonia) 03/24/2017  Hemorrhoids 6-6-06  Hyperlipidemia 5/17/2011  Hypertension 4-16-02  LBP (low back pain) 4-13-04  Low back pain radiating to both legs 10/1/2014  Lumbago  Lumbar disc herniation 10/1/2014  Lumbar facet arthropathy 10/1/2014  Lumbosacral radiculopathy at L5 10/1/2014  Lumbosacral radiculopathy at S1 10/1/2014  Microscopic hematuria  OA (osteoarthritis)  Overactive bladder 5/17/2011  
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   
 -MRI brain  Urinary tract infection, site not specified Past Surgical History:  
Procedure Laterality Date  HX APPENDECTOMY  HX CHOLECYSTECTOMY    HX HYSTERECTOMY    
 (+)DUB  HX POLYPECTOMY  [place] COLONOSCOPY FLX DX W/COLLJ SPEC WHEN PFRMD  6-16-06  
 normal, Dr Jacklyn Crane  [place] COLONOSCOPY FLX DX W/COLLJ SPEC WHEN PFRMD    
 (+)polyp= tubular adenoma Current Outpatient Medications Medication Sig Dispense Refill  mirtazapine (REMERON) 15 mg tablet Take 1 Tab by mouth nightly. 90 Tab 0  
 amLODIPine (NORVASC) 5 mg tablet Take 1 Tab by mouth daily. 90 Tab 3  
 tamsulosin (FLOMAX) 0.4 mg capsule Take 1 Cap by mouth daily. 90 Cap 3  
 gabapentin (NEURONTIN) 300 mg capsule 1 capsule by mouth morning, 1 capsule at midday and 2 capsules at bedtime 120 Cap 5  
 simvastatin (ZOCOR) 20 mg tablet Take 1 Tab by mouth nightly. 90 Tab 3  
 PARoxetine (PAXIL) 30 mg tablet TAKE 1 TABLET BY MOUTH  DAILY 90 Tab 1  
 rOPINIRole (REQUIP) 1 mg tablet Take 0.5-1 Tabs by mouth nightly. Indications: Restless Legs Syndrome 90 Tab 0  
 traMADol (ULTRAM) 50 mg tablet Take 1 Tab by mouth every eight (8) hours as needed for Pain. Max Daily Amount: 150 mg. 90 Tab 2  
 aspirin delayed-release 81 mg tablet Take 1 Tab by mouth daily. 100 Tab 1  
 inhalational spacing device (AEROCHAMBER MV) 1 Each by Does Not Apply route as needed. 1 Device 2  
 albuterol-ipratropium (DUO-NEB) 2.5 mg-0.5 mg/3 ml nebu 3 mL by Nebulization route every six (6) hours as needed. 30 Nebule 0  
 budesonide-formoterol (SYMBICORT) 80-4.5 mcg/actuation HFAA Take 2 Puffs by inhalation two (2) times a day. 1 Inhaler 0  
 OXYGEN-AIR DELIVERY SYSTEMS 3 L by Nasal route continuous.  Nebulizer & Compressor (PORTABLE NEBULIZER SYSTEM) machine 1 Each by Does Not Apply route every six (6) hours as needed. 1 Each 0 Allergies Allergen Reactions  Levaquin [Levofloxacin] Rash Social History Socioeconomic History  Marital status:  Spouse name: Not on file  Number of children: Not on file  Years of education: Not on file  Highest education level: Not on file Tobacco Use  Smoking status: Current Every Day Smoker Packs/day: 1.00 Years: 50.00 Pack years: 50.00 Types: Cigarettes  Smokeless tobacco: Never Used  Tobacco comment: pt has cut down recently to less than 1 ppd Substance and Sexual Activity  Alcohol use: No  
 Drug use: No  
 Sexual activity: Not Currently Visit Vitals /64 (BP 1 Location: Right arm, BP Patient Position: Sitting) Pulse 62 Temp 97.8 °F (36.6 °C) (Oral) Resp 16 Ht 5' 5\" (1.651 m) Wt 147 lb 9.6 oz (67 kg) SpO2 90% BMI 24.56 kg/m² Oral cavity reveals no lesions. Neck reveals no adenopathy or thyromegaly. Carotids are 2+ without bruits. Lungs are clear to percussion. Diminished breath sounds throughout with mild end expiratory wheeze and minimal crackles in the bases. Heart reveals a regular rhythm with normal S1 and S2 no murmur gallop click or rub. Apical impulse is not palpable. Abdomen is soft and nontender with no hepatosplenomegaly or masses and no bruits. Extremities reveal no clubbing cyanosis or edema. Pulses are 2+ except absent in the feet and ankles. Feet reveal no deformities ulcerations or calluses. Loss of sensation to monofilament testing. Breasts reveal no masses no skin or nipple abnormalities and no axillary adenopathy. Results for orders placed or performed during the hospital encounter of 10/16/18 CBC WITH AUTOMATED DIFF Result Value Ref Range WBC 6.1 4.6 - 13.2 K/uL  
 RBC 4.64 4.20 - 5.30 M/uL  
 HGB 13.6 12.0 - 16.0 g/dL HCT 44.0 35.0 - 45.0 % MCV 94.8 74.0 - 97.0 FL  
 MCH 29.3 24.0 - 34.0 PG  
 MCHC 30.9 (L) 31.0 - 37.0 g/dL  
 RDW 16.4 (H) 11.6 - 14.5 % PLATELET 137 741 - 720 K/uL MPV 10.7 9.2 - 11.8 FL  
 NEUTROPHILS 65 40 - 73 % LYMPHOCYTES 24 21 - 52 % MONOCYTES 7 3 - 10 % EOSINOPHILS 3 0 - 5 % BASOPHILS 1 0 - 2 %  
 ABS. NEUTROPHILS 4.0 1.8 - 8.0 K/UL  
 ABS. LYMPHOCYTES 1.4 0.9 - 3.6 K/UL  
 ABS. MONOCYTES 0.4 0.05 - 1.2 K/UL  
 ABS. EOSINOPHILS 0.2 0.0 - 0.4 K/UL  
 ABS. BASOPHILS 0.0 0.0 - 0.1 K/UL  
 DF AUTOMATED    
LIPID PANEL Result Value Ref Range LIPID PROFILE Cholesterol, total 150 <200 MG/DL Triglyceride 79 <150 MG/DL  
 HDL Cholesterol 62 (H) 40 - 60 MG/DL  
 LDL, calculated 72.2 0 - 100 MG/DL VLDL, calculated 15.8 MG/DL  
 CHOL/HDL Ratio 2.4 0 - 5.0 HEMOGLOBIN A1C WITH EAG Result Value Ref Range Hemoglobin A1c 6.2 (H) 4.2 - 5.6 % Est. average glucose 131 mg/dL METABOLIC PANEL, COMPREHENSIVE Result Value Ref Range Sodium 142 136 - 145 mmol/L Potassium 4.4 3.5 - 5.5 mmol/L Chloride 102 100 - 108 mmol/L  
 CO2 33 (H) 21 - 32 mmol/L Anion gap 7 3.0 - 18 mmol/L Glucose 101 (H) 74 - 99 mg/dL BUN 11 7.0 - 18 MG/DL Creatinine 0.64 0.6 - 1.3 MG/DL  
 BUN/Creatinine ratio 17 12 - 20 GFR est AA >60 >60 ml/min/1.73m2 GFR est non-AA >60 >60 ml/min/1.73m2 Calcium 8.9 8.5 - 10.1 MG/DL Bilirubin, total 0.4 0.2 - 1.0 MG/DL  
 ALT (SGPT) 14 13 - 56 U/L  
 AST (SGOT) 12 (L) 15 - 37 U/L Alk. phosphatase 91 45 - 117 U/L Protein, total 7.0 6.4 - 8.2 g/dL Albumin 4.1 3.4 - 5.0 g/dL Globulin 2.9 2.0 - 4.0 g/dL A-G Ratio 1.4 0.8 - 1.7 MICROALBUMIN, UR, RAND W/ MICROALB/CREAT RATIO Result Value Ref Range Microalbumin,urine random 12.20 (H) 0 - 3.0 MG/DL Creatinine, urine 42.77 30 - 125 mg/dL Microalbumin/Creat ratio (mg/g creat) 285 (H) 0 - 30 mg/g  
TSH 3RD GENERATION Result Value Ref Range TSH 1.40 0.36 - 3.74 uIU/mL T4, FREE Result Value Ref Range T4, Free 0.9 0.7 - 1.5 NG/DL  
VITAMIN D, 25 HYDROXY Result Value Ref Range Vitamin D 25-Hydroxy 20.5 (L) 30 - 100 ng/mL Assessment: #1.   Severe COPD with emphysema, she will continue DuoNeb and [place] and I have encouraged her to use oxygen at home all the time. #2.  Depression, not suicidal but chronically depressed with insomnia which may be related to depression. She will continue Paxil 30 mg daily, mirtazapine 15 mg at bedtime. 3.  Hypertension with blood pressure higher than usual, for now she will continue amlodipine 5 mg daily and no added salt diet and blood pressure still is high at next visit we may increase amlodipine or add other medicine. #4.  Lumbar spinal stenosis with ongoing pain, somewhat better. She will continue gabapentin 300 mg twice a day and 600 mg at bedtime and tramadol 50 mg up to 3 times a day as needed for pain. Follow-up in 3 months, no lab for 6 months. Gaetano Apodaca, [address] [place] [address] Ave Please note: This document has been produced using voice recognition software. Unrecognized errors in transcription may be present.

## 2019-01-07 NOTE — PROGRESS NOTES
1. Have you been to the ER, urgent care clinic or hospitalized since your last visit? NO 
      
 
2. Have you seen or consulted any other health care providers outside of the [facility] since your last visit (Include any pap smears or colon screening)? NO Do you have an Advanced Directive? YES Would you like information on Advanced Directives?  NO

## 2019-01-08 ENCOUNTER — TELEPHONE (OUTPATIENT)
Dept: INTERNAL MEDICINE CLINIC | Age: 84
End: 2019-01-08

## 2019-01-08 NOTE — TELEPHONE ENCOUNTER
Daughter Agapito Bui called- PT's AVS mentions diabetes- she wants to know why her mother is not on any medication for this

## 2019-01-08 NOTE — TELEPHONE ENCOUNTER
Patient daughter Shelley Sams called trying to find out why her mom the patient was not taking med for diabetes. I read her Dr. Nolberto Mcardle note but still not satisfied  please give her a call [phone].

## 2019-01-10 ENCOUNTER — TELEPHONE (OUTPATIENT)
Dept: PULMONOLOGY | Age: 84
End: 2019-01-10

## 2019-01-10 NOTE — TELEPHONE ENCOUNTER
PT'S DAUGHTER [id number]). PT WAS SUPPOSED TO GET O2 AND NO ONE HAS CONTACTED PT AND IT HAS BEEN OVER A WEEK. PLEASE CHECK AND CALL PT BACK.

## 2019-01-10 NOTE — TELEPHONE ENCOUNTER
Spoke with nancy at [facility]. She has the paperwork for the oxygen setup and they will be contacting pt.

## 2019-01-29 ENCOUNTER — TELEPHONE (OUTPATIENT)
Dept: INTERNAL MEDICINE CLINIC | Age: 84
End: 2019-01-29

## 2019-01-29 DIAGNOSIS — E11.40 TYPE 2 DIABETES MELLITUS WITH DIABETIC NEUROPATHY, WITHOUT LONG-TERM CURRENT USE OF INSULIN (HCC): ICD-10-CM

## 2019-01-29 RX ORDER — PAROXETINE 30 MG/1
TABLET, FILM COATED ORAL
Qty: 90 TAB | Refills: 1 | Status: SHIPPED | OUTPATIENT
Start: 2019-01-29

## 2019-01-29 NOTE — TELEPHONE ENCOUNTER
Pt daughter Christina Cueto) called asking about her mom's rx for MIRTAZAPINE she said since it has been being called in to [facility] she is only getting 15mg tablets , she said she use to get 30mg , daughter is wondering why ,please  advise

## 2019-01-29 NOTE — TELEPHONE ENCOUNTER
Last Visit: 1/7/19  Next Appt: 2/22/19  Previous Refill Encounter: 9/10/18-90+1 refill    Requested Prescriptions     Pending Prescriptions Disp Refills    PARoxetine (PAXIL) 30 mg tablet 90 Tab 1

## 2019-01-29 NOTE — TELEPHONE ENCOUNTER
Spoke with patients daughter and advised her that the mirtazapine was switched to 15 mg on October 22, 2018 due to med was not as effective as  expected and patient had starting gaining weight according to chart note 10/22. Ms. Rehman verbalized understanding.

## 2019-01-30 ENCOUNTER — TELEPHONE (OUTPATIENT)
Dept: INTERNAL MEDICINE CLINIC | Age: 84
End: 2019-01-30

## 2019-01-30 RX ORDER — TRAMADOL HYDROCHLORIDE 50 MG/1
TABLET ORAL
Qty: 90 TAB | Refills: 2 | Status: ON HOLD | OUTPATIENT
Start: 2019-01-30 | End: 2019-03-15 | Stop reason: SDUPTHER

## 2019-01-30 NOTE — TELEPHONE ENCOUNTER
Spoke with patient's daughter and given message from Dr. Marge Solomon. Verbalized understanding.         Nehemias Velez MD   [facility] 29 minutes ago (4:32 PM)      She can stop metoprolol, I do not advise taking her out in the extremely cold weather, but if she has to go out  the car should be pre-heated (Routing comment)

## 2019-01-30 NOTE — TELEPHONE ENCOUNTER
Pt daughter called asking if her mom should continue taking her Metoprolol 25mg and would also like to know Dr opinion on bringing her mom out in the cold  Temperature  Please advise [phone]

## 2019-02-22 RX ORDER — IPRATROPIUM BROMIDE AND ALBUTEROL SULFATE 2.5; .5 MG/3ML; MG/3ML
3 SOLUTION RESPIRATORY (INHALATION)
Qty: 30 NEBULE | Refills: 0 | Status: ON HOLD | OUTPATIENT
Start: 2019-02-22 | End: 2019-03-15 | Stop reason: SDUPTHER

## 2019-02-22 NOTE — TELEPHONE ENCOUNTER
Last Visit: 1/7/19  Next Appt: 2/22/19  Previous Refill Encounter: 7/18/18-30 neb+0    Requested Prescriptions     Pending Prescriptions Disp Refills    albuterol-ipratropium (DUO-NEB) 2.5 mg-0.5 mg/3 ml nebu 30 Nebule 0     Sig: 3 mL by Nebulization route every six (6) hours as needed.

## 2019-02-25 ENCOUNTER — OFFICE VISIT (OUTPATIENT)
Dept: PULMONOLOGY | Age: 84
End: 2019-02-25

## 2019-02-25 VITALS
DIASTOLIC BLOOD PRESSURE: 72 MMHG | RESPIRATION RATE: 18 BRPM | TEMPERATURE: 97.7 F | WEIGHT: 150 LBS | HEIGHT: 65 IN | SYSTOLIC BLOOD PRESSURE: 120 MMHG | BODY MASS INDEX: 24.99 KG/M2 | HEART RATE: 86 BPM | OXYGEN SATURATION: 87 %

## 2019-02-25 DIAGNOSIS — J44.1 ACUTE EXACERBATION OF COPD WITH ASTHMA (HCC): Primary | ICD-10-CM

## 2019-02-25 DIAGNOSIS — J96.11 CHRONIC RESPIRATORY FAILURE WITH HYPOXIA (HCC): ICD-10-CM

## 2019-02-25 DIAGNOSIS — J45.901 ACUTE EXACERBATION OF COPD WITH ASTHMA (HCC): Primary | ICD-10-CM

## 2019-02-25 DIAGNOSIS — G25.81 RLS (RESTLESS LEGS SYNDROME): ICD-10-CM

## 2019-02-25 PROBLEM — J44.9 STAGE 4 VERY SEVERE COPD BY GOLD CLASSIFICATION (HCC): Status: ACTIVE | Noted: 2019-02-25

## 2019-02-25 RX ORDER — ALBUTEROL SULFATE 0.83 MG/ML
2.5 SOLUTION RESPIRATORY (INHALATION) ONCE
Qty: 1 EACH | Refills: 0 | Status: SHIPPED | COMMUNITY
Start: 2019-02-25 | End: 2019-02-25

## 2019-02-25 RX ORDER — PREDNISONE 10 MG/1
TABLET ORAL
Qty: 18 TAB | Refills: 0 | Status: ON HOLD | OUTPATIENT
Start: 2019-02-25 | End: 2019-03-15 | Stop reason: SDUPTHER

## 2019-02-25 RX ORDER — ALBUTEROL SULFATE 0.83 MG/ML
2.5 SOLUTION RESPIRATORY (INHALATION) ONCE
Qty: 1 EACH | Refills: 0
Start: 2019-02-25 | End: 2019-02-25 | Stop reason: CLARIF

## 2019-02-25 NOTE — PROGRESS NOTES
[facility] Pulmonary, Critical Care, and Sleep Medicine Pulmonary Office Progress Notes Name: Helena Ferrer : 1933 Date: 2019 Subjective:  
 
Patient is a 80 y.o. female is here for follow up for: Problems-COPD 
 
 
19 Patient here today for evaluation -chronic COPD and multiple other issues. Patient walked into the clinic without her oxygen and was noted to have SaO2 70%. She was immediately placed on oxygen 3 L and gradually saturation improved to 89%. Her son reports that patient needs to be reminded and prompted to use her medications as well as the oxygen at home She has not been using the nebulizer as instructed because she feels that she cannot turn the switch on and therefore has to wait for family member to help She is taking her inhalers as prescribed Complains of increased cough with difficulty expectorating No interval ER or hospital visits Taking care of spouse who has dementia. She has not smoked since she was in the hospital after over 50 years of smoking. Past Medical History:  
Diagnosis Date  Chronic lung disease  Colon polyps  COPD 02  DDD (degenerative disc disease), lumbar 10/1/2014  Degeneration of lumbar or lumbosacral intervertebral disc  Depression  Diabetes ([address] 05  Diabetes mellitus ([address]  Diverticulosis   DM neuropathy, painful ([address] 10/1/2014  MOORE (Dyspnea on Exertion) 02  
 echo: +mild LVH, UN27-12% w/ diastolic dysfxn  Glaucoma  HCAP (healthcare-associated pneumonia) 2017  Hemorrhoids 06  Hyperlipidemia 2011  Hypertension 02  LBP (low back pain) 04  Low back pain radiating to both legs 10/1/2014  Lumbago  Lumbar disc herniation 10/1/2014  Lumbar facet arthropathy 10/1/2014  Lumbosacral radiculopathy at L5 10/1/2014  Lumbosacral radiculopathy at S1 10/1/2014  Microscopic hematuria  OA (osteoarthritis)  Overactive bladder 5/17/2011  
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   
 -MRI brain  Urinary tract infection, site not specified Allergies Allergen Reactions  Levaquin [Levofloxacin] Rash Current Outpatient Medications Medication Sig Dispense Refill  predniSONE (DELTASONE) 10 mg tablet 30 mg po dailyx 3 days,20 mg po daily x 3 days,10 mg po daily x 3 days 18 Tab 0  
 albuterol-ipratropium (DUO-NEB) 2.5 mg-0.5 mg/3 ml nebu 3 mL by Nebulization route every six (6) hours as needed. 30 Nebule 0  
 traMADol (ULTRAM) 50 mg tablet TAKE 1 TABLET BY MOUTH EVERY 8 HOURS AS NEEDED FOR PAIN. MAX 150MG DAILY 90 Tab 2  
 PARoxetine (PAXIL) 30 mg tablet TAKE 1 TABLET BY MOUTH  DAILY 90 Tab 1  
 mirtazapine (REMERON) 15 mg tablet Take 1 Tab by mouth nightly. 90 Tab 0  
 amLODIPine (NORVASC) 5 mg tablet Take 1 Tab by mouth daily. 90 Tab 3  
 tamsulosin (FLOMAX) 0.4 mg capsule Take 1 Cap by mouth daily. 90 Cap 3  
 gabapentin (NEURONTIN) 300 mg capsule 1 capsule by mouth morning, 1 capsule at midday and 2 capsules at bedtime 120 Cap 5  
 simvastatin (ZOCOR) 20 mg tablet Take 1 Tab by mouth nightly. 90 Tab 3  
 rOPINIRole (REQUIP) 1 mg tablet Take 0.5-1 Tabs by mouth nightly. Indications: Restless Legs Syndrome 90 Tab 0  
 aspirin delayed-release 81 mg tablet Take 1 Tab by mouth daily. 100 Tab 1  
 inhalational spacing device (AEROCHAMBER MV) 1 Each by Does Not Apply route as needed. 1 Device 2  
 OXYGEN-AIR DELIVERY SYSTEMS 3 L by Nasal route continuous.  Nebulizer & Compressor (PORTABLE NEBULIZER SYSTEM) machine 1 Each by Does Not Apply route every six (6) hours as needed. 1 Each 0  
 budesonide-formoterol (SYMBICORT) 80-4.5 mcg/actuation HFAA Take 2 Puffs by inhalation two (2) times a day. 1 Inhaler 0 Review of Systems: 
HEENT: No epistaxis, no nasal drainage, no difficulty in swallowing, no redness in eyes Respiratory: as above Cardiovascular: no chest pain, no palpitations, no chronic leg edema, no syncope Gastrointestinal: no abd pain, no vomiting, no diarrhea, no bleeding symptoms Genitourinary: No urinary symptoms or hematuria Integument/breast: No ulcers or rashes Musculoskeletal:Neg 
Neurological: No focal weakness, no seizures, no headaches Behvioral/Psych: No anxiety, no depression Constitutional: No fever, no chills, no weight loss, no night sweats Objective:  
 
Visit Vitals /72 (BP 1 Location: Left arm, BP Patient Position: Sitting) Pulse 86 Temp 97.7 °F (36.5 °C) (Oral) Resp 18 Ht 5' 5\" (1.651 m) Wt 68 kg (150 lb) SpO2 (!) 87% Comment: 2 LPM O2  
BMI 24.96 kg/m² Physical Exam:  
General: comfortable, no acute distress HEENT: pupils reactive, sclera anicteric, EOM intact Neck: No adenopathy or thyroid swelling, no JVD, supple CVS: S1S2 no murmurs RS: Mod AE bilaterally, bilateral expiratory wheezing heard ,no tactile fremitus or egophony, no accessory muscle use Abd: soft, non tender, no hepatosplenomegaly Neuro: non focal, awake, alert Extrm: no leg edema, clubbing or cyanosis Skin: no rash Data review: Hospital Outpatient Visit on 10/16/2018 Component Date Value Ref Range Status  WBC 10/16/2018 6.1  4.6 - 13.2 K/uL Final  
 RBC 10/16/2018 4.64  4.20 - 5.30 M/uL Final  
 HGB 10/16/2018 13.6  12.0 - 16.0 g/dL Final  
 HCT 10/16/2018 44.0  35.0 - 45.0 % Final  
 MCV 10/16/2018 94.8  74.0 - 97.0 FL Final  
 MCH 10/16/2018 29.3  24.0 - 34.0 PG Final  
  MCHC 10/16/2018 30.9* 31.0 - 37.0 g/dL Final  
 RDW 10/16/2018 16.4* 11.6 - 14.5 % Final  
 PLATELET 15/06/6036 918  135 - 420 K/uL Final  
 MPV 10/16/2018 10.7  9.2 - 11.8 FL Final  
 NEUTROPHILS 10/16/2018 65  40 - 73 % Final  
 LYMPHOCYTES 10/16/2018 24  21 - 52 % Final  
 MONOCYTES 10/16/2018 7  3 - 10 % Final  
 EOSINOPHILS 10/16/2018 3  0 - 5 % Final  
 BASOPHILS 10/16/2018 1  0 - 2 % Final  
 ABS. NEUTROPHILS 10/16/2018 4.0  1.8 - 8.0 K/UL Final  
 ABS. LYMPHOCYTES 10/16/2018 1.4  0.9 - 3.6 K/UL Final  
 ABS. MONOCYTES 10/16/2018 0.4  0.05 - 1.2 K/UL Final  
 ABS. EOSINOPHILS 10/16/2018 0.2  0.0 - 0.4 K/UL Final  
 ABS. BASOPHILS 10/16/2018 0.0  0.0 - 0.1 K/UL Final  
 DF 10/16/2018 AUTOMATED    Final  
 LIPID PROFILE 10/16/2018        Final  
 Cholesterol, total 10/16/2018 150  <200 MG/DL Final  
 Triglyceride 10/16/2018 79  <150 MG/DL Final  
 Comment: The drugs N-acetylcysteine (NAC) and 
Metamiszole have been found to cause falsely 
low results in this chemical assay. Please 
be sure to submit blood samples obtained BEFORE administration of either of these 
drugs to assure correct results.  HDL Cholesterol 10/16/2018 62* 40 - 60 MG/DL Final  
 LDL, calculated 10/16/2018 72.2  0 - 100 MG/DL Final  
 VLDL, calculated 10/16/2018 15.8  MG/DL Final  
 CHOL/HDL Ratio 10/16/2018 2.4  0 - 5.0   Final  
 Hemoglobin A1c 10/16/2018 6.2* 4.2 - 5.6 % Final  
 Comment: (NOTE) HbA1C Interpretive Ranges <5.7              Normal 
5.7 - 6.4         Consider Prediabetes >6.5              Consider Diabetes  Est. average glucose 10/16/2018 131  mg/dL Final  
 Comment: (NOTE) The eAG should be interpreted with patient characteristics in mind  
since ethnicity, interindividual differences, red cell lifespan,  
variation in rates of glycation, etc. may affect the validity of the  
calculation.  
  
 Sodium 10/16/2018 142  136 - 145 mmol/L Final  
  Potassium 10/16/2018 4.4  3.5 - 5.5 mmol/L Final  
 Chloride 10/16/2018 102  100 - 108 mmol/L Final  
 CO2 10/16/2018 33* 21 - 32 mmol/L Final  
 Anion gap 10/16/2018 7  3.0 - 18 mmol/L Final  
 Glucose 10/16/2018 101* 74 - 99 mg/dL Final  
 BUN 10/16/2018 11  7.0 - 18 MG/DL Final  
 Creatinine 10/16/2018 0.64  0.6 - 1.3 MG/DL Final  
 BUN/Creatinine ratio 10/16/2018 17  12 - 20   Final  
 GFR est AA 10/16/2018 >60  >60 ml/min/1.73m2 Final  
 GFR est non-AA 10/16/2018 >60  >60 ml/min/1.73m2 Final  
 Comment: (NOTE) Estimated GFR is calculated using the Modification of Diet in Renal  
Disease (MDRD) Study equation, reported for both  [facility] [place]) and non- Americans (GFRNA), and normalized to 1.73m2  
body surface area. The physician must decide which value applies to  
the patient. The MDRD study equation should only be used in  
individuals age 25 or older. It has not been validated for the  
following: pregnant women, patients with serious comorbid conditions,  
or on certain medications, or persons with extremes of body size,  
muscle mass, or nutritional status.  Calcium 10/16/2018 8.9  8.5 - 10.1 MG/DL Final  
 Bilirubin, total 10/16/2018 0.4  0.2 - 1.0 MG/DL Final  
 ALT (SGPT) 10/16/2018 14  13 - 56 U/L Final  
 AST (SGOT) 10/16/2018 12* 15 - 37 U/L Final  
 Alk.  phosphatase 10/16/2018 91  45 - 117 U/L Final  
 Protein, total 10/16/2018 7.0  6.4 - 8.2 g/dL Final  
 Albumin 10/16/2018 4.1  3.4 - 5.0 g/dL Final  
 Globulin 10/16/2018 2.9  2.0 - 4.0 g/dL Final  
 A-G Ratio 10/16/2018 1.4  0.8 - 1.7   Final  
 Microalbumin,urine random 10/16/2018 12.20* 0 - 3.0 MG/DL Final  
 Creatinine, urine 10/16/2018 42.77  30 - 125 mg/dL Final  
 Microalbumin/Creat ratio (mg/g cre* 10/16/2018 285* 0 - 30 mg/g Final  
 TSH 10/16/2018 1.40  0.36 - 3.74 uIU/mL Final  
 T4, Free 10/16/2018 0.9  0.7 - 1.5 NG/DL Final  
 Vitamin D 25-Hydroxy 10/16/2018 20.5* 30 - 100 ng/mL Final  
 Comment: (NOTE) Deficiency               <20 ng/mL Insufficiency          20-30 ng/mL Sufficient             ng/mL Possible toxicity       >100 ng/mL The Method used is [facility] currently standardized to a Center of Disease Control and Prevention (CDC) certified reference  
method. Samples containing fluorescein dye can produce falsely  
elevated values when tested with the ADVIA Centaur Vitamin D Assay. It is recommended that results in the toxic range, >100 ng/mL, be  
retested 72 hours post fluorescein exposure. Date FVC FEV1  FEV1/FVC [id number] TLC RV RV/TLC VC DLCO  
8/2015 53 51 decreased 33 73  120  58  
12/31/18 44 33 [id number]  nl nl nl  36 Imaging: 
I have personally reviewed the patients radiographs and have reviewed the reports: 
7/13/18: 
1. New hazy streaky densities at the lung bases bilaterally. Suggestive of 
infiltrate vs. atelectatic changes with pleural effusion. 2.  Slight prominence of the cardiac silhouette. 3.  Underlying COPD. 4.  Right mid lung zone with focal scarring vs. less likely subsegmental 
atelectasis. Patient Active Problem List  
Diagnosis Code  Hyperlipidemia E78.5  Overactive bladder N32.81  
 Sciatica M54.30  Degeneration of lumbar or lumbosacral intervertebral disc M51.37  
 Encounter for long-term (current) use of other medications Z79.899  Depression F32.9  
 Unspecified vitamin D deficiency E55.9  Glucose intolerance (pre-diabetes) R73.03  
 RLS (restless legs syndrome) G25.81  
 Aortic dissection, abdominal (HCC) I71.02  
 Ataxic gait R26.0  Chronic neck pain M54.2, G89.29  
 Orthostatic hypotension I95.1  Paresthesia R20.2  Repeated falls R29.6  Chronic pain syndrome G89.4  Lumbosacral spondylosis without myelopathy M47.817  Cervical stenosis of spinal canal M48.02  Spinal stenosis in cervical region M48.02  
 Polyneuropathy G62.9  Lumbar stenosis M48.061  
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  
 Lumbosacral radiculopathy at L5 M54.17  
 Lumbosacral radiculopathy at S1 M54.17  
 DM neuropathy, painful ([facility]) E11.40  Acute exacerbation of chronic obstructive pulmonary disease (COPD) ([facility] J44.1  Carotid artery stenosis I65.29  
 Atherosclerosis of native arteries of the extremities with intermittent claudication I70.219  
 MOORE (dyspnea on exertion) R06.09  
 SOB (shortness of breath) R06.02  
 Murmur, cardiac R01.1  Hyperlipidemia LDL goal <100 E78.5  Primary osteoarthritis involving multiple joints M15.0  Prediabetes R73.03  
 Restless leg syndrome G25.81  
 Essential hypertension with goal blood pressure less than 140/90 I10  Panlobular emphysema ([facility] J43.1  Impaired fasting glucose R73.01  
 HCAP (healthcare-associated pneumonia) J18.9  Abnormal CT scan, chest R93.89  
 Hypercholesterolemia E78.00  Hypokalemia E87.6  Dizziness R42  CHI (closed head injury) S09. 90XA  Elevated troponin R74.8  Type 2 diabetes mellitus with diabetic neuropathy, without long-term current use of insulin ([facility]) E11.40  Primary insomnia F51.01  
 Major depression, chronic F34.1  Pneumonia J18.9  
 CAP (community acquired pneumonia) J18.9  
 COPD exacerbation ([facility] J44.1  RBBB (right bundle branch block with left anterior fascicular block) I45.2  Type 2 diabetes with nephropathy ([facility]) E11.21  
 Chronic respiratory failure with hypoxia ([facility]) J96.11 IMPRESSION:  
  
· Very Severe COPD- GOLD 4 Group B. PFT declined since 2015. Needs group/stage appropriate treatment and long term supplemental Oxygen as she is hypoxic at rest and desaturates with activity . 3 L needed to correct saturations. · Chronic persistent obstructive cough with retained mucus · S/P-Acute on chronic hypoxic hypercapnic respiratory failure due to COPD exacerbation and atypical pneumonia: Improved · Bilateral pulmonary infiltrates- will need f/u to clearing · HTN 
· Glaucoma · DM · Depression · Chronic back pain · Cachexia RECOMMENDATIONS:  
· . Continue supplemental oxygen -- counseled pt regarding compliance of oxygen (pt not wearing on exertion at home). Will place new order for 3 L 
· Continue  LABA/ICS. Will not start LAMA in light of Glaucoma. Continue Symbicort with spacer. · Pt using duonebs PRN at home, will step up to a more structured 4 times a day · Instructed on use of the device and had patient demonstrate how to independently use it · Prednisone taper for the current exacerbation · Consider home based Pulmonary rehab · PFT's - discussed results with patient · Pt instructed on pursed lip breathing · Incremental ambulation as tolerated · Healthy weight and nutrition Counseled the patient regarding cessation of smoking. I reviewed health risks of tobacco use including increased risk of MI, stroke, cancer, etc.  We reviewed various approaches to cessation. Pt declined cessation assistance at this time. I also strongly advised patient to avoid smoking with oxygen use due to fire/explosion risk. Pt expressed understanding.   
 
  
Tres Erazo MD

## 2019-02-25 NOTE — PROGRESS NOTES
Chief Complaint Patient presents with  COPD  
  follow up from 12/31/2018  Other  
  emphysema, respiratory failure with hypoxia  Shortness of Breath  Wheezing 1. Have you been to the ER, urgent care clinic since your last visit? Hospitalized since your last visit? No 
 
2. Have you seen or consulted any other health care providers outside of the [address] [place] [address] since your last visit? Include any pap smears or colon screening.  No

## 2019-03-09 DIAGNOSIS — M54.50 LOW BACK PAIN RADIATING TO BOTH LEGS: ICD-10-CM

## 2019-03-09 DIAGNOSIS — M54.30 SCIATICA, UNSPECIFIED LATERALITY: ICD-10-CM

## 2019-03-09 DIAGNOSIS — G25.81 RLS (RESTLESS LEGS SYNDROME): ICD-10-CM

## 2019-03-09 DIAGNOSIS — M79.604 LOW BACK PAIN RADIATING TO BOTH LEGS: ICD-10-CM

## 2019-03-09 DIAGNOSIS — G62.9 POLYNEUROPATHY: ICD-10-CM

## 2019-03-09 DIAGNOSIS — M79.605 LOW BACK PAIN RADIATING TO BOTH LEGS: ICD-10-CM

## 2019-03-10 ENCOUNTER — HOSPITAL ENCOUNTER (INPATIENT)
Age: 84
LOS: 5 days | Discharge: NURSING, REHAB OR LONG-TERM CARE | DRG: 208 | End: 2019-03-15
Attending: EMERGENCY MEDICINE | Admitting: INTERNAL MEDICINE
Payer: MEDICARE

## 2019-03-10 ENCOUNTER — APPOINTMENT (OUTPATIENT)
Dept: GENERAL RADIOLOGY | Age: 84
DRG: 208 | End: 2019-03-10
Attending: EMERGENCY MEDICINE
Payer: MEDICARE

## 2019-03-10 DIAGNOSIS — J44.1 COPD EXACERBATION (HCC): ICD-10-CM

## 2019-03-10 DIAGNOSIS — R06.89 VENTILATORY FAILURE: ICD-10-CM

## 2019-03-10 DIAGNOSIS — J96.11 CHRONIC RESPIRATORY FAILURE WITH HYPOXIA (HCC): ICD-10-CM

## 2019-03-10 DIAGNOSIS — E11.40 TYPE 2 DIABETES MELLITUS WITH DIABETIC NEUROPATHY, WITHOUT LONG-TERM CURRENT USE OF INSULIN (HCC): ICD-10-CM

## 2019-03-10 DIAGNOSIS — Z71.89 ADVANCED CARE PLANNING/COUNSELING DISCUSSION: ICD-10-CM

## 2019-03-10 DIAGNOSIS — I50.9 CONGESTIVE HEART FAILURE, UNSPECIFIED HF CHRONICITY, UNSPECIFIED HEART FAILURE TYPE (HCC): Primary | ICD-10-CM

## 2019-03-10 DIAGNOSIS — R53.81 DEBILITY: ICD-10-CM

## 2019-03-10 DIAGNOSIS — S32.592A CLOSED FRACTURE OF MULTIPLE PUBIC RAMI, LEFT, INITIAL ENCOUNTER (HCC): ICD-10-CM

## 2019-03-10 LAB
ALBUMIN SERPL-MCNC: 3.1 G/DL (ref 3.4–5)
ALBUMIN/GLOB SERPL: 0.9 {RATIO} (ref 0.8–1.7)
ALP SERPL-CCNC: 81 U/L (ref 45–117)
ALT SERPL-CCNC: 21 U/L (ref 13–56)
ANION GAP SERPL CALC-SCNC: 7 MMOL/L (ref 3–18)
ARTERIAL PATENCY WRIST A: YES
AST SERPL-CCNC: 37 U/L (ref 15–37)
BASE EXCESS BLD CALC-SCNC: 6 MMOL/L
BASOPHILS # BLD: 0 K/UL (ref 0–0.1)
BASOPHILS NFR BLD: 0 % (ref 0–2)
BDY SITE: ABNORMAL
BILIRUB SERPL-MCNC: 0.5 MG/DL (ref 0.2–1)
BNP SERPL-MCNC: ABNORMAL PG/ML (ref 0–1800)
BODY TEMPERATURE: 37
BUN SERPL-MCNC: 41 MG/DL (ref 7–18)
BUN/CREAT SERPL: 19 (ref 12–20)
CALCIUM SERPL-MCNC: 8.2 MG/DL (ref 8.5–10.1)
CHLORIDE SERPL-SCNC: 93 MMOL/L (ref 100–108)
CO2 SERPL-SCNC: 37 MMOL/L (ref 21–32)
CREAT SERPL-MCNC: 2.18 MG/DL (ref 0.6–1.3)
DIFFERENTIAL METHOD BLD: ABNORMAL
EOSINOPHIL # BLD: 0 K/UL (ref 0–0.4)
EOSINOPHIL NFR BLD: 0 % (ref 0–5)
ERYTHROCYTE [DISTWIDTH] IN BLOOD BY AUTOMATED COUNT: 16 % (ref 11.6–14.5)
GAS FLOW.O2 O2 DELIVERY SYS: ABNORMAL L/MIN
GLOBULIN SER CALC-MCNC: 3.5 G/DL (ref 2–4)
GLUCOSE BLD STRIP.AUTO-MCNC: 259 MG/DL (ref 70–110)
GLUCOSE SERPL-MCNC: 281 MG/DL (ref 74–99)
HCO3 BLD-SCNC: 36.3 MMOL/L (ref 22–26)
HCT VFR BLD AUTO: 43.7 % (ref 35–45)
HGB BLD-MCNC: 13.8 G/DL (ref 12–16)
LACTATE BLD-SCNC: 2.67 MMOL/L (ref 0.4–2)
LYMPHOCYTES # BLD: 1.6 K/UL (ref 0.9–3.6)
LYMPHOCYTES NFR BLD: 14 % (ref 21–52)
MCH RBC QN AUTO: 28.8 PG (ref 24–34)
MCHC RBC AUTO-ENTMCNC: 31.6 G/DL (ref 31–37)
MCV RBC AUTO: 91.2 FL (ref 74–97)
MONOCYTES # BLD: 0.9 K/UL (ref 0.05–1.2)
MONOCYTES NFR BLD: 8 % (ref 3–10)
NEUTS SEG # BLD: 9.1 K/UL (ref 1.8–8)
NEUTS SEG NFR BLD: 78 % (ref 40–73)
O2/TOTAL GAS SETTING VFR VENT: 100 %
PCO2 BLD: 81.7 MMHG (ref 35–45)
PEEP RESPIRATORY: 8 CMH2O
PH BLD: 7.26 [PH] (ref 7.35–7.45)
PIP ISTAT,IPIP: 20
PLATELET # BLD AUTO: 155 K/UL (ref 135–420)
PMV BLD AUTO: 11.5 FL (ref 9.2–11.8)
PO2 BLD: 348 MMHG (ref 80–100)
POTASSIUM SERPL-SCNC: 3.3 MMOL/L (ref 3.5–5.5)
PRESSURE SUPPORT SETTING VENT: 12 CMH2O
PROT SERPL-MCNC: 6.6 G/DL (ref 6.4–8.2)
RBC # BLD AUTO: 4.79 M/UL (ref 4.2–5.3)
SAO2 % BLD: 100 % (ref 92–97)
SERVICE CMNT-IMP: ABNORMAL
SODIUM SERPL-SCNC: 137 MMOL/L (ref 136–145)
SPECIMEN TYPE: ABNORMAL
SPONTANEOUS TIMED, IST: YES
TOTAL RESP. RATE, ITRR: 24
TROPONIN I SERPL-MCNC: 0.84 NG/ML (ref 0–0.04)
WBC # BLD AUTO: 11.7 K/UL (ref 4.6–13.2)

## 2019-03-10 PROCEDURE — 83605 ASSAY OF LACTIC ACID: CPT

## 2019-03-10 PROCEDURE — 93005 ELECTROCARDIOGRAM TRACING: CPT

## 2019-03-10 PROCEDURE — 94660 CPAP INITIATION&MGMT: CPT

## 2019-03-10 PROCEDURE — 31500 INSERT EMERGENCY AIRWAY: CPT

## 2019-03-10 PROCEDURE — 71045 X-RAY EXAM CHEST 1 VIEW: CPT

## 2019-03-10 PROCEDURE — 5A09357 ASSISTANCE WITH RESPIRATORY VENTILATION, LESS THAN 24 CONSECUTIVE HOURS, CONTINUOUS POSITIVE AIRWAY PRESSURE: ICD-10-PCS | Performed by: EMERGENCY MEDICINE

## 2019-03-10 PROCEDURE — 99285 EMERGENCY DEPT VISIT HI MDM: CPT

## 2019-03-10 PROCEDURE — 83880 ASSAY OF NATRIURETIC PEPTIDE: CPT

## 2019-03-10 PROCEDURE — 82803 BLOOD GASES ANY COMBINATION: CPT

## 2019-03-10 PROCEDURE — 77030008683 HC TU ET CUF COVD -A

## 2019-03-10 PROCEDURE — 84484 ASSAY OF TROPONIN QUANT: CPT

## 2019-03-10 PROCEDURE — 36600 WITHDRAWAL OF ARTERIAL BLOOD: CPT

## 2019-03-10 PROCEDURE — 65270000029 HC RM PRIVATE

## 2019-03-10 PROCEDURE — 85025 COMPLETE CBC W/AUTO DIFF WBC: CPT

## 2019-03-10 PROCEDURE — 82962 GLUCOSE BLOOD TEST: CPT

## 2019-03-10 PROCEDURE — 73502 X-RAY EXAM HIP UNI 2-3 VIEWS: CPT

## 2019-03-10 PROCEDURE — 80053 COMPREHEN METABOLIC PANEL: CPT

## 2019-03-10 PROCEDURE — 87040 BLOOD CULTURE FOR BACTERIA: CPT

## 2019-03-10 RX ORDER — ROPINIROLE 1 MG/1
TABLET, FILM COATED ORAL
Qty: 90 TAB | Refills: 5 | Status: SHIPPED | OUTPATIENT
Start: 2019-03-10 | End: 2019-04-28

## 2019-03-10 RX ORDER — FUROSEMIDE 10 MG/ML
40 INJECTION INTRAMUSCULAR; INTRAVENOUS
Status: COMPLETED | OUTPATIENT
Start: 2019-03-10 | End: 2019-03-11

## 2019-03-10 RX ORDER — ENOXAPARIN SODIUM 100 MG/ML
1 INJECTION SUBCUTANEOUS
Status: DISCONTINUED | OUTPATIENT
Start: 2019-03-10 | End: 2019-03-11

## 2019-03-11 ENCOUNTER — APPOINTMENT (OUTPATIENT)
Dept: GENERAL RADIOLOGY | Age: 84
DRG: 208 | End: 2019-03-11
Attending: EMERGENCY MEDICINE
Payer: MEDICARE

## 2019-03-11 ENCOUNTER — APPOINTMENT (OUTPATIENT)
Dept: NON INVASIVE DIAGNOSTICS | Age: 84
DRG: 208 | End: 2019-03-11
Attending: PHYSICIAN ASSISTANT
Payer: MEDICARE

## 2019-03-11 ENCOUNTER — APPOINTMENT (OUTPATIENT)
Dept: GENERAL RADIOLOGY | Age: 84
DRG: 208 | End: 2019-03-11
Attending: INTERNAL MEDICINE
Payer: MEDICARE

## 2019-03-11 PROBLEM — I21.4 NSTEMI (NON-ST ELEVATED MYOCARDIAL INFARCTION) (HCC): Status: ACTIVE | Noted: 2019-03-11

## 2019-03-11 PROBLEM — S32.810A PELVIC RING FRACTURE, CLOSED, INITIAL ENCOUNTER (HCC): Status: ACTIVE | Noted: 2019-03-11

## 2019-03-11 LAB
ABO + RH BLD: NORMAL
ALBUMIN SERPL-MCNC: 2.4 G/DL (ref 3.4–5)
ALBUMIN/GLOB SERPL: 0.8 {RATIO} (ref 0.8–1.7)
ALP SERPL-CCNC: 71 U/L (ref 45–117)
ALT SERPL-CCNC: 17 U/L (ref 13–56)
ANION GAP SERPL CALC-SCNC: 6 MMOL/L (ref 3–18)
ARTERIAL PATENCY WRIST A: NO
ARTERIAL PATENCY WRIST A: YES
AST SERPL-CCNC: 29 U/L (ref 15–37)
ATRIAL RATE: 90 BPM
BASE EXCESS BLD CALC-SCNC: 3 MMOL/L
BASE EXCESS BLD CALC-SCNC: 7 MMOL/L
BASOPHILS # BLD: 0 K/UL (ref 0–0.06)
BASOPHILS NFR BLD: 0 % (ref 0–3)
BDY SITE: ABNORMAL
BDY SITE: ABNORMAL
BILIRUB SERPL-MCNC: 0.6 MG/DL (ref 0.2–1)
BLOOD GROUP ANTIBODIES SERPL: NORMAL
BODY TEMPERATURE: 37
BODY TEMPERATURE: 37
BUN SERPL-MCNC: 40 MG/DL (ref 7–18)
BUN/CREAT SERPL: 24 (ref 12–20)
CALCIUM SERPL-MCNC: 7.3 MG/DL (ref 8.5–10.1)
CALCULATED P AXIS, ECG09: 63 DEGREES
CALCULATED R AXIS, ECG10: -9 DEGREES
CALCULATED T AXIS, ECG11: -18 DEGREES
CHLORIDE SERPL-SCNC: 98 MMOL/L (ref 100–108)
CK MB CFR SERPL CALC: 1.7 % (ref 0–4)
CK MB CFR SERPL CALC: 1.8 % (ref 0–4)
CK MB CFR SERPL CALC: 2 % (ref 0–4)
CK MB SERPL-MCNC: 1.9 NG/ML (ref 5–25)
CK MB SERPL-MCNC: 2.8 NG/ML (ref 5–25)
CK MB SERPL-MCNC: 3.4 NG/ML (ref 5–25)
CK SERPL-CCNC: 154 U/L (ref 26–192)
CK SERPL-CCNC: 201 U/L (ref 26–192)
CK SERPL-CCNC: 96 U/L (ref 26–192)
CO2 SERPL-SCNC: 33 MMOL/L (ref 21–32)
CREAT SERPL-MCNC: 1.68 MG/DL (ref 0.6–1.3)
DIAGNOSIS, 93000: NORMAL
DIFFERENTIAL METHOD BLD: ABNORMAL
ECHO AO ROOT DIAM: 3.36 CM
ECHO LA AREA 4C: 18.7 CM2
ECHO LA VOL 4C: 49.89 ML (ref 22–52)
ECHO LA VOLUME INDEX A4C: 27.73 ML/M2 (ref 16–28)
ECHO LV E' LATERAL VELOCITY: 4.9 CM/S
ECHO LV INTERNAL DIMENSION DIASTOLIC: 4.17 CM (ref 3.9–5.3)
ECHO LV INTERNAL DIMENSION SYSTOLIC: 2.84 CM
ECHO LV IVSD: 1.17 CM (ref 0.6–0.9)
ECHO LV MASS 2D: 199.5 G (ref 67–162)
ECHO LV MASS INDEX 2D: 110.9 G/M2 (ref 43–95)
ECHO LV POSTERIOR WALL DIASTOLIC: 1.18 CM (ref 0.6–0.9)
ECHO LVOT DIAM: 1.9 CM
ECHO LVOT PEAK GRADIENT: 2 MMHG
ECHO LVOT PEAK VELOCITY: 71.36 CM/S
ECHO LVOT VTI: 13.64 CM
ECHO MV A VELOCITY: 71.16 CM/S
ECHO MV AREA VTI: 1.5 CM2
ECHO MV E DECELERATION TIME (DT): 302.4 MS
ECHO MV E VELOCITY: 67.6 CM/S
ECHO MV E/A RATIO: 0.95
ECHO MV E/E' LATERAL: 13.8
ECHO MV MAX VELOCITY: 80.64 CM/S
ECHO MV MEAN GRADIENT: 0.9 MMHG
ECHO MV PEAK GRADIENT: 2.6 MMHG
ECHO MV VTI: 25.23 CM
ECHO PULMONARY ARTERY SYSTOLIC PRESSURE (PASP): 31 MMHG
ECHO RV TAPSE: 1.64 CM (ref 1.5–2)
ECHO TV REGURGITANT MAX VELOCITY: 264.12 CM/S
ECHO TV REGURGITANT PEAK GRADIENT: 27.9 MMHG
EOSINOPHIL # BLD: 0 K/UL (ref 0–0.4)
EOSINOPHIL NFR BLD: 0 % (ref 0–5)
ERYTHROCYTE [DISTWIDTH] IN BLOOD BY AUTOMATED COUNT: 15.7 % (ref 11.6–14.5)
EST. AVERAGE GLUCOSE BLD GHB EST-MCNC: 157 MG/DL
FLUAV RNA SPEC QL NAA+PROBE: NEGATIVE
FLUBV RNA SPEC QL NAA+PROBE: NEGATIVE
GAS FLOW.O2 O2 DELIVERY SYS: ABNORMAL L/MIN
GAS FLOW.O2 O2 DELIVERY SYS: ABNORMAL L/MIN
GAS FLOW.O2 SETTING OXYMISER: 18 BPM
GLOBULIN SER CALC-MCNC: 3 G/DL (ref 2–4)
GLUCOSE BLD STRIP.AUTO-MCNC: 166 MG/DL (ref 70–110)
GLUCOSE BLD STRIP.AUTO-MCNC: 212 MG/DL (ref 70–110)
GLUCOSE BLD STRIP.AUTO-MCNC: 254 MG/DL (ref 70–110)
GLUCOSE SERPL-MCNC: 302 MG/DL (ref 74–99)
HBA1C MFR BLD: 7.1 % (ref 4.2–5.6)
HCO3 BLD-SCNC: 34.4 MMOL/L (ref 22–26)
HCO3 BLD-SCNC: 37.1 MMOL/L (ref 22–26)
HCT VFR BLD AUTO: 39.8 % (ref 35–45)
HGB BLD-MCNC: 12.5 G/DL (ref 12–16)
INR PPP: 1.1 (ref 0.8–1.2)
L PNEUMO AG UR QL IA: NEGATIVE
LACTATE BLD-SCNC: 2.87 MMOL/L (ref 0.4–2)
LYMPHOCYTES # BLD: 0.6 K/UL (ref 0.8–3.5)
LYMPHOCYTES NFR BLD: 8 % (ref 20–51)
MAGNESIUM SERPL-MCNC: 2.3 MG/DL (ref 1.6–2.6)
MCH RBC QN AUTO: 28 PG (ref 24–34)
MCHC RBC AUTO-ENTMCNC: 31.4 G/DL (ref 31–37)
MCV RBC AUTO: 89 FL (ref 74–97)
MONOCYTES # BLD: 0.2 K/UL (ref 0–1)
MONOCYTES NFR BLD: 3 % (ref 2–9)
NEUTS BAND NFR BLD MANUAL: 3 % (ref 0–5)
NEUTS SEG # BLD: 6.1 K/UL (ref 1.8–8)
NEUTS SEG NFR BLD: 86 % (ref 42–75)
O2/TOTAL GAS SETTING VFR VENT: 100 %
O2/TOTAL GAS SETTING VFR VENT: 100 %
P-R INTERVAL, ECG05: 150 MS
PCO2 BLD: 112.2 MMHG (ref 35–45)
PCO2 BLD: 60.9 MMHG (ref 35–45)
PEEP RESPIRATORY: 6 CMH2O
PEEP RESPIRATORY: 8 CMH2O
PH BLD: 7.13 [PH] (ref 7.35–7.45)
PH BLD: 7.36 [PH] (ref 7.35–7.45)
PHOSPHATE SERPL-MCNC: 2.5 MG/DL (ref 2.5–4.9)
PIP ISTAT,IPIP: 20
PLATELET # BLD AUTO: 143 K/UL (ref 135–420)
PLATELET COMMENTS,PCOM: ABNORMAL
PMV BLD AUTO: 11.5 FL (ref 9.2–11.8)
PO2 BLD: 183 MMHG (ref 80–100)
PO2 BLD: 93 MMHG (ref 80–100)
POTASSIUM SERPL-SCNC: 3.2 MMOL/L (ref 3.5–5.5)
PRESSURE SUPPORT SETTING VENT: 12 CMH2O
PROT SERPL-MCNC: 5.4 G/DL (ref 6.4–8.2)
PROTHROMBIN TIME: 13.7 SEC (ref 11.5–15.2)
Q-T INTERVAL, ECG07: 414 MS
QRS DURATION, ECG06: 128 MS
QTC CALCULATION (BEZET), ECG08: 506 MS
RBC # BLD AUTO: 4.47 M/UL (ref 4.2–5.3)
RBC MORPH BLD: ABNORMAL
RBC MORPH BLD: ABNORMAL
S PNEUM AG UR QL: NEGATIVE
SAO2 % BLD: 97 % (ref 92–97)
SAO2 % BLD: 99 % (ref 92–97)
SERVICE CMNT-IMP: ABNORMAL
SERVICE CMNT-IMP: ABNORMAL
SODIUM SERPL-SCNC: 137 MMOL/L (ref 136–145)
SPECIMEN EXP DATE BLD: NORMAL
SPECIMEN TYPE: ABNORMAL
SPECIMEN TYPE: ABNORMAL
SPONTANEOUS TIMED, IST: YES
TOTAL RESP. RATE, ITRR: 13
TOTAL RESP. RATE, ITRR: 18
TROPONIN I SERPL-MCNC: 0.7 NG/ML (ref 0–0.04)
TROPONIN I SERPL-MCNC: 0.94 NG/ML (ref 0–0.04)
TROPONIN I SERPL-MCNC: 1.01 NG/ML (ref 0–0.04)
VENTILATION MODE VENT: ABNORMAL
VENTRICULAR RATE, ECG03: 90 BPM
VOLUME CONTROL PLUS IVLCP: YES
VT SETTING VENT: 450 ML
WBC # BLD AUTO: 6.9 K/UL (ref 4.6–13.2)

## 2019-03-11 PROCEDURE — 87449 NOS EACH ORGANISM AG IA: CPT

## 2019-03-11 PROCEDURE — 74011250636 HC RX REV CODE- 250/636

## 2019-03-11 PROCEDURE — 0BH17EZ INSERTION OF ENDOTRACHEAL AIRWAY INTO TRACHEA, VIA NATURAL OR ARTIFICIAL OPENING: ICD-10-PCS | Performed by: EMERGENCY MEDICINE

## 2019-03-11 PROCEDURE — 85610 PROTHROMBIN TIME: CPT

## 2019-03-11 PROCEDURE — 87450 LEGIONELLA PNEUMOPHILA AG, URINE: CPT

## 2019-03-11 PROCEDURE — 94660 CPAP INITIATION&MGMT: CPT

## 2019-03-11 PROCEDURE — 77030037878 HC DRSG MEPILEX >48IN BORD MOLN -B

## 2019-03-11 PROCEDURE — 74011636637 HC RX REV CODE- 636/637: Performed by: INTERNAL MEDICINE

## 2019-03-11 PROCEDURE — 80053 COMPREHEN METABOLIC PANEL: CPT

## 2019-03-11 PROCEDURE — 83735 ASSAY OF MAGNESIUM: CPT

## 2019-03-11 PROCEDURE — 74011250637 HC RX REV CODE- 250/637: Performed by: PHYSICIAN ASSISTANT

## 2019-03-11 PROCEDURE — 74011636637 HC RX REV CODE- 636/637: Performed by: PHYSICIAN ASSISTANT

## 2019-03-11 PROCEDURE — 36415 COLL VENOUS BLD VENIPUNCTURE: CPT

## 2019-03-11 PROCEDURE — 65610000006 HC RM INTENSIVE CARE

## 2019-03-11 PROCEDURE — 93306 TTE W/DOPPLER COMPLETE: CPT

## 2019-03-11 PROCEDURE — 87502 INFLUENZA DNA AMP PROBE: CPT

## 2019-03-11 PROCEDURE — 36600 WITHDRAWAL OF ARTERIAL BLOOD: CPT

## 2019-03-11 PROCEDURE — 74011000250 HC RX REV CODE- 250: Performed by: EMERGENCY MEDICINE

## 2019-03-11 PROCEDURE — 74018 RADEX ABDOMEN 1 VIEW: CPT

## 2019-03-11 PROCEDURE — 94762 N-INVAS EAR/PLS OXIMTRY CONT: CPT

## 2019-03-11 PROCEDURE — 74011000258 HC RX REV CODE- 258: Performed by: PHYSICIAN ASSISTANT

## 2019-03-11 PROCEDURE — 77030008771 HC TU NG SALEM SUMP -A

## 2019-03-11 PROCEDURE — 86900 BLOOD TYPING SEROLOGIC ABO: CPT

## 2019-03-11 PROCEDURE — 84100 ASSAY OF PHOSPHORUS: CPT

## 2019-03-11 PROCEDURE — 83036 HEMOGLOBIN GLYCOSYLATED A1C: CPT

## 2019-03-11 PROCEDURE — 77030020186 HC BOOT HL PROTCT SAGE -B

## 2019-03-11 PROCEDURE — 82550 ASSAY OF CK (CPK): CPT

## 2019-03-11 PROCEDURE — 94640 AIRWAY INHALATION TREATMENT: CPT

## 2019-03-11 PROCEDURE — 71045 X-RAY EXAM CHEST 1 VIEW: CPT

## 2019-03-11 PROCEDURE — 74011000258 HC RX REV CODE- 258: Performed by: INTERNAL MEDICINE

## 2019-03-11 PROCEDURE — 82962 GLUCOSE BLOOD TEST: CPT

## 2019-03-11 PROCEDURE — 85025 COMPLETE CBC W/AUTO DIFF WBC: CPT

## 2019-03-11 PROCEDURE — 74011250636 HC RX REV CODE- 250/636: Performed by: PHYSICIAN ASSISTANT

## 2019-03-11 PROCEDURE — 94002 VENT MGMT INPAT INIT DAY: CPT

## 2019-03-11 PROCEDURE — 5A1945Z RESPIRATORY VENTILATION, 24-96 CONSECUTIVE HOURS: ICD-10-PCS | Performed by: EMERGENCY MEDICINE

## 2019-03-11 PROCEDURE — 74011250636 HC RX REV CODE- 250/636: Performed by: EMERGENCY MEDICINE

## 2019-03-11 PROCEDURE — 82803 BLOOD GASES ANY COMBINATION: CPT

## 2019-03-11 PROCEDURE — 77030038269 HC DRN EXT URIN PURWCK BARD -A

## 2019-03-11 PROCEDURE — 77030011256 HC DRSG MEPILEX <16IN NO BORD MOLN -A

## 2019-03-11 PROCEDURE — 74011000250 HC RX REV CODE- 250: Performed by: PHYSICIAN ASSISTANT

## 2019-03-11 PROCEDURE — 83605 ASSAY OF LACTIC ACID: CPT

## 2019-03-11 RX ORDER — SODIUM CHLORIDE 9 MG/ML
75 INJECTION, SOLUTION INTRAVENOUS CONTINUOUS
Status: DISCONTINUED | OUTPATIENT
Start: 2019-03-11 | End: 2019-03-11

## 2019-03-11 RX ORDER — SODIUM CHLORIDE 0.9 % (FLUSH) 0.9 %
5-40 SYRINGE (ML) INJECTION EVERY 8 HOURS
Status: DISCONTINUED | OUTPATIENT
Start: 2019-03-11 | End: 2019-03-15 | Stop reason: HOSPADM

## 2019-03-11 RX ORDER — POTASSIUM CHLORIDE 7.45 MG/ML
10 INJECTION INTRAVENOUS
Status: COMPLETED | OUTPATIENT
Start: 2019-03-11 | End: 2019-03-11

## 2019-03-11 RX ORDER — MIDAZOLAM HYDROCHLORIDE 1 MG/ML
2 INJECTION, SOLUTION INTRAMUSCULAR; INTRAVENOUS ONCE
Status: COMPLETED | OUTPATIENT
Start: 2019-03-11 | End: 2019-03-11

## 2019-03-11 RX ORDER — HEPARIN SODIUM 10000 [USP'U]/100ML
12-25 INJECTION, SOLUTION INTRAVENOUS
Status: CANCELLED | OUTPATIENT
Start: 2019-03-11

## 2019-03-11 RX ORDER — IPRATROPIUM BROMIDE AND ALBUTEROL SULFATE 2.5; .5 MG/3ML; MG/3ML
3 SOLUTION RESPIRATORY (INHALATION)
Status: DISCONTINUED | OUTPATIENT
Start: 2019-03-11 | End: 2019-03-12

## 2019-03-11 RX ORDER — MORPHINE SULFATE 2 MG/ML
3 INJECTION, SOLUTION INTRAMUSCULAR; INTRAVENOUS
Status: CANCELLED | OUTPATIENT
Start: 2019-03-11

## 2019-03-11 RX ORDER — DEXTROSE 50 % IN WATER (D50W) INTRAVENOUS SYRINGE
25-50 AS NEEDED
Status: DISCONTINUED | OUTPATIENT
Start: 2019-03-11 | End: 2019-03-12

## 2019-03-11 RX ORDER — ACETAMINOPHEN 325 MG/1
650 TABLET ORAL
Status: CANCELLED | OUTPATIENT
Start: 2019-03-11

## 2019-03-11 RX ORDER — MIDAZOLAM HYDROCHLORIDE 1 MG/ML
INJECTION, SOLUTION INTRAMUSCULAR; INTRAVENOUS
Status: COMPLETED
Start: 2019-03-11 | End: 2019-03-11

## 2019-03-11 RX ORDER — SODIUM CHLORIDE 0.9 % (FLUSH) 0.9 %
5-40 SYRINGE (ML) INJECTION AS NEEDED
Status: DISCONTINUED | OUTPATIENT
Start: 2019-03-11 | End: 2019-03-12

## 2019-03-11 RX ORDER — POTASSIUM CHLORIDE 20 MEQ/1
40 TABLET, EXTENDED RELEASE ORAL
Status: CANCELLED | OUTPATIENT
Start: 2019-03-11 | End: 2019-03-11

## 2019-03-11 RX ORDER — FUROSEMIDE 10 MG/ML
20 INJECTION INTRAMUSCULAR; INTRAVENOUS 2 TIMES DAILY
Status: CANCELLED | OUTPATIENT
Start: 2019-03-11

## 2019-03-11 RX ORDER — CHLORHEXIDINE GLUCONATE 1.2 MG/ML
10 RINSE ORAL EVERY 12 HOURS
Status: DISCONTINUED | OUTPATIENT
Start: 2019-03-11 | End: 2019-03-12

## 2019-03-11 RX ORDER — ETOMIDATE 2 MG/ML
20 INJECTION INTRAVENOUS
Status: COMPLETED | OUTPATIENT
Start: 2019-03-11 | End: 2019-03-11

## 2019-03-11 RX ORDER — BUDESONIDE 1 MG/2ML
1000 INHALANT ORAL
Status: DISCONTINUED | OUTPATIENT
Start: 2019-03-11 | End: 2019-03-12 | Stop reason: SDUPTHER

## 2019-03-11 RX ORDER — INSULIN LISPRO 100 [IU]/ML
INJECTION, SOLUTION INTRAVENOUS; SUBCUTANEOUS EVERY 6 HOURS
Status: DISCONTINUED | OUTPATIENT
Start: 2019-03-11 | End: 2019-03-12

## 2019-03-11 RX ORDER — MAGNESIUM SULFATE 100 %
4 CRYSTALS MISCELLANEOUS AS NEEDED
Status: DISCONTINUED | OUTPATIENT
Start: 2019-03-11 | End: 2019-03-12

## 2019-03-11 RX ORDER — PROPOFOL 10 MG/ML
5-50 VIAL (ML) INTRAVENOUS
Status: DISCONTINUED | OUTPATIENT
Start: 2019-03-11 | End: 2019-03-12

## 2019-03-11 RX ORDER — SUCCINYLCHOLINE CHLORIDE 20 MG/ML
100 INJECTION INTRAMUSCULAR; INTRAVENOUS
Status: COMPLETED | OUTPATIENT
Start: 2019-03-11 | End: 2019-03-11

## 2019-03-11 RX ADMIN — SUCCINYLCHOLINE CHLORIDE 100 MG: 20 INJECTION, SOLUTION INTRAMUSCULAR; INTRAVENOUS at 03:25

## 2019-03-11 RX ADMIN — POTASSIUM CHLORIDE 10 MEQ: 10 INJECTION, SOLUTION INTRAVENOUS at 09:33

## 2019-03-11 RX ADMIN — FUROSEMIDE 40 MG: 10 INJECTION, SOLUTION INTRAMUSCULAR; INTRAVENOUS at 00:41

## 2019-03-11 RX ADMIN — INSULIN LISPRO 6 UNITS: 100 INJECTION, SOLUTION INTRAVENOUS; SUBCUTANEOUS at 13:01

## 2019-03-11 RX ADMIN — INSULIN LISPRO 3 UNITS: 100 INJECTION, SOLUTION INTRAVENOUS; SUBCUTANEOUS at 23:17

## 2019-03-11 RX ADMIN — MIDAZOLAM HYDROCHLORIDE 2 MG: 2 INJECTION, SOLUTION INTRAMUSCULAR; INTRAVENOUS at 03:47

## 2019-03-11 RX ADMIN — MIDAZOLAM HYDROCHLORIDE 2 MG/HR: 5 INJECTION, SOLUTION INTRAMUSCULAR; INTRAVENOUS at 05:20

## 2019-03-11 RX ADMIN — CHLORHEXIDINE GLUCONATE 0.12% ORAL RINSE 10 ML: 1.2 LIQUID ORAL at 21:10

## 2019-03-11 RX ADMIN — POTASSIUM CHLORIDE 10 MEQ: 10 INJECTION, SOLUTION INTRAVENOUS at 10:40

## 2019-03-11 RX ADMIN — IPRATROPIUM BROMIDE AND ALBUTEROL SULFATE 3 ML: .5; 3 SOLUTION RESPIRATORY (INHALATION) at 20:49

## 2019-03-11 RX ADMIN — SODIUM CHLORIDE 500 MG: 900 INJECTION, SOLUTION INTRAVENOUS at 11:00

## 2019-03-11 RX ADMIN — SODIUM CHLORIDE, SODIUM ACETATE ANHYDROUS, SODIUM GLUCONATE, POTASSIUM CHLORIDE, AND MAGNESIUM CHLORIDE: 526; 222; 502; 37; 30 INJECTION, SOLUTION INTRAVENOUS at 11:08

## 2019-03-11 RX ADMIN — PROPOFOL 30 MCG/KG/MIN: 10 INJECTION, EMULSION INTRAVENOUS at 15:50

## 2019-03-11 RX ADMIN — Medication 10 ML: at 14:00

## 2019-03-11 RX ADMIN — INSULIN LISPRO 6 UNITS: 100 INJECTION, SOLUTION INTRAVENOUS; SUBCUTANEOUS at 17:35

## 2019-03-11 RX ADMIN — BUDESONIDE 1000 MCG: 1 SUSPENSION RESPIRATORY (INHALATION) at 20:49

## 2019-03-11 RX ADMIN — POTASSIUM CHLORIDE 10 MEQ: 10 INJECTION, SOLUTION INTRAVENOUS at 11:41

## 2019-03-11 RX ADMIN — METHYLPREDNISOLONE SODIUM SUCCINATE 60 MG: 40 INJECTION, POWDER, FOR SOLUTION INTRAMUSCULAR; INTRAVENOUS at 09:37

## 2019-03-11 RX ADMIN — MIDAZOLAM HYDROCHLORIDE 2 MG: 2 INJECTION, SOLUTION INTRAMUSCULAR; INTRAVENOUS at 04:21

## 2019-03-11 RX ADMIN — METHYLPREDNISOLONE SODIUM SUCCINATE 60 MG: 40 INJECTION, POWDER, FOR SOLUTION INTRAMUSCULAR; INTRAVENOUS at 14:41

## 2019-03-11 RX ADMIN — CHLORHEXIDINE GLUCONATE 0.12% ORAL RINSE 10 ML: 1.2 LIQUID ORAL at 09:33

## 2019-03-11 RX ADMIN — SODIUM CHLORIDE, SODIUM ACETATE ANHYDROUS, SODIUM GLUCONATE, POTASSIUM CHLORIDE, AND MAGNESIUM CHLORIDE 500 ML: 526; 222; 502; 37; 30 INJECTION, SOLUTION INTRAVENOUS at 10:39

## 2019-03-11 RX ADMIN — MIDAZOLAM HYDROCHLORIDE 2 MG: 1 INJECTION, SOLUTION INTRAMUSCULAR; INTRAVENOUS at 03:47

## 2019-03-11 RX ADMIN — ETOMIDATE 20 MG: 2 INJECTION, SOLUTION INTRAVENOUS at 03:24

## 2019-03-11 RX ADMIN — SODIUM CHLORIDE, SODIUM ACETATE ANHYDROUS, SODIUM GLUCONATE, POTASSIUM CHLORIDE, AND MAGNESIUM CHLORIDE: 526; 222; 502; 37; 30 INJECTION, SOLUTION INTRAVENOUS at 15:50

## 2019-03-11 RX ADMIN — ENOXAPARIN SODIUM 70 MG: 80 INJECTION SUBCUTANEOUS at 00:39

## 2019-03-11 RX ADMIN — PROPOFOL 30 MCG/KG/MIN: 10 INJECTION, EMULSION INTRAVENOUS at 10:53

## 2019-03-11 RX ADMIN — IPRATROPIUM BROMIDE AND ALBUTEROL SULFATE 3 ML: .5; 3 SOLUTION RESPIRATORY (INHALATION) at 16:07

## 2019-03-11 RX ADMIN — FAMOTIDINE 20 MG: 10 INJECTION INTRAVENOUS at 09:33

## 2019-03-11 RX ADMIN — Medication 10 ML: at 21:11

## 2019-03-11 RX ADMIN — METHYLPREDNISOLONE SODIUM SUCCINATE 60 MG: 40 INJECTION, POWDER, FOR SOLUTION INTRAMUSCULAR; INTRAVENOUS at 21:10

## 2019-03-11 NOTE — PROGRESS NOTES
Pt admitted to ICU intubated and placed on ventilator upon arrival, . Suction on, dayna to suction. Cuff inflated and intact. No SBT, pt recent intubation. HME in place, will place on heated humidification. 03/11/19 0841   Patient Observations   Pulse (Heart Rate) 79   Resp Rate 18   O2 Sat (%) 100 %   Airway - Continuous Aspiration of Subglottic Secretions (DAYNA) Tube 03/11/19 Oral   Placement Date/Time: 03/11/19 0326   Number of Attempts: 1  Inserted By:   Present on Admission/Arrival: No  Location: Oral  Placement Verified: Auscultation;EtCO2;BBS  Airway Types: Endotracheal, cuffed  Airway Tube Size: 7.5 mm   Insertion Depth (cm) 25 cm   Line Petros Lips   Side Secured Right   Cuff Pressure 30 cmH20   Site Assessment Clean, dry, & intact   Suction on Yes   Amt Secretions Aspirated (mL) 0 mL   Respiratory   Respiratory (WDL) X   Patient on Vent Yes - If patient is on vent, add Doc Flowsheet Ventilator ().    Respiratory Pattern Regular   Chest/Tracheal Assessment Chest expansion, symmetrical   Breath Sounds Left Clear;Diminished   Breath Sounds Right Coarse;Rhonchi   Cough Cough with suction   Airway Clearance   Suction ET Tube   Sputum Amount Small   Sputum Color/Odor White   Sputum Consistency Thick   Skin Integumentary   Skin Integumentary (WDL) WDL   Ventilator Initiate/Discontinue   Bio-Med ID # [id number]   Vent Settings   FIO2 (%) 90 %   CMV Rate Set 18   Back-Up Rate 18   Vt Set (ml) 450 ml   PEEP/VENT (cm H2O) 6 cm H20   Insp Time (sec) 0.9 sec   Insp Rise Time % 50 %   Flow Trigger 3   Ventilator Measurements   Resp Rate Observed 18   Vt Exhaled (Machine Breath) (ml) 442 ml   Ve Observed (l/min) 7.9 l/min   PIP Observed (cm H2O) 22 cm H2O   Plateau Pressure (cm H2O) 17 cm H2O   MAP (cm H2O) 11   I:E Ratio Actual 1:2.7   Auto PEEP Observed (cm H2O) 0 cm H2O   Safety & Alarms   Circuit Temperature (HME)   Backup Mode Checked/Apnea Yes   Pressure Max 40 cm H2O   Pressure Min 12 cm H2O   Ve Min 2 Ve Max 20   Vt Min 200 ml   Vt Max 800 ml   RR Max 40   Ambu Bag Yes   Ambu Mask Yes   Weaning Parameters   Spontaneous Breathing Trial Complete No (Comments)   Age Specific Ventilator Associated Pneumonia Bundle   Patient Age Group Adult   Adult Ventilator Associated Pneumonia Bundle   Elevation of Head to 30-45 Degrees (Unless Contraindicated) Yes   Assessment of Readiness to Extubate Yes   Mechanical VTE Orders Yes   Vent Method/Mode   Ventilation Method Conventional   Ventilator Mode Assist control;VC+   Pulmonary Toilet   Pulmonary Toilet H. O.B elevated;Suction

## 2019-03-11 NOTE — ED NOTES
Contacted pt family for update of care. Daughter [facility] states that pt does not want to be intubated. Daughter said she will be at the hospital in about 15-20 min.

## 2019-03-11 NOTE — PROGRESS NOTES
NUTRITION    Nutrition Screen      RECOMMENDATIONS / PLAN:     - Recommend starting tube feeding of Glucerna 1.5 at 20 mL/hr and advance as tolerated by 10 mL q 4 hours to goal rate of 50 mL/hr with 100 mL q 4 hour water flushes.   - Continue RD inpatient monitoring and evaluation. Goal Regimen: Glucerna 1.5 at 50 mL/hr + 100 mL q 4 hour water flushes to provide: 1800 kcal, 99 gm protein, 90 gm fat, 160 gm CHO, 19 gm fiber, 910 mL free water, 1510 mL total water, 100% RDIs     NUTRITION INTERVENTIONS & DIAGNOSIS:     [x] Enteral nutrition: recommended   [x] Collaboration and referral of nutrition care: interdisciplinary rounds     Nutrition Diagnosis: Inadequate oral intake related to respiratory status as evidenced by pt NPO, intubated. ASSESSMENT:     Pt intubated, admitted with CHF. Plan for OGT placement today, will start to start feeds per MD.     Average po intake adequate to meet patients estimated nutritional needs:   [] Yes     [x] No   [] Unable to determine at this time    Diet: DIET NPO      Food Allergies: NKFA  Current Appetite:   [] Good     [] Fair     [] Poor     [x] Other: NPO  Appetite/meal intake prior to admission:   [] Good     [] Fair     [] Poor     [x] Other: unknown  Feeding Limitations:  [] Swallowing difficulty    [] Chewing difficulty    [x] Other: respiratory status  Current Meal Intake: No data found.     BM: PTA   Skin Integrity: WDL  Edema:   [x] No     [] Yes   Pertinent Medications: Reviewed: Normosol at 75 mL/hr, SSI, steroid, 30 mEq KCl, pepcid, propofol to start, lasix given    Recent Labs     03/11/19  0700 03/10/19  2202    137   K 3.2* 3.3*   CL 98* 93*   CO2 33* 37*   * 281*   BUN 40* 41*   CREA 1.68* 2.18*   CA 7.3* 8.2*   MG 2.3  --    PHOS 2.5  --    ALB 2.4* 3.1*   SGOT 29 37   ALT 17 21     No intake or output data in the 24 hours ending 03/11/19 1025    Anthropometrics:  Ht Readings from Last 1 Encounters:   03/11/19 5' 5\" (1.651 m)     Last 3 Recorded Weights in this Encounter    03/10/19 2301 03/11/19 1009   Weight: 72.6 kg (160 lb) 72.6 kg (160 lb)     Body mass index is 26.63 kg/m². Weight History:   Weight Metrics 3/11/2019 2/25/2019 1/7/2019 12/31/2018 12/18/2018 10/22/2018 10/15/2018   Weight 160 lb 150 lb 147 lb 9.6 oz 144 lb 146 lb 143 lb 3.2 oz 146 lb 9.6 oz   BMI 26.63 kg/m2 24.96 kg/m2 24.56 kg/m2 23.96 kg/m2 24.3 kg/m2 23.83 kg/m2 24.4 kg/m2        Admitting Diagnosis: Congestive heart failure (CHF) ([facility]) [I50.9]  NSTEMI (non-ST elevated myocardial infarction) ([facility] [I21.4]  Pertinent PMHx: COPD, HTN, DM, diverticulosis, spinal stenosis     Education Needs:        [x] None identified  [] Identified - Not appropriate at this time  []  Identified and addressed - refer to education log  Learning Limitations:   [] None identified  [x] Identified: altered mentation   Cultural, Baptist & ethnic food preferences:  [x] None identified    [] Identified and addressed     ESTIMATED NUTRITION NEEDS:     Calories: 2364-9063 kcal ([id number]) based on  [x] Actual BW 73     [] IBW   Protein:  gm (1.2-2 gm/kg) based on  [x] Actual BW      [] IBW   Fluid: 1 mL/kcal     MONITORING & EVALUATION:     Nutrition Goal(s):   1. Nutritional needs will be met through adequate oral intake or nutrition support within the next 7 days.   Outcome:  [] Met/Ongoing    []  Not Met    [x] New/Initial Goal    Monitoring:   [] Food and beverage intake   [x] Diet order   [x] Nutrition-focused physical findings   [x] Treatment/therapy   [] Weight   [] Enteral nutrition intake        Previous Recommendations (for follow-up assessments only):     []   Implemented       []   Not Implemented (RD to address)      [] No Longer Appropriate     [] No Recommendation Made     Discharge Planning: pending ability to tolerate oral diet and goals of care   [x] Participated in care planning, discharge planning, & interdisciplinary rounds as appropriate      Jody Arana Karis, [address]    Pager: [phone]

## 2019-03-11 NOTE — PROGRESS NOTES
Pt just admitted to ICU, intubated in ED for respiratory failure. Pt s/p fall 5 days ago. VSS. Attached to monitor. Noted a stage 1 pressure ulcer to L heel , skin tear and multiple scabs on his skin. Skin assessment completed w/ Cris Barnes RN. Pt medicated per MAR. Will continue to monitor.

## 2019-03-11 NOTE — H&P
[facility]  Pulmonary, Critical Care, and Sleep Medicine    Name: Luzmaria Curtis MRN: [id number]   : 1933 Hospital: [facility]   Date: 3/11/2019        Critical Care History and Physical      IMPRESSION:   · Acute hypercapnic respiratory failure - failed BiPAP, currently on mechanical ventilation. ddx includes COPD exacerbation, acute CHF exacerbation, PNA, influenza. Likely multifactorial etiology  · GÉNESIS - likely prerenal/dehydration. Family reports decreased oral intake in the last several days. Bladder scan w/ 200 cc urine. · Elevated troponin - 0.83, demand ischemia from prior hypoxia vs ACS.  Repeating CE  · Pelvic fracture - from fall 4 days ago  · Lactic acidosis - 2.67   · Hx of severe COPD - prescribed home O2     Patient Active Problem List   Diagnosis Code    Hyperlipidemia E78.5    Overactive bladder N32.81    Sciatica M54.30    Degeneration of lumbar or lumbosacral intervertebral disc M51.37    Encounter for long-term (current) use of other medications Z79.899    Depression F32.9    Unspecified vitamin D deficiency E55.9    Glucose intolerance (pre-diabetes) R73.03    RLS (restless legs syndrome) G25.81    Aortic dissection, abdominal (HCC) I71.02    Ataxic gait R26.0    Chronic neck pain M54.2, G89.29    Orthostatic hypotension I95.1    Paresthesia R20.2    Repeated falls R29.6    Chronic pain syndrome G89.4    Lumbosacral spondylosis without myelopathy M47.817    Cervical stenosis of spinal canal M48.02    Spinal stenosis in cervical region M48.02    Polyneuropathy G62.9    Lumbar stenosis M48.061    High risk medication use Z79.899    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    Thoracic or lumbosacral neuritis or radiculitis, unspecified [id number]    Low back pain radiating to both legs M54.5    DDD (degenerative disc disease), lumbar M51.36    Spondylolisthesis of lumbar region M43.16    Spondylolisthesis, grade 1 M43.10    Lumbar facet arthropathy M47.816    Lumbar disc herniation M51.26    Lumbosacral radiculopathy at L5 M54.17    Lumbosacral radiculopathy at S1 M54.17    DM neuropathy, painful ([facility]) E11.40    Acute exacerbation of chronic obstructive pulmonary disease (COPD) ([facility]) J44.1    Carotid artery stenosis I65.29    Atherosclerosis of native arteries of the extremities with intermittent claudication I70.219    MOORE (dyspnea on exertion) R06.09    SOB (shortness of breath) R06.02    Murmur, cardiac R01.1    Hyperlipidemia LDL goal <100 E78.5    Primary osteoarthritis involving multiple joints M15.0    Prediabetes R73.03    Restless leg syndrome G25.81    Essential hypertension with goal blood pressure less than 140/90 I10    Panlobular emphysema ([facility]) J43.1    Impaired fasting glucose R73.01    HCAP (healthcare-associated pneumonia) J18.9    Abnormal CT scan, chest R93.89    Hypercholesterolemia E78.00    Hypokalemia E87.6    Dizziness R42    CHI (closed head injury) S09. 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 Daily CXR and ABGs. · I/D: Afebrile; aleukocytosis; follow-up BCx. Lactic acid Q4 until normalized.   · Hem/Onc: Daily CBC; H/H, and plts are stable  · CVS: HD stable; not currently requiring vasopressors. Aim MAP >65mmHg, trend CE, echo ordered for today. Consult cardiology  · Metabolic: Daily BMP; monitor e-lytes; replace PRN  · Renal: Trend Renal indices; purewick. Avoid nephrotoxic agents. Strict I/O. Fluid resuscitation w/ NS 75. Will switch to normosol when patient is transferred to the ICU. · Endocrine: POC Glucose q6; SSI  · GI: follow-up LFTs. Famotidine for SUP. NPO  · Musc/Skin: Consult ortho for pelvic fx. Wound care as needed. · Neuro: on versed for sedation. Goal RASS 0 to -1. Consider switching patient to propofol. · Fluids: NS 75 mL/hr  · Code Status: full code     Best practice:  · Sepsis Bundle per Hospital Protocol  · Glycemic control; avoid Hypoglycemia  · IHI ICU Bundles:  ·  Vent Bundle Followed, Vent Day 1    · Mech Vent patients/ Pulmonary pts:   · VAP bundle, Aim to keep peak plateau pressure 73-10WZ H2O  · Aspiration Precautions - HOB >30'  · Daily sedation holiday as indicated  · SBT as tolerated/appropriate  · Stress ulcer prophylaxis. famotidine   · DVT prophylaxis. SCDs  · Need for Lines, olivas assessed. · Restraints need. · Palliative care evaluation. Subjective/History: This patient has been seen and evaluated at the request of Dr. Prachi Torres for acute hypercapnic respiratory failure failing BiPAP.    03/11/19    Patient is a 80 y.o. female w/ pmhx of COPD, heart failure, diabetes, and HTN who presented to the ED via EMS complaining of SOB x2 days that has been gradually worsening per daughter. She states that they have been giving the patient albuterol treatments TID w/ no relief in her symptoms. Decreased oral intake and fatigue were also reported. She also reports that 4 days ago the patient had a fall and hit her head but was not evaluated at that time. Per chart review, upon arrival of EMS patient was hypoxic with O2 saturations into the 60s. Placed on CPAP for transport and O2 sat improved into the 90s.  She denies the patient expressing any headache, chest pain, abdominal pain, fevers/chills. No sick contacts. She states that the patient did not receive her flu shot this year. Family states the patient has home O2 but does not use it as prescribed. The patient was initially placed on BiPAP in the ED, however, her mental status worsening and her CO2 on ABG was 112.2 so the decision was made for RSI. Notable labs include a troponin of 0.84, BNP of 15,690 and Cr of 2.18. Upon my evaluation, patient was intubated and agitated, actively trying to reach for ETT. 2 mg of versed ordered and administered. Bedside nurse attempted to place an NGT, however met resistance x2 attempts. Hemodynamically stable, O2 saturations >90%. Repeat blood gas post intubation with improved CO2 at 60.9.     Past Medical History:   Diagnosis Date    Chronic lung disease     Colon polyps     COPD 8-30-02    DDD (degenerative disc disease), lumbar 10/1/2014    Degeneration of lumbar or lumbosacral intervertebral disc     Depression     Diabetes ([facility] 7-19-05    Diabetes mellitus ([facility]     Diverticulosis     DM neuropathy, painful ([facility] 10/1/2014    MOORE (Dyspnea on Exertion) 4-19-02    echo: +mild LVH, SF67-83% w/ diastolic dysfxn    Glaucoma     HCAP (healthcare-associated pneumonia) 03/24/2017    Hemorrhoids 6-6-06    Hyperlipidemia 5/17/2011    Hypertension 4-16-02    LBP (low back pain) 4-13-04    Low back pain radiating to both legs 10/1/2014    Lumbago     Lumbar disc herniation 10/1/2014    Lumbar facet arthropathy 10/1/2014    Lumbosacral radiculopathy at L5 10/1/2014    Lumbosacral radiculopathy at S1 10/1/2014    Microscopic hematuria     OA (osteoarthritis)     Overactive bladder 5/17/2011    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    RLS (restless legs syndrome) 1/17/2013    Sciatica     Shortness of breath     Spinal stenosis, lumbar region, without neurogenic claudication     Spondylolisthesis of lumbar region 10/1/2014    Spondylolisthesis, grade 1 10/1/2014    Stage 4 very severe COPD by GOLD classification ([facility] 2/25/2019    Stress urinary incontinence     Syncope     -MRI brain    Urinary tract infection, site not specified         Past Surgical History:   Procedure Laterality Date    HX APPENDECTOMY      HX CHOLECYSTECTOMY      HX HYSTERECTOMY      (+)DUB    HX POLYPECTOMY      IN COLONOSCOPY FLX DX W/COLLJ SPEC WHEN PFRMD  6-16-06    normal, Dr Schmidt Call FLX DX W/COLLJ [address] PFRMD      (+)polyp= tubular adenoma        Prior to Admission medications    Medication Sig Start Date End Date Taking? Authorizing Provider   rOPINIRole (REQUIP) 1 mg tablet TAKE ONE-HALF TO ONE TABLET BY MOUTH ONCE NIGHTLY AS NEEDED FOR  RESTLESS  LEGS  FOR  UP  TO  90  DAYS 3/10/19   Arlette Esquivel MD   predniSONE (DELTASONE) 10 mg tablet 30 mg po dailyx 3 days,20 mg po daily x 3 days,10 mg po daily x 3 days 2/25/19   Maggei Fitzpatrick MD   albuterol-ipratropium (DUO-NEB) 2.5 mg-0.5 mg/3 ml nebu 3 mL by Nebulization route every six (6) hours as needed. 2/22/19   Arlette Esquivel MD   traMADol (ULTRAM) 50 mg tablet TAKE 1 TABLET BY MOUTH EVERY 8 HOURS AS NEEDED FOR PAIN. MAX 150MG DAILY 1/30/19   Arlette Esquivel MD   PARoxetine (PAXIL) 30 mg tablet TAKE 1 TABLET BY MOUTH  DAILY 1/29/19   Arlette Esquivel MD   mirtazapine (REMERON) 15 mg tablet Take 1 Tab by mouth nightly. 11/5/18   Arlette Esquivel MD   amLODIPine (NORVASC) 5 mg tablet Take 1 Tab by mouth daily. 10/29/18   Arlette Esquivel MD   tamsulosin (FLOMAX) 0.4 mg capsule Take 1 Cap by mouth daily. 10/29/18   Arlette Esquivel MD   gabapentin (NEURONTIN) 300 mg capsule 1 capsule by mouth morning, 1 capsule at midday and 2 capsules at bedtime 10/22/18   Arlette Esquivel MD   simvastatin (ZOCOR) 20 mg tablet Take 1 Tab by mouth nightly.  10/16/18 Fredo Mdcaniels MD   rOPINIRole (REQUIP) 1 mg tablet Take 0.5-1 Tabs by mouth nightly. Indications: Restless Legs Syndrome 9/10/18   Fredo Mcdaniels MD   aspirin delayed-release 81 mg tablet Take 1 Tab by mouth daily. 8/10/18   Enoc Frank MD   inhalational spacing device (AEROCHAMBER MV) 1 Each by Does Not Apply route as needed. 18   Marizol Urbina MD   budesonide-formoterol (SYMBICORT) 80-4.5 mcg/actuation HFAA Take 2 Puffs by inhalation two (2) times a day. 18   Sukhwinder Gomez MD   OXYGEN-AIR DELIVERY SYSTEMS 3 L by Nasal route continuous. Provider, Historical   Nebulizer & Compressor (PORTABLE NEBULIZER SYSTEM) machine 1 Each by Does Not Apply route every six (6) hours as needed. 2/10/18   Annette Awad PA-C       Current Facility-Administered Medications   Medication Dose Route Frequency    midazolam in normal saline (VERSED) 2 mg/mL infusion  1-10 mg/hr IntraVENous TITRATE    sodium chloride (NS) flush 5-40 mL  5-40 mL IntraVENous Q8H    chlorhexidine (PERIDEX) 0.12 % mouthwash 10 mL  10 mL Oral Q12H       Allergies   Allergen Reactions    Levaquin [Levofloxacin] Rash        Social History     Tobacco Use    Smoking status: Former Smoker     Packs/day: 1.00     Years: 57.00     Pack years: 57.00     Types: Cigarettes     Last attempt to quit: 2018     Years since quittin.2    Smokeless tobacco: Never Used    Tobacco comment: pt has cut down recently to less than 1 ppd   Substance Use Topics    Alcohol use: No        Family History   Problem Relation Age of Onset    Colon Cancer Sister     Heart Disease Brother     Seizures Son     Colon Cancer Maternal Aunt           Review of Systems:  Review of systems not obtained due to patient factors. Objective:   Vital Signs:    Visit Vitals  /50   Pulse 70   Resp 18   Wt 72.6 kg (160 lb)   SpO2 97%   BMI 26.63 kg/m²       O2 Device: Endotracheal tube, Ventilator       No data recorded. Intake/Output:   Last shift:      No intake/output data recorded. Last 3 shifts: No intake/output data recorded. No intake or output data in the 24 hours ending 03/11/19 0437    Ventilator Settings:  Mode Rate Tidal Volume Pressure FiO2 PEEP   Assist control, VC+   450 ml    100 % 6 cm H20     Peak airway pressure: 25 cm H2O    Minute ventilation: 8.7 l/min      ARDS network Guidelines:   Lung protective strategy and Plateau  Pressure goal < 30 cm H2O goals  Oxygenation Goals PaO2 55-80 mm Hg or SaO2 88-95%  PH goal 7.30-7.45    VAP bundle:  Reviewed. Lianne tube to suction at 20-30 cm Hg. Maintain Lianne tube with 5-10ml air every 4 hours  Routine oral care every 4 hours  Elevation of head > 45 degree  Daily sedation holiday and SBT evaluation starting at 6.00am.    Physical Exam:     General:  Intubated, agitated   Head:  Normocephalic, without obvious abnormality, atraumatic. Eyes:  Conjunctivae/corneas clear. PERRL,   Nose: Nares normal. Septum midline. Mucosa normal.   Throat: Lips, mucosa, and tongue normal. Edentulous    Neck: Supple, symmetrical, trachea midline, no adenopathy   Lungs:   Symmetrical chest rise; good AE bilat; CTAB; diffuse crackles with diminished breath sounds bibasilarly   Heart:  RRR, S1, S2 normal, no m/r/g   Abdomen:   Soft, non-tender. Bowel sounds normal. No masses,  No organomegaly. Extremities: Extremities normal, atraumatic, no cyanosis or edema. Pulses: 2+ and symmetric all extremities.    Skin: Skin color, texture, turgor normal. No rashes or lesions   Neurologic: Agitated, not following commands     Devices:  · ETT: 03/11/19   · OGT: N/A  · Lines: PIVs  · Drains: N/A  · Dueñas: purewick    Data:     Recent Results (from the past 24 hour(s))   GLUCOSE, POC    Collection Time: 03/10/19  9:54 PM   Result Value Ref Range    Glucose (POC) 259 (H) 70 - 110 mg/dL   CBC WITH AUTOMATED DIFF    Collection Time: 03/10/19 10:02 PM   Result Value Ref Range    WBC 11.7 4.6 - 13.2 K/uL RBC 4.79 4.20 - 5.30 M/uL    HGB 13.8 12.0 - 16.0 g/dL    HCT 43.7 35.0 - 45.0 %    MCV 91.2 74.0 - 97.0 FL    MCH 28.8 24.0 - 34.0 PG    MCHC 31.6 31.0 - 37.0 g/dL    RDW 16.0 (H) 11.6 - 14.5 %    PLATELET 458 207 - 300 K/uL    MPV 11.5 9.2 - 11.8 FL    NEUTROPHILS 78 (H) 40 - 73 %    LYMPHOCYTES 14 (L) 21 - 52 %    MONOCYTES 8 3 - 10 %    EOSINOPHILS 0 0 - 5 %    BASOPHILS 0 0 - 2 %    ABS. NEUTROPHILS 9.1 (H) 1.8 - 8.0 K/UL    ABS. LYMPHOCYTES 1.6 0.9 - 3.6 K/UL    ABS. MONOCYTES 0.9 0.05 - 1.2 K/UL    ABS. EOSINOPHILS 0.0 0.0 - 0.4 K/UL    ABS. BASOPHILS 0.0 0.0 - 0.1 K/UL    DF AUTOMATED     METABOLIC PANEL, COMPREHENSIVE    Collection Time: 03/10/19 10:02 PM   Result Value Ref Range    Sodium 137 136 - 145 mmol/L    Potassium 3.3 (L) 3.5 - 5.5 mmol/L    Chloride 93 (L) 100 - 108 mmol/L    CO2 37 (H) 21 - 32 mmol/L    Anion gap 7 3.0 - 18 mmol/L    Glucose 281 (H) 74 - 99 mg/dL    BUN 41 (H) 7.0 - 18 MG/DL    Creatinine 2.18 (H) 0.6 - 1.3 MG/DL    BUN/Creatinine ratio 19 12 - 20      GFR est AA 26 (L) >60 ml/min/1.73m2    GFR est non-AA 21 (L) >60 ml/min/1.73m2    Calcium 8.2 (L) 8.5 - 10.1 MG/DL    Bilirubin, total 0.5 0.2 - 1.0 MG/DL    ALT (SGPT) 21 13 - 56 U/L    AST (SGOT) 37 15 - 37 U/L    Alk.  phosphatase 81 45 - 117 U/L    Protein, total 6.6 6.4 - 8.2 g/dL    Albumin 3.1 (L) 3.4 - 5.0 g/dL    Globulin 3.5 2.0 - 4.0 g/dL    A-G Ratio 0.9 0.8 - 1.7     TROPONIN I    Collection Time: 03/10/19 10:02 PM   Result Value Ref Range    Troponin-I, QT 0.84 (H) 0.0 - 0.045 NG/ML   NT-PRO BNP    Collection Time: 03/10/19 10:02 PM   Result Value Ref Range    NT pro-BNP 15,690 (H) 0 - 1,800 PG/ML   POC LACTIC ACID    Collection Time: 03/10/19 10:05 PM   Result Value Ref Range    Lactic Acid (POC) 2.67 (HH) 0.40 - 2.00 mmol/L   EKG, 12 LEAD, INITIAL    Collection Time: 03/10/19 10:29 PM   Result Value Ref Range    Ventricular Rate 90 BPM    Atrial Rate 90 BPM    P-R Interval 150 ms    QRS Duration 128 ms    Q-T Interval 414 ms    QTC Calculation (Bezet) 506 ms    Calculated P Axis 63 degrees    Calculated R Axis -9 degrees    Calculated T Axis -18 degrees    Diagnosis       Normal sinus rhythm  Right bundle branch block  T wave abnormality, consider inferior ischemia  Abnormal ECG  When compared with ECG of 13-JUL-2018 18:13,  Vent. rate has increased BY  34 BPM  Left posterior fascicular block is no longer present  Inverted T waves have replaced nonspecific T wave abnormality in Anterior   leads     POC G3    Collection Time: 03/10/19 10:46 PM   Result Value Ref Range    Device: BIPAP      FIO2 (POC) 100 %    pH (POC) 7.256 (L) 7.35 - 7.45      pCO2 (POC) 81.7 (H) 35.0 - 45.0 MMHG    pO2 (POC) 348 (H) 80 - 100 MMHG    HCO3 (POC) 36.3 (H) 22 - 26 MMOL/L    sO2 (POC) 100 (H) 92 - 97 %    Base excess (POC) 6 mmol/L    PEEP/CPAP (POC) 8 cmH2O    PIP (POC) 20      Pressure support 12 cmH2O    Allens test (POC) YES      Total resp. rate 24      Site LEFT RADIAL      Patient temp. 37.0      Specimen type (POC) ARTERIAL      Performed by Neuro Hero     Spontaneous timed YES     POC G3    Collection Time: 03/11/19  2:09 AM   Result Value Ref Range    Device: BIPAP      FIO2 (POC) 100 %    pH (POC) 7.127 (LL) 7.35 - 7.45      pCO2 (POC) 112.2 (H) 35.0 - 45.0 MMHG    pO2 (POC) 183 (H) 80 - 100 MMHG    HCO3 (POC) 37.1 (H) 22 - 26 MMOL/L    sO2 (POC) 99 (H) 92 - 97 %    Base excess (POC) 3 mmol/L    PEEP/CPAP (POC) 8 cmH2O    PIP (POC) 20      Pressure support 12 cmH2O    Allens test (POC) YES      Total resp. rate 13      Site RIGHT RADIAL      Patient temp.  37.0      Specimen type (POC) ARTERIAL      Performed by Arjun Dieter     Spontaneous timed YES     POC G3    Collection Time: 03/11/19  4:17 AM   Result Value Ref Range    Device: VENT      FIO2 (POC) 100 %    pH (POC) 7.361 7.35 - 7.45      pCO2 (POC) 60.9 (H) 35.0 - 45.0 MMHG    pO2 (POC) 93 80 - 100 MMHG    HCO3 (POC) 34.4 (H) 22 - 26 MMOL/L    sO2 (POC) 97 92 - 97 % Base excess (POC) 7 mmol/L    Mode ASSIST CONTROL      Tidal volume 450 ml    Set Rate 18 bpm    PEEP/CPAP (POC) 6 cmH2O    Allens test (POC) NO      Total resp. rate 18      Site RIGHT BRACHIAL      Patient temp. 37.0      Specimen type (POC) ARTERIAL      Performed by Renay Byod     Volume control plus YES             Recent Labs     03/11/19  0417 03/11/19  0209 03/10/19  2246   FIO2I 100 100 100   HCO3I 34.4* 37.1* 36.3*   PCO2I 60.9* 112.2* 81.7*   PHI 7.361 7.127* 7.256*   PO2I 93 183* 348*       Telemetry:normal sinus rhythm    Imaging:  I have personally reviewed the patients radiographs and have reviewed the reports:    CXR Results  (Last 48 hours)    None        CT Results  (Last 48 hours)    None              Behavioral Restraint Face-to-Face Evaluation  (must be completed within one hour of initiation of restraints)      Evaluate immediate situation:  agitation/reaching for ETT    Reaction to intervention: upper extremity restraints    Medical Condition/Assessment: acute respiratory failure    Behavioral Condition/Assessment: agitation    The patients review of systems, history, medications, and recent labs were reviewed at this time.      Continue/Discontinue restraints at this time: Continue      Desiree Foster   03/11/19   Pulmonary, Critical Care Medicine  [facility]

## 2019-03-11 NOTE — ED NOTES
[place]side [place] [facility]. Assumed care of patient. Received patient resting on stretcher. Assessment in progress. Family at bedside. Explanation of wait provided to the patient and her family. VSS, refer to flow sheet.

## 2019-03-11 NOTE — ED PROVIDER NOTES
ER03/03    80 y.o. WHITE OR  female    Presents to the ED with   Chief Complaint   Patient presents with    Respiratory Distress       Symptoms are moderate, constant, pervasive, and currently present. No additional relieving or exacerbating factors. No additional treatments at home. HPI:   10:03 PM Hari Pugh is a 80 y.o. female with h/o DM, COPD, and other PMHx who presents to ED via EMS. EMS reported that the patient had worsening SOB for the past three days. She was found with an oxygen saturation in the 60's. The patient was put on CPAP, and her oxygen saturation increased to the 90's. The patient was given albuterol, atrovent, and Solumedrol PTA. She is not on home oxygen. The patient had a fall five days ago with no evaluation or treatment. No other concerns or symptoms at this time. PCP: Nehemias Velez MD    Symptoms are constant, moderate, with no other relieving or exacerbating factors    HPI is limited due to respiratory status. ROS:    ROS is limited due to respiratory status.     Social History:   Social History     Socioeconomic History    Marital status:      Spouse name: Not on file    Number of children: Not on file    Years of education: Not on file    Highest education level: Not on file   Social Needs    Financial resource strain: Not on file    Food insecurity - worry: Not on file    Food insecurity - inability: Not on file   [employer] needs - medical: Not on file   [employer] needs - non-medical: Not on file   Occupational History    Not on file   Tobacco Use    Smoking status: Former Smoker     Packs/day: 1.00     Years: 57.00     Pack years: 57.00     Types: Cigarettes     Last attempt to quit: 2018     Years since quittin.2    Smokeless tobacco: Never Used    Tobacco comment: pt has cut down recently to less than 1 ppd   Substance and Sexual Activity    Alcohol use: No    Drug use: No    Sexual activity: Not Currently   Other Topics Concern    Not on file   Social History Narrative    Not on file      reports that she quit smoking about 2 months ago. Her smoking use included cigarettes. She has a 57.00 pack-year smoking history.  she has never used smokeless tobacco.    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    MOORE (Dyspnea on Exertion) 4-19-02    echo: +mild LVH, UB61-63% w/ diastolic dysfxn    Glaucoma     HCAP (healthcare-associated pneumonia) 03/24/2017    Hemorrhoids 6-6-06    Hyperlipidemia 5/17/2011    Hypertension 4-16-02    LBP (low back pain) 4-13-04    Low back pain radiating to both legs 10/1/2014    Lumbago     Lumbar disc herniation 10/1/2014    Lumbar facet arthropathy 10/1/2014    Lumbosacral radiculopathy at L5 10/1/2014    Lumbosacral radiculopathy at S1 10/1/2014    Microscopic hematuria     OA (osteoarthritis)     Overactive bladder 5/17/2011    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    RLS (restless legs syndrome) 1/17/2013    Sciatica     Shortness of breath     Spinal stenosis, lumbar region, without neurogenic claudication     Spondylolisthesis of lumbar region 10/1/2014    Spondylolisthesis, grade 1 10/1/2014    Stage 4 very severe COPD by GOLD classification ([facility] [address] 2/25/2019    Stress urinary incontinence     Syncope     -MRI brain    Urinary tract infection, site not specified          Past Surgical History: Past Surgical History:   Procedure Laterality Date    HX APPENDECTOMY      HX CHOLECYSTECTOMY      HX HYSTERECTOMY      (+)DUB    HX POLYPECTOMY      VT COLONOSCOPY FLX DX W/COLLJ SPEC WHEN PFRMD  6-16-06    normal, Dr Lewis Domingo FLX DX W/COLLJ [address] PFRMD      (+)polyp= tubular adenoma       Primary Care: Charity Garcia MD    Immunizations:     Medications: No current facility-administered medications for this encounter. Current Outpatient Medications:     rOPINIRole (REQUIP) 1 mg tablet, TAKE ONE-HALF TO ONE TABLET BY MOUTH ONCE NIGHTLY AS NEEDED FOR  RESTLESS  LEGS  FOR  UP  TO  90  DAYS, Disp: 90 Tab, Rfl: 5    predniSONE (DELTASONE) 10 mg tablet, 30 mg po dailyx 3 days,20 mg po daily x 3 days,10 mg po daily x 3 days, Disp: 18 Tab, Rfl: 0    albuterol-ipratropium (DUO-NEB) 2.5 mg-0.5 mg/3 ml nebu, 3 mL by Nebulization route every six (6) hours as needed. , Disp: 30 Nebule, Rfl: 0    traMADol (ULTRAM) 50 mg tablet, TAKE 1 TABLET BY MOUTH EVERY 8 HOURS AS NEEDED FOR PAIN. MAX 150MG DAILY, Disp: 90 Tab, Rfl: 2    PARoxetine (PAXIL) 30 mg tablet, TAKE 1 TABLET BY MOUTH  DAILY, Disp: 90 Tab, Rfl: 1    mirtazapine (REMERON) 15 mg tablet, Take 1 Tab by mouth nightly., Disp: 90 Tab, Rfl: 0    amLODIPine (NORVASC) 5 mg tablet, Take 1 Tab by mouth daily. , Disp: 90 Tab, Rfl: 3    tamsulosin (FLOMAX) 0.4 mg capsule, Take 1 Cap by mouth daily. , Disp: 90 Cap, Rfl: 3    gabapentin (NEURONTIN) 300 mg capsule, 1 capsule by mouth morning, 1 capsule at midday and 2 capsules at bedtime, Disp: 120 Cap, Rfl: 5    simvastatin (ZOCOR) 20 mg tablet, Take 1 Tab by mouth nightly., Disp: 90 Tab, Rfl: 3    rOPINIRole (REQUIP) 1 mg tablet, Take 0.5-1 Tabs by mouth nightly. Indications: Restless Legs Syndrome, Disp: 90 Tab, Rfl: 0    aspirin delayed-release 81 mg tablet, Take 1 Tab by mouth daily. , Disp: 100 Tab, Rfl: 1    inhalational spacing device (AEROCHAMBER MV), 1 Each by Does Not Apply route as needed. , Disp: 1 Device, Rfl: 2    budesonide-formoterol (SYMBICORT) 80-4.5 mcg/actuation HFAA, Take 2 Puffs by inhalation two (2) times a day., Disp: 1 Inhaler, Rfl: 0    OXYGEN-AIR DELIVERY SYSTEMS, 3 L by Nasal route continuous. , Disp: , Rfl:     Nebulizer & Compressor (PORTABLE NEBULIZER SYSTEM) machine, 1 Each by Does Not Apply route every six (6) hours as needed. , Disp: 1 Each, Rfl: 0    Allergies: Allergies   Allergen Reactions    Levaquin [Levofloxacin] Rash     Prior Echo April 2017    SUMMARY:  Procedure information: This was a technically difficult study. Left ventricle: Systolic function was normal by visual assessment. Ejection fraction was estimated in the range of 60 % to 65 %. No obvious  wall motion abnormalities identified in the views obtained. Wall thickness  was mildly increased. Last Heart Cath       Last Stress Test       Physical Exam:  . Patient Vitals for the past 12 hrs:   Pulse Resp BP SpO2   03/11/19 0235 88 (!) 0 112/50 100 %   03/11/19 0208 93   98 %   03/11/19 0200 96 16 165/78 98 %   03/11/19 0100 93 19 139/59 91 %   03/11/19 0045 94 20 (!) 139/98 91 %   03/11/19 0030 93 14 129/64 93 %   03/11/19 0015 88 20 112/54 91 %   03/11/19 0000   115/51    03/10/19 2345 88 23 124/53 93 %   03/10/19 2330 92 19 (!) 122/99 93 %   03/10/19 2315 90 21 124/72 94 %   03/10/19 2301    98 %   03/10/19 2300 91 19 118/67 97 %   03/10/19 2245 91 20 141/64 100 %   03/10/19 2230 90 21 (!) 106/91 100 %   03/10/19 2212    98 %   03/10/19 2202    98 %   03/10/19 2200 94 17  100 %   03/10/19 2154 97 18  (!) 86 %   03/10/19 2153    (!) 84 %     Gen: Well developed, well nourished 80 y.o. female  General: Well developed, well nourished, alert, appears stated age  [de-identified]: Normocephalic, atraumatic. No scleral icterus. Extraocular movements intact. .  Normal mucous membranes. Uvula midline. Airway widely patent. Respiratory: No accessory muscle use.  No wheeze, No rales, No rhonchi. Normal chest wall excursion. Tachypnic. Course respiration in all lung fields. Cardiovascular: Regular rhythm and rate, Normal pulses, Normal perfusion. No edema. Gastrointestinal: Non distended, Non tender, No masses. No ascites. No organomegaly. No evidence of trauma  Musculoskeletal: Full range of motion at all other tested joints. No joint effusions. No tenderness on left hip. Neurological: Normal strength, Normal sensation. Normal speech. No ataxia. Cranial nerves II-XII normal as tested. Skin: No rash, petechia or purpura. Warm and dry  Psychiatric: No suicidal ideation, No homicidal ideation. No hallucinations. Organized thoughts. Normal mood. normal affect. Heme: No lymphadenopathy. : Deferred    Orders:   Orders Placed This Encounter    XR CHEST SNGL V    XR HIP LT W OR WO PELV 2-3 VWS    CBC WITH AUTOMATED DIFF    METABOLIC PANEL, COMPREHENSIVE    TROPONIN I    NT-PRO BNP    NON-INVASIVE POSITIVE PRESSURE VENTILATION    GLUCOSE, POC    POC LACTIC ACID    POC G3    POC G3    EKG, 12 LEAD, INITIAL    furosemide (LASIX) injection 40 mg    DISCONTD: enoxaparin (LOVENOX) injection 70 mg    INITIAL PHYSICIAN ORDER: INPATIENT Stepdown; 9. Other (further clarification in H&P documentation)    INITIAL PHYSICIAN ORDER: INPATIENT Stepdown; 3. Patient receiving treatment that can only be provided in an inpatient setting (further clarification in H&P documentation)       ECG:   [id number]  EKG was done at 10:29 PM  Normal sinus rhythm, rate of 90, QRS duration of 128 ms, a QTC of 506 ms, no STEMI. RIGHT bundle branch block. Comparison:    Imaging:   No results found. X-ray of the pelvis shows a left-sided superior and inferior pubic rami fracture. No fracture      Chest x-ray shows no pneumonia, no pneumothorax, no acute disease.   Labs:  Labs Reviewed   CBC WITH AUTOMATED DIFF - Abnormal; Notable for the following components:       Result Value    RDW 16.0 (*)     NEUTROPHILS 78 (*)     LYMPHOCYTES 14 (*)     ABS. NEUTROPHILS 9.1 (*)     All other components within normal limits   METABOLIC PANEL, COMPREHENSIVE - Abnormal; Notable for the following components:    Potassium 3.3 (*)     Chloride 93 (*)     CO2 37 (*)     Glucose 281 (*)     BUN 41 (*)     Creatinine 2.18 (*)     GFR est AA 26 (*)     GFR est non-AA 21 (*)     Calcium 8.2 (*)     Albumin 3.1 (*)     All other components within normal limits   TROPONIN I - Abnormal; Notable for the following components:    Troponin-I, QT 0.84 (*)     All other components within normal limits   NT-PRO BNP - Abnormal; Notable for the following components:    NT pro-BNP 15,690 (*)     All other components within normal limits   GLUCOSE, POC - Abnormal; Notable for the following components:    Glucose (POC) 259 (*)     All other components within normal limits   POC LACTIC ACID - Abnormal; Notable for the following components:    Lactic Acid (POC) 2.67 (*)     All other components within normal limits   POC G3 - Abnormal; Notable for the following components:    pH (POC) 7.256 (*)     pCO2 (POC) 81.7 (*)     pO2 (POC) 348 (*)     HCO3 (POC) 36.3 (*)     sO2 (POC) 100 (*)     All other components within normal limits   POC G3 - Abnormal; Notable for the following components:    pH (POC) 7.127 (*)     pCO2 (POC) 112.2 (*)     pO2 (POC) 183 (*)     HCO3 (POC) 37.1 (*)     sO2 (POC) 99 (*)     All other components within normal limits       EMERGENCY DEPARTMENT COURSE  02:25AM  The patient became more somnolent on BiPAP. Blood gases repeated, and her pCO2 is now 112. Her initial pCO2 was in the 80s. The nurse is able to get a hold of a family member via telephone, they indicated the patient did not want to be intubated. We have asked him to return to the hospital so we can discuss the treatment plan at this point. Currently patient is on BiPAP. Her O2 sats are 100%. Her respiratory rate is between 8 and 11.   She is somewhat difficult to arouse, but her eyes open and she will attempt to follow commands with a sternal rub. I will have another discussion with the family when they arrive about intubation. 03:27AM  Procedure note: The patient's family arrived in emergency department and and gave consent for RSI intubation. They were advised of the risks. The patient was preoxygenated and her O2 sats 100% prior to beginning the procedure. She was given 20 mg of etomidate IV, followed by 100 mg of succinylcholine. A 7.5 ET tube was passed through the vocal cords on the 2nd attempt. I visualized the 2 passing directly through the vocal cords. The patient had good bilateral breath sounds with bagging after intubation. Her postintubation oxygen saturation was 100%. End-tidal CO2 color change. No complications during the procedure. Post intubation chest x-ray has been ordered    Critical Care Time:  The services I provided to this patient were to treat and/or prevent clinically significant deterioration that could result in the failure of one or more body systems and/or organ systems due to severe respiratory distress. Services included the following:  -reviewing nursing notes and old charts  -vital sign assessments  -direct patient care  -medication orders and management  -interpreting and reviewing diagnostic studies/labs  -re-evaluations  -documentation time    Aggregate critical care time was 60 minutes, which includes only time during which I was engaged in work directly related to the patient's care as described above, whether I was at bedside or elsewhere in the Emergency Department. It did not include time spent performing other reported procedures or the services of residents, students, nurses, or advance practice providers. Elliott Valdes MD    10:08 PM      Diagnosis:  1.  Congestive heart failure, unspecified HF chronicity, unspecified heart failure type ([facility] [address]    2. Closed fracture of multiple pubic jaycob newman, initial encounter ([facility] [address]    3. Ventilatory failure           Disposition:          Discharge Medications:   Current Discharge Medication List          (This chart was created with dictation software. It may contain unintended dictation errors)       Scribe Attestation     Lindsay Warner acting as a scribe for and in the presence of Jorge Bundy MD    March 10, 2019 at 9:59 PM       Provider Attestation:      I personally performed the services described in the documentation, reviewed the documentation, as recorded by the scribe in my presence, and it accurately and completely records my words and actions.  March 10, 2019 at 9:59 PM - Jorge Bundy MD

## 2019-03-11 NOTE — H&P
History & Physical    Patient: Ammy Mack MRN: [id number]  CSN: [id number]    YOB: 1933  Age: 80 y.o. Sex: female      DOA: 3/10/2019    Chief Complaint   Patient presents with    Respiratory Distress          HPI:     Ammy Mack is a 80 y.o. female who has a h/o COPD, RLS, HTN, not on oxygen at home was in her usual state of health when she fell last Thursday night while going to the bathroom. Since then her breathing is getting worse also. Today the family called the ambulance at home as patient was having a hard time in breathing. On their arrival patient's oxygen saturation was 60%, She was put on CPAP, given nebulizer and solumedrol. During my evaluation patient was on BiPaP and still SOB. Her troponin was high also, she will be treated for NSTEMI also. She was not able to move her left leg on her own, x-rays pelvis showed left inferior and superior rami fractures.     Past Medical History:   Diagnosis Date    Chronic lung disease     Colon polyps     COPD 8-30-02    DDD (degenerative disc disease), lumbar 10/1/2014    Degeneration of lumbar or lumbosacral intervertebral disc     Depression     Diabetes ([address] 7-19-05    Diabetes mellitus ([address]     Diverticulosis     DM neuropathy, painful ([facility] 10/1/2014    MOORE (Dyspnea on Exertion) 4-19-02    echo: +mild LVH, OA97-14% w/ diastolic dysfxn    Glaucoma     HCAP (healthcare-associated pneumonia) 03/24/2017    Hemorrhoids 6-6-06    Hyperlipidemia 5/17/2011    Hypertension 4-16-02    LBP (low back pain) 4-13-04    Low back pain radiating to both legs 10/1/2014    Lumbago     Lumbar disc herniation 10/1/2014    Lumbar facet arthropathy 10/1/2014    Lumbosacral radiculopathy at L5 10/1/2014    Lumbosacral radiculopathy at S1 10/1/2014    Microscopic hematuria     OA (osteoarthritis)     Overactive bladder 5/17/2011    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    RLS (restless legs syndrome) 2013    Sciatica     Shortness of breath     Spinal stenosis, lumbar region, without neurogenic claudication     Spondylolisthesis of lumbar region 10/1/2014    Spondylolisthesis, grade 1 10/1/2014    Stage 4 very severe COPD by GOLD classification ([facility] 2019    Stress urinary incontinence     Syncope     -MRI brain    Urinary tract infection, site not specified        Past Surgical History:   Procedure Laterality Date    HX APPENDECTOMY      HX CHOLECYSTECTOMY      HX HYSTERECTOMY      (+)DUB    HX POLYPECTOMY      VT COLONOSCOPY FLX DX W/COLLJ SPEC WHEN PFRMD  06    normal, Dr Migue Coe    VT COLONOSCOPY FLX DX W/COLLJ [address] WHEN PFRMD      (+)polyp= tubular adenoma       Family History   Problem Relation Age of Onset    Colon Cancer Sister     Heart Disease Brother     Seizures Son     Colon Cancer Maternal Aunt        Social History     Socioeconomic History    Marital status:      Spouse name: Not on file    Number of children: Not on file    Years of education: Not on file    Highest education level: Not on file   Tobacco Use    Smoking status: Former Smoker     Packs/day: 1.00     Years: 57.00     Pack years: 57.00     Types: Cigarettes     Last attempt to quit: 2018     Years since quittin.2    Smokeless tobacco: Never Used    Tobacco comment: pt has cut down recently to less than 1 ppd   Substance and Sexual Activity    Alcohol use: No    Drug use: No    Sexual activity: Not Currently       Prior to Admission medications    Medication Sig Start Date End Date Taking?  Authorizing Provider   rOPINIRole (REQUIP) 1 mg tablet TAKE ONE-HALF TO ONE TABLET BY MOUTH ONCE NIGHTLY AS NEEDED FOR  RESTLESS  LEGS  FOR  UP  TO  90  DAYS 3/10/19   Ana Hernandez MD   predniSONE (DELTASONE) 10 mg tablet 30 mg po dailyx 3 days,20 mg po daily x 3 days,10 mg po daily x 3 days 19   Isela John MD albuterol-ipratropium (DUO-NEB) 2.5 mg-0.5 mg/3 ml nebu 3 mL by Nebulization route every six (6) hours as needed. 2/22/19   Koffi Villafana MD   traMADol (ULTRAM) 50 mg tablet TAKE 1 TABLET BY MOUTH EVERY 8 HOURS AS NEEDED FOR PAIN. MAX 150MG DAILY 1/30/19   Koffi Villafana MD   PARoxetine (PAXIL) 30 mg tablet TAKE 1 TABLET BY MOUTH  DAILY 1/29/19   Koffi Villafana MD   mirtazapine (REMERON) 15 mg tablet Take 1 Tab by mouth nightly. 11/5/18   Koffi Villafana MD   amLODIPine (NORVASC) 5 mg tablet Take 1 Tab by mouth daily. 10/29/18   Koffi Villafana MD   tamsulosin (FLOMAX) 0.4 mg capsule Take 1 Cap by mouth daily. 10/29/18   Koffi Villafana MD   gabapentin (NEURONTIN) 300 mg capsule 1 capsule by mouth morning, 1 capsule at midday and 2 capsules at bedtime 10/22/18   Koffi Villafana MD   simvastatin (ZOCOR) 20 mg tablet Take 1 Tab by mouth nightly. 10/16/18   Koffi Villafana MD   rOPINIRole (REQUIP) 1 mg tablet Take 0.5-1 Tabs by mouth nightly. Indications: Restless Legs Syndrome 9/10/18   Koffi Villafana MD   aspirin delayed-release 81 mg tablet Take 1 Tab by mouth daily. 8/10/18   Vinayak Chavarria MD   inhalational spacing device (AEROCHAMBER MV) 1 Each by Does Not Apply route as needed. 7/27/18   Bianca Freeman MD   budesonide-formoterol (SYMBICORT) 80-4.5 mcg/actuation HFAA Take 2 Puffs by inhalation two (2) times a day. 7/18/18   Sourav Dietz MD   OXYGEN-AIR DELIVERY SYSTEMS 3 L by Nasal route continuous. Provider, Historical   Nebulizer & Compressor (PORTABLE NEBULIZER SYSTEM) machine 1 Each by Does Not Apply route every six (6) hours as needed. 2/10/18   Felix Awad PA-C       Allergies   Allergen Reactions    Levaquin [Levofloxacin] Rash       Review of Systems  Could not be obtained due to the severity of the illness.       Physical Exam:     Physical Exam:  Visit Vitals  /54   Pulse 88   Resp 20   Wt 72.6 kg (160 lb) SpO2 91%   BMI 26.63 kg/m²      O2 Device: BIPAP    No data recorded. No intake/output data recorded. No intake/output data recorded. General:  Alert, in severe respiratory distress. Head:  Normocephalic, without obvious abnormality, atraumatic. Eyes:  Conjunctivae/corneas clear. PERRL, EOMs intact. Nose: Nares normal. No drainage or sinus tenderness. Throat: Lips, mucosa, and tongue normal. Teeth and gums normal.   Neck: Supple, symmetrical, trachea midline, no adenopathy, thyroid: no enlargement/tenderness/nodules, no carotid bruit and no JVD. Back:   ROM normal. No CVA tenderness. Lungs:   Decreased breath sounds on auscultation bilaterally. Chest wall:  No tenderness or deformity. Heart:  Regular rate and rhythm, S1, S2 normal, no murmur, click, rub or gallop. Abdomen: Soft, non-tender. Bowel sounds normal. No masses,  No organomegaly. Extremities: Extremities normal, atraumatic, no cyanosis or edema. Pulses: 2+ and symmetric all extremities. Skin: Skin color, texture, turgor normal. No rashes or lesions   Neurologic: CNII-XII intact. No focal motor or sensory deficit. Labs Reviewed:    Recent Results (from the past 24 hour(s))   GLUCOSE, POC    Collection Time: 03/10/19  9:54 PM   Result Value Ref Range    Glucose (POC) 259 (H) 70 - 110 mg/dL   CBC WITH AUTOMATED DIFF    Collection Time: 03/10/19 10:02 PM   Result Value Ref Range    WBC 11.7 4.6 - 13.2 K/uL    RBC 4.79 4.20 - 5.30 M/uL    HGB 13.8 12.0 - 16.0 g/dL    HCT 43.7 35.0 - 45.0 %    MCV 91.2 74.0 - 97.0 FL    MCH 28.8 24.0 - 34.0 PG    MCHC 31.6 31.0 - 37.0 g/dL    RDW 16.0 (H) 11.6 - 14.5 %    PLATELET 023 374 - 553 K/uL    MPV 11.5 9.2 - 11.8 FL    NEUTROPHILS 78 (H) 40 - 73 %    LYMPHOCYTES 14 (L) 21 - 52 %    MONOCYTES 8 3 - 10 %    EOSINOPHILS 0 0 - 5 %    BASOPHILS 0 0 - 2 %    ABS. NEUTROPHILS 9.1 (H) 1.8 - 8.0 K/UL    ABS. LYMPHOCYTES 1.6 0.9 - 3.6 K/UL    ABS.  MONOCYTES 0.9 0.05 - 1.2 K/UL ABS. EOSINOPHILS 0.0 0.0 - 0.4 K/UL    ABS. BASOPHILS 0.0 0.0 - 0.1 K/UL    DF AUTOMATED     METABOLIC PANEL, COMPREHENSIVE    Collection Time: 03/10/19 10:02 PM   Result Value Ref Range    Sodium 137 136 - 145 mmol/L    Potassium 3.3 (L) 3.5 - 5.5 mmol/L    Chloride 93 (L) 100 - 108 mmol/L    CO2 37 (H) 21 - 32 mmol/L    Anion gap 7 3.0 - 18 mmol/L    Glucose 281 (H) 74 - 99 mg/dL    BUN 41 (H) 7.0 - 18 MG/DL    Creatinine 2.18 (H) 0.6 - 1.3 MG/DL    BUN/Creatinine ratio 19 12 - 20      GFR est AA 26 (L) >60 ml/min/1.73m2    GFR est non-AA 21 (L) >60 ml/min/1.73m2    Calcium 8.2 (L) 8.5 - 10.1 MG/DL    Bilirubin, total 0.5 0.2 - 1.0 MG/DL    ALT (SGPT) 21 13 - 56 U/L    AST (SGOT) 37 15 - 37 U/L    Alk. phosphatase 81 45 - 117 U/L    Protein, total 6.6 6.4 - 8.2 g/dL    Albumin 3.1 (L) 3.4 - 5.0 g/dL    Globulin 3.5 2.0 - 4.0 g/dL    A-G Ratio 0.9 0.8 - 1.7     TROPONIN I    Collection Time: 03/10/19 10:02 PM   Result Value Ref Range    Troponin-I, QT 0.84 (H) 0.0 - 0.045 NG/ML   NT-PRO BNP    Collection Time: 03/10/19 10:02 PM   Result Value Ref Range    NT pro-BNP 15,690 (H) 0 - 1,800 PG/ML   POC LACTIC ACID    Collection Time: 03/10/19 10:05 PM   Result Value Ref Range    Lactic Acid (POC) 2.67 (HH) 0.40 - 2.00 mmol/L   EKG, 12 LEAD, INITIAL    Collection Time: 03/10/19 10:29 PM   Result Value Ref Range    Ventricular Rate 90 BPM    Atrial Rate 90 BPM    P-R Interval 150 ms    QRS Duration 128 ms    Q-T Interval 414 ms    QTC Calculation (Bezet) 506 ms    Calculated P Axis 63 degrees    Calculated R Axis -9 degrees    Calculated T Axis -18 degrees    Diagnosis       Normal sinus rhythm  Right bundle branch block  T wave abnormality, consider inferior ischemia  Abnormal ECG  When compared with ECG of 13-JUL-2018 18:13,  Vent.  rate has increased BY  34 BPM  Left posterior fascicular block is no longer present  Inverted T waves have replaced nonspecific T wave abnormality in Anterior   leads     POC G3 Collection Time: 03/10/19 10:46 PM   Result Value Ref Range    Device: BIPAP      FIO2 (POC) 100 %    pH (POC) 7.256 (L) 7.35 - 7.45      pCO2 (POC) 81.7 (H) 35.0 - 45.0 MMHG    pO2 (POC) 348 (H) 80 - 100 MMHG    HCO3 (POC) 36.3 (H) 22 - 26 MMOL/L    sO2 (POC) 100 (H) 92 - 97 %    Base excess (POC) 6 mmol/L    PEEP/CPAP (POC) 8 cmH2O    PIP (POC) 20      Pressure support 12 cmH2O    Allens test (POC) YES      Total resp. rate 24      Site LEFT RADIAL      Patient temp. 37.0      Specimen type (POC) ARTERIAL      Performed by Aleyda Tirado     Spontaneous timed YES         Procedures/imaging: see electronic medical records for all procedures/Xrays and details which were not copied into this note but were reviewed prior to creation of Plan        Assessment/Plan     Active Problems:    ]  Congestive heart failure (CHF) ([facility] (3/10/2019)  - diurese the patient. - check echo. NSTEMI (non-ST elevated myocardial infarction) ([facility] (3/11/2019)  - IV heparin  - trend troponin. - cardiology consult. COPD exacerbation  - IV solumedrol.  - oxygen  - nebulizer. GÉNESIS  - possibly due to decreased renal perfusion from CHF or even hypotension.  - will follow. Code status  - full code    DVT prophylaxis  - on IV heparin.         Enrique Vernon MD  March 11, 2019

## 2019-03-11 NOTE — ED TRIAGE NOTES
Pt brought in via EMS from home c/o resp distress. Family called 911 b/c was altered. When EMS arrived pt was sating 60% on RA refusing treatment. Pt placed on CPAP with EMS, (2) albuterol, (1) atrovent, (125mg) Solumedrol, and (2g) mg were given to pt en route to hospital. Pt current O2 sats are in the 90th% on CPAP. Pt has a 20g PIV placed by medics in her left ac.

## 2019-03-11 NOTE — ED NOTES
Gave pt hospital socks and a warm blanket for comfort. Pt family wanting to know when pt is going to get admitted. I informed family that once pt labs and results have come back the medical team will make an appropriate decision regarding pt care.

## 2019-03-11 NOTE — ED NOTES
Bedside shift change report given to ABDULKADIR Pastrana (oncoming nurse) by Faye Durham RN (offgoing nurse). Report included the following information SBAR, ED Summary, MAR and Recent Results.

## 2019-03-11 NOTE — CONSULTS
Patient: Sandralee Favre                MRN: [id number]       SSN: [id number]  YOB: 1933        AGE: 80 y.o. SEX: female  Body mass index is 26.63 kg/m². PCP: Bernie Lewis MD  03/11/19    Chief Complaint: Intubated/sedated, by report fall 5 days ago    HPI: Patient intubated/sedated due to respiratory failure in ED. History obtained by notes in chart, no family in room this AM.  By chart history patient was brought to ER due to respiratory distress. Also had a fall 5 days prior with some hip pain, has not been treated. Patient was intubated due to respiratory distress. X rays of the left hip show pelvic ring injury, orthopedics consult placed.     Past Medical History:   Diagnosis Date    Chronic lung disease     Colon polyps     COPD 8-30-02    DDD (degenerative disc disease), lumbar 10/1/2014    Degeneration of lumbar or lumbosacral intervertebral disc     Depression     Diabetes ([facility] 7-19-05    Diabetes mellitus ([facility]     Diverticulosis     DM neuropathy, painful ([facility] [address] 10/1/2014    MOORE (Dyspnea on Exertion) 4-19-02    echo: +mild LVH, EA29-08% w/ diastolic dysfxn    Glaucoma     HCAP (healthcare-associated pneumonia) 03/24/2017    Hemorrhoids 6-6-06    Hyperlipidemia 5/17/2011    Hypertension 4-16-02    LBP (low back pain) 4-13-04    Low back pain radiating to both legs 10/1/2014    Lumbago     Lumbar disc herniation 10/1/2014    Lumbar facet arthropathy 10/1/2014    Lumbosacral radiculopathy at L5 10/1/2014    Lumbosacral radiculopathy at S1 10/1/2014    Microscopic hematuria     OA (osteoarthritis)     Overactive bladder 5/17/2011    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    RLS (restless legs syndrome) 1/17/2013    Sciatica     Shortness of breath     Spinal stenosis, lumbar region, without neurogenic claudication     Spondylolisthesis of lumbar region 10/1/2014    Spondylolisthesis, grade 1 10/1/2014    Stage 4 very severe COPD by GOLD classification ([facility] 2/25/2019    Stress urinary incontinence     Syncope     -MRI brain    Urinary tract infection, site not specified        Family History   Problem Relation Age of Onset    Colon Cancer Sister     Heart Disease Brother     Seizures Son     Colon Cancer Maternal Aunt        Current Facility-Administered Medications   Medication Dose Route Frequency Provider Last Rate Last Dose    sodium chloride (NS) flush 5-40 mL  5-40 mL IntraVENous Q8H Ana Tripp PA-C        sodium chloride (NS) flush 5-40 mL  5-40 mL IntraVENous PRN Lacho Tripp PA-C        chlorhexidine (PERIDEX) 0.12 % mouthwash 10 mL  10 mL Oral Q12H Lacho Tripp PA-C   10 mL at 03/11/19 0933    insulin lispro (HUMALOG) injection   SubCUTAneous Q6H Ana Tripp PA-C        glucose chewable tablet 16 g  4 Tab Oral PRN Lacho Tripp PA-C        glucagon (GLUCAGEN) injection 1 mg  1 mg IntraMUSCular PRN Lacho Tripp PA-C        dextrose (D50W) injection syrg 12.5-25 g  25-50 mL IntraVENous PRN Lacho Tripp PA-C        albuterol-ipratropium (DUO-NEB) 2.5 MG-0.5 MG/3 ML  3 mL Nebulization Q4H RT Ana Tripp PA-C        methylPREDNISolone (PF) (SOLU-MEDROL) injection 60 mg  60 mg IntraVENous Q6H Ana Tripp PA-C   60 mg at 03/11/19 0937    budesonide (PULMICORT) 1,000 mcg/2 mL nebulizer susp  1,000 mcg Nebulization BID RT Ana Tripp PA-C        famotidine (PF) (PEPCID) 20 mg in sodium chloride 0.9% 10 mL injection  20 mg IntraVENous DAILY Ana Tripp PA-C   20 mg at 03/11/19 0933    potassium chloride 10 mEq in 100 ml IVPB  10 mEq IntraVENous Q1H Ana Tripp PA-C 100 mL/hr at 03/11/19 0933 10 mEq at 03/11/19 0933    propofol (DIPRIVAN) infusion  5-50 mcg/kg/min IntraVENous TITRATE Van Melton PA-C        electrolyte-r (NORMOSOL R) infusion   IntraVENous CONTINUOUS Jordon Mills MD       [facility] azithromycin (ZITHROMAX) 500 mg in 0.9% sodium chloride (MBP/ADV) 250 mL adv  500 mg IntraVENous ONCE Ivy Baird PA-C        Followed by   Nya Moran ON 3/12/2019] azithromycin (ZITHROMAX) 250 mg in 0.9% sodium chloride 250 mL IVPB  250 mg IntraVENous Q24H Ivy Baird PA-C        electrolyte-r (NORMOSOL R) bolus infusion 500 mL  500 mL IntraVENous ONCE Ivy Baird PA-C           Allergies   Allergen Reactions    Levaquin [Levofloxacin] Rash       Past Surgical History:   Procedure Laterality Date    HX APPENDECTOMY      HX CHOLECYSTECTOMY      HX HYSTERECTOMY      (+)DUB    HX POLYPECTOMY      MO COLONOSCOPY FLX DX W/COLLJ SPEC WHEN PFRMD  06    normal, Dr Migue Coe    MO COLONOSCOPY FLX DX W/COLLJ SPEC WHEN PFRMD      (+)polyp= tubular adenoma       Social History     Socioeconomic History    Marital status:      Spouse name: Not on file    Number of children: Not on file    Years of education: Not on file    Highest education level: Not on file   Social Needs    Financial resource strain: Not on file    Food insecurity - worry: Not on file    Food insecurity - inability: Not on file   [employer] needs - medical: Not on file   [employer] needs - non-medical: Not on file   Occupational History    Not on file   Tobacco Use    Smoking status: Former Smoker     Packs/day: 1.00     Years: 57.00     Pack years: 57.00     Types: Cigarettes     Last attempt to quit: 2018     Years since quittin.2    Smokeless tobacco: Never Used    Tobacco comment: pt has cut down recently to less than 1 ppd   Substance and Sexual Activity    Alcohol use: No    Drug use: No    Sexual activity: Not Currently   Other Topics Concern    Not on file   Social History Narrative    Not on file       REVIEW OF SYSTEMS:      CON: negative for recent weight loss/gain, fever, or chills  EYE: negative for double or blurry vision  ENT: negative for hoarseness  RS: negative for cough, URI, SOB  CV:  negative for chest pain, palpitations  GI:    negative for blood in stool, nausea/vomiting  :  negative for blood in urine  MS: As per HPI  Other systems reviewed and noted below. PHYSICAL EXAMINATION:  Visit Vitals  /73   Pulse 67   Resp 18   Ht 5' 5\" (1.651 m)   Wt 160 lb (72.6 kg)   SpO2 99%   BMI 26.63 kg/m²     Body mass index is 26.63 kg/m². GENERAL: Intubated/sedated on exam, minimally arousable to pain  HEENT: Normocephalic, atraumatic. RESP: Intubated, equal chest rise  CV: Well perfused extremities. No cyanosis or clubbing noted. ABDOMEN: Soft, non-tender, non-distended. MS: Left hip with no obvious signs of trauma, no ecchymosis. Cap refill WNL distally. NO obvious pain with hip ROM but patient intubated/sedated so difficult to discern pain.   No crepitus with gentle hip ROM    IMaging: AP pelvis/Left hip x rays show minimally displaced inferior/superior pubic ramus fractures, no hip fracture noted    A/P:  80year old female with respiratory distress, intubated/sedated and x ray show likely acute left inferior/superior pubic ramus fractures and pelvic ring injury  -Care per ICU at this time to focus on heart/lungs  -No surgery recommended for pelvic ring injury  -When appropriate, if patient is extubated, recommend weight bearing as tolerated left lower extremity and PT evaluation  -PO pain control per primary team  -DVT prophylaxis x 4 weeks to help prevent DVT from pelvic ring injury/immobility  -No further orthopedic intervention at this time  -Thank you for consult      Electronically signed by: Yady Babin MD

## 2019-03-11 NOTE — PROGRESS NOTES
attended the interdisciplinary rounds for Lilian Britton, who is a 80 y.o.,female. Patients Primary Language is: [place].    According to the patients EMR Ep[employer] Affiliation is: [facility].     The reason the Patient came to the hospital is:   Patient Active Problem List    Diagnosis Date Noted    NSTEMI (non-ST elevated myocardial infarction) ([address] 03/11/2019    Congestive heart failure (CHF) ([address] 03/10/2019    Stage 4 very severe COPD by GOLD classification ([address] 02/25/2019    Chronic respiratory failure with hypoxia ([address] 01/01/2019    Type 2 diabetes with nephropathy ([address] 10/22/2018    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/2018    COPD exacerbation ([address] 07/13/2018    Pneumonia 02/07/2018    CAP (community acquired pneumonia) 02/07/2018    Major depression, chronic 12/06/2017    Primary insomnia 12/04/2017    Type 2 diabetes mellitus with diabetic neuropathy, without long-term current use of insulin ([address] 09/13/2017    Hypokalemia 04/21/2017    Dizziness 04/21/2017    CHI (closed head injury) 04/21/2017    Elevated troponin 04/21/2017    HCAP (healthcare-associated pneumonia) 02/07/2017    Abnormal CT scan, chest 02/07/2017    Panlobular emphysema ([address] 12/13/2016    Impaired fasting glucose 12/13/2016    Essential hypertension with goal blood pressure less than 140/90 08/15/2016    Hyperlipidemia LDL goal <100 04/12/2016    Primary osteoarthritis involving multiple joints 04/12/2016    Prediabetes 04/12/2016    Restless leg syndrome 04/12/2016    MOORE (dyspnea on exertion) 09/09/2015    SOB (shortness of breath) 09/09/2015    Murmur, cardiac 09/09/2015    Carotid artery stenosis 08/18/2015    Atherosclerosis of native arteries of the extremities with intermittent claudication 08/18/2015    Acute exacerbation of chronic obstructive pulmonary disease (COPD) ([address] 08/11/2015    Low back pain radiating to both legs 10/01/2014    DDD (degenerative disc disease), lumbar 10/01/2014    Spondylolisthesis of lumbar region 10/01/2014    Spondylolisthesis, grade 1 10/01/2014    Lumbar facet arthropathy 10/01/2014    Lumbar disc herniation 10/01/2014    Lumbosacral radiculopathy at L5 10/01/2014    Lumbosacral radiculopathy at S1 10/01/2014    DM neuropathy, painful ([address] 10/01/2014    Thoracic or lumbosacral neuritis or radiculitis, unspecified 07/14/2014    Spinal stenosis in cervical region 10/11/2013    Polyneuropathy 10/11/2013    Lumbar stenosis 10/11/2013    High risk medication use 10/11/2013    Ulnar neuropathy at elbow 10/11/2013    Chronic pain syndrome 09/16/2013    Lumbosacral spondylosis without myelopathy 09/16/2013    Cervical stenosis of spinal canal 09/16/2013    Repeated falls 08/07/2013    Ataxic gait 07/22/2013    Chronic neck pain 07/22/2013    Orthostatic hypotension 07/22/2013    Paresthesia 07/22/2013    Aortic dissection, abdominal (HCC) 02/05/2013    RLS (restless legs syndrome) 01/17/2013    Glucose intolerance (pre-diabetes) 05/24/2012    Unspecified vitamin D deficiency 04/04/2012    Depression 09/20/2011    Sciatica     Degeneration of lumbar or lumbosacral intervertebral disc     Encounter for long-term (current) use of other medications     Hyperlipidemia 05/17/2011    Overactive bladder 05/17/2011    Hypercholesterolemia 04/01/2008          Plan:  Janice Malhotra will continue to follow and will provide pastoral care on an as needed/requested basis.  recommends bedside caregivers page  on duty if patient shows signs of acute spiritual or emotional distress.     [address] [facility]  Board Certified [address] [facility]   [phone]

## 2019-03-11 NOTE — PROGRESS NOTES
Patient became more obtunded, I examined the patient, she was hard to be aroused. Repeat ABGs showed pH of 7.127, PCO2 112.2, PO2 183. D/W daughter [facility] about the intubation and I told her that her mom has failed the BiPaP therapy. She agreed for the intubation. I notified this to Dr. Colin Tay, the ED physician. He will intubate the patient. Will transfer the care to ICU team once intubated.

## 2019-03-11 NOTE — PROGRESS NOTES
Bedside and Verbal shift change report given to Cheryl RN (oncoming nurse) by Dawn Morales RN (offgoing nurse).  Report included the following information Kardex, ED Summary, MAR, Recent Results and Cardiac Rhythm SR.

## 2019-03-12 ENCOUNTER — APPOINTMENT (OUTPATIENT)
Dept: GENERAL RADIOLOGY | Age: 84
DRG: 208 | End: 2019-03-12
Attending: PHYSICIAN ASSISTANT
Payer: MEDICARE

## 2019-03-12 LAB
ALBUMIN SERPL-MCNC: 2.4 G/DL (ref 3.4–5)
ALBUMIN/GLOB SERPL: 0.8 {RATIO} (ref 0.8–1.7)
ALP SERPL-CCNC: 66 U/L (ref 45–117)
ALT SERPL-CCNC: 15 U/L (ref 13–56)
ANION GAP SERPL CALC-SCNC: 8 MMOL/L (ref 3–18)
ARTERIAL PATENCY WRIST A: NO
AST SERPL-CCNC: 17 U/L (ref 15–37)
BASE EXCESS BLD CALC-SCNC: 10 MMOL/L
BASOPHILS # BLD: 0 K/UL (ref 0–0.06)
BASOPHILS NFR BLD: 0 % (ref 0–3)
BDY SITE: ABNORMAL
BILIRUB SERPL-MCNC: 0.4 MG/DL (ref 0.2–1)
BODY TEMPERATURE: 37
BUN SERPL-MCNC: 44 MG/DL (ref 7–18)
BUN/CREAT SERPL: 30 (ref 12–20)
CALCIUM SERPL-MCNC: 8.1 MG/DL (ref 8.5–10.1)
CHLORIDE SERPL-SCNC: 101 MMOL/L (ref 100–108)
CK MB CFR SERPL CALC: 1.6 % (ref 0–4)
CK MB SERPL-MCNC: 1.1 NG/ML (ref 5–25)
CK SERPL-CCNC: 67 U/L (ref 26–192)
CO2 SERPL-SCNC: 32 MMOL/L (ref 21–32)
CREAT SERPL-MCNC: 1.48 MG/DL (ref 0.6–1.3)
DIFFERENTIAL METHOD BLD: ABNORMAL
EOSINOPHIL # BLD: 0 K/UL (ref 0–0.4)
EOSINOPHIL NFR BLD: 0 % (ref 0–5)
ERYTHROCYTE [DISTWIDTH] IN BLOOD BY AUTOMATED COUNT: 15.6 % (ref 11.6–14.5)
GAS FLOW.O2 O2 DELIVERY SYS: ABNORMAL L/MIN
GAS FLOW.O2 SETTING OXYMISER: 18 BPM
GLOBULIN SER CALC-MCNC: 3.2 G/DL (ref 2–4)
GLUCOSE BLD STRIP.AUTO-MCNC: 203 MG/DL (ref 70–110)
GLUCOSE SERPL-MCNC: 192 MG/DL (ref 74–99)
HCO3 BLD-SCNC: 32.3 MMOL/L (ref 22–26)
HCT VFR BLD AUTO: 40.1 % (ref 35–45)
HGB BLD-MCNC: 13.3 G/DL (ref 12–16)
LYMPHOCYTES # BLD: 0.8 K/UL (ref 0.8–3.5)
LYMPHOCYTES NFR BLD: 8 % (ref 20–51)
MAGNESIUM SERPL-MCNC: 2.5 MG/DL (ref 1.6–2.6)
MCH RBC QN AUTO: 28.7 PG (ref 24–34)
MCHC RBC AUTO-ENTMCNC: 33.2 G/DL (ref 31–37)
MCV RBC AUTO: 86.4 FL (ref 74–97)
MONOCYTES # BLD: 0.2 K/UL (ref 0–1)
MONOCYTES NFR BLD: 2 % (ref 2–9)
NEUTS BAND NFR BLD MANUAL: 3 % (ref 0–5)
NEUTS SEG # BLD: 8.8 K/UL (ref 1.8–8)
NEUTS SEG NFR BLD: 86 % (ref 42–75)
O2/TOTAL GAS SETTING VFR VENT: 70 %
OTHER CELLS NFR BLD MANUAL: 1 %
PCO2 BLD: 37.1 MMHG (ref 35–45)
PEEP RESPIRATORY: 5 CMH2O
PH BLD: 7.55 [PH] (ref 7.35–7.45)
PHOSPHATE SERPL-MCNC: 2.6 MG/DL (ref 2.5–4.9)
PLATELET # BLD AUTO: 157 K/UL (ref 135–420)
PLATELET COMMENTS,PCOM: ABNORMAL
PMV BLD AUTO: 12.1 FL (ref 9.2–11.8)
PO2 BLD: 61 MMHG (ref 80–100)
POTASSIUM SERPL-SCNC: 3.5 MMOL/L (ref 3.5–5.5)
PROT SERPL-MCNC: 5.6 G/DL (ref 6.4–8.2)
RBC # BLD AUTO: 4.64 M/UL (ref 4.2–5.3)
RBC MORPH BLD: ABNORMAL
RBC MORPH BLD: ABNORMAL
SAO2 % BLD: 94 % (ref 92–97)
SERVICE CMNT-IMP: ABNORMAL
SODIUM SERPL-SCNC: 141 MMOL/L (ref 136–145)
SPECIMEN TYPE: ABNORMAL
TOTAL RESP. RATE, ITRR: 18
TROPONIN I SERPL-MCNC: 0.63 NG/ML (ref 0–0.04)
VENTILATION MODE VENT: ABNORMAL
VOLUME CONTROL PLUS IVLCP: YES
VT SETTING VENT: 450 ML
WBC # BLD AUTO: 9.9 K/UL (ref 4.6–13.2)

## 2019-03-12 PROCEDURE — 74011250637 HC RX REV CODE- 250/637: Performed by: INTERNAL MEDICINE

## 2019-03-12 PROCEDURE — 74011000250 HC RX REV CODE- 250: Performed by: HOSPITALIST

## 2019-03-12 PROCEDURE — 74011000258 HC RX REV CODE- 258: Performed by: INTERNAL MEDICINE

## 2019-03-12 PROCEDURE — 80053 COMPREHEN METABOLIC PANEL: CPT

## 2019-03-12 PROCEDURE — 71045 X-RAY EXAM CHEST 1 VIEW: CPT

## 2019-03-12 PROCEDURE — 74011250637 HC RX REV CODE- 250/637: Performed by: PHYSICIAN ASSISTANT

## 2019-03-12 PROCEDURE — 36600 WITHDRAWAL OF ARTERIAL BLOOD: CPT

## 2019-03-12 PROCEDURE — 94762 N-INVAS EAR/PLS OXIMTRY CONT: CPT

## 2019-03-12 PROCEDURE — 83735 ASSAY OF MAGNESIUM: CPT

## 2019-03-12 PROCEDURE — 85025 COMPLETE CBC W/AUTO DIFF WBC: CPT

## 2019-03-12 PROCEDURE — 74011250636 HC RX REV CODE- 250/636: Performed by: INTERNAL MEDICINE

## 2019-03-12 PROCEDURE — 74011250636 HC RX REV CODE- 250/636: Performed by: PHYSICIAN ASSISTANT

## 2019-03-12 PROCEDURE — 84100 ASSAY OF PHOSPHORUS: CPT

## 2019-03-12 PROCEDURE — 94003 VENT MGMT INPAT SUBQ DAY: CPT

## 2019-03-12 PROCEDURE — 82962 GLUCOSE BLOOD TEST: CPT

## 2019-03-12 PROCEDURE — 36415 COLL VENOUS BLD VENIPUNCTURE: CPT

## 2019-03-12 PROCEDURE — 74011636637 HC RX REV CODE- 636/637: Performed by: INTERNAL MEDICINE

## 2019-03-12 PROCEDURE — 77030038269 HC DRN EXT URIN PURWCK BARD -A

## 2019-03-12 PROCEDURE — 82803 BLOOD GASES ANY COMBINATION: CPT

## 2019-03-12 PROCEDURE — 74011000258 HC RX REV CODE- 258: Performed by: PHYSICIAN ASSISTANT

## 2019-03-12 PROCEDURE — 94640 AIRWAY INHALATION TREATMENT: CPT

## 2019-03-12 PROCEDURE — 65270000029 HC RM PRIVATE

## 2019-03-12 PROCEDURE — 82550 ASSAY OF CK (CPK): CPT

## 2019-03-12 PROCEDURE — 74011000250 HC RX REV CODE- 250: Performed by: PHYSICIAN ASSISTANT

## 2019-03-12 RX ORDER — AMLODIPINE BESYLATE 5 MG/1
5 TABLET ORAL DAILY
Status: DISCONTINUED | OUTPATIENT
Start: 2019-03-13 | End: 2019-03-15 | Stop reason: HOSPADM

## 2019-03-12 RX ORDER — SCOLOPAMINE TRANSDERMAL SYSTEM 1 MG/1
1 PATCH, EXTENDED RELEASE TRANSDERMAL
Status: DISCONTINUED | OUTPATIENT
Start: 2019-03-12 | End: 2019-03-15

## 2019-03-12 RX ORDER — PAROXETINE HYDROCHLORIDE 20 MG/1
30 TABLET, FILM COATED ORAL DAILY
Status: DISCONTINUED | OUTPATIENT
Start: 2019-03-13 | End: 2019-03-15 | Stop reason: HOSPADM

## 2019-03-12 RX ORDER — ONDANSETRON 2 MG/ML
4 INJECTION INTRAMUSCULAR; INTRAVENOUS
Status: DISCONTINUED | OUTPATIENT
Start: 2019-03-12 | End: 2019-03-15 | Stop reason: HOSPADM

## 2019-03-12 RX ORDER — MIRTAZAPINE 15 MG/1
15 TABLET, FILM COATED ORAL
Status: DISCONTINUED | OUTPATIENT
Start: 2019-03-12 | End: 2019-03-15 | Stop reason: HOSPADM

## 2019-03-12 RX ORDER — POTASSIUM CHLORIDE 1.5 G/1.77G
40 POWDER, FOR SOLUTION ORAL
Status: DISCONTINUED | OUTPATIENT
Start: 2019-03-12 | End: 2019-03-12

## 2019-03-12 RX ORDER — PREDNISONE 20 MG/1
40 TABLET ORAL
Status: DISCONTINUED | OUTPATIENT
Start: 2019-03-12 | End: 2019-03-15 | Stop reason: HOSPADM

## 2019-03-12 RX ORDER — IPRATROPIUM BROMIDE AND ALBUTEROL SULFATE 2.5; .5 MG/3ML; MG/3ML
3 SOLUTION RESPIRATORY (INHALATION)
Status: DISCONTINUED | OUTPATIENT
Start: 2019-03-12 | End: 2019-03-12 | Stop reason: SDUPTHER

## 2019-03-12 RX ORDER — LORAZEPAM 2 MG/ML
1 INJECTION INTRAMUSCULAR
Status: DISCONTINUED | OUTPATIENT
Start: 2019-03-12 | End: 2019-03-15 | Stop reason: HOSPADM

## 2019-03-12 RX ORDER — ASPIRIN 81 MG/1
81 TABLET ORAL DAILY
Status: DISCONTINUED | OUTPATIENT
Start: 2019-03-13 | End: 2019-03-15 | Stop reason: HOSPADM

## 2019-03-12 RX ORDER — MORPHINE SULFATE 2 MG/ML
2 INJECTION, SOLUTION INTRAMUSCULAR; INTRAVENOUS
Status: DISCONTINUED | OUTPATIENT
Start: 2019-03-12 | End: 2019-03-12

## 2019-03-12 RX ORDER — IPRATROPIUM BROMIDE AND ALBUTEROL SULFATE 2.5; .5 MG/3ML; MG/3ML
3 SOLUTION RESPIRATORY (INHALATION)
Status: DISCONTINUED | OUTPATIENT
Start: 2019-03-12 | End: 2019-03-15

## 2019-03-12 RX ORDER — MORPHINE SULFATE 2 MG/ML
1 INJECTION, SOLUTION INTRAMUSCULAR; INTRAVENOUS
Status: DISCONTINUED | OUTPATIENT
Start: 2019-03-12 | End: 2019-03-15

## 2019-03-12 RX ORDER — BUDESONIDE AND FORMOTEROL FUMARATE DIHYDRATE 80; 4.5 UG/1; UG/1
2 AEROSOL RESPIRATORY (INHALATION)
Status: DISCONTINUED | OUTPATIENT
Start: 2019-03-12 | End: 2019-03-15 | Stop reason: HOSPADM

## 2019-03-12 RX ORDER — TAMSULOSIN HYDROCHLORIDE 0.4 MG/1
0.4 CAPSULE ORAL DAILY
Status: DISCONTINUED | OUTPATIENT
Start: 2019-03-13 | End: 2019-03-15 | Stop reason: HOSPADM

## 2019-03-12 RX ORDER — GABAPENTIN 300 MG/1
300 CAPSULE ORAL 4 TIMES DAILY
Status: DISCONTINUED | OUTPATIENT
Start: 2019-03-12 | End: 2019-03-14

## 2019-03-12 RX ORDER — MORPHINE SULFATE 100 MG/5ML
10 SOLUTION ORAL
Status: DISCONTINUED | OUTPATIENT
Start: 2019-03-12 | End: 2019-03-15

## 2019-03-12 RX ORDER — ROPINIROLE 1 MG/1
1 TABLET, FILM COATED ORAL
Status: DISCONTINUED | OUTPATIENT
Start: 2019-03-12 | End: 2019-03-15 | Stop reason: HOSPADM

## 2019-03-12 RX ORDER — SIMVASTATIN 20 MG/1
20 TABLET, FILM COATED ORAL
Status: DISCONTINUED | OUTPATIENT
Start: 2019-03-12 | End: 2019-03-13

## 2019-03-12 RX ADMIN — MORPHINE SULFATE 2 MG: 2 INJECTION, SOLUTION INTRAMUSCULAR; INTRAVENOUS at 11:02

## 2019-03-12 RX ADMIN — Medication 10 ML: at 21:01

## 2019-03-12 RX ADMIN — SODIUM CHLORIDE, SODIUM ACETATE ANHYDROUS, SODIUM GLUCONATE, POTASSIUM CHLORIDE, AND MAGNESIUM CHLORIDE: 526; 222; 502; 37; 30 INJECTION, SOLUTION INTRAVENOUS at 05:42

## 2019-03-12 RX ADMIN — BUDESONIDE 1000 MCG: 1 SUSPENSION RESPIRATORY (INHALATION) at 08:37

## 2019-03-12 RX ADMIN — CHLORHEXIDINE GLUCONATE 0.12% ORAL RINSE 10 ML: 1.2 LIQUID ORAL at 08:32

## 2019-03-12 RX ADMIN — SODIUM CHLORIDE: 900 INJECTION, SOLUTION INTRAVENOUS at 05:36

## 2019-03-12 RX ADMIN — IPRATROPIUM BROMIDE AND ALBUTEROL SULFATE 3 ML: .5; 3 SOLUTION RESPIRATORY (INHALATION) at 03:31

## 2019-03-12 RX ADMIN — PROPOFOL 30 MCG/KG/MIN: 10 INJECTION, EMULSION INTRAVENOUS at 01:17

## 2019-03-12 RX ADMIN — PROPOFOL 25 MCG/KG/MIN: 10 INJECTION, EMULSION INTRAVENOUS at 07:41

## 2019-03-12 RX ADMIN — INSULIN LISPRO 6 UNITS: 100 INJECTION, SOLUTION INTRAVENOUS; SUBCUTANEOUS at 05:37

## 2019-03-12 RX ADMIN — SODIUM CHLORIDE: 900 INJECTION, SOLUTION INTRAVENOUS at 06:35

## 2019-03-12 RX ADMIN — METHYLPREDNISOLONE SODIUM SUCCINATE 60 MG: 40 INJECTION, POWDER, FOR SOLUTION INTRAMUSCULAR; INTRAVENOUS at 08:32

## 2019-03-12 RX ADMIN — MORPHINE SULFATE 10 MG: 100 SOLUTION ORAL at 22:35

## 2019-03-12 RX ADMIN — SODIUM CHLORIDE: 900 INJECTION, SOLUTION INTRAVENOUS at 04:34

## 2019-03-12 RX ADMIN — IPRATROPIUM BROMIDE AND ALBUTEROL SULFATE 3 ML: .5; 3 SOLUTION RESPIRATORY (INHALATION) at 00:51

## 2019-03-12 RX ADMIN — SODIUM CHLORIDE: 900 INJECTION, SOLUTION INTRAVENOUS at 07:41

## 2019-03-12 RX ADMIN — METHYLPREDNISOLONE SODIUM SUCCINATE 60 MG: 40 INJECTION, POWDER, FOR SOLUTION INTRAMUSCULAR; INTRAVENOUS at 03:11

## 2019-03-12 RX ADMIN — BUDESONIDE AND FORMOTEROL FUMARATE DIHYDRATE 2 PUFF: 80; 4.5 AEROSOL RESPIRATORY (INHALATION) at 22:33

## 2019-03-12 RX ADMIN — IPRATROPIUM BROMIDE AND ALBUTEROL SULFATE 3 ML: .5; 3 SOLUTION RESPIRATORY (INHALATION) at 22:04

## 2019-03-12 RX ADMIN — IPRATROPIUM BROMIDE AND ALBUTEROL SULFATE 3 ML: .5; 3 SOLUTION RESPIRATORY (INHALATION) at 08:27

## 2019-03-12 RX ADMIN — FAMOTIDINE 20 MG: 10 INJECTION INTRAVENOUS at 08:32

## 2019-03-12 RX ADMIN — LORAZEPAM 1 MG: 2 INJECTION INTRAMUSCULAR; INTRAVENOUS at 11:02

## 2019-03-12 RX ADMIN — Medication 10 ML: at 05:43

## 2019-03-12 NOTE — CDMP QUERY
NSTEMI   was noted in the  H&P.  however, it is not noted in subsequent documentation. Please clarify if this condition was:    Treated & resolved  Ongoing/Improving  Ruled Out  ICU  H&P notes- \" Elevated troponin - 0.83, demand ischemia from prior hypoxia vs ACS. \"  follow up trop levels  :   1.01;   0.94;   0.70;   0.63;    Clinically Undetermined (no explanation for clinical findings)      Please clarify and document your clinical opinion in the progress notes and discharge summary including the definitive and/or presumptive diagnosis, (suspected or probable), related to the above clinical findings. Please include clinical findings supporting your diagnosis.     Thank you,   Richrd Dubin RN   CCDS  x [phone]

## 2019-03-12 NOTE — ROUTINE PROCESS
Bedside shift change report given to [address] [place] [address] (oncoming nurse) by Jody Lopez (offgoing nurse). Report included the following information SBAR, Kardex, MAR and Recent Results.

## 2019-03-12 NOTE — PROGRESS NOTES
attended the interdisciplinary rounds for Ronnie Ford, who is a 80 y.o.,female. Patients Primary Language is: [place].    According to the patients EMR Sikh Affiliation is: [facility].     The reason the Patient came to the hospital is:   Patient Active Problem List    Diagnosis Date Noted    NSTEMI (non-ST elevated myocardial infarction) ([address] 03/11/2019    Pelvic ring fracture, closed, initial encounter ([address] 03/11/2019    Congestive heart failure (CHF) ([address] 03/10/2019    Stage 4 very severe COPD by GOLD classification ([address] 02/25/2019    Chronic respiratory failure with hypoxia ([address] 01/01/2019    Type 2 diabetes with nephropathy ([address] 10/22/2018    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/2018    COPD exacerbation ([address] 07/13/2018    Pneumonia 02/07/2018    CAP (community acquired pneumonia) 02/07/2018    Major depression, chronic 12/06/2017    Primary insomnia 12/04/2017    Type 2 diabetes mellitus with diabetic neuropathy, without long-term current use of insulin ([address] 09/13/2017    Hypokalemia 04/21/2017    Dizziness 04/21/2017    CHI (closed head injury) 04/21/2017    Elevated troponin 04/21/2017    HCAP (healthcare-associated pneumonia) 02/07/2017    Abnormal CT scan, chest 02/07/2017    Panlobular emphysema ([address] 12/13/2016    Impaired fasting glucose 12/13/2016    Essential hypertension with goal blood pressure less than 140/90 08/15/2016    Hyperlipidemia LDL goal <100 04/12/2016    Primary osteoarthritis involving multiple joints 04/12/2016    Prediabetes 04/12/2016    Restless leg syndrome 04/12/2016    MOORE (dyspnea on exertion) 09/09/2015    SOB (shortness of breath) 09/09/2015    Murmur, cardiac 09/09/2015    Carotid artery stenosis 08/18/2015    Atherosclerosis of native arteries of the extremities with intermittent claudication 08/18/2015    Acute exacerbation of chronic obstructive pulmonary disease (COPD) ([address] 08/11/2015    Low back pain radiating to both legs 10/01/2014    DDD (degenerative disc disease), lumbar 10/01/2014    Spondylolisthesis of lumbar region 10/01/2014    Spondylolisthesis, grade 1 10/01/2014    Lumbar facet arthropathy 10/01/2014    Lumbar disc herniation 10/01/2014    Lumbosacral radiculopathy at L5 10/01/2014    Lumbosacral radiculopathy at S1 10/01/2014    DM neuropathy, painful ([address] 10/01/2014    Thoracic or lumbosacral neuritis or radiculitis, unspecified 07/14/2014    Spinal stenosis in cervical region 10/11/2013    Polyneuropathy 10/11/2013    Lumbar stenosis 10/11/2013    High risk medication use 10/11/2013    Ulnar neuropathy at elbow 10/11/2013    Chronic pain syndrome 09/16/2013    Lumbosacral spondylosis without myelopathy 09/16/2013    Cervical stenosis of spinal canal 09/16/2013    Repeated falls 08/07/2013    Ataxic gait 07/22/2013    Chronic neck pain 07/22/2013    Orthostatic hypotension 07/22/2013    Paresthesia 07/22/2013    Aortic dissection, abdominal (HCC) 02/05/2013    RLS (restless legs syndrome) 01/17/2013    Glucose intolerance (pre-diabetes) 05/24/2012    Unspecified vitamin D deficiency 04/04/2012    Depression 09/20/2011    Sciatica     Degeneration of lumbar or lumbosacral intervertebral disc     Encounter for long-term (current) use of other medications     Hyperlipidemia 05/17/2011    Overactive bladder 05/17/2011    Hypercholesterolemia 04/01/2008        Plan:  Loren Helm will continue to follow and will provide pastoral care on an as needed/requested basis.  recommends bedside caregivers page  on duty if patient shows signs of acute spiritual or emotional distress.     Delano Huffman  Board Certified [address] [place] [address]   [phone]

## 2019-03-12 NOTE — PROGRESS NOTES
Came to see the patient but noted that patient is only on comfort care now. Discussed with nurse and discussed with family. Will not do a formal consultation but will be available if needed.   Thank you very much

## 2019-03-12 NOTE — PALLIATIVE CARE
Approached by patient's  wishing comfort care for his wife and asking about compassionate extubation. Spouse as well as the family is very well aware of patient's underlying condition of severe end-stage COPD and current decompensation related to a fall as well as decompensated congestive heart failure with a non-ST elevation MI and concern for a new pneumonia as well. They understand that she is on significant ventilatory support. And expectations for continued ventilatory support need and not  restoring to prior level of functionality. Met with family at bedside to discuss current condition, prognosis, treatment plans and goals of care. Patient not able to participate due to mentation/critical condition. Participtants; provider                        Family members-2 sons daughter ,granddaughter grandsons                        RN                         Discussion encompassing acute change in condition and need for intervention   Therapeutic options, response discussed. Answered all questions and concerns to the best of my ability. Discussed code status, comfort measure options. Following recommendations: They want to proceed with the DNR  Compassionate extubation and further treatment at aimed at comfort care  Depending on course will visit issues of continued further care in hospital with hospice versus placement with hospice. Questions concerns regarding the process and support care were all discussed and answered to the best of my ability    Family updated on care plans. Orders placed. -DNR  Comfort care order set    Total Time: 45 min  Time spent in counseling / coordination:  >50% of time in counseling / coordination Yes    Amanda Salazar MD

## 2019-03-12 NOTE — PROGRESS NOTES
Pt compassionately extubated to 4 LPM humidified NC per orders. RN Andrew Morocho and family members at bedside. Suctioned airway pre and post procedure. No complications.

## 2019-03-12 NOTE — PROGRESS NOTES
[facility]. Pulmonary, Critical Care, and Sleep Medicine    Name: Keri Ruiz MRN: [id number]   : 1933 Hospital: [address] [place] [address] Dr   Date: 3/12/2019  Admission Date: 3/10/2019     Chart and notes reviewed. Data reviewed. I have evaluated all findings. [x]I have reviewed the flowsheet and previous days notes. [x]The patient is unable to give any meaningful history or review of systems because the patient is:  [x]Intubated [x]Sedated   []Unresponsive      [x]The patient is critically ill on      []Mechanical ventilation []Pressors   []BiPAP []         Interval HPI:Patient is a 80 y.o. female w/ pmhx of COPD, heart failure, diabetes, and HTN. Admitted on ventilator for resp failure secondary to COPD exacerbation in light of broken hip. Patient is seen by Dr. Ngozi Hernandez outpatient, active smoker, no O2 requirements. Subjective 19  Hospital Day:3  Vent Day:3  Overnight events: none, cont' w/ FiO2 70%  Mentation/Activity: sedated but does follow commands   Respiratory/ Secretions: scant   Hemodynamics: VSS  Urine output, bowel: adequate  Diet: n/a  Need for procedures: n/a              ROS:Review of systems not obtained due to patient factors. Events and notes from last 24 hours reviewed. Care plan discussed on multidisciplinary rounds.   Patient Active Problem List   Diagnosis Code    Hyperlipidemia E78.5    Overactive bladder N32.81    Sciatica M54.30    Degeneration of lumbar or lumbosacral intervertebral disc M51.37    Encounter for long-term (current) use of other medications Z79.899    Depression F32.9    Unspecified vitamin D deficiency E55.9    Glucose intolerance (pre-diabetes) R73.03    RLS (restless legs syndrome) G25.81    Aortic dissection, abdominal (HCC) I71.02    Ataxic gait R26.0    Chronic neck pain M54.2, G89.29    Orthostatic hypotension I95.1    Paresthesia R20.2    Repeated falls R29.6    Chronic pain syndrome G89.4    Lumbosacral spondylosis without myelopathy M47.817    Cervical stenosis of spinal canal M48.02    Spinal stenosis in cervical region M48.02    Polyneuropathy G62.9    Lumbar stenosis M48.061    High risk medication use Z79.899    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    Thoracic or lumbosacral neuritis or radiculitis, unspecified [id number]    Low back pain radiating to both legs M54.5    DDD (degenerative disc disease), lumbar M51.36    Spondylolisthesis of lumbar region M43.16    Spondylolisthesis, grade 1 M43.10    Lumbar facet arthropathy M47.816    Lumbar disc herniation M51.26    Lumbosacral radiculopathy at L5 M54.17    Lumbosacral radiculopathy at S1 M54.17    DM neuropathy, painful ([facility]) E11.40    Acute exacerbation of chronic obstructive pulmonary disease (COPD) ([facility]) J44.1    Carotid artery stenosis I65.29    Atherosclerosis of native arteries of the extremities with intermittent claudication I70.219    MOORE (dyspnea on exertion) R06.09    SOB (shortness of breath) R06.02    Murmur, cardiac R01.1    Hyperlipidemia LDL goal <100 E78.5    Primary osteoarthritis involving multiple joints M15.0    Prediabetes R73.03    Restless leg syndrome G25.81    Essential hypertension with goal blood pressure less than 140/90 I10    Panlobular emphysema ([facility]) J43.1    Impaired fasting glucose R73.01    HCAP (healthcare-associated pneumonia) J18.9    Abnormal CT scan, chest R93.89    Hypercholesterolemia E78.00    Hypokalemia E87.6    Dizziness R42    CHI (closed head injury) S09. 90XA    Elevated troponin R74.8    Type 2 diabetes mellitus with diabetic neuropathy, without long-term current use of insulin ([facility]) E11.40    Primary insomnia F51.01    Major depression, chronic F34.1    Pneumonia J18.9    CAP (community acquired pneumonia) J18.9    COPD exacerbation ([facility]) J44.1    RBBB (right bundle branch block with left anterior fascicular block) I45.2    Type 2 diabetes with nephropathy ([facility] E11.21    Chronic respiratory failure with hypoxia ([facility]) J96.11    Stage 4 very severe COPD by GOLD classification ([facility]) J44.9    Congestive heart failure (CHF) ([facility]) I50.9    NSTEMI (non-ST elevated myocardial infarction) ([facility] I21.4    Pelvic ring fracture, closed, initial encounter ([facility] S32.810A       Vital Signs:  Visit Vitals  /53   Pulse 87   Temp 99.2 °F (37.3 °C)   Resp (!) 31   Ht 5' 5\" (1.651 m)   Wt 72.6 kg (160 lb)   SpO2 97%   Breastfeeding? No   BMI 26.63 kg/m²       O2 Device: Ventilator, Endotracheal tube, Heated, Humidifier       Temp (24hrs), Av.2 °F (37.3 °C), Min:98.8 °F (37.1 °C), Max:100.2 °F (37.9 °C)       Intake/Output:   Last shift:      No intake/output data recorded.   Last 3 shifts: 03/10 1901 -  0700  In: 2193.8 [I.V.:2163.8]  Out: 350 [Urine:350]    Intake/Output Summary (Last 24 hours) at 3/12/2019 0859  Last data filed at 3/12/2019 0700  Gross per 24 hour   Intake 2193.82 ml   Output 350 ml   Net 1843.82 ml        Ventilator Settings:  Ventilator Mode: Assist control, VC+  Respiratory Rate  Back-Up Rate: 15  Insp Time (sec): 1 sec  I:E Ratio: 1:2.7  Ventilator Volumes  Vt Set (ml): 450 ml  Vt Exhaled (Machine Breath) (ml): 456 ml  Vt Spont (ml): 450 ml  Ve Observed (l/min): 8.68 l/min  Ventilator Pressures  PIP Observed (cm H2O): 26 cm H2O  Plateau Pressure (cm H2O): 20 cm H2O  MAP (cm H2O): 11  PEEP/VENT (cm H2O): 5 cm H20  Auto PEEP Observed (cm H2O): 0 cm H2O    Current Facility-Administered Medications   Medication Dose Route Frequency    sodium chloride (NS) flush 5-40 mL  5-40 mL IntraVENous Q8H    chlorhexidine (PERIDEX) 0.12 % mouthwash 10 mL  10 mL Oral Q12H    insulin lispro (HUMALOG) injection   SubCUTAneous Q6H    albuterol-ipratropium (DUO-NEB) 2.5 MG-0.5 MG/3 ML  3 mL Nebulization Q4H RT    methylPREDNISolone (PF) (SOLU-MEDROL) injection 60 mg  60 mg IntraVENous Q6H    budesonide (PULMICORT) 1,000 mcg/2 mL nebulizer susp  1,000 mcg Nebulization BID RT    famotidine (PF) (PEPCID) 20 mg in sodium chloride 0.9% 10 mL injection  20 mg IntraVENous DAILY    propofol (DIPRIVAN) infusion  5-50 mcg/kg/min IntraVENous TITRATE    electrolyte-r (NORMOSOL R) infusion   IntraVENous CONTINUOUS    azithromycin (ZITHROMAX) 250 mg in 0.9% sodium chloride 250 mL IVPB  250 mg IntraVENous Q24H         Telemetry: [x]Sinus []A-flutter []Paced    []A-fib []Multiple PVCs                  Physical Exam:      General: Intubated/sedated; HEENT:  Anicteric sclerae; pink palpebral conjunctivae; mucosa moist  Resp:  Symmetrical chest expansion, no accessory muscle use; diffuse rhonchi  CV:  S1, S2 present; regular rate and rhythm  GI:  Abdomen soft, non-tender; (+) active bowel sounds  Extremities:  +2 pulses on all extremities; no edema/ cyanosis/ clubbing noted   Skin:  Warm; no rashes/ lesions noted, normal turgor/cap refill   Neurologic:  Non-focal. Follows commands, tracks  Devices:  none      DATA:  MAR reviewed and pertinent medications noted or modified as needed    Labs:  Recent Labs     03/12/19  0033 03/11/19  0700 03/10/19  2202   WBC 9.9 6.9 11.7   HGB 13.3 12.5 13.8   HCT 40.1 39.8 43.7    143 155     Recent Labs     03/12/19  0033 03/11/19  0700 03/10/19  2202    137 137   K 3.5 3.2* 3.3*    98* 93*   CO2 32 33* 37*   * 302* 281*   BUN 44* 40* 41*   CREA 1.48* 1.68* 2.18*   CA 8.1* 7.3* 8.2*   MG 2.5 2.3  --    PHOS 2.6 2.5  --    ALB 2.4* 2.4* 3.1*   SGOT 17 29 37   ALT 15 17 21   INR  --  1.1  --      No results for input(s): PH, PCO2, PO2, HCO3, FIO2 in the last 72 hours.   Recent Labs     03/12/19  0344 03/11/19  0417 03/11/19  0209   FIO2I 70 100 100   HCO3I 32.3* 34.4* 37.1*   PCO2I 37.1 60.9* 112.2*   PHI 7.548* 7.361 7.127*   PO2I 61* 93 183*       Imaging:  [x]   I have personally reviewed the patients radiographs and reports  XR Results (most recent):  Results from Hospital Encounter encounter on 03/10/19   XR CHEST SNGL V    Narrative EXAM: CHEST  CPT CODE: 90562    CLINICAL INDICATION/HISTORY: ET tube placement. COMPARISON: 10 March 2019. TECHNIQUE: Single AP portable view of chest at 0421. FINDINGS: There is been interval placement of an endotracheal tube with the tip  about 2 cm above the fredrick. There is significant increased opacity at the  retrocardiac left base with partial obscuration hemidiaphragm suggesting  consolidation or atelectasis. There may be some persistent interstitial  prominence of the right lung base. The cardiomediastinal silhouette is normal  except for atherosclerotic changes at the arch of the aorta. The bones and soft  tissues are unremarkable. Impression IMPRESSION:    Intubated; tip about 2 cm above the fredrick. Interval increase in retrocardiac left base opacity and partial obscuration  hemidiaphragm consistent with consolidation and/or atelectasis. Persistent  subtle increased interstitial prominence right lung base. CT Results (most recent):  Results from Hospital Encounter encounter on 04/21/17   CT SPINE CERV WO CONT    Narrative CT scan of cervical spine, without contrast:        INDICATION:    Trauma due to fall. Trauma to back of head and back. Dizziness. History of hypertension, diabetes and COPD. TECHNIQUE:    Contiguous 2.5 mm the axial sections of cervical spine, T1, T2 and T3 levels are  obtained without intravenous contrast.    Coronal and sagittal images reformatted. All CT scans at this facility are performed using dose optimization technique as  appropriate to a  performed  examination, to include automated exposer control,  adjustment mA and / or  KV according to patient size (including appropriate  matching  for site specific examination), or use  of iterative  reconstruction  technique. COMPARISON STUDY: On MRI of cervical spine on 8/1/2013.         FINDINGS:    In lower cervical spine there is evidence of loss of lordosis with development  of mild kyphotic cardiac, as also noted on previous MRI study in 2013. There is no evidence of acute fracture, dislocation or subluxation in cervical  spine. There is minimal C4 anterolisthesis, similar to previous MRI study. The cervical facets are normally aligned bilaterally without evidence of  fracture. The odontoid process appears to be intact. The craniovertebral junction is  intact. In the visualized apices of lungs there is no definable acute process. There are  mild-to-moderate fibrotic changes noted in the bases of lungs bilaterally. At C1 level there is no evidence of fracture. At C2-C3 level there are findings of mild DDD and bicipital marked facet  osteoarthritis. No significant central stenosis or neural foraminal stenosis. At C3-C4 level mild DDD and right-sided severe facet osteoarthritis. No  significant central stenosis. Severe stenosis of right neural foramen. At C4-C5 level mild to moderate DDD, right sided marked facet osteoarthritis,  without obvious central stenosis. Moderate to severe stenosis of right neural  foramen. Moderate stenosis of left neural foramen. At C5-C6 level severe degenerative disc disease with mild central stenosis. Moderate left neural foraminal stenosis. At C6-C7 level moderate to severe DDD. No significant stenosis. At C7-T1 level mild DDD changes with minimal C7 anterolisthesis, similar to  previous MRI study. --------------------------------------------    IMPRESSIONS:        No evidence of acute fracture or dislocation in cervical spine. There are findings of multilevel moderate-to-severe spondylosis including  degenerative disc disease and facet osteoarthritis in cervical spine, as  described above. The findings are grossly unchanged as compared to previous MRI  of cervical spine on 8/1/2013.          IMPRESSION:   · Acute hypercapnic respiratory failure- Likely multifactorial etiology COPD and CHF  · GÉNESIS - likely prerenal/dehydration.  Family reports decreased oral intake PTA  · Elevated troponin - improved  · Pelvic fracture - from fall 4 days ago, not a surgical candidate  · Lactic acidosis - 2.67   · Hx of severe COPD - prescribed home O2  · Tobacco abuse      Patient Active Problem List   Diagnosis Code    Hyperlipidemia E78.5    Overactive bladder N32.81    Sciatica M54.30    Degeneration of lumbar or lumbosacral intervertebral disc M51.37    Encounter for long-term (current) use of other medications Z79.899    Depression F32.9    Unspecified vitamin D deficiency E55.9    Glucose intolerance (pre-diabetes) R73.03    RLS (restless legs syndrome) G25.81    Aortic dissection, abdominal ([facility]) I71.02    Ataxic gait R26.0    Chronic neck pain M54.2, G89.29    Orthostatic hypotension I95.1    Paresthesia R20.2    Repeated falls R29.6    Chronic pain syndrome G89.4    Lumbosacral spondylosis without myelopathy M47.817    Cervical stenosis of spinal canal M48.02    Spinal stenosis in cervical region M48.02    Polyneuropathy G62.9    Lumbar stenosis M48.061    High risk medication use Z79.899    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    Thoracic or lumbosacral neuritis or radiculitis, unspecified [id number]    Low back pain radiating to both legs M54.5    DDD (degenerative disc disease), lumbar M51.36    Spondylolisthesis of lumbar region M43.16    Spondylolisthesis, grade 1 M43.10    Lumbar facet arthropathy M47.816    Lumbar disc herniation M51.26    Lumbosacral radiculopathy at L5 M54.17    Lumbosacral radiculopathy at S1 M54.17    DM neuropathy, painful ([facility]) E11.40    Acute exacerbation of chronic obstructive pulmonary disease (COPD) ([facility]) J44.1    Carotid artery stenosis I65.29    Atherosclerosis of native arteries of the extremities with intermittent claudication I70.219    MOORE (dyspnea on exertion) R06.09    SOB (shortness of breath) R06.02    Murmur, cardiac R01.1    Hyperlipidemia LDL goal <100 E78.5    Primary osteoarthritis involving multiple joints M15.0    Prediabetes R73.03    Restless leg syndrome G25.81    Essential hypertension with goal blood pressure less than 140/90 I10    Panlobular emphysema ([facility]) J43.1    Impaired fasting glucose R73.01    HCAP (healthcare-associated pneumonia) J18.9    Abnormal CT scan, chest R93.89    Hypercholesterolemia E78.00    Hypokalemia E87.6    Dizziness R42    CHI (closed head injury) S09. 90XA    Elevated troponin R74.8    Type 2 diabetes mellitus with diabetic neuropathy, without long-term current use of insulin ([facility]) E11.40    Primary insomnia F51.01    Major depression, chronic F34.1    Pneumonia J18.9    CAP (community acquired pneumonia) J18.9    COPD exacerbation ([facility]) J44.1    RBBB (right bundle branch block with left anterior fascicular block) I45.2    Type 2 diabetes with nephropathy ([facility]) E11.21    Chronic respiratory failure with hypoxia ([facility]) J96.11    Stage 4 very severe COPD by GOLD classification ([facility]) J44.9    Congestive heart failure (CHF) ([facility]) I50.9    NSTEMI (non-ST elevated myocardial infarction) ([facility] I21.4    Pelvic ring fracture, closed, initial encounter ([facility] S32.810A        RECOMMENDATIONS:   · Family wishing to proceed w/ comfort measures only  · Comfort order set placed  · DDNR to be signed by family  · Discussed w/ Dr. Faiza Valderrama, will transfer to medical floor  · Discussed in interdisciplinary rounds     Best practice :    Glycemic control  IHI ICU bundles: Olivas Bundle Followed and Vent Bundle Followed, Vent Day 3    [facility] Vent patients- VAP bundle, aim to keep peak plateau pressure 10-29US H2O  Sress ulcer prophylaxis. DVT prophylaxis. Need for Lines, olivas assessed. Restraints need. Palliative care evaluation. High complexity decision making was performed during this consultation and evaluation. [x]       Pt is at high risk for further organ failure and dysfunction.      Kylee King, CHELITA  03/12/19  Pulmonary, Critical Care Medicine  [facility]

## 2019-03-12 NOTE — PROGRESS NOTES
[facility]  Progress Note    Patient: Analy Everett Age: 80 y.o. : 1933 MR#: [id number] SSN: [id number]  Date/Time: 3/12/2019     Subjective: pt sleepy but easily woke up and Ox2, pt feels fine. family at bedside     Assessment/Plan:   1. Acute on chronic hypoxic and hypercapnic resp failure: cont O2, BD and steroids. Pt compassionately extubated this afternoon and started on comfort care with med's as tolerated. No aggressive care or testing but ok for med's and diet as tolerated     2. Acute COPD exb: cont BD and O2  3. NSTEMI due to ACS vs demand ischemia: cont asa and statin, no invasive work up or testing per family  4. ARF  5. Chronic diastolic HF  6. Lactic acidosis  7. Pelvic fracture s/p fall at home: supportive care   8. HTN  9. DM 2  10. DNR  D/w pt and family at bedside in detail including  Isaac Cruz [phone], son, daughter in law Salma Cordova [phone] and grands kids in detail, they want to cont comfort care with PO med's and diet as tolerated, along with comfort med's. No escalation of care, no labs or tests. Will consult hospice care   D/w RN      I spent 60 minutes with the patient in face-to-face consultation, of which greater than 50% was spent in counseling and coordination of care as described above. Case discussed with:  [x]Patient  [x]Family  [x]Nursing  []Case Management  DVT Prophylaxis:  []Lovenox  []Hep SQ  []SCDs  []Coumadin   []On Heparin gtt    Objective:   VS:   Visit Vitals  /52   Pulse 99   Temp 99.2 °F (37.3 °C)   Resp 14   Ht 5' 5\" (1.651 m)   Wt 72.6 kg (160 lb)   SpO2 (!) 88%   Breastfeeding?  No   BMI 26.63 kg/m²      Tmax/24hrs: Temp (24hrs), Av.4 °F (37.4 °C), Min:98.9 °F (37.2 °C), Max:100.2 °F (37.9 °C)  IOBRIEF    Intake/Output Summary (Last 24 hours) at 3/12/2019 1724  Last data filed at 3/12/2019 0800  Gross per 24 hour   Intake 1544.02 ml   Output 350 ml   Net 1194.02 ml       General:  Alert, cooperative, no acute distress    Pulmonary:  Bilaterally exp Wheezing, no Rhonchi/Rales. Cardiovascular: Regular rate and Rhythm. GI:  Soft, Non distended, Non tender. + Bowel sounds. Extremities:  No edema, cyanosis, clubbing. Neurologic: Alert and oriented X 2.  Moves all ext  Additional:    Medications:   Current Facility-Administered Medications   Medication Dose Route Frequency    budesonide-formoterol (SYMBICORT) 80-4.5 mcg inhaler  2 Puff Inhalation BID RT    [START ON 3/13/2019] aspirin delayed-release tablet 81 mg  81 mg Oral DAILY    simvastatin (ZOCOR) tablet 20 mg  20 mg Oral QHS    gabapentin (NEURONTIN) capsule 300 mg  300 mg Oral QID    [START ON 3/13/2019] amLODIPine (NORVASC) tablet 5 mg  5 mg Oral DAILY    [START ON 3/13/2019] tamsulosin (FLOMAX) capsule 0.4 mg  0.4 mg Oral DAILY    mirtazapine (REMERON) tablet 15 mg  15 mg Oral QHS    [START ON 3/13/2019] PARoxetine (PAXIL) tablet 30 mg  30 mg Oral DAILY    albuterol-ipratropium (DUO-NEB) 2.5 MG-0.5 MG/3 ML  3 mL Nebulization Q4H PRN    rOPINIRole (REQUIP) tablet 1 mg  1 mg Oral QHS    ondansetron (ZOFRAN) injection 4 mg  4 mg IntraVENous Q4H PRN    LORazepam (ATIVAN) injection 1 mg  1 mg IntraVENous Q2H PRN    scopolamine (TRANSDERM-SCOP) 1 mg over 3 days 1 Patch  1 Patch TransDERmal Q72H PRN    morphine (ROXANOL) concentrated oral syringe 10 mg  10 mg Oral Q4H PRN    albuterol-ipratropium (DUO-NEB) 2.5 MG-0.5 MG/3 ML  3 mL Nebulization Q4H PRN    morphine injection 1 mg  1 mg IntraVENous Q2H PRN    methylPREDNISolone (PF) (Solu-MEDROL) injection 40 mg  40 mg IntraVENous Q8H    sodium chloride (NS) flush 5-40 mL  5-40 mL IntraVENous Q8H    budesonide (PULMICORT) 1,000 mcg/2 mL nebulizer susp  1,000 mcg Nebulization BID RT       Labs:    Recent Results (from the past 24 hour(s))   GLUCOSE, POC    Collection Time: 03/11/19  5:26 PM   Result Value Ref Range    Glucose (POC) 212 (H) 70 - 110 mg/dL   CARDIAC PANEL,(CK, CKMB & TROPONIN) Collection Time: 03/11/19  6:45 PM   Result Value Ref Range    CK 96 26 - 192 U/L    CK - MB 1.9 <3.6 ng/ml    CK-MB Index 2.0 0.0 - 4.0 %    Troponin-I, QT 0.70 (H) 0.0 - 0.045 NG/ML   GLUCOSE, POC    Collection Time: 03/11/19 11:14 PM   Result Value Ref Range    Glucose (POC) 166 (H) 70 - 110 mg/dL   CBC WITH AUTOMATED DIFF    Collection Time: 03/12/19 12:33 AM   Result Value Ref Range    WBC 9.9 4.6 - 13.2 K/uL    RBC 4.64 4.20 - 5.30 M/uL    HGB 13.3 12.0 - 16.0 g/dL    HCT 40.1 35.0 - 45.0 %    MCV 86.4 74.0 - 97.0 FL    MCH 28.7 24.0 - 34.0 PG    MCHC 33.2 31.0 - 37.0 g/dL    RDW 15.6 (H) 11.6 - 14.5 %    PLATELET 599 366 - 995 K/uL    MPV 12.1 (H) 9.2 - 11.8 FL    NEUTROPHILS 86 (H) 42 - 75 %    BAND NEUTROPHILS 3 0 - 5 %    LYMPHOCYTES 8 (L) 20 - 51 %    MONOCYTES 2 2 - 9 %    EOSINOPHILS 0 0 - 5 %    BASOPHILS 0 0 - 3 %    OTHER CELL 1 (H) 0      ABS. NEUTROPHILS 8.8 (H) 1.8 - 8.0 K/UL    ABS. LYMPHOCYTES 0.8 0.8 - 3.5 K/UL    ABS. MONOCYTES 0.2 0 - 1.0 K/UL    ABS. EOSINOPHILS 0.0 0.0 - 0.4 K/UL    ABS. BASOPHILS 0.0 0.0 - 0.06 K/UL    DF MANUAL      PLATELET COMMENTS ADEQUATE PLATELETS      RBC COMMENTS ANISOCYTOSIS  1+        RBC COMMENTS POLYCHROMASIA  1+       METABOLIC PANEL, COMPREHENSIVE    Collection Time: 03/12/19 12:33 AM   Result Value Ref Range    Sodium 141 136 - 145 mmol/L    Potassium 3.5 3.5 - 5.5 mmol/L    Chloride 101 100 - 108 mmol/L    CO2 32 21 - 32 mmol/L    Anion gap 8 3.0 - 18 mmol/L    Glucose 192 (H) 74 - 99 mg/dL    BUN 44 (H) 7.0 - 18 MG/DL    Creatinine 1.48 (H) 0.6 - 1.3 MG/DL    BUN/Creatinine ratio 30 (H) 12 - 20      GFR est AA 41 (L) >60 ml/min/1.73m2    GFR est non-AA 34 (L) >60 ml/min/1.73m2    Calcium 8.1 (L) 8.5 - 10.1 MG/DL    Bilirubin, total 0.4 0.2 - 1.0 MG/DL    ALT (SGPT) 15 13 - 56 U/L    AST (SGOT) 17 15 - 37 U/L    Alk.  phosphatase 66 45 - 117 U/L    Protein, total 5.6 (L) 6.4 - 8.2 g/dL    Albumin 2.4 (L) 3.4 - 5.0 g/dL    Globulin 3.2 2.0 - 4.0 g/dL    A-G Ratio 0.8 0.8 - 1.7     MAGNESIUM    Collection Time: 03/12/19 12:33 AM   Result Value Ref Range    Magnesium 2.5 1.6 - 2.6 mg/dL   PHOSPHORUS    Collection Time: 03/12/19 12:33 AM   Result Value Ref Range    Phosphorus 2.6 2.5 - 4.9 MG/DL   CARDIAC PANEL,(CK, CKMB & TROPONIN)    Collection Time: 03/12/19 12:33 AM   Result Value Ref Range    CK 67 26 - 192 U/L    CK - MB 1.1 <3.6 ng/ml    CK-MB Index 1.6 0.0 - 4.0 %    Troponin-I, QT 0.63 (H) 0.0 - 0.045 NG/ML   POC G3    Collection Time: 03/12/19  3:44 AM   Result Value Ref Range    Device: VENT      FIO2 (POC) 70 %    pH (POC) 7.548 (H) 7.35 - 7.45      pCO2 (POC) 37.1 35.0 - 45.0 MMHG    pO2 (POC) 61 (L) 80 - 100 MMHG    HCO3 (POC) 32.3 (H) 22 - 26 MMOL/L    sO2 (POC) 94 92 - 97 %    Base excess (POC) 10 mmol/L    Mode ASSIST CONTROL      Tidal volume 450 ml    Set Rate 18 bpm    PEEP/CPAP (POC) 5 cmH2O    Allens test (POC) NO      Total resp. rate 18      Site RIGHT BRACHIAL      Patient temp.  37.0      Specimen type (POC) ARTERIAL      Performed by Yeny Emery     Volume control plus YES     GLUCOSE, POC    Collection Time: 03/12/19  5:06 AM   Result Value Ref Range    Glucose (POC) 203 (H) 70 - 110 mg/dL       Signed By: Sean Jones MD     March 12, 2019

## 2019-03-12 NOTE — PROGRESS NOTES
visited with the family of Jose L Bah, who is a 80 y.o.,female. The  provided the following Interventions:  Initiated a relationship of care and support. Offered prayer and assurance of continued prayers on patients behalf. Plan:  Chaplains will continue to follow and will provide pastoral care on an as needed/requested basis.     [address] [facility] Certified [address] [place] [address]   [phone]

## 2019-03-12 NOTE — ROUTINE PROCESS
Bedside and Verbal shift change report given to Tari Underwood RN (oncoming nurse) by Jose Romano RN (offgoing nurse). Report included the following information SBAR, Kardex, ED Summary, Intake/Output, MAR, Accordion, Recent Results, Cardiac Rhythm SR, Alarm Parameters  and Quality Measures.

## 2019-03-13 PROCEDURE — 94762 N-INVAS EAR/PLS OXIMTRY CONT: CPT

## 2019-03-13 PROCEDURE — 65270000029 HC RM PRIVATE

## 2019-03-13 PROCEDURE — 74011250637 HC RX REV CODE- 250/637: Performed by: INTERNAL MEDICINE

## 2019-03-13 PROCEDURE — 76450000000

## 2019-03-13 PROCEDURE — 77030038269 HC DRN EXT URIN PURWCK BARD -A

## 2019-03-13 PROCEDURE — 74011000250 HC RX REV CODE- 250: Performed by: HOSPITALIST

## 2019-03-13 PROCEDURE — 74011250637 HC RX REV CODE- 250/637: Performed by: HOSPITALIST

## 2019-03-13 PROCEDURE — 77010033678 HC OXYGEN DAILY

## 2019-03-13 PROCEDURE — 74011636637 HC RX REV CODE- 636/637: Performed by: HOSPITALIST

## 2019-03-13 RX ORDER — FAMOTIDINE 20 MG/1
20 TABLET, FILM COATED ORAL DAILY
Status: DISCONTINUED | OUTPATIENT
Start: 2019-03-13 | End: 2019-03-15 | Stop reason: HOSPADM

## 2019-03-13 RX ORDER — IPRATROPIUM BROMIDE AND ALBUTEROL SULFATE 2.5; .5 MG/3ML; MG/3ML
3 SOLUTION RESPIRATORY (INHALATION)
Status: DISCONTINUED | OUTPATIENT
Start: 2019-03-13 | End: 2019-03-13

## 2019-03-13 RX ADMIN — MIRTAZAPINE 15 MG: 15 TABLET, FILM COATED ORAL at 22:30

## 2019-03-13 RX ADMIN — FAMOTIDINE 20 MG: 20 TABLET ORAL at 17:55

## 2019-03-13 RX ADMIN — ROPINIROLE HYDROCHLORIDE 1 MG: 1 TABLET, FILM COATED ORAL at 22:30

## 2019-03-13 RX ADMIN — PREDNISONE 40 MG: 20 TABLET ORAL at 09:10

## 2019-03-13 RX ADMIN — Medication 10 ML: at 22:31

## 2019-03-13 RX ADMIN — TAMSULOSIN HYDROCHLORIDE 0.4 MG: 0.4 CAPSULE ORAL at 09:10

## 2019-03-13 RX ADMIN — BUDESONIDE AND FORMOTEROL FUMARATE DIHYDRATE 2 PUFF: 80; 4.5 AEROSOL RESPIRATORY (INHALATION) at 22:30

## 2019-03-13 RX ADMIN — GABAPENTIN 300 MG: 300 CAPSULE ORAL at 09:10

## 2019-03-13 RX ADMIN — IPRATROPIUM BROMIDE AND ALBUTEROL SULFATE 3 ML: .5; 3 SOLUTION RESPIRATORY (INHALATION) at 16:01

## 2019-03-13 RX ADMIN — Medication 10 ML: at 05:23

## 2019-03-13 RX ADMIN — MORPHINE SULFATE 10 MG: 100 SOLUTION ORAL at 09:04

## 2019-03-13 RX ADMIN — MORPHINE SULFATE 10 MG: 100 SOLUTION ORAL at 04:43

## 2019-03-13 RX ADMIN — GABAPENTIN 300 MG: 300 CAPSULE ORAL at 14:14

## 2019-03-13 RX ADMIN — BUDESONIDE AND FORMOTEROL FUMARATE DIHYDRATE 2 PUFF: 80; 4.5 AEROSOL RESPIRATORY (INHALATION) at 09:14

## 2019-03-13 RX ADMIN — AMLODIPINE BESYLATE 5 MG: 5 TABLET ORAL at 09:10

## 2019-03-13 RX ADMIN — GABAPENTIN 300 MG: 300 CAPSULE ORAL at 17:55

## 2019-03-13 RX ADMIN — MORPHINE SULFATE 10 MG: 100 SOLUTION ORAL at 16:00

## 2019-03-13 RX ADMIN — GABAPENTIN 300 MG: 300 CAPSULE ORAL at 22:30

## 2019-03-13 RX ADMIN — PAROXETINE HYDROCHLORIDE 30 MG: 20 TABLET, FILM COATED ORAL at 09:10

## 2019-03-13 RX ADMIN — ASPIRIN 81 MG: 81 TABLET, COATED ORAL at 09:10

## 2019-03-13 RX ADMIN — IPRATROPIUM BROMIDE AND ALBUTEROL SULFATE 3 ML: .5; 3 SOLUTION RESPIRATORY (INHALATION) at 09:15

## 2019-03-13 RX ADMIN — Medication 10 ML: at 14:17

## 2019-03-13 RX ADMIN — IPRATROPIUM BROMIDE AND ALBUTEROL SULFATE 3 ML: .5; 3 SOLUTION RESPIRATORY (INHALATION) at 04:43

## 2019-03-13 RX ADMIN — IPRATROPIUM BROMIDE AND ALBUTEROL SULFATE 3 ML: .5; 3 SOLUTION RESPIRATORY (INHALATION) at 22:40

## 2019-03-13 NOTE — PROGRESS NOTES
Problem: Falls - Risk of  Goal: *Absence of Falls  Document Edson Fall Risk and appropriate interventions in the flowsheet. Outcome: Progressing Towards Goal  Fall Risk Interventions:  Mobility Interventions: Bed/chair exit alarm, Communicate number of staff needed for ambulation/transfer, Patient to call before getting OOB, PT Consult for mobility concerns    Mentation Interventions: Adequate sleep, hydration, pain control, Bed/chair exit alarm, Door open when patient unattended, Evaluate medications/consider consulting pharmacy, More frequent rounding, Reorient patient, Toileting rounds, Update white board    Medication Interventions: Assess postural VS orthostatic hypotension, Bed/chair exit alarm, Evaluate medications/consider consulting pharmacy, Teach patient to arise slowly, Patient to call before getting OOB    Elimination Interventions: Bed/chair exit alarm, Call light in reach, Patient to call for help with toileting needs, Toileting schedule/hourly rounds    History of Falls Interventions: Bed/chair exit alarm, Consult care management for discharge planning, Door open when patient unattended, Evaluate medications/consider consulting pharmacy, Room close to nurse's station        Problem: Pressure Injury - Risk of  Goal: *Prevention of pressure injury  Document Herbie Scale and appropriate interventions in the flowsheet. Outcome: Progressing Towards Goal  Pressure Injury Interventions:  Sensory Interventions: Assess changes in LOC, Check visual cues for pain, Keep linens dry and wrinkle-free, Maintain/enhance activity level, Minimize linen layers, Pressure redistribution bed/mattress (bed type), Turn and reposition approx.  every two hours (pillows and wedges if needed)    Moisture Interventions: Absorbent underpads, Apply protective barrier, creams and emollients, Check for incontinence Q2 hours and as needed, Internal/External urinary devices, Minimize layers, Maintain skin hydration (lotion/cream), Offer toileting Q_hr    Activity Interventions: Increase time out of bed, Pressure redistribution bed/mattress(bed type)    Mobility Interventions: Float heels, HOB 30 degrees or less, Pressure redistribution bed/mattress (bed type), PT/OT evaluation, Turn and reposition approx.  every two hours(pillow and wedges)    Nutrition Interventions: Discuss nutritional consult with provider, Document food/fluid/supplement intake    Friction and Shear Interventions: Apply protective barrier, creams and emollients, Feet elevated on foot rest, Foam dressings/transparent film/skin sealants, HOB 30 degrees or less, Lift sheet, Lift team/patient mobility team, Minimize layers

## 2019-03-13 NOTE — ACP (ADVANCE CARE PLANNING)
The Palliative Medicine    Goals of Care/Treatment Preferences    The Palliative Medicine team was consulted. Palliative medicine team members including Srinivasan Perdomo NP, Donna Foster NP, and this writer met with patients daughter, Azucena Juárez who is the mPOA in ICU meeting room. Also present were patients son and his wife, Assunta Spurling. Pt compassionately extubated to 4 LPM humidified NC on 3/12/19. Family wanting only comfort measures. Palliative medicine consulted by Bettie Katz MD to discuss next steps. Patient currently alert and maintained on 4 liters. Family members expressed distress in dealing with the patients roller coaster of events. Palliative Medicine team offered empathic listening and medical update of the progression of comfort care measures. Family expressed understanding. Patients daughter Azucena Juárez was able to complete POST form with Srinivasan Perdomo NP, choosing to continue DNR/DRI and comfort measures. Copies were placed on the medical chart for scanning into EMR with the original and 2 added copies provided to family and patient  Palliative medicine team will continue to follow with patient and family.           We reviewed advance care planning information, which includes the following:  Patient's Healthcare Decision Maker is[de-identified] Named in scanned ACP document(Iesha Portillo )  Confirm Advance Directive: Yes, on file  Patient Would Like to Complete Advance Directive: No  Does the patient have other document types: Do Not Resuscitate, MOST/MOLST/POST/POLST       We reviewed / discussed ode status as: DNR          Adis Odonnell RN, [facility]  Palliative Medicine Inpatient RN  [address] [facility]: [phone] ([phone])

## 2019-03-13 NOTE — PALLIATIVE CARE
Full note to follow    Patient seen x 2 today, son at bedside and daughter later joined. Compassionately extubated yesterday. Family shares it has been a \" roller coaster ride \" for family as all expected she would pass soon after extubation. Support offered discussed end of life ensuing symptoms and why end of life \" rally\" happens. POST signed for comfort measures, comfort meds in place allow her to eat for pleasure. Family aware her level of alertness will likely decline, goal is comfort and management of symptoms.      Goals of care DNR/DNI comfort measures

## 2019-03-13 NOTE — PROGRESS NOTES
[facility]  Pulmonary, Critical Care, and Sleep Medicine    Name: Kailyn Carmen MRN: [id number]   : 1933 Hospital: [facility]   Date: 3/13/2019        IMPRESSION:   · Goals of care-Comfort measures  · DDNR  · S/p Acute hypercapnic respiratory failure- Likely multifactorial etiology COPD and CHF  · Pelvic fracture - from fall 4 days ago, not a surgical candidate  · Hx of severe COPD - prescribed home O2  · Tobacco abuse   · Family support.      Patient Active Problem List   Diagnosis Code    Hyperlipidemia E78.5    Overactive bladder N32.81    Sciatica M54.30    Degeneration of lumbar or lumbosacral intervertebral disc M51.37    Encounter for long-term (current) use of other medications Z79.899    Depression F32.9    Unspecified vitamin D deficiency E55.9    Glucose intolerance (pre-diabetes) R73.03    RLS (restless legs syndrome) G25.81    Aortic dissection, abdominal ([facility]) I71.02    Ataxic gait R26.0    Chronic neck pain M54.2, G89.29    Orthostatic hypotension I95.1    Paresthesia R20.2    Repeated falls R29.6    Chronic pain syndrome G89.4    Lumbosacral spondylosis without myelopathy M47.817    Cervical stenosis of spinal canal M48.02    Spinal stenosis in cervical region M48.02    Polyneuropathy G62.9    Lumbar stenosis M48.061    High risk medication use Z79.899    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    Thoracic or lumbosacral neuritis or radiculitis, unspecified [id number]    Low back pain radiating to both legs M54.5    DDD (degenerative disc disease), lumbar M51.36    Spondylolisthesis of lumbar region M43.16    Spondylolisthesis, grade 1 M43.10    Lumbar facet arthropathy M47.816    Lumbar disc herniation M51.26    Lumbosacral radiculopathy at L5 M54.17    Lumbosacral radiculopathy at S1 M54.17    DM neuropathy, painful ([facility]) E11.40    Acute exacerbation of chronic obstructive pulmonary disease (COPD) ([facility]) J44.1    Carotid artery stenosis I65.29    Atherosclerosis of native arteries of the extremities with intermittent claudication I70.219    MOORE (dyspnea on exertion) R06.09    SOB (shortness of breath) R06.02    Murmur, cardiac R01.1    Hyperlipidemia LDL goal <100 E78.5    Primary osteoarthritis involving multiple joints M15.0    Prediabetes R73.03    Restless leg syndrome G25.81    Essential hypertension with goal blood pressure less than 140/90 I10    Panlobular emphysema ([facility]) J43.1    Impaired fasting glucose R73.01    HCAP (healthcare-associated pneumonia) J18.9    Abnormal CT scan, chest R93.89    Hypercholesterolemia E78.00    Hypokalemia E87.6    Dizziness R42    CHI (closed head injury) S09. 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    Type 2 diabetes with nephropathy ([facility]) E11.21    Chronic respiratory failure with hypoxia ([facility]) J96.11    Stage 4 very severe COPD by GOLD classification ([facility]) J44.9    Congestive heart failure (CHF) ([facility]) I50.9    NSTEMI (non-ST elevated myocardial infarction) ([facility] I21.4    Pelvic ring fracture, closed, initial encounter ([facility] S32.810A      PLAN:   · Family wishing to proceed w/ comfort measures only  · Comfort order set placed  · DDNR to be signed by family  · Will continue best support- bronchodilators, cough medications, Oxygen, diet  · Hospice evaluation     Subjective/Interval History:     Patient is a 80 y. o. female w/ pmhx of COPD, heart failure, diabetes, and HTN. Admitted on ventilator for resp failure secondary to COPD exacerbation in light of broken hip.  Patient followed by Pulmonology as outpatient, active smoker, On 3 L O2 requirements. On comfort measures  Overnight events: none,   Mentation/Activity: awake, talking asking to get OOB  Respiratory/ Secretions: scant but sounds congested  Hemodynamics: VSS  Urine output, bowel: adequate    ROS:Review of systems not obtained due to patient factors. Objective:   Vital Signs:    Visit Vitals  /64   Pulse 90   Temp 97.7 °F (36.5 °C)   Resp 14   Ht 5' 5\" (1.651 m)   Wt 72.6 kg (160 lb)   SpO2 (!) 88%   Breastfeeding? No   BMI 26.63 kg/m²       O2 Device: Nasal cannula   O2 Flow Rate (L/min): 6 l/min   Temp (24hrs), Av °F (36.7 °C), Min:97.7 °F (36.5 °C), Max:98.2 °F (36.8 °C)       Intake/Output:   Last shift:      No intake/output data recorded. Last 3 shifts:  1901 -  0700  In: 1417.8 [P.O.:50; I.V.:1337.8]  Out: 650 [Urine:650]    Intake/Output Summary (Last 24 hours) at 3/13/2019 0923  Last data filed at 3/13/2019 0650  Gross per 24 hour   Intake 50 ml   Output 300 ml   Net -250 ml        Physical Exam:    General: in no apparent distress, alert, afebrile and normal vitals   HEENT: Normal   Neck: No abnormally enlarged lymph nodes.    Chest: increased AP diameter, decreased excursion   Lungs: decreased air exchange bilaterally, prolonged expiration, rhonchi and scattered wheezes bilaterally   Heart: Regular rate and rhythm or S1S2 present   Abdomen: abdomen is soft without significant tenderness, masses, organomegaly or guarding   Extremity: negative   Neuro: alert   Skin: Skin color, texture, turgor normal. No rashes or lesions        DATA:  Labs:  Recent Labs     19  0033 19  0700 03/10/19  2202   WBC 9.9 6.9 11.7   HGB 13.3 12.5 13.8   HCT 40.1 39.8 43.7    143 155     Recent Labs     19  0033 19  0700 03/10/19  2202    137 137   K 3.5 3.2* 3.3*    98* 93*   CO2 32 33* 37*   * 302* 281*   BUN 44* 40* 41*   CREA 1.48* 1.68* 2.18*   CA 8.1* 7.3* 8.2*   MG 2.5 2.3  --    PHOS 2.6 2.5  --    ALB 2.4* 2.4* 3.1* SGOT 17 29 37   ALT 15 17 21   INR  --  1.1  --      No results for input(s): PH, PCO2, PO2, HCO3, FIO2 in the last 72 hours. XR Results (most recent):  CT Results (most recent):  Results from Hospital Encounter encounter on 04/21/17   CT SPINE CERV WO CONT    Narrative CT scan of cervical spine, without contrast:        INDICATION:    Trauma due to fall. Trauma to back of head and back. Dizziness. History of hypertension, diabetes and COPD. TECHNIQUE:    Contiguous 2.5 mm the axial sections of cervical spine, T1, T2 and T3 levels are  obtained without intravenous contrast.    Coronal and sagittal images reformatted. All CT scans at this facility are performed using dose optimization technique as  appropriate to a  performed  examination, to include automated exposer control,  adjustment mA and / or  KV according to patient size (including appropriate  matching  for site specific examination), or use  of iterative  reconstruction  technique. COMPARISON STUDY: On MRI of cervical spine on 8/1/2013. FINDINGS:    In lower cervical spine there is evidence of loss of lordosis with development  of mild kyphotic cardiac, as also noted on previous MRI study in 2013. There is no evidence of acute fracture, dislocation or subluxation in cervical  spine. There is minimal C4 anterolisthesis, similar to previous MRI study. The cervical facets are normally aligned bilaterally without evidence of  fracture. The odontoid process appears to be intact. The craniovertebral junction is  intact. In the visualized apices of lungs there is no definable acute process. There are  mild-to-moderate fibrotic changes noted in the bases of lungs bilaterally. At C1 level there is no evidence of fracture. At C2-C3 level there are findings of mild DDD and bicipital marked facet  osteoarthritis. No significant central stenosis or neural foraminal stenosis.     At C3-C4 level mild DDD and right-sided severe facet osteoarthritis. No  significant central stenosis. Severe stenosis of right neural foramen. At C4-C5 level mild to moderate DDD, right sided marked facet osteoarthritis,  without obvious central stenosis. Moderate to severe stenosis of right neural  foramen. Moderate stenosis of left neural foramen. At C5-C6 level severe degenerative disc disease with mild central stenosis. Moderate left neural foraminal stenosis. At C6-C7 level moderate to severe DDD. No significant stenosis. At C7-T1 level mild DDD changes with minimal C7 anterolisthesis, similar to  previous MRI study. --------------------------------------------    IMPRESSIONS:        No evidence of acute fracture or dislocation in cervical spine. There are findings of multilevel moderate-to-severe spondylosis including  degenerative disc disease and facet osteoarthritis in cervical spine, as  described above. The findings are grossly unchanged as compared to previous MRI  of cervical spine on 8/1/2013. Results from [place] encounter on 03/10/19   XR ABD PORT  1 V    Narrative EXAM : ABDOMEN PORTABLE  1743 HOURS    CLINICAL HISTORY/INDICATION:  OGT placement, intubated secondary to respiratory  distress/failure, patient fell several days prior to arrival with pelvic ring  fracture       COMPARISON: None. TECHNIQUE: AP portable abdomen 1 view    FINDINGS:    The tip of the esophagogastric tube ends in the proximal to mid stomach. There  is gas and stool within the colon. No bowel dilatation is seen. No  organomegaly is noted. Small focal region of increased opacity seen at the left  medial lung base. Impression IMPRESSION:    The esophagogastric tube ends at the proximal to mid stomach. Subsegmental atelectasis in the left lower lobe.         High complexity decision making was performed during the evaluation of this patient at high risk for decompensation with multiple organ involvement     Above mentioned total time spent on reviewing the case/medical record/data/notes/EMR/patient examination/documentation/coordinating care with nurse/consultants, exclusive of procedures with complex decision making performed and > 50% time spent in face to face evaluation.     Shashi Barnes MD

## 2019-03-13 NOTE — PROGRESS NOTES
NUTRITION    Nutrition Screen      RECOMMENDATIONS / PLAN:     - Continue current nutrition interventions. - Continue RD inpatient monitoring and evaluation. NUTRITION INTERVENTIONS & DIAGNOSIS:     [x] Meals/snacks: general/healthful diet     Nutrition Diagnosis: No nutrition diagnosis at this time. ASSESSMENT:     3/13: Decision made to provide comfort measures only and pt extubated yesterday. Awake and alert today, tolerating biscuit brought by family this morning and started on regular diet. 3/12: Pt intubated, admitted with CHF. Plan for OGT placement today, will start to start feeds per MD.     Average po intake adequate to meet patients estimated nutritional needs:   [] Yes     [] No   [x] Unable to determine at this time    Diet: DIET REGULAR      Food Allergies: NKFA  Current Appetite:   [x] Good     [] Fair     [] Poor     [] Other:   Appetite/meal intake prior to admission:   [x] Good     [] Fair     [] Poor     [] Other:   Feeding Limitations:  [] Swallowing difficulty    [] Chewing difficulty    [] Other:   Current Meal Intake: No data found. BM: PTA   Skin Integrity: heel stage I pressure injury, arm skin tear   Edema:   [x] No     [] Yes   Pertinent Medications: Reviewed    Recent Labs     03/12/19  0033 03/11/19  0700 03/10/19  2202    137 137   K 3.5 3.2* 3.3*    98* 93*   CO2 32 33* 37*   * 302* 281*   BUN 44* 40* 41*   CREA 1.48* 1.68* 2.18*   CA 8.1* 7.3* 8.2*   MG 2.5 2.3  --    PHOS 2.6 2.5  --    ALB 2.4* 2.4* 3.1*   SGOT 17 29 37   ALT 15 17 21       Intake/Output Summary (Last 24 hours) at 3/13/2019 1046  Last data filed at 3/13/2019 0650  Gross per 24 hour   Intake 50 ml   Output 300 ml   Net -250 ml       Anthropometrics:  Ht Readings from Last 1 Encounters:   03/11/19 5' 5\" (1.651 m)     Last 3 Recorded Weights in this Encounter    03/10/19 2301 03/11/19 1009   Weight: 72.6 kg (160 lb) 72.6 kg (160 lb)     Body mass index is 26.63 kg/m².     Weight History:   Weight Metrics 3/11/2019 2/25/2019 1/7/2019 12/31/2018 12/18/2018 10/22/2018 10/15/2018   Weight 160 lb 150 lb 147 lb 9.6 oz 144 lb 146 lb 143 lb 3.2 oz 146 lb 9.6 oz   BMI 26.63 kg/m2 24.96 kg/m2 24.56 kg/m2 23.96 kg/m2 24.3 kg/m2 23.83 kg/m2 24.4 kg/m2        Admitting Diagnosis: Congestive heart failure (CHF) (formerly [facility]) [I50.9]  NSTEMI (non-ST elevated myocardial infarction) ([facility] [address] [I21.4]  Pertinent PMHx: COPD, HTN, DM, diverticulosis, spinal stenosis     Education Needs:        [x] None identified  [] Identified - Not appropriate at this time  []  Identified and addressed - refer to education log  Learning Limitations:   [x] None identified  [] Identified:  Cultural, Episcopalian & ethnic food preferences:  [x] None identified    [] Identified and addressed     ESTIMATED NUTRITION NEEDS:     Calories: 6406-0469 kcal (MSJx1.2-1.3) based on  [x] Actual BW 72 kg     [] IBW   Protein: 58-72 gm (0.8-1 gm/kg) based on  [x] Actual BW      [] IBW   Fluid: 1 mL/kcal     MONITORING & EVALUATION:     Nutrition Goal(s): goal modified   1. Provide nutrition intervention as appropriate with goals of care for the next 5-7 days.  Outcome:  [] Met/Ongoing    []  Not Met    [x] New/Initial Goal      Monitoring:   [x] Food and beverage intake   [x] Diet order   [x] Nutrition-focused physical findings   [x] Treatment/therapy   [] Weight   [] Enteral nutrition intake        Previous Recommendations (for follow-up assessments only):     []   Implemented       []   Not Implemented (RD to address)      [x] No Longer Appropriate     [] No Recommendation Made     Discharge Planning: oral diet as tolerated for comfort   [x] Participated in care planning, discharge planning, & interdisciplinary rounds as appropriate      July Yanez, [address]    Pager: [phone]

## 2019-03-13 NOTE — ROUTINE PROCESS
Bedside shift change report given to Bud Shah RN (oncoming nurse) by Catherine Vargas RN (offgoing nurse). Report included the following information SBAR, Intake/Output and MAR.

## 2019-03-13 NOTE — CONSULTS
[facility]: [phone] ([phone])  [facility]: [phone]   [facility]: [phone]    Patient Name: Cami Funes  YOB: 1933    Date of Initial Consult: 3/13/2019   Reason for Consult: support and care decisions  Requesting Provider: Dr Tin Montoya  Primary Care Physician: Allyson Hinkle MD      SUMMARY:   Cami Funes is a 80y.o. year old with a past history of COPD, chronic pain, polyneuropathy, frequent falls   , who was admitted on 3/10/2019 from home  with a diagnosis of acute respiratory failure requiring intubation . ICU team spoke with family who reported she would not wish to be on a ventilator and wished for compassionate extubation. Current medical issues leading to Palliative Medicine involvement include: 80year old female who was compassionately extubated, now on comfort measures. Palliative medicine is consulted for support and continued care decision discussions. PALLIATIVE DIAGNOSES:   1. Advanced care plan discussion   2. Acute/ chronic  respiratory failure   3. COPD   4. Debility        PLAN:   1. Advanced care plan discussion patient is not able to participate in conversation however she is alert, but process is slow and can not reliably answer questions. Her son at bedside and later daughter Jordyn Perdomo arrived who is MPOA. Family shared they are confused about her clinical condition. They thought her life would be very short once life support was removed. Discussed her overall diagnosis / prognosis. She has end stage COPD per pulmonology. Spoke with daughter, and son who affirm DNR/DNI and continuation of comfort measures, symptom management only,allow her to eat and drink as is pleasurable. POST was signed to this effect by daughter who is MPOA. 2. Acute respiratory failure compassionately extubated yesterday . Comfortable appearing today.  Roxanol on board for dyspnea of which she says helps her \" breathing\" family does not wish for re intubation, BIPAP of CPAP. 3. COPD followed as o/p by pulmonology end stage disease. Would continue alb nebs PRN for comfort. 4. Initial consult note routed to primary continuity provider  5. Communicated plan of care with: Palliative IDT, daughter son daughter in law    GOALS OF CARE:  Patient/Health Care Proxy Stated Goals: Comfort      TREATMENT PREFERENCES:   Code Status: DNR    Advance Care Planning:  [x] The [facility] Interdisciplinary Team has updated the ACP Navigator with [address] and Patient Capacity    Primary Decision [facility] ([address]):   Primary Decision Maker (Active): Iesha Rehman - Daughter - [phone]    Medical Interventions: Comfort measures   Artificially Administered Nutrition: No feeding tube     Other:  As far as possible, the palliative care team has discussed with patient / health care proxy about goals of care / treatment preferences for patient.      HISTORY:     History obtained from: chart and family    CHIEF COMPLAINT: COPD and acute respiratory failure     HPI/SUBJECTIVE:    The patient is:   [] Verbal and participatory  [x] Non-participatory due to: confusion   Please see summary     Clinical Pain Assessment (nonverbal scale for nonverbal patients): Clinical Pain Assessment  Severity: 0     Activity (Movement): Lying quietly, normal position    Duration: for how long has pt been experiencing pain (e.g., 2 days, 1 month, years)  Frequency: how often pain is an issue (e.g., several times per day, once every few days, constant)     FUNCTIONAL ASSESSMENT:     Palliative Performance Scale (PPS): 30       ECOG  ECOG Status : Completely disabled     PSYCHOSOCIAL/SPIRITUAL SCREENING:      Any spiritual / Sabianist concerns:  [] Yes /  [x] No    Caregiver Burnout:  [] Yes /  [x] No /  [] No Caregiver Present      Anticipatory grief assessment:   [x] Normal  / [] Maladaptive        REVIEW OF SYSTEMS:     Positive and pertinent negative findings in ROS are noted above in HPI. The following systems were [] reviewed / [x] unable to be reviewed as noted in HPI  Other findings are noted below. Systems: constitutional, ears/nose/mouth/throat, respiratory, gastrointestinal, genitourinary, musculoskeletal, integumentary, neurologic, psychiatric, endocrine. Positive findings noted below. Modified ESAS Completed by: provider   Fatigue: 7       Pain: 0           Dyspnea: 1                    PHYSICAL EXAM:     Wt Readings from Last 3 Encounters:   03/14/19 71.7 kg (158 lb)   02/25/19 68 kg (150 lb)   01/07/19 67 kg (147 lb 9.6 oz)     Blood pressure 152/79, pulse 95, temperature 98.1 °F (36.7 °C), resp. rate 17, height 5' 5\" (1.651 m), weight 71.7 kg (158 lb), SpO2 (!) 87 %, not currently breastfeeding.   Pain:  Pain Scale 1: Numeric (0 - 10)  Pain Intensity 1: 0                 Last bowel movement: none recorded     Constitutional: elderly chronically ill female who is NAD  Eyes: pupils equal, anicteric  Respiratory: breathing not labored  Skin: warm, dry  Neurologic: alert smiling answers simple questions but will say I don't know frequently         HISTORY:     Active Problems:    Congestive heart failure (CHF) ([address] (3/10/2019)      NSTEMI (non-ST elevated myocardial infarction) ([address] (3/11/2019)      Pelvic ring fracture, closed, initial encounter ([facility] [address] (3/11/2019)      Past Medical History:   Diagnosis Date    Chronic lung disease     Colon polyps     COPD 8-30-02    DDD (degenerative disc disease), lumbar 10/1/2014    Degeneration of lumbar or lumbosacral intervertebral disc     Depression     Diabetes ([address] 7-19-05    Diabetes mellitus ([address]     Diverticulosis     DM neuropathy, painful ([address] 10/1/2014    MOORE (Dyspnea on Exertion) 4-19-02    echo: +mild LVH, HW22-11% w/ diastolic dysfxn    Glaucoma     HCAP (healthcare-associated pneumonia) 03/24/2017    Hemorrhoids 6-6-06    Hyperlipidemia 5/17/2011    Hypertension 02    LBP (low back pain) 04    Low back pain radiating to both legs 10/1/2014    Lumbago     Lumbar disc herniation 10/1/2014    Lumbar facet arthropathy 10/1/2014    Lumbosacral radiculopathy at L5 10/1/2014    Lumbosacral radiculopathy at S1 10/1/2014    Microscopic hematuria     OA (osteoarthritis)     Overactive bladder 2011    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    RLS (restless legs syndrome) 2013    Sciatica     Shortness of breath     Spinal stenosis, lumbar region, without neurogenic claudication     Spondylolisthesis of lumbar region 10/1/2014    Spondylolisthesis, grade 1 10/1/2014    Stage 4 very severe COPD by GOLD classification ([address] 2019    Stress urinary incontinence     Syncope     -MRI brain    Urinary tract infection, site not specified       Past Surgical History:   Procedure Laterality Date    HX APPENDECTOMY      HX CHOLECYSTECTOMY      HX HYSTERECTOMY      (+)DUB    HX POLYPECTOMY      MN COLONOSCOPY FLX DX W/COLLJ SPEC WHEN PFRMD  06    normal, Dr Andrews [place]    MN COLONOSCOPY FLX DX W/COLLJ [address] PFRMD      (+)polyp= tubular adenoma      Family History   Problem Relation Age of Onset    Colon Cancer Sister     Heart Disease Brother     Seizures Son     Colon Cancer Maternal Aunt      History reviewed, no pertinent family history.   Social History     Tobacco Use    Smoking status: Former Smoker     Packs/day: 1.00     Years: 57.00     Pack years: 57.00     Types: Cigarettes     Last attempt to quit: 2018     Years since quittin.2    Smokeless tobacco: Never Used    Tobacco comment: pt has cut down recently to less than 1 ppd   Substance Use Topics    Alcohol use: No     Allergies   Allergen Reactions    Levaquin [Levofloxacin] Rash      Current Facility-Administered Medications   Medication Dose Route Frequency    gabapentin (NEURONTIN) capsule 300 mg 300 mg Oral Q12H    famotidine (PEPCID) tablet 20 mg  20 mg Oral DAILY    budesonide-formoterol (SYMBICORT) 80-4.5 mcg inhaler  2 Puff Inhalation BID RT    aspirin delayed-release tablet 81 mg  81 mg Oral DAILY    amLODIPine (NORVASC) tablet 5 mg  5 mg Oral DAILY    tamsulosin (FLOMAX) capsule 0.4 mg  0.4 mg Oral DAILY    mirtazapine (REMERON) tablet 15 mg  15 mg Oral QHS    PARoxetine (PAXIL) tablet 30 mg  30 mg Oral DAILY    albuterol-ipratropium (DUO-NEB) 2.5 MG-0.5 MG/3 ML  3 mL Nebulization Q4H PRN    rOPINIRole (REQUIP) tablet 1 mg  1 mg Oral QHS    ondansetron (ZOFRAN) injection 4 mg  4 mg IntraVENous Q4H PRN    LORazepam (ATIVAN) injection 1 mg  1 mg IntraVENous Q2H PRN    scopolamine (TRANSDERM-SCOP) 1 mg over 3 days 1 Patch  1 Patch TransDERmal Q72H PRN    morphine (ROXANOL) concentrated oral syringe 10 mg  10 mg Oral Q4H PRN    morphine injection 1 mg  1 mg IntraVENous Q2H PRN    predniSONE (DELTASONE) tablet 40 mg  40 mg Oral DAILY WITH BREAKFAST    sodium chloride (NS) flush 5-40 mL  5-40 mL IntraVENous Q8H        LAB AND IMAGING FINDINGS:     Lab Results   Component Value Date/Time    WBC 9.9 03/12/2019 12:33 AM    HGB 13.3 03/12/2019 12:33 AM    PLATELET 208 38/00/6049 12:33 AM     Lab Results   Component Value Date/Time    Sodium 141 03/12/2019 12:33 AM    Potassium 3.5 03/12/2019 12:33 AM    Chloride 101 03/12/2019 12:33 AM    CO2 32 03/12/2019 12:33 AM    BUN 44 (H) 03/12/2019 12:33 AM    Creatinine 1.48 (H) 03/12/2019 12:33 AM    Calcium 8.1 (L) 03/12/2019 12:33 AM    Magnesium 2.5 03/12/2019 12:33 AM    Phosphorus 2.6 03/12/2019 12:33 AM      Lab Results   Component Value Date/Time    AST (SGOT) 17 03/12/2019 12:33 AM    Alk.  phosphatase 66 03/12/2019 12:33 AM    Protein, total 5.6 (L) 03/12/2019 12:33 AM    Albumin 2.4 (L) 03/12/2019 12:33 AM    Globulin 3.2 03/12/2019 12:33 AM     Lab Results   Component Value Date/Time    INR 1.1 03/11/2019 07:00 AM    Prothrombin time 13.7 03/11/2019 07:00 AM    aPTT 35.3 03/23/2010 07:18 AM      Lab Results   Component Value Date/Time    Iron 42 (L) 02/07/2017 08:06 PM    TIBC 174 (L) 02/07/2017 08:06 PM    Iron % saturation 24 02/07/2017 08:06 PM    Ferritin 135 05/14/2013 07:53 AM      No results found for: PH, PCO2, PO2  No components found for: [place]   Lab Results   Component Value Date/Time    CK 67 03/12/2019 12:33 AM    CK - MB 1.1 03/12/2019 12:33 AM              Total time: 70 minutes   Counseling / coordination time, spent as noted above: 50 minutes  > 50% counseling / coordination: yes with son daughter pulmonology attending     Prolonged service was provided for  []30 min   []75 min in face to face time in the presence of the patient, spent as noted above. Time Start:   Time End:   Note: this can only be billed with 78142 (initial) or 80027 (follow up). If multiple start / stop times, list each separately.

## 2019-03-13 NOTE — ROUTINE PROCESS
7745 assumed care of pt after bedside verbal report was given by off going nurse, pt resting in bed awake, pt on 6 liters o2 therapy with humidification, no acute distress noted or signs of pain, bed alarm on, call bell within pt's reach, will monitor for changes

## 2019-03-13 NOTE — PROGRESS NOTES
[facility] Hospitalist Group  Progress Note    Patient: Luann Burris Age: 80 y.o. : 1933 MR#: [id number] SSN: [id number]  Date/Time: 3/13/2019     Subjective: pt AAOx2, pt feels fine. Comfortable   Wants to go home      Assessment/Plan:   1. Acute on chronic hypoxic and hypercapnic resp failure: cont O2, BD and steroids. Pt compassionately extubated 3/13 and started on comfort care with med's as tolerated. No aggressive care or testing but ok for med's and diet as tolerated     2. Acute COPD exb, sever end stage COPD: cont BD and O2  3. NSTEMI due to ACS vs demand ischemia: cont asa and statin, no invasive work up or testing per family  4. ARF  5. Chronic diastolic HF  6. Lactic acidosis  7. Pelvic fracture s/p fall at home: supportive care   8. HTN  9. DM 2  10. DNR  D/w pt and family: daughter in law Chetna Peralta [phone], they want to cont comfort care with PO med's and diet as tolerated. No escalation of care, no labs or tests. D/w family about hospice care at home, family says no one at home to care for pt. Family meeting tomorrow for dispo plan   Palliative care in put noted   D/w RN   Zhou [phone]. Case discussed with:  [x]Patient  [x]Family  [x]Nursing  []Case Management  DVT Prophylaxis:  []Lovenox  []Hep SQ  []SCDs  []Coumadin   []On Heparin gtt    Objective:   VS:   Visit Vitals  /64   Pulse 90   Temp 96.4 °F (35.8 °C)   Resp 14   Ht 5' 5\" (1.651 m)   Wt 72.6 kg (160 lb)   SpO2 (!) 88%   Breastfeeding? No   BMI 26.63 kg/m²      Tmax/24hrs: Temp (24hrs), Av.4 °F (36.3 °C), Min:96.4 °F (35.8 °C), Max:98.2 °F (36.8 °C)  IOBRIEF    Intake/Output Summary (Last 24 hours) at 3/13/2019 1730  Last data filed at 3/13/2019 0650  Gross per 24 hour   Intake 50 ml   Output 300 ml   Net -250 ml       General:  Alert, cooperative, no acute distress    Pulmonary:  Bilaterally exp Wheezing, no Rhonchi/Rales. Cardiovascular: Regular rate and Rhythm.   GI:  Soft, Non distended, Non tender. + Bowel sounds. Extremities:  No edema, cyanosis, clubbing. Neurologic: Alert and oriented X 2. Moves all ext  Additional:    Medications:   Current Facility-Administered Medications   Medication Dose Route Frequency    famotidine (PEPCID) tablet 20 mg  20 mg Oral BID    albuterol-ipratropium (DUO-NEB) 2.5 MG-0.5 MG/3 ML  3 mL Nebulization Q4H RT    budesonide-formoterol (SYMBICORT) 80-4.5 mcg inhaler  2 Puff Inhalation BID RT    aspirin delayed-release tablet 81 mg  81 mg Oral DAILY    gabapentin (NEURONTIN) capsule 300 mg  300 mg Oral QID    amLODIPine (NORVASC) tablet 5 mg  5 mg Oral DAILY    tamsulosin (FLOMAX) capsule 0.4 mg  0.4 mg Oral DAILY    mirtazapine (REMERON) tablet 15 mg  15 mg Oral QHS    PARoxetine (PAXIL) tablet 30 mg  30 mg Oral DAILY    albuterol-ipratropium (DUO-NEB) 2.5 MG-0.5 MG/3 ML  3 mL Nebulization Q4H PRN    rOPINIRole (REQUIP) tablet 1 mg  1 mg Oral QHS    ondansetron (ZOFRAN) injection 4 mg  4 mg IntraVENous Q4H PRN    LORazepam (ATIVAN) injection 1 mg  1 mg IntraVENous Q2H PRN    scopolamine (TRANSDERM-SCOP) 1 mg over 3 days 1 Patch  1 Patch TransDERmal Q72H PRN    morphine (ROXANOL) concentrated oral syringe 10 mg  10 mg Oral Q4H PRN    morphine injection 1 mg  1 mg IntraVENous Q2H PRN    predniSONE (DELTASONE) tablet 40 mg  40 mg Oral DAILY WITH BREAKFAST    sodium chloride (NS) flush 5-40 mL  5-40 mL IntraVENous Q8H       Labs:    No results found for this or any previous visit (from the past 24 hour(s)).     Signed By: Donna Gonzalez MD     March 13, 2019

## 2019-03-14 PROBLEM — R53.81 DEBILITY: Status: ACTIVE | Noted: 2019-03-14

## 2019-03-14 PROBLEM — Z71.89 ADVANCED CARE PLANNING/COUNSELING DISCUSSION: Status: ACTIVE | Noted: 2019-03-14

## 2019-03-14 PROCEDURE — 74011636637 HC RX REV CODE- 636/637: Performed by: HOSPITALIST

## 2019-03-14 PROCEDURE — 74011000250 HC RX REV CODE- 250: Performed by: HOSPITALIST

## 2019-03-14 PROCEDURE — 74011250637 HC RX REV CODE- 250/637: Performed by: INTERNAL MEDICINE

## 2019-03-14 PROCEDURE — 77030038269 HC DRN EXT URIN PURWCK BARD -A

## 2019-03-14 PROCEDURE — 74011250637 HC RX REV CODE- 250/637: Performed by: HOSPITALIST

## 2019-03-14 PROCEDURE — 65270000029 HC RM PRIVATE

## 2019-03-14 PROCEDURE — 74011250636 HC RX REV CODE- 250/636: Performed by: INTERNAL MEDICINE

## 2019-03-14 PROCEDURE — 74011250636 HC RX REV CODE- 250/636: Performed by: HOSPITALIST

## 2019-03-14 RX ORDER — GABAPENTIN 300 MG/1
300 CAPSULE ORAL EVERY 12 HOURS
Status: DISCONTINUED | OUTPATIENT
Start: 2019-03-14 | End: 2019-03-15 | Stop reason: HOSPADM

## 2019-03-14 RX ADMIN — ROPINIROLE HYDROCHLORIDE 1 MG: 1 TABLET, FILM COATED ORAL at 21:00

## 2019-03-14 RX ADMIN — MORPHINE SULFATE 10 MG: 100 SOLUTION ORAL at 16:28

## 2019-03-14 RX ADMIN — PAROXETINE HYDROCHLORIDE 30 MG: 20 TABLET, FILM COATED ORAL at 10:10

## 2019-03-14 RX ADMIN — GABAPENTIN 300 MG: 300 CAPSULE ORAL at 21:00

## 2019-03-14 RX ADMIN — ASPIRIN 81 MG: 81 TABLET, COATED ORAL at 10:10

## 2019-03-14 RX ADMIN — MORPHINE SULFATE 1 MG: 2 INJECTION, SOLUTION INTRAMUSCULAR; INTRAVENOUS at 22:28

## 2019-03-14 RX ADMIN — AMLODIPINE BESYLATE 5 MG: 5 TABLET ORAL at 10:10

## 2019-03-14 RX ADMIN — Medication 10 ML: at 10:11

## 2019-03-14 RX ADMIN — MIRTAZAPINE 15 MG: 15 TABLET, FILM COATED ORAL at 23:12

## 2019-03-14 RX ADMIN — Medication 10 ML: at 15:16

## 2019-03-14 RX ADMIN — PREDNISONE 40 MG: 20 TABLET ORAL at 10:10

## 2019-03-14 RX ADMIN — GABAPENTIN 300 MG: 300 CAPSULE ORAL at 10:10

## 2019-03-14 RX ADMIN — LORAZEPAM 1 MG: 2 INJECTION INTRAMUSCULAR; INTRAVENOUS at 20:30

## 2019-03-14 RX ADMIN — IPRATROPIUM BROMIDE AND ALBUTEROL SULFATE 3 ML: .5; 3 SOLUTION RESPIRATORY (INHALATION) at 10:15

## 2019-03-14 RX ADMIN — LORAZEPAM 1 MG: 2 INJECTION INTRAMUSCULAR; INTRAVENOUS at 23:00

## 2019-03-14 RX ADMIN — IPRATROPIUM BROMIDE AND ALBUTEROL SULFATE 3 ML: .5; 3 SOLUTION RESPIRATORY (INHALATION) at 03:20

## 2019-03-14 RX ADMIN — Medication 10 ML: at 21:03

## 2019-03-14 RX ADMIN — MORPHINE SULFATE 10 MG: 100 SOLUTION ORAL at 20:30

## 2019-03-14 RX ADMIN — TAMSULOSIN HYDROCHLORIDE 0.4 MG: 0.4 CAPSULE ORAL at 10:10

## 2019-03-14 RX ADMIN — FAMOTIDINE 20 MG: 20 TABLET ORAL at 10:11

## 2019-03-14 RX ADMIN — IPRATROPIUM BROMIDE AND ALBUTEROL SULFATE 3 ML: .5; 3 SOLUTION RESPIRATORY (INHALATION) at 19:01

## 2019-03-14 NOTE — PROGRESS NOTES
[facility] Hospitalist Group  Progress Note    Patient: Veronica Griffith Age: 80 y.o. : 1933 MR#: [id number] SSN: [id number]  Date/Time: 3/14/2019     Subjective: pt AAOx2, pt feels fine. comfortable    at bedside       Assessment/Plan:   1. Acute on chronic hypoxic and hypercapnic resp failure: cont O2, BD and steroids. Pt compassionately extubated 3/13 and started on comfort care with med's as tolerated. No aggressive care or testing but ok for med's and diet as tolerated     2. Acute COPD exb, sever end stage COPD: cont BD and O2  3. NSTEMI due to ACS vs demand ischemia: cont asa and statin, no invasive work up or testing per family  4. ARF  5. Chronic diastolic HF  6. Lactic acidosis  7. Pelvic fracture s/p fall at home: supportive care   8. HTN  9. DM 2  10. DNR  D/w pt and family:  at bedside. daughter in law Shae Mancilla [phone]  Family thought pt will  post extubation, since pt cont do well. Family planning for care givers at home to take care of pt. Palliative care meet with family discussed hospice care at home. D/w RN  Jeanne Leone [phone]. Case discussed with:  [x]Patient  [x]Family  [x]Nursing  []Case Management  DVT Prophylaxis:  []Lovenox  []Hep SQ  []SCDs  []Coumadin   []On Heparin gtt    Objective:   VS:   Visit Vitals  /79 (BP 1 Location: Right arm, BP Patient Position: Supine)   Pulse 95   Temp 98.1 °F (36.7 °C)   Resp 17   Ht 5' 5\" (1.651 m)   Wt 71.7 kg (158 lb)   SpO2 (!) 87%   Breastfeeding? No   BMI 26.29 kg/m²      Tmax/24hrs: Temp (24hrs), Av.8 °F (36.6 °C), Min:97.5 °F (36.4 °C), Max:98.1 °F (36.7 °C)  IOBRIEF    Intake/Output Summary (Last 24 hours) at 3/14/2019 1454  Last data filed at 3/14/2019 0346  Gross per 24 hour   Intake 360 ml   Output 400 ml   Net -40 ml       General:  Alert, cooperative, no acute distress    Pulmonary:  Bilaterally exp Wheezing, no Rhonchi/Rales. Cardiovascular: Regular rate and Rhythm.   GI: Soft, Non distended, Non tender. + Bowel sounds. Extremities:  No edema, cyanosis, clubbing. Neurologic: Alert and oriented X 2. Moves all ext  Additional:    Medications:   Current Facility-Administered Medications   Medication Dose Route Frequency    gabapentin (NEURONTIN) capsule 300 mg  300 mg Oral Q12H    famotidine (PEPCID) tablet 20 mg  20 mg Oral DAILY    budesonide-formoterol (SYMBICORT) 80-4.5 mcg inhaler  2 Puff Inhalation BID RT    aspirin delayed-release tablet 81 mg  81 mg Oral DAILY    amLODIPine (NORVASC) tablet 5 mg  5 mg Oral DAILY    tamsulosin (FLOMAX) capsule 0.4 mg  0.4 mg Oral DAILY    mirtazapine (REMERON) tablet 15 mg  15 mg Oral QHS    PARoxetine (PAXIL) tablet 30 mg  30 mg Oral DAILY    albuterol-ipratropium (DUO-NEB) 2.5 MG-0.5 MG/3 ML  3 mL Nebulization Q4H PRN    rOPINIRole (REQUIP) tablet 1 mg  1 mg Oral QHS    ondansetron (ZOFRAN) injection 4 mg  4 mg IntraVENous Q4H PRN    LORazepam (ATIVAN) injection 1 mg  1 mg IntraVENous Q2H PRN    scopolamine (TRANSDERM-SCOP) 1 mg over 3 days 1 Patch  1 Patch TransDERmal Q72H PRN    morphine (ROXANOL) concentrated oral syringe 10 mg  10 mg Oral Q4H PRN    morphine injection 1 mg  1 mg IntraVENous Q2H PRN    predniSONE (DELTASONE) tablet 40 mg  40 mg Oral DAILY WITH BREAKFAST    sodium chloride (NS) flush 5-40 mL  5-40 mL IntraVENous Q8H       Labs:    No results found for this or any previous visit (from the past 24 hour(s)).     Signed By: Mary Llamas MD     March 14, 2019

## 2019-03-14 NOTE — ROUTINE PROCESS
TRANSFER - IN REPORT:    Verbal report received from Bonnie(name) on Luann [facility]  being received from ICU(unit) for routine progression of care      Report consisted of patients Situation, Background, Assessment and   Recommendations(SBAR). Information from the following report(s) SBAR and Kardex was reviewed with the receiving nurse. Opportunity for questions and clarification was provided. Assessment completed upon patients arrival to unit and care assumed.

## 2019-03-14 NOTE — ROUTINE PROCESS
1940 Bedside and Verbal shift change report given to [facility] [employer] (oncoming nurse) by Giacomo PANCHAL (offgoing nurse). Report included the following information SBAR, Kardex and MAR.

## 2019-03-14 NOTE — PROGRESS NOTES
[facility]: [phone]  Formerly [facility]: [address]: [phone]    Patient Name: Bear Brewer  YOB: 1933    Date of Initial Consult: 3/13/2019, follow up 3/14/2019   Reason for Consult: support and care decisions  Requesting Provider: Dr Ai Garner  Primary Care Physician: Umm Heath MD      SUMMARY:   Bear Brewer is a 80y.o. year old with a past history of COPD, chronic pain, polyneuropathy, frequent falls   , who was admitted on 3/10/2019 from home  with a diagnosis of acute respiratory failure requiring intubation . ICU team spoke with family who reported she would not wish to be on a ventilator and wished for compassionate extubation. Current medical issues leading to Palliative Medicine involvement include: 80year old female who was compassionately extubated, now on comfort measures. Palliative medicine is consulted for support and continued care decision discussions. 3/14/2019 awake alert tells us she slept like she was \" dead\" . Some dyspnea but she tells us the medication helps. Family at bedside, no change in care decisions. Hospice has met with family, they are trying to find additional care givers at home. PALLIATIVE DIAGNOSES:   1. Advanced care plan discussion   2. Acute/ chronic  respiratory failure   3. COPD   4. Debility        PLAN:   1. 3/14/2019 follow up along with Ms Yazmin Ramos RN patient very alert this am, talkative but has some confusion. Family at bedside, home hospice services discussed, family is considering options but concerned there is no dedicated care giver at home, her  is in a wheel chair and has a dx of dementia. Goals of care have not changed DNR/DNI comfort measures. We offered support to family as they embark her end of life journey.  Shared with family this journey can be days to weeks.   (please see below for previous conversations) 2. Advanced care plan discussion patient is not able to participate in conversation however she is alert, but process is slow and can not reliably answer questions. Her son at bedside and later daughter Mamta Moss arrived who is MPOA. Family shared they are confused about her clinical condition. They thought her life would be very short once life support was removed. Discussed her overall diagnosis / prognosis. She has end stage COPD per pulmonology. Spoke with daughter, and son who affirm DNR/DNI and continuation of comfort measures, symptom management only,allow her to eat and drink as is pleasurable. POST was signed to this effect by daughter who is MPOA. 3. Acute respiratory failure compassionately extubated yesterday . Comfortable appearing today. Roxanol on board for dyspnea of which she says helps her \" breathing\" family does not wish for re intubation, BIPAP of CPAP. 4. COPD followed as o/p by pulmonology end stage disease. Would continue alb nebs PRN for comfort. 5. Initial consult note routed to primary continuity provider  6. Communicated plan of care with: Palliative IDT, daughter son daughter in law    GOALS OF CARE:  Patient/Health Care Proxy Stated Goals: Comfort      TREATMENT PREFERENCES:   Code Status: DNR    Advance Care Planning:  [x] [facility] Interdisciplinary Team has updated the ACP Navigator with [address] and Patient Capacity    Primary Decision [facility] ([address]):   Primary Decision Maker (Active): Iesha Rehman - Daughter - [phone]    Medical Interventions: Comfort measures   Artificially Administered Nutrition: No feeding tube     Other:  As far as possible, the palliative care team has discussed with patient / health care proxy about goals of care / treatment preferences for patient.      HISTORY:     History obtained from: chart and family    CHIEF COMPLAINT: COPD and acute respiratory failure     HPI/SUBJECTIVE:    The patient is:   [] Verbal and participatory  [x] Non-participatory due to: confusion   Please see summary     Clinical Pain Assessment (nonverbal scale for nonverbal patients): Clinical Pain Assessment  Severity: 0     Activity (Movement): Lying quietly, normal position    Duration: for how long has pt been experiencing pain (e.g., 2 days, 1 month, years)  Frequency: how often pain is an issue (e.g., several times per day, once every few days, constant)     FUNCTIONAL ASSESSMENT:     Palliative Performance Scale (PPS): 30       ECOG  ECOG Status : Completely disabled     PSYCHOSOCIAL/SPIRITUAL SCREENING:      Any spiritual / Cheondoism concerns:  [] Yes /  [x] No    Caregiver Burnout:  [] Yes /  [x] No /  [] No Caregiver Present      Anticipatory grief assessment:   [x] Normal  / [] Maladaptive        REVIEW OF SYSTEMS:     Positive and pertinent negative findings in ROS are noted above in HPI. The following systems were [] reviewed / [x] unable to be reviewed as noted in HPI  Other findings are noted below. Systems: constitutional, ears/nose/mouth/throat, respiratory, gastrointestinal, genitourinary, musculoskeletal, integumentary, neurologic, psychiatric, endocrine. Positive findings noted below. Modified ESAS Completed by: provider   Fatigue: 7       Pain: 0           Dyspnea: 3                    PHYSICAL EXAM:     Wt Readings from Last 3 Encounters:   03/14/19 71.7 kg (158 lb)   02/25/19 68 kg (150 lb)   01/07/19 67 kg (147 lb 9.6 oz)     Blood pressure 152/79, pulse 95, temperature 98.1 °F (36.7 °C), resp. rate 17, height 5' 5\" (1.651 m), weight 71.7 kg (158 lb), SpO2 (!) 87 %, not currently breastfeeding. Pain:  Pain Scale 1: Numeric (0 - 10)  Pain Intensity 1: 0                 Last bowel movement: none recorded     Constitutional: reclining in bed, laughing alert a little confused in NAD  Eyes: pupils equal, anicteric  Respiratory: breathing mildly labored, coarse wheezing noted.    Skin: warm, dry, face red  Neurologic: alert slightly confused smiling can not really follow a complex conversation          HISTORY:     Active Problems:    Congestive heart failure (CHF) ([facility] [address] (3/10/2019)      NSTEMI (non-ST elevated myocardial infarction) ([address] (3/11/2019)      Pelvic ring fracture, closed, initial encounter ([facility] [address] (3/11/2019)      Advanced care planning/counseling discussion (3/14/2019)      Debility (3/14/2019)      Past Medical History:   Diagnosis Date    Chronic lung disease     Colon polyps     COPD 8-30-02    DDD (degenerative disc disease), lumbar 10/1/2014    Degeneration of lumbar or lumbosacral intervertebral disc     Depression     Diabetes ([address] 7-19-05    Diabetes mellitus ([address]     Diverticulosis     DM neuropathy, painful ([address] 10/1/2014    MOORE (Dyspnea on Exertion) 4-19-02    echo: +mild LVH, TZ32-74% w/ diastolic dysfxn    Glaucoma     HCAP (healthcare-associated pneumonia) 03/24/2017    Hemorrhoids 6-6-06    Hyperlipidemia 5/17/2011    Hypertension 4-16-02    LBP (low back pain) 4-13-04    Low back pain radiating to both legs 10/1/2014    Lumbago     Lumbar disc herniation 10/1/2014    Lumbar facet arthropathy 10/1/2014    Lumbosacral radiculopathy at L5 10/1/2014    Lumbosacral radiculopathy at S1 10/1/2014    Microscopic hematuria     OA (osteoarthritis)     Overactive bladder 5/17/2011    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    RLS (restless legs syndrome) 1/17/2013    Sciatica     Shortness of breath     Spinal stenosis, lumbar region, without neurogenic claudication     Spondylolisthesis of lumbar region 10/1/2014    Spondylolisthesis, grade 1 10/1/2014    Stage 4 very severe COPD by GOLD classification ([facility] [address] 2/25/2019    Stress urinary incontinence     Syncope     -MRI brain    Urinary tract infection, site not specified       Past Surgical History:   Procedure Laterality Date    HX APPENDECTOMY      HX CHOLECYSTECTOMY     HX HYSTERECTOMY      (+)DUB    HX POLYPECTOMY      MO COLONOSCOPY FLX DX W/COLLJ SPEC WHEN PFRMD  06    normal, Dr Karrie Rebolledo    MO COLONOSCOPY FLX DX W/COLLJ Danielle1978 PFRMD      (+)polyp= tubular adenoma      Family History   Problem Relation Age of Onset    Colon Cancer Sister     Heart Disease Brother     Seizures Son     Colon Cancer Maternal Aunt      History reviewed, no pertinent family history.   Social History     Tobacco Use    Smoking status: Former Smoker     Packs/day: 1.00     Years: 57.00     Pack years: 57.00     Types: Cigarettes     Last attempt to quit: 2018     Years since quittin.2    Smokeless tobacco: Never Used    Tobacco comment: pt has cut down recently to less than 1 ppd   Substance Use Topics    Alcohol use: No     Allergies   Allergen Reactions    Levaquin [Levofloxacin] Rash      Current Facility-Administered Medications   Medication Dose Route Frequency    gabapentin (NEURONTIN) capsule 300 mg  300 mg Oral Q12H    famotidine (PEPCID) tablet 20 mg  20 mg Oral DAILY    budesonide-formoterol (SYMBICORT) 80-4.5 mcg inhaler  2 Puff Inhalation BID RT    aspirin delayed-release tablet 81 mg  81 mg Oral DAILY    amLODIPine (NORVASC) tablet 5 mg  5 mg Oral DAILY    tamsulosin (FLOMAX) capsule 0.4 mg  0.4 mg Oral DAILY    mirtazapine (REMERON) tablet 15 mg  15 mg Oral QHS    PARoxetine (PAXIL) tablet 30 mg  30 mg Oral DAILY    albuterol-ipratropium (DUO-NEB) 2.5 MG-0.5 MG/3 ML  3 mL Nebulization Q4H PRN    rOPINIRole (REQUIP) tablet 1 mg  1 mg Oral QHS    ondansetron (ZOFRAN) injection 4 mg  4 mg IntraVENous Q4H PRN    LORazepam (ATIVAN) injection 1 mg  1 mg IntraVENous Q2H PRN    scopolamine (TRANSDERM-SCOP) 1 mg over 3 days 1 Patch  1 Patch TransDERmal Q72H PRN    morphine (ROXANOL) concentrated oral syringe 10 mg  10 mg Oral Q4H PRN    morphine injection 1 mg  1 mg IntraVENous Q2H PRN    predniSONE (DELTASONE) tablet 40 mg  40 mg Oral DAILY WITH BREAKFAST    sodium chloride (NS) flush 5-40 mL  5-40 mL IntraVENous Q8H        LAB AND IMAGING FINDINGS:     Lab Results   Component Value Date/Time    WBC 9.9 03/12/2019 12:33 AM    HGB 13.3 03/12/2019 12:33 AM    PLATELET 119 01/26/2859 12:33 AM     Lab Results   Component Value Date/Time    Sodium 141 03/12/2019 12:33 AM    Potassium 3.5 03/12/2019 12:33 AM    Chloride 101 03/12/2019 12:33 AM    CO2 32 03/12/2019 12:33 AM    BUN 44 (H) 03/12/2019 12:33 AM    Creatinine 1.48 (H) 03/12/2019 12:33 AM    Calcium 8.1 (L) 03/12/2019 12:33 AM    Magnesium 2.5 03/12/2019 12:33 AM    Phosphorus 2.6 03/12/2019 12:33 AM      Lab Results   Component Value Date/Time    AST (SGOT) 17 03/12/2019 12:33 AM    Alk. phosphatase 66 03/12/2019 12:33 AM    Protein, total 5.6 (L) 03/12/2019 12:33 AM    Albumin 2.4 (L) 03/12/2019 12:33 AM    Globulin 3.2 03/12/2019 12:33 AM     Lab Results   Component Value Date/Time    INR 1.1 03/11/2019 07:00 AM    Prothrombin time 13.7 03/11/2019 07:00 AM    aPTT 35.3 03/23/2010 07:18 AM      Lab Results   Component Value Date/Time    Iron 42 (L) 02/07/2017 08:06 PM    TIBC 174 (L) 02/07/2017 08:06 PM    Iron % saturation 24 02/07/2017 08:06 PM    Ferritin 135 05/14/2013 07:53 AM      No results found for: PH, PCO2, PO2  No components found for: [place]   Lab Results   Component Value Date/Time    CK 67 03/12/2019 12:33 AM    CK - MB 1.1 03/12/2019 12:33 AM              Total time: 25 minutes   Counseling / coordination time, spent as noted above: 15 minutes  > 50% counseling / coordination: yes with son daughter pulmonology attending     Prolonged service was provided for  []30 min   []75 min in face to face time in the presence of the patient, spent as noted above. Time Start:   Time End:   Note: this can only be billed with 97430 (initial) or 81683 (follow up). If multiple start / stop times, list each separately.

## 2019-03-14 NOTE — HOSPICE
Met with patient, , son, daughter and other family members at bedside. Discussed [facility] philosophy,services, criteria, and IDT. Answered all questions. Gave brochure with 24/7 contact information. Per the patient's daughter and daughter-in-law the family was told the patient was going to die in the hospital and did not have to leave because she was placed on comfort measures. Writer re-educated the family on comfort measures and discharge planning. The patient lives with her  who was present in a wheelchair and would not be capable of caring for her. Family plans to discuss hospice and find a caregiver for at home. Will continue to follow. Thank you for the referral to [facility]. If we can be of further assistance please contact [phone].     MARIN CarpenterN, RN  Nurse Liaison, [place] [address] [place] [address] [address]  [phone]  [email or web address]

## 2019-03-14 NOTE — PROGRESS NOTES
Hospice to see today. Will defer initial assessment until after hospice sees pt/family.  Rebecca Oden, [phone]

## 2019-03-14 NOTE — PROGRESS NOTES
Palliative medicine team members including Gretta Hernandez NP, Nayeli Calhoun NP, and this writer met with patients family members in room. Patient awake and alert, some audible wheezing noted, O2 sat 87% on 2 liters. Patient stated she has some dyspnea and that the medications help. Stated she sleep well. Family stated that hospice nurse had been into to speak with them already this morning. The family requested to speak with team in private, away from their parents for further medical update and next steps planning. Gretta Hernandez NP provided medical status update. Family would like to explore the option of home hospice but is currently patient has no caregiver in place. They will discuss with family members to formulate plan for caregiver coverage in the home. No change in care decisions, comfort measure to continue. Palliative medicine team will continue to follow with patient and family.         Gato Yi RN, [facility]  Palliative Medicine Inpatient RN  [facility]  Palliative COPE Line: [phone] ([phone]

## 2019-03-14 NOTE — ROUTINE PROCESS
TRANSFER - OUT REPORT:    Verbal report given to Elham Betancur(name) on Luzmaria Curtis  being transferred to [facility](unit) for routine progression of care       Report consisted of patients Situation, Background, Assessment and   Recommendations(SBAR). Information from the following report(s) SBAR, Intake/Output and MAR was reviewed with the receiving nurse. Lines:   Peripheral IV 03/11/19 Anterior; Left Wrist (Active)   Site Assessment Clean, dry, & intact 3/14/2019  2:14 AM   Phlebitis Assessment 0 3/14/2019  2:14 AM   Infiltration Assessment 0 3/14/2019  2:14 AM   Dressing Status Clean, dry, & intact 3/14/2019  2:14 AM   Dressing Type Transparent 3/14/2019  2:14 AM   Hub Color/Line Status Blue;Flushed;Patent 3/14/2019  2:14 AM   Action Taken Open ports on tubing capped 3/14/2019  2:14 AM   Alcohol Cap Used Yes 3/14/2019  2:14 AM       Peripheral IV 03/11/19 Anterior; Left Wrist (Active)   Site Assessment Clean, dry, & intact 3/14/2019  2:14 AM   Phlebitis Assessment 0 3/14/2019  2:14 AM   Infiltration Assessment 0 3/14/2019  2:14 AM   Dressing Status Clean, dry, & intact 3/14/2019  2:14 AM   Dressing Type Transparent 3/14/2019  2:14 AM   Hub Color/Line Status Pink;Flushed;Patent 3/14/2019  2:14 AM   Action Taken Open ports on tubing capped 3/14/2019  2:14 AM   Alcohol Cap Used Yes 3/14/2019  2:14 AM        Opportunity for questions and clarification was provided.       Patient transported with:   O2 @ 6 liters  Patient-specific medications from Pharmacy  Registered Nurse

## 2019-03-14 NOTE — PROGRESS NOTES
Resting quietly in bed  Verbal alert and oriented  RR even and unlabored  No complaints voiced @ this time  Pnt stable  Will continue to monitor  NADN

## 2019-03-15 ENCOUNTER — HOSPITAL ENCOUNTER (EMERGENCY)
Age: 84
Discharge: OTHER INSTITUTION | End: 2019-03-16
Attending: EMERGENCY MEDICINE
Payer: MEDICARE

## 2019-03-15 VITALS
RESPIRATION RATE: 17 BRPM | TEMPERATURE: 97.5 F | SYSTOLIC BLOOD PRESSURE: 98 MMHG | BODY MASS INDEX: 26.08 KG/M2 | DIASTOLIC BLOOD PRESSURE: 53 MMHG | WEIGHT: 156.53 LBS | HEIGHT: 65 IN | HEART RATE: 86 BPM | OXYGEN SATURATION: 98 %

## 2019-03-15 DIAGNOSIS — J44.1 CHRONIC OBSTRUCTIVE PULMONARY DISEASE WITH ACUTE EXACERBATION (HCC): Primary | ICD-10-CM

## 2019-03-15 DIAGNOSIS — R09.02 HYPOXIA: ICD-10-CM

## 2019-03-15 PROCEDURE — 80053 COMPREHEN METABOLIC PANEL: CPT

## 2019-03-15 PROCEDURE — 97166 OT EVAL MOD COMPLEX 45 MIN: CPT

## 2019-03-15 PROCEDURE — 74011250637 HC RX REV CODE- 250/637: Performed by: INTERNAL MEDICINE

## 2019-03-15 PROCEDURE — 83735 ASSAY OF MAGNESIUM: CPT

## 2019-03-15 PROCEDURE — 77010033678 HC OXYGEN DAILY: Performed by: INTERNAL MEDICINE

## 2019-03-15 PROCEDURE — 85025 COMPLETE CBC W/AUTO DIFF WBC: CPT

## 2019-03-15 PROCEDURE — 97530 THERAPEUTIC ACTIVITIES: CPT

## 2019-03-15 PROCEDURE — 97163 PT EVAL HIGH COMPLEX 45 MIN: CPT

## 2019-03-15 PROCEDURE — 74011636637 HC RX REV CODE- 636/637: Performed by: HOSPITALIST

## 2019-03-15 PROCEDURE — 99285 EMERGENCY DEPT VISIT HI MDM: CPT

## 2019-03-15 PROCEDURE — 77030029684 HC NEB SM VOL KT MONA -A

## 2019-03-15 PROCEDURE — 74011250637 HC RX REV CODE- 250/637: Performed by: HOSPITALIST

## 2019-03-15 PROCEDURE — 74011250636 HC RX REV CODE- 250/636: Performed by: INTERNAL MEDICINE

## 2019-03-15 PROCEDURE — 83880 ASSAY OF NATRIURETIC PEPTIDE: CPT

## 2019-03-15 PROCEDURE — 77030037878 HC DRSG MEPILEX >48IN BORD MOLN -B

## 2019-03-15 PROCEDURE — 74011250636 HC RX REV CODE- 250/636: Performed by: HOSPITALIST

## 2019-03-15 PROCEDURE — 82550 ASSAY OF CK (CPK): CPT

## 2019-03-15 RX ORDER — IPRATROPIUM BROMIDE AND ALBUTEROL SULFATE 2.5; .5 MG/3ML; MG/3ML
3 SOLUTION RESPIRATORY (INHALATION) EVERY 4 HOURS
Qty: 30 NEBULE | Refills: 0 | Status: SHIPPED
Start: 2019-03-15

## 2019-03-15 RX ORDER — FAMOTIDINE 20 MG/1
20 TABLET, FILM COATED ORAL DAILY
Qty: 15 TAB | Refills: 0 | Status: SHIPPED
Start: 2019-03-16

## 2019-03-15 RX ORDER — IPRATROPIUM BROMIDE AND ALBUTEROL SULFATE 2.5; .5 MG/3ML; MG/3ML
3 SOLUTION RESPIRATORY (INHALATION)
Status: DISCONTINUED | OUTPATIENT
Start: 2019-03-15 | End: 2019-03-15 | Stop reason: HOSPADM

## 2019-03-15 RX ORDER — ATORVASTATIN CALCIUM 40 MG/1
40 TABLET, FILM COATED ORAL
Qty: 30 TAB | Refills: 0 | Status: SHIPPED
Start: 2019-03-15 | End: 2020-01-01

## 2019-03-15 RX ORDER — PREDNISONE 10 MG/1
TABLET ORAL
Qty: 30 TAB | Refills: 0 | Status: SHIPPED
Start: 2019-03-15 | End: 2019-04-15 | Stop reason: ALTCHOICE

## 2019-03-15 RX ORDER — FUROSEMIDE 40 MG/5ML
20 SOLUTION ORAL DAILY
Status: DISCONTINUED | OUTPATIENT
Start: 2019-03-15 | End: 2019-03-15

## 2019-03-15 RX ORDER — ATORVASTATIN CALCIUM 40 MG/1
40 TABLET, FILM COATED ORAL
Status: DISCONTINUED | OUTPATIENT
Start: 2019-03-15 | End: 2019-03-15 | Stop reason: HOSPADM

## 2019-03-15 RX ORDER — TRAMADOL HYDROCHLORIDE 50 MG/1
50 TABLET ORAL
Qty: 90 TAB | Refills: 2 | Status: SHIPPED | OUTPATIENT
Start: 2019-03-15 | End: 2019-03-20

## 2019-03-15 RX ORDER — FUROSEMIDE 20 MG/1
20 TABLET ORAL
Qty: 15 TAB | Refills: 0 | Status: SHIPPED
Start: 2019-03-15

## 2019-03-15 RX ORDER — FUROSEMIDE 20 MG/1
20 TABLET ORAL DAILY
Status: DISCONTINUED | OUTPATIENT
Start: 2019-03-15 | End: 2019-03-15 | Stop reason: HOSPADM

## 2019-03-15 RX ADMIN — PAROXETINE HYDROCHLORIDE 30 MG: 20 TABLET, FILM COATED ORAL at 10:54

## 2019-03-15 RX ADMIN — ASPIRIN 81 MG: 81 TABLET, COATED ORAL at 10:54

## 2019-03-15 RX ADMIN — TAMSULOSIN HYDROCHLORIDE 0.4 MG: 0.4 CAPSULE ORAL at 10:54

## 2019-03-15 RX ADMIN — MORPHINE SULFATE 10 MG: 100 SOLUTION ORAL at 00:14

## 2019-03-15 RX ADMIN — FAMOTIDINE 20 MG: 20 TABLET ORAL at 10:54

## 2019-03-15 RX ADMIN — MORPHINE SULFATE 1 MG: 2 INJECTION, SOLUTION INTRAMUSCULAR; INTRAVENOUS at 10:56

## 2019-03-15 RX ADMIN — METHYLPREDNISOLONE SODIUM SUCCINATE 40 MG: 40 INJECTION, POWDER, FOR SOLUTION INTRAMUSCULAR; INTRAVENOUS at 16:53

## 2019-03-15 RX ADMIN — PREDNISONE 40 MG: 20 TABLET ORAL at 10:54

## 2019-03-15 RX ADMIN — AMLODIPINE BESYLATE 5 MG: 5 TABLET ORAL at 10:54

## 2019-03-15 RX ADMIN — MORPHINE SULFATE 10 MG: 100 SOLUTION ORAL at 03:45

## 2019-03-15 RX ADMIN — Medication 10 ML: at 06:00

## 2019-03-15 RX ADMIN — MORPHINE SULFATE 1 MG: 2 INJECTION, SOLUTION INTRAMUSCULAR; INTRAVENOUS at 03:16

## 2019-03-15 RX ADMIN — FUROSEMIDE 20 MG: 20 TABLET ORAL at 16:52

## 2019-03-15 RX ADMIN — MORPHINE SULFATE 1 MG: 2 INJECTION, SOLUTION INTRAMUSCULAR; INTRAVENOUS at 08:10

## 2019-03-15 RX ADMIN — GABAPENTIN 300 MG: 300 CAPSULE ORAL at 10:54

## 2019-03-15 NOTE — PROGRESS NOTES
Problem: Self Care Deficits Care Plan (Adult)  Goal: *Acute Goals and Plan of Care (Insert Text)  Occupational Therapy Goals  Initiated 3/15/2019 within 7 day(s). 1.  Patient will perform grooming with minimal assistance/contact guard assist.    2.  Patient will perform upper body dressing with minimal assistance/contact guard assist.  3.  Patient will perform toilet transfers with minimal assistance/contact guard assist.  4.  Patient will participate in upper extremity therapeutic exercise/activities with minimal assistance/contact guard assist for 8 minutes to increase strength/endurance for ADLs. Outcome: Progressing Towards Goal  Occupational Therapy EVALUATION    Patient: Espinoza Cates (37 y.o. female)  Date: 3/15/2019  Primary Diagnosis: Congestive heart failure (CHF) ([facility]) [I50.9]  NSTEMI (non-ST elevated myocardial infarction) [facility]) [I21.4]       Precautions:   Fall    ASSESSMENT :  Based on the objective data described below, the patient presents with decreased ADLs, decreased functional mobility and muscle weakness/poor endurance. Patient with SOB/increased respiratory rate during all activity and required extra time/assist for completion. Decreased coordination of RUE/LE noted with command following. Mod to max assist needed for self care tasks secondary to fatigue/poor endurance. Mod assist also given for supine to sit and standing at EOB. Patient educated on energy conservation techniques during daily activities and pursed lip breathing. She verbalized/demonstrated understanding. Supportive  in room. Recommend continued therapy at SNF to maximize her functional independence. Patient will benefit from skilled intervention to address the above impairments.   Patients rehabilitation potential is considered to be Fair  Factors which may influence rehabilitation potential include:   []             None noted  []             Mental ability/status  [x]             Medical condition  []             Home/family situation and support systems  []             Safety awareness  []             Pain tolerance/management  []             Other:      PLAN :  Recommendations and Planned Interventions:   [x]               Self Care Training                  [x]        Therapeutic Activities  [x]               Functional Mobility Training    []        Cognitive Retraining  [x]               Therapeutic Exercises           [x]        Endurance Activities  [x]               Balance Training                   []        Neuromuscular Re-Education  []               Visual/Perceptual Training     [x]   Home Safety Training  [x]               Patient Education                 [x]        Family Training/Education  []               Other (comment):    Frequency/Duration: Patient will be followed by occupational therapy 1-2 times per day/4-7 days per week to address goals. Discharge Recommendations: Murray Almanza  Further Equipment Recommendations for Discharge: TBD at next level of care     Barriers to Learning/Limitations: yes;  altered mental status (i.e. Mild Confusion)  Compensate with: visual, verbal, tactile, kinesthetic cues/model     PATIENT COMPLEXITY      Eval Complexity: History: MEDIUM Complexity : Expanded review of history including physical, cognitive and psychosocial  history ; Examination: MEDIUM Complexity : 3-5 performance deficits relating to physical, cognitive , or psychosocial skils that result in activity limitations and / or participation restrictions; Decision Making:MEDIUM Complexity : Patient may present with comorbidities that affect occupational performnce. Miniml to moderate modification of tasks or assistance (eg, physical or verbal ) with assesment(s) is necessary to enable patient to complete evaluation  Assessment: Moderate Complexity     SUBJECTIVE:   Patient stated Tom Mir had to go to the bathroom, so I got up.     OBJECTIVE DATA SUMMARY:     Past Medical History:   Diagnosis Date    Chronic lung disease     Colon polyps     COPD 8-30-02    DDD (degenerative disc disease), lumbar 10/1/2014    Degeneration of lumbar or lumbosacral intervertebral disc     Depression     Diabetes ([facility] 7-19-05    Diabetes mellitus ([facility]     Diverticulosis     DM neuropathy, painful ([facility] [facility] 10/1/2014    MOORE (Dyspnea on Exertion) 4-19-02    echo: +mild LVH, JF82-52% w/ diastolic dysfxn    Glaucoma     HCAP (healthcare-associated pneumonia) 03/24/2017    Hemorrhoids 6-6-06    Hyperlipidemia 5/17/2011    Hypertension 4-16-02    LBP (low back pain) 4-13-04    Low back pain radiating to both legs 10/1/2014    Lumbago     Lumbar disc herniation 10/1/2014    Lumbar facet arthropathy 10/1/2014    Lumbosacral radiculopathy at L5 10/1/2014    Lumbosacral radiculopathy at S1 10/1/2014    Microscopic hematuria     OA (osteoarthritis)     Overactive bladder 5/17/2011    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    RLS (restless legs syndrome) 1/17/2013    Sciatica     Shortness of breath     Spinal stenosis, lumbar region, without neurogenic claudication     Spondylolisthesis of lumbar region 10/1/2014    Spondylolisthesis, grade 1 10/1/2014    Stage 4 very severe COPD by GOLD classification ([facility] 2/25/2019    Stress urinary incontinence     Syncope     -MRI brain    Urinary tract infection, site not specified      Past Surgical History:   Procedure Laterality Date    HX APPENDECTOMY      HX CHOLECYSTECTOMY      HX HYSTERECTOMY      (+)DUB    HX POLYPECTOMY      [place] COLONOSCOPY FLX DX W/COLLJ SPEC WHEN PFRMD  6-16-06    normal, Dr Schmidt Call FLX DX W/COLLJ [address] PFRMD      (+)polyp= tubular adenoma     Prior Level of Function/Home Situation: Pt was independent with basic self care tasks and functional mobility PTA. She was sponge bathing at home.   Home Situation  Home Environment: Private residence  # Steps to Enter: 3  Rails to Enter: Yes  Hand Rails : Bilateral  One/Two Story Residence: One story  Living Alone: No  Support Systems: Spouse/Significant Other/Partner  Patient Expects to be Discharged to[de-identified] Private residence  Current DME Used/Available at Home: Oxygen, portable, Walker, rolling, Cane, straight  Tub or Shower Type: (Pt sponge bathing at home.)  [x]  Right hand dominant   []  Left hand dominant  Cognitive/Behavioral Status:  Neurologic State: Alert  Orientation Level: Oriented to person  Cognition: Decreased command following(with R extremities)  Safety/Judgement: Fall prevention    Skin: Intact on UEs    Edema: None noted in UEs    Vision/Perceptual:    Acuity: Able to read clock/calendar on wall without difficulty    Corrective Lenses: Reading glasses    Coordination:  Gross Motor Skills-Upper: Left Intact; Right Intact    Balance:  Sitting: With support  Standing: With support    Strength:  Strength: Generally decreased, functional(UEs)    Tone & Sensation:  Tone: Normal(UEs)  Sensation: Intact(UEs)    Range of Motion:  AROM: Generally decreased, functional(UEs)    Functional Mobility and Transfers for ADLs:  Bed Mobility:  Rolling: Moderate assistance;Assist x2  Supine to Sit: Maximum assistance  Sit to Supine: Maximum assistance;Assist x2  Scooting: Total assistance;Assist x2  Transfers:  Sit to Stand: Maximum assistance   Toilet Transfer : Maximum assistance(Pt with difficulty advancing her RLE.)     ADL Assessment:  Feeding: Setup;Supervision    Oral Facial Hygiene/Grooming: Moderate assistance    Bathing: Maximum assistance    Upper Body Dressing: Maximum assistance    Lower Body Dressing: Total assistance    Toileting: Total assistance    Pain:  Pt reports 0/10 pain or discomfort prior to treatment.    Pt reports 0/10 pain or discomfort post treatment.      Activity Tolerance:   Good    Please refer to the flowsheet for vital signs taken during this treatment. After treatment:   [] Patient left in no apparent distress sitting up in chair  [x] Patient left in no apparent distress in bed  [x] Call bell left within reach  [] Nursing notified  [x] Caregiver () present  [] Bed alarm activated    COMMUNICATION/EDUCATION:   [x] Home safety education was provided and the patient/caregiver indicated understanding. [x] Patient/family have participated as able in goal setting and plan of care. [x] Patient/family agree to work toward stated goals and plan of care. [] Patient understands intent and goals of therapy, but is neutral about his/her participation. [] Patient is unable to participate in goal setting and plan of care.     Thank you for this referral.  Rojas George MS OTR/L  Time Calculation: 23 mins

## 2019-03-15 NOTE — HOME CARE
Rounded on this \"[facility]\" patient. . I left a brochure on [facility] with the patient. [facility] will be available if needed.  Lety Hall LPN

## 2019-03-15 NOTE — PROGRESS NOTES
Discharge order noted for today. Patient has been accepted to [address] [place] [address] nursing [facility]. Confirmed with Jacinto Montoya that bed is available today. Met with patient and daughter, Oneyda Malaika, and are agreeable to the transition plan today Transport to facility has been arranged through [address] at 4:30 time. Patient's discharge summary has been forwarded to skilled nursing facility via cclink. Bedside RN, Jordan, has been updated to the transition plan. Discharge information has been updated on the AVS.  Please call report to [phone].       Ezekiel Aguirre, [phone]

## 2019-03-15 NOTE — PROGRESS NOTES
Reason for Admission:   Congestive heart failure (CHF) ([facility]) [I50.9]  NSTEMI (non-ST elevated myocardial infarction) ([facility] [I21.4]               RRAT Score:     52             Resources/supports as identified by patient/family:       Top Challenges facing patient (as identified by patient/family and CM):     Pt reports spouse is in frail health    Finances/Medication cost?       Transportation      Family can assist  Support system or lack thereof? Family supportive  Living arrangements? Lives with spouse   Self-care/ADLs/Cognition? Current Advanced Directive/Advance Care Plan:   no                          Plan for utilizing home health:    no                      Likelihood of readmission:   HIGH    Transition of Care Plan:                    Initial assessment completed with patient, spouse/SO and relative(s). Cognitive status of patient: only aware of  place, person and situation. Prior to this admission pt was independent. Palliative and Hospice have met with pt who initially was doing well, however, is now doing well and is interested in SNF. Face sheet information confirmed:  yes. List of available SNF agencies were provided and reviewed with the patient prior to discharge. Freedom of choice signed: yes, for [address] Currently, the discharge plan is SNF. The patient states that she can obtain her medications from the pharmacy, and take her medications as directed. Patient's current insurance is Medicare. Care Management Interventions  PCP Verified by CM: Yes  Mode of Transport at Discharge: BLS  Transition of Care Consult (CM Consult): SNF  Partner SNF: Yes  Discharge Durable Medical Equipment: No  Physical Therapy Consult: Yes  Occupational Therapy Consult: Yes  Speech Therapy Consult: No  Current Support Network: Lives with Spouse  Freedom of Choice Offered: Yes  Discharge Location  Discharge Placement: Skilled nursing facility        ADRIANNA Cutler [facility] [phone]

## 2019-03-15 NOTE — PROGRESS NOTES
Problem: Falls - Risk of  Goal: *Absence of Falls  Document Edson Fall Risk and appropriate interventions in the flowsheet.   Outcome: Progressing Towards Goal  Fall Risk Interventions:  Mobility Interventions: Bed/chair exit alarm    Mentation Interventions: Bed/chair exit alarm    Medication Interventions: Bed/chair exit alarm    Elimination Interventions: Call light in reach    History of Falls Interventions: Door open when patient unattended, Room close to nurse's station

## 2019-03-15 NOTE — ROUTINE PROCESS
Report called to Yoseph Topete LPN at   [address]     [id number] - ambulance here to  patient and family is at bedside

## 2019-03-15 NOTE — HOSPICE
Bedside visit. Family wishes to pursue SNF then Hospice services. Please contact [facility] [facility] with any questions or concerns at [phone].

## 2019-03-15 NOTE — PROGRESS NOTES
Per [facility] () called [facility] for transport spoke with Ana Singh, patient is placed on Will Call for 3/15/2019. Transport to [address] [place] [address]. Informed [facility] of transportation arrangements.

## 2019-03-15 NOTE — PROGRESS NOTES
Problem: Mobility Impaired (Adult and Pediatric)  Goal: *Acute Goals and Plan of Care (Insert Text)  Physical Therapy Goals  Initiated 3/15/2019 and to be accomplished within 7 day(s)  1. Patient will move from supine to sit and sit to supine , scoot up and down and roll side to side in bed with minimal assistance/contact guard assist.     2.  Patient will transfer from bed to chair and chair to bed with minimal assistance/contact guard assist using the least restrictive device. 3.  Patient will perform sit to stand with minimal assistance/contact guard assist.  4.  Patient will ambulate with minimal assistance/contact guard assist for 10-25 feet with the least restrictive device. Outcome: Progressing Towards Goal  physical Therapy EVALUATION    Patient: Herbie Covarrubias (59 y.o. female)  Date: 3/15/2019  Primary Diagnosis: Congestive heart failure (CHF) ([facility]) [I50.9]  NSTEMI (non-ST elevated myocardial infarction) [facility]) [I21.4]       Precautions:   Fall    ASSESSMENT :  PT orders received and patient cleared by nursing to participate with therapy. Patient is a 80 y.o. female admitted to the hospital due to resipratory distress. Five days prior to hospitalization, pt had a fall. On imaging, pt has pelvic fracture on L side and ortho notes state WBAT (Dr. Lourdes Haile on 3/11/19). Patient consents to PT evaluation and treatment. Pt has had pallaitive care and hospice consults. Family would like pt to go to a facility for therapy at this time with requests for PT evaluation. Pt reports she only wants to rest and states she wants to go home. Pt has some confusion at times but is oriented to person, place, and mostly to situation. Pt's sheets and pad is wet requiring to be changed. Pt performed rolling moderate assistance x2. Scooting total assistance x2. Pt fatigues very quickly with rolling activities. Pt is currently on 6L of O2 and SpO2 decreases to 87% with bed mobility and becomes very SOB.  Pt required >10 minutes to recovery and be above 90%. Pt not appropriate for transfers at this time due to fatigue and decreased activity tolerance. Pt did try to ambulate to the bathroom last night per nursing. Pt ended therapy supine in bed with HOB >30 degrees and alarm donned with all needs met. Answered family's questions. Patient will benefit from skilled intervention to address the above impairments and increase functional independence. Patients rehabilitation potential is considered to be Fair  Factors which may influence rehabilitation potential include:   []         None noted  []         Mental ability/status  [x]         Medical condition  []         Home/family situation and support systems  []         Safety awareness  []         Pain tolerance/management  []         Other:      PLAN :  Recommendations and Planned Interventions:  [x]           Bed Mobility Training             [x]    Neuromuscular Re-Education  [x]           Transfer Training                   []    Orthotic/Prosthetic Training  [x]           Gait Training                          []    Modalities  [x]           Therapeutic Exercises          []    Edema Management/Control  [x]           Therapeutic Activities            [x]    Patient and Family Training/Education  []           Other (comment):    Frequency/Duration: Patient will be followed by physical therapy 1-2 times per day to address goals.   Discharge Recommendations: Murray Almanza  Further Equipment Recommendations for Discharge: N/A     SUBJECTIVE:   Patient stated I just want to rest.    OBJECTIVE DATA SUMMARY:     Past Medical History:   Diagnosis Date    Chronic lung disease     Colon polyps     COPD 8-30-02    DDD (degenerative disc disease), lumbar 10/1/2014    Degeneration of lumbar or lumbosacral intervertebral disc     Depression     Diabetes ([facility] [address] 7-19-05    Diabetes mellitus ([facility] [address] [facility]     Diverticulosis     DM neuropathy, painful ([facility] [address] 10/1/2014    MOORE (Dyspnea on Exertion) 4-19-02    echo: +mild LVH, RM90-00% w/ diastolic dysfxn    Glaucoma     HCAP (healthcare-associated pneumonia) 03/24/2017    Hemorrhoids 6-6-06    Hyperlipidemia 5/17/2011    Hypertension 4-16-02    LBP (low back pain) 4-13-04    Low back pain radiating to both legs 10/1/2014    Lumbago     Lumbar disc herniation 10/1/2014    Lumbar facet arthropathy 10/1/2014    Lumbosacral radiculopathy at L5 10/1/2014    Lumbosacral radiculopathy at S1 10/1/2014    Microscopic hematuria     OA (osteoarthritis)     Overactive bladder 5/17/2011    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    RLS (restless legs syndrome) 1/17/2013    Sciatica     Shortness of breath     Spinal stenosis, lumbar region, without neurogenic claudication     Spondylolisthesis of lumbar region 10/1/2014    Spondylolisthesis, grade 1 10/1/2014    Stage 4 very severe COPD by GOLD classification ([address] 2/25/2019    Stress urinary incontinence     Syncope     -MRI brain    Urinary tract infection, site not specified      Past Surgical History:   Procedure Laterality Date    HX APPENDECTOMY      HX CHOLECYSTECTOMY      HX HYSTERECTOMY      (+)DUB    HX POLYPECTOMY      [place] COLONOSCOPY FLX DX W/COLLJ SPEC WHEN PFRMD  6-16-06    normal, Dr Watt Grad FLX DX W/COLLJ [address] PFRMD      (+)polyp= tubular adenoma     Barriers to Learning/Limitations: yes;  altered mental status (i.e.Sedation, Confusion)  Compensate with: Visual Cues, Verbal Cues and Tactile Cues  Prior Level of Function/Home Situation: Independent with mobility including gait using a cane at times.    Home Situation  Home Environment: Private residence  # Steps to Enter: 3  Rails to Enter: Yes  Hand Rails : Bilateral  One/Two Story Residence: One story  Living Alone: No  Support Systems: Spouse/Significant Other/Partner  Patient Expects to be Discharged to[de-identified] Private residence  Current DME Used/Available at Home: Oxygen, portable, Walker, rolling, Cane, straight  Tub or Shower Type: (Pt sponge bathing at home.)  Critical Behavior:  Neurologic State: Alert  Psychosocial  Patient Behaviors: Calm; Cooperative  Family  Behaviors: Calm;Supportive  Strength:    Strength: Generally decreased, functional(B LE)  Tone & Sensation:   Tone: Normal(B LE)  Sensation: Intact(B LE)   Range Of Motion:  AROM: Generally decreased, functional(B LE)  Functional Mobility:  Bed Mobility:  Rolling: Moderate assistance;Assist x2   Scooting: Total assistance;Assist x2     Therapeutic Exercises:   Reviewed and performed ankle pumps and heel slides. Pain:  Pre: 5/10 L leg  Post: 5/10  L lef  Activity Tolerance:   poor  Please refer to the flowsheet for vital signs taken during this treatment. After treatment:   [] Patient left in no apparent distress sitting up in chair  [] Patient left sitting on EOB  [x] Patient left in no apparent distress in bed  [] Patient declined to be OOB at this time due to    [x] Call bell left within reach  [x] Nursing notified(Xiomy)  [x] Caregiver present  [x] Bed alarm activated  [x] Personal items in reach     COMMUNICATION/EDUCATION:   [x]         Fall prevention education was provided and the patient/caregiver indicated understanding. [x]         Patient/family have participated as able in goal setting and plan of care. [x]         Patient/family agree to work toward stated goals and plan of care. []         Patient understands intent and goals of therapy, but is neutral about his/her participation. []         Patient is unable to participate in goal setting and plan of care. [x]         Role of physical therapy. []         Out of bed with nursing assistance 3-5 times a day.     Thank you for this referral.  Alba Messina, PT, DPT   Time Calculation: 38 mins        Eval Complexity: History: HIGH Complexity :3+ comorbidities / personal factors will impact the outcome/ POC Exam:HIGH Complexity : 4+ Standardized tests and measures addressing body structure, function, activity limitation and / or participation in recreation  Presentation: HIGH Complexity : Unstable and unpredictable characteristics  Clinical Decision Making:High Complexity   Overall Complexity:HIGH

## 2019-03-15 NOTE — DISCHARGE INSTRUCTIONS
Patient armband removed and shredded  MyChart Activation    Thank you for requesting access to [employer]. Please follow the instructions below to securely access and download your online medical record. [employer] allows you to send messages to your doctor, view your test results, renew your prescriptions, schedule appointments, and more. How Do I Sign Up? 1. In your internet browser, go to [email or web address]  2. Click on the First Time User? Click Here link in the Sign In box. You will be redirect to the New Member Sign Up page. 3. Enter your [employer] Access Code exactly as it appears below. You will not need to use this code after youve completed the sign-up process. If you do not sign up before the expiration date, you must request a new code. [employer] Access Code: [email or web address]  Expires: 2019 11:32 AM (This is the date your [employer] access code will )    4. Enter the last four digits of your Social Security Number (xxxx) and Date of Birth (mm/dd/yyyy) as indicated and click Submit. You will be taken to the next sign-up page. 5. Create a [facility] ID. This will be your [employer] login ID and cannot be changed, so think of one that is secure and easy to remember. 6. Create a [employer] password. You can change your password at any time. 7. Enter your Password Reset Question and Answer. This can be used at a later time if you forget your password. 8. Enter your e-mail address. You will receive e-mail notification when new information is available in [address] 9. Click Sign Up. You can now view and download portions of your medical record. 10. Click the Download Summary menu link to download a portable copy of your medical information. Additional Information    If you have questions, please visit the Frequently Asked Questions section of the [employer] website at [email or web address] Remember, [employer] is NOT to be used for urgent needs.  For medical emergencies, dial 911.      As part of the discharge instructions, medications already given today were discussed with the patient. The next dose due of all ordered meds was highlighted as part of the medication discharge instructions. Discussed with the patient the importance of taking medications as directed, as well as the side effects and adverse reactions to medications ordered. As part of the discharge instructions, medications already given today were discussed with the patient. The next dose due of all ordered meds was highlighted as part of the medication discharge instructions. Discussed with the patient the importance of taking medications as directed, as well as the side effects and adverse reactions to medications ordered. DISCHARGE SUMMARY from Nurse    PATIENT INSTRUCTIONS:    After general anesthesia or intravenous sedation, for 24 hours or while taking prescription Narcotics:  · Limit your activities  · Do not drive and operate hazardous machinery  · Do not make important personal or business decisions  · Do  not drink alcoholic beverages  · If you have not urinated within 8 hours after discharge, please contact your surgeon on call. Report the following to your surgeon:  · Excessive pain, swelling, redness or odor of or around the surgical area  · Temperature over 100.5  · Nausea and vomiting lasting longer than 4 hours or if unable to take medications  · Any signs of decreased circulation or nerve impairment to extremity: change in color, persistent  numbness, tingling, coldness or increase pain  · Any questions    What to do at Home:  Recommended activity: Activity as tolerated,     If you experience any of the following symptoms shortness of breath, chest pain, increased swelling in feet, legs, or abdomen. , please follow up with ED or Primary MD.    *  Please give a list of your current medications to your Primary Care Provider.     *  Please update this list whenever your medications are discontinued, doses are      changed, or new medications (including over-the-counter products) are added. *  Please carry medication information at all times in case of emergency situations. These are general instructions for a healthy lifestyle:    No smoking/ No tobacco products/ Avoid exposure to second hand smoke  Surgeon General's Warning:  Quitting smoking now greatly reduces serious risk to your health. Obesity, smoking, and sedentary lifestyle greatly increases your risk for illness    A healthy diet, regular physical exercise & weight monitoring are important for maintaining a healthy lifestyle    You may be retaining fluid if you have a history of heart failure or if you experience any of the following symptoms:  Weight gain of 3 pounds or more overnight or 5 pounds in a week, increased swelling in our hands or feet or shortness of breath while lying flat in bed. Please call your doctor as soon as you notice any of these symptoms; do not wait until your next office visit. Recognize signs and symptoms of STROKE:    F-face looks uneven    A-arms unable to move or move unevenly    S-speech slurred or non-existent    T-time-call 911 as soon as signs and symptoms begin-DO NOT go       Back to bed or wait to see if you get better-TIME IS BRAIN. Warning Signs of HEART ATTACK     Call 911 if you have these symptoms:   Chest discomfort. Most heart attacks involve discomfort in the center of the chest that lasts more than a few minutes, or that goes away and comes back. It can feel like uncomfortable pressure, squeezing, fullness, or pain.  Discomfort in other areas of the upper body. Symptoms can include pain or discomfort in one or both arms, the back, neck, jaw, or stomach.  Shortness of breath with or without chest discomfort.  Other signs may include breaking out in a cold sweat, nausea, or lightheadedness. Don't wait more than five minutes to call 911 - MINUTES MATTER! Fast action can save your life. Calling 911 is almost always the fastest way to get lifesaving treatment. Emergency Medical Services staff can begin treatment when they arrive -- up to an hour sooner than if someone gets to the hospital by car. The discharge information has been reviewed with the patient. The patient verbalized understanding. Discharge medications reviewed with the patient and appropriate educational materials and side effects teaching were provided.   ___________________________________________________________________________________________________________________________________

## 2019-03-15 NOTE — DISCHARGE SUMMARY
Physician Discharge Summary       Patient: Cami Funes MRN: [id number]  SSN: [id number]    YOB: 1933  Age: 80 y.o. Sex: female    PCP: Allyson Hinkle MD    Allergies: Levaquin [levofloxacin]    Admit date: 3/10/2019  Admitting Provider: Derek Watkins MD    Discharge date: 3/15/2019  Discharging Provider: Eleanor Ramirez MD    * Admission Diagnoses: Congestive heart failure (CHF) ([facility] [I50.9]  NSTEMI (non-ST elevated myocardial infarction) ([facility] [I21.4]    * Discharge Diagnoses:      1. Acute on chronic hypoxic and hypercapnic resp failure due to # 2, stable. S/p extubation. 2. Acute COPD exb, sever end stage COPD  3. Elevated troponin due to demand ischemia sec to # 1 and 2  4. ARF on stage 3 CKD, stable  5. Chronic diastolic HF, compensated. 6. Lactic acidosis due to # 1  7. Pelvic fracture s/p fall at home  8. HTN  9. DM 2  10. DNR      Hospital Problems as of 3/15/2019 Date Reviewed: 1/7/2019          Codes Class Noted - Resolved POA    Advanced care planning/counseling discussion ICD-10-CM: Z71.89  ICD-9-CM: V65.49  3/14/2019 - Present Unknown        Debility ICD-10-CM: R53.81  ICD-9-CM: 799.3  3/14/2019 - Present Unknown        NSTEMI (non-ST elevated myocardial infarction) ([facility] ICD-10-CM: I21.4  ICD-9-CM: 410.70  3/11/2019 - Present Unknown        Pelvic ring fracture, closed, initial encounter ([facility] ICD-10-CM: S32.810A  ICD-9-CM: 808.8  3/11/2019 - Present Unknown        Congestive heart failure (CHF) ([facility] ICD-10-CM: I50.9  ICD-9-CM: 428.0  3/10/2019 - Present Unknown              * Hospital Course: The patient was admitted to the hospital on 3/10/19 with a fall. She fell last ON 3/9/19 while going to the bathroom. Since then her breathing was getting worse and subsequently the family called the ambulance as patient was having a hard time in breathing. On their arrival patient's oxygen saturation was 60%, She was put on CPAP, given nebulizer and solumedrol. She was not able to move her left leg on her own, x-rays pelvis showed left inferior and superior rami fractures. With regards to respiratory failure, patient has known severe COPD. Initially she was managed with BiPAP and steroid but became lethargic and was found out to have CO2 retention so she was intubated and transferred to ICU. She was subsequently extubated with a though to make her comfort care. However, she did well so family decided to send her to SNF and monitor. She will be continued on 4-5 lpm oxygen. I spoke to dr. Inder Cortez on the day of discharge and she is fine with the plan. Patient will remain DNR. Hospice and palliative care saw this patient. With regards to pelvic fracture, patient was seen by ortho and did not recommend surgery. PT /OT for WBAT and pain management. With regards to elevated troponin, it was though sec to demand ischemia and will be continued on ASA and statin. Patient did not have any chest pain. ECHO was done and showed preserved EF with diastolic chf. She can be continued on Norvasc and Lasix. On the day of discharge, patient knows her home address, her , recognize family members but still somewhat confused. She and her family want her to go to SNF and monitor. If she fails SNF course, they will take her home with hospice. Cough present. SOB at its baseline. No chest or abdominal pain. No nausea or vomiting. * Procedures: none  * No surgery found *      Consults: Pulmonary/Intensive care and Orthopedic Surgery    Significant Diagnostic Studies: CXR, pelvic Xray    Discharge Exam:  Visit Vitals  BP 98/53 (BP 1 Location: Right arm, BP Patient Position: Supine)   Pulse 86   Temp 97.5 °F (36.4 °C)   Resp 17   Ht 5' 5\" (1.651 m)   Wt 71 kg (156 lb 8.4 oz)   SpO2 98%   Breastfeeding?  No   BMI 26.05 kg/m²     Neck: supple, symmetrical, trachea midline, no JVD  Lungs: clear to auscultation bilaterally, wheezes R anterior, R base, L base  Heart: S1, S2 normal  Abdomen: soft, non-tender. Bowel sounds normal. No masses,  no organomegaly  Extremities: no edema  Neurologic: moves both UEs well. * Discharge Condition: improved  * Disposition: [place] ([facility])    Discharge Medications:    Current Discharge Medication List      START taking these medications    Details   famotidine (PEPCID) 20 mg tablet Take 1 Tab by mouth daily. Qty: 15 Tab, Refills: 0      furosemide (LASIX) 20 mg tablet Take 1 Tab by mouth every fourty-eight (48) hours. Qty: 15 Tab, Refills: 0      atorvastatin (LIPITOR) 40 mg tablet Take 1 Tab by mouth nightly. Qty: 30 Tab, Refills: 0         CONTINUE these medications which have CHANGED    Details   albuterol-ipratropium (DUO-NEB) 2.5 mg-0.5 mg/3 ml nebu 3 mL by Nebulization route every four (4) hours. Qty: 30 Nebule, Refills: 0      predniSONE (DELTASONE) 10 mg tablet 40 mg po dailyx 3 days,30 mg po daily x 3 days, then 20 mg po daily for 3 daily then 10 mg po daily x 3 days  Qty: 30 Tab, Refills: 0      traMADol (ULTRAM) 50 mg tablet Take 1 Tab by mouth every eight (8) hours as needed for Pain for up to 5 days. Max Daily Amount: 150 mg.  Qty: 90 Tab, Refills: 2    Associated Diagnoses: Type 2 diabetes mellitus with diabetic neuropathy, without long-term current use of insulin ([facility] Closed fracture of multiple pubic rami, left, initial encounter ([facility]         CONTINUE these medications which have NOT CHANGED    Details   rOPINIRole (REQUIP) 1 mg tablet TAKE ONE-HALF TO ONE TABLET BY MOUTH ONCE NIGHTLY AS NEEDED FOR  RESTLESS  LEGS  FOR  UP  TO  90  DAYS  Qty: 90 Tab, Refills: 5    Associated Diagnoses: Sciatica, unspecified laterality; Low back pain radiating to both legs; Polyneuropathy; RLS (restless legs syndrome)      PARoxetine (PAXIL) 30 mg tablet TAKE 1 TABLET BY MOUTH  DAILY  Qty: 90 Tab, Refills: 1      mirtazapine (REMERON) 15 mg tablet Take 1 Tab by mouth nightly.   Qty: 90 Tab, Refills: 0 amLODIPine (NORVASC) 5 mg tablet Take 1 Tab by mouth daily. Qty: 90 Tab, Refills: 3      tamsulosin (FLOMAX) 0.4 mg capsule Take 1 Cap by mouth daily. Qty: 90 Cap, Refills: 3      gabapentin (NEURONTIN) 300 mg capsule 1 capsule by mouth morning, 1 capsule at midday and 2 capsules at bedtime  Qty: 120 Cap, Refills: 5      simvastatin (ZOCOR) 20 mg tablet Take 1 Tab by mouth nightly. Qty: 90 Tab, Refills: 3    Associated Diagnoses: Hyperlipidemia, unspecified hyperlipidemia type      aspirin delayed-release 81 mg tablet Take 1 Tab by mouth daily. Qty: 100 Tab, Refills: 1      inhalational spacing device (AEROCHAMBER MV) 1 Each by Does Not Apply route as needed. Qty: 1 Device, Refills: 2      budesonide-formoterol (SYMBICORT) 80-4.5 mcg/actuation HFAA Take 2 Puffs by inhalation two (2) times a day. Qty: 1 Inhaler, Refills: 0      OXYGEN-AIR DELIVERY SYSTEMS 3 L by Nasal route continuous. Nebulizer & Compressor (PORTABLE NEBULIZER SYSTEM) machine 1 Each by Does Not Apply route every six (6) hours as needed. Qty: 1 Each, Refills: 0               * Follow-up Care/Patient Instructions: Activity: PT/OT Eval and Treat  Diet: Cardiac Diet  Wound Care: None needed    Follow-up Information     Follow up With Specialties Details Why Contact Info    Latoya Woodard MD Internal Medicine   [address]   [phone] [phone]        Follow-up Appointments   Procedures    FOLLOW UP VISIT Appointment in: Other ([facility]) 1. With dr. Sonda Bosworth in 10 days 2. With dr. Matthias Funk in 2 weeks 3. With dr. Belén Sanders in 2 weeks for pelvic fracture     1. With dr. Sonda Bosworth in 10 days  2. With dr. Matthias Funk in 2 weeks  3.  With dr. Belén Sanders in 2 weeks for pelvic fracture     Standing Status:   Standing     Number of Occurrences:   1     Order Specific Question: Appointment in     Answer:    Other (Specify)         Signed:  Khai Vanegas MD  3/15/2019  2:28 PM

## 2019-03-16 ENCOUNTER — APPOINTMENT (OUTPATIENT)
Dept: GENERAL RADIOLOGY | Age: 84
End: 2019-03-16
Attending: EMERGENCY MEDICINE
Payer: MEDICARE

## 2019-03-16 VITALS
OXYGEN SATURATION: 89 % | HEART RATE: 90 BPM | DIASTOLIC BLOOD PRESSURE: 63 MMHG | RESPIRATION RATE: 19 BRPM | SYSTOLIC BLOOD PRESSURE: 151 MMHG | TEMPERATURE: 97.9 F

## 2019-03-16 LAB
ALBUMIN SERPL-MCNC: 3.3 G/DL (ref 3.4–5)
ALBUMIN/GLOB SERPL: 1 {RATIO} (ref 0.8–1.7)
ALP SERPL-CCNC: 155 U/L (ref 45–117)
ALT SERPL-CCNC: 30 U/L (ref 13–56)
ANION GAP SERPL CALC-SCNC: 7 MMOL/L (ref 3–18)
AST SERPL-CCNC: 19 U/L (ref 15–37)
BASOPHILS # BLD: 0 K/UL (ref 0–0.1)
BASOPHILS NFR BLD: 0 % (ref 0–2)
BILIRUB SERPL-MCNC: 0.5 MG/DL (ref 0.2–1)
BNP SERPL-MCNC: 4208 PG/ML (ref 0–1800)
BUN SERPL-MCNC: 30 MG/DL (ref 7–18)
BUN/CREAT SERPL: 27 (ref 12–20)
CALCIUM SERPL-MCNC: 8.9 MG/DL (ref 8.5–10.1)
CHLORIDE SERPL-SCNC: 95 MMOL/L (ref 100–108)
CK MB CFR SERPL CALC: 4.1 % (ref 0–4)
CK MB SERPL-MCNC: 4.7 NG/ML (ref 5–25)
CK SERPL-CCNC: 116 U/L (ref 26–192)
CO2 SERPL-SCNC: 34 MMOL/L (ref 21–32)
CREAT SERPL-MCNC: 1.12 MG/DL (ref 0.6–1.3)
DIFFERENTIAL METHOD BLD: ABNORMAL
EOSINOPHIL # BLD: 0 K/UL (ref 0–0.4)
EOSINOPHIL NFR BLD: 0 % (ref 0–5)
ERYTHROCYTE [DISTWIDTH] IN BLOOD BY AUTOMATED COUNT: 15.4 % (ref 11.6–14.5)
GLOBULIN SER CALC-MCNC: 3.2 G/DL (ref 2–4)
GLUCOSE SERPL-MCNC: 316 MG/DL (ref 74–99)
HCT VFR BLD AUTO: 43.8 % (ref 35–45)
HGB BLD-MCNC: 13.9 G/DL (ref 12–16)
LYMPHOCYTES # BLD: 0.5 K/UL (ref 0.9–3.6)
LYMPHOCYTES NFR BLD: 4 % (ref 21–52)
MAGNESIUM SERPL-MCNC: 2.2 MG/DL (ref 1.6–2.6)
MCH RBC QN AUTO: 28.2 PG (ref 24–34)
MCHC RBC AUTO-ENTMCNC: 31.7 G/DL (ref 31–37)
MCV RBC AUTO: 88.8 FL (ref 74–97)
MONOCYTES # BLD: 0 K/UL (ref 0.05–1.2)
MONOCYTES NFR BLD: 0 % (ref 3–10)
NEUTS SEG # BLD: 10.8 K/UL (ref 1.8–8)
NEUTS SEG NFR BLD: 96 % (ref 40–73)
PLATELET # BLD AUTO: 251 K/UL (ref 135–420)
PMV BLD AUTO: 10.8 FL (ref 9.2–11.8)
POTASSIUM SERPL-SCNC: 5 MMOL/L (ref 3.5–5.5)
PROT SERPL-MCNC: 6.5 G/DL (ref 6.4–8.2)
RBC # BLD AUTO: 4.93 M/UL (ref 4.2–5.3)
SODIUM SERPL-SCNC: 136 MMOL/L (ref 136–145)
TROPONIN I SERPL-MCNC: 0.1 NG/ML (ref 0–0.04)
WBC # BLD AUTO: 11.3 K/UL (ref 4.6–13.2)

## 2019-03-16 PROCEDURE — 93005 ELECTROCARDIOGRAM TRACING: CPT

## 2019-03-16 PROCEDURE — 74011000250 HC RX REV CODE- 250: Performed by: EMERGENCY MEDICINE

## 2019-03-16 PROCEDURE — 74011636637 HC RX REV CODE- 636/637: Performed by: EMERGENCY MEDICINE

## 2019-03-16 PROCEDURE — 94640 AIRWAY INHALATION TREATMENT: CPT

## 2019-03-16 PROCEDURE — 96375 TX/PRO/DX INJ NEW DRUG ADDON: CPT

## 2019-03-16 PROCEDURE — 74011250636 HC RX REV CODE- 250/636: Performed by: EMERGENCY MEDICINE

## 2019-03-16 PROCEDURE — 71045 X-RAY EXAM CHEST 1 VIEW: CPT

## 2019-03-16 PROCEDURE — 96374 THER/PROPH/DIAG INJ IV PUSH: CPT

## 2019-03-16 PROCEDURE — A9270 NON-COVERED ITEM OR SERVICE: HCPCS | Performed by: EMERGENCY MEDICINE

## 2019-03-16 RX ORDER — ALBUTEROL SULFATE 2.5 MG/.5ML
5 SOLUTION RESPIRATORY (INHALATION)
Status: COMPLETED | OUTPATIENT
Start: 2019-03-16 | End: 2019-03-16

## 2019-03-16 RX ORDER — IPRATROPIUM BROMIDE 0.5 MG/2.5ML
0.5 SOLUTION RESPIRATORY (INHALATION)
Status: COMPLETED | OUTPATIENT
Start: 2019-03-16 | End: 2019-03-16

## 2019-03-16 RX ORDER — DIPHENHYDRAMINE HYDROCHLORIDE 50 MG/ML
12.5 INJECTION, SOLUTION INTRAMUSCULAR; INTRAVENOUS ONCE
Status: COMPLETED | OUTPATIENT
Start: 2019-03-16 | End: 2019-03-16

## 2019-03-16 RX ORDER — LORAZEPAM 2 MG/ML
1 INJECTION INTRAMUSCULAR
Status: COMPLETED | OUTPATIENT
Start: 2019-03-16 | End: 2019-03-16

## 2019-03-16 RX ADMIN — LORAZEPAM 1 MG: 2 INJECTION INTRAMUSCULAR; INTRAVENOUS at 09:49

## 2019-03-16 RX ADMIN — ALBUTEROL SULFATE 5 MG: 2.5 SOLUTION RESPIRATORY (INHALATION) at 00:45

## 2019-03-16 RX ADMIN — PREDNISONE 50 MG: 20 TABLET ORAL at 03:19

## 2019-03-16 RX ADMIN — IPRATROPIUM BROMIDE 0.5 MG: 0.5 SOLUTION RESPIRATORY (INHALATION) at 09:49

## 2019-03-16 RX ADMIN — ALBUTEROL SULFATE 5 MG: 2.5 SOLUTION RESPIRATORY (INHALATION) at 09:48

## 2019-03-16 RX ADMIN — DIPHENHYDRAMINE HYDROCHLORIDE 12.5 MG: 50 INJECTION INTRAMUSCULAR; INTRAVENOUS at 03:20

## 2019-03-16 RX ADMIN — IPRATROPIUM BROMIDE 0.5 MG: 0.5 SOLUTION RESPIRATORY (INHALATION) at 03:20

## 2019-03-16 RX ADMIN — IPRATROPIUM BROMIDE 0.5 MG: 0.5 SOLUTION RESPIRATORY (INHALATION) at 00:45

## 2019-03-16 RX ADMIN — ALBUTEROL SULFATE 5 MG: 2.5 SOLUTION RESPIRATORY (INHALATION) at 03:20

## 2019-03-16 NOTE — ED NOTES
7 AM patient turned over to me Dr. Ezra Thorpe. 80-year-old with end-stage COPD who presents with shortness of breath. She recently left the hospital was on 4-5 L of home oxygen and was noted to be hypoxic at [facility]. There is question if she was actually on the oxygen at that time. She presently has no complaints she is resting comfortably on 4 L with 93% oxygen saturations. Family is bedside and she is currently pending hospice evaluation. Seen by hospice. Will not meet inpt requirement. Dw/ family; they are ok with d/c to [facility]. General; AOX3. Pulmonary; CTA-B. Cardiac: RRR no MRG; Abd S/NT/ND.  Plan:  D/c  Irene Hernández MD

## 2019-03-16 NOTE — ED NOTES
Assisted pt to bedside commode, pt was unsuccessful at voiding in commode, removed pt pants and brief pt came in with. PT has loud expiratory wheezing, becomes SOB very easily and is very weak. Pt family called per pt request. Filemon Rubio, left a voicemail to return my call, then called Assunta Spurling, she answered and I informed her what was going on. Pt was at [address] [facility].

## 2019-03-16 NOTE — DISCHARGE INSTRUCTIONS

## 2019-03-16 NOTE — ED TRIAGE NOTES
Pt brought in via EMS from nursing facility. Pt was attempting to complete admission process when facility noted pt O2 on RA was 86% so they called 911. Pt is in NAD at this time, sitting on stretcher. Pt provided a warm blanket, breathing txt from EMS finishing up.

## 2019-03-17 LAB
ATRIAL RATE: 100 BPM
BACTERIA SPEC CULT: NORMAL
BACTERIA SPEC CULT: NORMAL
CALCULATED P AXIS, ECG09: 57 DEGREES
CALCULATED R AXIS, ECG10: -57 DEGREES
CALCULATED T AXIS, ECG11: -9 DEGREES
DIAGNOSIS, 93000: NORMAL
P-R INTERVAL, ECG05: 140 MS
Q-T INTERVAL, ECG07: 368 MS
QRS DURATION, ECG06: 116 MS
QTC CALCULATION (BEZET), ECG08: 474 MS
SERVICE CMNT-IMP: NORMAL
SERVICE CMNT-IMP: NORMAL
VENTRICULAR RATE, ECG03: 100 BPM

## 2019-03-18 ENCOUNTER — PATIENT OUTREACH (OUTPATIENT)
Dept: INTERNAL MEDICINE CLINIC | Age: 84
End: 2019-03-18

## 2019-03-18 NOTE — Clinical Note
[facility] 3/10-3/15Transferred to [facility] Please monitor

## 2019-03-18 NOTE — PROGRESS NOTES
Transition of Care Coordination/Hospital to Post Acute Facility:     Date/Time:  3/18/2019 2:59 PM    Patient was admitted to [facility] on 3/10/19 for treatment of acute on chronic hypoxic and hypercapnic respiratory failure. Patient was discharged 3/15/19 to [address] [place] [address] for continuation of care. Inpatient RRAT score: 54    Top Challenges reviewed    None     Method of communication with care team :chart routing     Nurse Navigator(NN) spoke with Sharon Gomez LPN to provide introduction to self and explanation of the Nurse Navigator Role. Verified name and  as patient identifiers. Hussein Barry LPN verbalized they were in the middle of report. Writer will attempt to contact at a later time. Discussed and reviewed  N/A    ACP:   Does the patient have a current ACP (including DDNR):  yes  Does the post acute facility have a copy of the patients ACP:  unknown    Medication(s):   New Medications at Discharge: pepcid, lasix, lipitor  Changed Medications at Discharge: duo-neb, prednisone, tramadol  Discontinued Medications at Discharge: none     PCP/Specialist follow up:   Future Appointments   Date Time Provider Brandon Morgan   3/22/2019 11:45 AM Vicky Parker MD [facility] SCHED   2019 10:00 AM Everardo Ganser, MD [address]   2019 11:15 AM Mikey Zamorano MD [address]   2019  9:45 AM Christofer Laguerre MD [address] [place] [address]        Chart forwarded to [facility] Team NN for continued monitoring.

## 2019-03-19 ENCOUNTER — DOCUMENTATION ONLY (OUTPATIENT)
Dept: HOME HEALTH SERVICES | Facility: HOME HEALTH | Age: 84
End: 2019-03-19

## 2019-03-20 ENCOUNTER — PATIENT OUTREACH (OUTPATIENT)
Dept: CASE MANAGEMENT | Age: 84
End: 2019-03-20

## 2019-03-20 NOTE — PROGRESS NOTES
Community Care Team Documentation for Patient in [place]     Patient discharged from [facility] to [facility], on 3/15/2019. Hospital Discharge diagnosis:  1. Acute on chronic hypoxic and hypercapnic resp failure due to # 2, stable. S/p extubation.    2. Acute COPD exb, sever end stage COPD  3. Elevated troponin due to demand ischemia sec to # 1 and 2  4. ARF on stage 3 CKD, stable  5. Chronic diastolic HF, compensated. 6. Lactic acidosis due to # 1  7. Pelvic fracture s/p fall at home  8. HTN  9. DM 2  10. DNR      SNF Attending Provider:  Daphne France    Anticipated discharge date from SNF:  TBD      PCP : Caleb Knutson MD    Nurse Navigator: Magi Gilmore team rounds completed, updates provided by facility. Full Code  CHF: 154.4lbs, stable  PT/OT/SP. Dc home with family. High Risk            49       Total Score        2 . Living with Significant Other. Assisted Living. LTAC. SNF. or   Rehab    4 IP Visits Last 12 Months (1-3=4, 4=9, >4=11)    43 Charlson Comorbidity Score (Age + Comorbid Conditions)        Criteria that do not apply:    Has Seen PCP in Last 6 Months (Yes=3, No=0)    Patient Length of Stay (>5 days = 3)    Pt.  Coverage (Medicare=5 , Medicaid, or Self-Pay=4)      Active Ambulatory Problems     Diagnosis Date Noted    Hyperlipidemia 05/17/2011    Overactive bladder 05/17/2011    Sciatica     Degeneration of lumbar or lumbosacral intervertebral disc     Encounter for long-term (current) use of other medications     Depression 09/20/2011    Unspecified vitamin D deficiency 04/04/2012    Glucose intolerance (pre-diabetes) 05/24/2012    RLS (restless legs syndrome) 01/17/2013    Aortic dissection, abdominal (HCC) 02/05/2013    Ataxic gait 07/22/2013    Chronic neck pain 07/22/2013    Orthostatic hypotension 07/22/2013    Paresthesia 07/22/2013    Repeated falls 08/07/2013    Chronic pain syndrome 09/16/2013    Lumbosacral spondylosis without myelopathy 09/16/2013    Cervical stenosis of spinal canal 09/16/2013    Spinal stenosis in cervical region 10/11/2013    Polyneuropathy 10/11/2013    Lumbar stenosis 10/11/2013    High risk medication use 10/11/2013    Ulnar neuropathy at elbow 10/11/2013    Thoracic or lumbosacral neuritis or radiculitis, unspecified 07/14/2014    Low back pain radiating to both legs 10/01/2014    DDD (degenerative disc disease), lumbar 10/01/2014    Spondylolisthesis of lumbar region 10/01/2014    Spondylolisthesis, grade 1 10/01/2014    Lumbar facet arthropathy 10/01/2014    Lumbar disc herniation 10/01/2014    Lumbosacral radiculopathy at L5 10/01/2014    Lumbosacral radiculopathy at S1 10/01/2014    DM neuropathy, painful ([address] 10/01/2014    Acute exacerbation of chronic obstructive pulmonary disease (COPD) ([address] 08/11/2015    Carotid artery stenosis 08/18/2015    Atherosclerosis of native arteries of the extremities with intermittent claudication 08/18/2015    MOORE (dyspnea on exertion) 09/09/2015    SOB (shortness of breath) 09/09/2015    Murmur, cardiac 09/09/2015    Hyperlipidemia LDL goal <100 04/12/2016    Primary osteoarthritis involving multiple joints 04/12/2016    Prediabetes 04/12/2016    Restless leg syndrome 04/12/2016    Essential hypertension with goal blood pressure less than 140/90 08/15/2016    Panlobular emphysema ([address] 12/13/2016    Impaired fasting glucose 12/13/2016    HCAP (healthcare-associated pneumonia) 02/07/2017    Abnormal CT scan, chest 02/07/2017    Hypercholesterolemia 04/01/2008    Hypokalemia 04/21/2017    Dizziness 04/21/2017    CHI (closed head injury) 04/21/2017    Elevated troponin 04/21/2017    Type 2 diabetes mellitus with diabetic neuropathy, without long-term current use of insulin ([address] 09/13/2017    Primary insomnia 12/04/2017    Major depression, chronic 12/06/2017    Pneumonia 02/07/2018    CAP (community acquired pneumonia) 02/07/2018    COPD exacerbation ([address] 07/13/2018    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/2018    Type 2 diabetes with nephropathy ([address] 10/22/2018    Chronic respiratory failure with hypoxia ([address] 01/01/2019    Stage 4 very severe COPD by GOLD classification ([facility] 02/25/2019    Congestive heart failure (CHF) ([address] 03/10/2019    NSTEMI (non-ST elevated myocardial infarction) ([address] 03/11/2019    Pelvic ring fracture, closed, initial encounter ([facility] 03/11/2019    Advanced care planning/counseling discussion 03/14/2019    Debility 03/14/2019     Resolved Ambulatory Problems     Diagnosis Date Noted    No Resolved Ambulatory Problems     Past Medical History:   Diagnosis Date    Chronic lung disease     Colon polyps     COPD 8-30-02    DDD (degenerative disc disease), lumbar 10/1/2014    Degeneration of lumbar or lumbosacral intervertebral disc     Depression     Diabetes ([facility] 7-19-05    Diabetes mellitus ([address]     Diverticulosis     DM neuropathy, painful ([address] 10/1/2014    MOORE (Dyspnea on Exertion) 4-19-02    Glaucoma     HCAP (healthcare-associated pneumonia) 03/24/2017    Hemorrhoids 6-6-06    Hyperlipidemia 5/17/2011    Hypertension 4-16-02    LBP (low back pain) 4-13-04    Low back pain radiating to both legs 10/1/2014    Lumbago     Lumbar disc herniation 10/1/2014    Lumbar facet arthropathy 10/1/2014    Lumbosacral radiculopathy at L5 10/1/2014    Lumbosacral radiculopathy at S1 10/1/2014    Microscopic hematuria     OA (osteoarthritis)     Overactive bladder 5/17/2011    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    RLS (restless legs syndrome) 1/17/2013    Sciatica     Shortness of breath     Spinal stenosis, lumbar region, without neurogenic claudication     Spondylolisthesis of lumbar region 10/1/2014    Spondylolisthesis, grade 1 10/1/2014    Stage 4 very severe COPD by GOLD classification ([address] 2/25/2019    Stress urinary incontinence     Syncope     Urinary tract infection, site not specified

## 2019-03-27 ENCOUNTER — PATIENT OUTREACH (OUTPATIENT)
Dept: CASE MANAGEMENT | Age: 84
End: 2019-03-27

## 2019-04-03 ENCOUNTER — PATIENT OUTREACH (OUTPATIENT)
Dept: CASE MANAGEMENT | Age: 84
End: 2019-04-03

## 2019-04-03 NOTE — PROGRESS NOTES
Community Care Team Documentation for Patient in [facility]     [facility] Hospital Discharge diagnosis:  1. Acute on chronic hypoxic and hypercapnic resp failure due to # 2, stable. S/p extubation.    2. Acute COPD exb, sever end stage COPD  3. Elevated troponin due to demand ischemia sec to # 1 and 2  4. ARF on stage 3 CKD, stable  5. Chronic diastolic HF, compensated. 6. Lactic acidosis due to # 1  7. Pelvic fracture s/p fall at home  8. HTN  9. DM 2  10. DNR      SNF Attending Provider:  Kenan Ledbetter    Anticipated discharge date from SNF:  TBD      PCP : Mayank Gomez MD    Nurse Navigator: Allyn Severs, [place] team rounds completed, updates provided by facility. DNR  CHF: 150.4lbs,    PT/OT/SP: progressing well with therapy. Dc home with family. [facility]    High Risk            49       Total Score        2 . Living with Significant Other. Assisted Living. LTAC. SNF. or   Rehab    4 IP Visits Last 12 Months (1-3=4, 4=9, >4=11)    43 Charlson Comorbidity Score (Age + Comorbid Conditions)        Criteria that do not apply:    Has Seen PCP in Last 6 Months (Yes=3, No=0)    Patient Length of Stay (>5 days = 3)    Pt.  Coverage (Medicare=5 , Medicaid, or Self-Pay=4)      Active Ambulatory Problems     Diagnosis Date Noted    Hyperlipidemia 05/17/2011    Overactive bladder 05/17/2011    Sciatica     Degeneration of lumbar or lumbosacral intervertebral disc     Encounter for long-term (current) use of other medications     Depression 09/20/2011    Unspecified vitamin D deficiency 04/04/2012    Glucose intolerance (pre-diabetes) 05/24/2012    RLS (restless legs syndrome) 01/17/2013    Aortic dissection, abdominal (HCC) 02/05/2013    Ataxic gait 07/22/2013    Chronic neck pain 07/22/2013    Orthostatic hypotension 07/22/2013    Paresthesia 07/22/2013    Repeated falls 08/07/2013    Chronic pain syndrome 09/16/2013    Lumbosacral spondylosis without myelopathy 09/16/2013    Cervical stenosis of spinal canal 09/16/2013    Spinal stenosis in cervical region 10/11/2013    Polyneuropathy 10/11/2013    Lumbar stenosis 10/11/2013    High risk medication use 10/11/2013    Ulnar neuropathy at elbow 10/11/2013    Thoracic or lumbosacral neuritis or radiculitis, unspecified 07/14/2014    Low back pain radiating to both legs 10/01/2014    DDD (degenerative disc disease), lumbar 10/01/2014    Spondylolisthesis of lumbar region 10/01/2014    Spondylolisthesis, grade 1 10/01/2014    Lumbar facet arthropathy 10/01/2014    Lumbar disc herniation 10/01/2014    Lumbosacral radiculopathy at L5 10/01/2014    Lumbosacral radiculopathy at S1 10/01/2014    DM neuropathy, painful ([address] 10/01/2014    Acute exacerbation of chronic obstructive pulmonary disease (COPD) ([address] 08/11/2015    Carotid artery stenosis 08/18/2015    Atherosclerosis of native arteries of the extremities with intermittent claudication 08/18/2015    MOORE (dyspnea on exertion) 09/09/2015    SOB (shortness of breath) 09/09/2015    Murmur, cardiac 09/09/2015    Hyperlipidemia LDL goal <100 04/12/2016    Primary osteoarthritis involving multiple joints 04/12/2016    Prediabetes 04/12/2016    Restless leg syndrome 04/12/2016    Essential hypertension with goal blood pressure less than 140/90 08/15/2016    Panlobular emphysema ([address] 12/13/2016    Impaired fasting glucose 12/13/2016    HCAP (healthcare-associated pneumonia) 02/07/2017    Abnormal CT scan, chest 02/07/2017    Hypercholesterolemia 04/01/2008    Hypokalemia 04/21/2017    Dizziness 04/21/2017    CHI (closed head injury) 04/21/2017    Elevated troponin 04/21/2017    Type 2 diabetes mellitus with diabetic neuropathy, without long-term current use of insulin ([address] 09/13/2017    Primary insomnia 12/04/2017    Major depression, chronic 12/06/2017    Pneumonia 02/07/2018    CAP (community acquired pneumonia) 02/07/2018    COPD exacerbation ([facility] 07/13/2018    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/2018    Type 2 diabetes with nephropathy ([address] 10/22/2018    Chronic respiratory failure with hypoxia ([address] 01/01/2019    Stage 4 very severe COPD by GOLD classification ([facility] 02/25/2019    Congestive heart failure (CHF) ([facility] 03/10/2019    NSTEMI (non-ST elevated myocardial infarction) ([facility] 03/11/2019    Pelvic ring fracture, closed, initial encounter ([facility] 03/11/2019    Advanced care planning/counseling discussion 03/14/2019    Debility 03/14/2019     Resolved Ambulatory Problems     Diagnosis Date Noted    No Resolved Ambulatory Problems     Past Medical History:   Diagnosis Date    Chronic lung disease     Colon polyps     COPD 8-30-02    DDD (degenerative disc disease), lumbar 10/1/2014    Degeneration of lumbar or lumbosacral intervertebral disc     Depression     Diabetes ([facility] 7-19-05    Diabetes mellitus ([address]     Diverticulosis     DM neuropathy, painful ([facility] 10/1/2014    MOORE (Dyspnea on Exertion) 4-19-02    Glaucoma     HCAP (healthcare-associated pneumonia) 03/24/2017    Hemorrhoids 6-6-06    Hyperlipidemia 5/17/2011    Hypertension 4-16-02    LBP (low back pain) 4-13-04    Low back pain radiating to both legs 10/1/2014    Lumbago     Lumbar disc herniation 10/1/2014    Lumbar facet arthropathy 10/1/2014    Lumbosacral radiculopathy at L5 10/1/2014    Lumbosacral radiculopathy at S1 10/1/2014    Microscopic hematuria     OA (osteoarthritis)     Overactive bladder 5/17/2011    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    RLS (restless legs syndrome) 1/17/2013    Sciatica     Shortness of breath     Spinal stenosis, lumbar region, without neurogenic claudication     Spondylolisthesis of lumbar region 10/1/2014    Spondylolisthesis, grade 1 10/1/2014    Stage 4 very severe COPD by GOLD classification ([facility] 2/25/2019    Stress urinary incontinence     Syncope     Urinary tract infection, site not specified

## 2019-04-03 NOTE — PROGRESS NOTES
Community Care Team Documentation for Patient in [facility] Patient discharged from [facility] to [facility], on 3/15/2019. Hospital Discharge diagnosis: 1. Acute on chronic hypoxic and hypercapnic resp failure due to # 2, stable. S/p extubation.   
2. Acute COPD exb, sever end stage COPD 3. Elevated troponin due to demand ischemia sec to # 1 and 2 
4. ARF on stage 3 CKD, stable 5. Chronic diastolic HF, compensated. 6. Lactic acidosis due to # 1 7. Pelvic fracture s/p fall at home 8. HTN 9. DM 2 
10. DNR SNF Attending Provider:  Roel Guillermo Anticipated discharge date from SNF:  TBD 
 
 
PCP : Macie Malhotra MD 
 
Nurse Navigator: Leslie Queen RN Community Care team rounds completed, updates provided by facility. DNR 
CHF: 150lbs. stable 
dc to home 4/4 [facility]. High Risk 52 Total Score 2 . Living with Significant Other. Assisted Living. LTAC. SNF. or  
Rehab  
 4 IP Visits Last 12 Months (1-3=4, 4=9, >4=11) 43 Charlson Comorbidity Score (Age + Comorbid Conditions) Criteria that do not apply:  
 Has Seen PCP in Last 6 Months (Yes=3, No=0) Patient Length of Stay (>5 days = 3) Pt. Coverage (Medicare=5 , Medicaid, or Self-Pay=4) Active Ambulatory Problems Diagnosis Date Noted  Hyperlipidemia 05/17/2011  Overactive bladder 05/17/2011  Sciatica  Degeneration of lumbar or lumbosacral intervertebral disc  Encounter for long-term (current) use of other medications  Depression 09/20/2011  Unspecified vitamin D deficiency 04/04/2012  Glucose intolerance (pre-diabetes) 05/24/2012  RLS (restless legs syndrome) 01/17/2013  Aortic dissection, abdominal ([facility] 02/05/2013  Ataxic gait 07/22/2013  Chronic neck pain 07/22/2013  Orthostatic hypotension 07/22/2013  Paresthesia 07/22/2013  Repeated falls 08/07/2013  Chronic pain syndrome 09/16/2013  Lumbosacral spondylosis without myelopathy 09/16/2013  Cervical stenosis of spinal canal 09/16/2013  Spinal stenosis in cervical region 10/11/2013  Polyneuropathy 10/11/2013  Lumbar stenosis 10/11/2013  High risk medication use 10/11/2013  Ulnar neuropathy at elbow 10/11/2013  Thoracic or lumbosacral neuritis or radiculitis, unspecified 07/14/2014  Low back pain radiating to both legs 10/01/2014  DDD (degenerative disc disease), lumbar 10/01/2014  Spondylolisthesis of lumbar region 10/01/2014  Spondylolisthesis, grade 1 10/01/2014  Lumbar facet arthropathy 10/01/2014  Lumbar disc herniation 10/01/2014  Lumbosacral radiculopathy at L5 10/01/2014  Lumbosacral radiculopathy at S1 10/01/2014  DM neuropathy, painful ([address] 10/01/2014  Acute exacerbation of chronic obstructive pulmonary disease (COPD) ([address] 08/11/2015  Carotid artery stenosis 08/18/2015  Atherosclerosis of native arteries of the extremities with intermittent claudication 08/18/2015  MOORE (dyspnea on exertion) 09/09/2015  SOB (shortness of breath) 09/09/2015  Murmur, cardiac 09/09/2015  Hyperlipidemia LDL goal <100 04/12/2016  Primary osteoarthritis involving multiple joints 04/12/2016  Prediabetes 04/12/2016  Restless leg syndrome 04/12/2016  Essential hypertension with goal blood pressure less than 140/90 08/15/2016  Panlobular emphysema ([address] 12/13/2016  Impaired fasting glucose 12/13/2016  HCAP (healthcare-associated pneumonia) 02/07/2017  Abnormal CT scan, chest 02/07/2017  Hypercholesterolemia 04/01/2008  Hypokalemia 04/21/2017  Dizziness 04/21/2017  CHI (closed head injury) 04/21/2017  Elevated troponin 04/21/2017  Type 2 diabetes mellitus with diabetic neuropathy, without long-term current use of insulin ([address] 09/13/2017  Primary insomnia 12/04/2017  Major depression, chronic 12/06/2017  Pneumonia 02/07/2018  CAP (community acquired pneumonia) 02/07/2018  COPD exacerbation ([address] 07/13/2018  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/2018  Type 2 diabetes with nephropathy ([address] 10/22/2018  Chronic respiratory failure with hypoxia ([address] 01/01/2019  Stage 4 very severe COPD by GOLD classification ([facility] 02/25/2019  Congestive heart failure (CHF) ([address] 03/10/2019  
 NSTEMI (non-ST elevated myocardial infarction) ([address] 03/11/2019  Pelvic ring fracture, closed, initial encounter ([facility] 03/11/2019  Advanced care planning/counseling discussion 03/14/2019  Debility 03/14/2019 Resolved Ambulatory Problems Diagnosis Date Noted  No Resolved Ambulatory Problems Past Medical History:  
Diagnosis Date  Chronic lung disease  Colon polyps  COPD 8-30-02  DDD (degenerative disc disease), lumbar 10/1/2014  Degeneration of lumbar or lumbosacral intervertebral disc  Depression  Diabetes ([facility] 7-19-05  Diabetes mellitus ([address]  Diverticulosis   DM neuropathy, painful ([address] 10/1/2014  MOORE (Dyspnea on Exertion) 4-19-02  Glaucoma  HCAP (healthcare-associated pneumonia) 03/24/2017  Hemorrhoids 6-6-06  Hyperlipidemia 5/17/2011  Hypertension 4-16-02  LBP (low back pain) 4-13-04  Low back pain radiating to both legs 10/1/2014  Lumbago  Lumbar disc herniation 10/1/2014  Lumbar facet arthropathy 10/1/2014  Lumbosacral radiculopathy at L5 10/1/2014  Lumbosacral radiculopathy at S1 10/1/2014  Microscopic hematuria  OA (osteoarthritis)  Overactive bladder 5/17/2011  
 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  RLS (restless legs syndrome) 1/17/2013  Sciatica  Shortness of breath  Spinal stenosis, lumbar region, without neurogenic claudication  Spondylolisthesis of lumbar region 10/1/2014  Spondylolisthesis, grade 1 10/1/2014  Stage 4 very severe COPD by GOLD classification ([facility] 2/25/2019  Stress urinary incontinence  Syncope   Urinary tract infection, site not specified

## 2019-04-04 ENCOUNTER — HOME HEALTH ADMISSION (OUTPATIENT)
Dept: HOME HEALTH SERVICES | Facility: HOME HEALTH | Age: 84
End: 2019-04-04
Payer: MEDICARE

## 2019-04-05 ENCOUNTER — PATIENT OUTREACH (OUTPATIENT)
Dept: INTERNAL MEDICINE CLINIC | Age: 84
End: 2019-04-05

## 2019-04-05 NOTE — PROGRESS NOTES
Hospital Discharge Follow-Up Date/Time:  4/5/2019 4:03 PM 
 
Patient was [facility] on 3/10/19 and discharged on 3/15/19 for acute on chronic hypoxic and hypercapnic respiratory failure. Was transferred to [facility] 3/15/19 to 4/4/19 for continuation of care. The physician discharge summary was available at the time of outreach. Patient was contacted within 1 business days of discharge. Contacted patient for Complex Case Management  follow up. No answer. Left message introducing self, role and reason for call. 's voicemail full; unable to leave message. Requested return call. Contact information provided. Will attempt to contact at a later time.

## 2019-04-06 ENCOUNTER — HOME CARE VISIT (OUTPATIENT)
Dept: SCHEDULING | Facility: HOME HEALTH | Age: 84
End: 2019-04-06
Payer: MEDICARE

## 2019-04-06 PROCEDURE — G0299 HHS/HOSPICE OF RN EA 15 MIN: HCPCS

## 2019-04-06 PROCEDURE — 3331090002 HH PPS REVENUE DEBIT

## 2019-04-06 PROCEDURE — 400013 HH SOC

## 2019-04-06 PROCEDURE — 3331090001 HH PPS REVENUE CREDIT

## 2019-04-07 PROCEDURE — 3331090002 HH PPS REVENUE DEBIT

## 2019-04-07 PROCEDURE — 3331090001 HH PPS REVENUE CREDIT

## 2019-04-08 ENCOUNTER — HOME CARE VISIT (OUTPATIENT)
Dept: SCHEDULING | Facility: HOME HEALTH | Age: 84
End: 2019-04-08
Payer: MEDICARE

## 2019-04-08 ENCOUNTER — PATIENT OUTREACH (OUTPATIENT)
Dept: INTERNAL MEDICINE CLINIC | Age: 84
End: 2019-04-08

## 2019-04-08 ENCOUNTER — HOME CARE VISIT (OUTPATIENT)
Dept: HOME HEALTH SERVICES | Facility: HOME HEALTH | Age: 84
End: 2019-04-08
Payer: MEDICARE

## 2019-04-08 VITALS — TEMPERATURE: 98.4 F | DIASTOLIC BLOOD PRESSURE: 80 MMHG | SYSTOLIC BLOOD PRESSURE: 164 MMHG

## 2019-04-08 VITALS
SYSTOLIC BLOOD PRESSURE: 124 MMHG | TEMPERATURE: 98.1 F | OXYGEN SATURATION: 91 % | RESPIRATION RATE: 16 BRPM | DIASTOLIC BLOOD PRESSURE: 60 MMHG | HEART RATE: 77 BPM

## 2019-04-08 PROCEDURE — 3331090001 HH PPS REVENUE CREDIT

## 2019-04-08 PROCEDURE — G0151 HHCP-SERV OF PT,EA 15 MIN: HCPCS

## 2019-04-08 PROCEDURE — 3331090002 HH PPS REVENUE DEBIT

## 2019-04-08 NOTE — Clinical Note
[facility] 3/10-3/15 acute on chronic hypoxic and hypercapnicSNF 3/15-4/4 2 attempts made to [address] appt 4/9 at 10 AM

## 2019-04-08 NOTE — PROGRESS NOTES
[facility] Discharge Follow-Up Date/Time:  4/8/2019 3:44 PM 
 
Contacted patient for Complex Case Management  follow up. No answer. Left message introducing self, role and reason for call. Requested return call. Contact information provided. Will attempt to contact at a later time.   
 
PCP appt: 4/9/19 at 10 AM

## 2019-04-09 ENCOUNTER — HOME CARE VISIT (OUTPATIENT)
Dept: HOME HEALTH SERVICES | Facility: HOME HEALTH | Age: 84
End: 2019-04-09
Payer: MEDICARE

## 2019-04-09 PROCEDURE — 3331090002 HH PPS REVENUE DEBIT

## 2019-04-09 PROCEDURE — G0152 HHCP-SERV OF OT,EA 15 MIN: HCPCS

## 2019-04-09 PROCEDURE — 3331090001 HH PPS REVENUE CREDIT

## 2019-04-10 PROCEDURE — 3331090001 HH PPS REVENUE CREDIT

## 2019-04-10 PROCEDURE — 3331090002 HH PPS REVENUE DEBIT

## 2019-04-11 ENCOUNTER — HOME CARE VISIT (OUTPATIENT)
Dept: SCHEDULING | Facility: HOME HEALTH | Age: 84
End: 2019-04-11
Payer: MEDICARE

## 2019-04-11 PROCEDURE — G0157 HHC PT ASSISTANT EA 15: HCPCS

## 2019-04-11 PROCEDURE — 3331090002 HH PPS REVENUE DEBIT

## 2019-04-11 PROCEDURE — 3331090001 HH PPS REVENUE CREDIT

## 2019-04-12 ENCOUNTER — OFFICE VISIT (OUTPATIENT)
Dept: ORTHOPEDIC SURGERY | Age: 84
End: 2019-04-12

## 2019-04-12 ENCOUNTER — HOME CARE VISIT (OUTPATIENT)
Dept: SCHEDULING | Facility: HOME HEALTH | Age: 84
End: 2019-04-12
Payer: MEDICARE

## 2019-04-12 VITALS
OXYGEN SATURATION: 98 % | DIASTOLIC BLOOD PRESSURE: 74 MMHG | TEMPERATURE: 98.2 F | SYSTOLIC BLOOD PRESSURE: 132 MMHG | HEART RATE: 87 BPM

## 2019-04-12 VITALS
WEIGHT: 147 LBS | RESPIRATION RATE: 16 BRPM | SYSTOLIC BLOOD PRESSURE: 159 MMHG | DIASTOLIC BLOOD PRESSURE: 70 MMHG | BODY MASS INDEX: 24.49 KG/M2 | HEIGHT: 65 IN | OXYGEN SATURATION: 77 % | HEART RATE: 106 BPM | TEMPERATURE: 98.1 F

## 2019-04-12 VITALS
TEMPERATURE: 97.9 F | HEART RATE: 89 BPM | DIASTOLIC BLOOD PRESSURE: 71 MMHG | OXYGEN SATURATION: 84 % | SYSTOLIC BLOOD PRESSURE: 145 MMHG

## 2019-04-12 DIAGNOSIS — M25.559 ARTHRALGIA OF HIP, UNSPECIFIED LATERALITY: Primary | ICD-10-CM

## 2019-04-12 DIAGNOSIS — S32.810D CLOSED PELVIC RING FRACTURE WITH ROUTINE HEALING, SUBSEQUENT ENCOUNTER: ICD-10-CM

## 2019-04-12 PROCEDURE — 3331090002 HH PPS REVENUE DEBIT

## 2019-04-12 PROCEDURE — 3331090001 HH PPS REVENUE CREDIT

## 2019-04-12 PROCEDURE — G0158 HHC OT ASSISTANT EA 15: HCPCS

## 2019-04-12 NOTE — PROGRESS NOTES
1. Have you been to the ER, urgent care clinic since your last visit? Hospitalized since your last visit? Yes, [facility]  
 
 
2. Have you seen or consulted any other health care providers outside of the [address] [place] [address] since your last visit? Include any pap smears or colon screening.  NO

## 2019-04-12 NOTE — PROGRESS NOTES
Patient: Elisa Villafuerte                MRN: [id number]       SSN: [id number] YOB: 1933        AGE: 80 y.o. SEX: female Body mass index is 24.46 kg/m². PCP: Michael Riddle MD 
04/12/19 Chief Complaint: Pelvic pain HISTORY OF PRESENT ILLNESS:  Nemo Jackson returns to the office today for her pelvic ring injury. I saw her in the hospital where she was intubated in the ICU. They withdrew care, but she turned things around. She is here today still having some pain in her hip, which she says is 7/10. She is able to ambulate. No new complaints. Past Medical History:  
Diagnosis Date  Chronic lung disease  Colon polyps  COPD 8-30-02  DDD (degenerative disc disease), lumbar 10/1/2014  Degeneration of lumbar or lumbosacral intervertebral disc  Depression  Diabetes ([address] 7-19-05  Diabetes mellitus ([address]  Diverticulosis   DM neuropathy, painful ([address] 10/1/2014  MOORE (Dyspnea on Exertion) 4-19-02  
 echo: +mild LVH, [id number]% w/ diastolic dysfxn  Glaucoma  HCAP (healthcare-associated pneumonia) 03/24/2017  Hemorrhoids 6-6-06  Hyperlipidemia 5/17/2011  Hypertension 4-16-02  LBP (low back pain) 4-13-04  Low back pain radiating to both legs 10/1/2014  Lumbago  Lumbar disc herniation 10/1/2014  Lumbar facet arthropathy 10/1/2014  Lumbosacral radiculopathy at L5 10/1/2014  Lumbosacral radiculopathy at S1 10/1/2014  Microscopic hematuria  OA (osteoarthritis)  Overactive bladder 5/17/2011  
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   
 -MRI brain  Urinary tract infection, site not specified Family History Problem Relation Age of Onset  Colon Cancer Sister  Heart Disease Brother  Seizures Son  Colon Cancer Maternal Aunt Current Outpatient Medications Medication Sig Dispense Refill  insulin aspart U-100 (NOVOLOG) 100 unit/mL inpn 2 Units by SubCUTAneous route Before breakfast, lunch, and dinner. Per sliding scale- 0-200- 0 units, 201-250- 2 units, 251-300- 4 units, 301-350-6 units, 351-400-8 units, 401-450- 10 units and Call MD.    
24 hospitals albuterol-ipratropium (DUO-NEB) 2.5 mg-0.5 mg/3 ml nebu 3 mL by Nebulization route every four (4) hours. 30 Nebule 0  
 famotidine (PEPCID) 20 mg tablet Take 1 Tab by mouth daily. 15 Tab 0  
 furosemide (LASIX) 20 mg tablet Take 1 Tab by mouth every fourty-eight (48) hours. 15 Tab 0  
 atorvastatin (LIPITOR) 40 mg tablet Take 1 Tab by mouth nightly. 30 Tab 0  
 rOPINIRole (REQUIP) 1 mg tablet TAKE ONE-HALF TO ONE TABLET BY MOUTH ONCE NIGHTLY AS NEEDED FOR  RESTLESS  LEGS  FOR  UP  TO  90  DAYS 90 Tab 5  PARoxetine (PAXIL) 30 mg tablet TAKE 1 TABLET BY MOUTH  DAILY 90 Tab 1  
 mirtazapine (REMERON) 15 mg tablet Take 1 Tab by mouth nightly. 90 Tab 0  
 amLODIPine (NORVASC) 5 mg tablet Take 1 Tab by mouth daily. 90 Tab 3  
 tamsulosin (FLOMAX) 0.4 mg capsule Take 1 Cap by mouth daily. 90 Cap 3  
 gabapentin (NEURONTIN) 300 mg capsule 1 capsule by mouth morning, 1 capsule at midday and 2 capsules at bedtime 120 Cap 5  
 simvastatin (ZOCOR) 20 mg tablet Take 1 Tab by mouth nightly. 90 Tab 3  
 aspirin delayed-release 81 mg tablet Take 1 Tab by mouth daily. 100 Tab 1  
 inhalational spacing device (AEROCHAMBER MV) 1 Each by Does Not Apply route as needed. 1 Device 2  
 budesonide-formoterol (SYMBICORT) 80-4.5 mcg/actuation HFAA Take 2 Puffs by inhalation two (2) times a day.  1 Inhaler 0  
  OXYGEN-AIR DELIVERY SYSTEMS 3 L by Nasal route continuous.  Nebulizer & Compressor (PORTABLE NEBULIZER SYSTEM) machine 1 Each by Does Not Apply route every six (6) hours as needed. 1 Each 0  
 predniSONE (DELTASONE) 10 mg tablet 40 mg po dailyx 3 days,30 mg po daily x 3 days, then 20 mg po daily for 3 daily then 10 mg po daily x 3 days 30 Tab 0 Allergies Allergen Reactions  Levaquin [Levofloxacin] Rash Past Surgical History:  
Procedure Laterality Date  HX APPENDECTOMY  HX CHOLECYSTECTOMY    HX HYSTERECTOMY    
 (+)DUB  HX POLYPECTOMY  NC COLONOSCOPY FLX DX W/COLLJ SPEC WHEN PFRMD  06  
 normal, Dr Calderón Slight  NC COLONOSCOPY FLX DX W/COLLJ SPEC WHEN PFRMD    
 (+)polyp= tubular adenoma Social History Socioeconomic History  Marital status:  Spouse name: Not on file  Number of children: Not on file  Years of education: Not on file  Highest education level: Not on file Occupational History  Not on file Social Needs  Financial resource strain: Not on file  Food insecurity:  
  Worry: Not on file Inability: Not on file  Transportation needs:  
  Medical: Not on file Non-medical: Not on file Tobacco Use  Smoking status: Former Smoker Packs/day: 1.00 Years: 57.00 Pack years: 57.00 Types: Cigarettes Last attempt to quit: 2018 Years since quittin.2  Smokeless tobacco: Never Used  Tobacco comment: pt has cut down recently to less than 1 ppd Substance and Sexual Activity  Alcohol use: No  
 Drug use: No  
 Sexual activity: Not Currently Lifestyle  Physical activity:  
  Days per week: Not on file Minutes per session: Not on file  Stress: Not on file Relationships  Social connections:  
  Talks on phone: Not on file Gets together: Not on file Attends [employer] service: Not on file Active member of club or organization: Not on file Attends meetings of clubs or organizations: Not on file Relationship status: Not on file  Intimate partner violence:  
  Fear of current or ex partner: Not on file Emotionally abused: Not on file Physically abused: Not on file Forced sexual activity: Not on file Other Topics Concern  Not on file Social History Narrative  Not on file REVIEW OF SYSTEMS:   
 
No changes from previous review of systems unless noted. PHYSICAL EXAMINATION: 
Visit Vitals /70 (BP 1 Location: Left arm, BP Patient Position: Sitting) Pulse (!) 106 Temp 98.1 °F (36.7 °C) (Oral) Resp 16 Ht 5' 5\" (1.651 m) Wt 147 lb (66.7 kg) SpO2 (!) 77% BMI 24.46 kg/m² Body mass index is 24.46 kg/m². GENERAL: Alert and oriented x3, in no acute distress. HEENT: Normocephalic, atraumatic. RESP: Non labored breathing. SKIN: No rashes or lesions noted. PHYSICAL EXAM:   Physical exam of the left hip with tenderness to palpation over the pelvis. No pain with internal or external rotation of the hip. Mild pain with forward flexion. Neurovascularly intact distally. She is able to ambulate with a walker. IMAGING:   X-rays of the pelvis were taken in the office today. These show interval healing. ASSESSMENT AND PLAN:   Jose L Patel is an 80year-old female with a pelvic ring injury. She will continue weight bearing as tolerated. I encourage her to stay away from tobacco and also take calcium and vitamin D. I will plan to see her back in about a month.  
 
 
 
 
 
Electronically signed by: Oscar Velasco MD

## 2019-04-13 ENCOUNTER — HOME CARE VISIT (OUTPATIENT)
Dept: HOME HEALTH SERVICES | Facility: HOME HEALTH | Age: 84
End: 2019-04-13
Payer: MEDICARE

## 2019-04-13 PROCEDURE — 3331090002 HH PPS REVENUE DEBIT

## 2019-04-13 PROCEDURE — 3331090001 HH PPS REVENUE CREDIT

## 2019-04-14 ENCOUNTER — HOME CARE VISIT (OUTPATIENT)
Dept: SCHEDULING | Facility: HOME HEALTH | Age: 84
End: 2019-04-14
Payer: MEDICARE

## 2019-04-14 VITALS
OXYGEN SATURATION: 97 % | TEMPERATURE: 98.2 F | RESPIRATION RATE: 14 BRPM | HEART RATE: 86 BPM | SYSTOLIC BLOOD PRESSURE: 128 MMHG | DIASTOLIC BLOOD PRESSURE: 82 MMHG

## 2019-04-14 PROCEDURE — G0158 HHC OT ASSISTANT EA 15: HCPCS

## 2019-04-14 PROCEDURE — 3331090001 HH PPS REVENUE CREDIT

## 2019-04-14 PROCEDURE — 3331090002 HH PPS REVENUE DEBIT

## 2019-04-15 ENCOUNTER — OFFICE VISIT (OUTPATIENT)
Dept: INTERNAL MEDICINE CLINIC | Age: 84
End: 2019-04-15

## 2019-04-15 ENCOUNTER — HOSPITAL ENCOUNTER (OUTPATIENT)
Dept: LAB | Age: 84
Discharge: HOME | End: 2019-04-15
Payer: MEDICARE

## 2019-04-15 ENCOUNTER — PATIENT OUTREACH (OUTPATIENT)
Dept: INTERNAL MEDICINE CLINIC | Age: 84
End: 2019-04-15

## 2019-04-15 ENCOUNTER — HOME CARE VISIT (OUTPATIENT)
Dept: SCHEDULING | Facility: HOME HEALTH | Age: 84
End: 2019-04-15
Payer: MEDICARE

## 2019-04-15 VITALS
HEART RATE: 87 BPM | DIASTOLIC BLOOD PRESSURE: 76 MMHG | OXYGEN SATURATION: 98 % | TEMPERATURE: 98.2 F | RESPIRATION RATE: 14 BRPM | SYSTOLIC BLOOD PRESSURE: 128 MMHG

## 2019-04-15 VITALS
BODY MASS INDEX: 23.99 KG/M2 | HEART RATE: 88 BPM | DIASTOLIC BLOOD PRESSURE: 66 MMHG | HEIGHT: 65 IN | TEMPERATURE: 97.8 F | WEIGHT: 144 LBS | OXYGEN SATURATION: 84 % | RESPIRATION RATE: 14 BRPM | SYSTOLIC BLOOD PRESSURE: 122 MMHG

## 2019-04-15 DIAGNOSIS — I10 PRIMARY HYPERTENSION: ICD-10-CM

## 2019-04-15 DIAGNOSIS — E11.21 TYPE 2 DIABETES WITH NEPHROPATHY (HCC): Primary | ICD-10-CM

## 2019-04-15 DIAGNOSIS — J43.1 PANLOBULAR EMPHYSEMA (HCC): ICD-10-CM

## 2019-04-15 DIAGNOSIS — E11.21 TYPE 2 DIABETES WITH NEPHROPATHY (HCC): ICD-10-CM

## 2019-04-15 LAB
ANION GAP SERPL CALC-SCNC: 7 MMOL/L (ref 3–18)
BUN SERPL-MCNC: 9 MG/DL (ref 7–18)
BUN/CREAT SERPL: 12 (ref 12–20)
CALCIUM SERPL-MCNC: 8.6 MG/DL (ref 8.5–10.1)
CHLORIDE SERPL-SCNC: 100 MMOL/L (ref 100–108)
CO2 SERPL-SCNC: 34 MMOL/L (ref 21–32)
CREAT SERPL-MCNC: 0.74 MG/DL (ref 0.6–1.3)
EST. AVERAGE GLUCOSE BLD GHB EST-MCNC: 186 MG/DL
GLUCOSE SERPL-MCNC: 162 MG/DL (ref 74–99)
HBA1C MFR BLD: 8.1 % (ref 4.2–5.6)
POTASSIUM SERPL-SCNC: 3.7 MMOL/L (ref 3.5–5.5)
SODIUM SERPL-SCNC: 141 MMOL/L (ref 136–145)

## 2019-04-15 PROCEDURE — 3331090002 HH PPS REVENUE DEBIT

## 2019-04-15 PROCEDURE — 3331090001 HH PPS REVENUE CREDIT

## 2019-04-15 PROCEDURE — 80048 BASIC METABOLIC PNL TOTAL CA: CPT

## 2019-04-15 PROCEDURE — 83036 HEMOGLOBIN GLYCOSYLATED A1C: CPT

## 2019-04-15 PROCEDURE — G0158 HHC OT ASSISTANT EA 15: HCPCS

## 2019-04-15 NOTE — PROGRESS NOTES
Madelyn Johnson 9/22/1933, is a 80 y.o. female, who is seen today for reevaluation after recent hospitalization followed by rehab stay in the skilled facility. While in the intensive care unit the physicians that she was going to die and discontinued the ventilator, but she actually improved from there on. She was discharged from the nursing facility for fourth and contacted by our nurse April 5. When she was discharged from the  facility, medication did not include amlodipine that she has been on for quite a while. She was placed on Humalog sliding scale while in the facility and has continued this at home. Some days her glucose is 100-120 and other days it is up to 240 according to her son, so she has been getting this sliding scale insulin. She is eating fairly healthy diet was on. She says her breathing is doing well and she has not had any cigarettes since Christmas of 2018. She uses oxygen at home but not here in the office. Taking her other medicine correctly. She uses albuterol via nebulizer 3-4 times a day and is not using Symbicort anymore. Past Medical History:  
Diagnosis Date  Chronic lung disease  Colon polyps  COPD 8-30-02  DDD (degenerative disc disease), lumbar 10/1/2014  Degeneration of lumbar or lumbosacral intervertebral disc  Depression  Diabetes ([facility] [address] 7-19-05  Diabetes mellitus ([address]  Diverticulosis   DM neuropathy, painful ([facility] [address] 10/1/2014  MOORE (Dyspnea on Exertion) 4-19-02  
 echo: +mild LVH, BG87-14% w/ diastolic dysfxn  Glaucoma  HCAP (healthcare-associated pneumonia) 03/24/2017  Hemorrhoids 6-6-06  Hyperlipidemia 5/17/2011  Hypertension 4-16-02  LBP (low back pain) 4-13-04  Low back pain radiating to both legs 10/1/2014  Lumbago  Lumbar disc herniation 10/1/2014  Lumbar facet arthropathy 10/1/2014  Lumbosacral radiculopathy at L5 10/1/2014  Lumbosacral radiculopathy at S1 10/1/2014  Microscopic hematuria  OA (osteoarthritis)  Overactive bladder 5/17/2011  
 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  RLS (restless legs syndrome) 1/17/2013  Sciatica  Shortness of breath  Spinal stenosis, lumbar region, without neurogenic claudication  Spondylolisthesis of lumbar region 10/1/2014  Spondylolisthesis, grade 1 10/1/2014  Stage 4 very severe COPD by GOLD classification ([facility] [address] 2/25/2019  Stress urinary incontinence  Syncope   
 -MRI brain  Urinary tract infection, site not specified Current Outpatient Medications Medication Sig Dispense Refill  albuterol-ipratropium (DUO-NEB) 2.5 mg-0.5 mg/3 ml nebu 3 mL by Nebulization route every four (4) hours. 30 Nebule 0  
 famotidine (PEPCID) 20 mg tablet Take 1 Tab by mouth daily. 15 Tab 0  
 furosemide (LASIX) 20 mg tablet Take 1 Tab by mouth every fourty-eight (48) hours. 15 Tab 0  
 atorvastatin (LIPITOR) 40 mg tablet Take 1 Tab by mouth nightly. 30 Tab 0  
 rOPINIRole (REQUIP) 1 mg tablet TAKE ONE-HALF TO ONE TABLET BY MOUTH ONCE NIGHTLY AS NEEDED FOR  RESTLESS  LEGS  FOR  UP  TO  90  DAYS 90 Tab 5  PARoxetine (PAXIL) 30 mg tablet TAKE 1 TABLET BY MOUTH  DAILY 90 Tab 1  
 mirtazapine (REMERON) 15 mg tablet Take 1 Tab by mouth nightly. 90 Tab 0  
 tamsulosin (FLOMAX) 0.4 mg capsule Take 1 Cap by mouth daily. 90 Cap 3  
 gabapentin (NEURONTIN) 300 mg capsule 1 capsule by mouth morning, 1 capsule at midday and 2 capsules at bedtime 120 Cap 5  
 simvastatin (ZOCOR) 20 mg tablet Take 1 Tab by mouth nightly. 90 Tab 3  
 aspirin delayed-release 81 mg tablet Take 1 Tab by mouth daily. 100 Tab 1  
 inhalational spacing device (AEROCHAMBER MV) 1 Each by Does Not Apply route as needed. 1 Device 2  
 OXYGEN-AIR DELIVERY SYSTEMS 3 L by Nasal route continuous.     
 Nebulizer & Compressor (PORTABLE NEBULIZER SYSTEM) machine 1 Each by Does Not Apply route every six (6) hours as needed. 1 Each 0  
 insulin aspart U-100 (NOVOLOG) 100 unit/mL inpn 2 Units by SubCUTAneous route Before breakfast, lunch, and dinner. Per sliding scale- 0-200- 0 units, 201-250- 2 units, 251-300- 4 units, 301-350-6 units, 351-400-8 units, 401-450- 10 units and Call MD. Visit Vitals /66 (BP 1 Location: Left arm, BP Patient Position: Sitting) Pulse 88 Temp 97.8 °F (36.6 °C) (Oral) Resp 14 Ht 5' 5\" (1.651 m) Wt 144 lb (65.3 kg) SpO2 (!) 84% BMI 23.96 kg/m² Carotids are 2+ without bruits. Lungs are clear to percussion. Diminished breath sounds with no wheezing or crackles. Heart reveals a regular rhythm with normal S1 and S2 no murmur gallop click or rub. Apical impulse is not palpable. Abdomen is soft nontender with no hepatosplenomegaly or masses and no bruits. Extremities reveal no clubbing cyanosis or edema. Pulses are 2+ except absent in the feet. Feet are warm. Assessment: #1. History of hypertension blood pressure currently doing well off amlodipine. He will remain off amlodipine at least for now. #2.  Severe COPD with emphysema, oxygen saturation on room air 84%, continue oxygen continuously at 3 L/min. She will continue with nebulized albuterol 3-4 times a day. #3.  Diabetes, we will check BMP and hemoglobin A1c now and see how best to treat the diabetes, probably not with sliding scale insulin, this would be my last choice. More likely would be oral agent or no medication. She did not require oral agent when I saw her last fall. #4.  History of depression with weight loss and insomnia, doing fairly well, she will continue mirtazapine and paroxetine. 5.  After I finished with her and the nurse was drawing blood she mentioned that seeing bright red blood per rectum one time, apparently small quantity, if this recurs will have her see a gastroenterologist. 
 
Follow-up 2 months or sooner if needed Gaetano Hu, [address] Please note: This document has been produced using voice recognition software. Unrecognized errors in transcription may be present.

## 2019-04-15 NOTE — PATIENT INSTRUCTIONS

## 2019-04-15 NOTE — PROGRESS NOTES
1. Have you been to the ER, urgent care clinic or hospitalized since your last visit? YES. [facility] 3/10-3/15 for COPD/hypoxia, SNF 3/15-4/4 2. Have you seen or consulted any other health care providers outside of the [address] [place] [address] since your last visit (Include any pap smears or colon screening)?  NO

## 2019-04-16 ENCOUNTER — HOME CARE VISIT (OUTPATIENT)
Dept: SCHEDULING | Facility: HOME HEALTH | Age: 84
End: 2019-04-16
Payer: MEDICARE

## 2019-04-16 ENCOUNTER — TELEPHONE (OUTPATIENT)
Dept: INTERNAL MEDICINE CLINIC | Age: 84
End: 2019-04-16

## 2019-04-16 PROCEDURE — G0157 HHC PT ASSISTANT EA 15: HCPCS

## 2019-04-16 PROCEDURE — 3331090001 HH PPS REVENUE CREDIT

## 2019-04-16 PROCEDURE — 3331090002 HH PPS REVENUE DEBIT

## 2019-04-16 RX ORDER — GLIPIZIDE 5 MG/1
TABLET ORAL
Qty: 90 TAB | Refills: 3 | Status: SHIPPED | OUTPATIENT
Start: 2019-04-16 | End: 2019-04-28

## 2019-04-16 NOTE — TELEPHONE ENCOUNTER
Chief Complaint   Patient presents with    Labs     done 04-15-19 per Dr Saulo Pérez     [id number] I left a voice message to return the call.

## 2019-04-16 NOTE — PROGRESS NOTES
Based on current lab results I would recommend stopping sliding scale insulin and start glipizide 5 mg daily.   Will send that prescription to the pharmacy

## 2019-04-16 NOTE — TELEPHONE ENCOUNTER
----- Message from Stormy Mojica MD sent at 4/16/2019  7:34 AM EDT -----  Based on current lab results I would recommend stopping sliding scale insulin and start glipizide 5 mg daily.   Will send that prescription to the pharmacy

## 2019-04-17 VITALS
SYSTOLIC BLOOD PRESSURE: 140 MMHG | HEART RATE: 72 BPM | DIASTOLIC BLOOD PRESSURE: 62 MMHG | OXYGEN SATURATION: 89 % | TEMPERATURE: 97.2 F

## 2019-04-17 PROCEDURE — 3331090002 HH PPS REVENUE DEBIT

## 2019-04-17 PROCEDURE — 3331090001 HH PPS REVENUE CREDIT

## 2019-04-18 ENCOUNTER — APPOINTMENT (OUTPATIENT)
Dept: GENERAL RADIOLOGY | Age: 84
DRG: 189 | End: 2019-04-18
Attending: FAMILY MEDICINE
Payer: MEDICARE

## 2019-04-18 ENCOUNTER — TELEPHONE (OUTPATIENT)
Dept: INTERNAL MEDICINE CLINIC | Age: 84
End: 2019-04-18

## 2019-04-18 ENCOUNTER — APPOINTMENT (OUTPATIENT)
Dept: CT IMAGING | Age: 84
DRG: 189 | End: 2019-04-18
Attending: INTERNAL MEDICINE
Payer: MEDICARE

## 2019-04-18 ENCOUNTER — HOSPITAL ENCOUNTER (INPATIENT)
Age: 84
LOS: 10 days | Discharge: HOSPICE | DRG: 189 | End: 2019-04-28
Attending: EMERGENCY MEDICINE | Admitting: INTERNAL MEDICINE
Payer: MEDICARE

## 2019-04-18 DIAGNOSIS — R09.02 HYPOXIA: Primary | ICD-10-CM

## 2019-04-18 DIAGNOSIS — Z51.5 COMFORT MEASURES ONLY STATUS: ICD-10-CM
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
## 2019-04-18 PROBLEM — J18.9 HAP (HOSPITAL-ACQUIRED PNEUMONIA): Status: ACTIVE | Noted: 2019-04-18
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
## 2019-04-18 LAB
ALBUMIN SERPL-MCNC: 2.9 G/DL (ref 3.4–5)
ALBUMIN SERPL-MCNC: 3 G/DL (ref 3.4–5)
ALBUMIN/GLOB SERPL: 0.8 {RATIO} (ref 0.8–1.7)
ALBUMIN/GLOB SERPL: 0.8 {RATIO} (ref 0.8–1.7)
ALP SERPL-CCNC: 149 U/L (ref 45–117)
ALP SERPL-CCNC: 158 U/L (ref 45–117)
ALT SERPL-CCNC: 14 U/L (ref 13–56)
ALT SERPL-CCNC: 17 U/L (ref 13–56)
AMORPH CRY URNS QL MICRO: ABNORMAL
ANION GAP SERPL CALC-SCNC: 0 MMOL/L (ref 3–18)
ANION GAP SERPL CALC-SCNC: 1 MMOL/L (ref 3–18)
APPEARANCE UR: ABNORMAL
ARTERIAL PATENCY WRIST A: YES
AST SERPL-CCNC: 18 U/L (ref 15–37)
AST SERPL-CCNC: 37 U/L (ref 15–37)
BACTERIA URNS QL MICRO: ABNORMAL /HPF
BASE EXCESS BLD CALC-SCNC: 7 MMOL/L
BASOPHILS # BLD: 0 K/UL (ref 0–0.1)
BASOPHILS # BLD: 0 K/UL (ref 0–0.1)
BASOPHILS NFR BLD: 0 % (ref 0–2)
BASOPHILS NFR BLD: 0 % (ref 0–2)
BDY SITE: ABNORMAL
BILIRUB SERPL-MCNC: 0.4 MG/DL (ref 0.2–1)
BILIRUB SERPL-MCNC: 0.5 MG/DL (ref 0.2–1)
BILIRUB UR QL: NEGATIVE
BNP SERPL-MCNC: 1374 PG/ML (ref 0–1800)
BUN SERPL-MCNC: 12 MG/DL (ref 7–18)
BUN SERPL-MCNC: 14 MG/DL (ref 7–18)
BUN/CREAT SERPL: 17 (ref 12–20)
BUN/CREAT SERPL: 18 (ref 12–20)
CALCIUM SERPL-MCNC: 8.4 MG/DL (ref 8.5–10.1)
CALCIUM SERPL-MCNC: 8.8 MG/DL (ref 8.5–10.1)
CHLORIDE SERPL-SCNC: 96 MMOL/L (ref 100–108)
CHLORIDE SERPL-SCNC: 97 MMOL/L (ref 100–108)
CO2 SERPL-SCNC: 38 MMOL/L (ref 21–32)
CO2 SERPL-SCNC: 39 MMOL/L (ref 21–32)
COLOR UR: YELLOW
CREAT SERPL-MCNC: 0.71 MG/DL (ref 0.6–1.3)
CREAT SERPL-MCNC: 0.76 MG/DL (ref 0.6–1.3)
D DIMER PPP FEU-MCNC: 2.42 UG/ML(FEU)
DIFFERENTIAL METHOD BLD: ABNORMAL
DIFFERENTIAL METHOD BLD: ABNORMAL
EOSINOPHIL # BLD: 0 K/UL (ref 0–0.4)
EOSINOPHIL # BLD: 0.1 K/UL (ref 0–0.4)
EOSINOPHIL NFR BLD: 0 % (ref 0–5)
EOSINOPHIL NFR BLD: 1 % (ref 0–5)
EPITH CASTS URNS QL MICRO: ABNORMAL /LPF (ref 0–5)
ERYTHROCYTE [DISTWIDTH] IN BLOOD BY AUTOMATED COUNT: 16.3 % (ref 11.6–14.5)
ERYTHROCYTE [DISTWIDTH] IN BLOOD BY AUTOMATED COUNT: 16.4 % (ref 11.6–14.5)
GAS FLOW.O2 O2 DELIVERY SYS: ABNORMAL L/MIN
GAS FLOW.O2 SETTING OXYMISER: 5 L/M
GLOBULIN SER CALC-MCNC: 3.6 G/DL (ref 2–4)
GLOBULIN SER CALC-MCNC: 3.7 G/DL (ref 2–4)
GLUCOSE BLD STRIP.AUTO-MCNC: 163 MG/DL (ref 70–110)
GLUCOSE SERPL-MCNC: 134 MG/DL (ref 74–99)
GLUCOSE SERPL-MCNC: 168 MG/DL (ref 74–99)
GLUCOSE UR STRIP.AUTO-MCNC: NEGATIVE MG/DL
HCO3 BLD-SCNC: 34 MMOL/L (ref 22–26)
HCT VFR BLD AUTO: 37.1 % (ref 35–45)
HCT VFR BLD AUTO: 38.6 % (ref 35–45)
HGB BLD-MCNC: 11.8 G/DL (ref 12–16)
HGB BLD-MCNC: 11.9 G/DL (ref 12–16)
HGB UR QL STRIP: NEGATIVE
KETONES UR QL STRIP.AUTO: ABNORMAL MG/DL
LACTATE BLD-SCNC: 1.43 MMOL/L (ref 0.4–2)
LACTATE SERPL-SCNC: 0.7 MMOL/L (ref 0.4–2)
LEUKOCYTE ESTERASE UR QL STRIP.AUTO: ABNORMAL
LYMPHOCYTES # BLD: 1 K/UL (ref 0.9–3.6)
LYMPHOCYTES # BLD: 1.9 K/UL (ref 0.9–3.6)
LYMPHOCYTES NFR BLD: 11 % (ref 21–52)
LYMPHOCYTES NFR BLD: 26 % (ref 21–52)
MCH RBC QN AUTO: 27.7 PG (ref 24–34)
MCH RBC QN AUTO: 28.2 PG (ref 24–34)
MCHC RBC AUTO-ENTMCNC: 30.8 G/DL (ref 31–37)
MCHC RBC AUTO-ENTMCNC: 31.8 G/DL (ref 31–37)
MCV RBC AUTO: 88.5 FL (ref 74–97)
MCV RBC AUTO: 90 FL (ref 74–97)
MONOCYTES # BLD: 0.8 K/UL (ref 0.05–1.2)
MONOCYTES # BLD: 1 K/UL (ref 0.05–1.2)
MONOCYTES NFR BLD: 14 % (ref 3–10)
MONOCYTES NFR BLD: 9 % (ref 3–10)
NEUTS SEG # BLD: 4.1 K/UL (ref 1.8–8)
NEUTS SEG # BLD: 7.2 K/UL (ref 1.8–8)
NEUTS SEG NFR BLD: 59 % (ref 40–73)
NEUTS SEG NFR BLD: 80 % (ref 40–73)
NITRITE UR QL STRIP.AUTO: NEGATIVE
PCO2 BLD: 57.1 MMHG (ref 35–45)
PH BLD: 7.38 [PH] (ref 7.35–7.45)
PH UR STRIP: 8.5 [PH] (ref 5–8)
PLATELET # BLD AUTO: 242 K/UL (ref 135–420)
PLATELET # BLD AUTO: 250 K/UL (ref 135–420)
PMV BLD AUTO: 10.1 FL (ref 9.2–11.8)
PMV BLD AUTO: 9.9 FL (ref 9.2–11.8)
PO2 BLD: 54 MMHG (ref 80–100)
POTASSIUM SERPL-SCNC: 3.4 MMOL/L (ref 3.5–5.5)
POTASSIUM SERPL-SCNC: 3.6 MMOL/L (ref 3.5–5.5)
PROT SERPL-MCNC: 6.6 G/DL (ref 6.4–8.2)
PROT SERPL-MCNC: 6.6 G/DL (ref 6.4–8.2)
PROT UR STRIP-MCNC: 30 MG/DL
RBC # BLD AUTO: 4.19 M/UL (ref 4.2–5.3)
RBC # BLD AUTO: 4.29 M/UL (ref 4.2–5.3)
RBC #/AREA URNS HPF: NEGATIVE /HPF (ref 0–5)
SAO2 % BLD: 86 % (ref 92–97)
SERVICE CMNT-IMP: ABNORMAL
SODIUM SERPL-SCNC: 135 MMOL/L (ref 136–145)
SODIUM SERPL-SCNC: 136 MMOL/L (ref 136–145)
SP GR UR REFRACTOMETRY: 1.01 (ref 1–1.03)
SPECIMEN TYPE: ABNORMAL
TRI-PHOS CRY URNS QL MICRO: ABNORMAL
TROPONIN I SERPL-MCNC: 0.08 NG/ML (ref 0–0.04)
UROBILINOGEN UR QL STRIP.AUTO: 1 EU/DL (ref 0.2–1)
WBC # BLD AUTO: 7.1 K/UL (ref 4.6–13.2)
WBC # BLD AUTO: 9 K/UL (ref 4.6–13.2)
WBC URNS QL MICRO: ABNORMAL /HPF (ref 0–4)

## 2019-04-18 PROCEDURE — 74011250636 HC RX REV CODE- 250/636: Performed by: INTERNAL MEDICINE

## 2019-04-18 PROCEDURE — 36600 WITHDRAWAL OF ARTERIAL BLOOD: CPT

## 2019-04-18 PROCEDURE — 3331090002 HH PPS REVENUE DEBIT

## 2019-04-18 PROCEDURE — 36415 COLL VENOUS BLD VENIPUNCTURE: CPT

## 2019-04-18 PROCEDURE — 81001 URINALYSIS AUTO W/SCOPE: CPT

## 2019-04-18 PROCEDURE — 93005 ELECTROCARDIOGRAM TRACING: CPT

## 2019-04-18 PROCEDURE — 74011000250 HC RX REV CODE- 250: Performed by: FAMILY MEDICINE

## 2019-04-18 PROCEDURE — 80053 COMPREHEN METABOLIC PANEL: CPT

## 2019-04-18 PROCEDURE — 82803 BLOOD GASES ANY COMBINATION: CPT

## 2019-04-18 PROCEDURE — 65660000004 HC RM CVT STEPDOWN

## 2019-04-18 PROCEDURE — 85025 COMPLETE CBC W/AUTO DIFF WBC: CPT

## 2019-04-18 PROCEDURE — 87040 BLOOD CULTURE FOR BACTERIA: CPT

## 2019-04-18 PROCEDURE — 84484 ASSAY OF TROPONIN QUANT: CPT

## 2019-04-18 PROCEDURE — 74011250636 HC RX REV CODE- 250/636: Performed by: FAMILY MEDICINE

## 2019-04-18 PROCEDURE — 94640 AIRWAY INHALATION TREATMENT: CPT

## 2019-04-18 PROCEDURE — 83605 ASSAY OF LACTIC ACID: CPT

## 2019-04-18 PROCEDURE — 87086 URINE CULTURE/COLONY COUNT: CPT

## 2019-04-18 PROCEDURE — 74011000250 HC RX REV CODE- 250: Performed by: INTERNAL MEDICINE

## 2019-04-18 PROCEDURE — 83880 ASSAY OF NATRIURETIC PEPTIDE: CPT

## 2019-04-18 PROCEDURE — 87184 SC STD DISK METHOD PER PLATE: CPT

## 2019-04-18 PROCEDURE — 74011250636 HC RX REV CODE- 250/636

## 2019-04-18 PROCEDURE — 87077 CULTURE AEROBIC IDENTIFY: CPT

## 2019-04-18 PROCEDURE — 5A09357 ASSISTANCE WITH RESPIRATORY VENTILATION, LESS THAN 24 CONSECUTIVE HOURS, CONTINUOUS POSITIVE AIRWAY PRESSURE: ICD-10-PCS | Performed by: INTERNAL MEDICINE

## 2019-04-18 PROCEDURE — 74011250636 HC RX REV CODE- 250/636: Performed by: EMERGENCY MEDICINE

## 2019-04-18 PROCEDURE — 70450 CT HEAD/BRAIN W/O DYE: CPT

## 2019-04-18 PROCEDURE — 71045 X-RAY EXAM CHEST 1 VIEW: CPT

## 2019-04-18 PROCEDURE — 85379 FIBRIN DEGRADATION QUANT: CPT

## 2019-04-18 PROCEDURE — 3331090001 HH PPS REVENUE CREDIT

## 2019-04-18 PROCEDURE — 96375 TX/PRO/DX INJ NEW DRUG ADDON: CPT

## 2019-04-18 PROCEDURE — 74011250637 HC RX REV CODE- 250/637: Performed by: INTERNAL MEDICINE

## 2019-04-18 PROCEDURE — 96365 THER/PROPH/DIAG IV INF INIT: CPT

## 2019-04-18 PROCEDURE — 96361 HYDRATE IV INFUSION ADD-ON: CPT

## 2019-04-18 PROCEDURE — 99285 EMERGENCY DEPT VISIT HI MDM: CPT

## 2019-04-18 PROCEDURE — 82962 GLUCOSE BLOOD TEST: CPT

## 2019-04-18 PROCEDURE — 74011250637 HC RX REV CODE- 250/637: Performed by: FAMILY MEDICINE

## 2019-04-18 RX ORDER — VANCOMYCIN HYDROCHLORIDE
1250 EVERY 24 HOURS
Status: DISCONTINUED | OUTPATIENT
Start: 2019-04-19 | End: 2019-04-21

## 2019-04-18 RX ORDER — ACETAMINOPHEN 325 MG/1
650 TABLET ORAL ONCE
Status: COMPLETED | OUTPATIENT
Start: 2019-04-18 | End: 2019-04-18

## 2019-04-18 RX ORDER — FUROSEMIDE 20 MG/1
20 TABLET ORAL
Status: DISCONTINUED | OUTPATIENT
Start: 2019-04-18 | End: 2019-04-19

## 2019-04-18 RX ORDER — ACETAMINOPHEN 325 MG/1
650 TABLET ORAL
Status: DISCONTINUED | OUTPATIENT
Start: 2019-04-18 | End: 2019-04-28 | Stop reason: HOSPADM

## 2019-04-18 RX ORDER — IPRATROPIUM BROMIDE AND ALBUTEROL SULFATE 2.5; .5 MG/3ML; MG/3ML
3 SOLUTION RESPIRATORY (INHALATION) EVERY 4 HOURS
Status: DISCONTINUED | OUTPATIENT
Start: 2019-04-18 | End: 2019-04-21

## 2019-04-18 RX ORDER — ENOXAPARIN SODIUM 100 MG/ML
30 INJECTION SUBCUTANEOUS EVERY 24 HOURS
Status: DISCONTINUED | OUTPATIENT
Start: 2019-04-18 | End: 2019-04-19

## 2019-04-18 RX ORDER — MORPHINE SULFATE 10 MG/ML
INJECTION, SOLUTION INTRAMUSCULAR; INTRAVENOUS
Status: COMPLETED
Start: 2019-04-18 | End: 2019-04-18

## 2019-04-18 RX ORDER — PAROXETINE HYDROCHLORIDE 20 MG/1
30 TABLET, FILM COATED ORAL DAILY
Status: DISCONTINUED | OUTPATIENT
Start: 2019-04-19 | End: 2019-04-28 | Stop reason: HOSPADM

## 2019-04-18 RX ORDER — ONDANSETRON 2 MG/ML
4 INJECTION INTRAMUSCULAR; INTRAVENOUS
Status: COMPLETED | OUTPATIENT
Start: 2019-04-18 | End: 2019-04-18

## 2019-04-18 RX ORDER — HYDROMORPHONE HYDROCHLORIDE 1 MG/ML
2 INJECTION, SOLUTION INTRAMUSCULAR; INTRAVENOUS; SUBCUTANEOUS ONCE
Status: DISCONTINUED | OUTPATIENT
Start: 2019-04-18 | End: 2019-04-18

## 2019-04-18 RX ORDER — MIRTAZAPINE 15 MG/1
15 TABLET, FILM COATED ORAL
Status: DISCONTINUED | OUTPATIENT
Start: 2019-04-18 | End: 2019-04-28 | Stop reason: HOSPADM

## 2019-04-18 RX ORDER — MORPHINE SULFATE 10 MG/ML
2 INJECTION, SOLUTION INTRAMUSCULAR; INTRAVENOUS
Status: COMPLETED | OUTPATIENT
Start: 2019-04-18 | End: 2019-04-18

## 2019-04-18 RX ORDER — TAMSULOSIN HYDROCHLORIDE 0.4 MG/1
0.4 CAPSULE ORAL DAILY
Status: DISCONTINUED | OUTPATIENT
Start: 2019-04-19 | End: 2019-04-28 | Stop reason: HOSPADM

## 2019-04-18 RX ORDER — SODIUM CHLORIDE 0.9 % (FLUSH) 0.9 %
5-40 SYRINGE (ML) INJECTION AS NEEDED
Status: DISCONTINUED | OUTPATIENT
Start: 2019-04-18 | End: 2019-04-28 | Stop reason: HOSPADM

## 2019-04-18 RX ORDER — SODIUM CHLORIDE 0.9 % (FLUSH) 0.9 %
5-40 SYRINGE (ML) INJECTION EVERY 8 HOURS
Status: DISCONTINUED | OUTPATIENT
Start: 2019-04-18 | End: 2019-04-28 | Stop reason: HOSPADM

## 2019-04-18 RX ORDER — KETOROLAC TROMETHAMINE 30 MG/ML
10 INJECTION, SOLUTION INTRAMUSCULAR; INTRAVENOUS ONCE
Status: ACTIVE | OUTPATIENT
Start: 2019-04-18 | End: 2019-04-19

## 2019-04-18 RX ORDER — FAMOTIDINE 20 MG/1
20 TABLET, FILM COATED ORAL DAILY
Status: DISCONTINUED | OUTPATIENT
Start: 2019-04-19 | End: 2019-04-28 | Stop reason: HOSPADM

## 2019-04-18 RX ORDER — VANCOMYCIN 1.75 GRAM/500 ML IN 0.9 % SODIUM CHLORIDE INTRAVENOUS
1750 ONCE
Status: COMPLETED | OUTPATIENT
Start: 2019-04-18 | End: 2019-04-19

## 2019-04-18 RX ORDER — GABAPENTIN 300 MG/1
300 CAPSULE ORAL 2 TIMES DAILY
Status: DISCONTINUED | OUTPATIENT
Start: 2019-04-18 | End: 2019-04-28

## 2019-04-18 RX ORDER — ASPIRIN 81 MG/1
81 TABLET ORAL DAILY
Status: DISCONTINUED | OUTPATIENT
Start: 2019-04-19 | End: 2019-04-28 | Stop reason: HOSPADM

## 2019-04-18 RX ORDER — ATORVASTATIN CALCIUM 40 MG/1
40 TABLET, FILM COATED ORAL
Status: DISCONTINUED | OUTPATIENT
Start: 2019-04-18 | End: 2019-04-28 | Stop reason: HOSPADM

## 2019-04-18 RX ADMIN — AZITHROMYCIN MONOHYDRATE 500 MG: 500 INJECTION, POWDER, LYOPHILIZED, FOR SOLUTION INTRAVENOUS at 14:15

## 2019-04-18 RX ADMIN — ATORVASTATIN CALCIUM 40 MG: 40 TABLET, FILM COATED ORAL at 22:26

## 2019-04-18 RX ADMIN — ONDANSETRON 4 MG: 2 INJECTION INTRAMUSCULAR; INTRAVENOUS at 17:38

## 2019-04-18 RX ADMIN — Medication 10 ML: at 22:27

## 2019-04-18 RX ADMIN — VANCOMYCIN HYDROCHLORIDE 1750 MG: 10 INJECTION, POWDER, LYOPHILIZED, FOR SOLUTION INTRAVENOUS at 22:36

## 2019-04-18 RX ADMIN — MIRTAZAPINE 15 MG: 15 TABLET, FILM COATED ORAL at 22:26

## 2019-04-18 RX ADMIN — MORPHINE SULFATE 2 MG: 10 INJECTION, SOLUTION INTRAMUSCULAR; INTRAVENOUS at 17:39

## 2019-04-18 RX ADMIN — ACETAMINOPHEN 650 MG: 325 TABLET ORAL at 15:00

## 2019-04-18 RX ADMIN — GABAPENTIN 300 MG: 300 CAPSULE ORAL at 18:14

## 2019-04-18 RX ADMIN — SODIUM CHLORIDE 1000 ML: 900 INJECTION, SOLUTION INTRAVENOUS at 14:51

## 2019-04-18 RX ADMIN — IPRATROPIUM BROMIDE AND ALBUTEROL SULFATE 3 ML: .5; 3 SOLUTION RESPIRATORY (INHALATION) at 18:13

## 2019-04-18 RX ADMIN — FUROSEMIDE 20 MG: 20 TABLET ORAL at 18:14

## 2019-04-18 RX ADMIN — CEFEPIME 2 G: 2 INJECTION, POWDER, FOR SOLUTION INTRAVENOUS at 18:13

## 2019-04-18 RX ADMIN — CEFTRIAXONE 2 G: 2 INJECTION, POWDER, FOR SOLUTION INTRAMUSCULAR; INTRAVENOUS at 14:15

## 2019-04-18 NOTE — ROUTINE PROCESS
TRANSFER - OUT REPORT: 
 
Verbal report given to Tan PANCHAL(name) on Halima Lucio  being transferred to CVT Stepdown (unit) for routine progression of care Report consisted of patients Situation, Background, Assessment and  
Recommendations(SBAR). Information from the following report(s) SBAR was reviewed with the receiving nurse. Lines:  
Peripheral IV 04/18/19 Right Antecubital (Active) Site Assessment Clean, dry, & intact 4/18/2019  1:28 PM  
Phlebitis Assessment 0 4/18/2019  1:28 PM  
Infiltration Assessment 0 4/18/2019  1:28 PM  
  
 
Opportunity for questions and clarification was provided. Patient transported with: 
 Registered Nurse Tech

## 2019-04-18 NOTE — ED NOTES
Received pt from EMT, per pt she fell after attempting to help  who also fell. Per EMS, house smelt strong of urine.

## 2019-04-18 NOTE — TELEPHONE ENCOUNTER
Maricel Julien called-  Pt was seen this week- she said you had changed some meds and she needs a list of changes- she does up her medication for the PT- is she on diabetic medication?  Please advise

## 2019-04-18 NOTE — ED NOTES
EMERGENCY DEPARTMENT HISTORY AND PHYSICAL EXAM 
This was created with voice recognition software and transcription errors may be present. 2:00 PM 
Date: 4/18/2019 Patient Name: Zhanna Finn History of Presenting Illness Chief Complaint: Ahmad Shove History Provided By: Patient and Patient's Daughter HPI: Zhanna Finn is a 80 y.o. female with history of hypertension, restless leg syndrome, diabetes, peripheral neuropathy as well as recurrent falls who presented via EMS after a fall. Patient said that she got up a few times today but she experienced a fall. She did not have lightheadedness, heart palpitations or syncope but she did hit her head on the ground. According to her daughter, Ms. Nathan [address]  also had a fall and is also in the ER. Additionally, her daughter stated that Ms. Caitlyn Chávez is slowly having difficulties managing her own self and needs more assistance at home. PCP: Nevin Polk MD 
 
Past History Past Medical History: 
Past Medical History:  
Diagnosis Date  Chronic lung disease  Colon polyps  COPD 8-30-02  DDD (degenerative disc disease), lumbar 10/1/2014  Degeneration of lumbar or lumbosacral intervertebral disc  Depression  Diabetes ([address] 7-19-05  Diabetes mellitus ([address]  Diverticulosis   DM neuropathy, painful ([address] 10/1/2014  MOORE (Dyspnea on Exertion) 4-19-02  
 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   
 -MRI brain  Urinary tract infection, site not specified Past Surgical History: 
Past Surgical History:  
Procedure Laterality Date  HX APPENDECTOMY  HX CHOLECYSTECTOMY    HX HYSTERECTOMY    
 (+)DUB  HX POLYPECTOMY  CT COLONOSCOPY FLX DX W/COLLJ SPEC WHEN PFRMD  06  
 normal, Dr Jesenia Santos  CT COLONOSCOPY FLX DX W/COLLJ SPEC WHEN PFRMD    
 (+)polyp= tubular adenoma Family History: 
Family History Problem Relation Age of Onset  Colon Cancer Sister  Heart Disease Brother  Seizures Son  Colon Cancer Maternal Aunt Social History: 
Social History Tobacco Use  Smoking status: Former Smoker Packs/day: 1.00 Years: 57.00 Pack years: 57.00 Types: Cigarettes Last attempt to quit: 2018 Years since quittin.3  Smokeless tobacco: Never Used Substance Use Topics  Alcohol use: No  
 Drug use: No  
 
 
Allergies: Allergies Allergen Reactions  Levaquin [Levofloxacin] Rash Review of Systems Review of Systems Constitutional: Negative for chills and fever (Her VS demonstrate a fever but patient doesn't feel as if she is experiencing a fever). HENT: Positive for sore throat. Negative for congestion, mouth sores (No mouth sores but has a dry mouth), sinus pressure and sinus pain. Eyes: Negative for visual disturbance. Respiratory: Positive for cough (Productive) and shortness of breath. Negative for chest tightness and wheezing. Cardiovascular: Negative for chest pain and palpitations. Gastrointestinal: Negative for abdominal pain, constipation, diarrhea, nausea and vomiting. Musculoskeletal: Positive for neck pain. Negative for back pain. Skin: Negative for rash. Neurological: Positive for headaches. Negative for dizziness, syncope and numbness (No numbess down the legs but does endorse tingling sensation in the LEs bilaterally). 10 point review of systems otherwise negative unless noted in HPI. Physical Exam  
 
 
Physical Exam  
Constitutional: She is cooperative. Non-toxic appearance. She does not have a sickly appearance. She appears ill. No distress. Nasal cannula in place. HENT:  
Head: Normocephalic. Head is with abrasion. Head is without contusion and without laceration. Hair is normal.  
 
 
Right Ear: External ear normal.  
Left Ear: External ear normal.  
Nose: No rhinorrhea, nose lacerations or nasal deformity. Mouth/Throat: Mucous membranes are not pale, dry and not cyanotic. No oropharyngeal exudate. Eyes: Pupils are equal, round, and reactive to light. Conjunctivae and EOM are normal. Right eye exhibits no discharge. Left eye exhibits no discharge. No scleral icterus. Neck: Normal range of motion. Neck supple. No JVD present. No tracheal deviation present. Cardiovascular: Normal rate, regular rhythm, normal heart sounds and intact distal pulses. Exam reveals no gallop and no friction rub. No murmur heard. Pulmonary/Chest: Effort normal. No respiratory distress. Diffuse coarse rhonchi bilaterally Musculoskeletal: She exhibits no edema, tenderness or deformity. Neurological: She is alert. She has normal reflexes. She is disoriented (Oriented to person and place but not to time). She displays normal reflexes. No cranial nerve deficit. She exhibits normal muscle tone. Skin: Skin is warm and dry. No erythema. Psychiatric: She has a normal mood and affect.  Thought content normal.  
 
 Diagnostic Study Results Vital Signs: Pulse ox is in the low to mid 80s, RR 20 and HR >90 EKG: Right bundle branch block (appears chronic). Labs: Septic Bundle: CMP, CBC, Lactic Acid, Blood Cx, U/A Imaging: CXR Medical Decision Making ED Course: Progress Notes, Reevaluation, and Consults: 
Patient is a 80 y.o. female with history of end stage COPD, CKD, hypertension, restless leg syndrome, diabetes, peripheral neuropathy as well as recurrent falls who presented via EMS after a fall. She has a productive cough, fever, hypoxia (on 5LNC) as well as tachycardia and tachypnea. Will start sepsis bundle. Lactic acid normal. Will start CTX and Azithromycin. Given hypoxia, will order ABG. Will await further labs but likely will need admission for additional work up. 
 
3:57 PM: Po2 of 54 on 4LNC. CXR with probable infiltrates in the lower lobes. U/A with possible pyuria. Will order urine culture. Will need admission for further work up. Discussed with patient and daughter. 4:23 PM: Discussed with Dr. Santana Cuellar. Will admit to step-down given hypoxia. Additionally, I also discussed with patient and patient's daughter regarding intubation status. Patient said that she does not desire intubation. We will place patient on high flow given low PaO2. Will order IV Vancomycin as well as Cefepime given the fact that she was hospitalized recently and intubated (to cover for MRSA). Provider Notes (Medical Decision Making):  
 
  
 
Diagnosis Clinical Impression: 1. Hypoxia 2. Fall, sequela Disposition: 
 
Patient's Medications Start Taking No medications on file Continue Taking ALBUTEROL-IPRATROPIUM (DUO-NEB) 2.5 MG-0.5 MG/3 ML NEBU    3 mL by Nebulization route every four (4) hours. ASPIRIN DELAYED-RELEASE 81 MG TABLET    Take 1 Tab by mouth daily. ATORVASTATIN (LIPITOR) 40 MG TABLET    Take 1 Tab by mouth nightly. FAMOTIDINE (PEPCID) 20 MG TABLET    Take 1 Tab by mouth daily. FUROSEMIDE (LASIX) 20 MG TABLET    Take 1 Tab by mouth every fourty-eight (48) hours. GABAPENTIN (NEURONTIN) 300 MG CAPSULE    1 capsule by mouth morning, 1 capsule at midday and 2 capsules at bedtime GLIPIZIDE (GLUCOTROL) 5 MG TABLET    1 tablet by mouth daily INHALATIONAL SPACING DEVICE (AEROCHAMBER MV)    1 Each by Does Not Apply route as needed. INSULIN ASPART U-100 (NOVOLOG) 100 UNIT/ML INPN    2 Units by SubCUTAneous route Before breakfast, lunch, and dinner. Per sliding scale- 0-200- 0 units, 201-250- 2 units, 251-300- 4 units, 301-350-6 units, 351-400-8 units, 401-450- 10 units and Call MD.  
 MIRTAZAPINE (REMERON) 15 MG TABLET    Take 1 Tab by mouth nightly. NEBULIZER & COMPRESSOR (PORTABLE NEBULIZER SYSTEM) MACHINE    1 Each by Does Not Apply route every six (6) hours as needed. OXYGEN-AIR DELIVERY SYSTEMS    3 L by Nasal route continuous. PAROXETINE (PAXIL) 30 MG TABLET    TAKE 1 TABLET BY MOUTH  DAILY  
 ROPINIROLE (REQUIP) 1 MG TABLET    TAKE ONE-HALF TO ONE TABLET BY MOUTH ONCE NIGHTLY AS NEEDED FOR  RESTLESS  LEGS  FOR  UP  TO  90  DAYS  
 SIMVASTATIN (ZOCOR) 20 MG TABLET    Take 1 Tab by mouth nightly. TAMSULOSIN (FLOMAX) 0.4 MG CAPSULE    Take 1 Cap by mouth daily. These Medications have changed No medications on file Stop Taking No medications on file

## 2019-04-18 NOTE — H&P
History & Physical 
Patient: Zhanna Finn MRN: [id number]  CSN: [id number] YOB: 1933  Age: 80 y.o. Sex: female DOA: 4/18/2019 Chief Complaint:  
Chief Complaint Patient presents with  Fall  Back Pain HPI:  
 
Zhanna Finn is a 80 y.o. female with history of hypertension, restless leg syndrome, diabetes, peripheral neuropathy as well as recurrent falls who presented via EMS after a fall at home. Patient said that she got up a few times today but she experienced a fall while trying to get out of chair and stated she hit her head but has no neurologic deficit. She did not have lightheadedness, heart palpitations or syncope but she did hit her head on the ground. According to her daughter, Ms. Evans [address]  also had a fall and is also in the ER. Additionally, her daughter stated that Ms. Caitlyn Chávez is slowly having difficulties managing her own self and needs more assistance at home. Patient was found to have high grade fever, chills, and cough She will be admitted for management of HAP and fall CT head was not done by ER, Ordered during this assessment Past Medical History:  
Diagnosis Date  Chronic lung disease  Colon polyps  COPD 8-30-02  DDD (degenerative disc disease), lumbar 10/1/2014  Degeneration of lumbar or lumbosacral intervertebral disc  Depression  Diabetes ([address] 7-19-05  Diabetes mellitus ([facility]  Diverticulosis   DM neuropathy, painful ([facility] 10/1/2014  MOORE (Dyspnea on Exertion) 4-19-02  
 echo: +mild LVH, MK03-22% w/ diastolic dysfxn  Glaucoma  HCAP (healthcare-associated pneumonia) 03/24/2017  Hemorrhoids 6-6-06  Hyperlipidemia 5/17/2011  Hypertension 4-16-02  LBP (low back pain) 4-13-04  Low back pain radiating to both legs 10/1/2014  Lumbago  Lumbar disc herniation 10/1/2014  Lumbar facet arthropathy 10/1/2014  Lumbosacral radiculopathy at L5 10/1/2014  Lumbosacral radiculopathy at S1 10/1/2014  Microscopic hematuria  OA (osteoarthritis)  Overactive bladder 2011  
 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  RLS (restless legs syndrome) 2013  Sciatica  Shortness of breath  Spinal stenosis, lumbar region, without neurogenic claudication  Spondylolisthesis of lumbar region 10/1/2014  Spondylolisthesis, grade 1 10/1/2014  Stage 4 very severe COPD by GOLD classification ([facility] [address] 2019  Stress urinary incontinence  Syncope   
 -MRI brain  Urinary tract infection, site not specified Past Surgical History:  
Procedure Laterality Date  HX APPENDECTOMY  HX CHOLECYSTECTOMY    HX HYSTERECTOMY    
 (+)DUB  HX POLYPECTOMY  MN COLONOSCOPY FLX DX W/COLLJ SPEC WHEN PFRMD  06  
 normal, Dr Saray Lincoln  MN COLONOSCOPY FLX DX W/COLLJ SPEC WHEN PFRMD    
 (+)polyp= tubular adenoma Family History Problem Relation Age of Onset  Colon Cancer Sister  Heart Disease Brother  Seizures Son  Colon Cancer Maternal Aunt Social History Socioeconomic History  Marital status:  Spouse name: Not on file  Number of children: Not on file  Years of education: Not on file  Highest education level: Not on file Tobacco Use  Smoking status: Former Smoker Packs/day: 1.00 Years: 57.00 Pack years: 57.00 Types: Cigarettes Last attempt to quit: 2018 Years since quittin.3  Smokeless tobacco: Never Used Substance and Sexual Activity  Alcohol use: No  
 Drug use: No  
 Sexual activity: Not Currently Prior to Admission medications Medication Sig Start Date End Date Taking?  Authorizing Provider  
glipiZIDE (GLUCOTROL) 5 mg tablet 1 tablet by mouth daily 19  Yes Saul Skinner MD  
 insulin aspart U-100 (NOVOLOG) 100 unit/mL inpn 2 Units by SubCUTAneous route Before breakfast, lunch, and dinner. Per sliding scale- 0-200- 0 units, 201-250- 2 units, 251-300- 4 units, 301-350-6 units, 351-400-8 units, 401-450- 10 units and Call MD.   Yes Provider, Historical  
albuterol-ipratropium (DUO-NEB) 2.5 mg-0.5 mg/3 ml nebu 3 mL by Nebulization route every four (4) hours. 3/15/19  Yes Julio Cesar Rosenberg MD  
famotidine (PEPCID) 20 mg tablet Take 1 Tab by mouth daily. 3/16/19  Yes Julio Cesar Rosenberg MD  
furosemide (LASIX) 20 mg tablet Take 1 Tab by mouth every fourty-eight (48) hours. 3/15/19  Yes Julio Cesar Rosenberg MD  
atorvastatin (LIPITOR) 40 mg tablet Take 1 Tab by mouth nightly. 3/15/19  Yes Julio Cesar Rosenberg MD  
rOPINIRole (REQUIP) 1 mg tablet TAKE ONE-HALF TO ONE TABLET BY MOUTH ONCE NIGHTLY AS NEEDED FOR  RESTLESS  LEGS  FOR  UP  TO  90  DAYS 3/10/19  Yes Wild Gray MD  
PARoxetine (PAXIL) 30 mg tablet TAKE 1 TABLET BY MOUTH  DAILY 1/29/19  Yes Wild Gray MD  
mirtazapine (REMERON) 15 mg tablet Take 1 Tab by mouth nightly. 11/5/18  Yes Wild Gray MD  
tamsulosin (FLOMAX) 0.4 mg capsule Take 1 Cap by mouth daily. 10/29/18  Yes Wild Gray MD  
gabapentin (NEURONTIN) 300 mg capsule 1 capsule by mouth morning, 1 capsule at midday and 2 capsules at bedtime 10/22/18  Yes Wild Gray MD  
simvastatin (ZOCOR) 20 mg tablet Take 1 Tab by mouth nightly. 10/16/18  Yes Wild Gray MD  
aspirin delayed-release 81 mg tablet Take 1 Tab by mouth daily. 8/10/18  Yes Sherly Macias MD  
inhalational spacing device (AEROCHAMBER MV) 1 Each by Does Not Apply route as needed. 7/27/18  Yes Kodi Dior MD  
OXYGEN-AIR DELIVERY SYSTEMS 3 L by Nasal route continuous. Yes Provider, Historical  
Nebulizer & Compressor (PORTABLE NEBULIZER SYSTEM) machine 1 Each by Does Not Apply route every six (6) hours as needed.  2/10/18  Yes Ritesh Alyssa Quinn PA-C Allergies Allergen Reactions  Levaquin [Levofloxacin] Rash Review of Systems GENERAL: Patient alert, awake and oriented times 3, able to communicate full sentences and not in distress. HEENT: No change in vision, no earache, tinnitus, sore throat or sinus congestion. NECK: No pain or stiffness. PULMONARY: cough shortness of breath, and wheeze. Cardiovascular: no pnd or orthopnea, no CP GASTROINTESTINAL: No abdominal pain, nausea, vomiting or diarrhea, melena or bright red blood per rectum. GENITOURINARY: No urinary frequency, urgency, hesitancy or dysuria. MUSCULOSKELETAL: No joint or muscle pain, no back pain, no recent trauma. DERMATOLOGIC: No rash, no itching, no lesions. ENDOCRINE: No polyuria, polydipsia, no heat or cold intolerance. No recent change in weight. HEMATOLOGICAL: No anemia or easy bruising or bleeding. NEUROLOGIC: No headache, seizures, numbness, tingling or weakness. Physical Exam:  
 
Physical Exam: 
Visit Vitals /68 Pulse 97 Temp (!) 102.1 °F (38.9 °C) Resp 20 SpO2 (!) 82% O2 Flow Rate (L/min): 5 l/min O2 Device: Nasal cannula Temp (24hrs), Av.1 °F (38.9 °C), Min:102.1 °F (38.9 °C), Max:102.1 °F (38.9 °C) No intake/output data recorded. No intake/output data recorded. General:  Alert, cooperative, no distress, appears stated age. Head: Normocephalic, without obvious abnormality, atraumatic. Eyes:  Conjunctivae/corneas clear. PERRL, EOMs intact. Nose: Nares normal. No drainage or sinus tenderness. Neck: Supple, symmetrical, trachea midline, no adenopathy, thyroid: no enlargement, no carotid bruit and no JVD. Lungs:   Bilateral wheezes Heart:  Regular rate and rhythm, S1, S2 normal.  
  Abdomen: Soft, non-tender. Bowel sounds normal.   
Extremities: Extremities normal, atraumatic, no cyanosis or edema. Pulses: 2+ and symmetric all extremities. Skin:  No rashes or lesions Neurologic: AAOx3, No focal motor or sensory deficit. Labs Reviewed: 
 
BMP:  
Lab Results Component Value Date/Time  (L) 04/18/2019 12:20 PM  
 K 3.6 04/18/2019 12:20 PM  
 CL 96 (L) 04/18/2019 12:20 PM  
 CO2 38 (H) 04/18/2019 12:20 PM  
 AGAP 1 (L) 04/18/2019 12:20 PM  
  (H) 04/18/2019 12:20 PM  
 BUN 14 04/18/2019 12:20 PM  
 CREA 0.76 04/18/2019 12:20 PM  
 GFRAA >60 04/18/2019 12:20 PM  
 GFRNA >60 04/18/2019 12:20 PM  
 
CMP:  
Lab Results Component Value Date/Time  (L) 04/18/2019 12:20 PM  
 K 3.6 04/18/2019 12:20 PM  
 CL 96 (L) 04/18/2019 12:20 PM  
 CO2 38 (H) 04/18/2019 12:20 PM  
 AGAP 1 (L) 04/18/2019 12:20 PM  
  (H) 04/18/2019 12:20 PM  
 BUN 14 04/18/2019 12:20 PM  
 CREA 0.76 04/18/2019 12:20 PM  
 GFRAA >60 04/18/2019 12:20 PM  
 GFRNA >60 04/18/2019 12:20 PM  
 CA 8.8 04/18/2019 12:20 PM  
 ALB 3.0 (L) 04/18/2019 12:20 PM  
 TP 6.6 04/18/2019 12:20 PM  
 GLOB 3.6 04/18/2019 12:20 PM  
 AGRAT 0.8 04/18/2019 12:20 PM  
 SGOT 18 04/18/2019 12:20 PM  
 ALT 14 04/18/2019 12:20 PM  
 
All Cardiac Markers in the last 24 hours: No results found for: CPK, CK, CKMMB, CKMB, RCK3, CKMBT, CKNDX, CKND1, BRY, TROPT, TROIQ, ADIS, TROPT, TNIPOC, BNP, BNPP 
ABG:  
Lab Results Component Value Date/Time PHI 7.384 04/18/2019 03:23 PM  
 PCO2I 57.1 (H) 04/18/2019 03:23 PM  
 PO2I 54 (L) 04/18/2019 03:23 PM  
 HCO3I 34.0 (H) 04/18/2019 03:23 PM  
 
 
Procedures/imaging: see electronic medical records for all procedures/Xrays and details which were not copied into this note but were reviewed prior to creation of Plan Assessment/Plan Active Problems: 
 Acute on chronic Hypercarbic hypoxic respiratory failure Hospital associated Pneumonia COPD Exacerbation  
  
-Admit patient to stepdown Unit  
-Continue Nasal cannula 5L/ min, on 3 lit at home  
- nebulizer - START solumedrol 50 MG q 12  
- Start Cefepime and vanc - Follow blood and urine culture - BiPAP if needed  
-Patient is DNR/DNI 
 - continue Home medications: ASA Lipitor, PEPCID, gabapentin, Rameron and Paxil Pt/ot EVAL  
- CT head DVT/GI Prophylaxis: Lovenox Discussed with patient and daughter at bedside about hospital admission and my plan care, both understood and agree with my plan care.  
 
Melanie Butler MD 
4/18/2019 4:35 PM

## 2019-04-19 ENCOUNTER — APPOINTMENT (OUTPATIENT)
Dept: GENERAL RADIOLOGY | Age: 84
DRG: 189 | End: 2019-04-19
Attending: INTERNAL MEDICINE
Payer: MEDICARE

## 2019-04-19 ENCOUNTER — APPOINTMENT (OUTPATIENT)
Dept: CT IMAGING | Age: 84
DRG: 189 | End: 2019-04-19
Attending: INTERNAL MEDICINE
Payer: MEDICARE

## 2019-04-19 LAB
ANION GAP SERPL CALC-SCNC: 4 MMOL/L (ref 3–18)
ARTERIAL PATENCY WRIST A: YES
BASE EXCESS BLD CALC-SCNC: 12 MMOL/L
BDY SITE: ABNORMAL
BODY TEMPERATURE: 37
BUN SERPL-MCNC: 11 MG/DL (ref 7–18)
BUN/CREAT SERPL: 18 (ref 12–20)
CALCIUM SERPL-MCNC: 8.1 MG/DL (ref 8.5–10.1)
CHLORIDE SERPL-SCNC: 96 MMOL/L (ref 100–108)
CO2 SERPL-SCNC: 35 MMOL/L (ref 21–32)
CREAT SERPL-MCNC: 0.62 MG/DL (ref 0.6–1.3)
GAS FLOW.O2 O2 DELIVERY SYS: ABNORMAL L/MIN
GAS FLOW.O2 SETTING OXYMISER: 40 L/M
GLUCOSE BLD STRIP.AUTO-MCNC: 111 MG/DL (ref 70–110)
GLUCOSE BLD STRIP.AUTO-MCNC: 117 MG/DL (ref 70–110)
GLUCOSE BLD STRIP.AUTO-MCNC: 143 MG/DL (ref 70–110)
GLUCOSE BLD STRIP.AUTO-MCNC: 265 MG/DL (ref 70–110)
GLUCOSE SERPL-MCNC: 114 MG/DL (ref 74–99)
HCO3 BLD-SCNC: 37.9 MMOL/L (ref 22–26)
O2/TOTAL GAS SETTING VFR VENT: 62 %
PCO2 BLD: 71.7 MMHG (ref 35–45)
PH BLD: 7.33 [PH] (ref 7.35–7.45)
PO2 BLD: 69 MMHG (ref 80–100)
POTASSIUM SERPL-SCNC: 3.1 MMOL/L (ref 3.5–5.5)
SAO2 % BLD: 91 % (ref 92–97)
SERVICE CMNT-IMP: ABNORMAL
SODIUM SERPL-SCNC: 135 MMOL/L (ref 136–145)
SPECIMEN TYPE: ABNORMAL
TOTAL RESP. RATE, ITRR: 22

## 2019-04-19 PROCEDURE — 82962 GLUCOSE BLOOD TEST: CPT

## 2019-04-19 PROCEDURE — 36415 COLL VENOUS BLD VENIPUNCTURE: CPT

## 2019-04-19 PROCEDURE — 65660000004 HC RM CVT STEPDOWN

## 2019-04-19 PROCEDURE — 71045 X-RAY EXAM CHEST 1 VIEW: CPT

## 2019-04-19 PROCEDURE — 94660 CPAP INITIATION&MGMT: CPT

## 2019-04-19 PROCEDURE — 3331090002 HH PPS REVENUE DEBIT

## 2019-04-19 PROCEDURE — 74011636637 HC RX REV CODE- 636/637: Performed by: EMERGENCY MEDICINE

## 2019-04-19 PROCEDURE — 36600 WITHDRAWAL OF ARTERIAL BLOOD: CPT

## 2019-04-19 PROCEDURE — 74011250636 HC RX REV CODE- 250/636: Performed by: EMERGENCY MEDICINE

## 2019-04-19 PROCEDURE — 94640 AIRWAY INHALATION TREATMENT: CPT

## 2019-04-19 PROCEDURE — 74011000250 HC RX REV CODE- 250: Performed by: INTERNAL MEDICINE

## 2019-04-19 PROCEDURE — 74011250636 HC RX REV CODE- 250/636: Performed by: FAMILY MEDICINE

## 2019-04-19 PROCEDURE — 71275 CT ANGIOGRAPHY CHEST: CPT

## 2019-04-19 PROCEDURE — 74011250637 HC RX REV CODE- 250/637: Performed by: INTERNAL MEDICINE

## 2019-04-19 PROCEDURE — 74011250636 HC RX REV CODE- 250/636: Performed by: INTERNAL MEDICINE

## 2019-04-19 PROCEDURE — 80048 BASIC METABOLIC PNL TOTAL CA: CPT

## 2019-04-19 PROCEDURE — 3331090001 HH PPS REVENUE CREDIT

## 2019-04-19 PROCEDURE — 74011000250 HC RX REV CODE- 250: Performed by: EMERGENCY MEDICINE

## 2019-04-19 PROCEDURE — 74011636320 HC RX REV CODE- 636/320: Performed by: EMERGENCY MEDICINE

## 2019-04-19 PROCEDURE — 82803 BLOOD GASES ANY COMBINATION: CPT

## 2019-04-19 RX ORDER — POTASSIUM CHLORIDE 20 MEQ/1
20 TABLET, EXTENDED RELEASE ORAL DAILY
Status: DISCONTINUED | OUTPATIENT
Start: 2019-04-19 | End: 2019-04-21

## 2019-04-19 RX ORDER — ENOXAPARIN SODIUM 100 MG/ML
1 INJECTION SUBCUTANEOUS EVERY 12 HOURS
Status: DISCONTINUED | OUTPATIENT
Start: 2019-04-19 | End: 2019-04-20

## 2019-04-19 RX ORDER — MAGNESIUM SULFATE 100 %
4 CRYSTALS MISCELLANEOUS AS NEEDED
Status: DISCONTINUED | OUTPATIENT
Start: 2019-04-19 | End: 2019-04-28 | Stop reason: HOSPADM

## 2019-04-19 RX ORDER — INSULIN LISPRO 100 [IU]/ML
INJECTION, SOLUTION INTRAVENOUS; SUBCUTANEOUS
Status: DISCONTINUED | OUTPATIENT
Start: 2019-04-19 | End: 2019-04-27

## 2019-04-19 RX ORDER — POTASSIUM CHLORIDE 20 MEQ/1
40 TABLET, EXTENDED RELEASE ORAL
Status: COMPLETED | OUTPATIENT
Start: 2019-04-19 | End: 2019-04-19

## 2019-04-19 RX ORDER — DEXTROSE 50 % IN WATER (D50W) INTRAVENOUS SYRINGE
25-50 AS NEEDED
Status: DISCONTINUED | OUTPATIENT
Start: 2019-04-19 | End: 2019-04-28 | Stop reason: HOSPADM

## 2019-04-19 RX ORDER — FUROSEMIDE 10 MG/ML
20 INJECTION INTRAMUSCULAR; INTRAVENOUS DAILY
Status: DISCONTINUED | OUTPATIENT
Start: 2019-04-19 | End: 2019-04-19

## 2019-04-19 RX ADMIN — PAROXETINE HYDROCHLORIDE 30 MG: 20 TABLET, FILM COATED ORAL at 09:47

## 2019-04-19 RX ADMIN — IPRATROPIUM BROMIDE AND ALBUTEROL SULFATE 3 ML: .5; 3 SOLUTION RESPIRATORY (INHALATION) at 00:00

## 2019-04-19 RX ADMIN — METHYLPREDNISOLONE SODIUM SUCCINATE 60 MG: 40 INJECTION, POWDER, FOR SOLUTION INTRAMUSCULAR; INTRAVENOUS at 16:24

## 2019-04-19 RX ADMIN — GABAPENTIN 300 MG: 300 CAPSULE ORAL at 08:31

## 2019-04-19 RX ADMIN — FAMOTIDINE 20 MG: 20 TABLET ORAL at 08:31

## 2019-04-19 RX ADMIN — Medication 10 ML: at 21:04

## 2019-04-19 RX ADMIN — POTASSIUM CHLORIDE 40 MEQ: 20 TABLET, EXTENDED RELEASE ORAL at 07:27

## 2019-04-19 RX ADMIN — Medication 10 ML: at 07:26

## 2019-04-19 RX ADMIN — FUROSEMIDE 20 MG: 10 INJECTION, SOLUTION INTRAMUSCULAR; INTRAVENOUS at 07:27

## 2019-04-19 RX ADMIN — IOPAMIDOL 80 ML: 755 INJECTION, SOLUTION INTRAVENOUS at 16:10

## 2019-04-19 RX ADMIN — IPRATROPIUM BROMIDE AND ALBUTEROL SULFATE 3 ML: .5; 3 SOLUTION RESPIRATORY (INHALATION) at 16:00

## 2019-04-19 RX ADMIN — GABAPENTIN 300 MG: 300 CAPSULE ORAL at 18:06

## 2019-04-19 RX ADMIN — VANCOMYCIN HYDROCHLORIDE 1250 MG: 10 INJECTION, POWDER, LYOPHILIZED, FOR SOLUTION INTRAVENOUS at 23:42

## 2019-04-19 RX ADMIN — ASPIRIN 81 MG: 81 TABLET, COATED ORAL at 08:31

## 2019-04-19 RX ADMIN — IPRATROPIUM BROMIDE AND ALBUTEROL SULFATE 3 ML: .5; 3 SOLUTION RESPIRATORY (INHALATION) at 04:44

## 2019-04-19 RX ADMIN — ATORVASTATIN CALCIUM 40 MG: 40 TABLET, FILM COATED ORAL at 21:39

## 2019-04-19 RX ADMIN — INSULIN LISPRO 6 UNITS: 100 INJECTION, SOLUTION INTRAVENOUS; SUBCUTANEOUS at 23:42

## 2019-04-19 RX ADMIN — TAMSULOSIN HYDROCHLORIDE 0.4 MG: 0.4 CAPSULE ORAL at 08:31

## 2019-04-19 RX ADMIN — ENOXAPARIN SODIUM 70 MG: 80 INJECTION SUBCUTANEOUS at 16:24

## 2019-04-19 RX ADMIN — ENOXAPARIN SODIUM 70 MG: 80 INJECTION SUBCUTANEOUS at 04:31

## 2019-04-19 RX ADMIN — CEFEPIME 2 G: 2 INJECTION, POWDER, FOR SOLUTION INTRAVENOUS at 18:12

## 2019-04-19 RX ADMIN — AZITHROMYCIN MONOHYDRATE 500 MG: 500 INJECTION, POWDER, LYOPHILIZED, FOR SOLUTION INTRAVENOUS at 16:25

## 2019-04-19 RX ADMIN — POTASSIUM CHLORIDE 20 MEQ: 20 TABLET, EXTENDED RELEASE ORAL at 16:24

## 2019-04-19 RX ADMIN — METHYLPREDNISOLONE SODIUM SUCCINATE 60 MG: 40 INJECTION, POWDER, FOR SOLUTION INTRAMUSCULAR; INTRAVENOUS at 21:39

## 2019-04-19 RX ADMIN — IPRATROPIUM BROMIDE AND ALBUTEROL SULFATE 3 ML: .5; 3 SOLUTION RESPIRATORY (INHALATION) at 20:24

## 2019-04-19 NOTE — NURSE NAVIGATOR
Important Message from [id number]" reviewed and explained with the patient and/or representative at bedside and signature was obtained. A signed copy provided to patient/representative. Original signed document placed in patient's chart. Sunshine Taylor. Jenny, MARINN, RN Care Management [phone]

## 2019-04-19 NOTE — ROUTINE PROCESS
[id number] rcved telephone report from ED nurse,  
 
1911 Bedside and Verbal shift change report given to [address] (oncoming nurse) by Giacomo RN (offgoing nurse). Report included the following information SBAR, Kardex and MAR.

## 2019-04-19 NOTE — PROGRESS NOTES
Kinetic Dosing- Initial Progress Note Pharmacy Consult ordered by Dr. Luke Johnston Indication: HAP Patient clinical status and labs ordered/reviewed. Pt Weight Weight: 74.8 kg (165 lb) Serum Creatinine Lab Results Component Value Date/Time Creatinine 0.76 04/18/2019 12:20 PM  
 Creatinine, POC 0.8 01/29/2013 08:02 AM  
 
   
Creatinine Clearance Estimated Creatinine Clearance: 54.8 mL/min (based on SCr of 0.76 mg/dL). BUN Lab Results Component Value Date/Time BUN 14 04/18/2019 12:20 PM  
 
   
WBC Lab Results Component Value Date/Time WBC 9.0 04/18/2019 12:20 PM  
 
  
Temperature Temp: 98.5 °F (36.9 °C) HR Pulse (Heart Rate): 91 
  
BP BP: 108/68 Kinetic Dosing Parameters: 
 Vd = 62L K = .035 hr-1 
            t ½ = 20h Drug Levels:  
Vancomycin No results for input(s): VANCP, VANCT, VANCR, VANRA in the last 72 hours. Gentamicin No results for input(s): GENP, GENT in the last 72 hours. No lab exists for component:  GENR Tobramycin No results for input(s): TOBP, TOBT, TOBR in the last 72 hours. Amikacin No results for input(s): Ivana Kos in the last 72 hours. No lab exists for component:  Rylee Calderon Dose for naïve patient was initiated at: Vancomycin 1750mg IV x1 then 1250mg q24h Continue to monitor Sign: ERI Leach [facility] Date: 4/18/2019 Time: 9:23 PM

## 2019-04-19 NOTE — NURSE NAVIGATOR
Reason for Admission:   Hypoxia [R09.02] RRAT Score:     54 Resources/supports as identified by patient/family:    
 
Top Challenges facing patient (as identified by patient/family and CM):     See below Finances/Medication cost?     Unknown at this time Transportation      States daughter will transport her Support system or lack thereof? Daughter, daughter in law Living arrangements? Lives with  Self-care/ADLs/Cognition? Daughter and daughter in law assists with self care/ADLS Current Advanced Directive/Advance Care Plan:   yes Plan for utilizing home health:    yes - active with [facility] Likelihood of readmission:   HIGH Transition of Care Plan:               
 
 
Initial assessment completed with patient. Cognitive status of patient: oriented to time, place, person and situation - however is forgetful (couldn't remember her daughter's last name) Face sheet information confirmed:  yes. The patient designates daughter, Clara Boyd,  to participate in her discharge plan and to receive any needed information. This patient lives in a single family home with . Patient is able to navigate steps as needed. Prior to hospitalization, patient was considered to be independent with ADLs/IADLS : no . If not independent,  patient needs assist with : dressing, bathing, food preparation, cooking, toileting and grooming Patient has a current ACP document on file: yes The daughter will be available to transport patient home upon discharge. The patient already has [address] equipment available in the home. Patient is currently active with home health. If active, agency name is [address] [place] [address]. Patient has stayed in a skilled nursing facility or rehab. Was  stay within last 60 days : yes. - was in [address] [place] [address] from 3/15/19-4/4/19 This patient is on dialysis :no 
 
 Freedom of choice signed: no. Currently, the discharge plan is Home with Home Health vs SNF. Patient's current insurance is Medicare. Care Management Interventions PCP Verified by CM: Yes(this pastweek) Mode of Transport at Discharge: Self Transition of Care Consult (CM Consult): Home Health, SNF Physical Therapy Consult: No 
Occupational Therapy Consult: No 
Current Support Network: Lives with Spouse Confirm Follow Up Transport: Family(if patient able) Discharge Location Discharge Placement: Home with home health vs SNF LONG Lew, RN Care Management [phone]

## 2019-04-19 NOTE — HOME CARE
Pt is active to [facility] for SN/PT/OT/HHA - will need orders to resume care to resume those services - [facility] will follow for d/c needs - CHRISTINE Chamberlain RN

## 2019-04-19 NOTE — DIABETES MGMT
Glycemic Control Plan of Care 
 
T2DM with current A1c of 8.1% (4/15/2019). See separate notes, 4/22/2019, for assessment of home diabetes management and education. Noted Home diabetes med list: reported by patient on 4/22/2019: 
Glipizide 5 mg daily. POC BG range on 4/21/2019: 182-313 mg/dL. POC BG report on 4/22/2019 at time of review: 192, 344, 323 mg/dl. Patient is currently on IV Solumedrol 40 mg every 12 hours. Recommendation(s): 
1.) Consider adding basal lantus insulin daily. Called Dr. Lewis Zhou and obtained order for lantus 5 units daily starting today, 4/22/2019. 
2.) Modified correctional lispro insulin to very resistant dose. Assessment: 
Patient is 80year old with past medical history including type 2 diabetes mellitus with neuroapthy, former smoker,  COPD, carotid artery stenosis, CHI, NSTEMI, CHF, and restless leg syndrome - was admitted on 4/18/2019 with report of fall, back, recurrent falls and back pain. Noted: 
Severe COPD. Acute respiratory failure. Most recent blood glucose values: 
 
Results for Lorene Fong (MRN [id number]) as of 4/22/2019 14:20 Ref. Range 4/21/2019 07:19 4/21/2019 12:17 4/21/2019 16:34 4/21/2019 21:54 GLUCOSE,FAST - POC Latest Ref Range: 70 - 110 mg/dL 182 (H) 313 (H) 275 (H) 219 (H) Results for Lorene Fong (MRN [id number]) as of 4/22/2019 14:20 Ref. Range 4/22/2019 07:32 4/22/2019 11:29 4/22/2019 12:18 GLUCOSE,FAST - POC Latest Ref Range: 70 - 110 mg/dL 192 (H) 344 (H) 323 (H) Current A1C: 8.1% (4/15/2019) which is equivalent to estimated average blood glucose of 186 mg/dL during the past 2-3 months. Current hospital diabetes medications: 
Lantus insuliln 5 units daily, first dose ordered 4/22/2019. Correctional lispro insulin ACHS. Very resistant dose. Total daily dose insulin requirement previous day: 4/21/2019 Lispro: 18 units Home diabetes medications: Patient reported on 4/22/2018: 
Glipizide 5 mg daily. Diet: Diabetic consistent carb regular. Goals:  Blood glucose will be within target range of  mg/dL by 4/22/2019. Education:  
_x__  Refer to Diabetes Education Record: 4/22/2019 
___  Education not indicated at this time Praneeth Hu RN [facility] Pager: [phone]

## 2019-04-19 NOTE — CONSULTS
3 [facility] Pulmonary, Critical Care, and Sleep Medicine Initial Patient Consult Name: Mari Be MRN: [id number] : 1933 Hospital: [address] [place] [address] Date: 2019 IMPRESSION:  
· Severe COPD per ATS criteria. · Acute on chronic hypoxic respiratory failure and additionally has chronic hypercarbic respiratory failure in a patient with a clear appearing CXR. No evidence of CHF, infiltrates or effusions. · Acute exacerbation of COPD. · Hx of pelvic fractures. · Chronic pain syndrome. RECOMMENDATIONS:  
· Continue supplemental oxygen to keep SpO2>90%. Currently on HFNC 40L at 80%. This significantly increased oxygen requirement with a relatively clear lung is suspicious of pulmonary embolism. Await CTA. · Consider BIPAP if develops hypercarbic respiratory failure. · Bronchial hygiene protocol. · Bronchodilators - Continue scheduled duo-nebz. · Patient has been started on treatment dose lovenox by the primary team.  
· Fall precautions. · Will defer management of chronic pain to the primary team.  
· Steroids - Consider starting on steroids and aim for a short course for 5-7 days. · Antibiotics- Agree with broad spectrum abx - but please de-escalate after 48 hours if no growth. · Aspiration precautions. · Need for further diagnostics- CTA scan, echocardiogram. 
· Out patient testing- PFT, 6 min walk, Polysomnogram 
· Assess home Oxygen needs at discharge · OT, PT, OOB and ambulate · Healthy weight · Will Follow · DVT, PUD prophylaxis Subjective: This patient has been seen and evaluated at the request of Dr. Leslie Zavala for evaluation of hypoxic respiratory failure. Patient is a 80 y.o. female with past medical history significant for hypertension, restless leg syndrome, diabetes, peripheral neuropathy as well as recurrent falls who presented after a fall. Patient was brought in by EMS.  According to patient she attempted to get up from the chair when she fell and hit her head. No LOC, nausea, vomiting headaches etc after the fall. On admission to ED patient was noted to be pyrexial and was admitted and treated for HAP. During the course of admission patient became increasingly hypoxic and her CXR on admission was clear which arose suspicion of PE.  CTA is awaited. Past Medical History:  
Diagnosis Date  Chronic lung disease  Colon polyps  COPD 8-30-02  DDD (degenerative disc disease), lumbar 10/1/2014  Degeneration of lumbar or lumbosacral intervertebral disc  Depression  Diabetes ([address] 7-19-05  Diabetes mellitus ([address]  Diverticulosis   DM neuropathy, painful ([address] 10/1/2014  MOORE (Dyspnea on Exertion) 4-19-02  
 echo: +mild LVH, KX31-56% w/ diastolic dysfxn  Glaucoma  HCAP (healthcare-associated pneumonia) 03/24/2017  Hemorrhoids 6-6-06  Hyperlipidemia 5/17/2011  Hypertension 4-16-02  LBP (low back pain) 4-13-04  Low back pain radiating to both legs 10/1/2014  Lumbago  Lumbar disc herniation 10/1/2014  Lumbar facet arthropathy 10/1/2014  Lumbosacral radiculopathy at L5 10/1/2014  Lumbosacral radiculopathy at S1 10/1/2014  Microscopic hematuria  OA (osteoarthritis)  Overactive bladder 5/17/2011  
 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  RLS (restless legs syndrome) 1/17/2013  Sciatica  Shortness of breath  Spinal stenosis, lumbar region, without neurogenic claudication  Spondylolisthesis of lumbar region 10/1/2014  Spondylolisthesis, grade 1 10/1/2014  Stage 4 very severe COPD by GOLD classification ([facility] [address] 2/25/2019  Stress urinary incontinence  Syncope   
 -MRI brain  Urinary tract infection, site not specified Past Surgical History:  
Procedure Laterality Date  HX APPENDECTOMY  HX CHOLECYSTECTOMY    HX HYSTERECTOMY    
 (+)DUB  HX POLYPECTOMY  WI COLONOSCOPY FLX DX W/COLLJ SPEC WHEN PFRMD  6-16-06  
 normal, Dr Filemon Nevarez  WI COLONOSCOPY FLX DX W/COLLJ SPEC WHEN PFRMD    
 (+)polyp= tubular adenoma Prior to Admission medications Medication Sig Start Date End Date Taking? Authorizing Provider  
glipiZIDE (GLUCOTROL) 5 mg tablet 1 tablet by mouth daily 4/16/19  Yes Nancy Colunga MD  
insulin aspart U-100 (NOVOLOG) 100 unit/mL inpn 2 Units by SubCUTAneous route Before breakfast, lunch, and dinner. Per sliding scale- 0-200- 0 units, 201-250- 2 units, 251-300- 4 units, 301-350-6 units, 351-400-8 units, 401-450- 10 units and Call MD.   Yes Provider, Historical  
albuterol-ipratropium (DUO-NEB) 2.5 mg-0.5 mg/3 ml nebu 3 mL by Nebulization route every four (4) hours. 3/15/19  Yes Jordan Jernigan MD  
famotidine (PEPCID) 20 mg tablet Take 1 Tab by mouth daily. 3/16/19  Yes Jordan Jernigan MD  
furosemide (LASIX) 20 mg tablet Take 1 Tab by mouth every fourty-eight (48) hours. 3/15/19  Yes Jordan Jernigan MD  
atorvastatin (LIPITOR) 40 mg tablet Take 1 Tab by mouth nightly. 3/15/19  Yes Jordan Jernigan MD  
rOPINIRole (REQUIP) 1 mg tablet TAKE ONE-HALF TO ONE TABLET BY MOUTH ONCE NIGHTLY AS NEEDED FOR  RESTLESS  LEGS  FOR  UP  TO  90  DAYS 3/10/19  Yes Nancy Colunga MD  
PARoxetine (PAXIL) 30 mg tablet TAKE 1 TABLET BY MOUTH  DAILY 1/29/19  Yes Nancy Colunga MD  
mirtazapine (REMERON) 15 mg tablet Take 1 Tab by mouth nightly. 11/5/18  Yes Nancy Colunga MD  
tamsulosin (FLOMAX) 0.4 mg capsule Take 1 Cap by mouth daily. 10/29/18  Yes Nancy Colunga MD  
gabapentin (NEURONTIN) 300 mg capsule 1 capsule by mouth morning, 1 capsule at midday and 2 capsules at bedtime 10/22/18  Yes Nancy Colunga MD  
simvastatin (ZOCOR) 20 mg tablet Take 1 Tab by mouth nightly.  10/16/18  Yes Nancy Colunga MD  
 aspirin delayed-release 81 mg tablet Take 1 Tab by mouth daily. 8/10/18  Yes Alida Little MD  
inhalational spacing device (AEROCHAMBER MV) 1 Each by Does Not Apply route as needed. 18  Yes Alba Stark MD  
OXYGEN-AIR DELIVERY SYSTEMS 3 L by Nasal route continuous. Yes Provider, Historical  
Nebulizer & Compressor (PORTABLE NEBULIZER SYSTEM) machine 1 Each by Does Not Apply route every six (6) hours as needed. 2/10/18  Yes Pa Perez PA-C Allergies Allergen Reactions  Levaquin [Levofloxacin] Rash Social History Tobacco Use  Smoking status: Former Smoker Packs/day: 1.00 Years: 57.00 Pack years: 57.00 Types: Cigarettes Last attempt to quit: 2018 Years since quittin.3  Smokeless tobacco: Never Used Substance Use Topics  Alcohol use: No  
  
Family History Problem Relation Age of Onset  Colon Cancer Sister  Heart Disease Brother  Seizures Son  Colon Cancer Maternal Aunt @[id number]@ Immunization status: up to date and documented, stated as current, but no records available. Current Facility-Administered Medications Medication Dose Route Frequency  enoxaparin (LOVENOX) injection 70 mg  1 mg/kg SubCUTAneous Q12H  furosemide (LASIX) injection 20 mg  20 mg IntraVENous DAILY  potassium chloride (K-DUR, KLOR-CON) SR tablet 20 mEq  20 mEq Oral DAILY  azithromycin (ZITHROMAX) 500 mg in  mL  500 mg IntraVENous Q24H  
 albuterol-ipratropium (DUO-NEB) 2.5 MG-0.5 MG/3 ML  3 mL Nebulization Q4H  
 aspirin delayed-release tablet 81 mg  81 mg Oral DAILY  atorvastatin (LIPITOR) tablet 40 mg  40 mg Oral QHS  famotidine (PEPCID) tablet 20 mg  20 mg Oral DAILY  gabapentin (NEURONTIN) capsule 300 mg  300 mg Oral BID  mirtazapine (REMERON) tablet 15 mg  15 mg Oral QHS  PARoxetine (PAXIL) tablet 30 mg  30 mg Oral DAILY  tamsulosin (FLOMAX) capsule 0.4 mg  0.4 mg Oral DAILY  sodium chloride (NS) flush 5-40 mL  5-40 mL IntraVENous Q8H  
 vancomycin (VANCOCIN) 1250 mg in  ml infusion  1,250 mg IntraVENous Q24H Review of Systems: 
Pertinent items are noted in HPI. Objective: 
Vital Signs:   
Visit Vitals /84 (BP 1 Location: Left arm, BP Patient Position: At rest) Pulse 91 Temp 98.2 °F (36.8 °C) Resp 20 Wt 74.5 kg (164 lb 4.8 oz) SpO2 100% BMI 27.34 kg/m² O2 Device: Hi flow nasal cannula O2 Flow Rate (L/min): 40 l/min Temp (24hrs), Av.5 °F (37.5 °C), Min:98.2 °F (36.8 °C), Max:102.1 °F (38.9 °C) Intake/Output:  
Last shift:       07 - 1900 In: 360 [P.O.:360] Out: - Last 3 shifts:  190 -  0700 In: -  
Out: 1500 [Urine:1500] Intake/Output Summary (Last 24 hours) at 2019 1226 Last data filed at 2019 8454 Gross per 24 hour Intake 360 ml Output 1500 ml Net -1140 ml Physical Exam:  
General:  Alert, cooperative, no distress, appears stated age. Head:  Normocephalic, without obvious abnormality, atraumatic. Eyes:  Conjunctivae/corneas clear. PERRL, EOMs intact. Nose: Nares normal. Septum midline. Mucosa normal. No drainage or sinus tenderness. Throat: Lips, mucosa, and tongue normal. Teeth and gums normal.  
Neck: Supple, symmetrical, trachea midline, no adenopathy, thyroid: no enlargment/tenderness/nodules, no carotid bruit and no JVD. Back:   Symmetric, no curvature. ROM normal.  
Lungs:   Bilateral wheeze and coarse breath sounds bilaterally. Chest wall:  No tenderness or deformity. Heart:  Regular rate and rhythm, S1, S2 normal, no murmur, click, rub or gallop. Abdomen:   Soft, non-tender. Bowel sounds normal. No masses,  No organomegaly. Extremities: Extremities normal, atraumatic, no cyanosis or edema. Pulses: 2+ and symmetric all extremities. Skin: Skin color, texture, turgor normal. No rashes or lesions Lymph nodes: Cervical, supraclavicular, and axillary nodes normal.  
Neurologic: Grossly nonfocal  
 
Data review:  
 
Recent Results (from the past 24 hour(s)) POC LACTIC ACID Collection Time: 04/18/19  1:34 PM  
Result Value Ref Range Lactic Acid (POC) 1.43 0.40 - 2.00 mmol/L  
EKG, 12 LEAD, INITIAL Collection Time: 04/18/19  2:08 PM  
Result Value Ref Range Ventricular Rate 92 BPM  
 Atrial Rate 92 BPM  
 P-R Interval 154 ms QRS Duration 120 ms  
 Q-T Interval 394 ms QTC Calculation (Bezet) 487 ms Calculated P Axis 64 degrees Calculated R Axis 60 degrees Calculated T Axis 40 degrees Diagnosis Normal sinus rhythm Right bundle branch block Abnormal ECG When compared with ECG of 16-MAR-2019 03:01, 
premature ventricular complexes are no longer present Left anterior fascicular block is no longer present T wave inversion no longer evident in Inferior leads T wave inversion no longer evident in Anterior leads URINALYSIS W/ RFLX MICROSCOPIC Collection Time: 04/18/19  3:05 PM  
Result Value Ref Range Color YELLOW Appearance TURBID Specific gravity 1.015 1.005 - 1.030    
 pH (UA) 8.5 (H) 5.0 - 8.0 Protein 30 (A) NEG mg/dL Glucose NEGATIVE  NEG mg/dL Ketone TRACE (A) NEG mg/dL Bilirubin NEGATIVE  NEG Blood NEGATIVE  NEG Urobilinogen 1.0 0.2 - 1.0 EU/dL Nitrites NEGATIVE  NEG Leukocyte Esterase MODERATE (A) NEG URINE MICROSCOPIC ONLY Collection Time: 04/18/19  3:05 PM  
Result Value Ref Range WBC 4 to 11 0 - 4 /hpf  
 RBC NEGATIVE  0 - 5 /hpf Epithelial cells 1+ 0 - 5 /lpf Bacteria 4+ (A) NEG /hpf Amorphous Crystals 2+ (A) NEG Triple Phosphate crystals 2+ (A) NEG  
POC G3 Collection Time: 04/18/19  3:23 PM  
Result Value Ref Range Device: NASAL CANNULA Flow rate (POC) 5 L/M  
 pH (POC) 7.384 7.35 - 7.45    
 pCO2 (POC) 57.1 (H) 35.0 - 45.0 MMHG  
 pO2 (POC) 54 (L) 80 - 100 MMHG HCO3 (POC) 34.0 (H) 22 - 26 MMOL/L  
 sO2 (POC) 86 (L) 92 - 97 % Base excess (POC) 7 mmol/L Allens test (POC) YES Site LEFT RADIAL Specimen type (POC) ARTERIAL Performed by Yolanda Duran LACTIC ACID Collection Time: 04/18/19  9:40 PM  
Result Value Ref Range Lactic acid 0.7 0.4 - 2.0 MMOL/L  
METABOLIC PANEL, COMPREHENSIVE Collection Time: 04/18/19  9:40 PM  
Result Value Ref Range Sodium 136 136 - 145 mmol/L Potassium 3.4 (L) 3.5 - 5.5 mmol/L Chloride 97 (L) 100 - 108 mmol/L  
 CO2 39 (H) 21 - 32 mmol/L Anion gap 0 (L) 3.0 - 18 mmol/L Glucose 134 (H) 74 - 99 mg/dL BUN 12 7.0 - 18 MG/DL Creatinine 0.71 0.6 - 1.3 MG/DL  
 BUN/Creatinine ratio 17 12 - 20 GFR est AA >60 >60 ml/min/1.73m2 GFR est non-AA >60 >60 ml/min/1.73m2 Calcium 8.4 (L) 8.5 - 10.1 MG/DL Bilirubin, total 0.5 0.2 - 1.0 MG/DL  
 ALT (SGPT) 17 13 - 56 U/L  
 AST (SGOT) 37 15 - 37 U/L Alk. phosphatase 158 (H) 45 - 117 U/L Protein, total 6.6 6.4 - 8.2 g/dL Albumin 2.9 (L) 3.4 - 5.0 g/dL Globulin 3.7 2.0 - 4.0 g/dL A-G Ratio 0.8 0.8 - 1.7    
CBC WITH AUTOMATED DIFF Collection Time: 04/18/19  9:40 PM  
Result Value Ref Range WBC 7.1 4.6 - 13.2 K/uL  
 RBC 4.29 4. 20 - 5.30 M/uL  
 HGB 11.9 (L) 12.0 - 16.0 g/dL HCT 38.6 35.0 - 45.0 % MCV 90.0 74.0 - 97.0 FL  
 MCH 27.7 24.0 - 34.0 PG  
 MCHC 30.8 (L) 31.0 - 37.0 g/dL  
 RDW 16.4 (H) 11.6 - 14.5 % PLATELET 614 233 - 297 K/uL MPV 10.1 9.2 - 11.8 FL  
 NEUTROPHILS 59 40 - 73 % LYMPHOCYTES 26 21 - 52 % MONOCYTES 14 (H) 3 - 10 % EOSINOPHILS 1 0 - 5 % BASOPHILS 0 0 - 2 %  
 ABS. NEUTROPHILS 4.1 1.8 - 8.0 K/UL  
 ABS. LYMPHOCYTES 1.9 0.9 - 3.6 K/UL  
 ABS. MONOCYTES 1.0 0.05 - 1.2 K/UL  
 ABS. EOSINOPHILS 0.1 0.0 - 0.4 K/UL  
 ABS. BASOPHILS 0.0 0.0 - 0.1 K/UL  
 DF AUTOMATED    
NT-PRO BNP Collection Time: 04/18/19  9:40 PM  
Result Value Ref Range  NT pro-BNP 1,374 0 - 1,800 PG/ML  
TROPONIN I  
 Collection Time: 04/18/19  9:40 PM  
Result Value Ref Range Troponin-I, QT 0.08 (H) 0.0 - 0.045 NG/ML  
GLUCOSE, POC Collection Time: 04/18/19 10:12 PM  
Result Value Ref Range Glucose (POC) 163 (H) 70 - 110 mg/dL D DIMER Collection Time: 04/18/19 10:49 PM  
Result Value Ref Range D DIMER 2.42 (H) <0.46 ug/ml(FEU) METABOLIC PANEL, BASIC Collection Time: 04/19/19  4:58 AM  
Result Value Ref Range Sodium 135 (L) 136 - 145 mmol/L Potassium 3.1 (L) 3.5 - 5.5 mmol/L Chloride 96 (L) 100 - 108 mmol/L  
 CO2 35 (H) 21 - 32 mmol/L Anion gap 4 3.0 - 18 mmol/L Glucose 114 (H) 74 - 99 mg/dL BUN 11 7.0 - 18 MG/DL Creatinine 0.62 0.6 - 1.3 MG/DL  
 BUN/Creatinine ratio 18 12 - 20 GFR est AA >60 >60 ml/min/1.73m2 GFR est non-AA >60 >60 ml/min/1.73m2 Calcium 8.1 (L) 8.5 - 10.1 MG/DL  
GLUCOSE, POC Collection Time: 04/19/19  7:35 AM  
Result Value Ref Range Glucose (POC) 111 (H) 70 - 110 mg/dL GLUCOSE, POC Collection Time: 04/19/19 10:52 AM  
Result Value Ref Range Glucose (POC) 143 (H) 70 - 110 mg/dL Imaging: 
I have personally reviewed the patients radiographs and have reviewed the reports: XR Results (most recent): 
Results from Hospital Encounter encounter on 04/18/19 XR CHEST PORT Narrative EXAM: Chest Radiograph INDICATION: Cough and hypoxia TECHNIQUE: AP view of the chest 
 
COMPARISON: 3/16/2019, 3/12/2019, 3/10/2019 FINDINGS: No pneumothorax identified. The lungs are clear. No infiltrates 
appreciated. No effusions identified. The cardiac silhouette is at the upper 
lungs are normal. This is unchanged. Opacification is noted in the thoracic 
aorta. The pulmonary vasculature is unremarkable. Degenerative changes in the 
shoulders and spine. Impression Impression: 1. No evidence of acute infiltrate or effusion. CT Results (most recent): 
Results from Hospital Encounter encounter on 04/18/19 CT HEAD WO CONT Narrative EXAM: CT of the Head without contrast 
 
INDICATION: Fall and hit head. TECHNIQUE: CT of the head from the vertex to the skull base performed. No IV 
contrast administered. All CT scans at this facility are performed using dose optimization technique as 
appropriate to a performed exam, to include automated exposure control, 
adjustment of the mA and/or kV according to patient size (including appropriate 
matching for site specific examination) or use of iterative reconstruction 
technique. COMPARISON: 4/21/2017 and 4/17/2009 FINDINGS: No evidence of acute intra-axial or extra-axial hemorrhage. The 
ventricles and sulci are symmetric. There are mild prominent old lacunae are 
noted similar to prior imaging. Mild periventricular subcortical white matter 
hypodensities are noted. . No midline shift, mass effect or mass lesion 
appreciated. The gray-white junction is preserved. No evidence of an acute 
infarct identified. The mastoid air cells are well aerated. Mucosal thickening 
of the right sphenoid sinus is noted which is new since prior imaging. The 
remainder the paranasal sinuses are grossly unremarkable. The orbits are normal. 
The scalp and skull are unremarkable. Impression IMPRESSION: 
1. No acute intracranial process identified. 03/10/19 ECHO ADULT COMPLETE 03/11/2019 3/11/2019 Narrative · Procedure performed with the patient in a supine position. Unable to  
obtain on-axis apical images. Technically difficult study due to limited  
mobility and lung interference. Patient intubated and restrained. · Left ventricular low normal global systolic function. Calculated left  
ventricular ejection fraction is 55%. Visually measured ejection fraction. Left ventricular mild concentric hypertrophy. No regional wall motion  
abnormality noted. Inconclusive left ventricular diastolic function.  
· Mechanically ventilated; cannot use inferior caval vein diameter to  
 estimate central venous pressure. · Moderate to severe tricuspid valve regurgitation is present. There is no  
evidence of pulmonary hypertension. · Pulmonary artery pressure likely underestimated due to severity of  
tricuspid regurgitation. · Mild pulmonic valve regurgitation is present. · Right ventricle not well visualized. Right ventricular global systolic  
function is reduced.  
   
  Signed by: Augustus Cardinal, MD Lavella Baumgarten, MD

## 2019-04-19 NOTE — PROGRESS NOTES
Patient has been hypoxic, requiring 7 liters of oxygen that gives her O2 sat of 88%. She had a fall at home today, has been very weak. Concern is PE. Will do a STAT CT chest , D-dimer and Troponin with BNPeP.

## 2019-04-19 NOTE — TELEPHONE ENCOUNTER
Chief Complaint   Patient presents with    Medication Problem     Leora Vu MD  [facility] Nurses 4 hours ago (11:32 AM)      Insulin and amlodipine were stopped and glipizide 5 mg daily was added the next day. Routing comment      4-19-19 Patient's daughter-in-law  reached, and 2 identifiers were used: Full Name, and Date of Birth verified of patient. Information given per Dr Randi García the patient is to stop Insulin and Amlodipine, and take Glipizide 5 mg once daily. All understood.

## 2019-04-19 NOTE — PROGRESS NOTES
[facility] Hospitalist Group Progress Note Patient: Jane Otoole Age: 80 y.o. : 1933 MR#: [id number] SSN: [id number] Date/Time: 2019 Subjective:  
 
Patient lying in bed in NAD, awake, follows comamnds. On HFNC Assessment/Plan:  
 
-acute hypoxic respiratory failure 
- COPD with acute exacerbation 
- ? PNA 
-Chronic diastolic chf, compensated - Pelvic fracture PLAN 
O2, nebs, steroids Abx, follow cx 
pulm consulted 
- follow CTA 
- PT, OT 
- DNR 
D/w patient. Case discussed with:  [x]Patient  []Family  []Nursing  []Case Management DVT Prophylaxis:  []Lovenox  []Hep SQ  []SCDs  []Coumadin   []On Heparin gtt Objective:  
VS:  
Visit Vitals /78 (BP 1 Location: Left arm, BP Patient Position: At rest) Pulse 89 Temp 98.8 °F (37.1 °C) Resp 21 Wt 74.5 kg (164 lb 4.8 oz) SpO2 98% BMI 27.34 kg/m² Tmax/24hrs: Temp (24hrs), Av.8 °F (37.7 °C), Min:98.5 °F (36.9 °C), Max:102.1 °F (38.9 °C) Input/Output:  
 
Intake/Output Summary (Last 24 hours) at 2019 1049 Last data filed at 2019 0915 Gross per 24 hour Intake 360 ml Output 1500 ml Net -1140 ml General:  Awake, alert Cardiovascular:  S1S2+, RRR Pulmonary:  Coarse bs b/l, some wheezing GI:  Soft, BS+, NT, ND Extremities:  trace edema Labs:   
Recent Results (from the past 24 hour(s)) CULTURE, BLOOD Collection Time: 19 12:05 PM  
Result Value Ref Range Special Requests: NO SPECIAL REQUESTS Culture result: NO GROWTH AFTER 17 HOURS    
CULTURE, BLOOD Collection Time: 19 12:20 PM  
Result Value Ref Range Special Requests: NO SPECIAL REQUESTS Culture result: NO GROWTH AFTER 17 HOURS METABOLIC PANEL, COMPREHENSIVE Collection Time: 19 12:20 PM  
Result Value Ref Range Sodium 135 (L) 136 - 145 mmol/L Potassium 3.6 3.5 - 5.5 mmol/L  Chloride 96 (L) 100 - 108 mmol/L  
 CO2 38 (H) 21 - 32 mmol/L  
 Anion gap 1 (L) 3.0 - 18 mmol/L Glucose 168 (H) 74 - 99 mg/dL BUN 14 7.0 - 18 MG/DL Creatinine 0.76 0.6 - 1.3 MG/DL  
 BUN/Creatinine ratio 18 12 - 20 GFR est AA >60 >60 ml/min/1.73m2 GFR est non-AA >60 >60 ml/min/1.73m2 Calcium 8.8 8.5 - 10.1 MG/DL Bilirubin, total 0.4 0.2 - 1.0 MG/DL  
 ALT (SGPT) 14 13 - 56 U/L  
 AST (SGOT) 18 15 - 37 U/L Alk. phosphatase 149 (H) 45 - 117 U/L Protein, total 6.6 6.4 - 8.2 g/dL Albumin 3.0 (L) 3.4 - 5.0 g/dL Globulin 3.6 2.0 - 4.0 g/dL A-G Ratio 0.8 0.8 - 1.7    
CBC WITH AUTOMATED DIFF Collection Time: 04/18/19 12:20 PM  
Result Value Ref Range WBC 9.0 4.6 - 13.2 K/uL  
 RBC 4.19 (L) 4.20 - 5.30 M/uL  
 HGB 11.8 (L) 12.0 - 16.0 g/dL HCT 37.1 35.0 - 45.0 % MCV 88.5 74.0 - 97.0 FL  
 MCH 28.2 24.0 - 34.0 PG  
 MCHC 31.8 31.0 - 37.0 g/dL  
 RDW 16.3 (H) 11.6 - 14.5 % PLATELET 137 990 - 411 K/uL MPV 9.9 9.2 - 11.8 FL  
 NEUTROPHILS 80 (H) 40 - 73 % LYMPHOCYTES 11 (L) 21 - 52 % MONOCYTES 9 3 - 10 % EOSINOPHILS 0 0 - 5 % BASOPHILS 0 0 - 2 %  
 ABS. NEUTROPHILS 7.2 1.8 - 8.0 K/UL  
 ABS. LYMPHOCYTES 1.0 0.9 - 3.6 K/UL  
 ABS. MONOCYTES 0.8 0.05 - 1.2 K/UL  
 ABS. EOSINOPHILS 0.0 0.0 - 0.4 K/UL  
 ABS. BASOPHILS 0.0 0.0 - 0.1 K/UL  
 DF AUTOMATED    
POC LACTIC ACID Collection Time: 04/18/19  1:34 PM  
Result Value Ref Range Lactic Acid (POC) 1.43 0.40 - 2.00 mmol/L  
EKG, 12 LEAD, INITIAL Collection Time: 04/18/19  2:08 PM  
Result Value Ref Range Ventricular Rate 92 BPM  
 Atrial Rate 92 BPM  
 P-R Interval 154 ms QRS Duration 120 ms  
 Q-T Interval 394 ms QTC Calculation (Bezet) 487 ms Calculated P Axis 64 degrees Calculated R Axis 60 degrees Calculated T Axis 40 degrees Diagnosis Normal sinus rhythm Right bundle branch block Abnormal ECG When compared with ECG of 16-MAR-2019 03:01, 
premature ventricular complexes are no longer present Left anterior fascicular block is no longer present T wave inversion no longer evident in Inferior leads T wave inversion no longer evident in Anterior leads URINALYSIS W/ RFLX MICROSCOPIC Collection Time: 04/18/19  3:05 PM  
Result Value Ref Range Color YELLOW Appearance TURBID Specific gravity 1.015 1.005 - 1.030    
 pH (UA) 8.5 (H) 5.0 - 8.0 Protein 30 (A) NEG mg/dL Glucose NEGATIVE  NEG mg/dL Ketone TRACE (A) NEG mg/dL Bilirubin NEGATIVE  NEG Blood NEGATIVE  NEG Urobilinogen 1.0 0.2 - 1.0 EU/dL Nitrites NEGATIVE  NEG Leukocyte Esterase MODERATE (A) NEG URINE MICROSCOPIC ONLY Collection Time: 04/18/19  3:05 PM  
Result Value Ref Range WBC 4 to 11 0 - 4 /hpf  
 RBC NEGATIVE  0 - 5 /hpf Epithelial cells 1+ 0 - 5 /lpf Bacteria 4+ (A) NEG /hpf Amorphous Crystals 2+ (A) NEG Triple Phosphate crystals 2+ (A) NEG  
POC G3 Collection Time: 04/18/19  3:23 PM  
Result Value Ref Range Device: NASAL CANNULA Flow rate (POC) 5 L/M  
 pH (POC) 7.384 7.35 - 7.45    
 pCO2 (POC) 57.1 (H) 35.0 - 45.0 MMHG  
 pO2 (POC) 54 (L) 80 - 100 MMHG  
 HCO3 (POC) 34.0 (H) 22 - 26 MMOL/L  
 sO2 (POC) 86 (L) 92 - 97 % Base excess (POC) 7 mmol/L Allens test (POC) YES Site LEFT RADIAL Specimen type (POC) ARTERIAL Performed by Dewanda Merlin LACTIC ACID Collection Time: 04/18/19  9:40 PM  
Result Value Ref Range Lactic acid 0.7 0.4 - 2.0 MMOL/L  
METABOLIC PANEL, COMPREHENSIVE Collection Time: 04/18/19  9:40 PM  
Result Value Ref Range Sodium 136 136 - 145 mmol/L Potassium 3.4 (L) 3.5 - 5.5 mmol/L Chloride 97 (L) 100 - 108 mmol/L  
 CO2 39 (H) 21 - 32 mmol/L Anion gap 0 (L) 3.0 - 18 mmol/L Glucose 134 (H) 74 - 99 mg/dL BUN 12 7.0 - 18 MG/DL Creatinine 0.71 0.6 - 1.3 MG/DL  
 BUN/Creatinine ratio 17 12 - 20 GFR est AA >60 >60 ml/min/1.73m2 GFR est non-AA >60 >60 ml/min/1.73m2 Calcium 8.4 (L) 8.5 - 10.1 MG/DL Bilirubin, total 0.5 0.2 - 1.0 MG/DL  
 ALT (SGPT) 17 13 - 56 U/L  
 AST (SGOT) 37 15 - 37 U/L Alk. phosphatase 158 (H) 45 - 117 U/L Protein, total 6.6 6.4 - 8.2 g/dL Albumin 2.9 (L) 3.4 - 5.0 g/dL Globulin 3.7 2.0 - 4.0 g/dL A-G Ratio 0.8 0.8 - 1.7    
CBC WITH AUTOMATED DIFF Collection Time: 04/18/19  9:40 PM  
Result Value Ref Range WBC 7.1 4.6 - 13.2 K/uL  
 RBC 4.29 4. 20 - 5.30 M/uL  
 HGB 11.9 (L) 12.0 - 16.0 g/dL HCT 38.6 35.0 - 45.0 % MCV 90.0 74.0 - 97.0 FL  
 MCH 27.7 24.0 - 34.0 PG  
 MCHC 30.8 (L) 31.0 - 37.0 g/dL  
 RDW 16.4 (H) 11.6 - 14.5 % PLATELET 351 890 - 278 K/uL MPV 10.1 9.2 - 11.8 FL  
 NEUTROPHILS 59 40 - 73 % LYMPHOCYTES 26 21 - 52 % MONOCYTES 14 (H) 3 - 10 % EOSINOPHILS 1 0 - 5 % BASOPHILS 0 0 - 2 %  
 ABS. NEUTROPHILS 4.1 1.8 - 8.0 K/UL  
 ABS. LYMPHOCYTES 1.9 0.9 - 3.6 K/UL  
 ABS. MONOCYTES 1.0 0.05 - 1.2 K/UL  
 ABS. EOSINOPHILS 0.1 0.0 - 0.4 K/UL  
 ABS. BASOPHILS 0.0 0.0 - 0.1 K/UL  
 DF AUTOMATED    
NT-PRO BNP Collection Time: 04/18/19  9:40 PM  
Result Value Ref Range NT pro-BNP 1,374 0 - 1,800 PG/ML  
TROPONIN I Collection Time: 04/18/19  9:40 PM  
Result Value Ref Range Troponin-I, QT 0.08 (H) 0.0 - 0.045 NG/ML  
GLUCOSE, POC Collection Time: 04/18/19 10:12 PM  
Result Value Ref Range Glucose (POC) 163 (H) 70 - 110 mg/dL D DIMER Collection Time: 04/18/19 10:49 PM  
Result Value Ref Range D DIMER 2.42 (H) <0.46 ug/ml(FEU) METABOLIC PANEL, BASIC Collection Time: 04/19/19  4:58 AM  
Result Value Ref Range Sodium 135 (L) 136 - 145 mmol/L Potassium 3.1 (L) 3.5 - 5.5 mmol/L Chloride 96 (L) 100 - 108 mmol/L  
 CO2 35 (H) 21 - 32 mmol/L Anion gap 4 3.0 - 18 mmol/L Glucose 114 (H) 74 - 99 mg/dL BUN 11 7.0 - 18 MG/DL Creatinine 0.62 0.6 - 1.3 MG/DL  
 BUN/Creatinine ratio 18 12 - 20 GFR est AA >60 >60 ml/min/1.73m2 GFR est non-AA >60 >60 ml/min/1.73m2 Calcium 8.1 (L) 8.5 - 10.1 MG/DL  
GLUCOSE, POC Collection Time: 04/19/19  7:35 AM  
Result Value Ref Range Glucose (POC) 111 (H) 70 - 110 mg/dL Additional Data Reviewed:   
 
Signed By: David Proctor MD   
 April 19, 2019

## 2019-04-19 NOTE — PROGRESS NOTES
Problem: Pressure Injury - Risk of 
Goal: *Prevention of pressure injury Description Document Herbie Scale and appropriate interventions in the flowsheet. Outcome: Progressing Towards Goal 
  
Problem: Falls - Risk of 
Goal: *Absence of Falls Description Document Brenda Georges Fall Risk and appropriate interventions in the flowsheet.  
Outcome: Progressing Towards Goal

## 2019-04-19 NOTE — PROGRESS NOTES
conducted an initial consultation and Spiritual Assessment for Rona Parry, who is a 80 y.o.,female. Patients Primary Language is: [place]. According to the patients EMR Jehovah's witness Affiliation is: [facility].  
 
The reason the Patient came to the hospital is:  
Patient Active Problem List  
 Diagnosis Date Noted  Hypoxia 04/18/2019  
 HAP (hospital-acquired pneumonia) 04/18/2019  Fall 04/18/2019  Advanced care planning/counseling discussion 03/14/2019  Debility 03/14/2019  
 NSTEMI (non-ST elevated myocardial infarction) ([address] 03/11/2019  Pelvic ring fracture, closed, initial encounter ([address] 03/11/2019  Congestive heart failure (CHF) ([address] 03/10/2019  Stage 4 very severe COPD by GOLD classification ([address] 02/25/2019  Chronic respiratory failure with hypoxia ([address] 01/01/2019  Type 2 diabetes with nephropathy ([address] 10/22/2018  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/2018  COPD exacerbation ([address] 07/13/2018  Pneumonia 02/07/2018  CAP (community acquired pneumonia) 02/07/2018  Major depression, chronic 12/06/2017  Primary insomnia 12/04/2017  Type 2 diabetes mellitus with diabetic neuropathy, without long-term current use of insulin ([address] 09/13/2017  Hypokalemia 04/21/2017  Dizziness 04/21/2017  CHI (closed head injury) 04/21/2017  Elevated troponin 04/21/2017  HCAP (healthcare-associated pneumonia) 02/07/2017  Abnormal CT scan, chest 02/07/2017  Panlobular emphysema ([address] 12/13/2016  Impaired fasting glucose 12/13/2016  Primary hypertension 08/15/2016  Hyperlipidemia LDL goal <100 04/12/2016  Primary osteoarthritis involving multiple joints 04/12/2016  Prediabetes 04/12/2016  Restless leg syndrome 04/12/2016  MOORE (dyspnea on exertion) 09/09/2015  SOB (shortness of breath) 09/09/2015  Murmur, cardiac 09/09/2015  Carotid artery stenosis 08/18/2015  Atherosclerosis of native arteries of the extremities with intermittent claudication 08/18/2015  Acute exacerbation of chronic obstructive pulmonary disease (COPD) ([facility] 08/11/2015  Low back pain radiating to both legs 10/01/2014  DDD (degenerative disc disease), lumbar 10/01/2014  Spondylolisthesis of lumbar region 10/01/2014  Spondylolisthesis, grade 1 10/01/2014  Lumbar facet arthropathy 10/01/2014  Lumbar disc herniation 10/01/2014  Lumbosacral radiculopathy at L5 10/01/2014  Lumbosacral radiculopathy at S1 10/01/2014  DM neuropathy, painful ([facility] 10/01/2014  Thoracic or lumbosacral neuritis or radiculitis, unspecified 07/14/2014  Spinal stenosis in cervical region 10/11/2013  Polyneuropathy 10/11/2013  Lumbar stenosis 10/11/2013  High risk medication use 10/11/2013  Ulnar neuropathy at elbow 10/11/2013  Chronic pain syndrome 09/16/2013  Lumbosacral spondylosis without myelopathy 09/16/2013  Cervical stenosis of spinal canal 09/16/2013  Repeated falls 08/07/2013  Ataxic gait 07/22/2013  Chronic neck pain 07/22/2013  Orthostatic hypotension 07/22/2013  Paresthesia 07/22/2013  Aortic dissection, abdominal ([address] 02/05/2013  RLS (restless legs syndrome) 01/17/2013  Glucose intolerance (pre-diabetes) 05/24/2012  Unspecified vitamin D deficiency 04/04/2012  Depression 09/20/2011  Sciatica  Degeneration of lumbar or lumbosacral intervertebral disc  Encounter for long-term (current) use of other medications  Hyperlipidemia 05/17/2011  Overactive bladder 05/17/2011  Hypercholesterolemia 04/01/2008 The  provided the following Interventions: 
Patient was seen after vitals were taken. Initiated a relationship of care and support. Patient seemed avoidant to questions referring to family support. She indicated that she had a bad fall yesterday that brought her to the ER. She lives with her  and she has daughters that  live in the area. I was unable to explored issues of ki, belief, spirituality and Confucianism/ritual needs but left her with a spiritual card with all services provided by [address] [place] [address] while hospitalized. Listened empathically. Provided chaplaincy education. Provided information about Spiritual Care Services. Offered  assurance of continued prayers on patient's behalf. Chart reviewed. The following outcomes where achieved: 
Patient shared limited information about both her medical narrative and spiritual journey/beliefs.  confirmed Patient's Jainism Affiliation. Patient processed feeling about current hospitalization. Patient expressed gratitude for 's visit. Assessment: 
Patient denies any emotional concerns or spiritual needs at the time of visit. Patient does not have any Confucianism/cultural needs that will affect patients preferences in health care. There are no spiritual or Confucianism issues which require intervention at this time. Plan: 
Chaplains will continue to follow and will provide pastoral care on an as needed/requested basis.  recommends bedside caregivers page  on duty if patient shows signs of acute spiritual or emotional distress. Chaplain Resident Ashwini Molina Spiritual [facility]  
[phone]

## 2019-04-20 LAB
ANION GAP SERPL CALC-SCNC: 1 MMOL/L (ref 3–18)
ATRIAL RATE: 92 BPM
BASOPHILS # BLD: 0 K/UL (ref 0–0.1)
BASOPHILS NFR BLD: 0 % (ref 0–2)
BUN SERPL-MCNC: 12 MG/DL (ref 7–18)
BUN/CREAT SERPL: 15 (ref 12–20)
CALCIUM SERPL-MCNC: 9.6 MG/DL (ref 8.5–10.1)
CALCULATED P AXIS, ECG09: 64 DEGREES
CALCULATED R AXIS, ECG10: 60 DEGREES
CALCULATED T AXIS, ECG11: 40 DEGREES
CHLORIDE SERPL-SCNC: 98 MMOL/L (ref 100–108)
CO2 SERPL-SCNC: 40 MMOL/L (ref 21–32)
CREAT SERPL-MCNC: 0.79 MG/DL (ref 0.6–1.3)
DIAGNOSIS, 93000: NORMAL
DIFFERENTIAL METHOD BLD: ABNORMAL
EOSINOPHIL # BLD: 0 K/UL (ref 0–0.4)
EOSINOPHIL NFR BLD: 0 % (ref 0–5)
ERYTHROCYTE [DISTWIDTH] IN BLOOD BY AUTOMATED COUNT: 15.7 % (ref 11.6–14.5)
GLUCOSE BLD STRIP.AUTO-MCNC: 149 MG/DL (ref 70–110)
GLUCOSE BLD STRIP.AUTO-MCNC: 244 MG/DL (ref 70–110)
GLUCOSE BLD STRIP.AUTO-MCNC: 307 MG/DL (ref 70–110)
GLUCOSE SERPL-MCNC: 173 MG/DL (ref 74–99)
HCT VFR BLD AUTO: 42.9 % (ref 35–45)
HGB BLD-MCNC: 13.2 G/DL (ref 12–16)
LYMPHOCYTES # BLD: 1.1 K/UL (ref 0.9–3.6)
LYMPHOCYTES NFR BLD: 21 % (ref 21–52)
MAGNESIUM SERPL-MCNC: 2 MG/DL (ref 1.6–2.6)
MCH RBC QN AUTO: 27.4 PG (ref 24–34)
MCHC RBC AUTO-ENTMCNC: 30.8 G/DL (ref 31–37)
MCV RBC AUTO: 89.2 FL (ref 74–97)
MONOCYTES # BLD: 0.2 K/UL (ref 0.05–1.2)
MONOCYTES NFR BLD: 3 % (ref 3–10)
NEUTS SEG # BLD: 4.2 K/UL (ref 1.8–8)
NEUTS SEG NFR BLD: 76 % (ref 40–73)
P-R INTERVAL, ECG05: 154 MS
PLATELET # BLD AUTO: 271 K/UL (ref 135–420)
PMV BLD AUTO: 10.1 FL (ref 9.2–11.8)
POTASSIUM SERPL-SCNC: 4.3 MMOL/L (ref 3.5–5.5)
POTASSIUM SERPL-SCNC: 5.7 MMOL/L (ref 3.5–5.5)
Q-T INTERVAL, ECG07: 394 MS
QRS DURATION, ECG06: 120 MS
QTC CALCULATION (BEZET), ECG08: 487 MS
RBC # BLD AUTO: 4.81 M/UL (ref 4.2–5.3)
SODIUM SERPL-SCNC: 139 MMOL/L (ref 136–145)
VENTRICULAR RATE, ECG03: 92 BPM
WBC # BLD AUTO: 5.5 K/UL (ref 4.6–13.2)

## 2019-04-20 PROCEDURE — 94660 CPAP INITIATION&MGMT: CPT

## 2019-04-20 PROCEDURE — 74011250636 HC RX REV CODE- 250/636: Performed by: FAMILY MEDICINE

## 2019-04-20 PROCEDURE — 74011250636 HC RX REV CODE- 250/636: Performed by: INTERNAL MEDICINE

## 2019-04-20 PROCEDURE — 82962 GLUCOSE BLOOD TEST: CPT

## 2019-04-20 PROCEDURE — 3331090001 HH PPS REVENUE CREDIT

## 2019-04-20 PROCEDURE — 74011636637 HC RX REV CODE- 636/637: Performed by: EMERGENCY MEDICINE

## 2019-04-20 PROCEDURE — 36415 COLL VENOUS BLD VENIPUNCTURE: CPT

## 2019-04-20 PROCEDURE — 83735 ASSAY OF MAGNESIUM: CPT

## 2019-04-20 PROCEDURE — 77010033711 HC HIGH FLOW OXYGEN

## 2019-04-20 PROCEDURE — 94762 N-INVAS EAR/PLS OXIMTRY CONT: CPT

## 2019-04-20 PROCEDURE — 74011000250 HC RX REV CODE- 250: Performed by: INTERNAL MEDICINE

## 2019-04-20 PROCEDURE — 77030038269 HC DRN EXT URIN PURWCK BARD -A

## 2019-04-20 PROCEDURE — 74011250637 HC RX REV CODE- 250/637: Performed by: INTERNAL MEDICINE

## 2019-04-20 PROCEDURE — 74011000250 HC RX REV CODE- 250: Performed by: EMERGENCY MEDICINE

## 2019-04-20 PROCEDURE — 85025 COMPLETE CBC W/AUTO DIFF WBC: CPT

## 2019-04-20 PROCEDURE — 74011250636 HC RX REV CODE- 250/636: Performed by: EMERGENCY MEDICINE

## 2019-04-20 PROCEDURE — 94640 AIRWAY INHALATION TREATMENT: CPT

## 2019-04-20 PROCEDURE — 36600 WITHDRAWAL OF ARTERIAL BLOOD: CPT

## 2019-04-20 PROCEDURE — 84132 ASSAY OF SERUM POTASSIUM: CPT

## 2019-04-20 PROCEDURE — 65660000004 HC RM CVT STEPDOWN

## 2019-04-20 PROCEDURE — 3331090002 HH PPS REVENUE DEBIT

## 2019-04-20 RX ORDER — POTASSIUM CHLORIDE 7.45 MG/ML
10 INJECTION INTRAVENOUS
Status: DISPENSED | OUTPATIENT
Start: 2019-04-20 | End: 2019-04-20

## 2019-04-20 RX ORDER — FUROSEMIDE 10 MG/ML
20 INJECTION INTRAMUSCULAR; INTRAVENOUS DAILY
Status: DISCONTINUED | OUTPATIENT
Start: 2019-04-20 | End: 2019-04-25

## 2019-04-20 RX ORDER — FUROSEMIDE 10 MG/ML
20 INJECTION INTRAMUSCULAR; INTRAVENOUS ONCE
Status: COMPLETED | OUTPATIENT
Start: 2019-04-20 | End: 2019-04-20

## 2019-04-20 RX ORDER — ENOXAPARIN SODIUM 100 MG/ML
40 INJECTION SUBCUTANEOUS EVERY 24 HOURS
Status: DISCONTINUED | OUTPATIENT
Start: 2019-04-21 | End: 2019-04-28

## 2019-04-20 RX ADMIN — MIRTAZAPINE 15 MG: 15 TABLET, FILM COATED ORAL at 21:21

## 2019-04-20 RX ADMIN — Medication 10 ML: at 21:37

## 2019-04-20 RX ADMIN — INSULIN LISPRO 8 UNITS: 100 INJECTION, SOLUTION INTRAVENOUS; SUBCUTANEOUS at 21:33

## 2019-04-20 RX ADMIN — METHYLPREDNISOLONE SODIUM SUCCINATE 60 MG: 40 INJECTION, POWDER, FOR SOLUTION INTRAMUSCULAR; INTRAVENOUS at 06:13

## 2019-04-20 RX ADMIN — METHYLPREDNISOLONE SODIUM SUCCINATE 60 MG: 40 INJECTION, POWDER, FOR SOLUTION INTRAMUSCULAR; INTRAVENOUS at 14:00

## 2019-04-20 RX ADMIN — Medication 10 ML: at 06:32

## 2019-04-20 RX ADMIN — IPRATROPIUM BROMIDE AND ALBUTEROL SULFATE 3 ML: .5; 3 SOLUTION RESPIRATORY (INHALATION) at 07:39

## 2019-04-20 RX ADMIN — TAMSULOSIN HYDROCHLORIDE 0.4 MG: 0.4 CAPSULE ORAL at 08:30

## 2019-04-20 RX ADMIN — ASPIRIN 81 MG: 81 TABLET, COATED ORAL at 08:30

## 2019-04-20 RX ADMIN — CEFEPIME 2 G: 2 INJECTION, POWDER, FOR SOLUTION INTRAVENOUS at 02:22

## 2019-04-20 RX ADMIN — VANCOMYCIN HYDROCHLORIDE 1250 MG: 10 INJECTION, POWDER, LYOPHILIZED, FOR SOLUTION INTRAVENOUS at 21:28

## 2019-04-20 RX ADMIN — IPRATROPIUM BROMIDE AND ALBUTEROL SULFATE 3 ML: .5; 3 SOLUTION RESPIRATORY (INHALATION) at 16:15

## 2019-04-20 RX ADMIN — INSULIN LISPRO 4 UNITS: 100 INJECTION, SOLUTION INTRAVENOUS; SUBCUTANEOUS at 12:18

## 2019-04-20 RX ADMIN — ACETAMINOPHEN 650 MG: 325 TABLET ORAL at 10:38

## 2019-04-20 RX ADMIN — PAROXETINE HYDROCHLORIDE 30 MG: 20 TABLET, FILM COATED ORAL at 08:30

## 2019-04-20 RX ADMIN — FAMOTIDINE 20 MG: 20 TABLET ORAL at 08:30

## 2019-04-20 RX ADMIN — CEFEPIME 2 G: 2 INJECTION, POWDER, FOR SOLUTION INTRAVENOUS at 10:00

## 2019-04-20 RX ADMIN — IPRATROPIUM BROMIDE AND ALBUTEROL SULFATE 3 ML: .5; 3 SOLUTION RESPIRATORY (INHALATION) at 20:24

## 2019-04-20 RX ADMIN — INSULIN LISPRO 2 UNITS: 100 INJECTION, SOLUTION INTRAVENOUS; SUBCUTANEOUS at 07:30

## 2019-04-20 RX ADMIN — ATORVASTATIN CALCIUM 40 MG: 40 TABLET, FILM COATED ORAL at 21:21

## 2019-04-20 RX ADMIN — FUROSEMIDE 20 MG: 10 INJECTION, SOLUTION INTRAMUSCULAR; INTRAVENOUS at 02:18

## 2019-04-20 RX ADMIN — IPRATROPIUM BROMIDE AND ALBUTEROL SULFATE 3 ML: .5; 3 SOLUTION RESPIRATORY (INHALATION) at 12:58

## 2019-04-20 RX ADMIN — FUROSEMIDE 20 MG: 10 INJECTION, SOLUTION INTRAMUSCULAR; INTRAVENOUS at 21:19

## 2019-04-20 RX ADMIN — POTASSIUM CHLORIDE 20 MEQ: 20 TABLET, EXTENDED RELEASE ORAL at 08:31

## 2019-04-20 RX ADMIN — METHYLPREDNISOLONE SODIUM SUCCINATE 60 MG: 40 INJECTION, POWDER, FOR SOLUTION INTRAMUSCULAR; INTRAVENOUS at 21:18

## 2019-04-20 RX ADMIN — CEFEPIME 2 G: 2 INJECTION, POWDER, FOR SOLUTION INTRAVENOUS at 18:00

## 2019-04-20 RX ADMIN — AZITHROMYCIN MONOHYDRATE 500 MG: 500 INJECTION, POWDER, LYOPHILIZED, FOR SOLUTION INTRAVENOUS at 15:03

## 2019-04-20 RX ADMIN — ENOXAPARIN SODIUM 70 MG: 80 INJECTION SUBCUTANEOUS at 02:19

## 2019-04-20 RX ADMIN — GABAPENTIN 300 MG: 300 CAPSULE ORAL at 18:00

## 2019-04-20 RX ADMIN — GABAPENTIN 300 MG: 300 CAPSULE ORAL at 08:31

## 2019-04-20 RX ADMIN — Medication 10 ML: at 14:00

## 2019-04-20 RX ADMIN — IPRATROPIUM BROMIDE AND ALBUTEROL SULFATE 3 ML: .5; 3 SOLUTION RESPIRATORY (INHALATION) at 03:25

## 2019-04-20 RX ADMIN — ENOXAPARIN SODIUM 70 MG: 80 INJECTION SUBCUTANEOUS at 12:18

## 2019-04-20 RX ADMIN — POTASSIUM CHLORIDE 10 MEQ: 10 INJECTION, SOLUTION INTRAVENOUS at 02:29

## 2019-04-20 RX ADMIN — IPRATROPIUM BROMIDE AND ALBUTEROL SULFATE 3 ML: .5; 3 SOLUTION RESPIRATORY (INHALATION) at 00:53

## 2019-04-20 RX ADMIN — POTASSIUM CHLORIDE 10 MEQ: 10 INJECTION, SOLUTION INTRAVENOUS at 05:02

## 2019-04-20 NOTE — CONSULTS
[facility] Pulmonary, Critical Care, and Sleep Medicine Initial Patient Consult Name: Zhanna Finn MRN: [id number] : 1933 Hospital: [facility] Date: 2019 IMPRESSION:  
· Severe COPD per ATS criteria. · Acute on chronic hypoxic respiratory failure and additionally has chronic hypercarbic respiratory failure in a patient with a clear appearing CXR. No evidence of CHF, infiltrates or effusions. · Acute exacerbation of COPD. · Hx of pelvic fractures. · Chronic pain syndrome. RECOMMENDATIONS:  
· Continue supplemental oxygen to keep SpO2>90%. Currently on HFNC 40L at 80%. This significantly increased oxygen requirement with a relatively clear lung is suspicious of pulmonary embolism. Await CTA. · Consider BIPAP if develops hypercarbic respiratory failure. · Bronchial hygiene protocol. · Bronchodilators - Continue scheduled duo-nebz. · Patient has been started on treatment dose lovenox by the primary team.  
· Fall precautions. · Will defer management of chronic pain to the primary team.  
· Steroids - Consider starting on steroids and aim for a short course for 5-7 days. · Antibiotics- Agree with broad spectrum abx - but please de-escalate after 48 hours if no growth. · Aspiration precautions. · Out patient testing- PFT, 6 min walk, Polysomnogram 
· Assess home Oxygen needs at discharge · OT, PT, OOB and ambulate · Healthy weight · Will Follow · DVT, PUD prophylaxis Subjective: This patient has been seen and evaluated at the request of Dr. Neida Pollard for evaluation of hypoxic respiratory failure. Patient is a 80 y.o. female with past medical history significant for hypertension, restless leg syndrome, diabetes, peripheral neuropathy as well as recurrent falls who presented after a fall. Patient was brought in by EMS.  According to patient she attempted to get up from the chair when she fell and hit her head. No LOC, nausea, vomiting headaches etc after the fall. On admission to ED patient was noted to be pyrexial and was admitted and treated for HAP. During the course of admission patient became increasingly hypoxic and her CXR on admission was clear which arose suspicion of PE.  CTA is awaited. 4/20/2019: 
 
Required BIPAP overnight. Doing well this am.  Resting comfortably in bed on NC eating breakfast. 
 
 
Past Medical History:  
Diagnosis Date  Chronic lung disease  Colon polyps  COPD 8-30-02  DDD (degenerative disc disease), lumbar 10/1/2014  Degeneration of lumbar or lumbosacral intervertebral disc  Depression  Diabetes ([address] 7-19-05  Diabetes mellitus ([address]  Diverticulosis   DM neuropathy, painful ([address] 10/1/2014  MOORE (Dyspnea on Exertion) 4-19-02  
 echo: +mild LVH, PH38-07% w/ diastolic dysfxn  Glaucoma  HCAP (healthcare-associated pneumonia) 03/24/2017  Hemorrhoids 6-6-06  Hyperlipidemia 5/17/2011  Hypertension 4-16-02  LBP (low back pain) 4-13-04  Low back pain radiating to both legs 10/1/2014  Lumbago  Lumbar disc herniation 10/1/2014  Lumbar facet arthropathy 10/1/2014  Lumbosacral radiculopathy at L5 10/1/2014  Lumbosacral radiculopathy at S1 10/1/2014  Microscopic hematuria  OA (osteoarthritis)  Overactive bladder 5/17/2011  
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   
 -MRI brain  Urinary tract infection, site not specified Past Surgical History:  
Procedure Laterality Date  HX APPENDECTOMY  HX CHOLECYSTECTOMY    HX HYSTERECTOMY    
 (+)DUB  HX POLYPECTOMY  LA COLONOSCOPY FLX DX W/COLLJ SPEC WHEN PFRMD  6-16-06  
 normal, Dr Frances Garcia  LA COLONOSCOPY FLX DX W/COLLJ SPEC WHEN PFRMD    
 (+)polyp= tubular adenoma Prior to Admission medications Medication Sig Start Date End Date Taking? Authorizing Provider  
glipiZIDE (GLUCOTROL) 5 mg tablet 1 tablet by mouth daily 4/16/19  Yes Matt Sheriff MD  
insulin aspart U-100 (NOVOLOG) 100 unit/mL inpn 2 Units by SubCUTAneous route Before breakfast, lunch, and dinner. Per sliding scale- 0-200- 0 units, 201-250- 2 units, 251-300- 4 units, 301-350-6 units, 351-400-8 units, 401-450- 10 units and Call MD.   Yes Provider, Historical  
albuterol-ipratropium (DUO-NEB) 2.5 mg-0.5 mg/3 ml nebu 3 mL by Nebulization route every four (4) hours. 3/15/19  Yes Shante Wylie MD  
famotidine (PEPCID) 20 mg tablet Take 1 Tab by mouth daily. 3/16/19  Yes Shante Wylie MD  
furosemide (LASIX) 20 mg tablet Take 1 Tab by mouth every fourty-eight (48) hours. 3/15/19  Yes Shante Wylie MD  
atorvastatin (LIPITOR) 40 mg tablet Take 1 Tab by mouth nightly. 3/15/19  Yes Shante Wylie MD  
rOPINIRole (REQUIP) 1 mg tablet TAKE ONE-HALF TO ONE TABLET BY MOUTH ONCE NIGHTLY AS NEEDED FOR  RESTLESS  LEGS  FOR  UP  TO  90  DAYS 3/10/19  Yes Matt Sheriff MD  
PARoxetine (PAXIL) 30 mg tablet TAKE 1 TABLET BY MOUTH  DAILY 1/29/19  Yes Matt Sheriff MD  
mirtazapine (REMERON) 15 mg tablet Take 1 Tab by mouth nightly. 11/5/18  Yes Matt Sheriff MD  
tamsulosin (FLOMAX) 0.4 mg capsule Take 1 Cap by mouth daily. 10/29/18  Yes Matt Sheriff MD  
gabapentin (NEURONTIN) 300 mg capsule 1 capsule by mouth morning, 1 capsule at midday and 2 capsules at bedtime 10/22/18  Yes Matt Sheriff MD  
simvastatin (ZOCOR) 20 mg tablet Take 1 Tab by mouth nightly.  10/16/18  Yes Matt Sheriff MD  
 aspirin delayed-release 81 mg tablet Take 1 Tab by mouth daily. 8/10/18  Yes Bruce Peres MD  
inhalational spacing device (AEROCHAMBER MV) 1 Each by Does Not Apply route as needed. 18  Yes Maria M Galvan MD  
OXYGEN-AIR DELIVERY SYSTEMS 3 L by Nasal route continuous. Yes Provider, Historical  
Nebulizer & Compressor (PORTABLE NEBULIZER SYSTEM) machine 1 Each by Does Not Apply route every six (6) hours as needed. 2/10/18  Yes Shanon Hughes PA-C Allergies Allergen Reactions  Levaquin [Levofloxacin] Rash Social History Tobacco Use  Smoking status: Former Smoker Packs/day: 1.00 Years: 57.00 Pack years: 57.00 Types: Cigarettes Last attempt to quit: 2018 Years since quittin.3  Smokeless tobacco: Never Used Substance Use Topics  Alcohol use: No  
  
Family History Problem Relation Age of Onset  Colon Cancer Sister  Heart Disease Brother  Seizures Son  Colon Cancer Maternal Aunt @[email or web address]@ Immunization status: up to date and documented, stated as current, but no records available. Current Facility-Administered Medications Medication Dose Route Frequency  enoxaparin (LOVENOX) injection 70 mg  1 mg/kg SubCUTAneous Q12H  potassium chloride (K-DUR, KLOR-CON) SR tablet 20 mEq  20 mEq Oral DAILY  methylPREDNISolone (PF) (SOLU-MEDROL) injection 60 mg  60 mg IntraVENous Q8H  
 insulin lispro (HUMALOG) injection   SubCUTAneous AC&HS  cefepime (MAXIPIME) 2 g in sterile water (preservative free) 10 mL IV syringe  2 g IntraVENous Q8H  
 azithromycin (ZITHROMAX) 500 mg in  mL  500 mg IntraVENous Q24H  
 albuterol-ipratropium (DUO-NEB) 2.5 MG-0.5 MG/3 ML  3 mL Nebulization Q4H  
 aspirin delayed-release tablet 81 mg  81 mg Oral DAILY  atorvastatin (LIPITOR) tablet 40 mg  40 mg Oral QHS  famotidine (PEPCID) tablet 20 mg  20 mg Oral DAILY  gabapentin (NEURONTIN) capsule 300 mg  300 mg Oral BID  mirtazapine (REMERON) tablet 15 mg  15 mg Oral QHS  PARoxetine (PAXIL) tablet 30 mg  30 mg Oral DAILY  tamsulosin (FLOMAX) capsule 0.4 mg  0.4 mg Oral DAILY  sodium chloride (NS) flush 5-40 mL  5-40 mL IntraVENous Q8H  
 vancomycin (VANCOCIN) 1250 mg in  ml infusion  1,250 mg IntraVENous Q24H Review of Systems: 
Pertinent items are noted in HPI. Objective: 
Vital Signs:   
Visit Vitals /63 (BP 1 Location: Left arm, BP Patient Position: At rest) Pulse 83 Temp 97 °F (36.1 °C) Resp 18 Wt 74.5 kg (164 lb 4.8 oz) SpO2 96% BMI 27.34 kg/m² O2 Device: BIPAP  
O2 Flow Rate (L/min): 40 l/min Temp (24hrs), Av.2 °F (36.8 °C), Min:96.7 °F (35.9 °C), Max:99.8 °F (37.7 °C) Intake/Output:  
Last shift:      No intake/output data recorded. Last 3 shifts:  1901 -  0700 In: 840 [P.O.:840] Out: 3100 [Urine:3100] Intake/Output Summary (Last 24 hours) at 2019 4725 Last data filed at 2019 1342 Gross per 24 hour Intake 480 ml Output 1600 ml Net -1120 ml Physical Exam:  
General:  Alert, cooperative, no distress, appears stated age. Head:  Normocephalic, without obvious abnormality, atraumatic. Eyes:  Conjunctivae/corneas clear. PERRL, EOMs intact. Nose: Nares normal. Septum midline. Mucosa normal. No drainage or sinus tenderness. Throat: Lips, mucosa, and tongue normal. Teeth and gums normal.  
Neck: Supple, symmetrical, trachea midline, no adenopathy, thyroid: no enlargment/tenderness/nodules, no carotid bruit and no JVD. Back:   Symmetric, no curvature. ROM normal.  
Lungs:   Bilateral wheeze and coarse breath sounds bilaterally. Chest wall:  No tenderness or deformity. Heart:  Regular rate and rhythm, S1, S2 normal, no murmur, click, rub or gallop. Abdomen:   Soft, non-tender. Bowel sounds normal. No masses,  No organomegaly. Extremities: Extremities normal, atraumatic, no cyanosis or edema. Pulses: 2+ and symmetric all extremities. Skin: Skin color, texture, turgor normal. No rashes or lesions Lymph nodes: Cervical, supraclavicular, and axillary nodes normal.  
Neurologic: Grossly nonfocal  
 
Data review:  
 
Recent Results (from the past 24 hour(s)) GLUCOSE, POC Collection Time: 04/19/19 10:52 AM  
Result Value Ref Range Glucose (POC) 143 (H) 70 - 110 mg/dL GLUCOSE, POC Collection Time: 04/19/19  4:31 PM  
Result Value Ref Range Glucose (POC) 117 (H) 70 - 110 mg/dL GLUCOSE, POC Collection Time: 04/19/19  9:56 PM  
Result Value Ref Range Glucose (POC) 265 (H) 70 - 110 mg/dL POC G3 Collection Time: 04/19/19 10:34 PM  
Result Value Ref Range Device: High Flow Nasal Cannula Flow rate (POC) 40 L/M  
 FIO2 (POC) 62 % pH (POC) 7.331 (L) 7.35 - 7.45    
 pCO2 (POC) 71.7 (H) 35.0 - 45.0 MMHG  
 pO2 (POC) 69 (L) 80 - 100 MMHG  
 HCO3 (POC) 37.9 (H) 22 - 26 MMOL/L  
 sO2 (POC) 91 (L) 92 - 97 % Base excess (POC) 12 mmol/L Allens test (POC) YES Total resp. rate 22 Site RIGHT RADIAL Patient temp. 37.0 Specimen type (POC) ARTERIAL Performed by Wilson Hernández METABOLIC PANEL, BASIC Collection Time: 04/20/19  5:28 AM  
Result Value Ref Range Sodium 139 136 - 145 mmol/L Potassium 5.7 (H) 3.5 - 5.5 mmol/L Chloride 98 (L) 100 - 108 mmol/L  
 CO2 40 (H) 21 - 32 mmol/L Anion gap 1 (L) 3.0 - 18 mmol/L Glucose 173 (H) 74 - 99 mg/dL BUN 12 7.0 - 18 MG/DL Creatinine 0.79 0.6 - 1.3 MG/DL  
 BUN/Creatinine ratio 15 12 - 20 GFR est AA >60 >60 ml/min/1.73m2 GFR est non-AA >60 >60 ml/min/1.73m2 Calcium 9.6 8.5 - 10.1 MG/DL  
CBC WITH AUTOMATED DIFF Collection Time: 04/20/19  5:28 AM  
Result Value Ref Range WBC 5.5 4.6 - 13.2 K/uL  
 RBC 4.81 4.20 - 5.30 M/uL  
 HGB 13.2 12.0 - 16.0 g/dL HCT 42.9 35.0 - 45.0 %  MCV 89.2 74.0 - 97.0 FL  
 MCH 27.4 24.0 - 34.0 PG  
 MCHC 30.8 (L) 31.0 - 37.0 g/dL  
 RDW 15.7 (H) 11.6 - 14.5 % PLATELET 254 976 - 380 K/uL MPV 10.1 9.2 - 11.8 FL  
 NEUTROPHILS 76 (H) 40 - 73 % LYMPHOCYTES 21 21 - 52 % MONOCYTES 3 3 - 10 % EOSINOPHILS 0 0 - 5 % BASOPHILS 0 0 - 2 %  
 ABS. NEUTROPHILS 4.2 1.8 - 8.0 K/UL  
 ABS. LYMPHOCYTES 1.1 0.9 - 3.6 K/UL  
 ABS. MONOCYTES 0.2 0.05 - 1.2 K/UL  
 ABS. EOSINOPHILS 0.0 0.0 - 0.4 K/UL  
 ABS. BASOPHILS 0.0 0.0 - 0.1 K/UL  
 DF AUTOMATED MAGNESIUM Collection Time: 04/20/19  5:28 AM  
Result Value Ref Range Magnesium 2.0 1.6 - 2.6 mg/dL Imaging: 
I have personally reviewed the patients radiographs and have reviewed the reports: XR Results (most recent): 
Results from Hospital Encounter encounter on 04/18/19 XR CHEST PORT Narrative ONE VIEW CHEST RADIOGRAPH INDICATION: Change in level of consciousness. Hypoxia. COMPARISON: CTA chest earlier today. 4/18/2019 chest x-ray. FINDINGS: 
  
The exam is rotated, limiting the assessment. The cardiomediastinal silhouette 
is grossly stable allowing for the degree of rotation and CT appearance. Increased density in the left lower lung may also be in part due to rotation and 
overlying breast shadow. Atelectasis and trace pleural fluid was present at the 
left lung base on the prior CT today. Stable bones. Impression IMPRESSION: 
 
Exam limited by rotation. Much of the left lower lung appearance may represent 
rotational artifact. Mild atelectasis and trace pleural fluid was present on the 
prior CT exam earlier today. Repeat chest film could be utilized for better 
comparison of change. CT Results (most recent): 
Results from Hospital Encounter encounter on 04/18/19 CTA CHEST W OR W WO CONT Narrative EXAM: CT Angiogram of the Chest 
 
CLINICAL INDICATION: Shortness of breath. TECHNIQUE: CT angiogram of the chest performed. MIPS performed All CT scans at this facility are performed using dose optimization technique as 
appropriate to a performed exam, to include automated exposure control, 
adjustment of the mA and/or kV according to patient size (including appropriate 
matching for site specific examination) or use of iterative reconstruction 
technique. IV CONTRAST: 80 cc of Isovue 370 COMPARISON: 3/1/2017 and chest x-ray dated 4/18/2019 FINDINGS: 
Limitations: Exam is limited due to breathing motion which limits evaluation of 
the lung parenchyma and distal pulmonary arteries. Thyroid: Unremarkable. Mediastinum: No evidence of adenopathy or fluid collection. The esophagus is 
significantly thickened especially in the mid esophagus. No adjacent adenopathy 
appreciated. This is new. Heart: The cardiac silhouette is mildly prominent. Extensive coronary artery 
calcifications are present. Pericardium: Unremarkable Aorta: Calcifications are noted in the thoracic aorta. No evidence of aneurysm. There is irregular plaque which was present previously. This is most pronounced 
in the lower descending thoracic aorta. Pulmonary Arteries: No evidence of pulmonary artery embolus within the main and 
secondary pulmonary arteries. The tertiary pulmonary arteries are limited in 
evaluation due to breathing motion. The distal pulmonary arteries appear mildly 
prominent. Trachea and Bronchi: Unremarkable. Pleura: Small left pleural effusion is present. Lungs: Left lower lobe atelectasis and mild atelectasis in the right lower lobe 
is noted. There is interstitial thickening. Axilla/Chest wall: No acute findings. Upper Abdomen: A small focus of hyperenhancement in the dome of the right lobe 
of liver is noted which is unchanged since 2017 Musculoskeletal: Old L1 compression fracture is noted. There is a subtle loss of 
the superior endplate of H36 which is new since 2017 but appears to be old. Impression IMPRESSION: 
1. No evidence of pulmonary embolus or aortic dissection. 2.   Borderline interstitial pulmonary edema. 3.  Small left pleural effusion. 4.   Thickened esophagus which is new. May consider correlation with esophagram 
or EGD. 5.   Mild cardiac enlargement with extensive coronary artery calcifications. 03/10/19 ECHO ADULT COMPLETE 03/11/2019 3/11/2019 Narrative · Procedure performed with the patient in a supine position. Unable to  
obtain on-axis apical images. Technically difficult study due to limited  
mobility and lung interference. Patient intubated and restrained. · Left ventricular low normal global systolic function. Calculated left  
ventricular ejection fraction is 55%. Visually measured ejection fraction. Left ventricular mild concentric hypertrophy. No regional wall motion  
abnormality noted. Inconclusive left ventricular diastolic function. · Mechanically ventilated; cannot use inferior caval vein diameter to  
estimate central venous pressure. · Moderate to severe tricuspid valve regurgitation is present. There is no  
evidence of pulmonary hypertension. · Pulmonary artery pressure likely underestimated due to severity of  
tricuspid regurgitation. · Mild pulmonic valve regurgitation is present. · Right ventricle not well visualized. Right ventricular global systolic  
function is reduced.  
   
  Signed by: MD Brittni Danielle MD

## 2019-04-20 NOTE — PROGRESS NOTES
[facility] Hospitalist Group Progress Note Patient: Karen Collier Age: 80 y.o. : 1933 MR#: [id number] SSN: [id number] Date/Time: 2019 Subjective:  
 
Patient sitting in bed in NAD, awake, alert, on HFNC, family at bedside Assessment/Plan:  
 
-acute hypoxic and hyercapnic respiratory failure - chronic hypoxic resp failure, on home O2  3 liters vis NC continuously 
- COPD with acute exacerbation 
- ? PNA 
-Chronic diastolic chf, 
- Pelvic fracture - Esophageal wall thickening on CTA- close OP f/u with GI 
 
PLAN 
O2, nebs, steroids BIPAP QHS Abx, CTA - no PE, some mild pulm edema Resume lasix 
pulm following 
- PT, OT 
- DNR 
D/w patient and family Case discussed with:  [x]Patient  []Family  []Nursing  []Case Management DVT Prophylaxis:  []Lovenox  []Hep SQ  []SCDs  []Coumadin   []On Heparin gtt Objective:  
VS:  
Visit Vitals /73 (BP 1 Location: Left arm, BP Patient Position: At rest) Pulse 88 Temp 98 °F (36.7 °C) Resp 20 Wt 74.5 kg (164 lb 4.8 oz) SpO2 96% BMI 27.34 kg/m² Tmax/24hrs: Temp (24hrs), Av.8 °F (36.6 °C), Min:96.7 °F (35.9 °C), Max:99.8 °F (37.7 °C) Input/Output:  
 
Intake/Output Summary (Last 24 hours) at 2019 1811 Last data filed at 2019 1211 Gross per 24 hour Intake  Output 2200 ml Net -2200 ml General:  Awake, alert Cardiovascular:  S1S2+, RRR Pulmonary:  Coarse bs b/l GI:  Soft, BS+, NT, ND Extremities:  No edema Labs:   
Recent Results (from the past 24 hour(s)) GLUCOSE, POC Collection Time: 19  9:56 PM  
Result Value Ref Range Glucose (POC) 265 (H) 70 - 110 mg/dL POC G3 Collection Time: 19 10:34 PM  
Result Value Ref Range Device: High Flow Nasal Cannula Flow rate (POC) 40 L/M  
 FIO2 (POC) 62 % pH (POC) 7.331 (L) 7.35 - 7.45    
 pCO2 (POC) 71.7 (H) 35.0 - 45.0 MMHG  
 pO2 (POC) 69 (L) 80 - 100 MMHG HCO3 (POC) 37.9 (H) 22 - 26 MMOL/L  
 sO2 (POC) 91 (L) 92 - 97 % Base excess (POC) 12 mmol/L Allens test (POC) YES Total resp. rate 22 Site RIGHT RADIAL Patient temp. 37.0 Specimen type (POC) ARTERIAL Performed by Krish Umanzor METABOLIC PANEL, BASIC Collection Time: 04/20/19  5:28 AM  
Result Value Ref Range Sodium 139 136 - 145 mmol/L Potassium 5.7 (H) 3.5 - 5.5 mmol/L Chloride 98 (L) 100 - 108 mmol/L  
 CO2 40 (H) 21 - 32 mmol/L Anion gap 1 (L) 3.0 - 18 mmol/L Glucose 173 (H) 74 - 99 mg/dL BUN 12 7.0 - 18 MG/DL Creatinine 0.79 0.6 - 1.3 MG/DL  
 BUN/Creatinine ratio 15 12 - 20 GFR est AA >60 >60 ml/min/1.73m2 GFR est non-AA >60 >60 ml/min/1.73m2 Calcium 9.6 8.5 - 10.1 MG/DL  
CBC WITH AUTOMATED DIFF Collection Time: 04/20/19  5:28 AM  
Result Value Ref Range WBC 5.5 4.6 - 13.2 K/uL  
 RBC 4.81 4.20 - 5.30 M/uL  
 HGB 13.2 12.0 - 16.0 g/dL HCT 42.9 35.0 - 45.0 % MCV 89.2 74.0 - 97.0 FL  
 MCH 27.4 24.0 - 34.0 PG  
 MCHC 30.8 (L) 31.0 - 37.0 g/dL  
 RDW 15.7 (H) 11.6 - 14.5 % PLATELET 588 660 - 512 K/uL MPV 10.1 9.2 - 11.8 FL  
 NEUTROPHILS 76 (H) 40 - 73 % LYMPHOCYTES 21 21 - 52 % MONOCYTES 3 3 - 10 % EOSINOPHILS 0 0 - 5 % BASOPHILS 0 0 - 2 %  
 ABS. NEUTROPHILS 4.2 1.8 - 8.0 K/UL  
 ABS. LYMPHOCYTES 1.1 0.9 - 3.6 K/UL  
 ABS. MONOCYTES 0.2 0.05 - 1.2 K/UL  
 ABS. EOSINOPHILS 0.0 0.0 - 0.4 K/UL  
 ABS. BASOPHILS 0.0 0.0 - 0.1 K/UL  
 DF AUTOMATED MAGNESIUM Collection Time: 04/20/19  5:28 AM  
Result Value Ref Range Magnesium 2.0 1.6 - 2.6 mg/dL POTASSIUM Collection Time: 04/20/19  9:41 AM  
Result Value Ref Range Potassium 4.3 3.5 - 5.5 mmol/L  
GLUCOSE, POC Collection Time: 04/20/19 11:15 AM  
Result Value Ref Range Glucose (POC) 244 (H) 70 - 110 mg/dL GLUCOSE, POC Collection Time: 04/20/19  3:29 PM  
Result Value Ref Range Glucose (POC) 149 (H) 70 - 110 mg/dL Additional Data Reviewed:   
 
Signed By: Fredy Kim MD   
 April 20, 2019

## 2019-04-20 NOTE — PROGRESS NOTES
RESPIRATORY CARE ASSESSMENT FOR BRONCHIAL HYGIENE OR LUNG EXPANSION THERAPY Patient  Mariella Santoro     80 y.o.   female     4/20/2019  1:04 PM 
Patient Active Problem List  
Diagnosis Code  Hyperlipidemia E78.5  Overactive bladder N32.81  
 Sciatica M54.30  Degeneration of lumbar or lumbosacral intervertebral disc M51.37  
 Encounter for long-term (current) use of other medications Z79.899  Depression F32.9  
 Unspecified vitamin D deficiency E55.9  Glucose intolerance (pre-diabetes) R73.03  
 RLS (restless legs syndrome) G25.81  
 Aortic dissection, abdominal (HCC) I71.02  
 Ataxic gait R26.0  Chronic neck pain M54.2, G89.29  
 Orthostatic hypotension I95.1  Paresthesia R20.2  Repeated falls R29.6  Chronic pain syndrome G89.4  Lumbosacral spondylosis without myelopathy M47.817  Cervical stenosis of spinal canal M48.02  Spinal stenosis in cervical region M48.02  
 Polyneuropathy G62.9  Lumbar stenosis M48.061  
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  
 Lumbosacral radiculopathy at L5 M54.17  
 Lumbosacral radiculopathy at S1 M54.17  
 DM neuropathy, painful (HCC) E11.40  Acute exacerbation of chronic obstructive pulmonary disease (COPD) ([facility] [address] J44.1  Carotid artery stenosis I65.29  
 Atherosclerosis of native arteries of the extremities with intermittent claudication I70.219  
 MOORE (dyspnea on exertion) R06.09  
 SOB (shortness of breath) R06.02  
 Murmur, cardiac R01.1  Hyperlipidemia LDL goal <100 E78.5  Primary osteoarthritis involving multiple joints M15.0  Prediabetes R73.03  
  Restless leg syndrome G25.81  
 Primary hypertension I10  
 Panlobular emphysema ([facility]) J43.1  Impaired fasting glucose R73.01  
 HCAP (healthcare-associated pneumonia) J18.9  Abnormal CT scan, chest R93.89  
 Hypercholesterolemia E78.00  Hypokalemia E87.6  Dizziness R42  CHI (closed head injury) S09. 90XA  Elevated troponin R74.8  Type 2 diabetes mellitus with diabetic neuropathy, without long-term current use of insulin ([facility]) E11.40  Primary insomnia F51.01  
 Major depression, chronic F32.9  Pneumonia J18.9  
 CAP (community acquired pneumonia) J18.9  
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  
 Chronic respiratory failure with hypoxia ([facility]) J96.11  
 Stage 4 very severe COPD by GOLD classification ([facility] [address] J44.9  Congestive heart failure (CHF) ([facility]) I50.9  NSTEMI (non-ST elevated myocardial infarction) ([facility] [address] I21.4  Pelvic ring fracture, closed, initial encounter ([facility] [address] S32.810A  Advanced care planning/counseling discussion Z71.89  
 Debility R53.81  
 Hypoxia R09.02  
 HAP (hospital-acquired pneumonia) J18.9  Fall W19. XXXA  
 
 
ABG: 
Date:4/20/2019 Lab Results Component Value Date/Time PHI 7.331 (L) 04/19/2019 10:34 PM  
 PCO2I 71.7 (H) 04/19/2019 10:34 PM  
 PO2I 69 (L) 04/19/2019 10:34 PM  
 HCO3I 37.9 (H) 04/19/2019 10:34 PM  
 FIO2I 62 04/19/2019 10:34 PM  
 
 
Chest X-ray: 
Date:4/20/2019 Results from Hospital Encounter encounter on 04/18/19 XR CHEST PORT Narrative ONE VIEW CHEST RADIOGRAPH INDICATION: Change in level of consciousness. Hypoxia. COMPARISON: CTA chest earlier today. 4/18/2019 chest x-ray. FINDINGS: 
  
The exam is rotated, limiting the assessment. The cardiomediastinal silhouette 
is grossly stable allowing for the degree of rotation and CT appearance. Increased density in the left lower lung may also be in part due to rotation and 
overlying breast shadow. Atelectasis and trace pleural fluid was present at the 
left lung base on the prior CT today. Stable bones. Impression IMPRESSION: 
 
Exam limited by rotation. Much of the left lower lung appearance may represent 
rotational artifact. Mild atelectasis and trace pleural fluid was present on the 
prior CT exam earlier today. Repeat chest film could be utilized for better 
comparison of change. Lab Test: 
Date:4/20/2019 WBC:  
Lab Results Component Value Date/Time WBC 5.5 04/20/2019 05:28 AM  
HGB:  
Lab Results Component Value Date/Time HGB 13.2 04/20/2019 05:28 AM  
 PLTS:  
Lab Results Component Value Date/Time PLATELET 680 11/19/6313 05:28 AM  
 
 
SaO2%/flow: @YCZZKYK3(9)@ Vital Signs:    
Patient Vitals for the past 8 hrs: 
 Temp Pulse Resp BP SpO2  
04/20/19 1259     94 % 04/20/19 1112 97.4 °F (36.3 °C) 93 16 140/78 96 % 04/20/19 0817 97 °F (36.1 °C) 83 18 125/63 96 % 04/20/19 0740     95 % RA/O2 flow/device:HFNC 40L 55% First Inital Assesment:    
[x]Yes []No  
Reevaluation/Reassessment:   
[]Yes [x]No  
 
CHART REVIEW Points 0 X 1 X 2 X 3 X 4 X Points Pulmonary History Smoking History (1) none  Recent Smoking History <1 PPD  Recent Smoking History >1 PPD  Pulmonary Disease or Impairment  Severe Pulmonary Disease  2 Surgical History No Surgery  General Surgery  Lower Abdominal  Thoracic or Upper Abdominal  Thoracic & Pulmonary Disease  0 CXR Clear or not indicated  Chronic changes or CXR Pending  Infiltrate, atelectasis or pleural effusions  Infiltrates in more than 1lobe  Infiltrates +atelectasis +/or pleural effusions  2 PATIENT ASSESSMENT  
 0 X 1 X 2 X 3 X 4 X Points Respiratory Pattern Regular pattern RR 12-20  Increased RR 21-27   Mild Dyspnea at rest, irregular pattern RR 28-32  Moderate Dyspnea at rest, Use of accessory muscles, RR 33-36  Severe Dyspnea, Use of accessory muscles RR >36  0 Mental Status Alert Oriented cooperative  Confused, Follows commands  Lethargic, Does not follow commands  Obtunded  Unresponsive  0 Breath Sounds Clear  Decreased Unilaterally  Decreased Bilaterally  Mild Scattered wheezing or Crackles in bases  Severe Wheezing or rhonchi  1 Cough Strong dry NPC  Strong Productive  Weak NPC  Weak productive or weak with rhonchi  No cough or may require suctioning  1 Level of Activity Ambulatory  Ambulatory with assistance  Temporarily Non-ambulatory  Non-Ambulatory, able to position self  Unable to position self, confined to bed  1 Total Points/Score:   7 Specific Intervention Chart() Bronchial Hygiene/Secretion Clearance:   
[]EZPAP []Rotation bed with vibration []CPT with percussor []CPT via vest  
[x]Oscillastiang positive pressure expiratory device Lung Expansion:   
[]Incentive Spirometer w/RT visits []Incentive Spirometer w/nursing []EZPAP [] Mer Solian *Suctioning:   
[]Nasal Tracheal []Tracheal   
 *suctioning will be ordered and done PRN with an associated frequency such as QID/PRN based on score Other:   
Care Plan Level # Score Modality Frequency Comment Level 1 >17 Level 2 14-17 Level 3 10-13 Level 4 1-9 Aerobika PRN   
 
BRONCHIAL HYGIENE SCORING AND FREQUENCY GUIDELINES Frequency Indications/Findings Level # Q4 ATC Copious secretions, SOB, unable to sleep 1 QID & PRN at night Moderate amounts of secretions 2  
TID or Q6 wa Small amounts of secretions and poor cough: recent history of secretions 3 BID or Q8 wa Unable to deep breathe and cough effectively 4 Comments:  Aerobika PRN to help mobilize and clear secretions Respiratory Therapist: Carlene Garcia, RT

## 2019-04-20 NOTE — PROGRESS NOTES
Problem: Pressure Injury - Risk of 
Goal: *Prevention of pressure injury Description Document Herbie Scale and appropriate interventions in the flowsheet. Outcome: Progressing Towards Goal 
  
Problem: Falls - Risk of 
Goal: *Absence of Falls Description Document Charolett President Fall Risk and appropriate interventions in the flowsheet.  
Outcome: Progressing Towards Goal

## 2019-04-20 NOTE — PROGRESS NOTES
Went to give patient scheduled medication around 2200, when I went to wake the patient up she would not open her eyes. Tried shaking her leg and calling her name, but she would just groan. Sternal rubbed her chest and she would still not open her eyes, but try to curl up in a ball. Blood sugar was 265. HR and BP within normal limits. Still on high nette O2 with sats ranging from 93-95%. Dr. Hernandez Baldwin at nurses station and informed of above situation. He assessed her at bedside. Ordered STAT ABG and portable chest xray. After ABG was obtained and results came back (see flowsheet) he ordered her to be placed on bipap for the night. 2330: Bipap placed on patient by respiratory therapist. O2 sats in the 90s. Patient is awake and alert. Talking to us and joking around, back to her baseline. She had no complaints or concerns at that time. Will continue to monitor. 0030: Checked on patient. She was sleeping with her bipap on. No concerns at this time, will continue to monitor.

## 2019-04-20 NOTE — PROGRESS NOTES
Sleepy with hypercapnia on ABG 
CXR shows LLL Opacity Continue Abx Will place Pt on BIPAP over night

## 2019-04-21 LAB
ANION GAP SERPL CALC-SCNC: 2 MMOL/L (ref 3–18)
BASOPHILS # BLD: 0 K/UL (ref 0–0.1)
BASOPHILS NFR BLD: 0 % (ref 0–2)
BUN SERPL-MCNC: 18 MG/DL (ref 7–18)
BUN/CREAT SERPL: 25 (ref 12–20)
CALCIUM SERPL-MCNC: 9.6 MG/DL (ref 8.5–10.1)
CHLORIDE SERPL-SCNC: 98 MMOL/L (ref 100–108)
CO2 SERPL-SCNC: 40 MMOL/L (ref 21–32)
CREAT SERPL-MCNC: 0.71 MG/DL (ref 0.6–1.3)
DIFFERENTIAL METHOD BLD: ABNORMAL
EOSINOPHIL # BLD: 0 K/UL (ref 0–0.4)
EOSINOPHIL NFR BLD: 0 % (ref 0–5)
ERYTHROCYTE [DISTWIDTH] IN BLOOD BY AUTOMATED COUNT: 15.8 % (ref 11.6–14.5)
GLUCOSE BLD STRIP.AUTO-MCNC: 182 MG/DL (ref 70–110)
GLUCOSE BLD STRIP.AUTO-MCNC: 219 MG/DL (ref 70–110)
GLUCOSE BLD STRIP.AUTO-MCNC: 275 MG/DL (ref 70–110)
GLUCOSE BLD STRIP.AUTO-MCNC: 313 MG/DL (ref 70–110)
GLUCOSE SERPL-MCNC: 202 MG/DL (ref 74–99)
HCT VFR BLD AUTO: 39.5 % (ref 35–45)
HGB BLD-MCNC: 12.6 G/DL (ref 12–16)
LYMPHOCYTES # BLD: 1 K/UL (ref 0.9–3.6)
LYMPHOCYTES NFR BLD: 12 % (ref 21–52)
MAGNESIUM SERPL-MCNC: 1.9 MG/DL (ref 1.6–2.6)
MCH RBC QN AUTO: 27.7 PG (ref 24–34)
MCHC RBC AUTO-ENTMCNC: 31.9 G/DL (ref 31–37)
MCV RBC AUTO: 86.8 FL (ref 74–97)
MONOCYTES # BLD: 0.4 K/UL (ref 0.05–1.2)
MONOCYTES NFR BLD: 4 % (ref 3–10)
NEUTS SEG # BLD: 7.3 K/UL (ref 1.8–8)
NEUTS SEG NFR BLD: 84 % (ref 40–73)
PLATELET # BLD AUTO: 256 K/UL (ref 135–420)
PMV BLD AUTO: 10.1 FL (ref 9.2–11.8)
POTASSIUM SERPL-SCNC: 5.1 MMOL/L (ref 3.5–5.5)
RBC # BLD AUTO: 4.55 M/UL (ref 4.2–5.3)
SODIUM SERPL-SCNC: 140 MMOL/L (ref 136–145)
WBC # BLD AUTO: 8.6 K/UL (ref 4.6–13.2)

## 2019-04-21 PROCEDURE — 74011000250 HC RX REV CODE- 250: Performed by: INTERNAL MEDICINE

## 2019-04-21 PROCEDURE — 3331090001 HH PPS REVENUE CREDIT

## 2019-04-21 PROCEDURE — 83735 ASSAY OF MAGNESIUM: CPT

## 2019-04-21 PROCEDURE — 85025 COMPLETE CBC W/AUTO DIFF WBC: CPT

## 2019-04-21 PROCEDURE — 74011250636 HC RX REV CODE- 250/636: Performed by: INTERNAL MEDICINE

## 2019-04-21 PROCEDURE — 77010033711 HC HIGH FLOW OXYGEN

## 2019-04-21 PROCEDURE — 3331090002 HH PPS REVENUE DEBIT

## 2019-04-21 PROCEDURE — 65660000004 HC RM CVT STEPDOWN

## 2019-04-21 PROCEDURE — 82962 GLUCOSE BLOOD TEST: CPT

## 2019-04-21 PROCEDURE — 36415 COLL VENOUS BLD VENIPUNCTURE: CPT

## 2019-04-21 PROCEDURE — 74011250637 HC RX REV CODE- 250/637: Performed by: INTERNAL MEDICINE

## 2019-04-21 PROCEDURE — 80048 BASIC METABOLIC PNL TOTAL CA: CPT

## 2019-04-21 PROCEDURE — 74011636637 HC RX REV CODE- 636/637: Performed by: EMERGENCY MEDICINE

## 2019-04-21 PROCEDURE — 94762 N-INVAS EAR/PLS OXIMTRY CONT: CPT

## 2019-04-21 PROCEDURE — 74011000250 HC RX REV CODE- 250: Performed by: EMERGENCY MEDICINE

## 2019-04-21 PROCEDURE — 74011250636 HC RX REV CODE- 250/636: Performed by: EMERGENCY MEDICINE

## 2019-04-21 PROCEDURE — 94640 AIRWAY INHALATION TREATMENT: CPT

## 2019-04-21 PROCEDURE — 74011250636 HC RX REV CODE- 250/636: Performed by: FAMILY MEDICINE

## 2019-04-21 RX ORDER — IPRATROPIUM BROMIDE AND ALBUTEROL SULFATE 2.5; .5 MG/3ML; MG/3ML
3 SOLUTION RESPIRATORY (INHALATION)
Status: DISCONTINUED | OUTPATIENT
Start: 2019-04-21 | End: 2019-04-22

## 2019-04-21 RX ADMIN — GABAPENTIN 300 MG: 300 CAPSULE ORAL at 17:30

## 2019-04-21 RX ADMIN — AZITHROMYCIN MONOHYDRATE 500 MG: 500 INJECTION, POWDER, LYOPHILIZED, FOR SOLUTION INTRAVENOUS at 14:01

## 2019-04-21 RX ADMIN — PAROXETINE HYDROCHLORIDE 30 MG: 20 TABLET, FILM COATED ORAL at 09:00

## 2019-04-21 RX ADMIN — INSULIN LISPRO 8 UNITS: 100 INJECTION, SOLUTION INTRAVENOUS; SUBCUTANEOUS at 12:48

## 2019-04-21 RX ADMIN — Medication 10 ML: at 21:55

## 2019-04-21 RX ADMIN — Medication 10 ML: at 05:18

## 2019-04-21 RX ADMIN — TAMSULOSIN HYDROCHLORIDE 0.4 MG: 0.4 CAPSULE ORAL at 10:37

## 2019-04-21 RX ADMIN — MIRTAZAPINE 15 MG: 15 TABLET, FILM COATED ORAL at 21:55

## 2019-04-21 RX ADMIN — IPRATROPIUM BROMIDE AND ALBUTEROL SULFATE 3 ML: .5; 3 SOLUTION RESPIRATORY (INHALATION) at 20:10

## 2019-04-21 RX ADMIN — IPRATROPIUM BROMIDE AND ALBUTEROL SULFATE 3 ML: .5; 3 SOLUTION RESPIRATORY (INHALATION) at 08:58

## 2019-04-21 RX ADMIN — ENOXAPARIN SODIUM 40 MG: 40 INJECTION SUBCUTANEOUS at 10:00

## 2019-04-21 RX ADMIN — METHYLPREDNISOLONE SODIUM SUCCINATE 40 MG: 40 INJECTION, POWDER, FOR SOLUTION INTRAMUSCULAR; INTRAVENOUS at 12:48

## 2019-04-21 RX ADMIN — METHYLPREDNISOLONE SODIUM SUCCINATE 40 MG: 40 INJECTION, POWDER, FOR SOLUTION INTRAMUSCULAR; INTRAVENOUS at 21:54

## 2019-04-21 RX ADMIN — ATORVASTATIN CALCIUM 40 MG: 40 TABLET, FILM COATED ORAL at 21:55

## 2019-04-21 RX ADMIN — INSULIN LISPRO 4 UNITS: 100 INJECTION, SOLUTION INTRAVENOUS; SUBCUTANEOUS at 21:55

## 2019-04-21 RX ADMIN — Medication 10 ML: at 14:00

## 2019-04-21 RX ADMIN — ASPIRIN 81 MG: 81 TABLET, COATED ORAL at 10:37

## 2019-04-21 RX ADMIN — FUROSEMIDE 20 MG: 10 INJECTION, SOLUTION INTRAMUSCULAR; INTRAVENOUS at 10:37

## 2019-04-21 RX ADMIN — FAMOTIDINE 20 MG: 20 TABLET ORAL at 10:37

## 2019-04-21 RX ADMIN — METHYLPREDNISOLONE SODIUM SUCCINATE 60 MG: 40 INJECTION, POWDER, FOR SOLUTION INTRAMUSCULAR; INTRAVENOUS at 05:45

## 2019-04-21 RX ADMIN — INSULIN LISPRO 6 UNITS: 100 INJECTION, SOLUTION INTRAVENOUS; SUBCUTANEOUS at 16:30

## 2019-04-21 RX ADMIN — GABAPENTIN 300 MG: 300 CAPSULE ORAL at 10:37

## 2019-04-21 RX ADMIN — CEFEPIME 2 G: 2 INJECTION, POWDER, FOR SOLUTION INTRAVENOUS at 03:21

## 2019-04-21 RX ADMIN — POTASSIUM CHLORIDE 20 MEQ: 20 TABLET, EXTENDED RELEASE ORAL at 10:37

## 2019-04-21 NOTE — PROGRESS NOTES
4: 17 AM patient refused to have her temperature taken. I explained to the patient that it was important to take her temperature to see if she was febrile due to her stating that her pillow was wet but she stated\" :that's what I do\" and refused to have her temperature taken.

## 2019-04-21 NOTE — CONSULTS
[employer] [facility] Pulmonary, Critical Care, and Sleep Medicine Initial Patient Consult Name: Sandi Cerrato MRN: [id number] : 1933 Hospital: [facility] Date: 2019 IMPRESSION:  
· Severe COPD per ATS criteria. · Acute on chronic hypoxic respiratory failure and additionally has chronic hypercarbic respiratory failure in a patient with a clear appearing CXR. No evidence of CHF, infiltrates or effusions. · Acute exacerbation of COPD. · Hx of pelvic fractures. · Chronic pain syndrome. RECOMMENDATIONS:  
 
· Decrease solumedrol and duoneb frequency. · Antibiotics- De-escalation to azithromycin · Aspiration precautions. · Out patient testing- PFT, 6 min walk, Polysomnogram 
· Assess home Oxygen needs at discharge · OT, PT, OOB and ambulate · Healthy weight · Will Follow · DVT, PUD prophylaxis Subjective: This patient has been seen and evaluated at the request of Dr. Nam Zepeda for evaluation of hypoxic respiratory failure. Patient is a 80 y.o. female with past medical history significant for hypertension, restless leg syndrome, diabetes, peripheral neuropathy as well as recurrent falls who presented after a fall. Patient was brought in by EMS. According to patient she attempted to get up from the chair when she fell and hit her head. No LOC, nausea, vomiting headaches etc after the fall. On admission to ED patient was noted to be pyrexial and was admitted and treated for HAP. During the course of admission patient became increasingly hypoxic and her CXR on admission was clear which arose suspicion of PE.  CTA is awaited. 2019: 
 
Refused BIPAP overnight. Notes improvement this morning after having some chills and night sweats overnight. Respiratory status stable. Past Medical History:  
Diagnosis Date  Chronic lung disease  Colon polyps  COPD 02  DDD (degenerative disc disease), lumbar 10/1/2014  Degeneration of lumbar or lumbosacral intervertebral disc  Depression  Diabetes ([facility] [address] 7-19-05  Diabetes mellitus ([facility] [address]  Diverticulosis   [facility], [facility] 10/1/2014  MOORE (Dyspnea on Exertion) 4-19-02  
 echo: +mild LVH, GI59-24% w/ diastolic dysfxn  Glaucoma  HCAP (healthcare-associated pneumonia) 03/24/2017  Hemorrhoids 6-6-06  Hyperlipidemia 5/17/2011  Hypertension 4-16-02  LBP (low back pain) 4-13-04  Low back pain radiating to both legs 10/1/2014  Lumbago  Lumbar disc herniation 10/1/2014  Lumbar facet arthropathy 10/1/2014  Lumbosacral radiculopathy at L5 10/1/2014  Lumbosacral radiculopathy at S1 10/1/2014  Microscopic hematuria  OA (osteoarthritis)  Overactive bladder 5/17/2011  
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   
 -MRI brain  Urinary tract infection, site not specified Past Surgical History:  
Procedure Laterality Date  HX APPENDECTOMY  HX CHOLECYSTECTOMY    HX HYSTERECTOMY    
 (+)DUB  HX POLYPECTOMY  SD COLONOSCOPY FLX DX W/COLLJ SPEC WHEN PFRMD  6-16-06  
 normal, Dr Alison Raygoza  SD COLONOSCOPY FLX DX W/COLLJ SPEC WHEN PFRMD    
 (+)polyp= tubular adenoma Prior to Admission medications Medication Sig Start Date End Date Taking?  Authorizing Provider  
glipiZIDE (GLUCOTROL) 5 mg tablet 1 tablet by mouth daily 4/16/19  Yes Diana Granados MD  
insulin aspart U-100 (NOVOLOG) 100 unit/mL inpn 2 Units by SubCUTAneous route Before breakfast, lunch, and dinner. Per sliding scale- 0-200- 0 units, 201-250- 2 units, 251-300- 4 units, 301-350-6 units, 351-400-8 units, 401-450- 10 units and Call MD.   Yes Provider, Historical  
albuterol-ipratropium (DUO-NEB) 2.5 mg-0.5 mg/3 ml nebu 3 mL by Nebulization route every four (4) hours. 3/15/19  Yes Ras Ellis MD  
famotidine (PEPCID) 20 mg tablet Take 1 Tab by mouth daily. 3/16/19  Yes Ras Ellis MD  
furosemide (LASIX) 20 mg tablet Take 1 Tab by mouth every fourty-eight (48) hours. 3/15/19  Yes Ras Ellis MD  
atorvastatin (LIPITOR) 40 mg tablet Take 1 Tab by mouth nightly. 3/15/19  Yes Ras Ellis MD  
rOPINIRole (REQUIP) 1 mg tablet TAKE ONE-HALF TO ONE TABLET BY MOUTH ONCE NIGHTLY AS NEEDED FOR  RESTLESS  LEGS  FOR  UP  TO  90  DAYS 3/10/19  Yes Neli Bellamy MD  
PARoxetine (PAXIL) 30 mg tablet TAKE 1 TABLET BY MOUTH  DAILY 1/29/19  Yes Neli Bellamy MD  
mirtazapine (REMERON) 15 mg tablet Take 1 Tab by mouth nightly. 11/5/18  Yes Neli Bellamy MD  
tamsulosin (FLOMAX) 0.4 mg capsule Take 1 Cap by mouth daily. 10/29/18  Yes Neli Bellamy MD  
gabapentin (NEURONTIN) 300 mg capsule 1 capsule by mouth morning, 1 capsule at midday and 2 capsules at bedtime 10/22/18  Yes Neli Bellamy MD  
simvastatin (ZOCOR) 20 mg tablet Take 1 Tab by mouth nightly. 10/16/18  Yes Neli Bellamy MD  
aspirin delayed-release 81 mg tablet Take 1 Tab by mouth daily. 8/10/18  Yes Sabine Williamson MD  
inhalational spacing device (AEROCHAMBER MV) 1 Each by Does Not Apply route as needed. 7/27/18  Yes Erika Nagel MD  
OXYGEN-AIR DELIVERY SYSTEMS 3 L by Nasal route continuous. Yes Provider, Historical  
Nebulizer & Compressor (PORTABLE NEBULIZER SYSTEM) machine 1 Each by Does Not Apply route every six (6) hours as needed. 2/10/18  Yes Magdalene Krishna PA-C Allergies Allergen Reactions  Levaquin [Levofloxacin] Rash Social History Tobacco Use  Smoking status: Former Smoker Packs/day: 1.00 Years: 57.00 Pack years: 57.00 Types: Cigarettes Last attempt to quit: 2018 Years since quittin.3  Smokeless tobacco: Never Used Substance Use Topics  Alcohol use: No  
  
Family History Problem Relation Age of Onset  Colon Cancer Sister  Heart Disease Brother  Seizures Son  Colon Cancer Maternal Aunt Immunization status: up to date and documented, stated as current, but no records available. Current Facility-Administered Medications Medication Dose Route Frequency  enoxaparin (LOVENOX) injection 40 mg  40 mg SubCUTAneous Q24H  
 furosemide (LASIX) injection 20 mg  20 mg IntraVENous DAILY  potassium chloride (K-DUR, KLOR-CON) SR tablet 20 mEq  20 mEq Oral DAILY  methylPREDNISolone (PF) (SOLU-MEDROL) injection 60 mg  60 mg IntraVENous Q8H  
 insulin lispro (HUMALOG) injection   SubCUTAneous AC&HS  cefepime (MAXIPIME) 2 g in sterile water (preservative free) 10 mL IV syringe  2 g IntraVENous Q8H  
 azithromycin (ZITHROMAX) 500 mg in  mL  500 mg IntraVENous Q24H  
 albuterol-ipratropium (DUO-NEB) 2.5 MG-0.5 MG/3 ML  3 mL Nebulization Q4H  
 aspirin delayed-release tablet 81 mg  81 mg Oral DAILY  atorvastatin (LIPITOR) tablet 40 mg  40 mg Oral QHS  famotidine (PEPCID) tablet 20 mg  20 mg Oral DAILY  gabapentin (NEURONTIN) capsule 300 mg  300 mg Oral BID  mirtazapine (REMERON) tablet 15 mg  15 mg Oral QHS  PARoxetine (PAXIL) tablet 30 mg  30 mg Oral DAILY  tamsulosin (FLOMAX) capsule 0.4 mg  0.4 mg Oral DAILY  sodium chloride (NS) flush 5-40 mL  5-40 mL IntraVENous Q8H  
 vancomycin (VANCOCIN) 1250 mg in  ml infusion  1,250 mg IntraVENous Q24H Review of Systems: 
Pertinent items are noted in HPI. Objective: 
Vital Signs:   
Visit Vitals /83 (BP 1 Location: Left arm, BP Patient Position: At rest) Pulse 79 Temp 98.2 °F (36.8 °C) Resp 16 Wt 74.5 kg (164 lb 4.8 oz) SpO2 (!) 87% BMI 27.34 kg/m² O2 Device: Hi flow nasal cannula O2 Flow Rate (L/min): 60 l/min Temp (24hrs), Av.7 °F (36.5 °C), Min:97 °F (36.1 °C), Max:98.2 °F (36.8 °C) Intake/Output:  
Last shift:      No intake/output data recorded. Last 3 shifts:  1901 -  0700 In: -  
Out: 2200 [Urine:2200] Intake/Output Summary (Last 24 hours) at 2019 1437 Last data filed at 2019 1211 Gross per 24 hour Intake  Output 400 ml Net -400 ml Physical Exam:  
General:  Alert, cooperative, no distress, appears stated age. Head:  Normocephalic, without obvious abnormality, atraumatic. Eyes:  Conjunctivae/corneas clear. PERRL, EOMs intact. Nose: Nares normal. Septum midline. Mucosa normal. No drainage or sinus tenderness. Throat: Lips, mucosa, and tongue normal. Teeth and gums normal.  
Neck: Supple, symmetrical, trachea midline, no adenopathy, thyroid: no enlargment/tenderness/nodules, no carotid bruit and no JVD. Back:   Symmetric, no curvature. ROM normal.  
Lungs:   Bilateral wheeze and coarse breath sounds bilaterally. Chest wall:  No tenderness or deformity. Heart:  Regular rate and rhythm, S1, S2 normal, no murmur, click, rub or gallop. Abdomen:   Soft, non-tender. Bowel sounds normal. No masses,  No organomegaly. Extremities: Extremities normal, atraumatic, no cyanosis or edema. Pulses: 2+ and symmetric all extremities. Skin: Skin color, texture, turgor normal. No rashes or lesions Lymph nodes: Cervical, supraclavicular, and axillary nodes normal.  
Neurologic: Grossly nonfocal  
 
Data review:  
 
Recent Results (from the past 24 hour(s)) POTASSIUM Collection Time: 19  9:41 AM  
Result Value Ref Range Potassium 4.3 3.5 - 5.5 mmol/L  
GLUCOSE, POC Collection Time: 04/20/19 11:15 AM  
Result Value Ref Range Glucose (POC) 244 (H) 70 - 110 mg/dL GLUCOSE, POC Collection Time: 04/20/19  3:29 PM  
Result Value Ref Range Glucose (POC) 149 (H) 70 - 110 mg/dL GLUCOSE, POC Collection Time: 04/20/19  9:15 PM  
Result Value Ref Range Glucose (POC) 307 (H) 70 - 110 mg/dL CBC WITH AUTOMATED DIFF Collection Time: 04/21/19  5:49 AM  
Result Value Ref Range WBC 8.6 4.6 - 13.2 K/uL  
 RBC 4.55 4.20 - 5.30 M/uL  
 HGB 12.6 12.0 - 16.0 g/dL HCT 39.5 35.0 - 45.0 % MCV 86.8 74.0 - 97.0 FL  
 MCH 27.7 24.0 - 34.0 PG  
 MCHC 31.9 31.0 - 37.0 g/dL  
 RDW 15.8 (H) 11.6 - 14.5 % PLATELET 118 714 - 339 K/uL MPV 10.1 9.2 - 11.8 FL  
 NEUTROPHILS 84 (H) 40 - 73 % LYMPHOCYTES 12 (L) 21 - 52 % MONOCYTES 4 3 - 10 % EOSINOPHILS 0 0 - 5 % BASOPHILS 0 0 - 2 %  
 ABS. NEUTROPHILS 7.3 1.8 - 8.0 K/UL  
 ABS. LYMPHOCYTES 1.0 0.9 - 3.6 K/UL  
 ABS. MONOCYTES 0.4 0.05 - 1.2 K/UL  
 ABS. EOSINOPHILS 0.0 0.0 - 0.4 K/UL  
 ABS. BASOPHILS 0.0 0.0 - 0.1 K/UL  
 DF AUTOMATED METABOLIC PANEL, BASIC Collection Time: 04/21/19  5:49 AM  
Result Value Ref Range Sodium 140 136 - 145 mmol/L Potassium 5.1 3.5 - 5.5 mmol/L Chloride 98 (L) 100 - 108 mmol/L  
 CO2 40 (H) 21 - 32 mmol/L Anion gap 2 (L) 3.0 - 18 mmol/L Glucose 202 (H) 74 - 99 mg/dL BUN 18 7.0 - 18 MG/DL Creatinine 0.71 0.6 - 1.3 MG/DL  
 BUN/Creatinine ratio 25 (H) 12 - 20 GFR est AA >60 >60 ml/min/1.73m2 GFR est non-AA >60 >60 ml/min/1.73m2 Calcium 9.6 8.5 - 10.1 MG/DL MAGNESIUM Collection Time: 04/21/19  5:49 AM  
Result Value Ref Range Magnesium 1.9 1.6 - 2.6 mg/dL GLUCOSE, POC Collection Time: 04/21/19  7:19 AM  
Result Value Ref Range Glucose (POC) 182 (H) 70 - 110 mg/dL Imaging: 
I have personally reviewed the patients radiographs and have reviewed the reports: XR Results (most recent): 
Results from Hospital Encounter encounter on 04/18/19 XR CHEST PORT  Narrative ONE VIEW CHEST RADIOGRAPH  
 
 INDICATION: Change in level of consciousness. Hypoxia. COMPARISON: CTA chest earlier today. 4/18/2019 chest x-ray. FINDINGS: 
  
The exam is rotated, limiting the assessment. The cardiomediastinal silhouette 
is grossly stable allowing for the degree of rotation and CT appearance. Increased density in the left lower lung may also be in part due to rotation and 
overlying breast shadow. Atelectasis and trace pleural fluid was present at the 
left lung base on the prior CT today. Stable bones. Impression IMPRESSION: 
 
Exam limited by rotation. Much of the left lower lung appearance may represent 
rotational artifact. Mild atelectasis and trace pleural fluid was present on the 
prior CT exam earlier today. Repeat chest film could be utilized for better 
comparison of change. CT Results (most recent): 
Results from Hospital Encounter encounter on 04/18/19 CTA CHEST W OR W WO CONT Narrative EXAM: CT Angiogram of the Chest 
 
CLINICAL INDICATION: Shortness of breath. TECHNIQUE: CT angiogram of the chest performed. MIPS performed All CT scans at this facility are performed using dose optimization technique as 
appropriate to a performed exam, to include automated exposure control, 
adjustment of the mA and/or kV according to patient size (including appropriate 
matching for site specific examination) or use of iterative reconstruction 
technique. IV CONTRAST: 80 cc of Isovue 370 COMPARISON: 3/1/2017 and chest x-ray dated 4/18/2019 FINDINGS: 
Limitations: Exam is limited due to breathing motion which limits evaluation of 
the lung parenchyma and distal pulmonary arteries. Thyroid: Unremarkable. Mediastinum: No evidence of adenopathy or fluid collection. The esophagus is 
significantly thickened especially in the mid esophagus. No adjacent adenopathy 
appreciated. This is new. Heart: The cardiac silhouette is mildly prominent. Extensive coronary artery calcifications are present. Pericardium: Unremarkable Aorta: Calcifications are noted in the thoracic aorta. No evidence of aneurysm. There is irregular plaque which was present previously. This is most pronounced 
in the lower descending thoracic aorta. Pulmonary Arteries: No evidence of pulmonary artery embolus within the main and 
secondary pulmonary arteries. The tertiary pulmonary arteries are limited in 
evaluation due to breathing motion. The distal pulmonary arteries appear mildly 
prominent. Trachea and Bronchi: Unremarkable. Pleura: Small left pleural effusion is present. Lungs: Left lower lobe atelectasis and mild atelectasis in the right lower lobe 
is noted. There is interstitial thickening. Axilla/Chest wall: No acute findings. Upper Abdomen: A small focus of hyperenhancement in the dome of the right lobe 
of liver is noted which is unchanged since 2017 Musculoskeletal: Old L1 compression fracture is noted. There is a subtle loss of 
the superior endplate of P04 which is new since 2017 but appears to be old. Impression IMPRESSION: 
1. No evidence of pulmonary embolus or aortic dissection. 2.   Borderline interstitial pulmonary edema. 3.  Small left pleural effusion. 4.   Thickened esophagus which is new. May consider correlation with esophagram 
or EGD. 5.   Mild cardiac enlargement with extensive coronary artery calcifications. 03/10/19 ECHO ADULT COMPLETE 03/11/2019 3/11/2019 Narrative · Procedure performed with the patient in a supine position. Unable to  
obtain on-axis apical images. Technically difficult study due to limited  
mobility and lung interference. Patient intubated and restrained. · Left ventricular low normal global systolic function. Calculated left  
ventricular ejection fraction is 55%. Visually measured ejection fraction. Left ventricular mild concentric hypertrophy. No regional wall motion abnormality noted. Inconclusive left ventricular diastolic function. · Mechanically ventilated; cannot use inferior caval vein diameter to  
estimate central venous pressure. · Moderate to severe tricuspid valve regurgitation is present. There is no  
evidence of pulmonary hypertension. · Pulmonary artery pressure likely underestimated due to severity of  
tricuspid regurgitation. · Mild pulmonic valve regurgitation is present. · Right ventricle not well visualized. Right ventricular global systolic  
function is reduced.  
   
  Signed by: MD Aj Merida MD

## 2019-04-21 NOTE — PROGRESS NOTES
[facility] Hospitalist Group Progress Note Patient: Karen Collier Age: 80 y.o. : 1933 MR#: [id number] SSN: [id number] Date/Time: 2019 Subjective:  
 
Patient lying in bed in NAD, awake, alert, still on HFNC Assessment/Plan:  
 
-acute hypoxic and hyercapnic respiratory failure - chronic hypoxic resp failure, on home O2  3 liters vis NC continuously 
- COPD with acute exacerbation 
- ? PNA 
-Chronic diastolic chf, 
- Pelvic fracture - Esophageal wall thickening on CTA- close OP f/u with GI 
 
PLAN 
O2, nebs , steroids Pulmonary follwoing BIPAP QHS Abx, CTA - no PE, some mild pulm edema On lasix 
- PT, OT 
- DNR 
D/w patient and daughter at bedside, they request [facility] manager consulted Case discussed with:  [x]Patient  []Family  []Nursing  []Case Management DVT Prophylaxis:  []Lovenox  []Hep SQ  []SCDs  []Coumadin   []On Heparin gtt Objective:  
VS:  
Visit Vitals /83 (BP 1 Location: Left arm, BP Patient Position: At rest) Pulse 79 Temp 98.5 °F (36.9 °C) Resp 16 Wt 74.5 kg (164 lb 4.8 oz) SpO2 97% BMI 27.34 kg/m² Tmax/24hrs: Temp (24hrs), Av.1 °F (36.7 °C), Min:97.4 °F (36.3 °C), Max:98.6 °F (37 °C) Input/Output:  
No intake or output data in the 24 hours ending 19 1638 General:  Awake, alert Cardiovascular:  S1S2+, RRR Pulmonary:  Coarse bs b/l GI:  Soft, BS+, NT, ND Extremities:  No edema Labs:   
Recent Results (from the past 24 hour(s)) GLUCOSE, POC Collection Time: 19  9:15 PM  
Result Value Ref Range Glucose (POC) 307 (H) 70 - 110 mg/dL CBC WITH AUTOMATED DIFF Collection Time: 19  5:49 AM  
Result Value Ref Range WBC 8.6 4.6 - 13.2 K/uL  
 RBC 4.55 4.20 - 5.30 M/uL  
 HGB 12.6 12.0 - 16.0 g/dL HCT 39.5 35.0 - 45.0 % MCV 86.8 74.0 - 97.0 FL  
 MCH 27.7 24.0 - 34.0 PG  
 MCHC 31.9 31.0 - 37.0 g/dL  
 RDW 15.8 (H) 11.6 - 14.5 % PLATELET 259 950 - 382 K/uL MPV 10.1 9.2 - 11.8 FL  
 NEUTROPHILS 84 (H) 40 - 73 % LYMPHOCYTES 12 (L) 21 - 52 % MONOCYTES 4 3 - 10 % EOSINOPHILS 0 0 - 5 % BASOPHILS 0 0 - 2 %  
 ABS. NEUTROPHILS 7.3 1.8 - 8.0 K/UL  
 ABS. LYMPHOCYTES 1.0 0.9 - 3.6 K/UL  
 ABS. MONOCYTES 0.4 0.05 - 1.2 K/UL  
 ABS. EOSINOPHILS 0.0 0.0 - 0.4 K/UL  
 ABS. BASOPHILS 0.0 0.0 - 0.1 K/UL  
 DF AUTOMATED METABOLIC PANEL, BASIC Collection Time: 04/21/19  5:49 AM  
Result Value Ref Range Sodium 140 136 - 145 mmol/L Potassium 5.1 3.5 - 5.5 mmol/L Chloride 98 (L) 100 - 108 mmol/L  
 CO2 40 (H) 21 - 32 mmol/L Anion gap 2 (L) 3.0 - 18 mmol/L Glucose 202 (H) 74 - 99 mg/dL BUN 18 7.0 - 18 MG/DL Creatinine 0.71 0.6 - 1.3 MG/DL  
 BUN/Creatinine ratio 25 (H) 12 - 20 GFR est AA >60 >60 ml/min/1.73m2 GFR est non-AA >60 >60 ml/min/1.73m2 Calcium 9.6 8.5 - 10.1 MG/DL MAGNESIUM Collection Time: 04/21/19  5:49 AM  
Result Value Ref Range Magnesium 1.9 1.6 - 2.6 mg/dL GLUCOSE, POC Collection Time: 04/21/19  7:19 AM  
Result Value Ref Range Glucose (POC) 182 (H) 70 - 110 mg/dL GLUCOSE, POC Collection Time: 04/21/19 12:17 PM  
Result Value Ref Range Glucose (POC) 313 (H) 70 - 110 mg/dL GLUCOSE, POC Collection Time: 04/21/19  4:34 PM  
Result Value Ref Range Glucose (POC) 275 (H) 70 - 110 mg/dL Additional Data Reviewed:   
 
Signed By: Javad Gould MD   
 April 21, 2019

## 2019-04-21 NOTE — PROGRESS NOTES
OT orders received and chart reviewed. Pt not seen for skilled OT due to: 
[]  Nausea/vomiting 
[]  Eating 
[]  Pain 
[]  Pt lethargic [x]  Receiving breathing treatment with respiratory Will f/u later as patients' schedule allows. Thank you.  
Kirt Muñoz OTR/L

## 2019-04-21 NOTE — PROGRESS NOTES
PT eval order received and chart reviewed. Unable to see patient at this time as patient receiving breathing treatment. Will follow up as patient schedule allows. Thank you for this referral. Princess Bull, PT, DPT.

## 2019-04-21 NOTE — PROGRESS NOTES
Problem: Pressure Injury - Risk of 
Goal: *Prevention of pressure injury Description Document Herbie Scale and appropriate interventions in the flowsheet. Outcome: Progressing Towards Goal 
  
Problem: Falls - Risk of 
Goal: *Absence of Falls Description Document Tia Manus Fall Risk and appropriate interventions in the flowsheet.  
Outcome: Progressing Towards Goal

## 2019-04-22 ENCOUNTER — APPOINTMENT (OUTPATIENT)
Dept: NON INVASIVE DIAGNOSTICS | Age: 84
DRG: 189 | End: 2019-04-22
Attending: INTERNAL MEDICINE
Payer: MEDICARE

## 2019-04-22 ENCOUNTER — HOME CARE VISIT (OUTPATIENT)
Dept: HOME HEALTH SERVICES | Facility: HOME HEALTH | Age: 84
End: 2019-04-22
Payer: MEDICARE

## 2019-04-22 LAB
ANION GAP SERPL CALC-SCNC: 2 MMOL/L (ref 3–18)
BACTERIA SPEC CULT: ABNORMAL
BASOPHILS # BLD: 0 K/UL (ref 0–0.1)
BASOPHILS NFR BLD: 0 % (ref 0–2)
BUN SERPL-MCNC: 20 MG/DL (ref 7–18)
BUN/CREAT SERPL: 29 (ref 12–20)
CALCIUM SERPL-MCNC: 9 MG/DL (ref 8.5–10.1)
CHLORIDE SERPL-SCNC: 97 MMOL/L (ref 100–108)
CO2 SERPL-SCNC: 39 MMOL/L (ref 21–32)
CREAT SERPL-MCNC: 0.68 MG/DL (ref 0.6–1.3)
DIFFERENTIAL METHOD BLD: ABNORMAL
EOSINOPHIL # BLD: 0 K/UL (ref 0–0.4)
EOSINOPHIL NFR BLD: 0 % (ref 0–5)
ERYTHROCYTE [DISTWIDTH] IN BLOOD BY AUTOMATED COUNT: 16 % (ref 11.6–14.5)
GLUCOSE BLD STRIP.AUTO-MCNC: 149 MG/DL (ref 70–110)
GLUCOSE BLD STRIP.AUTO-MCNC: 192 MG/DL (ref 70–110)
GLUCOSE BLD STRIP.AUTO-MCNC: 209 MG/DL (ref 70–110)
GLUCOSE BLD STRIP.AUTO-MCNC: 323 MG/DL (ref 70–110)
GLUCOSE BLD STRIP.AUTO-MCNC: 344 MG/DL (ref 70–110)
GLUCOSE SERPL-MCNC: 230 MG/DL (ref 74–99)
HCT VFR BLD AUTO: 39.9 % (ref 35–45)
HGB BLD-MCNC: 12.7 G/DL (ref 12–16)
LYMPHOCYTES # BLD: 0.7 K/UL (ref 0.9–3.6)
LYMPHOCYTES NFR BLD: 9 % (ref 21–52)
MCH RBC QN AUTO: 27.5 PG (ref 24–34)
MCHC RBC AUTO-ENTMCNC: 31.8 G/DL (ref 31–37)
MCV RBC AUTO: 86.6 FL (ref 74–97)
MONOCYTES # BLD: 0.4 K/UL (ref 0.05–1.2)
MONOCYTES NFR BLD: 5 % (ref 3–10)
NEUTS SEG # BLD: 6.9 K/UL (ref 1.8–8)
NEUTS SEG NFR BLD: 86 % (ref 40–73)
PLATELET # BLD AUTO: 242 K/UL (ref 135–420)
PMV BLD AUTO: 10.7 FL (ref 9.2–11.8)
POTASSIUM SERPL-SCNC: 4 MMOL/L (ref 3.5–5.5)
RBC # BLD AUTO: 4.61 M/UL (ref 4.2–5.3)
SERVICE CMNT-IMP: ABNORMAL
SODIUM SERPL-SCNC: 138 MMOL/L (ref 136–145)
WBC # BLD AUTO: 8 K/UL (ref 4.6–13.2)

## 2019-04-22 PROCEDURE — 97162 PT EVAL MOD COMPLEX 30 MIN: CPT

## 2019-04-22 PROCEDURE — 74011250637 HC RX REV CODE- 250/637: Performed by: EMERGENCY MEDICINE

## 2019-04-22 PROCEDURE — 82962 GLUCOSE BLOOD TEST: CPT

## 2019-04-22 PROCEDURE — 3331090002 HH PPS REVENUE DEBIT

## 2019-04-22 PROCEDURE — 74011636637 HC RX REV CODE- 636/637: Performed by: EMERGENCY MEDICINE

## 2019-04-22 PROCEDURE — 97165 OT EVAL LOW COMPLEX 30 MIN: CPT

## 2019-04-22 PROCEDURE — 65660000004 HC RM CVT STEPDOWN

## 2019-04-22 PROCEDURE — 85025 COMPLETE CBC W/AUTO DIFF WBC: CPT

## 2019-04-22 PROCEDURE — 74011250637 HC RX REV CODE- 250/637: Performed by: INTERNAL MEDICINE

## 2019-04-22 PROCEDURE — 74011000250 HC RX REV CODE- 250: Performed by: INTERNAL MEDICINE

## 2019-04-22 PROCEDURE — 36415 COLL VENOUS BLD VENIPUNCTURE: CPT

## 2019-04-22 PROCEDURE — 77010033711 HC HIGH FLOW OXYGEN

## 2019-04-22 PROCEDURE — 80048 BASIC METABOLIC PNL TOTAL CA: CPT

## 2019-04-22 PROCEDURE — 74011250636 HC RX REV CODE- 250/636: Performed by: EMERGENCY MEDICINE

## 2019-04-22 PROCEDURE — 97535 SELF CARE MNGMENT TRAINING: CPT

## 2019-04-22 PROCEDURE — 97530 THERAPEUTIC ACTIVITIES: CPT

## 2019-04-22 PROCEDURE — 94640 AIRWAY INHALATION TREATMENT: CPT

## 2019-04-22 PROCEDURE — 94762 N-INVAS EAR/PLS OXIMTRY CONT: CPT

## 2019-04-22 PROCEDURE — 74011250636 HC RX REV CODE- 250/636: Performed by: INTERNAL MEDICINE

## 2019-04-22 PROCEDURE — 3331090001 HH PPS REVENUE CREDIT

## 2019-04-22 PROCEDURE — 77030038269 HC DRN EXT URIN PURWCK BARD -A

## 2019-04-22 PROCEDURE — 77010033678 HC OXYGEN DAILY

## 2019-04-22 RX ORDER — BUDESONIDE 1 MG/2ML
1000 INHALANT ORAL
Status: DISCONTINUED | OUTPATIENT
Start: 2019-04-22 | End: 2019-04-28 | Stop reason: HOSPADM

## 2019-04-22 RX ORDER — INSULIN GLARGINE 100 [IU]/ML
5 INJECTION, SOLUTION SUBCUTANEOUS DAILY
Status: DISCONTINUED | OUTPATIENT
Start: 2019-04-22 | End: 2019-04-24

## 2019-04-22 RX ORDER — IPRATROPIUM BROMIDE AND ALBUTEROL SULFATE 2.5; .5 MG/3ML; MG/3ML
3 SOLUTION RESPIRATORY (INHALATION)
Status: DISCONTINUED | OUTPATIENT
Start: 2019-04-22 | End: 2019-04-25

## 2019-04-22 RX ORDER — AZITHROMYCIN 250 MG/1
500 TABLET, FILM COATED ORAL EVERY 24 HOURS
Status: DISCONTINUED | OUTPATIENT
Start: 2019-04-22 | End: 2019-04-25

## 2019-04-22 RX ORDER — PREDNISONE 20 MG/1
40 TABLET ORAL
Status: DISCONTINUED | OUTPATIENT
Start: 2019-04-23 | End: 2019-04-23

## 2019-04-22 RX ADMIN — ATORVASTATIN CALCIUM 40 MG: 40 TABLET, FILM COATED ORAL at 21:55

## 2019-04-22 RX ADMIN — INSULIN LISPRO 12 UNITS: 100 INJECTION, SOLUTION INTRAVENOUS; SUBCUTANEOUS at 11:30

## 2019-04-22 RX ADMIN — Medication 10 ML: at 05:05

## 2019-04-22 RX ADMIN — MIRTAZAPINE 15 MG: 15 TABLET, FILM COATED ORAL at 21:55

## 2019-04-22 RX ADMIN — ENOXAPARIN SODIUM 40 MG: 40 INJECTION SUBCUTANEOUS at 10:00

## 2019-04-22 RX ADMIN — BUDESONIDE 1000 MCG: 1 SUSPENSION RESPIRATORY (INHALATION) at 21:01

## 2019-04-22 RX ADMIN — INSULIN GLARGINE 5 UNITS: 100 INJECTION, SOLUTION SUBCUTANEOUS at 15:00

## 2019-04-22 RX ADMIN — GABAPENTIN 300 MG: 300 CAPSULE ORAL at 08:56

## 2019-04-22 RX ADMIN — Medication 10 ML: at 14:06

## 2019-04-22 RX ADMIN — IPRATROPIUM BROMIDE AND ALBUTEROL SULFATE 3 ML: .5; 3 SOLUTION RESPIRATORY (INHALATION) at 01:26

## 2019-04-22 RX ADMIN — AZITHROMYCIN 500 MG: 250 TABLET, FILM COATED ORAL at 14:05

## 2019-04-22 RX ADMIN — IPRATROPIUM BROMIDE AND ALBUTEROL SULFATE 3 ML: .5; 3 SOLUTION RESPIRATORY (INHALATION) at 08:54

## 2019-04-22 RX ADMIN — INSULIN LISPRO 4 UNITS: 100 INJECTION, SOLUTION INTRAVENOUS; SUBCUTANEOUS at 17:07

## 2019-04-22 RX ADMIN — METHYLPREDNISOLONE SODIUM SUCCINATE 40 MG: 40 INJECTION, POWDER, FOR SOLUTION INTRAMUSCULAR; INTRAVENOUS at 08:54

## 2019-04-22 RX ADMIN — IPRATROPIUM BROMIDE AND ALBUTEROL SULFATE 3 ML: .5; 3 SOLUTION RESPIRATORY (INHALATION) at 21:01

## 2019-04-22 RX ADMIN — FUROSEMIDE 20 MG: 10 INJECTION, SOLUTION INTRAMUSCULAR; INTRAVENOUS at 08:54

## 2019-04-22 RX ADMIN — Medication 10 ML: at 21:56

## 2019-04-22 RX ADMIN — INSULIN LISPRO 2 UNITS: 100 INJECTION, SOLUTION INTRAVENOUS; SUBCUTANEOUS at 08:55

## 2019-04-22 RX ADMIN — PAROXETINE HYDROCHLORIDE 30 MG: 20 TABLET, FILM COATED ORAL at 08:54

## 2019-04-22 RX ADMIN — IPRATROPIUM BROMIDE AND ALBUTEROL SULFATE 3 ML: .5; 3 SOLUTION RESPIRATORY (INHALATION) at 16:32

## 2019-04-22 RX ADMIN — FAMOTIDINE 20 MG: 20 TABLET ORAL at 08:54

## 2019-04-22 RX ADMIN — ASPIRIN 81 MG: 81 TABLET, COATED ORAL at 08:56

## 2019-04-22 RX ADMIN — ACETAMINOPHEN 650 MG: 325 TABLET ORAL at 14:08

## 2019-04-22 RX ADMIN — GABAPENTIN 300 MG: 300 CAPSULE ORAL at 17:07

## 2019-04-22 RX ADMIN — TAMSULOSIN HYDROCHLORIDE 0.4 MG: 0.4 CAPSULE ORAL at 08:54

## 2019-04-22 NOTE — PROGRESS NOTES
Problem: Mobility Impaired (Adult and Pediatric) Goal: *Acute Goals and Plan of Care (Insert Text) Description Physical Therapy Goals Initiated 4/22/2019 and to be accomplished within 7 day(s) 1. Patient will move from supine to sit and sit to supine  in bed with independence. 2.  Patient will transfer from bed to chair and chair to bed with modified independence using the least restrictive device. 3.  Patient will perform sit to stand with modified independence. 4.  Patient will ambulate with supervision/set-up for 50 feet with the least restrictive device. 5.  Patient will ascend/descend 3 stairs with bilateral handrail(s) with supervision/set-up. To prepare pt for home mobility. PLOF:  Pt lives with  in a 1 story home with 3 steps to enter, bilateral railings. Pt reports she was ambulating independently with no RW prior to this hospitalization and caring for her ailing . Outcome: Progressing Towards Goal 
PHYSICAL THERAPY EVALUATION Patient: Marlo Mendoza (2 y.o. female) Date: 4/22/2019 Primary Diagnosis: Hypoxia [R09.02] Precautions:   Aspiration, Fall PLOF: See goals section above ASSESSMENT : 
Based on the objective data described below, the patient presents with decreased bilateral LE strength and decreased functional activity tolerance following admission for hypoxia. Pt was cleared by nursing to work with therapy. Pt was received semi-reclined in bed, agreeable to participate in PT . Pt was seen with OT to maximize pt safety and participation. Pt performed supine-sit with CGA and sit-stand with CGA. Pt displayed intact sitting balance and fair standing balance. Pt ambulated 5 with RW with CGA to HOB, limited by SOB and HFNC O2 tubing. Pt desaturated to 86% with standing activity. Pt declined to attempt transfer to [facility]. Pt returned to sitting and supine with CGA.   At conclusion of session, pt was left resting comfortably in chair, needs met, call bell in reach, nurse notified. Patient will benefit from skilled intervention to address the above impairments. Patient's rehabilitation potential is considered to be Good Factors which may influence rehabilitation potential include:   
? None noted ? Mental ability/status ? Medical condition ? Home/family situation and support systems ? Safety awareness 
? Pain tolerance/management 
? Other: PLAN : 
Recommendations and Planned Interventions:  
?           Bed Mobility Training             ? Neuromuscular Re-Education ? Transfer Training                   ? Orthotic/Prosthetic Training 
? Gait Training                          ? Modalities ? Therapeutic Exercises           ? Edema Management/Control ? Therapeutic Activities            ? Family Training/Education ? Patient Education ? Other (comment): Frequency/Duration: Patient will be followed by physical therapy 1-2 times per day to address goals. Discharge Recommendations: Home Health vs SNF depending on help available at home Further Equipment Recommendations for Discharge: N/A  
 
SUBJECTIVE:  
Patient stated ? I want to go wherever my  is.? OBJECTIVE DATA SUMMARY:  
 
Past Medical History:  
Diagnosis Date Chronic lung disease Colon polyps COPD 8-30-02 DDD (degenerative disc disease), lumbar 10/1/2014 Degeneration of lumbar or lumbosacral intervertebral disc Depression Diabetes ([facility] [address] 7-19-05 Diabetes mellitus ([facility] [address] Diverticulosis  DM neuropathy, painful ([facility] [address] 10/1/2014 MOORE (Dyspnea on Exertion) 4-19-02  
 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   
 -MRI brain Urinary tract infection, site not specified Past Surgical History:  
Procedure Laterality Date HX APPENDECTOMY HX CHOLECYSTECTOMY   HX HYSTERECTOMY    
 (+)DUB HX POLYPECTOMY    
 DC COLONOSCOPY FLX DX W/COLLJ SPEC WHEN PFRMD  6-16-06  
 normal, Dr Mark Mario DC COLONOSCOPY FLX DX W/COLLJ SPEC WHEN PFRMD    
 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 
Home Situation Home Environment: Private residence # Steps to Enter: 3 Rails to Enter: Yes One/Two Story Residence: One story Living Alone: No 
Support Systems: Family member(s) Patient Expects to be Discharged to[de-identified] Private residence(Pt states she wants to go where her  goes) Current DME Used/Available at Home: Walker, rolling Tub or Shower Type: Tub/Shower combination Critical Behavior: 
Neurologic State: Alert Orientation Level: Oriented X4 Cognition: Follows commands Safety/Judgement: Fall prevention Psychosocial 
Patient Behaviors: Anxious Purposeful Interaction: Yes Pt Identified Daily Priority: Clinical issues (comment) Caritas Process: Nurture loving kindness;Establish trust;Nurture spiritual self;Teaching/learning; Attend basic human needs Caring Interventions: Reassure; Therapeutic modalities Reassure: Prayer Therapeutic Modalities: Humor; Intentional therapeutic touch Skin Condition/Temp: Cool Skin Integrity: Intact Skin Integumentary Skin Color: Pale Skin Condition/Temp: Cool Skin Integrity: Intact Turgor: Non-tenting Hair Growth: Present Varicosities: Absent Strength:   
Strength: Generally decreased, functional 
Tone & Sensation:  
Tone: Normal 
Sensation: Intact Range Of Motion: 
AROM: Within functional limits Functional Mobility: 
Bed Mobility: 
  
Supine to Sit: Contact guard assistance Sit to Supine: Contact guard assistance Scooting: Maximum assistance;Assist x2 Transfers: 
Sit to Stand: Contact guard assistance Stand to Sit: Contact guard assistance Balance:  
Sitting: Intact Sitting - Static: Good (unsupported) Sitting - Dynamic: Good (unsupported) Standing: Impaired Standing - Static: Fair Standing - Dynamic : Fair Ambulation/Gait Training: 
Distance (ft): 5 Feet (ft) Assistive Device: Walker, rolling Ambulation - Level of Assistance: Stand-by assistance Gait Description (WDL): Exceptions to [facility] Gait Abnormalities: Decreased step clearance Pain: 
Pain level pre-treatment: 0/10 Pain level post-treatment: 0/10 Pain Intervention(s) : Medication (see MAR); Rest, Ice, Repositioning Response to intervention: Nurse notified, See doc flow Activity Tolerance:  
Fair Please refer to the flowsheet for vital signs taken during this treatment. After treatment:  
?         Patient left in no apparent distress sitting up in chair ? Patient left in no apparent distress in bed 
? Call bell left within reach ? Nursing notified ? Caregiver present ? Bed alarm activated ? SCDs applied COMMUNICATION/EDUCATION:  
?         Role of Physical Therapy in the acute care setting. ?         Fall prevention education was provided and the patient/caregiver indicated understanding. ? Patient/family have participated as able in goal setting and plan of care. ?         Patient/family agree to work toward stated goals and plan of care. ?         Patient understands intent and goals of therapy, but is neutral about his/her participation. ? Patient is unable to participate in goal setting/plan of care: ongoing with therapy staff. ?         Other: Thank you for this referral. 
July Humphrey, PT Time Calculation: 29 mins Eval Complexity: History: MEDIUM  Complexity : 1-2 comorbidities / personal factors will impact the outcome/ POC Exam:MEDIUM Complexity : 3 Standardized tests and measures addressing body structure, function, activity limitation and / or participation in recreation  Presentation: MEDIUM Complexity : Evolving with changing characteristics  Clinical Decision Making:Medium Complexity    Overall Complexity:MEDIUM

## 2019-04-22 NOTE — DIABETES MGMT
Diabetes Patient/Family Education RecordFactors That  May Influence Patients Ability  to Learn or  Comply with Recommendations []   Language barrier    []   Cultural needs   []   Motivation  
 []   Cognitive limitation    []   Physical   [x]   Education  
 []   Physiological factors   []   Hearing/vision/speaking impairment   []   Shinto beliefs []   Financial factors   []  Other:   []  No factors identified at this time. Person Instructed: [x]   Patient   []   Family   []  Other Preference for Learning: 
 [x]   Verbal   [x]   Written: diab educ packet   []  Demonstration Level of Comprehension & Competence:   
[x]  Good                                      [] Fair                                     []  Poor                             []  Needs Reinforcement  
[x]  Teachback completed Education Component:  
[x]  Medication management, including how to administer insulin (if appropriate) and potential medication interactions: Yes. Home diabetes medication list: Glipizide 5 mg daily. [x]  Nutritional management -obtain usual meal pattern: Yes. Patient reported that she was not sure about list of carbs and serving size. Educated patient about serving size/portion control of carbs (starches, fruits, dairy) and limiting intake of concentrated sweets. Patient stated that she'll need to cut down on serving size of fruits and did not know carb also include dairy. []  Exercise [x]  Signs, symptoms, and treatment of hyperglycemia and hypoglycemia: Yes. [x] Prevention, recognition and treatment of hyperglycemia and hypoglycemia: Yes. [x]  Importance of blood glucose monitoring and how to obtain a blood glucose meter: Yes. Educated and encouraged patient to monitor her blood sugar at home, keep diary and share this info with her medical provider each office visit. []  Instruction on use of the blood glucose meter [x]  Discuss the importance of HbA1C monitoring: Yes. Educated patient about A1c level and explained that her current A1c of 8.1% (4/15/2019) is equivalent to estimated average blood glucose of 186 mg/dl during the past 2-3 months.  
[]  Sick day guidelines  
[x]  Proper use and disposal of lancets, needles, syringes or insulin pens (if appropriate): Yes. [x]  Potential long-term complications (retinopathy, kidney disease, neuropathy, foot care): Yes. [x] Information about whom to contact in case of emergency or for more information: Yes. [x]  Goal:  Patient/family will demonstrate understanding of Diabetes Self Management Skills by: 4/29/2019 Plan for post-discharge education or self-management support: 
  [] Outpatient class schedule provided            [] Patient Declined 
  [] Scheduled for outpatient classes (date) _______ Verify: 
Does patient understand how diabetes medications work? No. Educated patient. Does patient know what their most recent A1c is? No. Educated patient. Does patient monitor glucose at home? Yes. Does patient have difficulty obtaining diabetes medications and testing supplies? No. 
  
Kodi Williamson RN 
[facility] [phone]

## 2019-04-22 NOTE — PROGRESS NOTES
Problem: Pressure Injury - Risk of 
Goal: *Prevention of pressure injury Description Document Herbie Scale and appropriate interventions in the flowsheet. Outcome: Progressing Towards Goal 
  
Problem: Falls - Risk of 
Goal: *Absence of Falls Description Document Melvin Perry Fall Risk and appropriate interventions in the flowsheet.  
Outcome: Progressing Towards Goal

## 2019-04-22 NOTE — CONSULTS
[address] [place] [address] [facility] Pulmonary, Critical Care, and Sleep Medicine F/U Patient Consult Name: Serenity Dia MRN: [id number] : 1933 Hospital: [address] [place] [address] Dr Date: 2019 This patient has been seen and evaluated at the request of Dr. Nyla Flowers for evaluation of hypoxic respiratory failure. IMPRESSION:  
· Severe COPD per ATS criteria. · Acute on chronic hypoxic respiratory failure and additionally has chronic hypercarbic respiratory failure in a patient with a clear appearing CXR. No evidence of CHF, infiltrates or effusions. · Acute exacerbation of COPD. · Hx of pelvic fractures. · Chronic pain syndrome. · Deconditioning ·   
  
RECOMMENDATIONS:  
 
· Discussed with primary service, since patient has significant hypoxia still, advise obtaining transthoracic echo with bubble study to rule out intracardiac shunt. · Decrease solumedrol · Continue scheduled bronchodilators duo nebs every 4 hours and Pulmicort twice daily · Antibiotics-per primary service, continue azithromycin for a total of 5 days including doses already given · Strict aspiration precautions. · Assess home Oxygen needs at discharge · Aggressive bronchial hygiene · OT, PT, OOB and ambulate · Healthy weight · Will Follow · DVT prophylaxis Subjective:  
19 Patient seen and examined at bedside. No acute events overnight. Patient today remains on high flow nasal cannula around 45% FiO2. Patient reports her breathing is slowly improving. Patient reports that she is sad that she continues to get sick and is in the hospital a lot now. Patient denies chest pain, nausea, vomiting, diarrhea. Interval HPI per Dr. Ally Hernández: 
Patient is a 80 y.o. female with past medical history significant for hypertension, restless leg syndrome, diabetes, peripheral neuropathy as well as recurrent falls who presented after a fall.  Patient was brought in by EMS. According to patient she attempted to get up from the chair when she fell and hit her head. No LOC, nausea, vomiting headaches etc after the fall. On admission to ED patient was noted to be pyrexial and was admitted and treated for HAP. During the course of admission patient became increasingly hypoxic and her CXR on admission was clear which arose suspicion of PE.  CTA is awaited. Past Medical History:  
Diagnosis Date  Chronic lung disease  Colon polyps  COPD 8-30-02  DDD (degenerative disc disease), lumbar 10/1/2014  Degeneration of lumbar or lumbosacral intervertebral disc  Depression  Diabetes ([address] 7-19-05  Diabetes mellitus ([address]  Diverticulosis   DM neuropathy, painful ([address] 10/1/2014  MOORE (Dyspnea on Exertion) 4-19-02  
 echo: +mild LVH, UR53-08% w/ diastolic dysfxn  Glaucoma  HCAP (healthcare-associated pneumonia) 03/24/2017  Hemorrhoids 6-6-06  Hyperlipidemia 5/17/2011  Hypertension 4-16-02  LBP (low back pain) 4-13-04  Low back pain radiating to both legs 10/1/2014  Lumbago  Lumbar disc herniation 10/1/2014  Lumbar facet arthropathy 10/1/2014  Lumbosacral radiculopathy at L5 10/1/2014  Lumbosacral radiculopathy at S1 10/1/2014  Microscopic hematuria  OA (osteoarthritis)  Overactive bladder 5/17/2011  
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   
 -MRI brain  Urinary tract infection, site not specified Past Surgical History:  
Procedure Laterality Date  HX APPENDECTOMY  HX CHOLECYSTECTOMY    HX HYSTERECTOMY    
 (+)DUB  HX POLYPECTOMY  NV COLONOSCOPY FLX DX W/COLLJ SPEC WHEN PFRMD  6-16-06  
 normal, Dr Kiesha Obando  NV COLONOSCOPY FLX DX W/COLLJ SPEC WHEN PFRMD    
 (+)polyp= tubular adenoma Prior to Admission medications Medication Sig Start Date End Date Taking? Authorizing Provider  
glipiZIDE (GLUCOTROL) 5 mg tablet 1 tablet by mouth daily 4/16/19  Yes Dionicio Dent MD  
insulin aspart U-100 (NOVOLOG) 100 unit/mL inpn 2 Units by SubCUTAneous route Before breakfast, lunch, and dinner. Per sliding scale- 0-200- 0 units, 201-250- 2 units, 251-300- 4 units, 301-350-6 units, 351-400-8 units, 401-450- 10 units and Call MD.   Yes Provider, Historical  
albuterol-ipratropium (DUO-NEB) 2.5 mg-0.5 mg/3 ml nebu 3 mL by Nebulization route every four (4) hours. 3/15/19  Yes Raymond Rodas MD  
famotidine (PEPCID) 20 mg tablet Take 1 Tab by mouth daily. 3/16/19  Yes Raymond Rodas MD  
furosemide (LASIX) 20 mg tablet Take 1 Tab by mouth every fourty-eight (48) hours. 3/15/19  Yes Raymond Rodas MD  
atorvastatin (LIPITOR) 40 mg tablet Take 1 Tab by mouth nightly. 3/15/19  Yes Raymond Rodas MD  
rOPINIRole (REQUIP) 1 mg tablet TAKE ONE-HALF TO ONE TABLET BY MOUTH ONCE NIGHTLY AS NEEDED FOR  RESTLESS  LEGS  FOR  UP  TO  90  DAYS 3/10/19  Yes Dionicio Dent MD  
PARoxetine (PAXIL) 30 mg tablet TAKE 1 TABLET BY MOUTH  DAILY 1/29/19  Yes Dionicio Dent MD  
mirtazapine (REMERON) 15 mg tablet Take 1 Tab by mouth nightly. 11/5/18  Yes Dionicio Dent MD  
tamsulosin (FLOMAX) 0.4 mg capsule Take 1 Cap by mouth daily. 10/29/18  Yes Dionicio Dent MD  
gabapentin (NEURONTIN) 300 mg capsule 1 capsule by mouth morning, 1 capsule at midday and 2 capsules at bedtime 10/22/18  Yes Dionicio Dent MD  
simvastatin (ZOCOR) 20 mg tablet Take 1 Tab by mouth nightly.  10/16/18  Yes Dionicio Detn MD  
 aspirin delayed-release 81 mg tablet Take 1 Tab by mouth daily. 8/10/18  Yes Ned Gould MD  
inhalational spacing device (AEROCHAMBER MV) 1 Each by Does Not Apply route as needed. 18  Yes Roberto Ho MD  
OXYGEN-AIR DELIVERY SYSTEMS 3 L by Nasal route continuous. Yes Provider, Historical  
Nebulizer & Compressor (PORTABLE NEBULIZER SYSTEM) machine 1 Each by Does Not Apply route every six (6) hours as needed. 2/10/18  Yes Veronica Ortiz PA-C Allergies Allergen Reactions  Levaquin [Levofloxacin] Rash Social History Tobacco Use  Smoking status: Former Smoker Packs/day: 1.00 Years: 57.00 Pack years: 57.00 Types: Cigarettes Last attempt to quit: 2018 Years since quittin.3  Smokeless tobacco: Never Used Substance Use Topics  Alcohol use: No  
  
Family History Problem Relation Age of Onset  Colon Cancer Sister  Heart Disease Brother  Seizures Son  Colon Cancer Maternal Aunt Immunization status: up to date and documented, stated as current, but no records available. Current Facility-Administered Medications Medication Dose Route Frequency  azithromycin (ZITHROMAX) tablet 500 mg  500 mg Oral Q24H  
 albuterol-ipratropium (DUO-NEB) 2.5 MG-0.5 MG/3 ML  3 mL Nebulization Q4H RT  
 budesonide (PULMICORT) 1,000 mcg/2 mL nebulizer susp  1,000 mcg Nebulization BID RT  
 insulin glargine (LANTUS) injection 5 Units  5 Units SubCUTAneous DAILY  methylPREDNISolone (PF) (SOLU-MEDROL) injection 40 mg  40 mg IntraVENous Q12H  
 enoxaparin (LOVENOX) injection 40 mg  40 mg SubCUTAneous Q24H  
 furosemide (LASIX) injection 20 mg  20 mg IntraVENous DAILY  insulin lispro (HUMALOG) injection   SubCUTAneous AC&HS  aspirin delayed-release tablet 81 mg  81 mg Oral DAILY  atorvastatin (LIPITOR) tablet 40 mg  40 mg Oral QHS  famotidine (PEPCID) tablet 20 mg  20 mg Oral DAILY  gabapentin (NEURONTIN) capsule 300 mg  300 mg Oral BID  mirtazapine (REMERON) tablet 15 mg  15 mg Oral QHS  PARoxetine (PAXIL) tablet 30 mg  30 mg Oral DAILY  tamsulosin (FLOMAX) capsule 0.4 mg  0.4 mg Oral DAILY  sodium chloride (NS) flush 5-40 mL  5-40 mL IntraVENous Q8H Review of Systems: A comprehensive review of systems was negative except for that written in the HPI. Objective: 
Vital Signs:   
Visit Vitals /74 (BP 1 Location: Left arm, BP Patient Position: At rest) Pulse 76 Temp 98.2 °F (36.8 °C) Resp 16 Wt 74.5 kg (164 lb 4.8 oz) SpO2 90% BMI 27.34 kg/m² O2 Device: Hi flow nasal cannula, Heated, Humidifier O2 Flow Rate (L/min): 30 l/min(Weaned from 4) Temp (24hrs), Av.1 °F (36.7 °C), Min:97.5 °F (36.4 °C), Max:98.7 °F (37.1 °C) Intake/Output:  
Last shift:      No intake/output data recorded. Last 3 shifts:  1901 -  0700 In: -  
Out: 8035 [[id number]] Intake/Output Summary (Last 24 hours) at 2019 1455 Last data filed at 2019 0800 Gross per 24 hour Intake  Output 1600 ml Net -1600 ml Physical Exam:  
General:  Alert, cooperative, no distress, appears stated age, laying in bed, wearing high flow nasal cannula Head:  Normocephalic, without obvious abnormality, atraumatic. Eyes:  Conjunctivae/corneas clear. PERRL, EOMs intact. Nose: Nares normal. Septum midline. Mucosa normal. No drainage or sinus tenderness. Throat: Lips, mucosa, and tongue normal.  Poor dentition, no oral lesions. Neck: Supple, symmetrical, trachea midline, no adenopathy, no carotid bruit and no JVD. Back:   Symmetric, no curvature. ROM normal.  
Lungs:   Bilateral wheeze and coarse breath sounds bilaterally. Diminished breath sounds in bilateral bases Chest wall:  No tenderness or deformity. Heart:  Regular rate and rhythm, S1, S2 normal, no murmur, click, rub or gallop. Abdomen:   Soft, non-tender. Bowel sounds normal. No masses,  No organomegaly. Extremities: Extremities normal, atraumatic, no cyanosis or edema. Pulses: 2+ and symmetric all extremities. Skin: Skin color, texture, turgor normal. No rashes or lesions Lymph nodes: Cervical, supraclavicular, and axillary nodes normal.  
Neurologic: Grossly nonfocal, strength and coordination grossly intact throughout all 4 extremities. Data review:  
 
Recent Results (from the past 24 hour(s)) GLUCOSE, POC Collection Time: 04/21/19  4:34 PM  
Result Value Ref Range Glucose (POC) 275 (H) 70 - 110 mg/dL GLUCOSE, POC Collection Time: 04/21/19  9:54 PM  
Result Value Ref Range Glucose (POC) 219 (H) 70 - 110 mg/dL CBC WITH AUTOMATED DIFF Collection Time: 04/22/19  6:10 AM  
Result Value Ref Range WBC 8.0 4.6 - 13.2 K/uL  
 RBC 4.61 4.20 - 5.30 M/uL  
 HGB 12.7 12.0 - 16.0 g/dL HCT 39.9 35.0 - 45.0 % MCV 86.6 74.0 - 97.0 FL  
 MCH 27.5 24.0 - 34.0 PG  
 MCHC 31.8 31.0 - 37.0 g/dL  
 RDW 16.0 (H) 11.6 - 14.5 % PLATELET 372 589 - 132 K/uL MPV 10.7 9.2 - 11.8 FL  
 NEUTROPHILS 86 (H) 40 - 73 % LYMPHOCYTES 9 (L) 21 - 52 % MONOCYTES 5 3 - 10 % EOSINOPHILS 0 0 - 5 % BASOPHILS 0 0 - 2 %  
 ABS. NEUTROPHILS 6.9 1.8 - 8.0 K/UL  
 ABS. LYMPHOCYTES 0.7 (L) 0.9 - 3.6 K/UL  
 ABS. MONOCYTES 0.4 0.05 - 1.2 K/UL  
 ABS. EOSINOPHILS 0.0 0.0 - 0.4 K/UL  
 ABS. BASOPHILS 0.0 0.0 - 0.1 K/UL  
 DF AUTOMATED METABOLIC PANEL, BASIC Collection Time: 04/22/19  6:10 AM  
Result Value Ref Range Sodium 138 136 - 145 mmol/L Potassium 4.0 3.5 - 5.5 mmol/L Chloride 97 (L) 100 - 108 mmol/L  
 CO2 39 (H) 21 - 32 mmol/L Anion gap 2 (L) 3.0 - 18 mmol/L Glucose 230 (H) 74 - 99 mg/dL BUN 20 (H) 7.0 - 18 MG/DL Creatinine 0.68 0.6 - 1.3 MG/DL  
 BUN/Creatinine ratio 29 (H) 12 - 20 GFR est AA >60 >60 ml/min/1.73m2 GFR est non-AA >60 >60 ml/min/1.73m2  Calcium 9.0 8.5 - 10.1 MG/DL  
 GLUCOSE, POC Collection Time: 04/22/19  7:32 AM  
Result Value Ref Range Glucose (POC) 192 (H) 70 - 110 mg/dL GLUCOSE, POC Collection Time: 04/22/19 11:29 AM  
Result Value Ref Range Glucose (POC) 344 (H) 70 - 110 mg/dL GLUCOSE, POC Collection Time: 04/22/19 12:18 PM  
Result Value Ref Range Glucose (POC) 323 (H) 70 - 110 mg/dL Imaging: 
I have personally reviewed the patients radiographs and have reviewed the reports: XR Results (most recent): 
Results from Hospital Encounter encounter on 04/18/19 XR CHEST PORT Narrative ONE VIEW CHEST RADIOGRAPH INDICATION: Change in level of consciousness. Hypoxia. COMPARISON: CTA chest earlier today. 4/18/2019 chest x-ray. FINDINGS: 
  
The exam is rotated, limiting the assessment. The cardiomediastinal silhouette 
is grossly stable allowing for the degree of rotation and CT appearance. Increased density in the left lower lung may also be in part due to rotation and 
overlying breast shadow. Atelectasis and trace pleural fluid was present at the 
left lung base on the prior CT today. Stable bones. Impression IMPRESSION: 
 
Exam limited by rotation. Much of the left lower lung appearance may represent 
rotational artifact. Mild atelectasis and trace pleural fluid was present on the 
prior CT exam earlier today. Repeat chest film could be utilized for better 
comparison of change. CT Results (most recent): 
Results from Hospital Encounter encounter on 04/18/19 CTA CHEST W OR W WO CONT Narrative EXAM: CT Angiogram of the Chest 
 
CLINICAL INDICATION: Shortness of breath. TECHNIQUE: CT angiogram of the chest performed. MIPS performed All CT scans at this facility are performed using dose optimization technique as 
appropriate to a performed exam, to include automated exposure control, 
adjustment of the mA and/or kV according to patient size (including appropriate matching for site specific examination) or use of iterative reconstruction 
technique. IV CONTRAST: 80 cc of Isovue 370 COMPARISON: 3/1/2017 and chest x-ray dated 4/18/2019 FINDINGS: 
Limitations: Exam is limited due to breathing motion which limits evaluation of 
the lung parenchyma and distal pulmonary arteries. Thyroid: Unremarkable. Mediastinum: No evidence of adenopathy or fluid collection. The esophagus is 
significantly thickened especially in the mid esophagus. No adjacent adenopathy 
appreciated. This is new. Heart: The cardiac silhouette is mildly prominent. Extensive coronary artery 
calcifications are present. Pericardium: Unremarkable Aorta: Calcifications are noted in the thoracic aorta. No evidence of aneurysm. There is irregular plaque which was present previously. This is most pronounced 
in the lower descending thoracic aorta. Pulmonary Arteries: No evidence of pulmonary artery embolus within the main and 
secondary pulmonary arteries. The tertiary pulmonary arteries are limited in 
evaluation due to breathing motion. The distal pulmonary arteries appear mildly 
prominent. Trachea and Bronchi: Unremarkable. Pleura: Small left pleural effusion is present. Lungs: Left lower lobe atelectasis and mild atelectasis in the right lower lobe 
is noted. There is interstitial thickening. Axilla/Chest wall: No acute findings. Upper Abdomen: A small focus of hyperenhancement in the dome of the right lobe 
of liver is noted which is unchanged since 2017 Musculoskeletal: Old L1 compression fracture is noted. There is a subtle loss of 
the superior endplate of U03 which is new since 2017 but appears to be old. Impression IMPRESSION: 
1. No evidence of pulmonary embolus or aortic dissection. 2.   Borderline interstitial pulmonary edema. 3.  Small left pleural effusion. 4.   Thickened esophagus which is new.  May consider correlation with esophagram 
or EGD. 5.   Mild cardiac enlargement with extensive coronary artery calcifications. 03/10/19 ECHO ADULT COMPLETE 03/11/2019 3/11/2019 Narrative · Procedure performed with the patient in a supine position. Unable to  
obtain on-axis apical images. Technically difficult study due to limited  
mobility and lung interference. Patient intubated and restrained. · Left ventricular low normal global systolic function. Calculated left  
ventricular ejection fraction is 55%. Visually measured ejection fraction. Left ventricular mild concentric hypertrophy. No regional wall motion  
abnormality noted. Inconclusive left ventricular diastolic function. · Mechanically ventilated; cannot use inferior caval vein diameter to  
estimate central venous pressure. · Moderate to severe tricuspid valve regurgitation is present. There is no  
evidence of pulmonary hypertension. · Pulmonary artery pressure likely underestimated due to severity of  
tricuspid regurgitation. · Mild pulmonic valve regurgitation is present. · Right ventricle not well visualized. Right ventricular global systolic  
function is reduced. Signed by: MD Aaron Spencer MD/MPH Pulmonary, Critical Care Medicine [facility]

## 2019-04-22 NOTE — PROGRESS NOTES
Problem: Self Care Deficits Care Plan (Adult) Goal: *Acute Goals and Plan of Care (Insert Text) Description Occupational Therapy Goals Initiated 4/22/2019 within 7 day(s). 1.  Patient will perform lower body dressing with modified independence with SpO2 greater than 88%. 2.  Patient will perform toileting with modified independence. 3.  Patient will perform toilet transfer with modified independence. 4.  Patient will participate in upper extremity therapeutic exercise/activities with supervision/set-up for 8 minutes. 5.  Patient will utilize energy conservation techniques during functional activities with minimal verbal cues. Prior Level of Function: Pt lives with her  in a [facility] with 3STE. She was (I) with basic self-care/ADLs and IADLs, including cooking, cleaning and medication management PTA. Outcome: Progressing Towards Goal 
 OCCUPATIONAL THERAPY EVALUATION Patient: Mercedes Christensen (48 y.o. female) Date: 4/22/2019 Primary Diagnosis: Hypoxia [R09.02] Precautions: Falls; Aspiration ASSESSMENT : 
Pt cleared to participate in OT evaluation by RN. Upon entering room, pt supine with HOB elevated, alert, and agreeable to therapy session. Based on the objective data described below, the patient presents with decreased strength, decreased endurance, decreased functional balance, and decreased functional mobility, affecting her performance in basic self-care/ADL tasks. During this evaluation, pt demonstrated to be concerned about her  as he is currently in the hospital. Pt needed cues for re-direction to participate in full OT eval. Pt is able to perform bed mobility with CGA to participate in self-care.  While sitting at the EOB, pt presents BUNelly are [facility] but has generally decreased strength, however functional. Pt presents she is able to perform LB dressing with supervision, however SpO2 decreased to 82-84%, therefore instructed pt on pacing. Pt requires CGA for sit<>stand and was able to take a few side steps to the [facility], with use of a Rw. Pt denied going to the [facility] at this time d/t privacy. Educated pt on the role of OT, evaluation process, energy conservation, and goals for therapy with pt demonstrating good understanding. The pt will benefit from further OT services, in order to maximize her ADL performance and decrease the risk for complications associated with decreased functional activity. Patient will benefit from skilled intervention to address the above impairments. Patient's rehabilitation potential is considered to be Good Factors which may influence rehabilitation potential include: ? None noted ? Mental ability/status ? Medical condition ? Home/family situation and support systems ? Safety awareness ? Pain tolerance/management ? Other: PLAN : 
Recommendations and Planned Interventions:  
?               Self Care Training                  ? Therapeutic Activities ? Functional Mobility Training   ? Cognitive Retraining 
? Therapeutic Exercises           ? Endurance Activities ? Balance Training                    ? Neuromuscular Re-Education ? Visual/Perceptual Training     ? Home Safety Training 
? Patient Education                   ? Family Training/Education ? Other (comment): Frequency/Duration: Patient will be followed by occupational therapy 1-2 times per day/4-7 days per week to address goals. Discharge Recommendations: Home Health vs SNF pending pt's progress in therapy. Further Equipment Recommendations for Discharge: TBD SUBJECTIVE:  
Patient stated ? *I think about my children and ? OBJECTIVE DATA SUMMARY:  
 
Past Medical History:  
Diagnosis Date Chronic lung disease Colon polyps COPD 8-30-02 DDD (degenerative disc disease), lumbar 10/1/2014 Degeneration of lumbar or lumbosacral intervertebral disc Depression Diabetes ([facility] 7-19-05 Diabetes mellitus ([facility] Diverticulosis  [facility], [facility] 10/1/2014 MOORE (Dyspnea on Exertion) 4-19-02  
 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   
 -MRI brain Urinary tract infection, site not specified Past Surgical History:  
Procedure Laterality Date HX APPENDECTOMY HX CHOLECYSTECTOMY   HX HYSTERECTOMY    
 (+)DUB HX POLYPECTOMY    
 NM COLONOSCOPY FLX DX W/COLLJ SPEC WHEN PFRMD  6-16-06  
 normal, Dr Caputo Miya NM COLONOSCOPY FLX DX W/COLLJ SPEC WHEN PFRMD    
 (+)polyp= tubular adenoma Barriers to Learning/Limitations: None Compensate with: visual, verbal, tactile, kinesthetic cues/model Home Situation:  
Home Situation Home Environment: Private residence One/Two Story Residence: One story Living Alone: No 
 Support Systems: Child(corina), Family member(s) Patient Expects to be Discharged to[de-identified] Private residence(Family asking about placement possibly) Current DME Used/Available at Home: Cane, straight, Oxygen, portable; Bedside commode Tub or Shower Type: Tub/Shower combination (Pt sponge bathes) ? Right hand dominant   ? Left hand dominant Cognitive/Behavioral Status: 
Neurologic State: Alert Orientation Level: Appropriate for age Cognition: Follows commands Safety/Judgement: Fall prevention Skin: Visible skin appeared intact Edema: None noted Coordination: BUE Coordination: Within functional limits Fine Motor Skills-Upper: Left Intact; Right Intact Gross Motor Skills-Upper: Left Intact; Right Intact Balance: 
Sitting: Intact Sitting - Static: Good (unsupported) Sitting - Dynamic: Good (unsupported) Standing: Impaired; With support Standing - Static: Fair Standing - Dynamic : Fair Strength: BUE Strength: Generally decreased, functional 
 
 
Tone & Sensation: BUE Tone: Normal 
Sensation: Intact Range of Motion: BUE 
AROM: Within functional limits Functional Mobility and Transfers for ADLs: 
Bed Mobility: 
 Supine to Sit: Contact guard assistance Sit to Supine: Contact guard assistance Scooting: Maximum assistance;Assist x2 Transfers: 
Sit to Stand: Contact guard assistance ADL Assessment:  
Feeding: Setup Oral Facial Hygiene/Grooming: Setup Bathing: Contact guard assistance Upper Body Dressing: Setup Lower Body Dressing: Setup;Supervision Toileting: Contact guard assistance ADL Intervention: Lower Body Dressing Assistance Dressing Assistance: Supervision/set up Position Performed: Seated edge of bed As pt was performing this task, pt's SpO2 at 82-83%. Instructed pt on pacing. Cognitive Retraining Safety/Judgement: Fall prevention Pain: 
Pain level pre-treatment: 0/10 Pain level post-treatment: 0/10 Pain Intervention(s): Medication (see MAR); Rest, Ice, Repositioning Response to intervention: Nurse notified, See doc flow Activity Tolerance:  
Fair+ Please refer to the flowsheet for vital signs taken during this treatment. After treatment:  
? Patient left in no apparent distress sitting up in chair ? Patient left in no apparent distress in bed 
? Call bell left within reach ? Nursing notified ? Caregiver present ? Bed alarm activated COMMUNICATION/EDUCATION:  
? Role of Occupational Therapy in the acute care setting 
? Home safety education was provided and the patient/caregiver indicated understanding. ? Patient/family have participated as able in goal setting and plan of care. ? Patient/family agree to work toward stated goals and plan of care. ? Patient understands intent and goals of therapy, but is neutral about his/her participation. ? Patient is unable to participate in goal setting and plan of care. Thank you for this referral. 
Rahel Dela Cruz OT Time Calculation: 30 mins Eval Complexity: History: MEDIUM Complexity : Expanded review of history including physical, cognitive and psychosocial  history ; Examination: LOW Complexity : 1-3 performance deficits relating to physical, cognitive , or psychosocial skils that result in activity limitations and / or participation restrictions ; Decision Making:LOW Complexity : No comorbidities that affect functional and no verbal or physical assistance needed to complete eval tasks

## 2019-04-22 NOTE — PROGRESS NOTES
0730-Bedside shift change report given to Libra Garcia RN (oncoming nurse) by Vina Cockayne RN (offgoing nurse). Report included the following information SBAR, Kardex, Procedure Summary, Intake/Output, MAR and Recent Results. 0830- AM assessment performed. Changed pt's purwick and bedding. Meds passed. Pt tolerated. 1200-  Pt blood glc 300+- 12 units of humalog given. 1400- Pt c/o abdominal/gas related pain. 650 mg tylenol given. 1500- 5 units of lantus given 1700- PM meds given. Pt still c/o pain in abdomen. MD paged. 1900-Bedside shift change report given to Debra Mendez (oncoming nurse) by Libra Garcia RN (offgoing nurse). Report included the following information SBAR, Kardex, Procedure Summary, Intake/Output, MAR and Recent Results.

## 2019-04-22 NOTE — PROGRESS NOTES
[facility] Hospitalist Group Progress Note Patient: Donis Morin Age: 80 y.o. : 1933 MR#: [id number] SSN: [id number] Date/Time: 2019 Subjective:  
 
Patient lying in  Bed in NAD, denies cough, has nassar. On HFNC Assessment/Plan:  
 
-acute hypoxic and hyercapnic respiratory failure - chronic hypoxic resp failure, on home O2  3 liters vis NC continuously 
- COPD with acute exacerbation 
- ? PNA 
-Chronic diastolic chf, 
- Pelvic fracture - Esophageal wall thickening on CTA- close OP f/u with GI 
 
PLAN Wean O2, nebs, steroids D/w pulmonologist, follow echo BIPAP QHS On lasix 
- PT, OT 
- DNR 
D/w patient Dispo- SNF  consulted Case discussed with:  [x]Patient  []Family  []Nursing  []Case Management DVT Prophylaxis:  []Lovenox  []Hep SQ  []SCDs  []Coumadin   []On Heparin gtt Objective:  
VS:  
Visit Vitals /80 (BP 1 Location: Left arm, BP Patient Position: At rest) Pulse 73 Temp 98.7 °F (37.1 °C) Resp 20 Wt 74.5 kg (164 lb 4.8 oz) SpO2 96% BMI 27.34 kg/m² Tmax/24hrs: Temp (24hrs), Av.2 °F (36.8 °C), Min:97.5 °F (36.4 °C), Max:98.7 °F (37.1 °C) Input/Output:  
 
Intake/Output Summary (Last 24 hours) at 2019 1748 Last data filed at 2019 0800 Gross per 24 hour Intake  Output 1600 ml Net -1600 ml General:  Awake, alert Cardiovascular:  S1S2+, RRR Pulmonary:  Coarse bs b/l GI:  Soft, BS+, NT, ND Extremities:  No edema Labs:   
Recent Results (from the past 24 hour(s)) GLUCOSE, POC Collection Time: 19  9:54 PM  
Result Value Ref Range Glucose (POC) 219 (H) 70 - 110 mg/dL CBC WITH AUTOMATED DIFF Collection Time: 19  6:10 AM  
Result Value Ref Range WBC 8.0 4.6 - 13.2 K/uL  
 RBC 4.61 4.20 - 5.30 M/uL  
 HGB 12.7 12.0 - 16.0 g/dL HCT 39.9 35.0 - 45.0 % MCV 86.6 74.0 - 97.0 FL  
 MCH 27.5 24.0 - 34.0 PG  
 MCHC 31.8 31.0 - 37.0 g/dL RDW 16.0 (H) 11.6 - 14.5 % PLATELET 309 828 - 238 K/uL MPV 10.7 9.2 - 11.8 FL  
 NEUTROPHILS 86 (H) 40 - 73 % LYMPHOCYTES 9 (L) 21 - 52 % MONOCYTES 5 3 - 10 % EOSINOPHILS 0 0 - 5 % BASOPHILS 0 0 - 2 %  
 ABS. NEUTROPHILS 6.9 1.8 - 8.0 K/UL  
 ABS. LYMPHOCYTES 0.7 (L) 0.9 - 3.6 K/UL  
 ABS. MONOCYTES 0.4 0.05 - 1.2 K/UL  
 ABS. EOSINOPHILS 0.0 0.0 - 0.4 K/UL  
 ABS. BASOPHILS 0.0 0.0 - 0.1 K/UL  
 DF AUTOMATED METABOLIC PANEL, BASIC Collection Time: 04/22/19  6:10 AM  
Result Value Ref Range Sodium 138 136 - 145 mmol/L Potassium 4.0 3.5 - 5.5 mmol/L Chloride 97 (L) 100 - 108 mmol/L  
 CO2 39 (H) 21 - 32 mmol/L Anion gap 2 (L) 3.0 - 18 mmol/L Glucose 230 (H) 74 - 99 mg/dL BUN 20 (H) 7.0 - 18 MG/DL Creatinine 0.68 0.6 - 1.3 MG/DL  
 BUN/Creatinine ratio 29 (H) 12 - 20 GFR est AA >60 >60 ml/min/1.73m2 GFR est non-AA >60 >60 ml/min/1.73m2 Calcium 9.0 8.5 - 10.1 MG/DL  
GLUCOSE, POC Collection Time: 04/22/19  7:32 AM  
Result Value Ref Range Glucose (POC) 192 (H) 70 - 110 mg/dL GLUCOSE, POC Collection Time: 04/22/19 11:29 AM  
Result Value Ref Range Glucose (POC) 344 (H) 70 - 110 mg/dL GLUCOSE, POC Collection Time: 04/22/19 12:18 PM  
Result Value Ref Range Glucose (POC) 323 (H) 70 - 110 mg/dL GLUCOSE, POC Collection Time: 04/22/19  3:41 PM  
Result Value Ref Range Glucose (POC) 209 (H) 70 - 110 mg/dL Additional Data Reviewed:   
 
Signed By: Cassell Boxer, MD   
 April 22, 2019

## 2019-04-23 ENCOUNTER — APPOINTMENT (OUTPATIENT)
Dept: NON INVASIVE DIAGNOSTICS | Age: 84
DRG: 189 | End: 2019-04-23
Attending: INTERNAL MEDICINE
Payer: MEDICARE

## 2019-04-23 LAB
ECHO LV INTERNAL DIMENSION DIASTOLIC: 4.37 CM (ref 3.9–5.3)
ECHO LV INTERNAL DIMENSION SYSTOLIC: 2.68 CM
ECHO LV IVSD: 1.4 CM (ref 0.6–0.9)
ECHO LV MASS 2D: 263.1 G (ref 67–162)
ECHO LV MASS INDEX 2D: 149 G/M2 (ref 43–95)
ECHO LV POSTERIOR WALL DIASTOLIC: 1.27 CM (ref 0.6–0.9)
ECHO PULMONARY ARTERY SYSTOLIC PRESSURE (PASP): 63 MMHG
ECHO TV REGURGITANT MAX VELOCITY: 371.59 CM/S
ECHO TV REGURGITANT PEAK GRADIENT: 55.2 MMHG
GLUCOSE BLD STRIP.AUTO-MCNC: 137 MG/DL (ref 70–110)
GLUCOSE BLD STRIP.AUTO-MCNC: 167 MG/DL (ref 70–110)
GLUCOSE BLD STRIP.AUTO-MCNC: 201 MG/DL (ref 70–110)
GLUCOSE BLD STRIP.AUTO-MCNC: 248 MG/DL (ref 70–110)

## 2019-04-23 PROCEDURE — 94640 AIRWAY INHALATION TREATMENT: CPT

## 2019-04-23 PROCEDURE — 74011636637 HC RX REV CODE- 636/637: Performed by: EMERGENCY MEDICINE

## 2019-04-23 PROCEDURE — 74011000250 HC RX REV CODE- 250: Performed by: EMERGENCY MEDICINE

## 2019-04-23 PROCEDURE — 97116 GAIT TRAINING THERAPY: CPT

## 2019-04-23 PROCEDURE — 77030038269 HC DRN EXT URIN PURWCK BARD -A

## 2019-04-23 PROCEDURE — 97530 THERAPEUTIC ACTIVITIES: CPT

## 2019-04-23 PROCEDURE — 65660000004 HC RM CVT STEPDOWN

## 2019-04-23 PROCEDURE — 74011250636 HC RX REV CODE- 250/636: Performed by: EMERGENCY MEDICINE

## 2019-04-23 PROCEDURE — 94762 N-INVAS EAR/PLS OXIMTRY CONT: CPT

## 2019-04-23 PROCEDURE — 74011636637 HC RX REV CODE- 636/637: Performed by: INTERNAL MEDICINE

## 2019-04-23 PROCEDURE — 97110 THERAPEUTIC EXERCISES: CPT

## 2019-04-23 PROCEDURE — 77010033678 HC OXYGEN DAILY

## 2019-04-23 PROCEDURE — 74011250637 HC RX REV CODE- 250/637: Performed by: INTERNAL MEDICINE

## 2019-04-23 PROCEDURE — 93308 TTE F-UP OR LMTD: CPT

## 2019-04-23 PROCEDURE — 3331090002 HH PPS REVENUE DEBIT

## 2019-04-23 PROCEDURE — 74011250637 HC RX REV CODE- 250/637: Performed by: EMERGENCY MEDICINE

## 2019-04-23 PROCEDURE — 82962 GLUCOSE BLOOD TEST: CPT

## 2019-04-23 PROCEDURE — 3331090001 HH PPS REVENUE CREDIT

## 2019-04-23 PROCEDURE — 97535 SELF CARE MNGMENT TRAINING: CPT

## 2019-04-23 PROCEDURE — 74011000250 HC RX REV CODE- 250: Performed by: INTERNAL MEDICINE

## 2019-04-23 RX ORDER — SODIUM CHLORIDE 9 MG/ML
10 INJECTION INTRAMUSCULAR; INTRAVENOUS; SUBCUTANEOUS
Status: COMPLETED | OUTPATIENT
Start: 2019-04-23 | End: 2019-04-23

## 2019-04-23 RX ORDER — DOCUSATE SODIUM 100 MG/1
100 CAPSULE, LIQUID FILLED ORAL
Status: DISCONTINUED | OUTPATIENT
Start: 2019-04-23 | End: 2019-04-24

## 2019-04-23 RX ORDER — PREDNISONE 20 MG/1
20 TABLET ORAL
Status: DISCONTINUED | OUTPATIENT
Start: 2019-04-24 | End: 2019-04-26

## 2019-04-23 RX ORDER — BISACODYL 5 MG
5 TABLET, DELAYED RELEASE (ENTERIC COATED) ORAL DAILY PRN
Status: DISCONTINUED | OUTPATIENT
Start: 2019-04-23 | End: 2019-04-28 | Stop reason: HOSPADM

## 2019-04-23 RX ORDER — MAG HYDROX/ALUMINUM HYD/SIMETH 200-200-20
30 SUSPENSION, ORAL (FINAL DOSE FORM) ORAL ONCE
Status: COMPLETED | OUTPATIENT
Start: 2019-04-23 | End: 2019-04-23

## 2019-04-23 RX ADMIN — IPRATROPIUM BROMIDE AND ALBUTEROL SULFATE 3 ML: .5; 3 SOLUTION RESPIRATORY (INHALATION) at 07:01

## 2019-04-23 RX ADMIN — DOCUSATE SODIUM 100 MG: 100 CAPSULE, LIQUID FILLED ORAL at 18:50

## 2019-04-23 RX ADMIN — ALUMINUM HYDROXIDE, MAGNESIUM HYDROXIDE, AND SIMETHICONE 30 ML: 200; 200; 20 SUSPENSION ORAL at 09:49

## 2019-04-23 RX ADMIN — Medication 10 ML: at 05:46

## 2019-04-23 RX ADMIN — ASPIRIN 81 MG: 81 TABLET, COATED ORAL at 08:12

## 2019-04-23 RX ADMIN — BUDESONIDE 1000 MCG: 1 SUSPENSION RESPIRATORY (INHALATION) at 21:28

## 2019-04-23 RX ADMIN — FUROSEMIDE 20 MG: 10 INJECTION, SOLUTION INTRAMUSCULAR; INTRAVENOUS at 08:12

## 2019-04-23 RX ADMIN — ATORVASTATIN CALCIUM 40 MG: 40 TABLET, FILM COATED ORAL at 21:56

## 2019-04-23 RX ADMIN — TAMSULOSIN HYDROCHLORIDE 0.4 MG: 0.4 CAPSULE ORAL at 08:12

## 2019-04-23 RX ADMIN — INSULIN LISPRO 9 UNITS: 100 INJECTION, SOLUTION INTRAVENOUS; SUBCUTANEOUS at 11:30

## 2019-04-23 RX ADMIN — GABAPENTIN 300 MG: 300 CAPSULE ORAL at 08:12

## 2019-04-23 RX ADMIN — FAMOTIDINE 20 MG: 20 TABLET ORAL at 08:12

## 2019-04-23 RX ADMIN — IPRATROPIUM BROMIDE AND ALBUTEROL SULFATE 3 ML: .5; 3 SOLUTION RESPIRATORY (INHALATION) at 21:29

## 2019-04-23 RX ADMIN — PREDNISONE 40 MG: 20 TABLET ORAL at 08:12

## 2019-04-23 RX ADMIN — INSULIN GLARGINE 5 UNITS: 100 INJECTION, SOLUTION SUBCUTANEOUS at 09:00

## 2019-04-23 RX ADMIN — INSULIN LISPRO 3 UNITS: 100 INJECTION, SOLUTION INTRAVENOUS; SUBCUTANEOUS at 08:11

## 2019-04-23 RX ADMIN — Medication 10 ML: at 22:06

## 2019-04-23 RX ADMIN — AZITHROMYCIN 500 MG: 250 TABLET, FILM COATED ORAL at 14:00

## 2019-04-23 RX ADMIN — SODIUM CHLORIDE 10 ML: 9 INJECTION INTRAMUSCULAR; INTRAVENOUS; SUBCUTANEOUS at 15:06

## 2019-04-23 RX ADMIN — BUDESONIDE 1000 MCG: 1 SUSPENSION RESPIRATORY (INHALATION) at 07:01

## 2019-04-23 RX ADMIN — ACETAMINOPHEN 650 MG: 325 TABLET ORAL at 05:46

## 2019-04-23 RX ADMIN — GABAPENTIN 300 MG: 300 CAPSULE ORAL at 18:00

## 2019-04-23 RX ADMIN — INSULIN LISPRO 6 UNITS: 100 INJECTION, SOLUTION INTRAVENOUS; SUBCUTANEOUS at 21:55

## 2019-04-23 RX ADMIN — ENOXAPARIN SODIUM 40 MG: 40 INJECTION SUBCUTANEOUS at 09:49

## 2019-04-23 RX ADMIN — MIRTAZAPINE 15 MG: 15 TABLET, FILM COATED ORAL at 21:56

## 2019-04-23 RX ADMIN — BISACODYL 5 MG: 5 TABLET, COATED ORAL at 18:50

## 2019-04-23 RX ADMIN — Medication 10 ML: at 14:00

## 2019-04-23 RX ADMIN — PAROXETINE HYDROCHLORIDE 30 MG: 20 TABLET, FILM COATED ORAL at 08:12

## 2019-04-23 NOTE — PROGRESS NOTES
0730-Bedside shift change report given to [address] [place] [address] (oncoming nurse) by Mega Murcia (offgoing nurse). Report included the following information SBAR, Kardex, Procedure Summary, Intake/Output, MAR and Recent Results. 0830- AM assessment performed. Medications passed. Pt c/o gas/constipation pain. 1x order of maalox put in and given to pt.  
 
0930- PT/OT worked w pt, tried to make pt use BSC. Pt had no bm. 
 
1200- Prune juice given w lunch. 1800- MD notified about gas/constipation pain again. Dulcolax and colace order put in. 
 
1850- Pt given stool softener and laxative. Bedside shift change report given to Epifanio Vallejo (oncoming nurse) by Gricelda Duron RN (offgoing nurse). Report included the following information SBAR, Kardex, Procedure Summary, Intake/Output, MAR and Recent Results.

## 2019-04-23 NOTE — PROGRESS NOTES
Problem: Mobility Impaired (Adult and Pediatric) Goal: *Acute Goals and Plan of Care (Insert Text) Description Physical Therapy Goals Initiated 4/22/2019 and to be accomplished within 7 day(s) 1. Patient will move from supine to sit and sit to supine  in bed with independence. 2.  Patient will transfer from bed to chair and chair to bed with modified independence using the least restrictive device. 3.  Patient will perform sit to stand with modified independence. 4.  Patient will ambulate with supervision/set-up for 50 feet with the least restrictive device. 5.  Patient will ascend/descend 3 stairs with bilateral handrail(s) with supervision/set-up. To prepare pt for home mobility. PLOF:  Pt lives with  in a 1 story home with 3 steps to enter, bilateral railings. Pt reports she was ambulating independently with no RW prior to this hospitalization and caring for her ailing . Outcome: Progressing Towards Goal 
 PHYSICAL THERAPY TREATMENT Patient: Mercedes Christensen (24 y.o. female) Date: 4/23/2019 Diagnosis: Hypoxia [R09.02] Precautions: Aspiration, Fall Chart, physical therapy assessment, plan of care and goals were reviewed. OBJECTIVE/ ASSESSMENT: 
Patient found supine in bed willing to work with PT. Patient seen with OT to maximize safety of patient and staff. Pt reports 9/10 abdominal pain at this time and fatigue. Pt is able to perform SLR, but declines 10 reps, able to complete 5 bilaterally. PT the sat at EOB and stood x 1 trial before quickly sitting without warning. When asked pt  reports some dizziness. Pt stood again and ambulated to b/s commode to sit. Vitals were taken at this time and all found to be within normal range. Pt was unable to have BM and stood from commode chair, with DUMONT switching commode for recliner. Pt sat with rapid descent toward chair without warning. Pt was left with needs in reach and nursing notified.  Pt reports extreme worry about her  who is also admitted to this hospital. She states her  wants to go home and has declined rehab. A this point pt would be good candidate for SNF though she is hesitant. Education: 
?         Bed mobility ? Transfers ? Ambulation / gait ? Assistive device management ? Stairs ? Body mechanics ? Position change ? Therapeutic exercise ? Activity pacing / energy conservation ? Other: 
 
Progression toward goals: 
?      Improving appropriately and progressing toward goals ? Improving slowly and progressing toward goals ? Not making progress toward goals and plan of care will be adjusted PLAN: 
Patient continues to benefit from skilled intervention to address the above impairments. Continue treatment per established plan of care. Discharge Recommendations:  Murray Almanza Further Equipment Recommendations for Discharge:  rolling walker SUBJECTIVE:  
Patient stated ? They've always told me that I'm too fast.? OBJECTIVE DATA SUMMARY:  
Critical Behavior: 
Neurologic State: Alert Orientation Level: Oriented to place, Oriented to person Cognition: Follows commands Safety/Judgement: Fall prevention Functional Mobility Training: 
Bed Mobility: 
Supine to Sit: Contact guard assistance Transfers: 
Sit to Stand: Contact guard assistance Stand to Sit: Minimum assistance(with rapid descend) Balance: 
Sitting: Intact Standing: Impaired; With support Standing - Static: Fair Standing - Dynamic : Fair Ambulation/Gait Training: 
Distance (ft): 3 Feet (ft) Assistive Device: Walker, rolling Ambulation - Level of Assistance: Stand-by assistance Gait Abnormalities: Decreased step clearance Therapeutic Exercises:  
 
 
EXERCISE Sets Reps Active Active Assist  
Passive Self- assited ROM Comments Ankle Pumps   ? ? ? ?   
Quad Sets   ? ? ? ?    
Glut Sets   ? ? ? ?   
 Short Arc Quads   ? ? ? ? Heel Slides   ? ? ? ? Straight Leg Raises 1 5 ? ? ? ? Bilaterally Hip Abd   ? ? ? ? Long Arc Quads 1 10 ? ? ? ? Bilaterally Seated Marching   ? ? ? ? Seated Knee Flexion   ? ? ? ? Standing Marching   ? ? ? ? Pain: 
Pre tx pain: 9 Post tx pain: 9 Pain Scale 1: Numeric (0 - 10) Pain Intensity 1: 0 Intervention: position change Activity Tolerance:  
Fair Please refer to the flowsheet for vital signs taken during this treatment. After treatment:  
? Patient left in no apparent distress sitting up in chair ? Patient left in no apparent distress in bed 
? Call bell left within reach ? Nursing notified ? Caregiver present ? Bed alarm activated ? SCDs applied ? Ice applied Britney Sandra PTA Time Calculation: 26 mins

## 2019-04-23 NOTE — PROGRESS NOTES
[facility] Hospitalist Group Progress Note Patient: Amada Moritz Age: 80 y.o. : 1933 MR#: [id number] SSN: [id number] Date/Time: 2019 Subjective:  
 
Patient lying in bed in NAD, awake, has nassar. Off HFNC, on NC Assessment/Plan:  
 
-acute hypoxic and hyercapnic respiratory failure - chronic hypoxic resp failure, on home O2  3 liters vis NC continuously 
- COPD with acute exacerbation 
- ? PNA 
-Chronic diastolic chf, 
- Pelvic fracture - Esophageal wall thickening on CTA- close OP f/u with GI 
 
PLAN Wean O2 to baseline, Nebs, steroids Follow echo On lasix PT, OT 
- DNR 
D/w patient Dispo- Likely SNF soon  consulted D/w  Daughter [phone] over phone Case discussed with:  [x]Patient  []Family  []Nursing  []Case Management DVT Prophylaxis:  []Lovenox  []Hep SQ  []SCDs  []Coumadin   []On Heparin gtt Objective:  
VS:  
Visit Vitals /84 Pulse 93 Temp 98.8 °F (37.1 °C) Resp 19 Ht 5' 5\" (1.651 m) Wt 69.4 kg (153 lb) SpO2 97% BMI 25.46 kg/m² Tmax/24hrs: Temp (24hrs), Av.5 °F (37.5 °C), Min:98.3 °F (36.8 °C), Max:101.6 °F (38.7 °C) Input/Output:  
 
Intake/Output Summary (Last 24 hours) at 2019 1606 Last data filed at 2019 1335 Gross per 24 hour Intake 480 ml Output 1100 ml Net -620 ml General:  Awake, alert Cardiovascular:  S1S2+, RRR Pulmonary:  Coarse bs b/l GI:  Soft, BS+, NT, ND Extremities:  No edema Labs:   
Recent Results (from the past 24 hour(s)) GLUCOSE, POC Collection Time: 19  9:06 PM  
Result Value Ref Range Glucose (POC) 149 (H) 70 - 110 mg/dL GLUCOSE, POC Collection Time: 19  7:54 AM  
Result Value Ref Range Glucose (POC) 167 (H) 70 - 110 mg/dL GLUCOSE, POC Collection Time: 19 10:57 AM  
Result Value Ref Range Glucose (POC) 248 (H) 70 - 110 mg/dL ECHO ADULT FOLLOW-UP OR LIMITED  Collection Time: 19  3:06 PM  
 Result Value Ref Range LVIDd 4.37 3.9 - 5.3 cm  
 LVPWd 1.27 (A) 0.6 - 0.9 cm LVIDs 2.68 cm IVSd 1.40 (A) 0.6 - 0.9 cm  
 LV Mass .1 (A) 67 - 162 g  
 LV Mass AL Index 120.2 (A) 43 - 95 g/m2 Triscuspid Valve Regurgitation Peak Gradient 55.2 mmHg  
 TR Max Velocity 371.59 cm/s PASP 63.0 mmHg Additional Data Reviewed:   
 
Signed By: Rochelle Armando MD   
 April 23, 2019

## 2019-04-23 NOTE — PROGRESS NOTES
Spoke with Aryan Harris RN, patient is a high risk for sepsis, risk alert is Temp 101.6 and RR 25. Patient is on PO Zithromax. Last [facility] SYSTEM 4/18 NGTD, Urine Cx 4/18 +GP from 4/18 - sensitivity did not covered current anbx.   RN will notify MD

## 2019-04-23 NOTE — PROGRESS NOTES
Problem: Self Care Deficits Care Plan (Adult) Goal: *Acute Goals and Plan of Care (Insert Text) Description Occupational Therapy Goals Initiated 4/22/2019 within 7 day(s). 1.  Patient will perform lower body dressing with modified independence with SpO2 greater than 88%. 2.  Patient will perform toileting with modified independence. 3.  Patient will perform toilet transfer with modified independence. 4.  Patient will participate in upper extremity therapeutic exercise/activities with supervision/set-up for 8 minutes. 5.  Patient will utilize energy conservation techniques during functional activities with minimal verbal cues. Prior Level of Function: Pt lives with her  in a [facility] with 3STE. She was (I) with basic self-care/ADLs and IADLs, including cooking, cleaning and medication management PTA. Outcome: Progressing Towards Goal 
 OCCUPATIONAL THERAPY TREATMENT Patient: Irene Austin (3 y.o. female) Date: 4/23/2019 Diagnosis: Hypoxia [R09.02] <principal problem not specified> Precautions: Aspiration, Fall Chart, occupational therapy assessment, plan of care, and goals were reviewed. ASSESSMENT: 
Pt required min encouragement to participate in therapy this am. Pt is seen with PT to increase safety of the pt and staff during functional mobility and ADLs. Pt educated on EC techniques to utilize for ADLs and on the importance of participating in OT in hospital setting to improve endurance and independence w/ADLs. Pt was able to perform ADL grooming and UB dressing task seated w/SBA, required CGA to std in preparation for toilet txfr. Upon std pt rapidly returns to sit at EOB. Educated pt on safety and on the importance of reaching back and slowly lowering on EOB when sitting down for safety to prevent falls. Pt verbalized understanding.  Pt required Min Ax2 to maneuver to the [facility], pt was not able to void and assisted to the chair at the end of the session, call bell within reach. Progression toward goals: 
?          Improving appropriately and progressing toward goals ? Improving slowly and progressing toward goals ? Not making progress toward goals and plan of care will be adjusted PLAN: 
Patient continues to benefit from skilled intervention to address the above impairments. Continue treatment per established plan of care. Discharge Recommendations:  Murray Almanza Further Equipment Recommendations for Discharge:  bedside commode and shower chair SUBJECTIVE:  
Patient stated ? I want to go home. ? OBJECTIVE DATA SUMMARY:  
Cognitive/Behavioral Status: 
Neurologic State: Alert Orientation Level: Oriented to place, Oriented to person Cognition: Follows commands Safety/Judgement: Fall prevention Functional Mobility and Transfers for ADLs: 
Bed Mobility: 
  
Supine to Sit: Contact guard assistance Transfers: 
Sit to Stand: Contact guard assistance Toilet Transfer : Minimum assistance;Assist x2 Balance: 
Sitting: Intact Standing: Impaired; With support Standing - Static: Fair Standing - Dynamic : Fair ADL Intervention: 
 Grooming Grooming Assistance: Supervision(seated in the chair) Washing Hands: Supervision/set-up Upper Body Dressing Assistance Shirt simulation with hospital gown: Stand-by assistance Pain: 
Pain level pre-treatment: not rated Pain level post-treatment: not rated Activity Tolerance:   
Fair Please refer to the flowsheet for vital signs taken during this treatment. After treatment:  
?  Patient left in no apparent distress sitting up in chair ? Patient left in no apparent distress in bed 
? Call bell left within reach ? Nursing notified ? Caregiver present ? Bed alarm activated COMMUNICATION/EDUCATION:  
? Role of Occupational Therapy in the acute care setting 
? Home safety education was provided and the patient/caregiver indicated understanding. ? Patient/family have participated as able in working towards goals and plan of care. ? Patient/family agree to work toward stated goals and plan of care. ? Patient understands intent and goals of therapy, but is neutral about his/her participation. ? Patient is unable to participate in goal setting and plan of care. Thank you for this referral. 
TIM Graves/L Time Calculation: 26 mins

## 2019-04-23 NOTE — DIABETES MGMT
Glycemic Control Plan of Care 
 
T2DM with current A1c of 8.1% (4/15/2019). See separate notes, 4/22/2019, for assessment of home diabetes management and education. Noted Home diabetes med list: reported by patient on 4/22/2019: 
Glipizide 5 mg daily. POC BG range on 4/22/2019: 149-344 mg/dL. Modified correctional lispro insulin to very resistant dose and added basal lantus insulin 5 units daily. POC BG report on 4/23/2019 at time of review: 167, 248 mg/dl. Patient is currently on prednisone 40 mg daily. Recommendation(s): 
1.) Consider increasing basal lantus insulin dose from 5 to 8 units daily. Assessment: 
Patient is 80year old with past medical history including type 2 diabetes mellitus with neuroapthy, former smoker,  COPD, carotid artery stenosis, CHI, NSTEMI, CHF, and restless leg syndrome - was admitted on 4/18/2019 with report of fall, back, recurrent falls and back pain. Noted: 
Severe COPD. Acute respiratory failure. Most recent blood glucose values: 
 
Results for Lisa Garza (MRN [id number]) as of 4/23/2019 14:39 Ref. Range 4/22/2019 07:32 4/22/2019 11:29 4/22/2019 12:18 4/22/2019 15:41 4/22/2019 21:06 GLUCOSE,FAST - POC Latest Ref Range: 70 - 110 mg/dL 192 (H) 344 (H) 323 (H) 209 (H) 149 (H) Results for Lisa Garza (MRN [id number]) as of 4/23/2019 14:39 Ref. Range 4/23/2019 07:54 4/23/2019 10:57 GLUCOSE,FAST - POC Latest Ref Range: 70 - 110 mg/dL 167 (H) 248 (H) Current A1C: 8.1% (4/15/2019) which is equivalent to estimated average blood glucose of 186 mg/dL during the past 2-3 months. Current hospital diabetes medications: 
Lantus insuliln 5 units daily since 4/22/2019. Correctional lispro insulin ACHS. Very resistant dose. Total daily dose insulin requirement previous day: 4/22/2019 Lantus: 5 units Lispro: 18 units TDD inslin dose: 23 units Home diabetes medications: Patient reported on 4/22/2018: 
Glipizide 5 mg daily. Diet: Diabetic consistent carb regular. Goals:  Blood glucose will be within target range of  mg/dL by 4/23/2019. Education:  
_x__  Refer to Diabetes Education Record: 4/22/2019 
___  Education not indicated at this time Norma Piedra RN [facility] Pager: [phone]

## 2019-04-23 NOTE — NURSE NAVIGATOR
Spoke with daughter, Mayte Liao. She states that she would like Ms. Ayla Foster and Mr. Ayla Foster ([place] on [address]) to go to [address] [place] [address]. They were there previously and she would like them to return. She is aware that Ms. Ayla Foster will be in her copay days. They are looking into the possibility of Assisted living facility for the both of them in the next few weeks after a few weeks of rehab at [facility]. Will place in CC link. Fatoumata Magana. Jenny, MARINN, RN Care Management [phone]

## 2019-04-23 NOTE — PROGRESS NOTES
Called Dr. Stas Jackson to inform of patients temperature of 101.6, treated with tylenol. Informed physician of gram positive cocci in patients urine. Physician aware, patient on azithromycin. No further orders given.

## 2019-04-24 ENCOUNTER — APPOINTMENT (OUTPATIENT)
Dept: GENERAL RADIOLOGY | Age: 84
DRG: 189 | End: 2019-04-24
Attending: EMERGENCY MEDICINE
Payer: MEDICARE

## 2019-04-24 LAB
ANION GAP SERPL CALC-SCNC: 4 MMOL/L (ref 3–18)
BACTERIA SPEC CULT: NORMAL
BACTERIA SPEC CULT: NORMAL
BASOPHILS # BLD: 0 K/UL (ref 0–0.1)
BASOPHILS NFR BLD: 0 % (ref 0–2)
BUN SERPL-MCNC: 25 MG/DL (ref 7–18)
BUN/CREAT SERPL: 35 (ref 12–20)
CALCIUM SERPL-MCNC: 9.1 MG/DL (ref 8.5–10.1)
CHLORIDE SERPL-SCNC: 90 MMOL/L (ref 100–108)
CO2 SERPL-SCNC: 36 MMOL/L (ref 21–32)
CREAT SERPL-MCNC: 0.71 MG/DL (ref 0.6–1.3)
DIFFERENTIAL METHOD BLD: ABNORMAL
EOSINOPHIL # BLD: 0 K/UL (ref 0–0.4)
EOSINOPHIL NFR BLD: 0 % (ref 0–5)
ERYTHROCYTE [DISTWIDTH] IN BLOOD BY AUTOMATED COUNT: 16.5 % (ref 11.6–14.5)
GLUCOSE BLD STRIP.AUTO-MCNC: 119 MG/DL (ref 70–110)
GLUCOSE BLD STRIP.AUTO-MCNC: 137 MG/DL (ref 70–110)
GLUCOSE BLD STRIP.AUTO-MCNC: 165 MG/DL (ref 70–110)
GLUCOSE BLD STRIP.AUTO-MCNC: 314 MG/DL (ref 70–110)
GLUCOSE SERPL-MCNC: 110 MG/DL (ref 74–99)
HCT VFR BLD AUTO: 37.3 % (ref 35–45)
HGB BLD-MCNC: 12.4 G/DL (ref 12–16)
LYMPHOCYTES # BLD: 1 K/UL (ref 0.9–3.6)
LYMPHOCYTES NFR BLD: 6 % (ref 21–52)
MAGNESIUM SERPL-MCNC: 1.9 MG/DL (ref 1.6–2.6)
MCH RBC QN AUTO: 28 PG (ref 24–34)
MCHC RBC AUTO-ENTMCNC: 33.2 G/DL (ref 31–37)
MCV RBC AUTO: 84.2 FL (ref 74–97)
MONOCYTES # BLD: 0.4 K/UL (ref 0.05–1.2)
MONOCYTES NFR BLD: 3 % (ref 3–10)
NEUTS SEG # BLD: 14.1 K/UL (ref 1.8–8)
NEUTS SEG NFR BLD: 91 % (ref 40–73)
PLATELET # BLD AUTO: 224 K/UL (ref 135–420)
PMV BLD AUTO: 10.7 FL (ref 9.2–11.8)
POTASSIUM SERPL-SCNC: 3.5 MMOL/L (ref 3.5–5.5)
RBC # BLD AUTO: 4.43 M/UL (ref 4.2–5.3)
SERVICE CMNT-IMP: NORMAL
SERVICE CMNT-IMP: NORMAL
SODIUM SERPL-SCNC: 130 MMOL/L (ref 136–145)
WBC # BLD AUTO: 15.5 K/UL (ref 4.6–13.2)

## 2019-04-24 PROCEDURE — 74011250637 HC RX REV CODE- 250/637: Performed by: INTERNAL MEDICINE

## 2019-04-24 PROCEDURE — 74011636637 HC RX REV CODE- 636/637: Performed by: INTERNAL MEDICINE

## 2019-04-24 PROCEDURE — 74011636637 HC RX REV CODE- 636/637: Performed by: EMERGENCY MEDICINE

## 2019-04-24 PROCEDURE — 94640 AIRWAY INHALATION TREATMENT: CPT

## 2019-04-24 PROCEDURE — 83735 ASSAY OF MAGNESIUM: CPT

## 2019-04-24 PROCEDURE — 74011000250 HC RX REV CODE- 250: Performed by: INTERNAL MEDICINE

## 2019-04-24 PROCEDURE — 3331090002 HH PPS REVENUE DEBIT

## 2019-04-24 PROCEDURE — 80048 BASIC METABOLIC PNL TOTAL CA: CPT

## 2019-04-24 PROCEDURE — 65660000000 HC RM CCU STEPDOWN

## 2019-04-24 PROCEDURE — 77030038269 HC DRN EXT URIN PURWCK BARD -A

## 2019-04-24 PROCEDURE — 74011250636 HC RX REV CODE- 250/636: Performed by: EMERGENCY MEDICINE

## 2019-04-24 PROCEDURE — 82962 GLUCOSE BLOOD TEST: CPT

## 2019-04-24 PROCEDURE — 77030037870 HC GLD SHT PREVALON SAGE -B

## 2019-04-24 PROCEDURE — 36415 COLL VENOUS BLD VENIPUNCTURE: CPT

## 2019-04-24 PROCEDURE — 74018 RADEX ABDOMEN 1 VIEW: CPT

## 2019-04-24 PROCEDURE — 85025 COMPLETE CBC W/AUTO DIFF WBC: CPT

## 2019-04-24 PROCEDURE — 74011250637 HC RX REV CODE- 250/637: Performed by: EMERGENCY MEDICINE

## 2019-04-24 PROCEDURE — 77010033678 HC OXYGEN DAILY: Performed by: INTERNAL MEDICINE

## 2019-04-24 PROCEDURE — 3331090001 HH PPS REVENUE CREDIT

## 2019-04-24 RX ORDER — FACIAL-BODY WIPES
10 EACH TOPICAL DAILY PRN
Status: DISCONTINUED | OUTPATIENT
Start: 2019-04-24 | End: 2019-04-25

## 2019-04-24 RX ORDER — SIMETHICONE 80 MG
80 TABLET,CHEWABLE ORAL
Status: DISCONTINUED | OUTPATIENT
Start: 2019-04-24 | End: 2019-04-28 | Stop reason: HOSPADM

## 2019-04-24 RX ORDER — ONDANSETRON 2 MG/ML
4 INJECTION INTRAMUSCULAR; INTRAVENOUS
Status: DISCONTINUED | OUTPATIENT
Start: 2019-04-24 | End: 2019-04-28 | Stop reason: HOSPADM

## 2019-04-24 RX ORDER — DOCUSATE SODIUM 100 MG/1
100 CAPSULE, LIQUID FILLED ORAL 2 TIMES DAILY
Status: DISCONTINUED | OUTPATIENT
Start: 2019-04-24 | End: 2019-04-28 | Stop reason: HOSPADM

## 2019-04-24 RX ADMIN — SIMETHICONE CHEW TAB 80 MG 80 MG: 80 TABLET ORAL at 06:58

## 2019-04-24 RX ADMIN — BUDESONIDE 1000 MCG: 1 SUSPENSION RESPIRATORY (INHALATION) at 20:40

## 2019-04-24 RX ADMIN — BISACODYL 5 MG: 5 TABLET, COATED ORAL at 09:19

## 2019-04-24 RX ADMIN — DOCUSATE SODIUM 100 MG: 100 CAPSULE, LIQUID FILLED ORAL at 09:19

## 2019-04-24 RX ADMIN — Medication 10 ML: at 21:30

## 2019-04-24 RX ADMIN — Medication 10 ML: at 06:59

## 2019-04-24 RX ADMIN — INSULIN LISPRO 12 UNITS: 100 INJECTION, SOLUTION INTRAVENOUS; SUBCUTANEOUS at 18:07

## 2019-04-24 RX ADMIN — ONDANSETRON 4 MG: 2 INJECTION INTRAMUSCULAR; INTRAVENOUS at 09:03

## 2019-04-24 RX ADMIN — MIRTAZAPINE 15 MG: 15 TABLET, FILM COATED ORAL at 21:25

## 2019-04-24 RX ADMIN — GABAPENTIN 300 MG: 300 CAPSULE ORAL at 18:05

## 2019-04-24 RX ADMIN — AZITHROMYCIN 500 MG: 250 TABLET, FILM COATED ORAL at 14:06

## 2019-04-24 RX ADMIN — IPRATROPIUM BROMIDE AND ALBUTEROL SULFATE 3 ML: .5; 3 SOLUTION RESPIRATORY (INHALATION) at 20:40

## 2019-04-24 RX ADMIN — FUROSEMIDE 20 MG: 10 INJECTION, SOLUTION INTRAMUSCULAR; INTRAVENOUS at 09:03

## 2019-04-24 RX ADMIN — ENOXAPARIN SODIUM 40 MG: 40 INJECTION SUBCUTANEOUS at 13:55

## 2019-04-24 RX ADMIN — IPRATROPIUM BROMIDE AND ALBUTEROL SULFATE 3 ML: .5; 3 SOLUTION RESPIRATORY (INHALATION) at 13:07

## 2019-04-24 RX ADMIN — Medication 10 ML: at 14:13

## 2019-04-24 RX ADMIN — ONDANSETRON 4 MG: 2 INJECTION INTRAMUSCULAR; INTRAVENOUS at 18:06

## 2019-04-24 RX ADMIN — FAMOTIDINE 20 MG: 20 TABLET ORAL at 09:03

## 2019-04-24 RX ADMIN — TAMSULOSIN HYDROCHLORIDE 0.4 MG: 0.4 CAPSULE ORAL at 09:03

## 2019-04-24 RX ADMIN — DOCUSATE SODIUM 100 MG: 100 CAPSULE, LIQUID FILLED ORAL at 18:06

## 2019-04-24 RX ADMIN — GABAPENTIN 300 MG: 300 CAPSULE ORAL at 09:03

## 2019-04-24 RX ADMIN — PREDNISONE 20 MG: 20 TABLET ORAL at 09:03

## 2019-04-24 RX ADMIN — ATORVASTATIN CALCIUM 40 MG: 40 TABLET, FILM COATED ORAL at 21:25

## 2019-04-24 RX ADMIN — PAROXETINE HYDROCHLORIDE 30 MG: 20 TABLET, FILM COATED ORAL at 14:06

## 2019-04-24 RX ADMIN — ASPIRIN 81 MG: 81 TABLET, COATED ORAL at 09:03

## 2019-04-24 RX ADMIN — IPRATROPIUM BROMIDE AND ALBUTEROL SULFATE 3 ML: .5; 3 SOLUTION RESPIRATORY (INHALATION) at 16:17

## 2019-04-24 NOTE — PROGRESS NOTES
1340 - Pt being cleaned up with nursing staff at this time. Will follow up later in day. 1417 - Pt in 8/10 pain at this time and declines PT intervention. Will f/u at later date.

## 2019-04-24 NOTE — PROGRESS NOTES
Problem: Diabetes Self-Management Goal: *Disease process and treatment process Description Define diabetes and identify own type of diabetes; list 3 options for treating diabetes. 4/24/2019 1226 by Kwan Martin RN Outcome: Progressing Towards Goal 
4/24/2019 1216 by Kwan Martin RN Outcome: Progressing Towards Goal 
  
Problem: Diabetes Self-Management Goal: *Incorporating nutritional management into lifestyle Description Describe effect of type, amount and timing of food on blood glucose; list 3 methods for planning meals. 4/24/2019 1226 by Kwan Martin RN Outcome: Progressing Towards Goal 
4/24/2019 1216 by Kwan Martin RN Outcome: Progressing Towards Goal 
  
Problem: Diabetes Self-Management Goal: *Incorporating physical activity into lifestyle Description State effect of exercise on blood glucose levels. 4/24/2019 1226 by Kwan Martin RN Outcome: Progressing Towards Goal 
4/24/2019 1216 by Kwan Martin RN Outcome: Progressing Towards Goal 
  
Problem: Diabetes Self-Management Goal: *Developing strategies to promote health/change behavior Description Define the ABC's of diabetes; identify appropriate screenings, schedule and personal plan for screenings. 4/24/2019 1226 by Kwan Martin RN Outcome: Progressing Towards Goal 
4/24/2019 1216 by Kwan Martin RN Outcome: Progressing Towards Goal 
  
Problem: Diabetes Self-Management Goal: *Monitoring blood glucose, interpreting and using results Description Identify recommended blood glucose targets  and personal targets. 4/24/2019 1226 by Kwan Martin RN Outcome: Progressing Towards Goal 
4/24/2019 1216 by Kwan Martin RN Outcome: Progressing Towards Goal 
  
Problem: Diabetes Self-Management Goal: *Using medications safely Description State effect of diabetes medications on diabetes; name diabetes medication taking, action and side effects.  
4/24/2019 1226 by Kwan Martin RN 
 Outcome: Progressing Towards Goal 
4/24/2019 1216 by Pastor Pam RN Outcome: Progressing Towards Goal 
  
Problem: Patient Education: Go to Patient Education Activity Goal: Patient/Family Education 4/24/2019 1226 by Pastor Pam RN Outcome: Progressing Towards Goal 
4/24/2019 1216 by Pastor Pam RN Outcome: Progressing Towards Goal 
  
Problem: Pressure Injury - Risk of 
Goal: *Prevention of pressure injury Description Document Herbie Scale and appropriate interventions in the flowsheet. 4/24/2019 1226 by Pastor Pam RN Outcome: Progressing Towards Goal 
4/24/2019 1216 by Pastor Pam RN Outcome: Progressing Towards Goal 
  
Problem: Falls - Risk of 
Goal: *Absence of Falls Description Document Lito Miles Fall Risk and appropriate interventions in the flowsheet. 4/24/2019 1226 by Pastor Pam RN Outcome: Progressing Towards Goal 
4/24/2019 1216 by Pastor Pam RN Outcome: Progressing Towards Goal

## 2019-04-24 NOTE — CONSULTS
[email or web address]. COM 
[phone] GASTROENTEROLOGY CONSULT Impression: 1. Constipation sounds chronic given patient's reports. Will rule out associated low flow state (such as ischemia) in setting of severe COPD symptoms and history of hyperlipidemia. Patient is also on diuretic. PE noted abdominal distension and mild right-sided tenderness 2. Bowel distension (small and large) on KUB 4/24/2019 
- no free air 3. Severe COPD 4. Acute on chronic hypoxic respiratory failure Plan: 1. Continue with current bowel regimen 2. Will obtain arterial doppler US and repeat KUB in the morning. 3. Monitor for worsening abdominal distension 4. Call on-call provider in case of worsening symptoms. 5. Clear liquid diet for now 6. Avoid calcium channel blockers & beta blockers in case of low flow state Chief Complaint: constipation HPI: 
Elliot Queen is a 80 y.o. female who I am being asked to see in consultation for an opinion regarding constipation. Medical history significant for hypoxia, COPD (stage 4), acute on chronic respiratory failure, hyperlipidemia. Patient reports chronic constipation since childhood that she has managed with OTC laxatives at home. Per patient, last BM was last week Thursday which this duration is not unusual for her. Chart review noted 5 mg dulcolax tablets (1 yesterday and today) without improvement in BM - suppository at 1pm today. Had KUB today remarkable for  Distended loops of large and scattered small bowel. She is passing flatus. Has been eating and drinking without emesis or nausea or increased abdominal pain. PMH:  
Past Medical History:  
Diagnosis Date  Chronic lung disease  Colon polyps  COPD 8-30-02  DDD (degenerative disc disease), lumbar 10/1/2014  Degeneration of lumbar or lumbosacral intervertebral disc  Depression  Diabetes ([facility] 7-19-05  Diabetes mellitus ([facility]  Diverticulosis   DM neuropathy, painful ([facility] 10/1/2014  MOORE (Dyspnea on Exertion) 4-19-02  
 echo: +mild LVH, HA05-22% w/ diastolic dysfxn  Glaucoma  HCAP (healthcare-associated pneumonia) 03/24/2017  Hemorrhoids 6-6-06  Hyperlipidemia 5/17/2011  Hypertension 4-16-02  LBP (low back pain) 4-13-04  Low back pain radiating to both legs 10/1/2014  Lumbago  Lumbar disc herniation 10/1/2014  Lumbar facet arthropathy 10/1/2014  Lumbosacral radiculopathy at L5 10/1/2014  Lumbosacral radiculopathy at S1 10/1/2014  Microscopic hematuria  OA (osteoarthritis)  Overactive bladder 5/17/2011  
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   
 -MRI brain  Urinary tract infection, site not specified PSH:  
Past Surgical History:  
Procedure Laterality Date  HX APPENDECTOMY  HX CHOLECYSTECTOMY    HX HYSTERECTOMY    
 (+)DUB  HX POLYPECTOMY  SC COLONOSCOPY FLX DX W/COLLJ SPEC WHEN PFRMD  6-16-06  
 normal, Dr Jame Irving  SC COLONOSCOPY FLX DX W/COLLJ SPEC WHEN PFRMD    
 (+)polyp= tubular adenoma Social HX:  
Social History Socioeconomic History  Marital status:  Spouse name: Not on file  Number of children: Not on file  Years of education: Not on file  Highest education level: Not on file Occupational History  Not on file Social Needs  Financial resource strain: Not on file  Food insecurity:  
  Worry: Not on file Inability: Not on file  Transportation needs:  
  Medical: Not on file Non-medical: Not on file Tobacco Use  Smoking status: Former Smoker Packs/day: 1.00 Years: 57.00 Pack years: 57.00 Types: Cigarettes Last attempt to quit: 2018 Years since quittin.3  Smokeless tobacco: Never Used Substance and Sexual Activity  Alcohol use: No  
 Drug use: No  
 Sexual activity: Not Currently Lifestyle  Physical activity:  
  Days per week: Not on file Minutes per session: Not on file  Stress: Not on file Relationships  Social connections:  
  Talks on phone: Not on file Gets together: Not on file Attends Buddhist service: Not on file Active member of club or organization: Not on file Attends meetings of clubs or organizations: Not on file Relationship status: Not on file  Intimate partner violence:  
  Fear of current or ex partner: Not on file Emotionally abused: Not on file Physically abused: Not on file Forced sexual activity: Not on file Other Topics Concern  Not on file Social History Narrative  Not on file FHX:  
Family History Problem Relation Age of Onset  Colon Cancer Sister  Heart Disease Brother  Seizures Son  Colon Cancer Maternal Aunt Allergy: Allergies Allergen Reactions  Levaquin [Levofloxacin] Rash Patient Active Problem List  
Diagnosis Code  Hyperlipidemia E78.5  Overactive bladder N32.81  
 Sciatica M54.30  Degeneration of lumbar or lumbosacral intervertebral disc M51.37  
 Encounter for long-term (current) use of other medications Z79.899  Depression F32.9  
 Unspecified vitamin D deficiency E55.9  Glucose intolerance (pre-diabetes) R73.03  
 RLS (restless legs syndrome) G25.81  
 Aortic dissection, abdominal (HCC) I71.02  
 Ataxic gait R26.0  Chronic neck pain M54.2, G89.29  
 Orthostatic hypotension I95.1  Paresthesia R20.2  Repeated falls R29.6  Chronic pain syndrome G89.4  Lumbosacral spondylosis without myelopathy M47.817  Cervical stenosis of spinal canal M48.02  Spinal stenosis in cervical region M48.02  
 Polyneuropathy G62.9  Lumbar stenosis M48.061  
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  
 Lumbosacral radiculopathy at L5 M54.17  
 Lumbosacral radiculopathy at S1 M54.17  
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  
 Atherosclerosis of native arteries of the extremities with intermittent claudication I70.219  
 MOORE (dyspnea on exertion) R06.09  
 SOB (shortness of breath) R06.02  
 Murmur, cardiac R01.1  Hyperlipidemia LDL goal <100 E78.5  Primary osteoarthritis involving multiple joints M15.0  Prediabetes R73.03  
 Restless leg syndrome G25.81  
 Primary hypertension I10  
 Panlobular emphysema ([facility]) J43.1  Impaired fasting glucose R73.01  
 HCAP (healthcare-associated pneumonia) J18.9  Abnormal CT scan, chest R93.89  
 Hypercholesterolemia E78.00  Hypokalemia E87.6  Dizziness R42  CHI (closed head injury) S09. 90XA  Elevated troponin R74.8  Type 2 diabetes mellitus with diabetic neuropathy, without long-term current use of insulin ([facility]) E11.40  Primary insomnia F51.01  
 Major depression, chronic F32.9  Pneumonia J18.9  
 CAP (community acquired pneumonia) J18.9  
 COPD exacerbation ([facility] J44.1  RBBB (right bundle branch block with left anterior fascicular block) I45.2  Type 2 diabetes with nephropathy ([facility]) E11.21  
  Chronic respiratory failure with hypoxia ([facility]) J96.11  
 Stage 4 very severe COPD by GOLD classification ([facility] J44.9  Congestive heart failure (CHF) ([facility]) I50.9  NSTEMI (non-ST elevated myocardial infarction) ([facility] I21.4  Pelvic ring fracture, closed, initial encounter ([facility] S32.810A  Advanced care planning/counseling discussion Z71.89  
 Debility R53.81  
 Hypoxia R09.02  
 HAP (hospital-acquired pneumonia) J18.9  Fall W19. Bebo Damon Home Medications:  
 
Medications Prior to Admission Medication Sig  
 glipiZIDE (GLUCOTROL) 5 mg tablet 1 tablet by mouth daily  insulin aspart U-100 (NOVOLOG) 100 unit/mL inpn 2 Units by SubCUTAneous route Before breakfast, lunch, and dinner. Per sliding scale- 0-200- 0 units, 201-250- 2 units, 251-300- 4 units, 301-350-6 units, 351-400-8 units, 401-450- 10 units and Call MD. Nicholson albuterol-ipratropium (DUO-NEB) 2.5 mg-0.5 mg/3 ml nebu 3 mL by Nebulization route every four (4) hours.  famotidine (PEPCID) 20 mg tablet Take 1 Tab by mouth daily.  furosemide (LASIX) 20 mg tablet Take 1 Tab by mouth every fourty-eight (48) hours.  atorvastatin (LIPITOR) 40 mg tablet Take 1 Tab by mouth nightly.  rOPINIRole (REQUIP) 1 mg tablet TAKE ONE-HALF TO ONE TABLET BY MOUTH ONCE NIGHTLY AS NEEDED FOR  RESTLESS  LEGS  FOR  UP  TO  90  DAYS  PARoxetine (PAXIL) 30 mg tablet TAKE 1 TABLET BY MOUTH  DAILY  mirtazapine (REMERON) 15 mg tablet Take 1 Tab by mouth nightly.  tamsulosin (FLOMAX) 0.4 mg capsule Take 1 Cap by mouth daily.  gabapentin (NEURONTIN) 300 mg capsule 1 capsule by mouth morning, 1 capsule at midday and 2 capsules at bedtime  simvastatin (ZOCOR) 20 mg tablet Take 1 Tab by mouth nightly.  aspirin delayed-release 81 mg tablet Take 1 Tab by mouth daily.  inhalational spacing device (AEROCHAMBER MV) 1 Each by Does Not Apply route as needed.  OXYGEN-AIR DELIVERY SYSTEMS 3 L by Nasal route continuous.  Nebulizer & Compressor (PORTABLE NEBULIZER SYSTEM) machine 1 Each by Does Not Apply route every six (6) hours as needed. Review of Systems:  
 
Constitutional: No fevers, chills Skin: No rashes, pruritis, jaundice, ulcerations, erythema. HENT: No headaches Eyes: No visual changes, blurred vision, eye pain, photophobia, jaundice. Cardiovascular: No chest pain Respiratory: (+) cough Gastrointestinal: (+) constipation; right side abdominal pain with coughing Genitourinary: No dysuria, bleeding, discharge, pyuria. Musculoskeletal: No weakness, arthralgias, wasting. Endo: (+) sweats. Heme: No bruising, easy bleeding. Allergies: As noted. Neurological: Alert and oriented. Gait not assessed. Psychiatric:  No anxiety, depression, hallucinations. Visit Vitals /47 Pulse 94 Temp 98.7 °F (37.1 °C) Resp 22 Ht 5' 5\" (1.651 m) Wt 67.1 kg (147 lb 14.4 oz) SpO2 100% BMI 24.61 kg/m² Physical Assessment:  
 
constitutional: no deformities, in no acute respiratory distress; coughing 
skin: no rashes, ulcer 
eyes: inspection: normal conjunctivae and lids; no jaundice pupils: normal 
ENMT: normal  
neck: normal range of motion Respiratory: coarse sounds bilat Cardiovascular: RRR 
abdominal: distended; mild right-sided tenderness 
rectal: hemoccult/guaiac: not performed. musculoskeletal: moves extremities  
neurologic: grossly intact by observation 
psychiatric: judgement/insight: within normal limits. memory: within normal limits for recent and remote events. mood and affect: no evidence of depression, anxiety or agitation. orientation: oriented to time, space and person.  
 
 
 
Basic Metabolic Profile Recent Labs  
  04/24/19 
0543 *  
K 3.5 CL 90* CO2 36*  
BUN 25* * CA 9.1 MG 1.9  
  
  
CBC w/Diff Recent Labs  
  04/24/19 
0543 WBC 15.5*  
RBC 4.43 HGB 12.4 HCT 37.3 MCV 84.2 MCH 28.0 MCHC 33.2 RDW 16.5*  
 Recent Labs  
  04/24/19 
0543 GRANS 91* LYMPH 6*  
EOS 0  
  
 
 
4/24/2019: Abd Xray (KUB): abdomen distended loops of large bowel as 
well as prominent loops of scattered small bowel noted throughout. Small bowel 
dilatation up to at least 3.2 cm. No free air. HETAL Mark. [facility], [address] [place] [address] Cell: [phone] [email or web address]

## 2019-04-24 NOTE — PROGRESS NOTES
Bedside and Verbal shift change report received [facility]  [facility] (off going nurse). Report included the following information SBAR, Kardex, MAR and Recent Results. Assumed care patient in bed awake w/ nasal canula breathing pattern regular oxygenating well. Fall prevention program maintained,call bell and bedside table within reach. No problems identified at this time,complaints of abdominal pain possibly from constipation,will give some PRN dulcolax and colace,will continue care. 0028- Patient is nauseated and vomiting,after eating. Abdomen distended. Henny JASMINE. Waiting for call back. 46- Dr. Saige Ireland called back w/ Telephone order of pl;ace patient NPO, stat KUB and zofran 4mg q 8hrs for nausea & vomiting. Verified w/ read back. 1310- Soap clarissa enema given. 1430- MD called informed,still no BM. 
 
1600- still No BM MD placed a GI consult ,patient informed reg.consult. 1950- Bedside and Verbal shift change report given to ABDULKADIR Washburn (oncoming nurse) by Aniket Salazar RN (offgoing nurse).  Report included the following information SBAR, Kardex, MAR and Recent Results.

## 2019-04-24 NOTE — ROUTINE PROCESS
Bedside and Verbal shift change report given to Sanchez Brumfield (oncoming nurse) by Luis Curiel RN (offgoing nurse). Report included the following information SBAR, Kardex, Recent Results, Med Rec Status and Cardiac Rhythm sinus arrhythmmia.

## 2019-04-24 NOTE — PROGRESS NOTES
[facility]ist [facility]  Progress Note    Patient: Dhruv Lerma Age: 80 y.o. : 1933 MR#: [id number] SSN: [id number]  Date: 2018     Subjective:     Pt reports feeling okay today and is very anxious to go home to . States she had worsening SOB and non-productive cough before admission but none currently. Denies any chest pain, N/V/D/C. Assessment/Plan:   1. CAP (community acquired pneumonia)  2. AMS/ Metabolic encephalopathy: resolved  3. HTN  4. DDD/Restless leg syndrome  5. COPD exacerbation   6. Hx of tobacco abuse  7. Hyperglycemia r/t steroids  8. Bradycardia  9. Hypoxia requiring supplemental O2     Plan:  Continue duoneb, azithromycin/ceftrixone, pulmicort, brovana, duoneb. Cont steroids PO   Cont SSI   Cont. BP meds, Hold BB per parameters with HR < 60.  Pulm consult, Amb O2 on RA 88%, and 92% with 3-4L NC. Will need home O2. Goals of care: Full code. Pt will like to go home despite recs for SNF from PT/OT  Disposition:  [x]PT/OT ordered, rec SNF   [x] Case management referral    Case discussed with:  [x]Patient  [x]Family  [x]Nursing  [x]Case Management  DVT Prophylaxis:  []Lovenox  []Hep SQ  [x]SCDs  []Coumadin   []On Heparin gtt    Objective:   VS:   Visit Vitals    /68 (BP 1 Location: Right arm, BP Patient Position: At rest)    Pulse (!) 51    Temp 97.8 °F (36.6 °C)    Resp 18    Ht 5' 5\" (1.651 m)    Wt 54.4 kg (120 lb)    SpO2 90%    Breastfeeding No    BMI 19.97 kg/m2      Tmax/24hrs: Temp (24hrs), Av.1 °F (36.2 °C), Min:96.4 °F (35.8 °C), Max:97.8 °F (36.6 °C)  No intake or output data in the 24 hours ending 18 1728    General:  Awake, alert, NAD  Cardiovascular:  Bradycardic, regular rate  Pulmonary:  Coarse breath sounds. Shanon Pummel GI:  NT, normal BS  Extremities:  No edema or cyanosis, moves ext spontaneously.      Labs:    Recent Results (from the past 24 hour(s))   GLUCOSE, POC    Collection Time: 18  9:50 PM Result Value Ref Range    Glucose (POC) 157 (H) 70 - 110 mg/dL   CBC WITH AUTOMATED DIFF    Collection Time: 02/09/18  4:34 AM   Result Value Ref Range    WBC 4.6 4.6 - 13.2 K/uL    RBC 4.14 (L) 4.20 - 5.30 M/uL    HGB 12.2 12.0 - 16.0 g/dL    HCT 37.4 35.0 - 45.0 %    MCV 90.3 74.0 - 97.0 FL    MCH 29.5 24.0 - 34.0 PG    MCHC 32.6 31.0 - 37.0 g/dL    RDW 13.4 11.6 - 14.5 %    PLATELET 029 094 - 763 K/uL    MPV 10.9 9.2 - 11.8 FL    NEUTROPHILS 77 (H) 40 - 73 %    LYMPHOCYTES 19 (L) 21 - 52 %    MONOCYTES 4 3 - 10 %    EOSINOPHILS 0 0 - 5 %    BASOPHILS 0 0 - 2 %    ABS. NEUTROPHILS 3.6 1.8 - 8.0 K/UL    ABS. LYMPHOCYTES 0.9 0.9 - 3.6 K/UL    ABS. MONOCYTES 0.2 0.05 - 1.2 K/UL    ABS. EOSINOPHILS 0.0 0.0 - 0.4 K/UL    ABS.  BASOPHILS 0.0 0.0 - 0.1 K/UL    DF AUTOMATED     METABOLIC PANEL, BASIC    Collection Time: 02/09/18  4:34 AM   Result Value Ref Range    Sodium 134 (L) 136 - 145 mmol/L    Potassium 4.3 3.5 - 5.5 mmol/L    Chloride 96 (L) 100 - 108 mmol/L    CO2 32 21 - 32 mmol/L    Anion gap 6 3.0 - 18 mmol/L    Glucose 157 (H) 74 - 99 mg/dL    BUN 10 7.0 - 18 MG/DL    Creatinine 0.33 (L) 0.6 - 1.3 MG/DL    BUN/Creatinine ratio 30 (H) 12 - 20      GFR est AA >60 >60 ml/min/1.73m2    GFR est non-AA >60 >60 ml/min/1.73m2    Calcium 8.6 8.5 - 10.1 MG/DL   GLUCOSE, POC    Collection Time: 02/09/18  8:09 AM   Result Value Ref Range    Glucose (POC) 153 (H) 70 - 110 mg/dL   GLUCOSE, POC    Collection Time: 02/09/18 12:38 PM   Result Value Ref Range    Glucose (POC) 219 (H) 70 - 110 mg/dL       Signed By: Sheila Gray PA-C     February 9, 2018 5:28 PM Expiration Date (Month Year): 2/2021

## 2019-04-24 NOTE — PROGRESS NOTES
[facility] Pulmonary, Critical Care, and Sleep Medicine F/U Patient Consult Name: Donis Morin MRN: [id number] : 1933 Hospital: [address] [place] [address] Dr Date: 2019 This patient has been seen and evaluated at the request of Dr. Svetlana Reese for evaluation of hypoxic respiratory failure. IMPRESSION:  
· Severe COPD per ATS criteria. · Acute on chronic hypoxic respiratory failure and additionally has chronic hypercarbic respiratory failure in a patient with a clear appearing CXR. No evidence of CHF, infiltrates or effusions. · Acute exacerbation of COPD. · Pulmonary hypertension, moderate to severe, WHO group 3, may also have a component of group 2 
· Hx of pelvic fractures. · Chronic pain syndrome. · Deconditioning ·   
  
RECOMMENDATIONS:  
 
· Transthoracic echo reviewed, no intracardiac shunt. Patient has pulmonary hypertension though. Would recommend continued diuresis. · Can switch to prednisone tomorrow, 20 mg, taper over the next 4 days. · Continue scheduled bronchodilators duo nebs every 4 hours and Pulmicort twice daily. Please discharge the patient on Symbicort 80/4.5 MCG 2 puffs twice daily with spacer and Spiriva HandiHaler 1 puff once daily · Antibiotics-per primary service, continue azithromycin for a total of 5 days including doses already given · Strict aspiration precautions. · Assess home Oxygen needs at discharge · Aggressive bronchial hygiene · OT, PT, OOB and ambulate · Healthy weight · Will Follow · DVT prophylaxis Subjective:  
19 Patient seen and examined at bedside. No acute events overnight. Patient weaned to nasal cannula and doing well. Patient reports that her breathing has improved some. Patient denies any new dyspnea, cough, nausea, vomiting, diarrhea, chest pain.  
 
 
 
Interval HPI per Dr. Coco Zhang: 
Patient is a 80 y.o. female with past medical history significant for hypertension, restless leg syndrome, diabetes, peripheral neuropathy as well as recurrent falls who presented after a fall. Patient was brought in by EMS. According to patient she attempted to get up from the chair when she fell and hit her head. No LOC, nausea, vomiting headaches etc after the fall. On admission to ED patient was noted to be pyrexial and was admitted and treated for HAP. During the course of admission patient became increasingly hypoxic and her CXR on admission was clear which arose suspicion of PE.  CTA is awaited. Past Medical History:  
Diagnosis Date  Chronic lung disease  Colon polyps  COPD 8-30-02  DDD (degenerative disc disease), lumbar 10/1/2014  Degeneration of lumbar or lumbosacral intervertebral disc  Depression  Diabetes ([address] 7-19-05  Diabetes mellitus ([address]  Diverticulosis   DM neuropathy, painful ([address] 10/1/2014  MOORE (Dyspnea on Exertion) 4-19-02  
 echo: +mild LVH, PF90-43% w/ diastolic dysfxn  Glaucoma  HCAP (healthcare-associated pneumonia) 03/24/2017  Hemorrhoids 6-6-06  Hyperlipidemia 5/17/2011  Hypertension 4-16-02  LBP (low back pain) 4-13-04  Low back pain radiating to both legs 10/1/2014  Lumbago  Lumbar disc herniation 10/1/2014  Lumbar facet arthropathy 10/1/2014  Lumbosacral radiculopathy at L5 10/1/2014  Lumbosacral radiculopathy at S1 10/1/2014  Microscopic hematuria  OA (osteoarthritis)  Overactive bladder 5/17/2011  
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   
 -MRI brain  Urinary tract infection, site not specified Past Surgical History:  
Procedure Laterality Date  HX APPENDECTOMY  HX CHOLECYSTECTOMY    HX HYSTERECTOMY    
 (+)DUB  HX POLYPECTOMY  [facility] COLONOSCOPY FLX DX W/COLLJ SPEC WHEN PFRMD  6-16-06  
 normal, Dr Ameya Anand  NH COLONOSCOPY FLX DX W/COLLJ SPEC WHEN PFRMD    
 (+)polyp= tubular adenoma Prior to Admission medications Medication Sig Start Date End Date Taking? Authorizing Provider  
glipiZIDE (GLUCOTROL) 5 mg tablet 1 tablet by mouth daily 4/16/19  Yes Abbey Orr MD  
insulin aspart U-100 (NOVOLOG) 100 unit/mL inpn 2 Units by SubCUTAneous route Before breakfast, lunch, and dinner. Per sliding scale- 0-200- 0 units, 201-250- 2 units, 251-300- 4 units, 301-350-6 units, 351-400-8 units, 401-450- 10 units and Call MD.   Yes Provider, Historical  
albuterol-ipratropium (DUO-NEB) 2.5 mg-0.5 mg/3 ml nebu 3 mL by Nebulization route every four (4) hours. 3/15/19  Yes Antonio Heart MD  
famotidine (PEPCID) 20 mg tablet Take 1 Tab by mouth daily. 3/16/19  Yes Antonio Heart MD  
furosemide (LASIX) 20 mg tablet Take 1 Tab by mouth every fourty-eight (48) hours. 3/15/19  Yes Antonio Heart MD  
atorvastatin (LIPITOR) 40 mg tablet Take 1 Tab by mouth nightly. 3/15/19  Yes Antonio Heart MD  
rOPINIRole (REQUIP) 1 mg tablet TAKE ONE-HALF TO ONE TABLET BY MOUTH ONCE NIGHTLY AS NEEDED FOR  RESTLESS  LEGS  FOR  UP  TO  90  DAYS 3/10/19  Yes Abbey Orr MD  
PARoxetine (PAXIL) 30 mg tablet TAKE 1 TABLET BY MOUTH  DAILY 1/29/19  Yes Abbey Orr MD  
mirtazapine (REMERON) 15 mg tablet Take 1 Tab by mouth nightly. 11/5/18  Yes Abbey Orr MD  
tamsulosin (FLOMAX) 0.4 mg capsule Take 1 Cap by mouth daily.  10/29/18  Yes Abbey Orr MD  
gabapentin (NEURONTIN) 300 mg capsule 1 capsule by mouth morning, 1 capsule at midday and 2 capsules at bedtime 10/22/18  Yes Maggie Medina, Fuad Jiang MD  
simvastatin (ZOCOR) 20 mg tablet Take 1 Tab by mouth nightly. 10/16/18  Yes Angella Junior MD  
aspirin delayed-release 81 mg tablet Take 1 Tab by mouth daily. 8/10/18  Yes Tee Coleman MD  
inhalational spacing device (AEROCHAMBER MV) 1 Each by Does Not Apply route as needed. 18  Yes Yoselyn Felix MD  
OXYGEN-AIR DELIVERY SYSTEMS 3 L by Nasal route continuous. Yes Provider, Historical  
Nebulizer & Compressor (PORTABLE NEBULIZER SYSTEM) machine 1 Each by Does Not Apply route every six (6) hours as needed. 2/10/18  Yes Mercedez Escobar PA-C Allergies Allergen Reactions  Levaquin [Levofloxacin] Rash Social History Tobacco Use  Smoking status: Former Smoker Packs/day: 1.00 Years: 57.00 Pack years: 57.00 Types: Cigarettes Last attempt to quit: 2018 Years since quittin.3  Smokeless tobacco: Never Used Substance Use Topics  Alcohol use: No  
  
Family History Problem Relation Age of Onset  Colon Cancer Sister  Heart Disease Brother  Seizures Son  Colon Cancer Maternal Aunt Immunization status: up to date and documented, stated as current, but no records available. Current Facility-Administered Medications Medication Dose Route Frequency  azithromycin (ZITHROMAX) tablet 500 mg  500 mg Oral Q24H  
 albuterol-ipratropium (DUO-NEB) 2.5 MG-0.5 MG/3 ML  3 mL Nebulization Q4H RT  
 budesonide (PULMICORT) 1,000 mcg/2 mL nebulizer susp  1,000 mcg Nebulization BID RT  
 insulin glargine (LANTUS) injection 5 Units  5 Units SubCUTAneous DAILY  predniSONE (DELTASONE) tablet 40 mg  40 mg Oral DAILY WITH BREAKFAST  enoxaparin (LOVENOX) injection 40 mg  40 mg SubCUTAneous Q24H  
 furosemide (LASIX) injection 20 mg  20 mg IntraVENous DAILY  insulin lispro (HUMALOG) injection   SubCUTAneous AC&HS  aspirin delayed-release tablet 81 mg  81 mg Oral DAILY  atorvastatin (LIPITOR) tablet 40 mg  40 mg Oral QHS  famotidine (PEPCID) tablet 20 mg  20 mg Oral DAILY  gabapentin (NEURONTIN) capsule 300 mg  300 mg Oral BID  mirtazapine (REMERON) tablet 15 mg  15 mg Oral QHS  PARoxetine (PAXIL) tablet 30 mg  30 mg Oral DAILY  tamsulosin (FLOMAX) capsule 0.4 mg  0.4 mg Oral DAILY  sodium chloride (NS) flush 5-40 mL  5-40 mL IntraVENous Q8H Review of Systems: A comprehensive review of systems was negative except for that written in the HPI. Objective: 
Vital Signs:   
Visit Vitals /88 Pulse 77 Temp 98.1 °F (36.7 °C) Resp 19 Ht 5' 5\" (1.651 m) Wt 69.4 kg (153 lb) SpO2 97% BMI 25.46 kg/m² O2 Device: Nasal cannula O2 Flow Rate (L/min): 4 l/min Temp (24hrs), Av.4 °F (37.4 °C), Min:98.1 °F (36.7 °C), Max:101.6 °F (38.7 °C) Intake/Output:  
Last shift:      No intake/output data recorded. Last 3 shifts:  0701 -  1900 In: 720 [P.O.:720] Out: 1800 [Urine:1800] Intake/Output Summary (Last 24 hours) at 2019 2111 Last data filed at 2019 1900 Gross per 24 hour Intake 720 ml Output 1400 ml Net -680 ml Physical Exam:  
General:  Alert, cooperative, no distress, appears stated age, laying in bed, wearing high flow nasal cannula Head:  Normocephalic, without obvious abnormality, atraumatic. Eyes:  Conjunctivae/corneas clear. PERRL, EOMs intact. Nose: Nares normal. Septum midline. Mucosa normal. No drainage or sinus tenderness. Throat: Lips, mucosa, and tongue normal.  Poor dentition, no oral lesions. Neck: Supple, symmetrical, trachea midline, no adenopathy, no carotid bruit and no JVD. Back:   Symmetric, no curvature. ROM normal.  
Lungs:    Bilateral scattered rhonchi, improved from yesterday, no wheezes rales bilaterally Chest wall:  No tenderness or deformity. Heart:  Regular rate and rhythm, S1, S2 normal, no murmur, click, rub or gallop. Abdomen:   Soft, non-tender. Bowel sounds normal. No masses,  No organomegaly. Extremities: Extremities normal, atraumatic, no cyanosis or edema. Pulses: 2+ and symmetric all extremities. Skin: Skin color, texture, turgor normal. No rashes or lesions Lymph nodes: Cervical, supraclavicular, and axillary nodes normal.  
Neurologic: Grossly nonfocal, strength and coordination grossly intact throughout all 4 extremities. Data review:  
 
Recent Results (from the past 24 hour(s)) GLUCOSE, POC Collection Time: 04/23/19  7:54 AM  
Result Value Ref Range Glucose (POC) 167 (H) 70 - 110 mg/dL GLUCOSE, POC Collection Time: 04/23/19 10:57 AM  
Result Value Ref Range Glucose (POC) 248 (H) 70 - 110 mg/dL ECHO ADULT FOLLOW-UP OR LIMITED Collection Time: 04/23/19  3:06 PM  
Result Value Ref Range LVIDd 4.37 3.9 - 5.3 cm  
 LVPWd 1.27 (A) 0.6 - 0.9 cm LVIDs 2.68 cm IVSd 1.40 (A) 0.6 - 0.9 cm  
 LV Mass .1 (A) 67 - 162 g  
 LV Mass AL Index 149.0 (A) 43 - 95 g/m2 Triscuspid Valve Regurgitation Peak Gradient 55.2 mmHg  
 TR Max Velocity 371.59 cm/s PASP 63.0 mmHg GLUCOSE, POC Collection Time: 04/23/19  3:58 PM  
Result Value Ref Range Glucose (POC) 137 (H) 70 - 110 mg/dL Imaging: 
I have personally reviewed the patients radiographs and have reviewed the reports:  No new studies in the interval 
XR Results (most recent): 
Results from Hospital Encounter encounter on 04/18/19 XR CHEST PORT Narrative ONE VIEW CHEST RADIOGRAPH INDICATION: Change in level of consciousness. Hypoxia. COMPARISON: CTA chest earlier today. 4/18/2019 chest x-ray. FINDINGS: 
  
The exam is rotated, limiting the assessment. The cardiomediastinal silhouette 
is grossly stable allowing for the degree of rotation and CT appearance. Increased density in the left lower lung may also be in part due to rotation and 
overlying breast shadow.  Atelectasis and trace pleural fluid was present at the 
left lung base on the prior CT today. Stable bones. Impression IMPRESSION: 
 
Exam limited by rotation. Much of the left lower lung appearance may represent 
rotational artifact. Mild atelectasis and trace pleural fluid was present on the 
prior CT exam earlier today. Repeat chest film could be utilized for better 
comparison of change. CT Results (most recent): 
Results from Hospital Encounter encounter on 04/18/19 CTA CHEST W OR W WO CONT Narrative EXAM: CT Angiogram of the Chest 
 
CLINICAL INDICATION: Shortness of breath. TECHNIQUE: CT angiogram of the chest performed. MIPS performed All CT scans at this facility are performed using dose optimization technique as 
appropriate to a performed exam, to include automated exposure control, 
adjustment of the mA and/or kV according to patient size (including appropriate 
matching for site specific examination) or use of iterative reconstruction 
technique. IV CONTRAST: 80 cc of Isovue 370 COMPARISON: 3/1/2017 and chest x-ray dated 4/18/2019 FINDINGS: 
Limitations: Exam is limited due to breathing motion which limits evaluation of 
the lung parenchyma and distal pulmonary arteries. Thyroid: Unremarkable. Mediastinum: No evidence of adenopathy or fluid collection. The esophagus is 
significantly thickened especially in the mid esophagus. No adjacent adenopathy 
appreciated. This is new. Heart: The cardiac silhouette is mildly prominent. Extensive coronary artery 
calcifications are present. Pericardium: Unremarkable Aorta: Calcifications are noted in the thoracic aorta. No evidence of aneurysm. There is irregular plaque which was present previously. This is most pronounced 
in the lower descending thoracic aorta. Pulmonary Arteries: No evidence of pulmonary artery embolus within the main and 
secondary pulmonary arteries.  The tertiary pulmonary arteries are limited in 
 evaluation due to breathing motion. The distal pulmonary arteries appear mildly 
prominent. Trachea and Bronchi: Unremarkable. Pleura: Small left pleural effusion is present. Lungs: Left lower lobe atelectasis and mild atelectasis in the right lower lobe 
is noted. There is interstitial thickening. Axilla/Chest wall: No acute findings. Upper Abdomen: A small focus of hyperenhancement in the dome of the right lobe 
of liver is noted which is unchanged since 2017 Musculoskeletal: Old L1 compression fracture is noted. There is a subtle loss of 
the superior endplate of S35 which is new since 2017 but appears to be old. Impression IMPRESSION: 
1. No evidence of pulmonary embolus or aortic dissection. 2.   Borderline interstitial pulmonary edema. 3.  Small left pleural effusion. 4.   Thickened esophagus which is new. May consider correlation with esophagram 
or EGD. 5.   Mild cardiac enlargement with extensive coronary artery calcifications. 03/10/19 ECHO ADULT COMPLETE 03/11/2019 3/11/2019 Narrative · Procedure performed with the patient in a supine position. Unable to  
obtain on-axis apical images. Technically difficult study due to limited  
mobility and lung interference. Patient intubated and restrained. · Left ventricular low normal global systolic function. Calculated left  
ventricular ejection fraction is 55%. Visually measured ejection fraction. Left ventricular mild concentric hypertrophy. No regional wall motion  
abnormality noted. Inconclusive left ventricular diastolic function. · Mechanically ventilated; cannot use inferior caval vein diameter to  
estimate central venous pressure. · Moderate to severe tricuspid valve regurgitation is present. There is no  
evidence of pulmonary hypertension. · Pulmonary artery pressure likely underestimated due to severity of  
tricuspid regurgitation. · Mild pulmonic valve regurgitation is present. · Right ventricle not well visualized. Right ventricular global systolic  
function is reduced. Signed by: Pj Mcdowell MD  
 
04/18/19 ECHO ADULT FOLLOW-UP OR LIMITED 04/23/2019 4/23/2019 Narrative · Technically difficult study due to patient's body habitus and  
technically difficult study due to patient's heart rhythm. · Saline contrast was given to evaluate for intracardiac shunt. No shunt  
seen. · Estimated left ventricular ejection fraction is 56 - 60%. Visually  
measured ejection fraction. Left ventricular moderate concentric  
hypertrophy. · Mildly elevated central venous pressure (5-10 mmHg); IVC diameter is  
less than 21 mm and collapses less than 50% with respiration. · Moderate to severe tricuspid valve regurgitation is present. Moderate  
pulmonary hypertension is present. PASP 63mmHg Signed by: MD Peter Hendricks MD/MPH Pulmonary, Critical Care Medicine [facility]

## 2019-04-24 NOTE — DIABETES MGMT
Glycemic Control Plan of Care 
 
T2DM with current A1c of 8.1% (4/15/2019). See separate notes, 4/22/2019, for assessment of home diabetes management and education. Noted Home diabetes med list: reported by patient on 4/22/2019: 
Glipizide 5 mg daily. POC BG range on 4/23/2019: 167-248 mg/dL. Patient on very resistant dose of correctional lispro insulin and daily basal lantus insulin 5 units daily. POC BG report on 4/24/2019 at time of review: 119, 165, 314 mg/dl. Noted daily basal lantus insulin discontinued today. Patient is currently on prednisone 20 mg daily. Recommendation(s): 
1.) Consider adding basal lantus insulin 5 units daily. 2.) Modified clear liquid diet by adding no concentrated sweets. Assessment: 
Patient is 80year old with past medical history including type 2 diabetes mellitus with neuroapthy, former smoker,  COPD, carotid artery stenosis, CHI, NSTEMI, CHF, and restless leg syndrome - was admitted on 4/18/2019 with report of fall, back, recurrent falls and back pain. Noted: 
Severe COPD. Acute respiratory failure. Constipation. T2DM with current A1c of 8.1% (4/15/2019). Most recent blood glucose values: 
 
Results for Lisa Garza (MRN [id number]) as of 4/24/2019 16:38 Ref. Range 4/23/2019 07:54 4/23/2019 10:57 4/23/2019 15:58 4/23/2019 21:15 GLUCOSE,FAST - POC Latest Ref Range: 70 - 110 mg/dL 167 (H) 248 (H) 137 (H) 201 (H) Results for Lisa Garza (MRN [id number]) as of 4/24/2019 16:38 Ref. Range 4/24/2019 07:11 4/24/2019 10:42 GLUCOSE,FAST - POC Latest Ref Range: 70 - 110 mg/dL 119 (H) 165 (H) Current A1C: 8.1% (4/15/2019) which is equivalent to estimated average blood glucose of 186 mg/dL during the past 2-3 months. Current hospital diabetes medications: 
Correctional lispro insulin ACHS. Very resistant dose. Total daily dose insulin requirement previous day: 4/23/2019 Lantus: 5 units Lispro: 18 units TDD inslin dose: 23 units Home diabetes medications: Patient reported on 4/22/2018: 
Glipizide 5 mg daily. Diet: Clear liquid; no concentrated sweets. Goals:  Blood glucose will be within target range of  mg/dL by 4/27/2019. Education:  
_x__  Refer to Diabetes Education Record: 4/22/2019 
___  Education not indicated at this time Bakari Okeefe RN [facility] Pager: [phone]

## 2019-04-24 NOTE — PROGRESS NOTES
[facility] Hospitalist Group Progress Note Patient: Amanda Calderon Age: 80 y.o. : 1933 MR#: [id number] SSN: [id number] Date/Time: 2019 Subjective:  
 
Patient lying in bed in NAD, awake, follows commands, c/o some abdominal distension and pain Assessment/Plan:  
 
-acute hypoxic and hyercapnic respiratory failure - chronic hypoxic resp failure, on home O2  3 liters vis NC continuously 
- COPD with acute exacerbation 
- ? PNA 
-Chronic diastolic chf, 
- Pelvic fracture - Esophageal wall thickening on CTA- close OP f/u with GI 
- Constipation - Leucocytosis- may be steroid related PLAN Wean O2, nebs, taper steroids On lasix PT, OT 
- DNR Bowel regimen, enema today Dispo- d/w , SNF bed not available yet Daughter [id number] D/w RN, d/w patient Case discussed with:  [x]Patient  []Family  []Nursing  []Case Management DVT Prophylaxis:  []Lovenox  []Hep SQ  []SCDs  []Coumadin   []On Heparin gtt Objective:  
VS:  
Visit Vitals /47 Pulse (!) 52 Temp 98.7 °F (37.1 °C) Resp 22 Ht 5' 5\" (1.651 m) Wt 67.1 kg (147 lb 14.4 oz) SpO2 100% BMI 24.61 kg/m² Tmax/24hrs: Temp (24hrs), Av.6 °F (37 °C), Min:97.2 °F (36.2 °C), Max:100.2 °F (37.9 °C) Input/Output:  
 
Intake/Output Summary (Last 24 hours) at 2019 1150 Last data filed at 2019 5063 Gross per 24 hour Intake 480 ml Output 700 ml Net -220 ml General:  Awake, alert Cardiovascular:  S1S2+, RRR Pulmonary:  Coarse bs b/l GI:  Soft, BS+, NT, mild distension Extremities:  No edema Labs:   
Recent Results (from the past 24 hour(s)) ECHO ADULT FOLLOW-UP OR LIMITED Collection Time: 19  3:06 PM  
Result Value Ref Range LVIDd 4.37 3.9 - 5.3 cm  
 LVPWd 1.27 (A) 0.6 - 0.9 cm LVIDs 2.68 cm IVSd 1.40 (A) 0.6 - 0.9 cm  
 LV Mass .1 (A) 67 - 162 g  
 LV Mass AL Index 149.0 (A) 43 - 95 g/m2 Triscuspid Valve Regurgitation Peak Gradient 55.2 mmHg  
 TR Max Velocity 371.59 cm/s PASP 63.0 mmHg GLUCOSE, POC Collection Time: 04/23/19  3:58 PM  
Result Value Ref Range Glucose (POC) 137 (H) 70 - 110 mg/dL GLUCOSE, POC Collection Time: 04/23/19  9:15 PM  
Result Value Ref Range Glucose (POC) 201 (H) 70 - 110 mg/dL CBC WITH AUTOMATED DIFF Collection Time: 04/24/19  5:43 AM  
Result Value Ref Range WBC 15.5 (H) 4.6 - 13.2 K/uL  
 RBC 4.43 4.20 - 5.30 M/uL  
 HGB 12.4 12.0 - 16.0 g/dL HCT 37.3 35.0 - 45.0 % MCV 84.2 74.0 - 97.0 FL  
 MCH 28.0 24.0 - 34.0 PG  
 MCHC 33.2 31.0 - 37.0 g/dL  
 RDW 16.5 (H) 11.6 - 14.5 % PLATELET 590 241 - 787 K/uL MPV 10.7 9.2 - 11.8 FL  
 NEUTROPHILS 91 (H) 40 - 73 % LYMPHOCYTES 6 (L) 21 - 52 % MONOCYTES 3 3 - 10 % EOSINOPHILS 0 0 - 5 % BASOPHILS 0 0 - 2 %  
 ABS. NEUTROPHILS 14.1 (H) 1.8 - 8.0 K/UL  
 ABS. LYMPHOCYTES 1.0 0.9 - 3.6 K/UL  
 ABS. MONOCYTES 0.4 0.05 - 1.2 K/UL  
 ABS. EOSINOPHILS 0.0 0.0 - 0.4 K/UL  
 ABS. BASOPHILS 0.0 0.0 - 0.1 K/UL  
 DF AUTOMATED METABOLIC PANEL, BASIC Collection Time: 04/24/19  5:43 AM  
Result Value Ref Range Sodium 130 (L) 136 - 145 mmol/L Potassium 3.5 3.5 - 5.5 mmol/L Chloride 90 (L) 100 - 108 mmol/L  
 CO2 36 (H) 21 - 32 mmol/L Anion gap 4 3.0 - 18 mmol/L Glucose 110 (H) 74 - 99 mg/dL BUN 25 (H) 7.0 - 18 MG/DL Creatinine 0.71 0.6 - 1.3 MG/DL  
 BUN/Creatinine ratio 35 (H) 12 - 20 GFR est AA >60 >60 ml/min/1.73m2 GFR est non-AA >60 >60 ml/min/1.73m2 Calcium 9.1 8.5 - 10.1 MG/DL MAGNESIUM Collection Time: 04/24/19  5:43 AM  
Result Value Ref Range Magnesium 1.9 1.6 - 2.6 mg/dL GLUCOSE, POC Collection Time: 04/24/19  7:11 AM  
Result Value Ref Range Glucose (POC) 119 (H) 70 - 110 mg/dL Additional Data Reviewed:   
 
Signed By: Elva Perez MD   
 April 24, 2019

## 2019-04-24 NOTE — PROGRESS NOTES
No BM yet per RN , even after enema. GI consulted for ? obstipation, ?ileus. Tried to call daughter , left messge.

## 2019-04-24 NOTE — PROGRESS NOTES
6866. Bart gomez MD d/t pt c/o gas pain. Ca we order prn Simethicone for pt? Colace and Dulcolax not helping 
 
9103. Bart Burns per Dr. Janey Hickey, order [place] 36JE QID prn.

## 2019-04-25 ENCOUNTER — APPOINTMENT (OUTPATIENT)
Dept: GENERAL RADIOLOGY | Age: 84
DRG: 189 | End: 2019-04-25
Attending: PHYSICIAN ASSISTANT
Payer: MEDICARE

## 2019-04-25 ENCOUNTER — APPOINTMENT (OUTPATIENT)
Dept: ULTRASOUND IMAGING | Age: 84
DRG: 189 | End: 2019-04-25
Attending: SURGERY
Payer: MEDICARE

## 2019-04-25 ENCOUNTER — APPOINTMENT (OUTPATIENT)
Dept: VASCULAR SURGERY | Age: 84
DRG: 189 | End: 2019-04-25
Attending: PHYSICIAN ASSISTANT
Payer: MEDICARE

## 2019-04-25 LAB
ABDOMINAL PROX AORTA VEL: 82.3 CM/S
ALBUMIN SERPL-MCNC: 2.3 G/DL (ref 3.4–5)
ALBUMIN/GLOB SERPL: 0.6 {RATIO} (ref 0.8–1.7)
ALP SERPL-CCNC: 110 U/L (ref 45–117)
ALT SERPL-CCNC: 21 U/L (ref 13–56)
ANION GAP SERPL CALC-SCNC: 7 MMOL/L (ref 3–18)
AST SERPL-CCNC: 23 U/L (ref 15–37)
BASOPHILS # BLD: 0 K/UL (ref 0–0.1)
BASOPHILS NFR BLD: 0 % (ref 0–2)
BILIRUB DIRECT SERPL-MCNC: 0.2 MG/DL (ref 0–0.2)
BILIRUB SERPL-MCNC: 0.4 MG/DL (ref 0.2–1)
BUN SERPL-MCNC: 30 MG/DL (ref 7–18)
BUN/CREAT SERPL: 33 (ref 12–20)
CALCIUM SERPL-MCNC: 8.8 MG/DL (ref 8.5–10.1)
CELIAC EDV: 49.3 CM/S
CELIAC PSV: 329.8 CM/S
CHLORIDE SERPL-SCNC: 89 MMOL/L (ref 100–108)
CO2 SERPL-SCNC: 35 MMOL/L (ref 21–32)
COMMON HEPATIC EDV: 24.3 CM/S
COMMON HEPATIC PSV: 79.1 CM/S
CREAT SERPL-MCNC: 0.92 MG/DL (ref 0.6–1.3)
DIFFERENTIAL METHOD BLD: ABNORMAL
EOSINOPHIL # BLD: 0.1 K/UL (ref 0–0.4)
EOSINOPHIL NFR BLD: 0 % (ref 0–5)
ERYTHROCYTE [DISTWIDTH] IN BLOOD BY AUTOMATED COUNT: 16.6 % (ref 11.6–14.5)
GLOBULIN SER CALC-MCNC: 3.6 G/DL (ref 2–4)
GLUCOSE BLD STRIP.AUTO-MCNC: 137 MG/DL (ref 70–110)
GLUCOSE BLD STRIP.AUTO-MCNC: 212 MG/DL (ref 70–110)
GLUCOSE BLD STRIP.AUTO-MCNC: 213 MG/DL (ref 70–110)
GLUCOSE SERPL-MCNC: 156 MG/DL (ref 74–99)
HCT VFR BLD AUTO: 35.4 % (ref 35–45)
HGB BLD-MCNC: 11.9 G/DL (ref 12–16)
LIPASE SERPL-CCNC: 50 U/L (ref 73–393)
LYMPHOCYTES # BLD: 0.9 K/UL (ref 0.9–3.6)
LYMPHOCYTES NFR BLD: 5 % (ref 21–52)
MCH RBC QN AUTO: 28.1 PG (ref 24–34)
MCHC RBC AUTO-ENTMCNC: 33.6 G/DL (ref 31–37)
MCV RBC AUTO: 83.7 FL (ref 74–97)
MONOCYTES # BLD: 1 K/UL (ref 0.05–1.2)
MONOCYTES NFR BLD: 6 % (ref 3–10)
NEUTS SEG # BLD: 15.6 K/UL (ref 1.8–8)
NEUTS SEG NFR BLD: 89 % (ref 40–73)
PLATELET # BLD AUTO: 226 K/UL (ref 135–420)
PMV BLD AUTO: 10.8 FL (ref 9.2–11.8)
POTASSIUM SERPL-SCNC: 3.4 MMOL/L (ref 3.5–5.5)
PROT SERPL-MCNC: 5.9 G/DL (ref 6.4–8.2)
PROX AORTIC AP: 2.12 CM
PROX AORTIC TRANS: 2.18 CM
PROX SMA EDV: 42.1 CM/S
PROX SMA PSV: 319.7 CM/S
RBC # BLD AUTO: 4.23 M/UL (ref 4.2–5.3)
SODIUM SERPL-SCNC: 131 MMOL/L (ref 136–145)
WBC # BLD AUTO: 17.6 K/UL (ref 4.6–13.2)

## 2019-04-25 PROCEDURE — 65660000000 HC RM CCU STEPDOWN

## 2019-04-25 PROCEDURE — 80076 HEPATIC FUNCTION PANEL: CPT

## 2019-04-25 PROCEDURE — 94762 N-INVAS EAR/PLS OXIMTRY CONT: CPT

## 2019-04-25 PROCEDURE — 94640 AIRWAY INHALATION TREATMENT: CPT

## 2019-04-25 PROCEDURE — 74011250637 HC RX REV CODE- 250/637: Performed by: INTERNAL MEDICINE

## 2019-04-25 PROCEDURE — 80048 BASIC METABOLIC PNL TOTAL CA: CPT

## 2019-04-25 PROCEDURE — 74011636637 HC RX REV CODE- 636/637: Performed by: INTERNAL MEDICINE

## 2019-04-25 PROCEDURE — 85025 COMPLETE CBC W/AUTO DIFF WBC: CPT

## 2019-04-25 PROCEDURE — 74011000258 HC RX REV CODE- 258: Performed by: INTERNAL MEDICINE

## 2019-04-25 PROCEDURE — 74011250636 HC RX REV CODE- 250/636: Performed by: INTERNAL MEDICINE

## 2019-04-25 PROCEDURE — 3331090001 HH PPS REVENUE CREDIT

## 2019-04-25 PROCEDURE — 74011250636 HC RX REV CODE- 250/636: Performed by: EMERGENCY MEDICINE

## 2019-04-25 PROCEDURE — 3331090002 HH PPS REVENUE DEBIT

## 2019-04-25 PROCEDURE — 93975 VASCULAR STUDY: CPT

## 2019-04-25 PROCEDURE — 74011000250 HC RX REV CODE- 250: Performed by: INTERNAL MEDICINE

## 2019-04-25 PROCEDURE — 74011636637 HC RX REV CODE- 636/637: Performed by: EMERGENCY MEDICINE

## 2019-04-25 PROCEDURE — 74018 RADEX ABDOMEN 1 VIEW: CPT

## 2019-04-25 PROCEDURE — 74011250637 HC RX REV CODE- 250/637: Performed by: SURGERY

## 2019-04-25 PROCEDURE — 74011250637 HC RX REV CODE- 250/637: Performed by: EMERGENCY MEDICINE

## 2019-04-25 PROCEDURE — 82962 GLUCOSE BLOOD TEST: CPT

## 2019-04-25 PROCEDURE — 83690 ASSAY OF LIPASE: CPT

## 2019-04-25 PROCEDURE — 77010033678 HC OXYGEN DAILY

## 2019-04-25 PROCEDURE — 77030038269 HC DRN EXT URIN PURWCK BARD -A

## 2019-04-25 PROCEDURE — 36415 COLL VENOUS BLD VENIPUNCTURE: CPT

## 2019-04-25 RX ORDER — FACIAL-BODY WIPES
10 EACH TOPICAL
Status: COMPLETED | OUTPATIENT
Start: 2019-04-25 | End: 2019-04-25

## 2019-04-25 RX ORDER — FUROSEMIDE 20 MG/1
20 TABLET ORAL DAILY
Status: DISCONTINUED | OUTPATIENT
Start: 2019-04-25 | End: 2019-04-28 | Stop reason: HOSPADM

## 2019-04-25 RX ORDER — AZITHROMYCIN 250 MG/1
500 TABLET, FILM COATED ORAL EVERY 24 HOURS
Status: DISCONTINUED | OUTPATIENT
Start: 2019-04-26 | End: 2019-04-26

## 2019-04-25 RX ORDER — IPRATROPIUM BROMIDE AND ALBUTEROL SULFATE 2.5; .5 MG/3ML; MG/3ML
3 SOLUTION RESPIRATORY (INHALATION)
Status: DISCONTINUED | OUTPATIENT
Start: 2019-04-25 | End: 2019-04-26

## 2019-04-25 RX ORDER — FACIAL-BODY WIPES
10 EACH TOPICAL DAILY PRN
Status: DISCONTINUED | OUTPATIENT
Start: 2019-04-25 | End: 2019-04-28 | Stop reason: HOSPADM

## 2019-04-25 RX ADMIN — DOCUSATE SODIUM 100 MG: 100 CAPSULE, LIQUID FILLED ORAL at 10:22

## 2019-04-25 RX ADMIN — IPRATROPIUM BROMIDE AND ALBUTEROL SULFATE 3 ML: .5; 3 SOLUTION RESPIRATORY (INHALATION) at 04:23

## 2019-04-25 RX ADMIN — IPRATROPIUM BROMIDE AND ALBUTEROL SULFATE 3 ML: .5; 3 SOLUTION RESPIRATORY (INHALATION) at 01:06

## 2019-04-25 RX ADMIN — AZITHROMYCIN 500 MG: 250 TABLET, FILM COATED ORAL at 14:00

## 2019-04-25 RX ADMIN — IPRATROPIUM BROMIDE AND ALBUTEROL SULFATE 3 ML: .5; 3 SOLUTION RESPIRATORY (INHALATION) at 21:51

## 2019-04-25 RX ADMIN — LIDOCAINE HYDROCHLORIDE: 10 INJECTION, SOLUTION EPIDURAL; INFILTRATION; INTRACAUDAL; PERINEURAL at 12:00

## 2019-04-25 RX ADMIN — MIRTAZAPINE 15 MG: 15 TABLET, FILM COATED ORAL at 21:22

## 2019-04-25 RX ADMIN — PREDNISONE 20 MG: 20 TABLET ORAL at 10:22

## 2019-04-25 RX ADMIN — Medication 10 MG: at 12:17

## 2019-04-25 RX ADMIN — INSULIN LISPRO 6 UNITS: 100 INJECTION, SOLUTION INTRAVENOUS; SUBCUTANEOUS at 12:19

## 2019-04-25 RX ADMIN — GABAPENTIN 300 MG: 300 CAPSULE ORAL at 10:22

## 2019-04-25 RX ADMIN — DOCUSATE SODIUM 100 MG: 100 CAPSULE, LIQUID FILLED ORAL at 18:00

## 2019-04-25 RX ADMIN — BUDESONIDE 1000 MCG: 1 SUSPENSION RESPIRATORY (INHALATION) at 21:51

## 2019-04-25 RX ADMIN — FUROSEMIDE 20 MG: 10 INJECTION, SOLUTION INTRAMUSCULAR; INTRAVENOUS at 10:22

## 2019-04-25 RX ADMIN — Medication 10 ML: at 14:00

## 2019-04-25 RX ADMIN — ASPIRIN 81 MG: 81 TABLET, COATED ORAL at 10:22

## 2019-04-25 RX ADMIN — Medication 10 ML: at 21:22

## 2019-04-25 RX ADMIN — PIPERACILLIN SODIUM AND TAZOBACTAM SODIUM 3.38 G: 3; .375 INJECTION, POWDER, LYOPHILIZED, FOR SOLUTION INTRAVENOUS at 17:00

## 2019-04-25 RX ADMIN — Medication 10 ML: at 06:57

## 2019-04-25 RX ADMIN — PAROXETINE HYDROCHLORIDE 30 MG: 20 TABLET, FILM COATED ORAL at 10:22

## 2019-04-25 RX ADMIN — TAMSULOSIN HYDROCHLORIDE 0.4 MG: 0.4 CAPSULE ORAL at 10:22

## 2019-04-25 RX ADMIN — INSULIN LISPRO 6 UNITS: 100 INJECTION, SOLUTION INTRAVENOUS; SUBCUTANEOUS at 21:22

## 2019-04-25 RX ADMIN — FUROSEMIDE 20 MG: 20 TABLET ORAL at 12:16

## 2019-04-25 RX ADMIN — Medication 1 ENEMA: at 13:00

## 2019-04-25 RX ADMIN — GABAPENTIN 300 MG: 300 CAPSULE ORAL at 18:00

## 2019-04-25 RX ADMIN — ATORVASTATIN CALCIUM 40 MG: 40 TABLET, FILM COATED ORAL at 21:22

## 2019-04-25 RX ADMIN — ENOXAPARIN SODIUM 40 MG: 40 INJECTION SUBCUTANEOUS at 10:22

## 2019-04-25 RX ADMIN — FAMOTIDINE 20 MG: 20 TABLET ORAL at 10:22

## 2019-04-25 RX ADMIN — PIPERACILLIN SODIUM AND TAZOBACTAM SODIUM 3.38 G: 3; .375 INJECTION, POWDER, LYOPHILIZED, FOR SOLUTION INTRAVENOUS at 12:16

## 2019-04-25 NOTE — NURSE NAVIGATOR
Patient will be going to [address] [place] [address] once bed available. Bed may be available today - waiting on call back from [facility].  is patient on [address] and will be going to [facility] also. [facility] working on room for patients so they can be together. Wendy Ren. Jenny, MARINN, RN Care Management [phone]

## 2019-04-25 NOTE — PROGRESS NOTES
[employer] [facility] Pulmonary, Critical Care, and Sleep Medicine F/U Patient Consult Name: Randal Stauffer MRN: [id number] : 1933 Hospital: [facility] Date: 2019 This patient has been seen and evaluated at the request of Dr. Linsey Pearson for evaluation of hypoxic respiratory failure. IMPRESSION:  
· Severe COPD per ATS criteria: Improving · Acute on chronic hypoxic respiratory failure and additionally has chronic hypercarbic respiratory failure in a patient with a clear appearing CXR. No evidence of CHF, infiltrates or effusions. · Acute exacerbation of COPD. · Pulmonary hypertension, moderate to severe, WHO group 3, may also have a component of group 2 
· Hx of pelvic fractures. · Chronic pain syndrome. · Deconditioning · Ileus · UTI  
  
RECOMMENDATIONS:  
· Keep patient net negative fluid balance as renal function allows · Continue to taper prednisone · Continue scheduled bronchodilators duo nebs every 4 hours and Pulmicort twice daily. Please discharge the patient on Symbicort 80/4.5 MCG 2 puffs twice daily with spacer and Spiriva HandiHaler 1 puff once daily · Antibiotics-per primary service · Strict aspiration precautions. · Management of ileus per primary service · Assess home Oxygen needs at discharge · Aggressive bronchial hygiene · OT, PT, OOB and ambulate · Healthy weight · Will Follow · DVT prophylaxis Subjective:  
19 Patient seen and examined at bedside. No acute events overnight. Patient reporting increased abdominal pain. Abdominal x-ray shows dilated bowel loops. Patient denies nausea or vomiting. Patient reports no new dyspnea or chest pain Interval HPI per Dr. Raquel Tan: 
Patient is a 80 y.o. female with past medical history significant for hypertension, restless leg syndrome, diabetes, peripheral neuropathy as well as recurrent falls who presented after a fall.  Patient was brought in by EMS. According to patient she attempted to get up from the chair when she fell and hit her head. No LOC, nausea, vomiting headaches etc after the fall. On admission to ED patient was noted to be pyrexial and was admitted and treated for HAP. During the course of admission patient became increasingly hypoxic and her CXR on admission was clear which arose suspicion of PE.  CTA is awaited. Past Medical History:  
Diagnosis Date  Chronic lung disease  Colon polyps  COPD 8-30-02  DDD (degenerative disc disease), lumbar 10/1/2014  Degeneration of lumbar or lumbosacral intervertebral disc  Depression  Diabetes ([address] 7-19-05  Diabetes mellitus ([address]  Diverticulosis   DM neuropathy, painful ([address] 10/1/2014  MOORE (Dyspnea on Exertion) 4-19-02  
 echo: +mild LVH, YC51-26% w/ diastolic dysfxn  Glaucoma  HCAP (healthcare-associated pneumonia) 03/24/2017  Hemorrhoids 6-6-06  Hyperlipidemia 5/17/2011  Hypertension 4-16-02  LBP (low back pain) 4-13-04  Low back pain radiating to both legs 10/1/2014  Lumbago  Lumbar disc herniation 10/1/2014  Lumbar facet arthropathy 10/1/2014  Lumbosacral radiculopathy at L5 10/1/2014  Lumbosacral radiculopathy at S1 10/1/2014  Microscopic hematuria  OA (osteoarthritis)  Overactive bladder 5/17/2011  
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   
 -MRI brain  Urinary tract infection, site not specified Past Surgical History:  
Procedure Laterality Date  HX APPENDECTOMY  HX CHOLECYSTECTOMY    HX HYSTERECTOMY    
 (+)DUB  HX POLYPECTOMY  [facility] COLONOSCOPY FLX DX W/COLLJ SPEC WHEN PFRMD  6-16-06  
 normal, Dr Jame Irving  VA COLONOSCOPY FLX DX W/COLLJ SPEC WHEN PFRMD    
 (+)polyp= tubular adenoma Prior to Admission medications Medication Sig Start Date End Date Taking? Authorizing Provider  
glipiZIDE (GLUCOTROL) 5 mg tablet 1 tablet by mouth daily 4/16/19  Yes Aren Brown MD  
insulin aspart U-100 (NOVOLOG) 100 unit/mL inpn 2 Units by SubCUTAneous route Before breakfast, lunch, and dinner. Per sliding scale- 0-200- 0 units, 201-250- 2 units, 251-300- 4 units, 301-350-6 units, 351-400-8 units, 401-450- 10 units and Call MD.   Yes Provider, Historical  
albuterol-ipratropium (DUO-NEB) 2.5 mg-0.5 mg/3 ml nebu 3 mL by Nebulization route every four (4) hours. 3/15/19  Yes Darcella Curling, MD  
famotidine (PEPCID) 20 mg tablet Take 1 Tab by mouth daily. 3/16/19  Yes Darcella Curling, MD  
furosemide (LASIX) 20 mg tablet Take 1 Tab by mouth every fourty-eight (48) hours. 3/15/19  Yes Darcella Curling, MD  
atorvastatin (LIPITOR) 40 mg tablet Take 1 Tab by mouth nightly. 3/15/19  Yes Darcella Curling, MD  
rOPINIRole (REQUIP) 1 mg tablet TAKE ONE-HALF TO ONE TABLET BY MOUTH ONCE NIGHTLY AS NEEDED FOR  RESTLESS  LEGS  FOR  UP  TO  90  DAYS 3/10/19  Yes Aren Brown MD  
PARoxetine (PAXIL) 30 mg tablet TAKE 1 TABLET BY MOUTH  DAILY 1/29/19  Yes Aren Brown MD  
mirtazapine (REMERON) 15 mg tablet Take 1 Tab by mouth nightly. 11/5/18  Yes Aren Brown MD  
tamsulosin (FLOMAX) 0.4 mg capsule Take 1 Cap by mouth daily. 10/29/18  Yes Aren Brown MD  
gabapentin (NEURONTIN) 300 mg capsule 1 capsule by mouth morning, 1 capsule at midday and 2 capsules at bedtime 10/22/18  Yes Aren Brown MD  
simvastatin (ZOCOR) 20 mg tablet Take 1 Tab by mouth nightly.  10/16/18  Yes Aren Brown MD  
 aspirin delayed-release 81 mg tablet Take 1 Tab by mouth daily. 8/10/18  Yes Anne Marie Marcos MD  
inhalational spacing device (AEROCHAMBER MV) 1 Each by Does Not Apply route as needed. 18  Yes Hilda Benedict MD  
OXYGEN-AIR DELIVERY SYSTEMS 3 L by Nasal route continuous. Yes Provider, Teri  
Nebulizer & Compressor (PORTABLE NEBULIZER SYSTEM) machine 1 Each by Does Not Apply route every six (6) hours as needed. 2/10/18  Yes Darlin Brown PA-C Allergies Allergen Reactions  Levaquin [Levofloxacin] Rash Social History Tobacco Use  Smoking status: Former Smoker Packs/day: 1.00 Years: 57.00 Pack years: 57.00 Types: Cigarettes Last attempt to quit: 2018 Years since quittin.3  Smokeless tobacco: Never Used Substance Use Topics  Alcohol use: No  
  
Family History Problem Relation Age of Onset  Colon Cancer Sister  Heart Disease Brother  Seizures Son  Colon Cancer Maternal Aunt Immunization status: up to date and documented, stated as current, but no records available. Current Facility-Administered Medications Medication Dose Route Frequency  furosemide (LASIX) tablet 20 mg  20 mg Oral DAILY  piperacillin-tazobactam (ZOSYN) 3.375 g in 0.9% sodium chloride (MBP/ADV) 100 mL MBP  3.375 g IntraVENous Q6H  
 albuterol-ipratropium (DUO-NEB) 2.5 MG-0.5 MG/3 ML  3 mL Nebulization TID RT  
 docusate sodium (COLACE) capsule 100 mg  100 mg Oral BID  predniSONE (DELTASONE) tablet 20 mg  20 mg Oral DAILY WITH BREAKFAST  azithromycin (ZITHROMAX) tablet 500 mg  500 mg Oral Q24H  
 budesonide (PULMICORT) 1,000 mcg/2 mL nebulizer susp  1,000 mcg Nebulization BID RT  
 enoxaparin (LOVENOX) injection 40 mg  40 mg SubCUTAneous Q24H  
 insulin lispro (HUMALOG) injection   SubCUTAneous AC&HS  aspirin delayed-release tablet 81 mg  81 mg Oral DAILY  atorvastatin (LIPITOR) tablet 40 mg  40 mg Oral QHS  famotidine (PEPCID) tablet 20 mg  20 mg Oral DAILY  gabapentin (NEURONTIN) capsule 300 mg  300 mg Oral BID  mirtazapine (REMERON) tablet 15 mg  15 mg Oral QHS  PARoxetine (PAXIL) tablet 30 mg  30 mg Oral DAILY  tamsulosin (FLOMAX) capsule 0.4 mg  0.4 mg Oral DAILY  sodium chloride (NS) flush 5-40 mL  5-40 mL IntraVENous Q8H Review of Systems: A comprehensive review of systems was negative except for that written in the HPI. Objective: 
Vital Signs:   
Visit Vitals /63 Pulse 81 Temp 98.5 °F (36.9 °C) Resp 24 Ht 5' 5\" (1.651 m) Wt 67.1 kg (147 lb 14.4 oz) SpO2 91% BMI 24.61 kg/m² O2 Device: Nasal cannula O2 Flow Rate (L/min): 5 l/min Temp (24hrs), Av.8 °F (37.1 °C), Min:98.3 °F (36.8 °C), Max:99.8 °F (37.7 °C) Intake/Output:  
Last shift:       07 - 1900 In: -  
Out: 400 [Urine:400] Last 3 shifts: 1901 -  0700 In: 360 [P.O.:360] Out: 800 [Urine:800] Intake/Output Summary (Last 24 hours) at 2019 1747 Last data filed at 2019 3797 Gross per 24 hour Intake 360 ml Output 800 ml Net -440 ml Physical Exam:  
General:  Alert, cooperative, no distress, appears stated age, laying in bed, wearing nasal cannula Head:  Normocephalic, without obvious abnormality, atraumatic. Eyes:  Conjunctivae/corneas clear. PERRL, EOMs intact. Nose: Nares normal. Septum midline. Mucosa normal. No drainage or sinus tenderness. Throat: Lips, mucosa, and tongue normal.  Poor dentition, no oral lesions. Neck: Supple, symmetrical, trachea midline, no adenopathy, no carotid bruit and no JVD. Back:   Symmetric, no curvature. ROM normal.  
Lungs:    Distant breath sounds bilaterally, clear to auscultation bilaterally, no wheezes/rales/rhonchi Chest wall:  No tenderness or deformity. Heart:  Regular rate and rhythm, S1, S2 normal, no murmur, click, rub or gallop. Abdomen:    More tense today, nonrigid, non-tender. Bowel sounds normal. No masses,  No organomegaly. Extremities: Extremities normal, atraumatic, no cyanosis or edema. Pulses: 2+ and symmetric all extremities. Skin: Skin color, texture, turgor normal. No rashes or lesions Lymph nodes: Cervical, supraclavicular, and axillary nodes normal.  
Neurologic: Grossly nonfocal, strength and coordination grossly intact throughout all 4 extremities. Data review:  
 
Recent Results (from the past 24 hour(s)) GLUCOSE, POC Collection Time: 04/24/19  9:14 PM  
Result Value Ref Range Glucose (POC) 137 (H) 70 - 110 mg/dL CBC WITH AUTOMATED DIFF Collection Time: 04/25/19  6:18 AM  
Result Value Ref Range WBC 17.6 (H) 4.6 - 13.2 K/uL  
 RBC 4.23 4.20 - 5.30 M/uL  
 HGB 11.9 (L) 12.0 - 16.0 g/dL HCT 35.4 35.0 - 45.0 % MCV 83.7 74.0 - 97.0 FL  
 MCH 28.1 24.0 - 34.0 PG  
 MCHC 33.6 31.0 - 37.0 g/dL  
 RDW 16.6 (H) 11.6 - 14.5 % PLATELET 472 492 - 947 K/uL MPV 10.8 9.2 - 11.8 FL  
 NEUTROPHILS 89 (H) 40 - 73 % LYMPHOCYTES 5 (L) 21 - 52 % MONOCYTES 6 3 - 10 % EOSINOPHILS 0 0 - 5 % BASOPHILS 0 0 - 2 %  
 ABS. NEUTROPHILS 15.6 (H) 1.8 - 8.0 K/UL  
 ABS. LYMPHOCYTES 0.9 0.9 - 3.6 K/UL  
 ABS. MONOCYTES 1.0 0.05 - 1.2 K/UL  
 ABS. EOSINOPHILS 0.1 0.0 - 0.4 K/UL  
 ABS. BASOPHILS 0.0 0.0 - 0.1 K/UL  
 DF AUTOMATED METABOLIC PANEL, BASIC Collection Time: 04/25/19  6:18 AM  
Result Value Ref Range Sodium 131 (L) 136 - 145 mmol/L Potassium 3.4 (L) 3.5 - 5.5 mmol/L Chloride 89 (L) 100 - 108 mmol/L  
 CO2 35 (H) 21 - 32 mmol/L Anion gap 7 3.0 - 18 mmol/L Glucose 156 (H) 74 - 99 mg/dL BUN 30 (H) 7.0 - 18 MG/DL Creatinine 0.92 0.6 - 1.3 MG/DL  
 BUN/Creatinine ratio 33 (H) 12 - 20 GFR est AA >60 >60 ml/min/1.73m2 GFR est non-AA 58 (L) >60 ml/min/1.73m2 Calcium 8.8 8.5 - 10.1 MG/DL  
HEPATIC FUNCTION PANEL  Collection Time: 04/25/19  6:18 AM  
 Result Value Ref Range Protein, total 5.9 (L) 6.4 - 8.2 g/dL Albumin 2.3 (L) 3.4 - 5.0 g/dL Globulin 3.6 2.0 - 4.0 g/dL A-G Ratio 0.6 (L) 0.8 - 1.7 Bilirubin, total 0.4 0.2 - 1.0 MG/DL Bilirubin, direct 0.2 0.0 - 0.2 MG/DL Alk. phosphatase 110 45 - 117 U/L  
 AST (SGOT) 23 15 - 37 U/L  
 ALT (SGPT) 21 13 - 56 U/L  
LIPASE Collection Time: 04/25/19  6:18 AM  
Result Value Ref Range Lipase 50 (L) 73 - 393 U/L  
DUPLEX ABD VISC ART ORGANS COMPLETE Collection Time: 04/25/19  8:21 AM  
Result Value Ref Range Prox aortic trans 2.18 cm Prox aortic AP 2.12 cm Celiac .8 cm/s Celiac EDV 49.3 cm/s Common Hepatic PSV 79.1 cm/s Common Hepatic EDV 24.3 cm/s Prox SMA .7 cm/s Prox SMA EDV 42.1 cm/s Abdominal prox aorta delvis 82.3 cm/s GLUCOSE, POC Collection Time: 04/25/19 11:18 AM  
Result Value Ref Range Glucose (POC) 213 (H) 70 - 110 mg/dL GLUCOSE, POC Collection Time: 04/25/19  4:49 PM  
Result Value Ref Range Glucose (POC) 137 (H) 70 - 110 mg/dL Imaging: 
I have personally reviewed the patients radiographs and have reviewed the reports:  No new studies in the interval 
XR Results (most recent): 
Results from Hospital Encounter encounter on 04/18/19 XR ABD (KUB) Narrative EXAM:  Abdomen AP. CLINICAL INDICATION:  Constipation. Dilated bowel. COMPARISON:  04/24/19. TECHNIQUE:  AP view of the abdomen in supine position. FINDINGS:   
 
- Colon remains dilated/distended with air. The ascending colon measures up to 
about 10.3 cm in width. Previously observed small bowel distention is improved. - No definite mass effect or free intraperitoneal air. Impression IMPRESSION:   
 
1. Dilated ascending and transverse colon. Most likely related to ileus. 2.  Improved small bowel dilation. CT Results (most recent): 
Results from Hospital Encounter encounter on 04/18/19 CTA CHEST W OR W WO CONT Narrative EXAM: CT Angiogram of the Chest 
 
CLINICAL INDICATION: Shortness of breath. TECHNIQUE: CT angiogram of the chest performed. MIPS performed All CT scans at this facility are performed using dose optimization technique as 
appropriate to a performed exam, to include automated exposure control, 
adjustment of the mA and/or kV according to patient size (including appropriate 
matching for site specific examination) or use of iterative reconstruction 
technique. IV CONTRAST: 80 cc of Isovue 370 COMPARISON: 3/1/2017 and chest x-ray dated 4/18/2019 FINDINGS: 
Limitations: Exam is limited due to breathing motion which limits evaluation of 
the lung parenchyma and distal pulmonary arteries. Thyroid: Unremarkable. Mediastinum: No evidence of adenopathy or fluid collection. The esophagus is 
significantly thickened especially in the mid esophagus. No adjacent adenopathy 
appreciated. This is new. Heart: The cardiac silhouette is mildly prominent. Extensive coronary artery 
calcifications are present. Pericardium: Unremarkable Aorta: Calcifications are noted in the thoracic aorta. No evidence of aneurysm. There is irregular plaque which was present previously. This is most pronounced 
in the lower descending thoracic aorta. Pulmonary Arteries: No evidence of pulmonary artery embolus within the main and 
secondary pulmonary arteries. The tertiary pulmonary arteries are limited in 
evaluation due to breathing motion. The distal pulmonary arteries appear mildly 
prominent. Trachea and Bronchi: Unremarkable. Pleura: Small left pleural effusion is present. Lungs: Left lower lobe atelectasis and mild atelectasis in the right lower lobe 
is noted. There is interstitial thickening. Axilla/Chest wall: No acute findings. Upper Abdomen: A small focus of hyperenhancement in the dome of the right lobe 
of liver is noted which is unchanged since 2017 Musculoskeletal: Old L1 compression fracture is noted. There is a subtle loss of 
the superior endplate of A19 which is new since 2017 but appears to be old. Impression IMPRESSION: 
1. No evidence of pulmonary embolus or aortic dissection. 2.   Borderline interstitial pulmonary edema. 3.  Small left pleural effusion. 4.   Thickened esophagus which is new. May consider correlation with esophagram 
or EGD. 5.   Mild cardiac enlargement with extensive coronary artery calcifications. 03/10/19 ECHO ADULT COMPLETE 03/11/2019 3/11/2019 Narrative · Procedure performed with the patient in a supine position. Unable to  
obtain on-axis apical images. Technically difficult study due to limited  
mobility and lung interference. Patient intubated and restrained. · Left ventricular low normal global systolic function. Calculated left  
ventricular ejection fraction is 55%. Visually measured ejection fraction. Left ventricular mild concentric hypertrophy. No regional wall motion  
abnormality noted. Inconclusive left ventricular diastolic function. · Mechanically ventilated; cannot use inferior caval vein diameter to  
estimate central venous pressure. · Moderate to severe tricuspid valve regurgitation is present. There is no  
evidence of pulmonary hypertension. · Pulmonary artery pressure likely underestimated due to severity of  
tricuspid regurgitation. · Mild pulmonic valve regurgitation is present. · Right ventricle not well visualized. Right ventricular global systolic  
function is reduced. Signed by: Jermaine Moscoso MD  
 
04/18/19 ECHO ADULT FOLLOW-UP OR LIMITED 04/23/2019 4/23/2019 Narrative · Technically difficult study due to patient's body habitus and  
technically difficult study due to patient's heart rhythm. · Saline contrast was given to evaluate for intracardiac shunt. No shunt  
seen. · Estimated left ventricular ejection fraction is 56 - 60%. Visually  
measured ejection fraction. Left ventricular moderate concentric  
hypertrophy. · Mildly elevated central venous pressure (5-10 mmHg); IVC diameter is  
less than 21 mm and collapses less than 50% with respiration. · Moderate to severe tricuspid valve regurgitation is present. Moderate  
pulmonary hypertension is present. PASP 63mmHg Signed by: MD Liset Akers MD/MPH Pulmonary, Critical Care Medicine [facility]

## 2019-04-25 NOTE — PROGRESS NOTES
[facility] Pulmonary, Critical Care, and Sleep Medicine F/U Patient Consult Name: Donis Morin MRN: [id number] : 1933 Hospital: [facility] Date: 2019 This patient has been seen and evaluated at the request of Dr. Svetlana Reese for evaluation of hypoxic respiratory failure. IMPRESSION:  
· Severe COPD per ATS criteria: Improving · Acute on chronic hypoxic respiratory failure and additionally has chronic hypercarbic respiratory failure in a patient with a clear appearing CXR. No evidence of CHF, infiltrates or effusions. · Acute exacerbation of COPD. · Pulmonary hypertension, moderate to severe, WHO group 3, may also have a component of group 2 
· Hx of pelvic fractures. · Chronic pain syndrome. · Deconditioning ·   
  
RECOMMENDATIONS:  
 
· Keep patient net negative fluid balance as renal function allows · Continue to taper prednisone over the next 3 to 4 days · Continue scheduled bronchodilators duo nebs every 4 hours and Pulmicort twice daily. Please discharge the patient on Symbicort 80/4.5 MCG 2 puffs twice daily with spacer and Spiriva HandiHaler 1 puff once daily · Antibiotics-per primary service, continue azithromycin for a total of 5 days including doses already given · Strict aspiration precautions. · Assess home Oxygen needs at discharge · Aggressive bronchial hygiene · OT, PT, OOB and ambulate · Healthy weight · Will Follow · DVT prophylaxis Subjective:  
19 Patient seen and examined at bedside. No acute events overnight. Patient weaned to room air and doing well. Patient reports that her breathing has improved some. Patient denies any new dyspnea, cough, nausea, vomiting, diarrhea, chest pain. Patient is having some issues with constipation Interval HPI per Dr. Coco Zhang: 
Patient is a 80 y.o. female with past medical history significant for hypertension, restless leg syndrome, diabetes, peripheral neuropathy as well as recurrent falls who presented after a fall. Patient was brought in by EMS. According to patient she attempted to get up from the chair when she fell and hit her head. No LOC, nausea, vomiting headaches etc after the fall. On admission to ED patient was noted to be pyrexial and was admitted and treated for HAP. During the course of admission patient became increasingly hypoxic and her CXR on admission was clear which arose suspicion of PE.  CTA is awaited. Past Medical History:  
Diagnosis Date  Chronic lung disease  Colon polyps  COPD 8-30-02  DDD (degenerative disc disease), lumbar 10/1/2014  Degeneration of lumbar or lumbosacral intervertebral disc  Depression  Diabetes ([address] 7-19-05  Diabetes mellitus ([address]  Diverticulosis   DM neuropathy, painful ([address] 10/1/2014  MOORE (Dyspnea on Exertion) 4-19-02  
 echo: +mild LVH, II44-21% w/ diastolic dysfxn  Glaucoma  HCAP (healthcare-associated pneumonia) 03/24/2017  Hemorrhoids 6-6-06  Hyperlipidemia 5/17/2011  Hypertension 4-16-02  LBP (low back pain) 4-13-04  Low back pain radiating to both legs 10/1/2014  Lumbago  Lumbar disc herniation 10/1/2014  Lumbar facet arthropathy 10/1/2014  Lumbosacral radiculopathy at L5 10/1/2014  Lumbosacral radiculopathy at S1 10/1/2014  Microscopic hematuria  OA (osteoarthritis)  Overactive bladder 5/17/2011  
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   
 -MRI brain  Urinary tract infection, site not specified Past Surgical History:  
Procedure Laterality Date  HX APPENDECTOMY  HX CHOLECYSTECTOMY    HX HYSTERECTOMY    
 (+)DUB  HX POLYPECTOMY  FL COLONOSCOPY FLX DX W/COLLJ SPEC WHEN PFRMD  6-16-06  
 normal, Dr Enrike Dorman  FL COLONOSCOPY FLX DX W/COLLJ SPEC WHEN PFRMD    
 (+)polyp= tubular adenoma Prior to Admission medications Medication Sig Start Date End Date Taking? Authorizing Provider  
glipiZIDE (GLUCOTROL) 5 mg tablet 1 tablet by mouth daily 4/16/19  Yes Mayank Gomez MD  
insulin aspart U-100 (NOVOLOG) 100 unit/mL inpn 2 Units by SubCUTAneous route Before breakfast, lunch, and dinner. Per sliding scale- 0-200- 0 units, 201-250- 2 units, 251-300- 4 units, 301-350-6 units, 351-400-8 units, 401-450- 10 units and Call MD.   Yes Provider, Historical  
albuterol-ipratropium (DUO-NEB) 2.5 mg-0.5 mg/3 ml nebu 3 mL by Nebulization route every four (4) hours. 3/15/19  Yes aSrbjit Muñiz MD  
famotidine (PEPCID) 20 mg tablet Take 1 Tab by mouth daily. 3/16/19  Yes Sarbjit Muñiz MD  
furosemide (LASIX) 20 mg tablet Take 1 Tab by mouth every fourty-eight (48) hours. 3/15/19  Yes Sarbjit Muñiz MD  
atorvastatin (LIPITOR) 40 mg tablet Take 1 Tab by mouth nightly. 3/15/19  Yes Sarbjit Muñiz MD  
rOPINIRole (REQUIP) 1 mg tablet TAKE ONE-HALF TO ONE TABLET BY MOUTH ONCE NIGHTLY AS NEEDED FOR  RESTLESS  LEGS  FOR  UP  TO  90  DAYS 3/10/19  Yes Mayank Gomez MD  
PARoxetine (PAXIL) 30 mg tablet TAKE 1 TABLET BY MOUTH  DAILY 1/29/19  Yes Mayank Gomez MD  
mirtazapine (REMERON) 15 mg tablet Take 1 Tab by mouth nightly. 11/5/18  Yes Mayank Gomez MD  
tamsulosin (FLOMAX) 0.4 mg capsule Take 1 Cap by mouth daily.  10/29/18  Yes Mayank Gomez MD  
gabapentin (NEURONTIN) 300 mg capsule 1 capsule by mouth morning, 1 capsule at midday and 2 capsules at bedtime 10/22/18  Yes Saulo Pérez, Morene Hodgkin, MD  
simvastatin (ZOCOR) 20 mg tablet Take 1 Tab by mouth nightly. 10/16/18  Yes Joe Tom MD  
aspirin delayed-release 81 mg tablet Take 1 Tab by mouth daily. 8/10/18  Yes Alcon Hernández MD  
inhalational spacing device (AEROCHAMBER MV) 1 Each by Does Not Apply route as needed. 18  Yes Indiana Andrade MD  
OXYGEN-AIR DELIVERY SYSTEMS 3 L by Nasal route continuous. Yes Provider, Historical  
Nebulizer & Compressor (PORTABLE NEBULIZER SYSTEM) machine 1 Each by Does Not Apply route every six (6) hours as needed. 2/10/18  Yes Esdras Padilla PA-C Allergies Allergen Reactions  Levaquin [Levofloxacin] Rash Social History Tobacco Use  Smoking status: Former Smoker Packs/day: 1.00 Years: 57.00 Pack years: 57.00 Types: Cigarettes Last attempt to quit: 2018 Years since quittin.3  Smokeless tobacco: Never Used Substance Use Topics  Alcohol use: No  
  
Family History Problem Relation Age of Onset  Colon Cancer Sister  Heart Disease Brother  Seizures Son  Colon Cancer Maternal Aunt Immunization status: up to date and documented, stated as current, but no records available. Current Facility-Administered Medications Medication Dose Route Frequency  docusate sodium (COLACE) capsule 100 mg  100 mg Oral BID  predniSONE (DELTASONE) tablet 20 mg  20 mg Oral DAILY WITH BREAKFAST  azithromycin (ZITHROMAX) tablet 500 mg  500 mg Oral Q24H  
 albuterol-ipratropium (DUO-NEB) 2.5 MG-0.5 MG/3 ML  3 mL Nebulization Q4H RT  
 budesonide (PULMICORT) 1,000 mcg/2 mL nebulizer susp  1,000 mcg Nebulization BID RT  
 enoxaparin (LOVENOX) injection 40 mg  40 mg SubCUTAneous Q24H  
 furosemide (LASIX) injection 20 mg  20 mg IntraVENous DAILY  insulin lispro (HUMALOG) injection   SubCUTAneous AC&HS  aspirin delayed-release tablet 81 mg  81 mg Oral DAILY  atorvastatin (LIPITOR) tablet 40 mg  40 mg Oral QHS  famotidine (PEPCID) tablet 20 mg  20 mg Oral DAILY  gabapentin (NEURONTIN) capsule 300 mg  300 mg Oral BID  mirtazapine (REMERON) tablet 15 mg  15 mg Oral QHS  PARoxetine (PAXIL) tablet 30 mg  30 mg Oral DAILY  tamsulosin (FLOMAX) capsule 0.4 mg  0.4 mg Oral DAILY  sodium chloride (NS) flush 5-40 mL  5-40 mL IntraVENous Q8H Review of Systems: A comprehensive review of systems was negative except for that written in the HPI. Objective: 
Vital Signs:   
Visit Vitals /67 Pulse 90 Temp 98.3 °F (36.8 °C) Resp 24 Ht 5' 5\" (1.651 m) Wt 67.1 kg (147 lb 14.4 oz) SpO2 100% BMI 24.61 kg/m² O2 Device: Nasal cannula O2 Flow Rate (L/min): 3 l/min Temp (24hrs), Av.6 °F (37 °C), Min:97.2 °F (36.2 °C), Max:100.2 °F (37.9 °C) Intake/Output:  
Last shift:      No intake/output data recorded. Last 3 shifts:  07 - 1900 In: 1080 [P.O.:1080] Out: 1900 [[facility]:[id number]] Intake/Output Summary (Last 24 hours) at [address] Last data filed at 2019 1831 Gross per 24 hour Intake 360 ml Output 600 ml Net -240 ml Physical Exam:  
General:  Alert, cooperative, no distress, appears stated age, laying in bed Head:  Normocephalic, without obvious abnormality, atraumatic. Eyes:  Conjunctivae/corneas clear. PERRL, EOMs intact. Nose: Nares normal. Septum midline. Mucosa normal. No drainage or sinus tenderness. Throat: Lips, mucosa, and tongue normal.  Poor dentition, no oral lesions. Neck: Supple, symmetrical, trachea midline, no adenopathy, no carotid bruit and no JVD. Back:   Symmetric, no curvature. ROM normal.  
Lungs:    Distant breath sounds bilaterally, clear to auscultation bilaterally, no wheezes/rales/rhonchi Chest wall:  No tenderness or deformity. Heart:  Regular rate and rhythm, S1, S2 normal, no murmur, click, rub or gallop. Abdomen:    A little tense, soft overall, nonrigid, non-tender. Bowel sounds normal. No masses,  No organomegaly. Extremities: Extremities normal, atraumatic, no cyanosis or edema. Pulses: 2+ and symmetric all extremities. Skin: Skin color, texture, turgor normal. No rashes or lesions Lymph nodes: Cervical, supraclavicular, and axillary nodes normal.  
Neurologic: Grossly nonfocal, strength and coordination grossly intact throughout all 4 extremities. Data review:  
 
Recent Results (from the past 24 hour(s)) CBC WITH AUTOMATED DIFF Collection Time: 04/24/19  5:43 AM  
Result Value Ref Range WBC 15.5 (H) 4.6 - 13.2 K/uL  
 RBC 4.43 4.20 - 5.30 M/uL  
 HGB 12.4 12.0 - 16.0 g/dL HCT 37.3 35.0 - 45.0 % MCV 84.2 74.0 - 97.0 FL  
 MCH 28.0 24.0 - 34.0 PG  
 MCHC 33.2 31.0 - 37.0 g/dL  
 RDW 16.5 (H) 11.6 - 14.5 % PLATELET 920 903 - 383 K/uL MPV 10.7 9.2 - 11.8 FL  
 NEUTROPHILS 91 (H) 40 - 73 % LYMPHOCYTES 6 (L) 21 - 52 % MONOCYTES 3 3 - 10 % EOSINOPHILS 0 0 - 5 % BASOPHILS 0 0 - 2 %  
 ABS. NEUTROPHILS 14.1 (H) 1.8 - 8.0 K/UL  
 ABS. LYMPHOCYTES 1.0 0.9 - 3.6 K/UL  
 ABS. MONOCYTES 0.4 0.05 - 1.2 K/UL  
 ABS. EOSINOPHILS 0.0 0.0 - 0.4 K/UL  
 ABS. BASOPHILS 0.0 0.0 - 0.1 K/UL  
 DF AUTOMATED METABOLIC PANEL, BASIC Collection Time: 04/24/19  5:43 AM  
Result Value Ref Range Sodium 130 (L) 136 - 145 mmol/L Potassium 3.5 3.5 - 5.5 mmol/L Chloride 90 (L) 100 - 108 mmol/L  
 CO2 36 (H) 21 - 32 mmol/L Anion gap 4 3.0 - 18 mmol/L Glucose 110 (H) 74 - 99 mg/dL BUN 25 (H) 7.0 - 18 MG/DL Creatinine 0.71 0.6 - 1.3 MG/DL  
 BUN/Creatinine ratio 35 (H) 12 - 20 GFR est AA >60 >60 ml/min/1.73m2 GFR est non-AA >60 >60 ml/min/1.73m2 Calcium 9.1 8.5 - 10.1 MG/DL MAGNESIUM Collection Time: 04/24/19  5:43 AM  
Result Value Ref Range Magnesium 1.9 1.6 - 2.6 mg/dL GLUCOSE, POC Collection Time: 04/24/19  7:11 AM  
Result Value Ref Range Glucose (POC) 119 (H) 70 - 110 mg/dL GLUCOSE, POC Collection Time: 04/24/19 10:42 AM  
Result Value Ref Range Glucose (POC) 165 (H) 70 - 110 mg/dL GLUCOSE, POC Collection Time: 04/24/19  4:03 PM  
Result Value Ref Range Glucose (POC) 314 (H) 70 - 110 mg/dL GLUCOSE, POC Collection Time: 04/24/19  9:14 PM  
Result Value Ref Range Glucose (POC) 137 (H) 70 - 110 mg/dL Imaging: 
I have personally reviewed the patients radiographs and have reviewed the reports:  No new studies in the interval 
XR Results (most recent): 
Results from Hospital Encounter encounter on 04/18/19 XR ABD (KUB) Narrative INDICATION: Constipation. COMPARISON: 3/19. FINDINGS: A supine radiograph of the abdomen distended loops of large bowel as 
well as prominent loops of scattered small bowel noted throughout. Small bowel 
dilatation up to at least 3.2 cm, may represent ileus or developing obstruction. No definite free air. Osseous structures are stable. Impression IMPRESSION: As above. CT Results (most recent): 
Results from Hospital Encounter encounter on 04/18/19 CTA CHEST W OR W WO CONT Narrative EXAM: CT Angiogram of the Chest 
 
CLINICAL INDICATION: Shortness of breath. TECHNIQUE: CT angiogram of the chest performed. MIPS performed All CT scans at this facility are performed using dose optimization technique as 
appropriate to a performed exam, to include automated exposure control, 
adjustment of the mA and/or kV according to patient size (including appropriate 
matching for site specific examination) or use of iterative reconstruction 
technique. IV CONTRAST: 80 cc of Isovue 370 COMPARISON: 3/1/2017 and chest x-ray dated 4/18/2019 FINDINGS: 
Limitations: Exam is limited due to breathing motion which limits evaluation of 
the lung parenchyma and distal pulmonary arteries. Thyroid: Unremarkable. Mediastinum: No evidence of adenopathy or fluid collection. The esophagus is 
significantly thickened especially in the mid esophagus. No adjacent adenopathy 
appreciated. This is new. Heart: The cardiac silhouette is mildly prominent. Extensive coronary artery 
calcifications are present. Pericardium: Unremarkable Aorta: Calcifications are noted in the thoracic aorta. No evidence of aneurysm. There is irregular plaque which was present previously. This is most pronounced 
in the lower descending thoracic aorta. Pulmonary Arteries: No evidence of pulmonary artery embolus within the main and 
secondary pulmonary arteries. The tertiary pulmonary arteries are limited in 
evaluation due to breathing motion. The distal pulmonary arteries appear mildly 
prominent. Trachea and Bronchi: Unremarkable. Pleura: Small left pleural effusion is present. Lungs: Left lower lobe atelectasis and mild atelectasis in the right lower lobe 
is noted. There is interstitial thickening. Axilla/Chest wall: No acute findings. Upper Abdomen: A small focus of hyperenhancement in the dome of the right lobe 
of liver is noted which is unchanged since 2017 Musculoskeletal: Old L1 compression fracture is noted. There is a subtle loss of 
the superior endplate of H61 which is new since 2017 but appears to be old. Impression IMPRESSION: 
1. No evidence of pulmonary embolus or aortic dissection. 2.   Borderline interstitial pulmonary edema. 3.  Small left pleural effusion. 4.   Thickened esophagus which is new. May consider correlation with esophagram 
or EGD. 5.   Mild cardiac enlargement with extensive coronary artery calcifications. 03/10/19 ECHO ADULT COMPLETE 03/11/2019 3/11/2019 Narrative · Procedure performed with the patient in a supine position. Unable to  
obtain on-axis apical images. Technically difficult study due to limited mobility and lung interference. Patient intubated and restrained. · Left ventricular low normal global systolic function. Calculated left  
ventricular ejection fraction is 55%. Visually measured ejection fraction. Left ventricular mild concentric hypertrophy. No regional wall motion  
abnormality noted. Inconclusive left ventricular diastolic function. · Mechanically ventilated; cannot use inferior caval vein diameter to  
estimate central venous pressure. · Moderate to severe tricuspid valve regurgitation is present. There is no  
evidence of pulmonary hypertension. · Pulmonary artery pressure likely underestimated due to severity of  
tricuspid regurgitation. · Mild pulmonic valve regurgitation is present. · Right ventricle not well visualized. Right ventricular global systolic  
function is reduced. Signed by: Yosi Pacheco MD  
 
04/18/19 ECHO ADULT FOLLOW-UP OR LIMITED 04/23/2019 4/23/2019 Narrative · Technically difficult study due to patient's body habitus and  
technically difficult study due to patient's heart rhythm. · Saline contrast was given to evaluate for intracardiac shunt. No shunt  
seen. · Estimated left ventricular ejection fraction is 56 - 60%. Visually  
measured ejection fraction. Left ventricular moderate concentric  
hypertrophy. · Mildly elevated central venous pressure (5-10 mmHg); IVC diameter is  
less than 21 mm and collapses less than 50% with respiration. · Moderate to severe tricuspid valve regurgitation is present. Moderate  
pulmonary hypertension is present. PASP 63mmHg Signed by: MD Steve Tate MD/[facility] Pulmonary, Critical Care Medicine [facility]

## 2019-04-25 NOTE — DIABETES MGMT
Glycemic Control Plan of Care 
 
T2DM with current A1c of 8.1% (4/15/2019). See separate notes, 4/22/2019, for assessment of home diabetes management and education. Noted Home diabetes med list: reported by patient on 4/22/2019: 
Glipizide 5 mg daily. POC BG range on 4/24/2019: 119-314 mg/dL. Patient on very resistant dose of correctional lispro insulin. Daily lantus insulin 5 units discontinued by provider. Patient received a total of 12 units lispro insulin. POC BG report on 4/25/2019 at time of review: None yet. Patient is currently on prednisone 20 mg daily. Recommendation(s): 
1.) Consider adding basal lantus insulin 5 units daily. Assessment: 
Patient is 80year old with past medical history including type 2 diabetes mellitus with neuroapthy, former smoker,  COPD, carotid artery stenosis, CHI, NSTEMI, CHF, and restless leg syndrome - was admitted on 4/18/2019 with report of fall, back, recurrent falls and back pain. Noted: 
Severe COPD. Acute respiratory failure. Constipation. T2DM with current A1c of 8.1% (4/15/2019). Most recent blood glucose values: 
 
Results for Delmi Hargrove (MRN [id number]) as of 4/25/2019 10:49 Ref. Range 4/24/2019 07:11 4/24/2019 10:42 4/24/2019 16:03 4/24/2019 21:14 GLUCOSE,FAST - POC Latest Ref Range: 70 - 110 mg/dL 119 (H) 165 (H) 314 (H) 137 (H) Current A1C: 8.1% (4/15/2019) which is equivalent to estimated average blood glucose of 186 mg/dL during the past 2-3 months. Current hospital diabetes medications: 
Correctional lispro insulin ACHS. Very resistant dose. Total daily dose insulin requirement previous day: 4/24/2019 Lispro: 12 units Home diabetes medications: Patient reported on 4/22/2018: 
Glipizide 5 mg daily. Diet: Clear liquid; no concentrated sweets. Goals:  Blood glucose will be within target range of  mg/dL by 4/28/2019. Education:  
_x__  Refer to Diabetes Education Record: 4/22/2019 ___  Education not indicated at this time Jonetta Blizzard, RN [facility] Pager: [phone]

## 2019-04-25 NOTE — CONSULTS
Surgery Consult Patient: Jose Rafael Jordan MRN: [id number]  CSN: [id number] YOB: 1933 Age: 80 y.o. Sex: female DOA: 2019 HPI:  
 
Jose Rafael Jordan is a 80 y.o. female who presents with abdominal distension and pain. No current or past post parandial pain. No unexplained weight loss. Currently no n/v or diarrhea/constipation. She doesn't complain of claudication or rest pain. Does have family members that have  of colon cancer but no major vascular surgical history. Past Medical History:  
Diagnosis Date  Chronic lung disease  Colon polyps  COPD 02  DDD (degenerative disc disease), lumbar 10/1/2014  Degeneration of lumbar or lumbosacral intervertebral disc  Depression  Diabetes ([address] 05  Diabetes mellitus ([address]  Diverticulosis   DM neuropathy, painful ([address] 10/1/2014  MOORE (Dyspnea on Exertion) 02  
 echo: +mild LVH, GT13-25% w/ diastolic dysfxn  Glaucoma  HCAP (healthcare-associated pneumonia) 2017  Hemorrhoids 06  Hyperlipidemia 2011  Hypertension 02  LBP (low back pain) 04  Low back pain radiating to both legs 10/1/2014  Lumbago  Lumbar disc herniation 10/1/2014  Lumbar facet arthropathy 10/1/2014  Lumbosacral radiculopathy at L5 10/1/2014  Lumbosacral radiculopathy at S1 10/1/2014  Microscopic hematuria  OA (osteoarthritis)  Overactive bladder 2011  
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   
 -MRI brain  Urinary tract infection, site not specified Past Surgical History:  
Procedure Laterality Date  HX APPENDECTOMY  HX CHOLECYSTECTOMY    HX HYSTERECTOMY    
 (+)DUB  HX POLYPECTOMY  SC COLONOSCOPY FLX DX W/COLLJ SPEC WHEN PFRMD  06  
 normal, Dr Lee Diss  SC COLONOSCOPY FLX DX W/COLLJ SPEC WHEN PFRMD    
 (+)polyp= tubular adenoma Family History Problem Relation Age of Onset  Colon Cancer Sister  Heart Disease Brother  Seizures Son  Colon Cancer Maternal Aunt Social History Socioeconomic History  Marital status:  Spouse name: Not on file  Number of children: Not on file  Years of education: Not on file  Highest education level: Not on file Tobacco Use  Smoking status: Former Smoker Packs/day: 1.00 Years: 57.00 Pack years: 57.00 Types: Cigarettes Last attempt to quit: 2018 Years since quittin.3  Smokeless tobacco: Never Used Substance and Sexual Activity  Alcohol use: No  
 Drug use: No  
 Sexual activity: Not Currently Prior to Admission medications Medication Sig Start Date End Date Taking? Authorizing Provider  
glipiZIDE (GLUCOTROL) 5 mg tablet 1 tablet by mouth daily 19  Yes Travis Aviles MD  
insulin aspart U-100 (NOVOLOG) 100 unit/mL inpn 2 Units by SubCUTAneous route Before breakfast, lunch, and dinner. Per sliding scale- 0-200- 0 units, 201-250- 2 units, 251-300- 4 units, 301-350-6 units, 351-400-8 units, 401-450- 10 units and Call MD.   Yes Provider, Historical  
albuterol-ipratropium (DUO-NEB) 2.5 mg-0.5 mg/3 ml nebu 3 mL by Nebulization route every four (4) hours. 3/15/19  Yes Diego Willis MD  
famotidine (PEPCID) 20 mg tablet Take 1 Tab by mouth daily. 3/16/19  Yes Diego Willis MD  
furosemide (LASIX) 20 mg tablet Take 1 Tab by mouth every fourty-eight (48) hours.  3/15/19  Yes Diego Willis MD  
 atorvastatin (LIPITOR) 40 mg tablet Take 1 Tab by mouth nightly. 3/15/19  Yes Jm Goss MD  
rOPINIRole (REQUIP) 1 mg tablet TAKE ONE-HALF TO ONE TABLET BY MOUTH ONCE NIGHTLY AS NEEDED FOR  RESTLESS  LEGS  FOR  UP  TO  90  DAYS 3/10/19  Yes Macie Malhotra MD  
PARoxetine (PAXIL) 30 mg tablet TAKE 1 TABLET BY MOUTH  DAILY 1/29/19  Yes Macie Malhotra MD  
mirtazapine (REMERON) 15 mg tablet Take 1 Tab by mouth nightly. 11/5/18  Yes Macie Malhotra MD  
tamsulosin (FLOMAX) 0.4 mg capsule Take 1 Cap by mouth daily. 10/29/18  Yes Macie Malhotra MD  
gabapentin (NEURONTIN) 300 mg capsule 1 capsule by mouth morning, 1 capsule at midday and 2 capsules at bedtime 10/22/18  Yes Macie Malhotra MD  
simvastatin (ZOCOR) 20 mg tablet Take 1 Tab by mouth nightly. 10/16/18  Yes Macie Malhotra MD  
aspirin delayed-release 81 mg tablet Take 1 Tab by mouth daily. 8/10/18  Yes Jose F Hernandez MD  
inhalational spacing device (AEROCHAMBER MV) 1 Each by Does Not Apply route as needed. 7/27/18  Yes Daniel Pérez MD  
OXYGEN-AIR DELIVERY SYSTEMS 3 L by Nasal route continuous. Yes Provider, [facility]  
Nebulizer & Compressor (PORTABLE NEBULIZER SYSTEM) machine 1 Each by Does Not Apply route every six (6) hours as needed. 2/10/18  Yes Jimena Crews PA-C Allergies Allergen Reactions  Levaquin [Levofloxacin] Rash Physical Exam:  
  
Visit Vitals /59 Pulse 87 Temp 98.9 °F (37.2 °C) Resp 26 Ht 5' 5\" (1.651 m) Wt 147 lb 14.4 oz (67.1 kg) SpO2 94% BMI 24.61 kg/m² GENERAL: alert, cooperative, no distress, appears stated age, EYE: negative findings: anicteric sclera and EOMI, THROAT & NECK: normal and no erythema or exudates noted. , MMM, LUNG: clear to auscultation bilaterally, HEART: regular rate and rhythm, S1, S2 normal, no murmur, click, rub or gallop, ABDOMEN: normal findings: no masses palpable, bowel sounds normal, nontender, abnormal findings:  obese, distended, EXTREMITIES:  extremities normal, atraumatic, no cyanosis or edema, NEUROLOGIC: negative findings: alert, oriented x3 
cranial nerves II-XII intact ROS: 
Constitutional: negative Eyes: negative Ears, nose, mouth, throat, and face: negative Respiratory: negative Cardiovascular: negative Gastrointestinal: negative Genitourinary:negative Hematologic/lymphatic: negative Musculoskeletal:negative Neurological: negative Behavioral/Psych: negative Endocrine: negative Allergic/Immunologic: negative Unless otherwise mentioned in the HPI. Data Review: CBC:  
Lab Results Component Value Date/Time WBC 17.6 (H) 04/25/2019 06:18 AM  
 RBC 4.23 04/25/2019 06:18 AM  
 HGB 11.9 (L) 04/25/2019 06:18 AM  
 HCT 35.4 04/25/2019 06:18 AM  
 PLATELET 414 99/21/6700 06:18 AM  
  
BMP:  
Lab Results Component Value Date/Time Glucose 156 (H) 04/25/2019 06:18 AM  
 Sodium 131 (L) 04/25/2019 06:18 AM  
 Potassium 3.4 (L) 04/25/2019 06:18 AM  
 Chloride 89 (L) 04/25/2019 06:18 AM  
 CO2 35 (H) 04/25/2019 06:18 AM  
 BUN 30 (H) 04/25/2019 06:18 AM  
 Creatinine 0.92 04/25/2019 06:18 AM  
 Calcium 8.8 04/25/2019 06:18 AM  
 
Coagulation:  
Lab Results Component Value Date/Time Prothrombin time 13.7 03/11/2019 07:00 AM  
 INR 1.1 03/11/2019 07:00 AM  
 aPTT 35.3 03/23/2010 07:18 AM  
 
 
 
Assessment/Plan  
 
79 y/o female with abdominal distension and pain mostly within RLQ. She had an ultrasound showing stenosis of SMA and celiac artery asked by Liane Pitts and Rod to comment. --I reviewed the ultrasound images and unfortunately they are pretty poor given the bowel gas from her distention but do agree with velocity measurements obtained. --Fortunately patient had recent CTA chest and I can actually see the proximal SMA and see the celiac artery to its bifurcation.   These are widely patent with maybe just a mild 30-40% stenosis of the celiac artery at its origin. --More than likely the ultrasound is overestimating disease given the poor acoustic windows secondary to body habitus and abdominal distention. --Currently no symptoms of acute or chronic mesenteric ischemia 
--I feel the ultrasound is incorrect and would side with the CTA showing almost no disease 
--Patient can follow up on an as needed basis 
--Please call with any further questions or concerns. Active Problems: Hypoxia (4/18/2019) HAP (hospital-acquired pneumonia) (4/18/2019) Fall (4/18/2019) Kelli Malave MD 
April 25, 2019

## 2019-04-25 NOTE — PROGRESS NOTES
RESPIRATORY MEDICATION PROTOCOL ASSESSMENT Patient  Estefany Kidney     80 y.o.   female     4/25/2019  3:23 PM 
 
Breath Sounds Pre Procedure:  Breath Sounds Bilateral: Diminished Right Breath Sounds: Diminished Breath Sounds Post Procedure: Breath Sounds Bilateral: Diminished Breathing pattern: Pre procedure  Breathing Pattern: Regular Post procedure  Breathing Pattern: Regular Cough: Pre procedure  Cough: Non-productive Post procedure Cough: Non-productive Heart Rate: Pre procedure Pulse: 84 
         Post procedure Pulse: 79 Resp Rate: Pre procedure  Respirations: 24 
         Post procedure Nebulizer Therapy: Current medications Aerosolized Medications: DuoNeb Problem List:  
Patient Active Problem List  
Diagnosis Code  Hyperlipidemia E78.5  Overactive bladder N32.81  
 Sciatica M54.30  Degeneration of lumbar or lumbosacral intervertebral disc M51.37  
 Encounter for long-term (current) use of other medications Z79.899  Depression F32.9  
 Unspecified vitamin D deficiency E55.9  Glucose intolerance (pre-diabetes) R73.03  
 RLS (restless legs syndrome) G25.81  
 Aortic dissection, abdominal (HCC) I71.02  
 Ataxic gait R26.0  Chronic neck pain M54.2, G89.29  
 Orthostatic hypotension I95.1  Paresthesia R20.2  Repeated falls R29.6  Chronic pain syndrome G89.4  Lumbosacral spondylosis without myelopathy M47.817  Cervical stenosis of spinal canal M48.02  Spinal stenosis in cervical region M48.02  
 Polyneuropathy G62.9  Lumbar stenosis M48.061  
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  
  Spondylolisthesis, grade 1 M43.10  Lumbar facet arthropathy M47.816  Lumbar disc herniation M51.26  
 Lumbosacral radiculopathy at L5 M54.17  
 Lumbosacral radiculopathy at S1 M54.17  
 DM neuropathy, painful ([facility]) E11.40  Acute exacerbation of chronic obstructive pulmonary disease (COPD) ([facility] J44.1  Carotid artery stenosis I65.29  
 Atherosclerosis of native arteries of the extremities with intermittent claudication I70.219  
 MOORE (dyspnea on exertion) R06.09  
 SOB (shortness of breath) R06.02  
 Murmur, cardiac R01.1  Hyperlipidemia LDL goal <100 E78.5  Primary osteoarthritis involving multiple joints M15.0  Prediabetes R73.03  
 Restless leg syndrome G25.81  
 Primary hypertension I10  
 Panlobular emphysema ([facility]) J43.1  Impaired fasting glucose R73.01  
 HCAP (healthcare-associated pneumonia) J18.9  Abnormal CT scan, chest R93.89  
 Hypercholesterolemia E78.00  Hypokalemia E87.6  Dizziness R42  CHI (closed head injury) S09. 90XA  Elevated troponin R74.8  Type 2 diabetes mellitus with diabetic neuropathy, without long-term current use of insulin ([facility]) E11.40  Primary insomnia F51.01  
 Major depression, chronic F32.9  Pneumonia J18.9  
 CAP (community acquired pneumonia) J18.9  
 COPD exacerbation ([facility] J44.1  RBBB (right bundle branch block with left anterior fascicular block) I45.2  Type 2 diabetes with nephropathy ([facility]) E11.21  
 Chronic respiratory failure with hypoxia ([facility]) J96.11  
 Stage 4 very severe COPD by GOLD classification ([facility] J44.9  Congestive heart failure (CHF) ([facility]) I50.9  NSTEMI (non-ST elevated myocardial infarction) ([facility] I21.4  Pelvic ring fracture, closed, initial encounter ([facility] S32.810A  Advanced care planning/counseling discussion Z71.89  
 Debility R53.81  
 Hypoxia R09.02  
 HAP (hospital-acquired pneumonia) J18.9  Fall W19. [facility] Patient alert and cooperative to use MDI: Yes 
 
 Home Respiratory Therapy Regimen/Frequency:  YES Medication Device Frequency SEVERITY INDEX: 
 
ITEM 0 1 2 3 4 Score Respiratory Pattern and or Rate Regular 10-19 Regular 20-24  
24-30   
30-34 Severe SOB or  
Greater than 35 2 Breath Sounds Clear Occasional Wheeze Mild Wheezing Moderate Wheezing  wheezing/Absent breath sounds 1 Shortness of Breath None Dyspnea on Exertion Dyspnea at Rest Moderate Shortness of Breath at Rest Severe Shortness of Breath - Limited Speech 1 Total Score:  4 * Scoring Guidelines 0-4 pts:  PRN-BID  
5-7 pts:  BID, TID, QID 
8-9 pts:  TID, QID, Q6 
10-12 pts:  Q4-Q6 * - Guidelines used with clinical judgement. PRN Treatments can be ordered to supplement scheduled treatments. Regardless of score, frequency should not be less than normal home regimen. Recommended Order/Frequency:  TID, PRN Comments:   
 
 
 
Respiratory Therapist: Jung Salazar, RRT

## 2019-04-25 NOTE — PROGRESS NOTES
Pt SpO2 decreased to 71%. Went to check on pt, she is resting quietly with eyes closed and mouth open. Pt arouses to voice. SpO2 82%. Increased O2 to 5L. Pt SpO2 95%.

## 2019-04-25 NOTE — PROGRESS NOTES
[email or web address] 
[phone] Gastroenterology follow up-Progress note Impression: 1. Ileus most likely based on radiographic findings of dilated colon. Improved small bowel compared to imaging on 4/24/2019. Stool in descending colon. Ileus likely contributed by therapy related to management of respiratory failure. 2. Personal history of constipation 3. 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 TANI not visualized. Common hepatic artery was patent. Duplex abd US (4/25/2019) noted technical difficulty due to bowel gas and peristalsis 4. Severe COPD 5. Acute on chronic hypoxic respiratory failure Plan: 1. Vascular consult to evaluate stenosis of celiac artery and SMA. 2. Consider soap clarissa enema as needed for constipation 3. Monitor for worsening abdominal distension 4. Clear liquid diet 5. Avoid calcium channel blockers & beta blockers in case of low flow state Subjective:  Has abdominal distension (unchanged sensation per patient) and right side abdominal pain (unchanged from yesterday). Passing flatus, no tenesmus. Tolerating liquid diet without nausea/vomiting. No bowel movement. (2) doses of Colace 100 mg and (2) doses of dulcolax 10 mg administered since yesterday. ROS: Denies any fevers, chills, rash. Eyes: normal  
Neck: normal  
Cardiovascular: normal rate and regular rhythm Pulmonary/Chest Wall: breath sounds normal  
Abdominal: distended, bowel sounds present, non-acute, right quadrant tenderness Patient Active Problem List  
Diagnosis Code  Hyperlipidemia E78.5  Overactive bladder N32.81  
 Sciatica M54.30  Degeneration of lumbar or lumbosacral intervertebral disc M51.37  
 Encounter for long-term (current) use of other medications Z79.899  Depression F32.9  
 Unspecified vitamin D deficiency E55.9  Glucose intolerance (pre-diabetes) R73.03  
 RLS (restless legs syndrome) G25.81  
 Aortic dissection, abdominal (HCC) I71.02  
  Ataxic gait R26.0  Chronic neck pain M54.2, G89.29  
 Orthostatic hypotension I95.1  Paresthesia R20.2  Repeated falls R29.6  Chronic pain syndrome G89.4  Lumbosacral spondylosis without myelopathy M47.817  Cervical stenosis of spinal canal M48.02  Spinal stenosis in cervical region M48.02  
 Polyneuropathy G62.9  Lumbar stenosis M48.061  
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  
 Lumbosacral radiculopathy at L5 M54.17  
 Lumbosacral radiculopathy at S1 M54.17  
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  
 Atherosclerosis of native arteries of the extremities with intermittent claudication I70.219  
 MOORE (dyspnea on exertion) R06.09  
 SOB (shortness of breath) R06.02  
 Murmur, cardiac R01.1  Hyperlipidemia LDL goal <100 E78.5  Primary osteoarthritis involving multiple joints M15.0  Prediabetes R73.03  
 Restless leg syndrome G25.81  
 Primary hypertension I10  
 Panlobular emphysema (HCC) J43.1  Impaired fasting glucose R73.01  
 HCAP (healthcare-associated pneumonia) J18.9  Abnormal CT scan, chest R93.89  
 Hypercholesterolemia E78.00  Hypokalemia E87.6  Dizziness R42  CHI (closed head injury) S09. 90XA  Elevated troponin R74.8  Type 2 diabetes mellitus with diabetic neuropathy, without long-term current use of insulin ([facility]) E11.40  Primary insomnia F51.01  
 Major depression, chronic F32.9  Pneumonia J18.9  
 CAP (community acquired pneumonia) J18.9  COPD exacerbation ([facility] J44.1  RBBB (right bundle branch block with left anterior fascicular block) I45.2  Type 2 diabetes with nephropathy ([facility]) E11.21  
 Chronic respiratory failure with hypoxia ([facility]) J96.11  
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  
 Debility R53.81  
 Hypoxia R09.02  
 HAP (hospital-acquired pneumonia) J18.9  Fall W19. Cyndee Fluke Visit Vitals /59 Pulse 97 Temp 99.8 °F (37.7 °C) Resp 11 Ht 5' 5\" (1.651 m) Wt 67.1 kg (147 lb 14.4 oz) SpO2 90% BMI 24.61 kg/m² Intake/Output Summary (Last 24 hours) at 4/25/2019 0800 Last data filed at 4/24/2019 2353 Gross per 24 hour Intake 360 ml Output 400 ml Net -40 ml CBC w/Diff Lab Results Component Value Date/Time WBC 17.6 (H) 04/25/2019 06:18 AM  
 RBC 4.23 04/25/2019 06:18 AM  
 HGB 11.9 (L) 04/25/2019 06:18 AM  
 HCT 35.4 04/25/2019 06:18 AM  
 MCV 83.7 04/25/2019 06:18 AM  
 MCH 28.1 04/25/2019 06:18 AM  
 MCHC 33.6 04/25/2019 06:18 AM  
 RDW 16.6 (H) 04/25/2019 06:18 AM  
  04/25/2019 06:18 AM  
 Lab Results Component Value Date/Time GRANS 89 (H) 04/25/2019 06:18 AM  
 LYMPH 5 (L) 04/25/2019 06:18 AM  
 EOS 0 04/25/2019 06:18 AM  
 BANDS 3 03/12/2019 12:33 AM  
 BASOS 0 04/25/2019 06:18 AM  
  
Basic Metabolic Profile Recent Labs  
  04/25/19 
0618 04/24/19 
0543 * 130*  
K 3.4* 3.5 CL 89* 90* CO2 35* 36* BUN 30* 25* CA 8.8 9.1 MG  --  1.9 Hepatic Function Lab Results Component Value Date/Time ALB 2.9 (L) 04/18/2019 09:40 PM  
 TP 6.6 04/18/2019 09:40 PM  
  (H) 04/18/2019 09:40 PM  
 Lab Results Component Value Date/Time SGOT 37 04/18/2019 09:40 PM  
  
 
 
Coags No results for input(s): PTP, INR, APTT in the last 72 hours. No lab exists for component: INREXT Xr Abd (kub) Result Date: 4/25/2019 IMPRESSION:  1. Dilated ascending and transverse colon. Most likely related to ileus. 2.  Improved small bowel dilation. H[facility]AL [facility]. [email or web address] Phone: [phone] Pager: [phone]

## 2019-04-25 NOTE — PROGRESS NOTES
[facility] Hospitalist Group Progress Note Patient: Irene Austin Age: 80 y.o. : 1933 MR#: [id number] SSN: [id number] Date/Time: 2019 Subjective:  
 
Denies for chest and abdominal pain currently. No nausea or vomiting since yesterday. MOORE +. Cough present. Stats she uses 3 lpm oxygen at home. Assessment/Plan: 1. Acute on chronic hypoxic and hyercapnic respiratory failure 2. Chronic hypoxic resp failure, on home O2  3 liters via NC continuously 3. COPD with acute exacerbation 4. Abdominal distention and celiac/SMA stenosis 5. Chronic diastolic chf, compensated. 6. H/o Pelvic fracture 7. Esophageal wall thickening on CTA 8. Constipation 9. Leucocytosis- may be steroid related 10. Pseudomonas UTI PLAN: 
 
Cont current management Change to PO lasix and replace K Vascular surgery consulted Add Zosyn and monitor WBC 
PT/OT to follow Talked to GI PA -Will make further plan after seeing her today Daughter [id number] Case discussed with:  [x]Patient  []Family  []Nursing  []Case Management DVT Prophylaxis:  []Lovenox  []Hep SQ  []SCDs  []Coumadin   []On Heparin gtt Objective:  
 
/59   Pulse 97   Temp 99.8 °F (37.7 °C)   Resp 11   Ht 5' 5\" (1.651 m)   Wt 67.1 kg (147 lb 14.4 oz)   SpO2 90%   BMI 24.61 kg/m² General:  Awake, alert Cardiovascular:  S1S2+, RRR Pulmonary:  Coarse bs b/l GI:  Soft, BS+, NT, + distension Extremities:  No pedal edema CNS: moves all extremities Labs:   
Recent Results (from the past 24 hour(s)) GLUCOSE, POC Collection Time: 19 10:42 AM  
Result Value Ref Range Glucose (POC) 165 (H) 70 - 110 mg/dL GLUCOSE, POC Collection Time: 19  4:03 PM  
Result Value Ref Range Glucose (POC) 314 (H) 70 - 110 mg/dL GLUCOSE, POC Collection Time: 19  9:14 PM  
Result Value Ref Range Glucose (POC) 137 (H) 70 - 110 mg/dL CBC WITH AUTOMATED DIFF  
 Collection Time: 04/25/19  6:18 AM  
Result Value Ref Range WBC 17.6 (H) 4.6 - 13.2 K/uL  
 RBC 4.23 4.20 - 5.30 M/uL  
 HGB 11.9 (L) 12.0 - 16.0 g/dL HCT 35.4 35.0 - 45.0 % MCV 83.7 74.0 - 97.0 FL  
 MCH 28.1 24.0 - 34.0 PG  
 MCHC 33.6 31.0 - 37.0 g/dL  
 RDW 16.6 (H) 11.6 - 14.5 % PLATELET 097 399 - 872 K/uL MPV 10.8 9.2 - 11.8 FL  
 NEUTROPHILS 89 (H) 40 - 73 % LYMPHOCYTES 5 (L) 21 - 52 % MONOCYTES 6 3 - 10 % EOSINOPHILS 0 0 - 5 % BASOPHILS 0 0 - 2 %  
 ABS. NEUTROPHILS 15.6 (H) 1.8 - 8.0 K/UL  
 ABS. LYMPHOCYTES 0.9 0.9 - 3.6 K/UL  
 ABS. MONOCYTES 1.0 0.05 - 1.2 K/UL  
 ABS. EOSINOPHILS 0.1 0.0 - 0.4 K/UL  
 ABS. BASOPHILS 0.0 0.0 - 0.1 K/UL  
 DF AUTOMATED METABOLIC PANEL, BASIC Collection Time: 04/25/19  6:18 AM  
Result Value Ref Range Sodium 131 (L) 136 - 145 mmol/L Potassium 3.4 (L) 3.5 - 5.5 mmol/L Chloride 89 (L) 100 - 108 mmol/L  
 CO2 35 (H) 21 - 32 mmol/L Anion gap 7 3.0 - 18 mmol/L Glucose 156 (H) 74 - 99 mg/dL BUN 30 (H) 7.0 - 18 MG/DL Creatinine 0.92 0.6 - 1.3 MG/DL  
 BUN/Creatinine ratio 33 (H) 12 - 20 GFR est AA >60 >60 ml/min/1.73m2 GFR est non-AA 58 (L) >60 ml/min/1.73m2 Calcium 8.8 8.5 - 10.1 MG/DL  
DUPLEX ABD VISC ART ORGANS COMPLETE Collection Time: 04/25/19  8:21 AM  
Result Value Ref Range Prox aortic trans 2.18 cm Prox aortic AP 2.12 cm Celiac .8 cm/s Celiac EDV 49.3 cm/s Common Hepatic PSV 79.1 cm/s Common Hepatic EDV 24.3 cm/s Prox SMA .7 cm/s Prox SMA EDV 42.1 cm/s Abdominal prox aorta delvis 82.3 cm/s Signed By: Jonathan Yadav MD   
 April 25, 2019

## 2019-04-25 NOTE — ROUTINE PROCESS
Bedside and Verbal shift change report given to Sana Tejada (oncoming nurse) by Starla Ruiz RN (offgoing nurse). Report included the following information SBAR, Kardex, Intake/Output, MAR and Cardiac Rhythm sinus rhythm with PVCs.

## 2019-04-25 NOTE — PROGRESS NOTES
0369. Yazmin gomez hospitalist d/t pt HR increased to 150s. HR came back gurpreet to 76s. During run of vta, this RN was in pt room and pt is resting quietly with eyes closed. Appears in NAD. 
 
 
0005. ..informed Dr Silvia Velez of pt run of vtach. Will continue to monitor.

## 2019-04-25 NOTE — PROGRESS NOTES
0510. Yazmin Po Yazmin Po Pt SpO2 decreased to 70s, pt on 3L nasal cannula. Increased O2 to 4L, no change. Increased to 5L and pt SpO2 83%. Pt placed on bipap. SpO2 increased to 96%. Will continue to monitor. 8696. .the patient taken off bipap and placed back on 5L nasal cannula. SpO2 94%

## 2019-04-26 ENCOUNTER — APPOINTMENT (OUTPATIENT)
Dept: CT IMAGING | Age: 84
DRG: 189 | End: 2019-04-26
Attending: SURGERY
Payer: MEDICARE

## 2019-04-26 LAB
ANION GAP SERPL CALC-SCNC: 6 MMOL/L (ref 3–18)
BUN SERPL-MCNC: 27 MG/DL (ref 7–18)
BUN/CREAT SERPL: 34 (ref 12–20)
CALCIUM SERPL-MCNC: 8.5 MG/DL (ref 8.5–10.1)
CHLORIDE SERPL-SCNC: 93 MMOL/L (ref 100–108)
CO2 SERPL-SCNC: 37 MMOL/L (ref 21–32)
CREAT SERPL-MCNC: 0.8 MG/DL (ref 0.6–1.3)
GLUCOSE BLD STRIP.AUTO-MCNC: 121 MG/DL (ref 70–110)
GLUCOSE BLD STRIP.AUTO-MCNC: 177 MG/DL (ref 70–110)
GLUCOSE BLD STRIP.AUTO-MCNC: 274 MG/DL (ref 70–110)
GLUCOSE BLD STRIP.AUTO-MCNC: 80 MG/DL (ref 70–110)
GLUCOSE SERPL-MCNC: 129 MG/DL (ref 74–99)
MAGNESIUM SERPL-MCNC: 2.2 MG/DL (ref 1.6–2.6)
POTASSIUM SERPL-SCNC: 4 MMOL/L (ref 3.5–5.5)
SODIUM SERPL-SCNC: 136 MMOL/L (ref 136–145)

## 2019-04-26 PROCEDURE — 74011250636 HC RX REV CODE- 250/636: Performed by: EMERGENCY MEDICINE

## 2019-04-26 PROCEDURE — 65660000000 HC RM CCU STEPDOWN

## 2019-04-26 PROCEDURE — 94640 AIRWAY INHALATION TREATMENT: CPT

## 2019-04-26 PROCEDURE — 83735 ASSAY OF MAGNESIUM: CPT

## 2019-04-26 PROCEDURE — 74011636637 HC RX REV CODE- 636/637: Performed by: EMERGENCY MEDICINE

## 2019-04-26 PROCEDURE — 74011000250 HC RX REV CODE- 250: Performed by: INTERNAL MEDICINE

## 2019-04-26 PROCEDURE — 3331090001 HH PPS REVENUE CREDIT

## 2019-04-26 PROCEDURE — 80048 BASIC METABOLIC PNL TOTAL CA: CPT

## 2019-04-26 PROCEDURE — 97530 THERAPEUTIC ACTIVITIES: CPT

## 2019-04-26 PROCEDURE — 77030038269 HC DRN EXT URIN PURWCK BARD -A

## 2019-04-26 PROCEDURE — 74011636320 HC RX REV CODE- 636/320: Performed by: INTERNAL MEDICINE

## 2019-04-26 PROCEDURE — 74011000258 HC RX REV CODE- 258: Performed by: INTERNAL MEDICINE

## 2019-04-26 PROCEDURE — 97116 GAIT TRAINING THERAPY: CPT

## 2019-04-26 PROCEDURE — 82962 GLUCOSE BLOOD TEST: CPT

## 2019-04-26 PROCEDURE — 74177 CT ABD & PELVIS W/CONTRAST: CPT

## 2019-04-26 PROCEDURE — 97535 SELF CARE MNGMENT TRAINING: CPT

## 2019-04-26 PROCEDURE — 36415 COLL VENOUS BLD VENIPUNCTURE: CPT

## 2019-04-26 PROCEDURE — 74011250636 HC RX REV CODE- 250/636: Performed by: ANESTHESIOLOGY

## 2019-04-26 PROCEDURE — 74011250636 HC RX REV CODE- 250/636: Performed by: INTERNAL MEDICINE

## 2019-04-26 PROCEDURE — 3331090002 HH PPS REVENUE DEBIT

## 2019-04-26 RX ORDER — SODIUM CHLORIDE, SODIUM LACTATE, POTASSIUM CHLORIDE, CALCIUM CHLORIDE 600; 310; 30; 20 MG/100ML; MG/100ML; MG/100ML; MG/100ML
100 INJECTION, SOLUTION INTRAVENOUS CONTINUOUS
Status: DISCONTINUED | OUTPATIENT
Start: 2019-04-26 | End: 2019-04-28

## 2019-04-26 RX ORDER — MORPHINE SULFATE 2 MG/ML
1 INJECTION, SOLUTION INTRAMUSCULAR; INTRAVENOUS
Status: DISCONTINUED | OUTPATIENT
Start: 2019-04-26 | End: 2019-04-26

## 2019-04-26 RX ORDER — MORPHINE SULFATE 2 MG/ML
2 INJECTION, SOLUTION INTRAMUSCULAR; INTRAVENOUS
Status: DISCONTINUED | OUTPATIENT
Start: 2019-04-26 | End: 2019-04-28

## 2019-04-26 RX ORDER — IPRATROPIUM BROMIDE AND ALBUTEROL SULFATE 2.5; .5 MG/3ML; MG/3ML
3 SOLUTION RESPIRATORY (INHALATION)
Status: DISCONTINUED | OUTPATIENT
Start: 2019-04-26 | End: 2019-04-28 | Stop reason: HOSPADM

## 2019-04-26 RX ADMIN — PIPERACILLIN SODIUM AND TAZOBACTAM SODIUM 3.38 G: 3; .375 INJECTION, POWDER, LYOPHILIZED, FOR SOLUTION INTRAVENOUS at 06:37

## 2019-04-26 RX ADMIN — SODIUM CHLORIDE, SODIUM LACTATE, POTASSIUM CHLORIDE, AND CALCIUM CHLORIDE 125 ML/HR: 600; 310; 30; 20 INJECTION, SOLUTION INTRAVENOUS at 19:35

## 2019-04-26 RX ADMIN — IPRATROPIUM BROMIDE AND ALBUTEROL SULFATE 3 ML: .5; 3 SOLUTION RESPIRATORY (INHALATION) at 09:00

## 2019-04-26 RX ADMIN — Medication 10 ML: at 09:38

## 2019-04-26 RX ADMIN — IPRATROPIUM BROMIDE AND ALBUTEROL SULFATE 3 ML: .5; 3 SOLUTION RESPIRATORY (INHALATION) at 14:00

## 2019-04-26 RX ADMIN — MORPHINE SULFATE 2 MG: 2 INJECTION, SOLUTION INTRAMUSCULAR; INTRAVENOUS at 22:26

## 2019-04-26 RX ADMIN — PIPERACILLIN SODIUM AND TAZOBACTAM SODIUM 3.38 G: 3; .375 INJECTION, POWDER, LYOPHILIZED, FOR SOLUTION INTRAVENOUS at 00:22

## 2019-04-26 RX ADMIN — ENOXAPARIN SODIUM 40 MG: 40 INJECTION SUBCUTANEOUS at 09:35

## 2019-04-26 RX ADMIN — IOPAMIDOL 100 ML: 612 INJECTION, SOLUTION INTRAVENOUS at 12:06

## 2019-04-26 RX ADMIN — DIATRIZOATE MEGLUMINE AND DIATRIZOATE SODIUM 30 ML: 600; 100 SOLUTION ORAL; RECTAL at 09:34

## 2019-04-26 RX ADMIN — INSULIN LISPRO 9 UNITS: 100 INJECTION, SOLUTION INTRAVENOUS; SUBCUTANEOUS at 15:53

## 2019-04-26 RX ADMIN — BUDESONIDE 1000 MCG: 1 SUSPENSION RESPIRATORY (INHALATION) at 09:00

## 2019-04-26 RX ADMIN — PIPERACILLIN SODIUM AND TAZOBACTAM SODIUM 3.38 G: 3; .375 INJECTION, POWDER, LYOPHILIZED, FOR SOLUTION INTRAVENOUS at 14:25

## 2019-04-26 RX ADMIN — Medication 10 ML: at 22:29

## 2019-04-26 NOTE — PROGRESS NOTES
Discharge planning: 
 
Patient has been accepted to [address] [place] [address]. The nursing facility informed this writer that they will not have a bed available for patient until Tuesday at the earliest. She is trying to get to [facility] because this is where her  was recently admitted to. Care management will need to continue to closely monitor for discharge planning to ensure a safe discharge from [facility]. Mike Guillen. MSW Care Manager Pager#: [phone]

## 2019-04-26 NOTE — PROGRESS NOTES
Okay to proceed for surgery. Mild to moderate perioperative risk. D/w dr. Paula Preciado. ID consulted.

## 2019-04-26 NOTE — PROGRESS NOTES
Problem: Mobility Impaired (Adult and Pediatric) Goal: *Acute Goals and Plan of Care (Insert Text) Description Physical Therapy Goals Initiated 4/22/2019 and to be accomplished within 7 day(s) 1. Patient will move from supine to sit and sit to supine  in bed with independence. 2.  Patient will transfer from bed to chair and chair to bed with modified independence using the least restrictive device. 3.  Patient will perform sit to stand with modified independence. 4.  Patient will ambulate with supervision/set-up for 50 feet with the least restrictive device. 5.  Patient will ascend/descend 3 stairs with bilateral handrail(s) with supervision/set-up. To prepare pt for home mobility. PLOF:  Pt lives with  in a 1 story home with 3 steps to enter, bilateral railings. Pt reports she was ambulating independently with no RW prior to this hospitalization and caring for her ailing . Outcome: Progressing Towards Goal 
 PHYSICAL THERAPY TREATMENT Patient: Elliot Queen (66 y.o. female) Date: 4/26/2019 Diagnosis: Hypoxia [R09.02] <principal problem not specified> Procedure(s) (LRB): 
SIGMOIDOSCOPY FLEXIBLE (N/A) Day of Surgery Precautions: Aspiration, Fall PLOF: See goals section above ASSESSMENT: 
Pt was cleared by nursing to participate in therapy. Pt was received semi-reclined in bed, agreeable to work with PT . Pt was seen with OT to maximize pt safety and participation. Pt performed supine-sit transfer with min A and displayed good sitting balance on EOB while performing ADL/self-care activities. Pt then performed sit-stand with min A x 2 and ambulated x 8 feet with RW , SBA toward HOB. Pt tolerated this well, performed stand-sit with min A x 2 requiring support for eccentric control and not using safe hand placement in spite of cuing. Pt was given permission to advance diet to clear liquids during session.  Pt was agreeable to get up into chair to eat, so pt performed sit-stand and ambulation x 15 additional feet with RW , SBA to recliner. Pt was instructed prior to sitting on safe hand placement and still sat with unsafe hand placement. She was made comfortable in recliner, needs met, nurse notified, and call bell in reach. Progression toward goals:  
?      Improving appropriately and progressing toward goals ? Improving slowly and progressing toward goals ? Not making progress toward goals and plan of care will be adjusted PLAN: 
Patient continues to benefit from skilled intervention to address the above impairments. Continue treatment per established plan of care. Discharge Recommendations:  Murray Almanza Further Equipment Recommendations for Discharge:  rolling walker SUBJECTIVE:  
Patient stated ? I need to do this, my  needs me.? OBJECTIVE DATA SUMMARY:  
Critical Behavior: 
Neurologic State: Alert Orientation Level: Oriented X4 Cognition: Follows commands, Poor safety awareness Safety/Judgement: Fall prevention Functional Mobility Training: 
Bed Mobility: 
Supine to Sit: Contact guard assistance Transfers: 
Sit to Stand: Minimum assistance;Assist x2 Stand to Sit: Minimum assistance;Assist x2 Bed to Chair: Contact guard assistance Balance: 
Sitting: Intact Sitting - Static: Good (unsupported) Sitting - Dynamic: Good (unsupported) Standing: Impaired; With support Standing - Static: Fair Standing - Dynamic : Fair Ambulation/Gait Training: 
Distance (ft): 18 Feet (ft) Assistive Device: Walker, rolling Ambulation - Level of Assistance: Stand-by assistance Gait Abnormalities: Decreased step clearance Pain: 
Pain level pre-treatment: 4/10 (R low back/hip) Pain level post-treatment: 6/10  (R  low back/hip) Pain Intervention(s): Medication (see MAR); Rest, Ice, Repositioning Response to intervention: Nurse notified, See doc flow Activity Tolerance:  
Fair Please refer to the flowsheet for vital signs taken during this treatment. After treatment:  
? Patient left in no apparent distress sitting up in chair ? Patient left in no apparent distress in bed 
? Call bell left within reach ? Nursing notified ? Caregiver present ? Bed alarm activated ? SCDs applied COMMUNICATION/EDUCATION:  
?         Role of Physical Therapy in the acute care setting. ?         Fall prevention education was provided and the patient/caregiver indicated understanding. ? Patient/family have participated as able in working toward goals and plan of care. ?         Patient/family agree to work toward stated goals and plan of care. ?         Patient understands intent and goals of therapy, but is neutral about his/her participation. ? Patient is unable to participate in stated goals/plan of care: ongoing with therapy staff. ?         Other: 
 
   
Danette Russo, PT Time Calculation: 31 mins

## 2019-04-26 NOTE — PROGRESS NOTES
D/w With Dr. Dc Mancilla. Concern of perforation/abscess? Will hold off on flex sig decompression till official CT report available. Patient considering surgical treatment.  
 
Scottie Barrett MD

## 2019-04-26 NOTE — ROUTINE PROCESS
TRANSFER - IN REPORT: 
 
Verbal report received from [address] (name) on Sandi Pacer  being received from [facility] (unit) for routine progression of care Report consisted of patients Situation, Background, Assessment and  
Recommendations(SBAR). Information from the following report(s) SBAR, Kardex, Intake/Output, MAR, Recent Results and Quality Measures was reviewed with the receiving nurse. Opportunity for questions and clarification was provided. Assessment completed upon patients arrival to unit and care assumed.

## 2019-04-26 NOTE — CONSULTS
[address] [facility] Name:  Sun Agudelo 
MR#:   [id number] :  1933 ACCOUNT #:  [de-identified] DATE OF SERVICE:  2019 REASON FOR CONSULTATION:  Abdominal pain and distension with nausea and vomiting. HISTORY OF PRESENT ILLNESS:  This 49-year-old  female who seems to be alert, awake, and oriented is being seen with the above complaints of progressive abdominal distension with episode of nausea and vomiting yesterday and leukocytosis. The patient has a background history of constipation and takes laxative of her choice, a background history of hysterectomy in the past which appears to be the only thing she could remember, and abdominal x-ray finding showing evidence of gas in the large and small bowel, but no free air noted. The patient has not had any BM for few days she says, no flatus as well. Food makes her stomach get more distended and more pain, even liquid she says. The patient is currently being followed by GI, Pulmonology, and of course the hospitalist.  ID consult was requested by Dr. Ziggy Paulson. ALLERGIES:  TO LEVAQUIN. PHYSICAL EXAMINATION: 
GENERAL:  She is alert, awake, and oriented but complains of pain in the abdomen. She points her pain to be in the right side of the abdomen. HEENT:  Normocephalic. Anicteric sclerae noted. NECK:  Supple. No adenopathy noted. ABDOMEN:  Distended and bloated with tympanism allover with severe pain and tenderness with rebound and guarding on the right side of the abdomen, right upper quadrant and some in the right lower quadrant. This is kind of lower midline from previous hysterectomy. There is no notable scar in the right upper quadrant or upper abdomen to suggest a gallbladder surgery history or appendectomy in the past.  Bowel sounds are rare, almost absent. No adenopathy in the groins noted. IMPRESSION: 
1. Acute abdominal pain, rule out gallbladder disease. 2.  Ileus secondary to number 1 and adhesions from previous surgery. RECOMMENDATIONS:  To keep n.p.o. We will get an ultrasound of the upper abdomen to check on the gallbladder and if the ultrasound is negative, we will need to get a CAT scan of the abdomen with contrast.  LFTs will be ordered as well as lipase for today. Dulcolax suppository can also be done today, and follow this with Fleet's Enema after 45 minutes to an hour if no results. The above plan was discussed with Dr. Sandeep Greenberg and agreed to proceed. Agree with current antibiotics based on the urine culture of pseudomonas and enterococcus. We will follow this case with you. The above plan was discussed with the patient and agreeable as well. Maria Elena Bowling MD 
 
 
[id number] 
D:  04/25/2019 13:00 
T:  04/25/2019 15:58 JOB #:  [id number] CC:  Sandeep Greenberg MD

## 2019-04-26 NOTE — PROGRESS NOTES
NUTRITION Nutrition Screen RECOMMENDATIONS / PLAN:  
 
- Monitor GI symptoms and readiness for diet advancement 
- Continue IV fluids until oral diet started and intake adequate. - Continue RD inpatient monitoring and evaluation. NUTRITION INTERVENTIONS & DIAGNOSIS:  
 
[x] Meals/snacks: modified composition Nutrition Diagnosis:  Inadequate energy intake related to insufficient calorie diet due to pt unable to tolerate solid food due to altered GI function as evidenced by pt on clear liquid diet ASSESSMENT:  
 
Pt off unit at time of visit. Undergoing flex sig with decompression. Was on solid food diet; downgraded to clear liquids due to pt having abdominal distention/pain and c/o constipation; adynamic ileus. Average po intake adequate to meet patients estimated nutritional needs:   [] Yes     [x] No   [] Unable to determine at this time Diet: DIET CLEAR LIQUID Food Allergies:  None known Current Appetite:   [] Good     [] Fair     [] Poor     [x] Other: unknown Appetite/meal intake prior to admission:   [] Good     [] Fair     [] Poor     [x] Other: unknown Feeding Limitations:  [] Swallowing difficulty    [] Chewing difficulty    [] Other: 
Current Meal Intake:  
Patient Vitals for the past 100 hrs: 
 % Diet Eaten 04/25/19 1914 50 % 04/23/19 1749 75 % 04/23/19 1335 35 % 04/23/19 0946 100 % BM:  4/26 - per chart Skin Integrity: no pressure injury or wound noted Edema:   [x] No     [] Yes Pertinent Medications: Reviewed: bowel regimen, pepcid, lasix, SSI, LR at 125 mL/hr, remeron, zofran, steroid Recent Labs  
  04/26/19 
0241 04/25/19 
0618 04/24/19 
0543  131* 130*  
K 4.0 3.4* 3.5 CL 93* 89* 90* CO2 37* 35* 36* * 156* 110* BUN 27* 30* 25* CREA 0.80 0.92 0.71 CA 8.5 8.8 9.1 MG 2.2  --  1.9 ALB  --  2.3*  --   
SGOT  --  23  --   
ALT  --  21  -- Intake/Output Summary (Last 24 hours) at 4/26/2019 0851 Last data filed at 4/25/2019 9215 Gross per 24 hour Intake 240 ml Output  Net 240 ml Anthropometrics: 
Ht Readings from Last 1 Encounters:  
04/23/19 5' 5\" (1.651 m) Last 3 Recorded Weights in this Encounter 04/23/19 1504 04/24/19 0523 04/26/19 0400 Weight: 69.4 kg (153 lb) 67.1 kg (147 lb 14.4 oz) 71 kg (156 lb 9.6 oz) Body mass index is 26.06 kg/m². Weight History:   Noted weight fluctuations PTA per chart hx 
 
Weight Metrics 4/26/2019 4/18/2019 4/15/2019 4/12/2019 3/15/2019 2/25/2019 1/7/2019 Weight 156 lb 9.6 oz - 144 lb 147 lb 156 lb 8.4 oz 150 lb 147 lb 9.6 oz  
BMI - 26.06 kg/m2 23.96 kg/m2 24.46 kg/m2 26.05 kg/m2 24.96 kg/m2 24.56 kg/m2 Admitting Diagnosis: Hypoxia [R09.02] Pertinent PMHx: colon polyps, COPD, depression, DM, diverticulosis, HLD, HTN Education Needs:        [x] None identified  [] Identified - Not appropriate at this time  []  Identified and addressed - refer to education log Learning Limitations:   [x] None identified  [] Identified Cultural, Pentecostal & ethnic food preferences:  [x] None identified    [] Identified and addressed ESTIMATED NUTRITION NEEDS:  
 
Calories: [phone] kcal (25-30 kcal/kg) based on  [x] Actual BW: 71 kg      [] IBW Protein:  gm (1-1.5 gm/kg) based on  [x] Actual BW      [] IBW Fluid: 1 mL/kcal 
  
MONITORING & EVALUATION:  
 
Nutrition Goal(s): 1. Po intake of meals will meet >75% of patient estimated nutritional needs within the next 7 days. Outcome:  [] Met/Ongoing    [] Progressing towards goal    [] Not progressing towards goal    [x] New/Initial goal  
 
Monitoring:   [x] Food and beverage intake   [x] Diet order   [x] Nutrition-focused physical findings   [x] Treatment/therapy   [] Weight   [] Enteral nutrition intake Previous Recommendations (for follow-up assessments only):     []   Implemented       []   Not Implemented (RD to address)      [] No Longer Appropriate     [] No Recommendation Made Discharge Planning:  Pending ability to tolerate po; recommend diabetic,cardiac diet [x] Participated in care planning, discharge planning, & interdisciplinary rounds as appropriate Imani Grady, RD Pager: [phone]

## 2019-04-26 NOTE — PROGRESS NOTES
[email or web address] 
[phone] Gastroenterology follow up-Progress note Impression: 1. Abdominal pain (unchanged from yesterday) 2. Adynamic ileus (noted on abdominal Xray) 
- vascular evaluation ruled out mesenteric ischemia 3. Personal history of constipation 4. Severe COPD 5. Acute on chronic hypoxic respiratory failure 6. Leukocytosis Plan: 1. Evaluation by Dr Davon Pires (surgeon) done 4/25/2019 with plans for NPO, RUQ US. CT Abd if neg US. Await LFTs/lipase 2. Continue established bowel regimen for constipation 3. Monitor for worsening abdominal distension 4. Medical treatment per primary team. 
 
  
Subjective:  Abdominal distension/pain (unchanged from yesterday). Passing flatus. No tenesmus, bowel movement, nausea, vomiting. ROS: Denies any fevers, chills, rash. Eyes: normal  
Neck: normal  
Cardiovascular: normal rate and regular rhythm Pulmonary/Chest Wall: albuterol respiratory treatment in session. Abdominal: Distended/soft, hypoactive bowel sounds, right quadrant tenderness Patient Active Problem List  
Diagnosis Code  Hyperlipidemia E78.5  Overactive bladder N32.81  
 Sciatica M54.30  Degeneration of lumbar or lumbosacral intervertebral disc M51.37  
 Encounter for long-term (current) use of other medications Z79.899  Depression F32.9  
 Unspecified vitamin D deficiency E55.9  Glucose intolerance (pre-diabetes) R73.03  
 RLS (restless legs syndrome) G25.81  
 Aortic dissection, abdominal (HCC) I71.02  
 Ataxic gait R26.0  Chronic neck pain M54.2, G89.29  
 Orthostatic hypotension I95.1  Paresthesia R20.2  Repeated falls R29.6  Chronic pain syndrome G89.4  Lumbosacral spondylosis without myelopathy M47.817  Cervical stenosis of spinal canal M48.02  Spinal stenosis in cervical region M48.02  
 Polyneuropathy G62.9  Lumbar stenosis M48.061  
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  
 Lumbosacral radiculopathy at L5 M54.17  
 Lumbosacral radiculopathy at S1 M54.17  
 DM neuropathy, painful ([facility]) E11.40  Acute exacerbation of chronic obstructive pulmonary disease (COPD) ([facility] J44.1  Carotid artery stenosis I65.29  
 Atherosclerosis of native arteries of the extremities with intermittent claudication I70.219  
 MOORE (dyspnea on exertion) R06.09  
 SOB (shortness of breath) R06.02  
 Murmur, cardiac R01.1  Hyperlipidemia LDL goal <100 E78.5  Primary osteoarthritis involving multiple joints M15.0  Prediabetes R73.03  
 Restless leg syndrome G25.81  
 Primary hypertension I10  
 Panlobular emphysema ([facility]) J43.1  Impaired fasting glucose R73.01  
 HCAP (healthcare-associated pneumonia) J18.9  Abnormal CT scan, chest R93.89  
 Hypercholesterolemia E78.00  Hypokalemia E87.6  Dizziness R42  CHI (closed head injury) S09. 90XA  Elevated troponin R74.8  Type 2 diabetes mellitus with diabetic neuropathy, without long-term current use of insulin ([facility]) E11.40  Primary insomnia F51.01  
 Major depression, chronic F32.9  Pneumonia J18.9  
 CAP (community acquired pneumonia) J18.9  
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  
 Chronic respiratory failure with hypoxia ([facility]) J96.11  
 Stage 4 very severe COPD by GOLD classification ([facility] J44.9  Congestive heart failure (CHF) ([facility]) I50.9  NSTEMI (non-ST elevated myocardial infarction) ([facility] I21.4  Pelvic ring fracture, closed, initial encounter ([facility] S32.810A  Advanced care planning/counseling discussion Z71.89  
 Debility R53.81  
 Hypoxia R09.02  
 HAP (hospital-acquired pneumonia) J18.9  Fall W19. Jimmye Rise Visit Vitals /61 (BP 1 Location: Right arm, BP Patient Position: At rest) Pulse 65 Temp 98.3 °F (36.8 °C) Resp 18 Ht 5' 5\" (1.651 m) Wt 71 kg (156 lb 9.6 oz) SpO2 90% BMI 26.06 kg/m² Intake/Output Summary (Last 24 hours) at 4/26/2019 1144 Last data filed at 4/25/2019 0555 Gross per 24 hour Intake 240 ml Output 400 ml Net -160 ml CBC w/Diff Lab Results Component Value Date/Time WBC 17.6 (H) 04/25/2019 06:18 AM  
 RBC 4.23 04/25/2019 06:18 AM  
 HGB 11.9 (L) 04/25/2019 06:18 AM  
 HCT 35.4 04/25/2019 06:18 AM  
 MCV 83.7 04/25/2019 06:18 AM  
 MCH 28.1 04/25/2019 06:18 AM  
 MCHC 33.6 04/25/2019 06:18 AM  
 RDW 16.6 (H) 04/25/2019 06:18 AM  
  04/25/2019 06:18 AM  
 Lab Results Component Value Date/Time GRANS 89 (H) 04/25/2019 06:18 AM  
 LYMPH 5 (L) 04/25/2019 06:18 AM  
 EOS 0 04/25/2019 06:18 AM  
 BANDS 3 03/12/2019 12:33 AM  
 BASOS 0 04/25/2019 06:18 AM  
  
Basic Metabolic Profile Recent Labs  
  04/26/19 
0241   
K 4.0  
CL 93* CO2 37* BUN 27* CA 8.5 MG 2.2 Hepatic Function Lab Results Component Value Date/Time ALB 2.3 (L) 04/25/2019 06:18 AM  
 TP 5.9 (L) 04/25/2019 06:18 AM  
  04/25/2019 06:18 AM  
 Lab Results Component Value Date/Time SGOT 23 04/25/2019 06:18 AM  
  
 
 
Coags No results for input(s): PTP, INR, APTT in the last 72 hours. No lab exists for component: INREXT, INREXT Xr Abd (kub) Result Date: 4/25/2019 IMPRESSION:  1. Dilated ascending and transverse colon. Most likely related to ileus. 2.  Improved small bowel dilation. HETAL Odonnell [facility]. [email or web address] Phone: [phone] Pager: [phone]

## 2019-04-26 NOTE — PROGRESS NOTES
[facility] Hospitalist Group Progress Note Patient: Donis Morin Age: 80 y.o. : 1933 MR#: [id number] SSN: [id number] Date/Time: 2019 Subjective:  
 
Late entery note. Saw her earlier in her room. Abdominal pain present. No nausea or vomiting. No BM yet. MOORE better. Cough present. No chest pain. Assessment/Plan: 1. Acute on chronic hypoxic and hyercapnic respiratory failure, stable 2. Chronic hypoxic resp failure, on home O2  3 liters via NC continuously 3. COPD with acute exacerbation 4. Abdominal distention and celiac/SMA stenosis 5. Chronic diastolic chf, compensated. 6. H/o Pelvic fracture 7. Esophageal wall thickening on CTA 8. Constipation 9. Leucocytosis 10. Pseudomonas UTI PLAN: 
 
Cont current management CT report pending - surgery/GI to follow D/w pulmonology - d/c steroid Cont antibiotic and monitor Daughter [id number] Case discussed with:  [x]Patient  []Family  []Nursing  []Case Management DVT Prophylaxis:  []Lovenox  []Hep SQ  []SCDs  []Coumadin   []On Heparin gtt Objective:  
 
/67   Pulse 84   Temp 98.5 °F (36.9 °C)   Resp 20   Ht 5' 5\" (1.651 m)   Wt 71 kg (156 lb 9.6 oz)   SpO2 97%   Breastfeeding? No   BMI 26.06 kg/m² General:  Awake, alert Cardiovascular:  S1S2+, RRR Pulmonary:  Diminished in bases, no wheezes GI:  Soft, BS+, Tenderness present, + distension Extremities:  No pedal edema CNS: moves all extremities Labs:   
Recent Results (from the past 24 hour(s)) GLUCOSE, POC Collection Time: 19  4:49 PM  
Result Value Ref Range Glucose (POC) 137 (H) 70 - 110 mg/dL GLUCOSE, POC Collection Time: 19  9:19 PM  
Result Value Ref Range Glucose (POC) 212 (H) 70 - 110 mg/dL METABOLIC PANEL, BASIC Collection Time: 19  2:41 AM  
Result Value Ref Range Sodium 136 136 - 145 mmol/L Potassium 4.0 3.5 - 5.5 mmol/L  Chloride 93 (L) 100 - 108 mmol/L  
 CO2 37 (H) 21 - 32 mmol/L Anion gap 6 3.0 - 18 mmol/L Glucose 129 (H) 74 - 99 mg/dL BUN 27 (H) 7.0 - 18 MG/DL Creatinine 0.80 0.6 - 1.3 MG/DL  
 BUN/Creatinine ratio 34 (H) 12 - 20 GFR est AA >60 >60 ml/min/1.73m2 GFR est non-AA >60 >60 ml/min/1.73m2 Calcium 8.5 8.5 - 10.1 MG/DL MAGNESIUM Collection Time: 04/26/19  2:41 AM  
Result Value Ref Range Magnesium 2.2 1.6 - 2.6 mg/dL GLUCOSE, POC Collection Time: 04/26/19  7:28 AM  
Result Value Ref Range Glucose (POC) 121 (H) 70 - 110 mg/dL GLUCOSE, POC Collection Time: 04/26/19  1:00 PM  
Result Value Ref Range Glucose (POC) 177 (H) 70 - 110 mg/dL GLUCOSE, POC Collection Time: 04/26/19  3:34 PM  
Result Value Ref Range Glucose (POC) 274 (H) 70 - 110 mg/dL Signed By: Jagdish Metzger MD   
 April 26, 2019

## 2019-04-26 NOTE — PROGRESS NOTES
2320: Patient in room 408. Katy Connors RN at bedside. No concerns or questions. TRANSFER - OUT REPORT: 
 
Verbal report given to Katy Connors RN on Lucio Said  being transferred to  for routine progression of care Report consisted of patients Situation, Background, Assessment and  
Recommendations(SBAR). Information from the following report(s) SBAR, Kardex, ED Summary, Procedure Summary, Intake/Output, MAR, Recent Results, Med Rec Status, Cardiac Rhythm sinus rhythm, Alarm Parameters  and Quality Measures was reviewed with the receiving nurse. Lines:  
Peripheral IV 04/18/19 Right Antecubital (Active) Site Assessment Clean, dry, & intact 4/25/2019  4:00 AM  
Phlebitis Assessment 0 4/25/2019  4:00 AM  
Infiltration Assessment 0 4/25/2019  4:00 AM  
Dressing Status Clean, dry, & intact 4/25/2019  4:00 AM  
Dressing Type Transparent 4/25/2019  4:00 AM  
Hub Color/Line Status Pink;Flushed 4/25/2019  4:00 AM  
Action Taken Open ports on tubing capped 4/25/2019  4:00 AM  
Alcohol Cap Used Yes 4/25/2019  4:00 AM  
   
Peripheral IV 04/21/19 Posterior;Right Wrist (Active) Site Assessment Clean, dry, & intact 4/25/2019  4:00 AM  
Phlebitis Assessment 0 4/25/2019  4:00 AM  
Infiltration Assessment 0 4/25/2019  4:00 AM  
Dressing Status Clean, dry, & intact 4/25/2019  4:00 AM  
Dressing Type Transparent 4/25/2019  4:00 AM  
Hub Color/Line Status Blue;Flushed 4/25/2019  4:00 AM  
Action Taken Open ports on tubing capped 4/25/2019  4:00 AM  
Alcohol Cap Used Yes 4/25/2019  4:00 AM  
  
 
Opportunity for questions and clarification was provided. Patient transported with: 
 O2 @ 5 liters Registered Nurse

## 2019-04-26 NOTE — ROUTINE PROCESS
Bedside shift change report given to [address] (oncoming nurse) by Kusum Irwin (offgoing nurse). Report included the following information SBAR, Kardex, Intake/Output, MAR, Recent Results and Quality Measures.

## 2019-04-26 NOTE — PROGRESS NOTES
Problem: Pressure Injury - Risk of 
Goal: *Prevention of pressure injury Description Document Herbie Scale and appropriate interventions in the flowsheet. Outcome: Progressing Towards Goal 
  
Problem: Falls - Risk of 
Goal: *Absence of Falls Description Document Gregory Elsy Fall Risk and appropriate interventions in the flowsheet.  
Outcome: Progressing Towards Goal

## 2019-04-26 NOTE — PROGRESS NOTES
Consult received. Chart reviewed. Perforated cecum - plans for surgery. Pip/tazo is appropriate abx choice in this situation. Prior cultures reveal patient is colonized with gram negatives susceptible to pip/tazo. D/w dr. Desmond Garcia. Full consult to follow. thanks

## 2019-04-26 NOTE — PROGRESS NOTES
Problem: Self Care Deficits Care Plan (Adult) Goal: *Acute Goals and Plan of Care (Insert Text) Description Occupational Therapy Goals Initiated 4/22/2019 within 7 day(s). 1.  Patient will perform lower body dressing with modified independence with SpO2 greater than 88%. 2.  Patient will perform toileting with modified independence. 3.  Patient will perform toilet transfer with modified independence. 4.  Patient will participate in upper extremity therapeutic exercise/activities with supervision/set-up for 8 minutes. 5.  Patient will utilize energy conservation techniques during functional activities with minimal verbal cues. Prior Level of Function: Pt lives with her  in a [facility] with [facility]. She was (I) with basic self-care/ADLs and IADLs, including cooking, cleaning and medication management PTA. Outcome: Progressing Towards Goal 
 OCCUPATIONAL THERAPY TREATMENT Patient: Elliot Queen (6 y.o. female) Date: 4/26/2019 Diagnosis: Hypoxia [R09.02] <principal problem not specified> Precautions: Aspiration, Fall Chart, occupational therapy assessment, plan of care, and goals were reviewed. ASSESSMENT: 
Pt presented in bed and agreeable to therapy. Co-treat with PT to maximize safety and participation. Pt performs bed mobility to sit EOB with CGA. Pt saturated with urine, Pt performs sit to stand with Min A x2 and RW 2/2 decreased dynamic standing. Pt performs bladder hygiene with CGA and RW. Pt requires Min A for stand to sit with rapid decline on EOB. Pt performs grooming tasks and UB dressing with setup, seated EOB. Pt performs functional mobility around the bed to the chair with RW and CGA. Pt requires Min A x2 for stand to sit with rapid decline in chair. Pt educated on transfer safety, OOB, role of OT vs PT and energy conservation. Progression toward goals: 
?          Improving appropriately and progressing toward goals ?          Improving slowly and progressing toward goals ? Not making progress toward goals and plan of care will be adjusted PLAN: 
Patient continues to benefit from skilled intervention to address the above impairments. Continue treatment per established plan of care. Discharge Recommendations:  Murray Almanza Further Equipment Recommendations for Discharge:  bedside commode and shower chair SUBJECTIVE:  
Patient stated I just don't know why I hurt so bad.  OBJECTIVE DATA SUMMARY:  
Cognitive/Behavioral Status: 
Neurologic State: Alert Orientation Level: Oriented X4 Cognition: Follows commands, Poor safety awareness Safety/Judgement: Fall prevention Functional Mobility and Transfers for ADLs: 
Bed Mobility: 
Supine to Sit: Contact guard assistance Transfers: 
Sit to Stand: Minimum assistance;Assist x2 Stand to sit: Minimum assistance; Assist x2 with rapid descend Bed to Chair: Contact guard assistance Balance: 
Sitting: Intact Sitting - Static: Good (unsupported) Sitting - Dynamic: Good (unsupported) Standing: Impaired; With support Standing - Static: Fair Standing - Dynamic : Fair ADL Intervention: 
Grooming Washing Face: Supervision/set-up Washing Hands: Supervision/set-up Upper Body Dressing Assistance Hospital Gown: Supervision/ set-up Pain: 
Pt reports 5/10 pain or discomfort prior to treatment in R hip and lower back. Pt reports 7/10 pain or discomfort post treatment in R hip and lower back. Activity Tolerance:   
Fair Please refer to the flowsheet for vital signs taken during this treatment. After treatment:  
?  Patient left in no apparent distress sitting up in chair ? Patient left in no apparent distress in bed 
? Call bell left within reach ? Nursing notified ? Caregiver present ? Bed alarm activated COMMUNICATION/EDUCATION:  
? Home safety education was provided and the patient/caregiver indicated understanding. ? Patient/family have participated as able in goal setting and plan of care. ? Patient/family agree to work toward stated goals and plan of care. ? Patient understands intent and goals of therapy, but is neutral about his/her participation. ? Patient is unable to participate in goal setting and plan of care. Aris Nash Time Calculation: 23 mins

## 2019-04-27 LAB
BASOPHILS # BLD: 0 K/UL (ref 0–0.1)
BASOPHILS NFR BLD: 0 % (ref 0–2)
DIFFERENTIAL METHOD BLD: ABNORMAL
EOSINOPHIL # BLD: 0.1 K/UL (ref 0–0.4)
EOSINOPHIL NFR BLD: 1 % (ref 0–5)
ERYTHROCYTE [DISTWIDTH] IN BLOOD BY AUTOMATED COUNT: 16.6 % (ref 11.6–14.5)
GLUCOSE BLD STRIP.AUTO-MCNC: 103 MG/DL (ref 70–110)
GLUCOSE BLD STRIP.AUTO-MCNC: 120 MG/DL (ref 70–110)
GLUCOSE BLD STRIP.AUTO-MCNC: 133 MG/DL (ref 70–110)
GLUCOSE BLD STRIP.AUTO-MCNC: 84 MG/DL (ref 70–110)
GLUCOSE BLD STRIP.AUTO-MCNC: 95 MG/DL (ref 70–110)
HCT VFR BLD AUTO: 34 % (ref 35–45)
HGB BLD-MCNC: 11 G/DL (ref 12–16)
LYMPHOCYTES # BLD: 1.1 K/UL (ref 0.9–3.6)
LYMPHOCYTES NFR BLD: 7 % (ref 21–52)
MCH RBC QN AUTO: 27.5 PG (ref 24–34)
MCHC RBC AUTO-ENTMCNC: 32.4 G/DL (ref 31–37)
MCV RBC AUTO: 85 FL (ref 74–97)
MONOCYTES # BLD: 1.7 K/UL (ref 0.05–1.2)
MONOCYTES NFR BLD: 11 % (ref 3–10)
NEUTS SEG # BLD: 11.9 K/UL (ref 1.8–8)
NEUTS SEG NFR BLD: 81 % (ref 40–73)
PLATELET # BLD AUTO: 313 K/UL (ref 135–420)
PMV BLD AUTO: 10.3 FL (ref 9.2–11.8)
RBC # BLD AUTO: 4 M/UL (ref 4.2–5.3)
WBC # BLD AUTO: 14.7 K/UL (ref 4.6–13.2)

## 2019-04-27 PROCEDURE — 82962 GLUCOSE BLOOD TEST: CPT

## 2019-04-27 PROCEDURE — 74011000258 HC RX REV CODE- 258: Performed by: INTERNAL MEDICINE

## 2019-04-27 PROCEDURE — 74011250636 HC RX REV CODE- 250/636: Performed by: EMERGENCY MEDICINE

## 2019-04-27 PROCEDURE — 77010033678 HC OXYGEN DAILY

## 2019-04-27 PROCEDURE — 74011250637 HC RX REV CODE- 250/637: Performed by: EMERGENCY MEDICINE

## 2019-04-27 PROCEDURE — 77030038269 HC DRN EXT URIN PURWCK BARD -A

## 2019-04-27 PROCEDURE — 74011250637 HC RX REV CODE- 250/637: Performed by: SURGERY

## 2019-04-27 PROCEDURE — 74011250637 HC RX REV CODE- 250/637: Performed by: INTERNAL MEDICINE

## 2019-04-27 PROCEDURE — 85025 COMPLETE CBC W/AUTO DIFF WBC: CPT

## 2019-04-27 PROCEDURE — 74011000250 HC RX REV CODE- 250: Performed by: INTERNAL MEDICINE

## 2019-04-27 PROCEDURE — 74011250636 HC RX REV CODE- 250/636: Performed by: INTERNAL MEDICINE

## 2019-04-27 PROCEDURE — 65660000000 HC RM CCU STEPDOWN

## 2019-04-27 PROCEDURE — 3331090001 HH PPS REVENUE CREDIT

## 2019-04-27 PROCEDURE — 94640 AIRWAY INHALATION TREATMENT: CPT

## 2019-04-27 PROCEDURE — 36415 COLL VENOUS BLD VENIPUNCTURE: CPT

## 2019-04-27 PROCEDURE — 74011250636 HC RX REV CODE- 250/636: Performed by: ANESTHESIOLOGY

## 2019-04-27 PROCEDURE — 3331090002 HH PPS REVENUE DEBIT

## 2019-04-27 RX ORDER — INSULIN LISPRO 100 [IU]/ML
INJECTION, SOLUTION INTRAVENOUS; SUBCUTANEOUS EVERY 6 HOURS
Status: DISCONTINUED | OUTPATIENT
Start: 2019-04-27 | End: 2019-04-28 | Stop reason: HOSPADM

## 2019-04-27 RX ORDER — SODIUM CHLORIDE, SODIUM LACTATE, POTASSIUM CHLORIDE, CALCIUM CHLORIDE 600; 310; 30; 20 MG/100ML; MG/100ML; MG/100ML; MG/100ML
25 INJECTION, SOLUTION INTRAVENOUS CONTINUOUS
Status: CANCELLED | OUTPATIENT
Start: 2019-04-27 | End: 2019-04-28

## 2019-04-27 RX ORDER — FAMOTIDINE 20 MG/1
20 TABLET, FILM COATED ORAL ONCE
Status: CANCELLED | OUTPATIENT
Start: 2019-04-27 | End: 2019-04-27

## 2019-04-27 RX ORDER — DEXTROSE 50 % IN WATER (D50W) INTRAVENOUS SYRINGE
25-50 AS NEEDED
Status: CANCELLED | OUTPATIENT
Start: 2019-04-27

## 2019-04-27 RX ORDER — MAGNESIUM SULFATE 100 %
4 CRYSTALS MISCELLANEOUS AS NEEDED
Status: CANCELLED | OUTPATIENT
Start: 2019-04-27

## 2019-04-27 RX ORDER — LORAZEPAM 0.5 MG/1
0.5 TABLET ORAL
Status: DISCONTINUED | OUTPATIENT
Start: 2019-04-27 | End: 2019-04-28

## 2019-04-27 RX ORDER — INSULIN LISPRO 100 [IU]/ML
INJECTION, SOLUTION INTRAVENOUS; SUBCUTANEOUS ONCE
Status: CANCELLED | OUTPATIENT
Start: 2019-04-27 | End: 2019-04-28

## 2019-04-27 RX ADMIN — PIPERACILLIN SODIUM AND TAZOBACTAM SODIUM 3.38 G: 3; .375 INJECTION, POWDER, LYOPHILIZED, FOR SOLUTION INTRAVENOUS at 17:39

## 2019-04-27 RX ADMIN — MORPHINE SULFATE 2 MG: 2 INJECTION, SOLUTION INTRAMUSCULAR; INTRAVENOUS at 03:01

## 2019-04-27 RX ADMIN — GABAPENTIN 300 MG: 300 CAPSULE ORAL at 17:38

## 2019-04-27 RX ADMIN — SODIUM CHLORIDE, SODIUM LACTATE, POTASSIUM CHLORIDE, AND CALCIUM CHLORIDE 125 ML/HR: 600; 310; 30; 20 INJECTION, SOLUTION INTRAVENOUS at 07:16

## 2019-04-27 RX ADMIN — IPRATROPIUM BROMIDE AND ALBUTEROL SULFATE 3 ML: .5; 3 SOLUTION RESPIRATORY (INHALATION) at 08:08

## 2019-04-27 RX ADMIN — Medication 10 ML: at 05:34

## 2019-04-27 RX ADMIN — BUDESONIDE 1000 MCG: 1 SUSPENSION RESPIRATORY (INHALATION) at 08:08

## 2019-04-27 RX ADMIN — DOCUSATE SODIUM 100 MG: 100 CAPSULE, LIQUID FILLED ORAL at 21:58

## 2019-04-27 RX ADMIN — ONDANSETRON 4 MG: 2 INJECTION INTRAMUSCULAR; INTRAVENOUS at 03:38

## 2019-04-27 RX ADMIN — PIPERACILLIN SODIUM AND TAZOBACTAM SODIUM 3.38 G: 3; .375 INJECTION, POWDER, LYOPHILIZED, FOR SOLUTION INTRAVENOUS at 05:25

## 2019-04-27 RX ADMIN — LORAZEPAM 0.5 MG: 0.5 TABLET ORAL at 21:58

## 2019-04-27 RX ADMIN — PIPERACILLIN SODIUM AND TAZOBACTAM SODIUM 3.38 G: 3; .375 INJECTION, POWDER, LYOPHILIZED, FOR SOLUTION INTRAVENOUS at 00:03

## 2019-04-27 RX ADMIN — SODIUM CHLORIDE, SODIUM LACTATE, POTASSIUM CHLORIDE, AND CALCIUM CHLORIDE 100 ML/HR: 600; 310; 30; 20 INJECTION, SOLUTION INTRAVENOUS at 19:26

## 2019-04-27 RX ADMIN — ATORVASTATIN CALCIUM 40 MG: 40 TABLET, FILM COATED ORAL at 21:59

## 2019-04-27 RX ADMIN — MIRTAZAPINE 15 MG: 15 TABLET, FILM COATED ORAL at 21:58

## 2019-04-27 RX ADMIN — MORPHINE SULFATE 2 MG: 2 INJECTION, SOLUTION INTRAMUSCULAR; INTRAVENOUS at 12:31

## 2019-04-27 RX ADMIN — PIPERACILLIN SODIUM AND TAZOBACTAM SODIUM 3.38 G: 3; .375 INJECTION, POWDER, LYOPHILIZED, FOR SOLUTION INTRAVENOUS at 12:32

## 2019-04-27 RX ADMIN — PIPERACILLIN SODIUM AND TAZOBACTAM SODIUM 3.38 G: 3; .375 INJECTION, POWDER, LYOPHILIZED, FOR SOLUTION INTRAVENOUS at 23:31

## 2019-04-27 RX ADMIN — IPRATROPIUM BROMIDE AND ALBUTEROL SULFATE 3 ML: .5; 3 SOLUTION RESPIRATORY (INHALATION) at 12:09

## 2019-04-27 RX ADMIN — Medication 10 ML: at 22:01

## 2019-04-27 RX ADMIN — Medication 10 ML: at 17:39

## 2019-04-27 RX ADMIN — ENOXAPARIN SODIUM 40 MG: 40 INJECTION SUBCUTANEOUS at 10:44

## 2019-04-27 NOTE — PROGRESS NOTES
GEN.  SURG 
 
CTSCAN ABDOMEN/PELVIS  REVIEWED WITH RADIOLOGIST, Patient  Seen and  Examined in pre -op area. Obinna Maurer Spoke with her nurse as well Patient alert, awake, oriented, appears  Anxious but in gen appears to be  Better clinically compared to  [facility] (OUTPATIENT [facility]) Denies  N/V , small flatus, Pain continuous  With distention. Pain  Max to  Right side ( lower, upper  And suprapubic  Area) with  Rebound/ guarding VS  Stable,  No  Fever CTSCAN FINDINGS  OF  CONTROLLED  SIGMOID  DIVERTICULITIS PERFORATION WITH  FLUID/ ABSCESS MAX TO  PELVIS AND SCATTERED SMALL POCKETS IN PELVIS. PORTION OF SMALL BOWEL ADHESED BUT PATENT. NO FREE INTRA PERITONEAL  AIR. PROCEDURE cancelled with the above  Findings, discussed with patient extensively the need for  Surgery (X - LAP, SIGMOID  RESECTION, END  COLOSTOMY, DRAINAGE OF PELVIS, POSS SMALL BOWEL  RESECTION ETC)  ICU  POST  OP,  POSS VENT SEC TO  COPD. Patient  ANXIOUS/ DOES NOT  WANT  ANYTHING  DONE, WANTS TO  DISCUSS WITH  FAMILY. SPOKE  WITH  DAUGHTER( KELLI) UNDERSTANDS, WANTS TO  DISCUSS WITH THE  REST OF THE  FAMILY 
 
REC: Schedule for  SURGERY  Monday( Patient  Stable, no  Fever;,Aallow  Time for family to discuss and give  DECISION. Obinna Maurer Meanwhile  Continue  IV  Antibiotics, and HYDRATION) WILL FOLLOW  CLOSELY

## 2019-04-27 NOTE — PROGRESS NOTES
[facility] Hospitalist Group Progress Note Patient: Charles Moreno Age: 80 y.o. : 1933 MR#: [id number] SSN: [id number] Date/Time: 2019 Subjective:  
 
Continues to have abdominal pain. no chest pain or cough. SOB better. No nausea or vomiting. Wants to eat. Assessment/Plan: 1. Acute on chronic hypoxic and hyercapnic respiratory failure, stable 2. Chronic hypoxic resp failure, on home O2  3 liters via NC continuously 3. COPD with acute exacerbation 4. Perforated sigmoid diverticulitis with multiple small pelvic abscesses 5. Chronic diastolic chf, compensated. 6. H/o Pelvic fracture 7. Esophageal wall thickening on CTA 8. Constipation 9. Leucocytosis 10. Pseudomonas UTI 11. celiac/SMA stenosis PLAN: 
 
Cont current management CT report reviewed Patient is undecided about surgery until talk to her family and sees her  Will readjust her medications Daughter [id number] Case discussed with:  [x]Patient  []Family  []Nursing  []Case Management DVT Prophylaxis:  []Lovenox  []Hep SQ  []SCDs  []Coumadin   []On Heparin gtt Objective:  
 
/52 (BP 1 Location: Right arm, BP Patient Position: At rest)   Pulse 84   Temp 98.7 °F (37.1 °C)   Resp 18   Ht 5' 5\" (1.651 m)   Wt 71.8 kg (158 lb 3.2 oz)   SpO2 97%   Breastfeeding? No   BMI 26.33 kg/m² General:  Awake, alert Cardiovascular:  S1S2+, RRR Pulmonary:  Diminished in bases, no wheezes GI:  Soft, BS+, Tenderness present, + distension Extremities:  No pedal edema CNS: moves all extremities Labs:   
Recent Results (from the past 24 hour(s)) GLUCOSE, POC Collection Time: 19  1:00 PM  
Result Value Ref Range Glucose (POC) 177 (H) 70 - 110 mg/dL GLUCOSE, POC Collection Time: 19  3:34 PM  
Result Value Ref Range Glucose (POC) 274 (H) 70 - 110 mg/dL GLUCOSE, POC Collection Time: 19  9:30 PM  
Result Value Ref Range Glucose (POC) 80 70 - 110 mg/dL GLUCOSE, POC Collection Time: 04/27/19  6:08 AM  
Result Value Ref Range Glucose (POC) 103 70 - 110 mg/dL Signed By: Ese Woodall MD   
 April 27, 2019 97

## 2019-04-27 NOTE — PROGRESS NOTES
Patient is NPO with LR @100 ml/hr -- will continue to monitor the blood sugars every 6 hour per the protocol--

## 2019-04-27 NOTE — ROUTINE PROCESS
Bedside and Verbal shift change report given to [address] (oncoming nurse) by Samuel Carmona RN (offgoing nurse). Report given with SBAR, Kardex, Intake/Output, MAR, Accordion and Recent Results.

## 2019-04-27 NOTE — PROGRESS NOTES
GEN.  SURG Spoke with her nurse, Dr. Noy Muse, seen/ examined  Patient Remains  Alert, awake, oriented, appears anxious/ upset/ discouraged Still not decided about  Surgery. Spoke with daughter  Earlier and all questions  answered-  No final  Decision made. CHANCE OF \" CURE\" WITH  SURGERY BUT UPHILL POST OP CONSIDERING MEDICAL ISSUES( COPD etc and findings on CTSCAN , VERSUS NON  SURGERY. Denies  N/V Abdomen:  Remains  Distended with  Rebound  Guarding pain  to upper abdomen, right side and  suprapubic area Rec: Clear liquids KUB in am 
          Hold  Lovenox  After Sunday am 
           Consider  IR  For pelvic abscess drainage( Location not amenable per review of CT with radiologist earlier)

## 2019-04-27 NOTE — PROGRESS NOTES
[employer] [facility] Pulmonary, Critical Care, and Sleep Medicine F/U Patient Consult Name: Halima Lucio MRN: [id number] : 1933 Hospital: [facility] Date: 2019 This patient has been seen and evaluated at the request of Dr. Wyatt Gao for evaluation of hypoxic respiratory failure. IMPRESSION:  
· Severe COPD per ATS criteria · Acute on chronic hypoxic respiratory failure and additionally has chronic hypercarbic respiratory failure in a patient with a clear appearing CXR. No evidence of CHF, infiltrates or effusions. · Acute exacerbation of COPD -- Resolved · Pulmonary hypertension, moderate to severe, WHO group 3, may also have a component of group 2 · Diverticular perforation with abscess and ileus · Hx of pelvic fractures. · Chronic pain syndrome. · Deconditioning · Ileus · UTI  
  
RECOMMENDATIONS:  
· Discussed with primary service, can stop prednisone as COPD exacerbation is resolved and patient has new bowel perforation with abscess · For preoperative pulmonary risk assessment, pt is a high risk for pulmonary complications in the perioperative and postoperative periods. This is based on ARISCAT (Canet) risk index. Potential pulmonary complications include postoperative hypoxia, atelectasis, infection, exacerbation of COPD, and respiratory failure (both prolonged mechanical ventilation and unplanned reintubation). I have informed the patient of the risks. However due to the nature of the surgery, benefits due to its emergent nature outweigh the potential risks. In order to optimize the patient's outcomes, I would recommend that: 1. Pt should receive scheduled bronchodilators in the perioperative and postoperative period 2. Pt receive noninvasive positive pressure ventilation (CPAP or BiPAP) in the immediate postoperative period 3. Avoid use of neuromuscular blockers if possible 4. Avoid overuse of opioid and sedating medications in the postoperative period 5. Aggressive pulmonary toileting 6. Frequent incentive spirometry 7. Out of bed as soon as possible with early ambulation and involvement of physical therapy 8. DVT prophylaxis as soon as possible per the surgeon · Continue scheduled bronchodilators duo nebs every 4 hours and Pulmicort twice daily. Please discharge the patient on Symbicort 80/4.5 MCG 2 puffs twice daily with spacer and Spiriva HandiHaler 1 puff once daily · Antibiotics-per primary service · Strict aspiration precautions. · Management of bowel perforation and abscess per surgery and primary service · Assess home Oxygen needs at discharge · Aggressive bronchial hygiene · OT, PT, OOB and ambulate · Will Follow · DVT prophylaxis Subjective:  
04/26/19 Patient seen and examined at bedside. No acute events overnight. Patient reporting increased abdominal pain. Patient was supposed to go for endoscopy, but CT scan showed bowel perforation with abscess. Endoscopy was aborted while patient was in the preop area. Patient reports she does not want surgery if she ends up with a colostomy. Otherwise patient denies any dyspnea, nausea, vomiting, chest pain, worsening cough. Interval HPI per Dr. Nadia Calvillo: 
Patient is a 80 y.o. female with past medical history significant for hypertension, restless leg syndrome, diabetes, peripheral neuropathy as well as recurrent falls who presented after a fall. Patient was brought in by EMS. According to patient she attempted to get up from the chair when she fell and hit her head. No LOC, nausea, vomiting headaches etc after the fall. On admission to ED patient was noted to be pyrexial and was admitted and treated for HAP. During the course of admission patient became increasingly hypoxic and her CXR on admission was clear which arose suspicion of PE.  CTA is awaited. Past Medical History:  
Diagnosis Date  Chronic lung disease  Colon polyps  COPD 8-30-02  DDD (degenerative disc disease), lumbar 10/1/2014  Degeneration of lumbar or lumbosacral intervertebral disc  Depression  Diabetes ([facility] 7-19-05  Diabetes mellitus ([facility]  Diverticulosis   DM neuropathy, painful ([facility] 10/1/2014  MOORE (Dyspnea on Exertion) 4-19-02  
 echo: +mild LVH, PQ46-91% w/ diastolic dysfxn  Glaucoma  HCAP (healthcare-associated pneumonia) 03/24/2017  Hemorrhoids 6-6-06  Hyperlipidemia 5/17/2011  Hypertension 4-16-02  LBP (low back pain) 4-13-04  Low back pain radiating to both legs 10/1/2014  Lumbago  Lumbar disc herniation 10/1/2014  Lumbar facet arthropathy 10/1/2014  Lumbosacral radiculopathy at L5 10/1/2014  Lumbosacral radiculopathy at S1 10/1/2014  Microscopic hematuria  OA (osteoarthritis)  Overactive bladder 5/17/2011  
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   
 -MRI brain  Urinary tract infection, site not specified Past Surgical History:  
Procedure Laterality Date  HX APPENDECTOMY  HX CHOLECYSTECTOMY    HX HYSTERECTOMY    
 (+)DUB  HX POLYPECTOMY  UT COLONOSCOPY FLX DX W/COLLJ SPEC WHEN PFRMD  6-16-06  
 normal, Dr Shanta Britt  UT COLONOSCOPY FLX DX W/COLLJ SPEC WHEN PFRMD    
 (+)polyp= tubular adenoma Prior to Admission medications Medication Sig Start Date End Date Taking?  Authorizing Provider  
glipiZIDE (GLUCOTROL) 5 mg tablet 1 tablet by mouth daily 4/16/19  Yes Richy Barnes MD  
 insulin aspart U-100 (NOVOLOG) 100 unit/mL inpn 2 Units by SubCUTAneous route Before breakfast, lunch, and dinner. Per sliding scale- 0-200- 0 units, 201-250- 2 units, 251-300- 4 units, 301-350-6 units, 351-400-8 units, 401-450- 10 units and Call MD.   Yes Provider, Historical  
albuterol-ipratropium (DUO-NEB) 2.5 mg-0.5 mg/3 ml nebu 3 mL by Nebulization route every four (4) hours. 3/15/19  Yes Diego Willis MD  
famotidine (PEPCID) 20 mg tablet Take 1 Tab by mouth daily. 3/16/19  Yes Diego Willis MD  
furosemide (LASIX) 20 mg tablet Take 1 Tab by mouth every fourty-eight (48) hours. 3/15/19  Yes Diego Willis MD  
atorvastatin (LIPITOR) 40 mg tablet Take 1 Tab by mouth nightly. 3/15/19  Yes Diego Willis MD  
rOPINIRole (REQUIP) 1 mg tablet TAKE ONE-HALF TO ONE TABLET BY MOUTH ONCE NIGHTLY AS NEEDED FOR  RESTLESS  LEGS  FOR  UP  TO  90  DAYS 3/10/19  Yes Travis Aviles MD  
PARoxetine (PAXIL) 30 mg tablet TAKE 1 TABLET BY MOUTH  DAILY 1/29/19  Yes Travis Aviles MD  
mirtazapine (REMERON) 15 mg tablet Take 1 Tab by mouth nightly. 11/5/18  Yes Travis Aviles MD  
tamsulosin (FLOMAX) 0.4 mg capsule Take 1 Cap by mouth daily. 10/29/18  Yes Travis Aviles MD  
gabapentin (NEURONTIN) 300 mg capsule 1 capsule by mouth morning, 1 capsule at midday and 2 capsules at bedtime 10/22/18  Yes Travis Aviles MD  
simvastatin (ZOCOR) 20 mg tablet Take 1 Tab by mouth nightly. 10/16/18  Yes Travis Aviles MD  
aspirin delayed-release 81 mg tablet Take 1 Tab by mouth daily. 8/10/18  Yes Padmini Crow MD  
inhalational spacing device (AEROCHAMBER MV) 1 Each by Does Not Apply route as needed. 7/27/18  Yes Trevor Conrad MD  
OXYGEN-AIR DELIVERY SYSTEMS 3 L by Nasal route continuous. Yes Provider, Historical  
Nebulizer & Compressor (PORTABLE NEBULIZER SYSTEM) machine 1 Each by Does Not Apply route every six (6) hours as needed.  2/10/18  Yes Ritesh Helder Abdalla PA-C Allergies Allergen Reactions  Levaquin [Levofloxacin] Rash Social History Tobacco Use  Smoking status: Former Smoker Packs/day: 1.00 Years: 57.00 Pack years: 57.00 Types: Cigarettes Last attempt to quit: 2018 Years since quittin.3  Smokeless tobacco: Never Used Substance Use Topics  Alcohol use: No  
  
Family History Problem Relation Age of Onset  Colon Cancer Sister  Heart Disease Brother  Seizures Son  Colon Cancer Maternal Aunt Immunization status: up to date and documented, stated as current, but no records available. Current Facility-Administered Medications Medication Dose Route Frequency  lactated Ringers infusion  125 mL/hr IntraVENous CONTINUOUS  
 albuterol-ipratropium (DUO-NEB) 2.5 MG-0.5 MG/3 ML  3 mL Nebulization QID RT  
 furosemide (LASIX) tablet 20 mg  20 mg Oral DAILY  piperacillin-tazobactam (ZOSYN) 3.375 g in 0.9% sodium chloride (MBP/ADV) 100 mL MBP  3.375 g IntraVENous Q6H  
 docusate sodium (COLACE) capsule 100 mg  100 mg Oral BID  budesonide (PULMICORT) 1,000 mcg/2 mL nebulizer susp  1,000 mcg Nebulization BID RT  
 enoxaparin (LOVENOX) injection 40 mg  40 mg SubCUTAneous Q24H  
 insulin lispro (HUMALOG) injection   SubCUTAneous AC&HS  aspirin delayed-release tablet 81 mg  81 mg Oral DAILY  atorvastatin (LIPITOR) tablet 40 mg  40 mg Oral QHS  famotidine (PEPCID) tablet 20 mg  20 mg Oral DAILY  gabapentin (NEURONTIN) capsule 300 mg  300 mg Oral BID  mirtazapine (REMERON) tablet 15 mg  15 mg Oral QHS  PARoxetine (PAXIL) tablet 30 mg  30 mg Oral DAILY  tamsulosin (FLOMAX) capsule 0.4 mg  0.4 mg Oral DAILY  sodium chloride (NS) flush 5-40 mL  5-40 mL IntraVENous Q8H Review of Systems: A comprehensive review of systems was negative except for that written in the HPI. Objective: 
Vital Signs:   
Visit Vitals /66 (BP 1 Location: Left arm, BP Patient Position: At rest) Pulse 85 Temp 99.3 °F (37.4 °C) Resp 18 Ht 5' 5\" (1.651 m) Wt 71 kg (156 lb 9.6 oz) SpO2 90% Breastfeeding? No  
BMI 26.06 kg/m² O2 Device: Nasal cannula O2 Flow Rate (L/min): 4 l/min Temp (24hrs), Av.4 °F (36.9 °C), Min:97.4 °F (36.3 °C), Max:99.3 °F (37.4 °C) Intake/Output:  
Last shift:      No intake/output data recorded. Last 3 shifts:  07 -  190 In: 240 [P.O.:240] Out: 400 [Urine:400] No intake or output data in the 24 hours ending 19 7481 Physical Exam:  
General:  Alert, cooperative, no distress, appears stated age, laying in bed, wearing nasal cannula Head:  Normocephalic, without obvious abnormality, atraumatic. Eyes:  Conjunctivae/corneas clear. PERRL, EOMs intact. Nose: Nares normal. Septum midline. Mucosa normal. No drainage or sinus tenderness. Throat: Lips, mucosa, and tongue normal.  Poor dentition, no oral lesions. Neck: Supple, symmetrical, trachea midline, no adenopathy, no carotid bruit and no JVD. Lungs:    Distant breath sounds bilaterally, clear to auscultation bilaterally, no wheezes/rales/rhonchi Heart:  Regular rate and rhythm, S1, S2 normal, no murmur, click, rub or gallop. Abdomen:    Less tense today, nonrigid, non-tender. Bowel sounds normal. No masses,  No organomegaly. Extremities: Extremities normal, atraumatic, no cyanosis or edema. Pulses: 2+ and symmetric all extremities. Skin: Skin color, texture, turgor normal. No rashes or lesions Lymph nodes: Cervical, supraclavicular, and axillary nodes normal.  
Neurologic: Grossly nonfocal, strength and coordination grossly intact throughout all 4 extremities. Data review:  
 
Recent Results (from the past 24 hour(s)) METABOLIC PANEL, BASIC Collection Time: 19  2:41 AM  
Result Value Ref Range Sodium 136 136 - 145 mmol/L  Potassium 4.0 3.5 - 5.5 mmol/L  
 Chloride 93 (L) 100 - 108 mmol/L  
 CO2 37 (H) 21 - 32 mmol/L Anion gap 6 3.0 - 18 mmol/L Glucose 129 (H) 74 - 99 mg/dL BUN 27 (H) 7.0 - 18 MG/DL Creatinine 0.80 0.6 - 1.3 MG/DL  
 BUN/Creatinine ratio 34 (H) 12 - 20 GFR est AA >60 >60 ml/min/1.73m2 GFR est non-AA >60 >60 ml/min/1.73m2 Calcium 8.5 8.5 - 10.1 MG/DL MAGNESIUM Collection Time: 04/26/19  2:41 AM  
Result Value Ref Range Magnesium 2.2 1.6 - 2.6 mg/dL GLUCOSE, POC Collection Time: 04/26/19  7:28 AM  
Result Value Ref Range Glucose (POC) 121 (H) 70 - 110 mg/dL GLUCOSE, POC Collection Time: 04/26/19  1:00 PM  
Result Value Ref Range Glucose (POC) 177 (H) 70 - 110 mg/dL GLUCOSE, POC Collection Time: 04/26/19  3:34 PM  
Result Value Ref Range Glucose (POC) 274 (H) 70 - 110 mg/dL GLUCOSE, POC Collection Time: 04/26/19  9:30 PM  
Result Value Ref Range Glucose (POC) 80 70 - 110 mg/dL Imaging: 
I have personally reviewed the patients radiographs and have reviewed the reports:  
Personal review of CT abdomen shows dilated bowel loops, dilated colon as well as sigmoid colon, and possible collection in the pelvis. No free air seen. XR Results (most recent): 
Results from Hospital Encounter encounter on 04/18/19 XR ABD (KUB) Narrative EXAM:  Abdomen AP. CLINICAL INDICATION:  Constipation. Dilated bowel. COMPARISON:  04/24/19. TECHNIQUE:  AP view of the abdomen in supine position. FINDINGS:   
 
- Colon remains dilated/distended with air. The ascending colon measures up to 
about 10.3 cm in width. Previously observed small bowel distention is improved. - No definite mass effect or free intraperitoneal air. Impression IMPRESSION:   
 
1. Dilated ascending and transverse colon. Most likely related to ileus. 2.  Improved small bowel dilation. CT Results (most recent): 
Results from Hospital Encounter encounter on 04/18/19 CT ABD PELV W CONT Narrative CT of the Abdomen and Pelvis With Contrast 
 
CPT CODE: 87724 CLINICAL HISTORY: Progressive abdominal distention with episode of nausea and 
vomiting yesterday and leukocytosis. Background history of constipation. No 
bowel movement or passing gas for days. Rebound and guarding in the right side 
of the abdomen, right lower quadrant. Acute abdomen. . Preop for possible surgery 
tomorrow. Mliss Reynolds TECHNIQUE: After uneventful administration of oral and nonionic intravenous 
contrast ( 100 cc Isovue 792), 5 mm helical scan was obtained of the abdomen and 
pelvis. Sagittal and coronal reformations then created with the original data 
set. All CT scans at this facility are performed using dose optimization 
techniques as appropriate to a performed exam, to include automated exposure 
control, adjustment of the mA and/or kV according to patient's size (including 
appropriate matching for site specific examinations), or use of iterative 
reconstruction technique. COMPARISON: Chest CT 4/19/2019, 2/7/2017 abdominopelvic CT 
 
FINDINGS:  
 
CT Abdomen and pelvis: 
 
 view: Gaseous distention of bowel loops, mostly the colon. Linear density 
right lung base. Lung bases: Subsegmental atelectasis right lung base. Dependent densities also 
left lung base with small left effusion. Heart and pericardium: Normal. 
 
Liver: Normal. Trace free fluid in the right subhepatic space. No portal venous 
gas. Vascular structures normally enhancing. Gallbladder: Surgically absent. Spleen: Normal. 
 
Pancreas: Normal. 
 
Adrenal glands: Normal. 
 
Kidneys: Enhancing symmetrically with small cortical cysts bilaterally. Retroperitoneum: Complex appearance of the abdominal aorta with incomplete 
linear calcified flap traversing the lumen at the infrarenal abdominal aorta but 
with contrast on both sides. Maximal dimension abdominal aorta is 28 x 26 mm. Stable appearance compared with previous noncontrast scan. Lymph nodes: No adenopathy. Bowel: Densities near the cardia of the stomach probably oral contrast. Stomach 
otherwise unremarkable. Duodenum normal.  
 
Proximal small bowel loops well opacified and measuring up to 3 cm. One loop in 
the abdomen posteriorly near the pelvis shows mild wall and fold thickening (50) 
with adjacent right from mesenteric extraluminal air (45). Distal small bowel 
loops beyond this point are also opacified however and oral contrast has reached 
the terminal ileum. These loops are less dilated, 2.2 cm in diameter. Mesenteric vessels appear to be enhancing. Mild burden arteriosclerosis at the 
SMA. Cecum is midline in the abdomen at the level of the umbilicus on an elongated 
mesentery, moderately distended with gas and stool and measures 7.4 cm. Moderate 
quantity gas and stool also the transverse colon, largest caliber 5.9 cm. Distal 
transverse colon less dilated. Left colon relatively collapsed. There are 
numerous diverticula beginning at the distal left colon and extending through 
the sigmoid colon. There is diffuse wall thickening at the distal sigmoid with 
the greatest degree of surrounding inflammation here. No enhancing mass. There is surrounding inflammation in the sigmoid mesentery. There is a small 
abscess with air-fluid level in the right side pelvis measuring 3.6 x 3.7 cm 
(52) and additional dots of extraluminal air adjacent to the sigmoid on the 
right (52) as well as adjacent retromesenteric air on the right (51) as well as 
possibly a few dots in the right lower quadrant (56, 54). There is additional 
left-sided pelvic collection measuring 1.6 x 4.0 cm (53), and a deep pelvic 
collection posterior sigmoid measuring 1.6 x 2.7 cm (62). GYN: Prior hysterectomy. No adnexal masses. Urinary bladder: Is normal. 
 
General: Body wall edema. MSK: Chronic compression deformities T12 and L1. Healing left obturator ring fracture. Bilateral sacral fractures involving S1 on the right and S1 and S2 on 
the left. Impression IMPRESSION: 
 
Perforated sigmoid diverticulitis with multiple small pelvic abscesses and some 
extraluminal air in the retroareolar mesenteric region, also right lower 
quadrant. One small bowel loop adjacent to the inflammatory process in the pelvis is thick 
walled and the small bowel is relatively dilated proximal to this loop 
compatible with partial small bowel obstruction. Mild colonic distention proximally. Above sent to Dr. Josiane Bell and Dr. Miesha Ventura via aaTag system at 
time of signing dictation. Small left effusion and atelectasis both lung bases. Cortical cysts in both kidneys. Chronic calcified flap in the abdominal aorta. Healing left obturator ring fracture. Bilateral sacral fractures that could be 
traumatic or might be insufficiency fractures. Compression deformities T12 and L1. L1 compression fracture chronic since 2017. T12 compression fracture stable 
since 4/19/2019 but new since 2017, of indeterminate age. 03/10/19 ECHO ADULT COMPLETE 03/11/2019 3/11/2019 Narrative · Procedure performed with the patient in a supine position. Unable to  
obtain on-axis apical images. Technically difficult study due to limited  
mobility and lung interference. Patient intubated and restrained. · Left ventricular low normal global systolic function. Calculated left  
ventricular ejection fraction is 55%. Visually measured ejection fraction. Left ventricular mild concentric hypertrophy. No regional wall motion  
abnormality noted. Inconclusive left ventricular diastolic function. · Mechanically ventilated; cannot use inferior caval vein diameter to  
estimate central venous pressure. · Moderate to severe tricuspid valve regurgitation is present. There is no  
evidence of pulmonary hypertension. · Pulmonary artery pressure likely underestimated due to severity of  
tricuspid regurgitation. · Mild pulmonic valve regurgitation is present. · Right ventricle not well visualized. Right ventricular global systolic  
function is reduced. Signed by: Brianne Nieves MD  
 
04/18/19 ECHO ADULT FOLLOW-UP OR LIMITED 04/23/2019 4/23/2019 Narrative · Technically difficult study due to patient's body habitus and  
technically difficult study due to patient's heart rhythm. · Saline contrast was given to evaluate for intracardiac shunt. No shunt  
seen. · Estimated left ventricular ejection fraction is 56 - 60%. Visually  
measured ejection fraction. Left ventricular moderate concentric  
hypertrophy. · Mildly elevated central venous pressure (5-10 mmHg); IVC diameter is  
less than 21 mm and collapses less than 50% with respiration. · Moderate to severe tricuspid valve regurgitation is present. Moderate  
pulmonary hypertension is present. PASP 63mmHg Signed by: MD Jaden Sanchez MD/MPH Pulmonary, Critical Care Medicine [facility]

## 2019-04-27 NOTE — PROGRESS NOTES
[address], [address] [facility], [address] 
[email or web address] Impression/Plan: 1. Perforated diverticulum w/ intra abdominal abscesses Plan:   Agree w/ need for urgent surgical intervention Chief Complaint:  
pain Subjective:   
Pt somewhat confused. States she does not want an \"operation or bag\". Rising WBC#  Reports passing flatus and BM Eyes: conjunctiva normal, EOM normal  
Neck: ROM normal, supple and trachea normal  
Cardiovascular: heart normal, intact distal pulses, normal rate and regular rhythm Pulmonary/Chest Wall: breath sounds normal and effort normal  
Abdominal: Distended, tympanitic, tender Visit Vitals /52 (BP 1 Location: Right arm, BP Patient Position: At rest) Pulse 84 Temp 98.7 °F (37.1 °C) Resp 18 Ht 5' 5\" (1.651 m) Wt 71.8 kg (158 lb 3.2 oz) SpO2 97% Breastfeeding? No  
BMI 26.33 kg/m² No intake or output data in the 24 hours ending 04/27/19 0948 CBC w/Diff Lab Results Component Value Date/Time WBC 17.6 (H) 04/25/2019 06:18 AM  
 RBC 4.23 04/25/2019 06:18 AM  
 HGB 11.9 (L) 04/25/2019 06:18 AM  
 HCT 35.4 04/25/2019 06:18 AM  
 MCV 83.7 04/25/2019 06:18 AM  
 MCH 28.1 04/25/2019 06:18 AM  
 MCHC 33.6 04/25/2019 06:18 AM  
 RDW 16.6 (H) 04/25/2019 06:18 AM  
  04/25/2019 06:18 AM  
 Lab Results Component Value Date/Time GRANS 89 (H) 04/25/2019 06:18 AM  
 LYMPH 5 (L) 04/25/2019 06:18 AM  
 EOS 0 04/25/2019 06:18 AM  
 BANDS 3 03/12/2019 12:33 AM  
 BASOS 0 04/25/2019 06:18 AM  
  
Basic Metabolic Profile Recent Labs  
  04/26/19 
0241   
K 4.0  
CL 93* CO2 37* BUN 27* CA 8.5 MG 2.2 Hepatic Function Lab Results Component Value Date/Time ALB 2.3 (L) 04/25/2019 06:18 AM  
 TP 5.9 (L) 04/25/2019 06:18 AM  
  04/25/2019 06:18 AM  
 Lab Results Component Value Date/Time SGOT 23 04/25/2019 06:18 AM  
  
 
 
 
 
 
 
 
Kemal Orta MD, MD 
[facility], [facility] 
[email or web address]

## 2019-04-27 NOTE — PROGRESS NOTES
Bedside shift change report given to [facility] G.RN (oncoming nurse) by Rachael Rossi (offgoing nurse). Report included the following information SBAR and Kardex.

## 2019-04-28 ENCOUNTER — APPOINTMENT (OUTPATIENT)
Dept: GENERAL RADIOLOGY | Age: 84
DRG: 189 | End: 2019-04-28
Attending: SURGERY
Payer: MEDICARE

## 2019-04-28 VITALS
TEMPERATURE: 99.3 F | HEIGHT: 65 IN | OXYGEN SATURATION: 92 % | WEIGHT: 160.8 LBS | SYSTOLIC BLOOD PRESSURE: 180 MMHG | RESPIRATION RATE: 18 BRPM | HEART RATE: 92 BPM | DIASTOLIC BLOOD PRESSURE: 76 MMHG | BODY MASS INDEX: 26.79 KG/M2

## 2019-04-28 LAB
ANION GAP SERPL CALC-SCNC: 9 MMOL/L (ref 3–18)
BASOPHILS # BLD: 0 K/UL (ref 0–0.1)
BASOPHILS NFR BLD: 0 % (ref 0–2)
BUN SERPL-MCNC: 13 MG/DL (ref 7–18)
BUN/CREAT SERPL: 25 (ref 12–20)
CALCIUM SERPL-MCNC: 8.4 MG/DL (ref 8.5–10.1)
CHLORIDE SERPL-SCNC: 96 MMOL/L (ref 100–108)
CO2 SERPL-SCNC: 32 MMOL/L (ref 21–32)
CREAT SERPL-MCNC: 0.52 MG/DL (ref 0.6–1.3)
DIFFERENTIAL METHOD BLD: ABNORMAL
EOSINOPHIL # BLD: 0.1 K/UL (ref 0–0.4)
EOSINOPHIL NFR BLD: 1 % (ref 0–5)
ERYTHROCYTE [DISTWIDTH] IN BLOOD BY AUTOMATED COUNT: 16.4 % (ref 11.6–14.5)
GLUCOSE BLD STRIP.AUTO-MCNC: 161 MG/DL (ref 70–110)
GLUCOSE BLD STRIP.AUTO-MCNC: 98 MG/DL (ref 70–110)
GLUCOSE SERPL-MCNC: 97 MG/DL (ref 74–99)
HCT VFR BLD AUTO: 33.9 % (ref 35–45)
HGB BLD-MCNC: 11.1 G/DL (ref 12–16)
LYMPHOCYTES # BLD: 1 K/UL (ref 0.9–3.6)
LYMPHOCYTES NFR BLD: 6 % (ref 21–52)
MCH RBC QN AUTO: 27.8 PG (ref 24–34)
MCHC RBC AUTO-ENTMCNC: 32.7 G/DL (ref 31–37)
MCV RBC AUTO: 84.8 FL (ref 74–97)
MONOCYTES # BLD: 1.9 K/UL (ref 0.05–1.2)
MONOCYTES NFR BLD: 12 % (ref 3–10)
NEUTS SEG # BLD: 13.4 K/UL (ref 1.8–8)
NEUTS SEG NFR BLD: 81 % (ref 40–73)
PLATELET # BLD AUTO: 318 K/UL (ref 135–420)
PMV BLD AUTO: 10.5 FL (ref 9.2–11.8)
POTASSIUM SERPL-SCNC: 2.9 MMOL/L (ref 3.5–5.5)
RBC # BLD AUTO: 4 M/UL (ref 4.2–5.3)
SODIUM SERPL-SCNC: 137 MMOL/L (ref 136–145)
WBC # BLD AUTO: 16.5 K/UL (ref 4.6–13.2)

## 2019-04-28 PROCEDURE — 3331090002 HH PPS REVENUE DEBIT

## 2019-04-28 PROCEDURE — 74011250636 HC RX REV CODE- 250/636: Performed by: INTERNAL MEDICINE

## 2019-04-28 PROCEDURE — 74011250637 HC RX REV CODE- 250/637: Performed by: EMERGENCY MEDICINE

## 2019-04-28 PROCEDURE — 3331090001 HH PPS REVENUE CREDIT

## 2019-04-28 PROCEDURE — 77030038269 HC DRN EXT URIN PURWCK BARD -A

## 2019-04-28 PROCEDURE — 74011000258 HC RX REV CODE- 258: Performed by: INTERNAL MEDICINE

## 2019-04-28 PROCEDURE — 74011000250 HC RX REV CODE- 250: Performed by: INTERNAL MEDICINE

## 2019-04-28 PROCEDURE — 82962 GLUCOSE BLOOD TEST: CPT

## 2019-04-28 PROCEDURE — 74011250636 HC RX REV CODE- 250/636: Performed by: EMERGENCY MEDICINE

## 2019-04-28 PROCEDURE — 74011250637 HC RX REV CODE- 250/637: Performed by: INTERNAL MEDICINE

## 2019-04-28 PROCEDURE — 36415 COLL VENOUS BLD VENIPUNCTURE: CPT

## 2019-04-28 PROCEDURE — 74011636637 HC RX REV CODE- 636/637: Performed by: INTERNAL MEDICINE

## 2019-04-28 PROCEDURE — 85025 COMPLETE CBC W/AUTO DIFF WBC: CPT

## 2019-04-28 PROCEDURE — 74018 RADEX ABDOMEN 1 VIEW: CPT

## 2019-04-28 PROCEDURE — 80048 BASIC METABOLIC PNL TOTAL CA: CPT

## 2019-04-28 PROCEDURE — 94640 AIRWAY INHALATION TREATMENT: CPT

## 2019-04-28 RX ORDER — LORAZEPAM 2 MG/ML
0.5 CONCENTRATE ORAL
Status: DISCONTINUED | OUTPATIENT
Start: 2019-04-28 | End: 2019-04-28 | Stop reason: HOSPADM

## 2019-04-28 RX ORDER — MORPHINE SULFATE 20 MG/ML
5-10 SOLUTION ORAL
Qty: 30 ML | Refills: 0 | Status: SHIPPED | OUTPATIENT
Start: 2019-04-28 | End: 2019-05-01

## 2019-04-28 RX ORDER — AMOXICILLIN AND CLAVULANATE POTASSIUM 875; 125 MG/1; MG/1
1 TABLET, FILM COATED ORAL EVERY 12 HOURS
Qty: 10 TAB | Refills: 0
Start: 2019-04-28 | End: 2019-10-19

## 2019-04-28 RX ORDER — AMOXICILLIN AND CLAVULANATE POTASSIUM 875; 125 MG/1; MG/1
1 TABLET, FILM COATED ORAL EVERY 12 HOURS
Status: DISCONTINUED | OUTPATIENT
Start: 2019-04-28 | End: 2019-04-28 | Stop reason: HOSPADM

## 2019-04-28 RX ORDER — LORAZEPAM 2 MG/ML
0.5 CONCENTRATE ORAL
Qty: 30 ML | Refills: 0 | Status: SHIPPED | OUTPATIENT
Start: 2019-04-28

## 2019-04-28 RX ORDER — MORPHINE SULFATE 100 MG/5ML
5-10 SOLUTION ORAL
Status: DISCONTINUED | OUTPATIENT
Start: 2019-04-28 | End: 2019-04-28 | Stop reason: HOSPADM

## 2019-04-28 RX ADMIN — FAMOTIDINE 20 MG: 20 TABLET ORAL at 10:02

## 2019-04-28 RX ADMIN — Medication 10 ML: at 05:43

## 2019-04-28 RX ADMIN — INSULIN LISPRO 3 UNITS: 100 INJECTION, SOLUTION INTRAVENOUS; SUBCUTANEOUS at 12:23

## 2019-04-28 RX ADMIN — ENOXAPARIN SODIUM 40 MG: 40 INJECTION SUBCUTANEOUS at 10:02

## 2019-04-28 RX ADMIN — GABAPENTIN 300 MG: 300 CAPSULE ORAL at 10:00

## 2019-04-28 RX ADMIN — PIPERACILLIN SODIUM AND TAZOBACTAM SODIUM 3.38 G: 3; .375 INJECTION, POWDER, LYOPHILIZED, FOR SOLUTION INTRAVENOUS at 05:43

## 2019-04-28 RX ADMIN — BUDESONIDE 1000 MCG: 1 SUSPENSION RESPIRATORY (INHALATION) at 07:55

## 2019-04-28 RX ADMIN — PIPERACILLIN SODIUM AND TAZOBACTAM SODIUM 3.38 G: 3; .375 INJECTION, POWDER, LYOPHILIZED, FOR SOLUTION INTRAVENOUS at 11:24

## 2019-04-28 RX ADMIN — POTASSIUM CHLORIDE: 2 INJECTION, SOLUTION, CONCENTRATE INTRAVENOUS at 11:21

## 2019-04-28 RX ADMIN — ASPIRIN 81 MG: 81 TABLET, COATED ORAL at 10:02

## 2019-04-28 RX ADMIN — POTASSIUM CHLORIDE: 2 INJECTION, SOLUTION, CONCENTRATE INTRAVENOUS at 10:06

## 2019-04-28 RX ADMIN — MORPHINE SULFATE 2 MG: 2 INJECTION, SOLUTION INTRAMUSCULAR; INTRAVENOUS at 11:29

## 2019-04-28 RX ADMIN — IPRATROPIUM BROMIDE AND ALBUTEROL SULFATE 3 ML: .5; 3 SOLUTION RESPIRATORY (INHALATION) at 07:55

## 2019-04-28 RX ADMIN — TAMSULOSIN HYDROCHLORIDE 0.4 MG: 0.4 CAPSULE ORAL at 10:01

## 2019-04-28 RX ADMIN — DOCUSATE SODIUM 100 MG: 100 CAPSULE, LIQUID FILLED ORAL at 10:02

## 2019-04-28 RX ADMIN — IPRATROPIUM BROMIDE AND ALBUTEROL SULFATE 3 ML: .5; 3 SOLUTION RESPIRATORY (INHALATION) at 12:15

## 2019-04-28 RX ADMIN — PAROXETINE HYDROCHLORIDE 30 MG: 20 TABLET, FILM COATED ORAL at 10:01

## 2019-04-28 RX ADMIN — FUROSEMIDE 20 MG: 20 TABLET ORAL at 10:02

## 2019-04-28 RX ADMIN — SODIUM CHLORIDE, SODIUM LACTATE, POTASSIUM CHLORIDE, AND CALCIUM CHLORIDE 100 ML/HR: 600; 310; 30; 20 INJECTION, SOLUTION INTRAVENOUS at 06:43

## 2019-04-28 RX ADMIN — AMOXICILLIN AND CLAVULANATE POTASSIUM 1 TABLET: 875; 125 TABLET, FILM COATED ORAL at 16:15

## 2019-04-28 RX ADMIN — IPRATROPIUM BROMIDE AND ALBUTEROL SULFATE 3 ML: .5; 3 SOLUTION RESPIRATORY (INHALATION) at 01:10

## 2019-04-28 RX ADMIN — POTASSIUM CHLORIDE: 2 INJECTION, SOLUTION, CONCENTRATE INTRAVENOUS at 12:25

## 2019-04-28 NOTE — ROUTINE PROCESS
Patient transferring to [facility] via medical transport Report called to Bridgett Balderas RN Opportunity to ask question given and utilized  -- Iv site removed, purewick removed and patient changed -- O2 @ 4liters in place No issues [id number]-- RN Weber called for information the patient's abdominal issues He asked for a copy of the CT scan and any notes that included information on her refusal of surgery and what type of surgery Faxed the CT scan, Dr. Terri Cruz notes from 4/27/2019 and Dr. Isamar Daniel notes from 4/27/2019

## 2019-04-28 NOTE — DISCHARGE SUMMARY
[facility] DISCHARGE SUMMARY Name:  Jessica Mittal 
MR#:   [id number] :  1933 ACCOUNT #:  [de-identified] ADMIT DATE:  2019 DISCHARGE DATE:  2019 DISPOSITION:  Discharged to the nursing home for comfort care measures. DISCHARGE CONDITION:  Stable. DISCHARGE DIAGNOSES: 
1. Acute-on-chronic hypoxic and hypercapnic respiratory failure, now stable. 2.  Chronic hypoxic respiratory failure, on home oxygen 3 liters. 3.  Perforated sigmoid diverticulitis with multiple small pelvic abscesses, the patient does not want surgery. 4.  Severe chronic obstructive pulmonary disease with mild exacerbation, now much better. 5.  Chronic diastolic heart failure, compensated. 6.  History of pelvic fracture. 7.  Leukocytosis. 8.  Esophageal wall thickening. 9.  Constipation, better. 10.  Pseudomonas urinary tract infection. 11.  Celiac and superior mesenteric artery stenosis. 12.  Comfort measures only. 13.  Do not resuscitate. DISCHARGE MEDICATIONS: 
1. Augmentin 875 mg 1 tablet 2 times a day for 10 days. 2.  Ativan 0.25 mL sublingual q.4 hours p.r.n. for agitation and/or anxiety. 3.  Roxanol 0.25-0.5 mL sublingual every 3 hours p.r.n. for pain and/or worsening shortness of breath. 4.  DuoNebs every 4 hours as needed for shortness of breath. 5.  Pepcid 20 mg daily. 6.  Lasix 20 mg every 48 hours. 7.  Atorvastatin 40 mg daily. 8.  Paxil 30 mg daily. 9.  Flomax 0.4 mg daily. 10.  Zocor 20 mg daily. 11.  Aspirin 81 mg daily. 12.  Oxygen 3 liters via nasal cannula. IMAGING AND PROCEDURES: 
1. CTA chest was done at the time of admission, reported no evidence of pulmonary embolism. Pulmonary edema present, small left pleural effusion. 2.  Mesenteric Doppler showed the patient has more than 70% stenosis of celiac and SMA.  
3.  CT abdomen and pelvis without contrast on  showed perforated sigmoid diverticulitis with multiple small pelvic abscess as well as air in the abdomen. 4.  Blood culture initially negative. 5.  Urine culture x1 was positive for Pseudomonas. CONSULTANTS: 
1. General Surgery, Dr. Davon Pires. 
2.  Pulmonary with Dr. Leon Gottlieb. 3.  Vascular Surgery with Dr. Shannon Shipley and group. 4.  GI with Dr. Bere Watkins and group. HOSPITAL COURSE:  The patient was admitted to the hospital on 04/18/2019 with a complaint of fall and back pain. Please refer to hospital admission H and P for further details. She was admitted with a diagnosis of acute-on-chronic hypercapnic-hypoxic respiratory failure due to COPD exacerbation and possible pneumonia versus bronchitis. The patient was continued on oxygen, started on IV steroid, IV antibiotic, and admitted to the hospital.  The patient was seen by pulmonologist next day as the patient had severe COPD based on ATS criteria. She was continued on her home medications for other comorbidities. During hospital course, she started having abdominal pain as well as abdominal distention. GI was consulted on 04/24/2019. They ordered mesenteric Doppler. I took over this patient's care on 04/25/2019. Mesenteric Doppler was showing stenosis of SMA as well as celiac artery. Vascular Surgery was consulted, but looking at the patient's overall clinical medical issues, they decided to continue conservative management at this point as there was no sign of acute or chronic mesenteric ischemia. Because of worsening abdominal pain, we also ordered CT abdomen and pelvis, which showed perforated sigmoid diverticulitis with pelvic abscess. Dr. Davon Pires from Surgery was consulted. The patient was started on antibiotic. The patient had persistent leukocytosis which initially was thought secondary to steroid. The patient decided not to go for surgery even though she continues to have pain and worsening leukocytosis.   We had multiple discussions with the patient by me as well as Dr. Maude Umanzor from Surgery Department as well as from Dr. Chaparrita Talley from GI Department. The patient decided to pursue with conservative management, and she wanted to be as comfortable as possible. On the day of her transfer, I talked to the patient at length in presence of the patient's daughter and son-in-law. The patient made her mind not to pursue for surgery. She understands the risk of worsening abdominal pain and becoming septic from this perforated ulcer. She wants to eat and take that risk. She decided to have hospice, and she wants to go to the nursing home. Daughter has already made arrangement for the patient to go to nursing home with hospice. At this time, we will continue with medication management by mouth until she is able to take it, but if she is not able to take them or has any problem, then she should not be given those medications by nursing home staff. The patient will be continued on comfort measures with morphine and Ativan as described above. I also tried to talk to the patient about benefit from the surgery versus risk. She understands about it, but she still says that she does not want surgery and wants to be as comfortable as possible in presence of family. Family is okay with the patient's decision. DISCHARGE INSTRUCTIONS: 
1. Diet, comfort diet, she can have soft and liquid diet whatever she is able to tolerate. 2.  Activity as tolerated. 3.  Nursing home doctor to follow this patient with hospice team. 
 
Total time greater than 35 minutes. Caretha Brittle, MD 
 
 
[id number] 
D:  04/28/2019 14:08 T:  04/28/2019 15:04 JOB #:  [id number] CC:  Bridgett Essex, MD Dois Hazy, MD Terrill Lauber, MD Temple Mad, MD Sissy Deer, MD

## 2019-04-28 NOTE — PROGRESS NOTES
Discharge Planning: This writer call [facility] to refer this pt for hospice and to make them aware that this pt was being discharged to [facility] today (4/28/19) with a plan for Hospice with Jonathon Pabon. Discharge order noted for today. Patient has been accepted to [facility]  skilled nursing [facility]. Confirmed with  Steven Crouch that bed is available today. Met with patient and  Family members daughter and son in law and are agreeable to the transition plan today. pt's family set-up a self payment arrangement Insurance authorization has been obtained. [address]  Transport to facility has been arranged through [employer]  at 4:00 p.m. time. Patient's discharge summary has been forwarded to skilled nursing facility via [employer]clink . Bedside RN Juliet Concepcion, has been updated to the transition plan. Discharge information has been updated on the AVS.  Please call report to [phone]. Received a return call from [facility], informed her of the plan and the scheduled discharge to [facility]. [employer] Care Manager

## 2019-04-28 NOTE — PROGRESS NOTES
GEN.  SURG Spoke with her nurse, previous  Noted  Noted, patient not seen Patient refused  Surgery, On Hospice/ comfort care Transfer to NH  Today Will sign  off

## 2019-04-28 NOTE — DISCHARGE SUMMARY
Physician Discharge Summary Patient: Yvette Perez MRN: [id number]  SSN: [id number] YOB: 1933  Age: 80 y.o. Sex: female PCP: Marcy Lazo MD 
 
Allergies: Levaquin [levofloxacin] Admit date: 4/18/2019 Admitting Provider: Sarbjit Darnell MD 
 
Discharge date: 4/28/2019 Discharging Provider: Ofelia Duncan MD 
 
* Admission Diagnoses: Hypoxia [R09.02] * Discharge Diagnoses:   
Hospital Problems as of 4/28/2019 Date Reviewed: 4/15/2019 Codes Class Noted - Resolved POA Hypoxia ICD-10-CM: R09.02 
ICD-9-CM: 799.02  4/18/2019 - Present Unknown HAP (hospital-acquired pneumonia) ICD-10-CM: J18.9 ICD-9-CM: 466  4/18/2019 - Present Unknown Fall ICD-10-CM: W19. Nolberto Forte ICD-9-CM: E888.9  4/18/2019 - Present Unknown [facility] Course: dictated # [id number] * Procedures: none Consults: Pulmonary/Intensive care, GI and General Surgery Discharge Exam: 
 
/76 (BP 1 Location: Right arm, BP Patient Position: At rest)   Pulse 92   Temp 99.3 °F (37.4 °C)   Resp 18   Ht 5' 5\" (1.651 m)   Wt 72.9 kg (160 lb 12.8 oz)   SpO2 92%   Breastfeeding? No   BMI 26.76 kg/m² * Discharge Condition: poor * Disposition: Hospice/Comfort measures only Discharge Medications: 
Current Discharge Medication List  
  
START taking these medications Details  
amoxicillin-clavulanate (AUGMENTIN) 875-125 mg per tablet Take 1 Tab by mouth every twelve (12) hours. Qty: 10 Tab, Refills: 0 LORazepam (INTENSOL) 2 mg/mL concentrated solution 0.25 mL by SubLINGual route every four (4) hours as needed for Agitation or Anxiety. Max Daily Amount: 3 mg. Qty: 30 mL, Refills: 0 Associated Diagnoses: Comfort measures only status  
  
morphine (ROXANOL) 100 mg/5 mL (20 mg/mL) concentrated solution 0.25-0.5 mL by SubLINGual route every three (3) hours as needed for Pain for up to 3 days. Max Daily Amount: 80 mg. 
Qty: 30 mL, Refills: 0 Associated Diagnoses: Comfort measures only status CONTINUE these medications which have NOT CHANGED Details  
albuterol-ipratropium (DUO-NEB) 2.5 mg-0.5 mg/3 ml nebu 3 mL by Nebulization route every four (4) hours. Qty: 30 Nebule, Refills: 0  
  
famotidine (PEPCID) 20 mg tablet Take 1 Tab by mouth daily. Qty: 15 Tab, Refills: 0  
  
furosemide (LASIX) 20 mg tablet Take 1 Tab by mouth every fourty-eight (48) hours. Qty: 15 Tab, Refills: 0  
  
atorvastatin (LIPITOR) 40 mg tablet Take 1 Tab by mouth nightly. Qty: 30 Tab, Refills: 0 PARoxetine (PAXIL) 30 mg tablet TAKE 1 TABLET BY MOUTH  DAILY Qty: 90 Tab, Refills: 1  
  
tamsulosin (FLOMAX) 0.4 mg capsule Take 1 Cap by mouth daily. Qty: 90 Cap, Refills: 3  
  
simvastatin (ZOCOR) 20 mg tablet Take 1 Tab by mouth nightly. Qty: 90 Tab, Refills: 3 Associated Diagnoses: Hyperlipidemia, unspecified hyperlipidemia type  
  
aspirin delayed-release 81 mg tablet Take 1 Tab by mouth daily. Qty: 100 Tab, Refills: 1  
  
inhalational spacing device (AEROCHAMBER MV) 1 Each by Does Not Apply route as needed. Qty: 1 Device, Refills: 2 OXYGEN-AIR DELIVERY SYSTEMS 3 L by Nasal route continuous. Nebulizer & Compressor (PORTABLE NEBULIZER SYSTEM) machine 1 Each by Does Not Apply route every six (6) hours as needed. Qty: 1 Each, Refills: 0 STOP taking these medications  
  
 glipiZIDE (GLUCOTROL) 5 mg tablet Comments:  
Reason for Stopping:   
   
 insulin aspart U-100 (NOVOLOG) 100 unit/mL inpn Comments:  
Reason for Stopping:   
   
 rOPINIRole (REQUIP) 1 mg tablet Comments:  
Reason for Stopping:   
   
 mirtazapine (REMERON) 15 mg tablet Comments:  
Reason for Stopping:   
   
 gabapentin (NEURONTIN) 300 mg capsule Comments:  
Reason for Stopping:   
   
  
 
 
* Follow-up Care/Patient Instructions: Activity: Activity as tolerated Diet: Comfort feeding Wound Care: None needed Follow-up Information Follow up With Specialties Details Why Contact Info [facility] Fadi MOURA [facility] [place] [address] 
[phone] Marcy Lazo MD Internal Medicine   9125 [address] [facility] [address] [place] [address] 
[phone] Follow-up Appointments Procedures  FOLLOW UP VISIT Appointment in: Other (Specify) [facility] PHYSICIAN TO FOLLOW WITH HOSPICE TEAM  
  Standing Status:   Standing Number of Occurrences:   1 Order Specific Question:   Appointment in Answer: Other (Specify) Signed: 
Ofelia Duncan MD 
4/28/2019 
2:08 PM

## 2019-04-28 NOTE — PROGRESS NOTES
Bedside shift change report given to Elin South G.RN (oncoming nurse) by Kym Valencia RN (offgoing nurse). Report included the following information SBAR and Kardex.

## 2019-04-28 NOTE — PROGRESS NOTES
[facility] Hospitalist Group Progress Note Patient: Karen Collier Age: 80 y.o. : 1933 MR#: [id number] SSN: [id number] Date/Time: 2019 Subjective:  
 
Continues to have abdominal pain. no chest pain or cough. SOB better. No nausea or vomiting. Had a lengthy conversation with patient and family. Patient decided not to persue with surgery, understands the risk of clinical deterioration without surgery. Patient wants to be as comfortable as possible and wants to eat. She wants to go to the same nursing home where her  is. Assessment/Plan: 1. Acute on chronic hypoxic and hyercapnic respiratory failure, stable 2. Chronic hypoxic resp failure, on home O2  3 liters via NC continuously 3. COPD with acute exacerbation, resolved 4. Perforated sigmoid diverticulitis with multiple small pelvic abscesses. 5. Chronic diastolic chf, compensated. 6. H/o Pelvic fracture 7. Esophageal wall thickening on CTA 8. Constipation 9. Leucocytosis 10. Pseudomonas UTI 11. celiac/SMA stenosis PLAN: 
 
Cont current management Hospice consult is placed CM discussed with SNF - patient has been accepted for comfort measures only. Daughter [id number] Case discussed with:  [x]Patient  []Family  []Nursing  []Case Management DVT Prophylaxis:  []Lovenox  []Hep SQ  []SCDs  []Coumadin   []On Heparin gtt Objective:  
 
/76 (BP 1 Location: Right arm, BP Patient Position: At rest)   Pulse 92   Temp 99.3 °F (37.4 °C)   Resp 18   Ht 5' 5\" (1.651 m)   Wt 72.9 kg (160 lb 12.8 oz)   SpO2 92%   Breastfeeding? No   BMI 26.76 kg/m² General:  Awake, alert Cardiovascular:  S1S2+, RRR Pulmonary:  Diminished in bases, no wheezes GI:  Soft, BS+, Tenderness present, + distension Extremities:  No pedal edema CNS: moves all extremities Labs:   
Recent Results (from the past 24 hour(s)) GLUCOSE, POC  Collection Time: 19  3:32 PM  
 Result Value Ref Range Glucose (POC) 84 70 - 110 mg/dL CBC WITH AUTOMATED DIFF Collection Time: 04/27/19  4:17 PM  
Result Value Ref Range WBC 14.7 (H) 4.6 - 13.2 K/uL  
 RBC 4.00 (L) 4.20 - 5.30 M/uL  
 HGB 11.0 (L) 12.0 - 16.0 g/dL HCT 34.0 (L) 35.0 - 45.0 % MCV 85.0 74.0 - 97.0 FL  
 MCH 27.5 24.0 - 34.0 PG  
 MCHC 32.4 31.0 - 37.0 g/dL  
 RDW 16.6 (H) 11.6 - 14.5 % PLATELET 651 176 - 996 K/uL MPV 10.3 9.2 - 11.8 FL  
 NEUTROPHILS 81 (H) 40 - 73 % LYMPHOCYTES 7 (L) 21 - 52 % MONOCYTES 11 (H) 3 - 10 % EOSINOPHILS 1 0 - 5 % BASOPHILS 0 0 - 2 %  
 ABS. NEUTROPHILS 11.9 (H) 1.8 - 8.0 K/UL  
 ABS. LYMPHOCYTES 1.1 0.9 - 3.6 K/UL  
 ABS. MONOCYTES 1.7 (H) 0.05 - 1.2 K/UL  
 ABS. EOSINOPHILS 0.1 0.0 - 0.4 K/UL  
 ABS. BASOPHILS 0.0 0.0 - 0.1 K/UL  
 DF AUTOMATED    
GLUCOSE, POC Collection Time: 04/27/19  9:26 PM  
Result Value Ref Range Glucose (POC) 133 (H) 70 - 110 mg/dL GLUCOSE, POC Collection Time: 04/27/19 11:54 PM  
Result Value Ref Range Glucose (POC) 120 (H) 70 - 110 mg/dL CBC WITH AUTOMATED DIFF Collection Time: 04/28/19  4:09 AM  
Result Value Ref Range WBC 16.5 (H) 4.6 - 13.2 K/uL  
 RBC 4.00 (L) 4.20 - 5.30 M/uL  
 HGB 11.1 (L) 12.0 - 16.0 g/dL HCT 33.9 (L) 35.0 - 45.0 % MCV 84.8 74.0 - 97.0 FL  
 MCH 27.8 24.0 - 34.0 PG  
 MCHC 32.7 31.0 - 37.0 g/dL  
 RDW 16.4 (H) 11.6 - 14.5 % PLATELET 483 450 - 510 K/uL MPV 10.5 9.2 - 11.8 FL  
 NEUTROPHILS 81 (H) 40 - 73 % LYMPHOCYTES 6 (L) 21 - 52 % MONOCYTES 12 (H) 3 - 10 % EOSINOPHILS 1 0 - 5 % BASOPHILS 0 0 - 2 %  
 ABS. NEUTROPHILS 13.4 (H) 1.8 - 8.0 K/UL  
 ABS. LYMPHOCYTES 1.0 0.9 - 3.6 K/UL  
 ABS. MONOCYTES 1.9 (H) 0.05 - 1.2 K/UL  
 ABS. EOSINOPHILS 0.1 0.0 - 0.4 K/UL  
 ABS. BASOPHILS 0.0 0.0 - 0.1 K/UL  
 DF AUTOMATED METABOLIC PANEL, BASIC Collection Time: 04/28/19  4:09 AM  
Result Value Ref Range Sodium 137 136 - 145 mmol/L  Potassium 2.9 (LL) 3.5 - 5.5 mmol/L  
 Chloride 96 (L) 100 - 108 mmol/L  
 CO2 32 21 - 32 mmol/L Anion gap 9 3.0 - 18 mmol/L Glucose 97 74 - 99 mg/dL BUN 13 7.0 - 18 MG/DL Creatinine 0.52 (L) 0.6 - 1.3 MG/DL  
 BUN/Creatinine ratio 25 (H) 12 - 20 GFR est AA >60 >60 ml/min/1.73m2 GFR est non-AA >60 >60 ml/min/1.73m2 Calcium 8.4 (L) 8.5 - 10.1 MG/DL  
GLUCOSE, POC Collection Time: 04/28/19  6:29 AM  
Result Value Ref Range Glucose (POC) 98 70 - 110 mg/dL GLUCOSE, POC Collection Time: 04/28/19 11:26 AM  
Result Value Ref Range Glucose (POC) 161 (H) 70 - 110 mg/dL Current Discharge Medication List  
  
START taking these medications Details  
amoxicillin-clavulanate (AUGMENTIN) 875-125 mg per tablet Take 1 Tab by mouth every twelve (12) hours. Qty: 10 Tab, Refills: 0 LORazepam (INTENSOL) 2 mg/mL concentrated solution 0.25 mL by SubLINGual route every four (4) hours as needed for Agitation or Anxiety. Max Daily Amount: 3 mg. Qty: 30 mL, Refills: 0 Associated Diagnoses: Comfort measures only status  
  
morphine (ROXANOL) 100 mg/5 mL (20 mg/mL) concentrated solution 0.25-0.5 mL by SubLINGual route every three (3) hours as needed for Pain for up to 3 days. Max Daily Amount: 80 mg. 
Qty: 30 mL, Refills: 0 Associated Diagnoses: Comfort measures only status CONTINUE these medications which have NOT CHANGED Details  
albuterol-ipratropium (DUO-NEB) 2.5 mg-0.5 mg/3 ml nebu 3 mL by Nebulization route every four (4) hours. Qty: 30 Nebule, Refills: 0  
  
famotidine (PEPCID) 20 mg tablet Take 1 Tab by mouth daily. Qty: 15 Tab, Refills: 0  
  
furosemide (LASIX) 20 mg tablet Take 1 Tab by mouth every fourty-eight (48) hours. Qty: 15 Tab, Refills: 0  
  
atorvastatin (LIPITOR) 40 mg tablet Take 1 Tab by mouth nightly. Qty: 30 Tab, Refills: 0 PARoxetine (PAXIL) 30 mg tablet TAKE 1 TABLET BY MOUTH  DAILY Qty: 90 Tab, Refills: 1  
  
tamsulosin (FLOMAX) 0.4 mg capsule Take 1 Cap by mouth daily. Qty: 90 Cap, Refills: 3  
  
simvastatin (ZOCOR) 20 mg tablet Take 1 Tab by mouth nightly. Qty: 90 Tab, Refills: 3 Associated Diagnoses: Hyperlipidemia, unspecified hyperlipidemia type  
  
aspirin delayed-release 81 mg tablet Take 1 Tab by mouth daily. Qty: 100 Tab, Refills: 1  
  
inhalational spacing device (AEROCHAMBER MV) 1 Each by Does Not Apply route as needed. Qty: 1 Device, Refills: 2 OXYGEN-AIR DELIVERY SYSTEMS 3 L by Nasal route continuous. Nebulizer & Compressor (PORTABLE NEBULIZER SYSTEM) machine 1 Each by Does Not Apply route every six (6) hours as needed. Qty: 1 Each, Refills: 0 STOP taking these medications  
  
 glipiZIDE (GLUCOTROL) 5 mg tablet Comments:  
Reason for Stopping:   
   
 insulin aspart U-100 (NOVOLOG) 100 unit/mL inpn Comments:  
Reason for Stopping:   
   
 rOPINIRole (REQUIP) 1 mg tablet Comments:  
Reason for Stopping:   
   
 mirtazapine (REMERON) 15 mg tablet Comments:  
Reason for Stopping:   
   
 gabapentin (NEURONTIN) 300 mg capsule Comments:  
Reason for Stopping:   
   
  
 
 
 
Signed By: Ese Woodall MD   
 April 28, 2019

## 2019-04-28 NOTE — ROUTINE PROCESS
Bedside and Verbal shift change report given to [facility] RN (oncoming nurse) by Fco Pradhan RN (offgoing nurse). Report given with SBAR, Kardex, Intake/Output, MAR, Accordion and Recent Results.

## 2019-04-28 NOTE — PROGRESS NOTES
[place]iannon [address], [address] [facility], [address] [facility] 
[email or web address] Impression/Plan: 1. Diverticulitis w/ perforation/peritonitis Plan:  Pt has again refused surgery 
rec comfort care Agree w/ DNR Will sign off but be available as needed Chief Complaint: Pain, distension Subjective:   
 
Pt reports cont 'd pain and distension. CT noted. Pt repeatedly states that she does not want surgical intervention Eyes: conjunctiva normal, EOM normal  
Neck: ROM normal, supple and trachea normal  
Cardiovascular: heart normal, intact distal pulses, normal rate and regular rhythm Pulmonary/Chest Wall: Decreased breath sounds, few rales Abdominal: Decreased BS, distended and tympanitic Visit Vitals /77 (BP 1 Location: Right arm, BP Patient Position: At rest) Pulse 89 Temp 99.3 °F (37.4 °C) Resp 18 Ht 5' 5\" (1.651 m) Wt 72.9 kg (160 lb 12.8 oz) SpO2 90% Breastfeeding? No  
BMI 26.76 kg/m² Intake/Output Summary (Last 24 hours) at 4/28/2019 1019 Last data filed at 4/28/2019 1011 Gross per 24 hour Intake  Output 750 ml Net -750 ml CBC w/Diff Lab Results Component Value Date/Time WBC 16.5 (H) 04/28/2019 04:09 AM  
 RBC 4.00 (L) 04/28/2019 04:09 AM  
 HGB 11.1 (L) 04/28/2019 04:09 AM  
 HCT 33.9 (L) 04/28/2019 04:09 AM  
 MCV 84.8 04/28/2019 04:09 AM  
 MCH 27.8 04/28/2019 04:09 AM  
 MCHC 32.7 04/28/2019 04:09 AM  
 RDW 16.4 (H) 04/28/2019 04:09 AM  
  04/28/2019 04:09 AM  
 Lab Results Component Value Date/Time GRANS 81 (H) 04/28/2019 04:09 AM  
 LYMPH 6 (L) 04/28/2019 04:09 AM  
 EOS 1 04/28/2019 04:09 AM  
 BANDS 3 03/12/2019 12:33 AM  
 BASOS 0 04/28/2019 04:09 AM  
  
Basic Metabolic Profile Recent Labs  
  04/28/19 
0409 04/26/19 
0241  136  
K 2.9* 4.0  
CL 96* 93* CO2 32 37* BUN 13 27* CA 8.4* 8.5 MG  --  2.2 Hepatic Function Lab Results Component Value Date/Time ALB 2.3 (L) 04/25/2019 06:18 AM  
 TP 5.9 (L) 04/25/2019 06:18 AM  
  04/25/2019 06:18 AM  
 Lab Results Component Value Date/Time SGOT 23 04/25/2019 06:18 AM  
  
 
 
 
 
 
 
 
Maureen Tompkins MD, MD 
[facility], [address] 
[email or web address]

## 2019-04-29 ENCOUNTER — HOME CARE VISIT (OUTPATIENT)
Dept: HOSPICE | Facility: HOSPICE | Age: 84
End: 2019-04-29
Payer: MEDICARE

## 2019-04-29 ENCOUNTER — TELEPHONE (OUTPATIENT)
Dept: INTERNAL MEDICINE CLINIC | Age: 84
End: 2019-04-29

## 2019-04-29 ENCOUNTER — HOSPICE ADMISSION (OUTPATIENT)
Dept: HOSPICE | Facility: HOSPICE | Age: 84
End: 2019-04-29
Payer: MEDICARE

## 2019-04-29 VITALS
HEART RATE: 83 BPM | BODY MASS INDEX: 27.49 KG/M2 | TEMPERATURE: 99 F | WEIGHT: 165 LBS | SYSTOLIC BLOOD PRESSURE: 132 MMHG | RESPIRATION RATE: 20 BRPM | OXYGEN SATURATION: 93 % | HEIGHT: 65 IN | DIASTOLIC BLOOD PRESSURE: 62 MMHG

## 2019-04-29 PROCEDURE — G0299 HHS/HOSPICE OF RN EA 15 MIN: HCPCS

## 2019-04-29 PROCEDURE — 0651 HSPC ROUTINE HOME CARE

## 2019-04-29 PROCEDURE — 3331090001 HH PPS REVENUE CREDIT

## 2019-04-29 PROCEDURE — 3331090002 HH PPS REVENUE DEBIT

## 2019-04-29 PROCEDURE — 3336500001 HSPC ELECTION

## 2019-04-29 PROCEDURE — HOSPICE MEDICATION HC HH HOSPICE MEDICATION

## 2019-04-30 ENCOUNTER — HOME CARE VISIT (OUTPATIENT)
Dept: HOSPICE | Facility: HOSPICE | Age: 84
End: 2019-04-30
Payer: MEDICARE

## 2019-04-30 ENCOUNTER — PATIENT OUTREACH (OUTPATIENT)
Dept: INTERNAL MEDICINE CLINIC | Age: 84
End: 2019-04-30

## 2019-04-30 PROCEDURE — 3331090001 HH PPS REVENUE CREDIT

## 2019-04-30 PROCEDURE — G0299 HHS/HOSPICE OF RN EA 15 MIN: HCPCS

## 2019-04-30 PROCEDURE — 0651 HSPC ROUTINE HOME CARE

## 2019-04-30 PROCEDURE — HOSPICE MEDICATION HC HH HOSPICE MEDICATION

## 2019-04-30 PROCEDURE — 3331090002 HH PPS REVENUE DEBIT

## 2019-04-30 NOTE — Clinical Note
[facility]

## 2019-04-30 NOTE — PROGRESS NOTES
Transition of Care Coordination/Hospital to Post Acute Facility: 
  
Date/Time:  2019 11:46 AM 
 
[facility] . Patient was discharged 19 to [address] [place] [address] for continuation of care. Inpatient RRAT score: 58 Top Challenges reviewed None Method of communication with care team :chart routing Nurse Navigator(NN) spoke with Stefano Vernon RN to provide introduction to self and explanation of the Nurse Navigator Role. Verified name and  as patient identifiers. Discussed and reviewed  discharge summary, medication reconciliation ACP:  
Does the patient have a current ACP (including DDNR):  yes Does the post acute facility have a copy of the patients ACP:  yes Medication(s):  
New Medications at Discharge: Augmentin, Intensol, Roxanol Changed Medications at Discharge: None Discontinued Medications at Discharge: Glipizide, Neurontin, Novolog, Requip, Remeron PCP/Specialist follow up:  
Future Appointments Date Time Provider Brandon Morgan 5/10/2019  1:00 PM Trey Crooks MD [facility] FAN SCHED  
2019  8:30 AM Aren Brown MD [facility]  
2019  9:45 AM Elin Christy MD [address] [place] [address] Opportunity to ask questions was provided. Contact information was provided for future reference or further questions. Chart forwarded to Bridger Omer, [facility] for continued monitoring.

## 2019-05-01 ENCOUNTER — HOME CARE VISIT (OUTPATIENT)
Dept: SCHEDULING | Facility: HOME HEALTH | Age: 84
End: 2019-05-01
Payer: MEDICARE

## 2019-05-01 ENCOUNTER — PATIENT OUTREACH (OUTPATIENT)
Dept: CASE MANAGEMENT | Age: 84
End: 2019-05-01

## 2019-05-01 ENCOUNTER — HOME CARE VISIT (OUTPATIENT)
Dept: HOSPICE | Facility: HOSPICE | Age: 84
End: 2019-05-01
Payer: MEDICARE

## 2019-05-01 PROCEDURE — G0155 HHCP-SVS OF CSW,EA 15 MIN: HCPCS

## 2019-05-01 PROCEDURE — 3331090001 HH PPS REVENUE CREDIT

## 2019-05-01 PROCEDURE — 3331090002 HH PPS REVENUE DEBIT

## 2019-05-01 PROCEDURE — 0651 HSPC ROUTINE HOME CARE

## 2019-05-01 PROCEDURE — G0299 HHS/HOSPICE OF RN EA 15 MIN: HCPCS

## 2019-05-02 ENCOUNTER — HOME CARE VISIT (OUTPATIENT)
Dept: HOSPICE | Facility: HOSPICE | Age: 84
End: 2019-05-02
Payer: MEDICARE

## 2019-05-02 ENCOUNTER — HOME CARE VISIT (OUTPATIENT)
Dept: SCHEDULING | Facility: HOME HEALTH | Age: 84
End: 2019-05-02
Payer: MEDICARE

## 2019-05-02 VITALS
OXYGEN SATURATION: 91 % | DIASTOLIC BLOOD PRESSURE: 57 MMHG | SYSTOLIC BLOOD PRESSURE: 128 MMHG | HEART RATE: 80 BPM | RESPIRATION RATE: 20 BRPM

## 2019-05-02 PROCEDURE — 3331090001 HH PPS REVENUE CREDIT

## 2019-05-02 PROCEDURE — 0651 HSPC ROUTINE HOME CARE

## 2019-05-02 PROCEDURE — G0156 HHCP-SVS OF AIDE,EA 15 MIN: HCPCS

## 2019-05-02 PROCEDURE — 3331090002 HH PPS REVENUE DEBIT

## 2019-05-02 PROCEDURE — G0299 HHS/HOSPICE OF RN EA 15 MIN: HCPCS

## 2019-05-03 PROCEDURE — 0651 HSPC ROUTINE HOME CARE

## 2019-05-03 PROCEDURE — 3331090001 HH PPS REVENUE CREDIT

## 2019-05-03 PROCEDURE — 3331090002 HH PPS REVENUE DEBIT

## 2019-05-04 PROCEDURE — 0651 HSPC ROUTINE HOME CARE

## 2019-05-04 PROCEDURE — 3331090002 HH PPS REVENUE DEBIT

## 2019-05-04 PROCEDURE — 3331090001 HH PPS REVENUE CREDIT

## 2019-05-05 PROCEDURE — 0651 HSPC ROUTINE HOME CARE

## 2019-05-05 PROCEDURE — 3331090002 HH PPS REVENUE DEBIT

## 2019-05-05 PROCEDURE — 3331090001 HH PPS REVENUE CREDIT

## 2019-05-06 PROCEDURE — 3331090001 HH PPS REVENUE CREDIT

## 2019-05-06 PROCEDURE — 0651 HSPC ROUTINE HOME CARE

## 2019-05-06 PROCEDURE — HOSPICE MEDICATION HC HH HOSPICE MEDICATION

## 2019-05-06 PROCEDURE — 3331090002 HH PPS REVENUE DEBIT

## 2019-05-07 ENCOUNTER — HOME CARE VISIT (OUTPATIENT)
Dept: SCHEDULING | Facility: HOME HEALTH | Age: 84
End: 2019-05-07
Payer: MEDICARE

## 2019-05-07 ENCOUNTER — HOME CARE VISIT (OUTPATIENT)
Dept: HOME HEALTH SERVICES | Facility: HOME HEALTH | Age: 84
End: 2019-05-07
Payer: MEDICARE

## 2019-05-07 PROCEDURE — HOSPICE MEDICATION HC HH HOSPICE MEDICATION

## 2019-05-07 PROCEDURE — 0651 HSPC ROUTINE HOME CARE

## 2019-05-07 PROCEDURE — G0156 HHCP-SVS OF AIDE,EA 15 MIN: HCPCS

## 2019-05-08 ENCOUNTER — HOME CARE VISIT (OUTPATIENT)
Dept: HOSPICE | Facility: HOSPICE | Age: 84
End: 2019-05-08
Payer: MEDICARE

## 2019-05-08 PROCEDURE — 0651 HSPC ROUTINE HOME CARE

## 2019-05-08 PROCEDURE — G0299 HHS/HOSPICE OF RN EA 15 MIN: HCPCS

## 2019-05-09 ENCOUNTER — HOME CARE VISIT (OUTPATIENT)
Dept: SCHEDULING | Facility: HOME HEALTH | Age: 84
End: 2019-05-09
Payer: MEDICARE

## 2019-05-09 ENCOUNTER — HOME CARE VISIT (OUTPATIENT)
Dept: HOSPICE | Facility: HOSPICE | Age: 84
End: 2019-05-09
Payer: MEDICARE

## 2019-05-09 PROCEDURE — 0651 HSPC ROUTINE HOME CARE

## 2019-05-09 PROCEDURE — G0156 HHCP-SVS OF AIDE,EA 15 MIN: HCPCS

## 2019-05-10 ENCOUNTER — HOME CARE VISIT (OUTPATIENT)
Dept: HOSPICE | Facility: HOSPICE | Age: 84
End: 2019-05-10
Payer: MEDICARE

## 2019-05-10 PROCEDURE — G0155 HHCP-SVS OF CSW,EA 15 MIN: HCPCS

## 2019-05-10 PROCEDURE — 0651 HSPC ROUTINE HOME CARE

## 2019-05-11 PROCEDURE — 0651 HSPC ROUTINE HOME CARE

## 2019-05-12 PROCEDURE — 0651 HSPC ROUTINE HOME CARE

## 2019-05-13 VITALS
OXYGEN SATURATION: 97 % | DIASTOLIC BLOOD PRESSURE: 76 MMHG | RESPIRATION RATE: 18 BRPM | HEART RATE: 77 BPM | SYSTOLIC BLOOD PRESSURE: 134 MMHG

## 2019-05-13 PROCEDURE — 0651 HSPC ROUTINE HOME CARE

## 2019-05-13 PROCEDURE — HOSPICE MEDICATION HC HH HOSPICE MEDICATION

## 2019-05-14 PROCEDURE — HOSPICE MEDICATION HC HH HOSPICE MEDICATION

## 2019-05-14 PROCEDURE — 0651 HSPC ROUTINE HOME CARE

## 2019-05-15 ENCOUNTER — HOME CARE VISIT (OUTPATIENT)
Dept: HOSPICE | Facility: HOSPICE | Age: 84
End: 2019-05-15
Payer: MEDICARE

## 2019-05-15 ENCOUNTER — PATIENT OUTREACH (OUTPATIENT)
Dept: CASE MANAGEMENT | Age: 84
End: 2019-05-15

## 2019-05-15 PROCEDURE — G0299 HHS/HOSPICE OF RN EA 15 MIN: HCPCS

## 2019-05-15 PROCEDURE — 0651 HSPC ROUTINE HOME CARE

## 2019-05-15 NOTE — PROGRESS NOTES
Community Care Team Documentation for Patient in [facility]     Patient discharged from [facility] 4/18/2019 - 4/28/2019 to [facility], on 4/28/2019. Hospital Discharge diagnosis:    1. Acute-on-chronic hypoxic and hypercapnic respiratory failure, now stable. 2.  Chronic hypoxic respiratory failure, on home oxygen 3 liters. 3.  Perforated sigmoid diverticulitis with multiple small pelvic abscesses, the patient does not want surgery. 4.  Severe chronic obstructive pulmonary disease with mild exacerbation, now much better. 5.  Chronic diastolic heart failure, compensated. 6.  History of pelvic fracture. 7.  Leukocytosis. 8.  Esophageal wall thickening. 9.  Constipation, better. 10.  Pseudomonas urinary tract infection. 11.  Celiac and superior mesenteric artery stenosis. 12.  Comfort measures only. 13.  Do not resuscitate. SNF Attending Provider:      Anticipated discharge date from SNF:        PCP : Ignacio Cortez MD    Nurse Navigator: Cisco Griggs team rounds completed, updates provided by facility.  also at facility for skilled services. [address], not skilled. Patient and spouse will discharge to home, patient will continue on Hospice. DC 5/20/2019    High Risk            50       Total Score        3 Has Seen PCP in Last 6 Months (Yes=3, No=0)    4 IP Visits Last 12 Months (1-3=4, 4=9, >4=11)    43 Charlson Comorbidity Score (Age + Comorbid Conditions)        Criteria that do not apply:    . Living with Significant Other. Assisted Living. LTAC. SNF. or   Rehab    Patient Length of Stay (>5 days = 3)    Pt.  Coverage (Medicare=5 , Medicaid, or Self-Pay=4)      Active Ambulatory Problems     Diagnosis Date Noted    Hyperlipidemia 05/17/2011    Overactive bladder 05/17/2011    Sciatica     Degeneration of lumbar or lumbosacral intervertebral disc     Encounter for long-term (current) use of other medications     Depression 09/20/2011    Unspecified vitamin D deficiency 04/04/2012    Glucose intolerance (pre-diabetes) 05/24/2012    RLS (restless legs syndrome) 01/17/2013    Aortic dissection, abdominal ([address] 02/05/2013    Ataxic gait 07/22/2013    Chronic neck pain 07/22/2013    Orthostatic hypotension 07/22/2013    Paresthesia 07/22/2013    Repeated falls 08/07/2013    Chronic pain syndrome 09/16/2013    Lumbosacral spondylosis without myelopathy 09/16/2013    Cervical stenosis of spinal canal 09/16/2013    Spinal stenosis in cervical region 10/11/2013    Polyneuropathy 10/11/2013    Lumbar stenosis 10/11/2013    High risk medication use 10/11/2013    Ulnar neuropathy at elbow 10/11/2013    Thoracic or lumbosacral neuritis or radiculitis, unspecified 07/14/2014    Low back pain radiating to both legs 10/01/2014    DDD (degenerative disc disease), lumbar 10/01/2014    Spondylolisthesis of lumbar region 10/01/2014    Spondylolisthesis, grade 1 10/01/2014    Lumbar facet arthropathy 10/01/2014    Lumbar disc herniation 10/01/2014    Lumbosacral radiculopathy at L5 10/01/2014    Lumbosacral radiculopathy at S1 10/01/2014    DM neuropathy, painful ([facility] 10/01/2014    Acute exacerbation of chronic obstructive pulmonary disease (COPD) ([facility] 08/11/2015    Carotid artery stenosis 08/18/2015    Atherosclerosis of native arteries of the extremities with intermittent claudication 08/18/2015    MOORE (dyspnea on exertion) 09/09/2015    SOB (shortness of breath) 09/09/2015    Murmur, cardiac 09/09/2015    Hyperlipidemia LDL goal <100 04/12/2016    Primary osteoarthritis involving multiple joints 04/12/2016    Prediabetes 04/12/2016    Restless leg syndrome 04/12/2016    Primary hypertension 08/15/2016    Panlobular emphysema ([facility] 12/13/2016    Impaired fasting glucose 12/13/2016    HCAP (healthcare-associated pneumonia) 02/07/2017    Abnormal CT scan, chest 02/07/2017    Hypercholesterolemia 04/01/2008    Hypokalemia 04/21/2017    Dizziness 04/21/2017    CHI (closed head injury) 04/21/2017    Elevated troponin 04/21/2017    Type 2 diabetes mellitus with diabetic neuropathy, without long-term current use of insulin ([address] 09/13/2017    Primary insomnia 12/04/2017    Major depression, chronic 12/06/2017    Pneumonia 02/07/2018    CAP (community acquired pneumonia) 02/07/2018    COPD exacerbation ([address] 07/13/2018    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/2018    Type 2 diabetes with nephropathy ([address] 10/22/2018    Chronic respiratory failure with hypoxia ([address] 01/01/2019    Stage 4 very severe COPD by GOLD classification ([facility] 02/25/2019    Congestive heart failure (CHF) ([address] 03/10/2019    NSTEMI (non-ST elevated myocardial infarction) ([facility] 03/11/2019    Pelvic ring fracture, closed, initial encounter ([facility] 03/11/2019    Advanced care planning/counseling discussion 03/14/2019    Debility 03/14/2019    Hypoxia 04/18/2019    HAP (hospital-acquired pneumonia) 04/18/2019    Fall 04/18/2019     Resolved Ambulatory Problems     Diagnosis Date Noted    No Resolved Ambulatory Problems     Past Medical History:   Diagnosis Date    Chronic lung disease     Colon polyps     COPD 8-30-02    DDD (degenerative disc disease), lumbar 10/1/2014    Degeneration of lumbar or lumbosacral intervertebral disc     Depression     Diabetes ([address] 7-19-05    Diabetes mellitus ([address]     Diverticulosis     DM neuropathy, painful ([address] 10/1/2014    MOORE (Dyspnea on Exertion) 4-19-02    Glaucoma     HCAP (healthcare-associated pneumonia) 03/24/2017    Hemorrhoids 6-6-06    Hyperlipidemia 5/17/2011    Hypertension 4-16-02    LBP (low back pain) 4-13-04    Low back pain radiating to both legs 10/1/2014    Lumbago     Lumbar disc herniation 10/1/2014    Lumbar facet arthropathy 10/1/2014    Lumbosacral radiculopathy at L5 10/1/2014  Lumbosacral radiculopathy at S1 10/1/2014    Microscopic hematuria     OA (osteoarthritis)     Overactive bladder 5/17/2011    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    RLS (restless legs syndrome) 1/17/2013    Sciatica     Shortness of breath     Spinal stenosis, lumbar region, without neurogenic claudication     Spondylolisthesis of lumbar region 10/1/2014    Spondylolisthesis, grade 1 10/1/2014    Stage 4 very severe COPD by GOLD classification ([address] 2/25/2019    Stress urinary incontinence     Syncope     Urinary tract infection, site not specified

## 2019-05-15 NOTE — PROGRESS NOTES
Community Care Team Documentation for Patient in [place] [facility]     Patient discharged from [facility] 4/18/2019 - 4/28/2019 [facility], on 4/28/2019. Hospital Discharge diagnosis:    1. Acute-on-chronic hypoxic and hypercapnic respiratory failure, now stable. 2.  Chronic hypoxic respiratory failure, on home oxygen 3 liters. 3.  Perforated sigmoid diverticulitis with multiple small pelvic abscesses, the patient does not want surgery. 4.  Severe chronic obstructive pulmonary disease with mild exacerbation, now much better. 5.  Chronic diastolic heart failure, compensated. 6.  History of pelvic fracture. 7.  Leukocytosis. 8.  Esophageal wall thickening. 9.  Constipation, better. 10.  Pseudomonas urinary tract infection. 11.  Celiac and superior mesenteric artery stenosis. 12.  Comfort measures only. 13.  Do not resuscitate. SNF Attending Provider:      Anticipated discharge date from SNF:        PCP : Michael Riddle MD    Nurse Navigator: Cisco Quinonez team rounds completed, updates provided by facility.  also at facility for skilled services. [address], not skilled. Will follow. High Risk            50       Total Score        3 Has Seen PCP in Last 6 Months (Yes=3, No=0)    4 IP Visits Last 12 Months (1-3=4, 4=9, >4=11)    43 Charlson Comorbidity Score (Age + Comorbid Conditions)        Criteria that do not apply:    . Living with Significant Other. Assisted Living. LTAC. SNF. or   Rehab    Patient Length of Stay (>5 days = 3)    Pt.  Coverage (Medicare=5 , Medicaid, or Self-Pay=4)      Active Ambulatory Problems     Diagnosis Date Noted    Hyperlipidemia 05/17/2011    Overactive bladder 05/17/2011    Sciatica     Degeneration of lumbar or lumbosacral intervertebral disc     Encounter for long-term (current) use of other medications     Depression 09/20/2011    Unspecified vitamin D deficiency 04/04/2012    Glucose intolerance (pre-diabetes) 05/24/2012    RLS (restless legs syndrome) 01/17/2013    Aortic dissection, abdominal ([address] 02/05/2013    Ataxic gait 07/22/2013    Chronic neck pain 07/22/2013    Orthostatic hypotension 07/22/2013    Paresthesia 07/22/2013    Repeated falls 08/07/2013    Chronic pain syndrome 09/16/2013    Lumbosacral spondylosis without myelopathy 09/16/2013    Cervical stenosis of spinal canal 09/16/2013    Spinal stenosis in cervical region 10/11/2013    Polyneuropathy 10/11/2013    Lumbar stenosis 10/11/2013    High risk medication use 10/11/2013    Ulnar neuropathy at elbow 10/11/2013    Thoracic or lumbosacral neuritis or radiculitis, unspecified 07/14/2014    Low back pain radiating to both legs 10/01/2014    DDD (degenerative disc disease), lumbar 10/01/2014    Spondylolisthesis of lumbar region 10/01/2014    Spondylolisthesis, grade 1 10/01/2014    Lumbar facet arthropathy 10/01/2014    Lumbar disc herniation 10/01/2014    Lumbosacral radiculopathy at L5 10/01/2014    Lumbosacral radiculopathy at S1 10/01/2014    DM neuropathy, painful ([facility] 10/01/2014    Acute exacerbation of chronic obstructive pulmonary disease (COPD) ([facility] 08/11/2015    Carotid artery stenosis 08/18/2015    Atherosclerosis of native arteries of the extremities with intermittent claudication 08/18/2015    MOORE (dyspnea on exertion) 09/09/2015    SOB (shortness of breath) 09/09/2015    Murmur, cardiac 09/09/2015    Hyperlipidemia LDL goal <100 04/12/2016    Primary osteoarthritis involving multiple joints 04/12/2016    Prediabetes 04/12/2016    Restless leg syndrome 04/12/2016    Primary hypertension 08/15/2016    Panlobular emphysema ([facility] 12/13/2016    Impaired fasting glucose 12/13/2016    HCAP (healthcare-associated pneumonia) 02/07/2017    Abnormal CT scan, chest 02/07/2017    Hypercholesterolemia 04/01/2008    Hypokalemia 04/21/2017    Dizziness 04/21/2017  CHI (closed head injury) 04/21/2017    Elevated troponin 04/21/2017    Type 2 diabetes mellitus with diabetic neuropathy, without long-term current use of insulin ([address] 09/13/2017    Primary insomnia 12/04/2017    Major depression, chronic 12/06/2017    Pneumonia 02/07/2018    CAP (community acquired pneumonia) 02/07/2018    COPD exacerbation ([address] 07/13/2018    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/2018    Type 2 diabetes with nephropathy ([address] 10/22/2018    Chronic respiratory failure with hypoxia ([address] 01/01/2019    Stage 4 very severe COPD by GOLD classification ([facility] 02/25/2019    Congestive heart failure (CHF) ([address] 03/10/2019    NSTEMI (non-ST elevated myocardial infarction) ([address] 03/11/2019    Pelvic ring fracture, closed, initial encounter ([facility] 03/11/2019    Advanced care planning/counseling discussion 03/14/2019    Debility 03/14/2019    Hypoxia 04/18/2019    HAP (hospital-acquired pneumonia) 04/18/2019    Fall 04/18/2019     Resolved Ambulatory Problems     Diagnosis Date Noted    No Resolved Ambulatory Problems     Past Medical History:   Diagnosis Date    Chronic lung disease     Colon polyps     COPD 8-30-02    DDD (degenerative disc disease), lumbar 10/1/2014    Degeneration of lumbar or lumbosacral intervertebral disc     Depression     Diabetes ([address] 7-19-05    Diabetes mellitus ([address]     Diverticulosis     DM neuropathy, painful ([address] 10/1/2014    MOORE (Dyspnea on Exertion) 4-19-02    Glaucoma     HCAP (healthcare-associated pneumonia) 03/24/2017    Hemorrhoids 6-6-06    Hyperlipidemia 5/17/2011    Hypertension 4-16-02    LBP (low back pain) 4-13-04    Low back pain radiating to both legs 10/1/2014    Lumbago     Lumbar disc herniation 10/1/2014    Lumbar facet arthropathy 10/1/2014    Lumbosacral radiculopathy at L5 10/1/2014    Lumbosacral radiculopathy at S1 10/1/2014    Microscopic hematuria     OA (osteoarthritis)     Overactive bladder 5/17/2011    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    RLS (restless legs syndrome) 1/17/2013    Sciatica     Shortness of breath     Spinal stenosis, lumbar region, without neurogenic claudication     Spondylolisthesis of lumbar region 10/1/2014    Spondylolisthesis, grade 1 10/1/2014    Stage 4 very severe COPD by GOLD classification ([facility] 2/25/2019    Stress urinary incontinence     Syncope     Urinary tract infection, site not specified

## 2019-05-16 PROCEDURE — 0651 HSPC ROUTINE HOME CARE

## 2019-05-17 ENCOUNTER — HOME CARE VISIT (OUTPATIENT)
Dept: SCHEDULING | Facility: HOME HEALTH | Age: 84
End: 2019-05-17
Payer: MEDICARE

## 2019-05-17 ENCOUNTER — HOME CARE VISIT (OUTPATIENT)
Dept: HOSPICE | Facility: HOSPICE | Age: 84
End: 2019-05-17
Payer: MEDICARE

## 2019-05-17 PROCEDURE — 0651 HSPC ROUTINE HOME CARE

## 2019-05-18 PROCEDURE — 0651 HSPC ROUTINE HOME CARE

## 2019-05-19 ENCOUNTER — HOME CARE VISIT (OUTPATIENT)
Dept: HOSPICE | Facility: HOSPICE | Age: 84
End: 2019-05-19
Payer: MEDICARE

## 2019-05-19 PROCEDURE — 0651 HSPC ROUTINE HOME CARE

## 2019-05-20 ENCOUNTER — HOME CARE VISIT (OUTPATIENT)
Dept: HOSPICE | Facility: HOSPICE | Age: 84
End: 2019-05-20
Payer: MEDICARE

## 2019-05-20 ENCOUNTER — HOME CARE VISIT (OUTPATIENT)
Dept: SCHEDULING | Facility: HOME HEALTH | Age: 84
End: 2019-05-20
Payer: MEDICARE

## 2019-05-20 PROCEDURE — G0156 HHCP-SVS OF AIDE,EA 15 MIN: HCPCS

## 2019-05-20 PROCEDURE — 0651 HSPC ROUTINE HOME CARE

## 2019-05-20 RX ORDER — IPRATROPIUM BROMIDE AND ALBUTEROL SULFATE 2.5; .5 MG/3ML; MG/3ML
SOLUTION RESPIRATORY (INHALATION)
Qty: 90 NEBULE | Refills: 0 | Status: SHIPPED | OUTPATIENT
Start: 2019-05-20 | End: 2019-09-04 | Stop reason: SDUPTHER

## 2019-05-21 ENCOUNTER — PATIENT OUTREACH (OUTPATIENT)
Dept: INTERNAL MEDICINE CLINIC | Age: 84
End: 2019-05-21

## 2019-05-21 ENCOUNTER — HOME CARE VISIT (OUTPATIENT)
Dept: HOSPICE | Facility: HOSPICE | Age: 84
End: 2019-05-21
Payer: MEDICARE

## 2019-05-21 PROCEDURE — G0299 HHS/HOSPICE OF RN EA 15 MIN: HCPCS

## 2019-05-21 PROCEDURE — 0651 HSPC ROUTINE HOME CARE

## 2019-05-21 NOTE — PROGRESS NOTES
Hospital Discharge Follow-Up Date/Time:  5/21/2019 12:02 PM 
 
Patient was admitted to [facility] on 4/18/19 and discharged on 4/28/19 for acute on chronic hypoxic and hypercapnic respiratory failure. Patient was transferred to [address] [place] [address] 4/28/19 to 5/20/19 for continuation of care. The physician discharge summary was available at the time of outreach. Patient was contacted within 1 business days of discharge. Contacted Daya Davis, daughter (listed on [facility]) for Disease Specific Care Management  follow up. No answer. Left message introducing self, role and reason for call. Requested return call. Contact information provided. Will attempt to contact at a later time. Also spoke with Kennedy Otto, patient's son-in-law who was able to verify hospital bed was received. He voiced he will let his wife know that we called. Home Health orders at discharge: hospice [address]: [address] Date of initial visit: unknown Durable Medical Equipment ordered/company: hospital bed Durable Medical Equipment received: yes Advance Care Planning:  
Does patient have an Advance Directive:  reviewed and current [facility] follow up appointment(s):  
Future Appointments Date Time Provider Brandon Morgan 5/23/2019 To Be Determined Liliana Gu CNA [address] [facility]  
5/27/2019 To Be Determined Liliana Gu CNA [address] [facility] [facility]  
5/30/2019 To Be Determined Liliana Gu CNA [address] [facility] [facility]  
6/3/2019  4:00 AM Wilson Neighbor [facility]  
6/3/2019 To Be Determined Liliana Gu CNA [facility]  
6/6/2019 To Be Determined Liliana Gu  [address]  
6/10/2019 To Be Determined Liliana Gu  [address]  
6/13/2019 To Be Determined Liliana Gu CNA [address] [place] [address]  
6/17/2019 To Be Determined Liliana Gu CNA [facility]  
 6/20/2019 To Be Determined OLIVIA AndradeA [address] [facility]  
6/21/2019  8:30 AM Diana Granados MD [facility]  
6/24/2019 To Be Determined Scoobyaugusto [place],  [facility],  [id number]  
6/25/2019  9:45 AM Graham Sarmiento MD [address] [place] [address]  
6/27/2019 To Be Determined Scoobyaugusto iMcha, CNA [address] [facility]  
7/1/2019 To Be Determined Hakeem Muse, A [address] [facility]  
7/4/2019 To Be Determined Hakeem Muse, A [address] [facility]  
7/8/2019 To Be Determined Hakeem Muse, A [address] [facility]  
7/11/2019 To Be Determined Hakeem Muse, A [address] [facility]  
7/15/2019 To Be Determined Hakeem Muse, A [address] [facility]  
7/18/2019 To Be Determined Hakeem Muse, A [address] [facility]  
7/22/2019 To Be Determined Hakeem Muse, A [address] [facility]  
7/25/2019 To Be Determined OLIVIA AndradeA [address] [facility] [id number] [facility] follow up appointment(s): N/A [facility]:  n/a

## 2019-05-22 ENCOUNTER — HOME CARE VISIT (OUTPATIENT)
Dept: HOSPICE | Facility: HOSPICE | Age: 84
End: 2019-05-22
Payer: MEDICARE

## 2019-05-22 PROCEDURE — 0651 HSPC ROUTINE HOME CARE

## 2019-05-23 ENCOUNTER — HOME CARE VISIT (OUTPATIENT)
Dept: SCHEDULING | Facility: HOME HEALTH | Age: 84
End: 2019-05-23
Payer: MEDICARE

## 2019-05-23 ENCOUNTER — PATIENT OUTREACH (OUTPATIENT)
Dept: INTERNAL MEDICINE CLINIC | Age: 84
End: 2019-05-23

## 2019-05-23 ENCOUNTER — HOME CARE VISIT (OUTPATIENT)
Dept: HOSPICE | Facility: HOSPICE | Age: 84
End: 2019-05-23
Payer: MEDICARE

## 2019-05-23 VITALS
DIASTOLIC BLOOD PRESSURE: 61 MMHG | RESPIRATION RATE: 18 BRPM | HEART RATE: 72 BPM | OXYGEN SATURATION: 98 % | SYSTOLIC BLOOD PRESSURE: 102 MMHG

## 2019-05-23 VITALS
TEMPERATURE: 99.7 F | SYSTOLIC BLOOD PRESSURE: 160 MMHG | HEART RATE: 94 BPM | DIASTOLIC BLOOD PRESSURE: 80 MMHG | OXYGEN SATURATION: 98 %

## 2019-05-23 PROCEDURE — G0156 HHCP-SVS OF AIDE,EA 15 MIN: HCPCS

## 2019-05-23 PROCEDURE — 0651 HSPC ROUTINE HOME CARE

## 2019-05-23 PROCEDURE — T4526 ADULT SIZE PULL-ON MED: HCPCS

## 2019-05-23 PROCEDURE — A9270 NON-COVERED ITEM OR SERVICE: HCPCS

## 2019-05-23 PROCEDURE — G0151 HHCP-SERV OF PT,EA 15 MIN: HCPCS

## 2019-05-23 PROCEDURE — HHS10554 SHAMPOO/BODY WASH 8 OZ ALOE VESTA

## 2019-05-23 PROCEDURE — T4541 LARGE DISPOSABLE UNDERPAD: HCPCS

## 2019-05-24 ENCOUNTER — HOME CARE VISIT (OUTPATIENT)
Dept: HOSPICE | Facility: HOSPICE | Age: 84
End: 2019-05-24
Payer: MEDICARE

## 2019-05-24 VITALS
HEART RATE: 87 BPM | OXYGEN SATURATION: 99 % | DIASTOLIC BLOOD PRESSURE: 53 MMHG | SYSTOLIC BLOOD PRESSURE: 126 MMHG | RESPIRATION RATE: 20 BRPM

## 2019-05-24 PROCEDURE — 0651 HSPC ROUTINE HOME CARE

## 2019-05-24 PROCEDURE — G0299 HHS/HOSPICE OF RN EA 15 MIN: HCPCS

## 2019-05-25 PROCEDURE — 0651 HSPC ROUTINE HOME CARE

## 2019-05-26 PROCEDURE — 0651 HSPC ROUTINE HOME CARE

## 2019-05-27 PROCEDURE — HOSPICE MEDICATION HC HH HOSPICE MEDICATION

## 2019-05-27 PROCEDURE — 0651 HSPC ROUTINE HOME CARE

## 2019-05-28 ENCOUNTER — HOME CARE VISIT (OUTPATIENT)
Dept: HOSPICE | Facility: HOSPICE | Age: 84
End: 2019-05-28
Payer: MEDICARE

## 2019-05-28 PROCEDURE — G0299 HHS/HOSPICE OF RN EA 15 MIN: HCPCS

## 2019-05-28 PROCEDURE — 0651 HSPC ROUTINE HOME CARE

## 2019-05-28 NOTE — TELEPHONE ENCOUNTER
Juan Alberto Escobar MD  [facility] 1 minute ago (4:39 PM)      Gabapentin was discontinued. Tricia Logan aware of message and verbalized understanding.

## 2019-05-28 NOTE — TELEPHONE ENCOUNTER
Wild Gray MD  [facility] 33 minutes ago (3:56 PM)      She should continue Pepcid for now, that may be discontinued in the fairly near future at a follow-up appointment. Routing comment        Spoke with Adria Sow. She is aware of message and verbalized understanding. She wanted to know patient needs to continue gabapentin.

## 2019-05-28 NOTE — TELEPHONE ENCOUNTER
PT is home from [facility] of her meds were changed-including Pepcid being added- she wants a med reconciliation

## 2019-05-29 ENCOUNTER — HOME CARE VISIT (OUTPATIENT)
Dept: SCHEDULING | Facility: HOME HEALTH | Age: 84
End: 2019-05-29
Payer: MEDICARE

## 2019-05-29 PROCEDURE — G0157 HHC PT ASSISTANT EA 15: HCPCS

## 2019-05-29 PROCEDURE — 0651 HSPC ROUTINE HOME CARE

## 2019-05-30 ENCOUNTER — HOME CARE VISIT (OUTPATIENT)
Dept: SCHEDULING | Facility: HOME HEALTH | Age: 84
End: 2019-05-30
Payer: MEDICARE

## 2019-05-30 VITALS
TEMPERATURE: 97.9 F | OXYGEN SATURATION: 98 % | HEART RATE: 60 BPM | SYSTOLIC BLOOD PRESSURE: 141 MMHG | DIASTOLIC BLOOD PRESSURE: 51 MMHG

## 2019-05-30 VITALS
SYSTOLIC BLOOD PRESSURE: 138 MMHG | HEART RATE: 83 BPM | TEMPERATURE: 98.1 F | OXYGEN SATURATION: 96 % | DIASTOLIC BLOOD PRESSURE: 61 MMHG

## 2019-05-30 PROCEDURE — 0651 HSPC ROUTINE HOME CARE

## 2019-05-30 PROCEDURE — G0157 HHC PT ASSISTANT EA 15: HCPCS

## 2019-05-31 ENCOUNTER — HOME CARE VISIT (OUTPATIENT)
Dept: HOSPICE | Facility: HOSPICE | Age: 84
End: 2019-05-31
Payer: MEDICARE

## 2019-05-31 ENCOUNTER — HOME CARE VISIT (OUTPATIENT)
Dept: SCHEDULING | Facility: HOME HEALTH | Age: 84
End: 2019-05-31
Payer: MEDICARE

## 2019-05-31 VITALS
DIASTOLIC BLOOD PRESSURE: 64 MMHG | OXYGEN SATURATION: 96 % | TEMPERATURE: 98.9 F | HEART RATE: 76 BPM | SYSTOLIC BLOOD PRESSURE: 110 MMHG

## 2019-05-31 VITALS — HEART RATE: 77 BPM | OXYGEN SATURATION: 99 % | RESPIRATION RATE: 18 BRPM

## 2019-05-31 PROCEDURE — G0299 HHS/HOSPICE OF RN EA 15 MIN: HCPCS

## 2019-05-31 PROCEDURE — G0151 HHCP-SERV OF PT,EA 15 MIN: HCPCS

## 2019-05-31 PROCEDURE — 0651 HSPC ROUTINE HOME CARE

## 2019-06-01 PROCEDURE — 0651 HSPC ROUTINE HOME CARE

## 2019-06-02 PROCEDURE — 0651 HSPC ROUTINE HOME CARE

## 2019-06-03 PROCEDURE — 0651 HSPC ROUTINE HOME CARE

## 2019-06-04 ENCOUNTER — PATIENT OUTREACH (OUTPATIENT)
Dept: INTERNAL MEDICINE CLINIC | Age: 84
End: 2019-06-04

## 2019-06-04 PROCEDURE — 0651 HSPC ROUTINE HOME CARE

## 2019-06-04 NOTE — PROGRESS NOTES
Patient has graduated from the Complex Case Management  program on 6/4/19. Patient is under the care of hospice. Patient/family has the ability to self-manage. Care management goals have been completed at this time. No further nurse navigator follow up scheduled. Goals Addressed     None          Pt has nurse navigator's contact information for any further questions, concerns, or needs.   Patients upcoming visits:    Future Appointments   Date Time Provider Brandon Morgan   6/21/2019  8:30 AM Nevin Polk MD [facility] [address]   6/25/2019  9:45 AM Jason Prince MD [address] [place] [address]

## 2019-06-05 PROCEDURE — 0651 HSPC ROUTINE HOME CARE

## 2019-06-06 ENCOUNTER — HOME CARE VISIT (OUTPATIENT)
Dept: HOSPICE | Facility: HOSPICE | Age: 84
End: 2019-06-06
Payer: MEDICARE

## 2019-06-06 PROCEDURE — 0651 HSPC ROUTINE HOME CARE

## 2019-06-06 PROCEDURE — T4526 ADULT SIZE PULL-ON MED: HCPCS

## 2019-06-06 PROCEDURE — G0299 HHS/HOSPICE OF RN EA 15 MIN: HCPCS

## 2019-06-06 PROCEDURE — HOSPICE MEDICATION HC HH HOSPICE MEDICATION

## 2019-06-06 PROCEDURE — T4541 LARGE DISPOSABLE UNDERPAD: HCPCS

## 2019-06-06 PROCEDURE — A6250 SKIN SEAL PROTECT MOISTURIZR: HCPCS

## 2019-06-07 ENCOUNTER — HOME CARE VISIT (OUTPATIENT)
Dept: HOSPICE | Facility: HOSPICE | Age: 84
End: 2019-06-07
Payer: MEDICARE

## 2019-06-07 PROCEDURE — HOSPICE MEDICATION HC HH HOSPICE MEDICATION

## 2019-06-07 PROCEDURE — 0651 HSPC ROUTINE HOME CARE

## 2019-06-07 PROCEDURE — G0299 HHS/HOSPICE OF RN EA 15 MIN: HCPCS

## 2019-06-07 NOTE — TELEPHONE ENCOUNTER
Pt daughter in law Mak Aguilar stated pt needs a refill for Albuterol solution to be sent to [facility]. Please advise [phone].

## 2019-06-08 VITALS — RESPIRATION RATE: 16 BRPM

## 2019-06-08 PROCEDURE — 0651 HSPC ROUTINE HOME CARE

## 2019-06-09 PROCEDURE — 0651 HSPC ROUTINE HOME CARE

## 2019-06-10 ENCOUNTER — TELEPHONE (OUTPATIENT)
Dept: INTERNAL MEDICINE CLINIC | Age: 84
End: 2019-06-10

## 2019-06-10 ENCOUNTER — HOME CARE VISIT (OUTPATIENT)
Dept: HOSPICE | Facility: HOSPICE | Age: 84
End: 2019-06-10
Payer: MEDICARE

## 2019-06-10 PROCEDURE — 0651 HSPC ROUTINE HOME CARE

## 2019-06-10 PROCEDURE — G0299 HHS/HOSPICE OF RN EA 15 MIN: HCPCS

## 2019-06-10 PROCEDURE — HOSPICE MEDICATION HC HH HOSPICE MEDICATION

## 2019-06-10 NOTE — TELEPHONE ENCOUNTER
Lizandro Nettles MD  [facility] 27 minutes ago (5:00 PM)      In February 2018 another doctor ordered 100 mg of tramadol at bedtime, I did not order that.       Spoke with [facility] [place], the rx she asked about was trazodone which it looked like she was on 100mg of that before as well, Abundio Chen made aware of old dosage

## 2019-06-10 NOTE — TELEPHONE ENCOUNTER
Chiquita Quiles with [employer] is calling stating the patient told them that Dr. Beverly Blancas used to prescribe her Tramadol to help her sleep. They started her on 50 mg but it doesn't seem to be working. She wants to know what we had her on to see if maybe there medical director will go with that.

## 2019-06-11 PROCEDURE — 0651 HSPC ROUTINE HOME CARE

## 2019-06-12 ENCOUNTER — HOME CARE VISIT (OUTPATIENT)
Dept: HOSPICE | Facility: HOSPICE | Age: 84
End: 2019-06-12
Payer: MEDICARE

## 2019-06-12 PROCEDURE — 0651 HSPC ROUTINE HOME CARE

## 2019-06-13 ENCOUNTER — HOME CARE VISIT (OUTPATIENT)
Dept: SCHEDULING | Facility: HOME HEALTH | Age: 84
End: 2019-06-13
Payer: MEDICARE

## 2019-06-13 VITALS
RESPIRATION RATE: 18 BRPM | SYSTOLIC BLOOD PRESSURE: 114 MMHG | OXYGEN SATURATION: 98 % | DIASTOLIC BLOOD PRESSURE: 47 MMHG | HEART RATE: 62 BPM

## 2019-06-13 PROCEDURE — 0651 HSPC ROUTINE HOME CARE

## 2019-06-13 PROCEDURE — G0155 HHCP-SVS OF CSW,EA 15 MIN: HCPCS

## 2019-06-14 PROCEDURE — 0651 HSPC ROUTINE HOME CARE

## 2019-06-15 PROCEDURE — 0651 HSPC ROUTINE HOME CARE

## 2019-06-16 PROCEDURE — 0651 HSPC ROUTINE HOME CARE

## 2019-06-17 PROCEDURE — HOSPICE MEDICATION HC HH HOSPICE MEDICATION

## 2019-06-17 PROCEDURE — 0651 HSPC ROUTINE HOME CARE

## 2019-06-18 ENCOUNTER — HOME CARE VISIT (OUTPATIENT)
Dept: HOSPICE | Facility: HOSPICE | Age: 84
End: 2019-06-18
Payer: MEDICARE

## 2019-06-18 PROCEDURE — 0651 HSPC ROUTINE HOME CARE

## 2019-06-19 PROCEDURE — 0651 HSPC ROUTINE HOME CARE

## 2019-06-20 ENCOUNTER — HOME CARE VISIT (OUTPATIENT)
Dept: SCHEDULING | Facility: HOME HEALTH | Age: 84
End: 2019-06-20
Payer: MEDICARE

## 2019-06-20 PROCEDURE — 0651 HSPC ROUTINE HOME CARE

## 2019-06-20 PROCEDURE — G0299 HHS/HOSPICE OF RN EA 15 MIN: HCPCS

## 2019-06-20 PROCEDURE — HOSPICE MEDICATION HC HH HOSPICE MEDICATION

## 2019-06-21 PROCEDURE — 0651 HSPC ROUTINE HOME CARE

## 2019-06-22 PROCEDURE — 0651 HSPC ROUTINE HOME CARE

## 2019-06-23 PROCEDURE — 0651 HSPC ROUTINE HOME CARE

## 2019-06-24 PROCEDURE — 0651 HSPC ROUTINE HOME CARE

## 2019-06-25 VITALS — HEART RATE: 84 BPM | DIASTOLIC BLOOD PRESSURE: 72 MMHG | SYSTOLIC BLOOD PRESSURE: 143 MMHG | OXYGEN SATURATION: 98 %

## 2019-06-25 PROCEDURE — 0651 HSPC ROUTINE HOME CARE

## 2019-06-26 PROCEDURE — 0651 HSPC ROUTINE HOME CARE

## 2019-06-27 PROCEDURE — 0651 HSPC ROUTINE HOME CARE

## 2019-06-28 ENCOUNTER — HOME CARE VISIT (OUTPATIENT)
Dept: SCHEDULING | Facility: HOME HEALTH | Age: 84
End: 2019-06-28
Payer: MEDICARE

## 2019-06-28 PROCEDURE — 0651 HSPC ROUTINE HOME CARE

## 2019-06-28 PROCEDURE — G0299 HHS/HOSPICE OF RN EA 15 MIN: HCPCS

## 2019-06-29 PROCEDURE — 0651 HSPC ROUTINE HOME CARE

## 2019-06-30 PROCEDURE — 0651 HSPC ROUTINE HOME CARE

## 2019-07-01 VITALS
RESPIRATION RATE: 18 BRPM | OXYGEN SATURATION: 95 % | DIASTOLIC BLOOD PRESSURE: 53 MMHG | HEART RATE: 67 BPM | SYSTOLIC BLOOD PRESSURE: 117 MMHG

## 2019-07-01 PROCEDURE — 0651 HSPC ROUTINE HOME CARE

## 2019-07-02 PROCEDURE — 0651 HSPC ROUTINE HOME CARE

## 2019-07-03 ENCOUNTER — HOME CARE VISIT (OUTPATIENT)
Dept: HOSPICE | Facility: HOSPICE | Age: 84
End: 2019-07-03
Payer: MEDICARE

## 2019-07-03 PROCEDURE — 0651 HSPC ROUTINE HOME CARE

## 2019-07-04 PROCEDURE — 0651 HSPC ROUTINE HOME CARE

## 2019-07-05 ENCOUNTER — TELEPHONE (OUTPATIENT)
Dept: INTERNAL MEDICINE CLINIC | Age: 84
End: 2019-07-05

## 2019-07-05 ENCOUNTER — HOME CARE VISIT (OUTPATIENT)
Dept: HOSPICE | Facility: HOSPICE | Age: 84
End: 2019-07-05
Payer: MEDICARE

## 2019-07-05 DIAGNOSIS — G25.81 RESTLESS LEG SYNDROME: Primary | ICD-10-CM

## 2019-07-05 PROCEDURE — 0651 HSPC ROUTINE HOME CARE

## 2019-07-05 PROCEDURE — HOSPICE MEDICATION HC HH HOSPICE MEDICATION

## 2019-07-05 RX ORDER — GABAPENTIN 300 MG/1
CAPSULE ORAL
Qty: 270 CAP | Refills: 1 | Status: SHIPPED | OUTPATIENT
Start: 2019-07-05 | End: 2020-02-24

## 2019-07-05 NOTE — TELEPHONE ENCOUNTER
Hospice calling, says family is asking if pt can go back on gabapentin for restless leg? If so needs an rx faxed to them at [phone].

## 2019-07-06 PROCEDURE — 0651 HSPC ROUTINE HOME CARE

## 2019-07-07 PROCEDURE — 0651 HSPC ROUTINE HOME CARE

## 2019-07-08 PROCEDURE — 0651 HSPC ROUTINE HOME CARE

## 2019-07-09 PROCEDURE — 0651 HSPC ROUTINE HOME CARE

## 2019-07-10 PROCEDURE — 0651 HSPC ROUTINE HOME CARE

## 2019-07-11 ENCOUNTER — HOME CARE VISIT (OUTPATIENT)
Dept: SCHEDULING | Facility: HOME HEALTH | Age: 84
End: 2019-07-11
Payer: MEDICARE

## 2019-07-11 ENCOUNTER — HOME CARE VISIT (OUTPATIENT)
Dept: HOSPICE | Facility: HOSPICE | Age: 84
End: 2019-07-11
Payer: MEDICARE

## 2019-07-11 PROCEDURE — G0155 HHCP-SVS OF CSW,EA 15 MIN: HCPCS

## 2019-07-11 PROCEDURE — 0651 HSPC ROUTINE HOME CARE

## 2019-07-12 ENCOUNTER — HOME CARE VISIT (OUTPATIENT)
Dept: HOSPICE | Facility: HOSPICE | Age: 84
End: 2019-07-12
Payer: MEDICARE

## 2019-07-12 PROCEDURE — G0299 HHS/HOSPICE OF RN EA 15 MIN: HCPCS

## 2019-07-12 PROCEDURE — 0651 HSPC ROUTINE HOME CARE

## 2019-07-13 PROCEDURE — 0651 HSPC ROUTINE HOME CARE

## 2019-07-14 PROCEDURE — 0651 HSPC ROUTINE HOME CARE

## 2019-07-15 PROCEDURE — 0651 HSPC ROUTINE HOME CARE

## 2019-07-16 PROCEDURE — 0651 HSPC ROUTINE HOME CARE

## 2019-07-17 ENCOUNTER — HOME CARE VISIT (OUTPATIENT)
Dept: HOSPICE | Facility: HOSPICE | Age: 84
End: 2019-07-17
Payer: MEDICARE

## 2019-07-17 VITALS
OXYGEN SATURATION: 98 % | RESPIRATION RATE: 18 BRPM | DIASTOLIC BLOOD PRESSURE: 78 MMHG | HEART RATE: 55 BPM | SYSTOLIC BLOOD PRESSURE: 136 MMHG | TEMPERATURE: 97 F

## 2019-07-17 PROCEDURE — G0299 HHS/HOSPICE OF RN EA 15 MIN: HCPCS

## 2019-07-17 PROCEDURE — 0651 HSPC ROUTINE HOME CARE

## 2019-07-17 PROCEDURE — T4526 ADULT SIZE PULL-ON MED: HCPCS

## 2019-07-17 PROCEDURE — HOSPICE MEDICATION HC HH HOSPICE MEDICATION

## 2019-07-17 PROCEDURE — A4927 NON-STERILE GLOVES: HCPCS

## 2019-07-18 PROCEDURE — 0651 HSPC ROUTINE HOME CARE

## 2019-07-19 PROCEDURE — 0651 HSPC ROUTINE HOME CARE

## 2019-07-20 PROCEDURE — 0651 HSPC ROUTINE HOME CARE

## 2019-07-21 PROCEDURE — 0651 HSPC ROUTINE HOME CARE

## 2019-07-22 PROCEDURE — 0651 HSPC ROUTINE HOME CARE

## 2019-07-23 PROCEDURE — 0651 HSPC ROUTINE HOME CARE

## 2019-07-24 ENCOUNTER — HOME CARE VISIT (OUTPATIENT)
Dept: SCHEDULING | Facility: HOME HEALTH | Age: 84
End: 2019-07-24
Payer: MEDICARE

## 2019-07-24 PROCEDURE — 0651 HSPC ROUTINE HOME CARE

## 2019-07-24 PROCEDURE — G0299 HHS/HOSPICE OF RN EA 15 MIN: HCPCS

## 2019-07-25 PROCEDURE — 0651 HSPC ROUTINE HOME CARE

## 2019-07-25 PROCEDURE — T4526 ADULT SIZE PULL-ON MED: HCPCS

## 2019-07-25 PROCEDURE — T4541 LARGE DISPOSABLE UNDERPAD: HCPCS

## 2019-07-26 PROCEDURE — 0651 HSPC ROUTINE HOME CARE

## 2019-07-27 VITALS
RESPIRATION RATE: 18 BRPM | SYSTOLIC BLOOD PRESSURE: 130 MMHG | HEART RATE: 60 BPM | DIASTOLIC BLOOD PRESSURE: 69 MMHG | OXYGEN SATURATION: 98 %

## 2019-07-27 PROCEDURE — 0651 HSPC ROUTINE HOME CARE

## 2019-07-28 PROCEDURE — 0651 HSPC ROUTINE HOME CARE

## 2019-07-29 PROCEDURE — 0651 HSPC ROUTINE HOME CARE

## 2019-07-30 PROCEDURE — 0651 HSPC ROUTINE HOME CARE

## 2019-07-31 PROCEDURE — 0651 HSPC ROUTINE HOME CARE

## 2019-08-01 ENCOUNTER — HOME CARE VISIT (OUTPATIENT)
Dept: SCHEDULING | Facility: HOME HEALTH | Age: 84
End: 2019-08-01
Payer: MEDICARE

## 2019-08-01 PROCEDURE — G0299 HHS/HOSPICE OF RN EA 15 MIN: HCPCS

## 2019-08-01 PROCEDURE — 0651 HSPC ROUTINE HOME CARE

## 2019-08-01 PROCEDURE — T4526 ADULT SIZE PULL-ON MED: HCPCS

## 2019-08-02 VITALS
RESPIRATION RATE: 18 BRPM | HEART RATE: 64 BPM | DIASTOLIC BLOOD PRESSURE: 50 MMHG | SYSTOLIC BLOOD PRESSURE: 89 MMHG | OXYGEN SATURATION: 98 %

## 2019-08-02 PROCEDURE — 0651 HSPC ROUTINE HOME CARE

## 2019-08-03 PROCEDURE — 0651 HSPC ROUTINE HOME CARE

## 2019-08-04 PROCEDURE — 0651 HSPC ROUTINE HOME CARE

## 2019-08-05 PROCEDURE — 0651 HSPC ROUTINE HOME CARE

## 2019-08-06 PROCEDURE — 0651 HSPC ROUTINE HOME CARE

## 2019-08-07 PROCEDURE — 0651 HSPC ROUTINE HOME CARE

## 2019-08-08 ENCOUNTER — HOME CARE VISIT (OUTPATIENT)
Dept: SCHEDULING | Facility: HOME HEALTH | Age: 84
End: 2019-08-08
Payer: MEDICARE

## 2019-08-08 PROCEDURE — G0299 HHS/HOSPICE OF RN EA 15 MIN: HCPCS

## 2019-08-08 PROCEDURE — 0651 HSPC ROUTINE HOME CARE

## 2019-08-09 VITALS
OXYGEN SATURATION: 97 % | HEART RATE: 61 BPM | DIASTOLIC BLOOD PRESSURE: 71 MMHG | RESPIRATION RATE: 20 BRPM | SYSTOLIC BLOOD PRESSURE: 118 MMHG

## 2019-08-09 PROCEDURE — 0651 HSPC ROUTINE HOME CARE

## 2019-08-10 PROCEDURE — 0651 HSPC ROUTINE HOME CARE

## 2019-08-11 PROCEDURE — 0651 HSPC ROUTINE HOME CARE

## 2019-08-12 PROCEDURE — 0651 HSPC ROUTINE HOME CARE

## 2019-08-13 PROCEDURE — 0651 HSPC ROUTINE HOME CARE

## 2019-08-14 ENCOUNTER — HOME CARE VISIT (OUTPATIENT)
Dept: SCHEDULING | Facility: HOME HEALTH | Age: 84
End: 2019-08-14
Payer: MEDICARE

## 2019-08-14 PROCEDURE — G0155 HHCP-SVS OF CSW,EA 15 MIN: HCPCS

## 2019-08-14 PROCEDURE — 0651 HSPC ROUTINE HOME CARE

## 2019-08-15 ENCOUNTER — HOME CARE VISIT (OUTPATIENT)
Dept: HOSPICE | Facility: HOSPICE | Age: 84
End: 2019-08-15
Payer: MEDICARE

## 2019-08-15 PROCEDURE — 0651 HSPC ROUTINE HOME CARE

## 2019-08-15 PROCEDURE — HOSPICE MEDICATION HC HH HOSPICE MEDICATION

## 2019-08-16 ENCOUNTER — HOME CARE VISIT (OUTPATIENT)
Dept: SCHEDULING | Facility: HOME HEALTH | Age: 84
End: 2019-08-16
Payer: MEDICARE

## 2019-08-16 PROCEDURE — G0299 HHS/HOSPICE OF RN EA 15 MIN: HCPCS

## 2019-08-16 PROCEDURE — 0651 HSPC ROUTINE HOME CARE

## 2019-08-17 PROCEDURE — 0651 HSPC ROUTINE HOME CARE

## 2019-08-18 PROCEDURE — 0651 HSPC ROUTINE HOME CARE

## 2019-08-19 PROCEDURE — 0651 HSPC ROUTINE HOME CARE

## 2019-08-20 VITALS
OXYGEN SATURATION: 96 % | DIASTOLIC BLOOD PRESSURE: 68 MMHG | SYSTOLIC BLOOD PRESSURE: 100 MMHG | HEART RATE: 64 BPM | RESPIRATION RATE: 18 BRPM

## 2019-08-20 PROCEDURE — 0651 HSPC ROUTINE HOME CARE

## 2019-08-21 PROCEDURE — 0651 HSPC ROUTINE HOME CARE

## 2019-08-22 PROCEDURE — 0651 HSPC ROUTINE HOME CARE

## 2019-08-23 ENCOUNTER — HOME CARE VISIT (OUTPATIENT)
Dept: HOSPICE | Facility: HOSPICE | Age: 84
End: 2019-08-23
Payer: MEDICARE

## 2019-08-23 PROCEDURE — 0651 HSPC ROUTINE HOME CARE

## 2019-08-23 PROCEDURE — HOSPICE MEDICATION HC HH HOSPICE MEDICATION

## 2019-08-24 PROCEDURE — 0651 HSPC ROUTINE HOME CARE

## 2019-08-25 PROCEDURE — 0651 HSPC ROUTINE HOME CARE

## 2019-08-26 ENCOUNTER — HOME CARE VISIT (OUTPATIENT)
Dept: SCHEDULING | Facility: HOME HEALTH | Age: 84
End: 2019-08-26
Payer: MEDICARE

## 2019-08-26 PROCEDURE — G0299 HHS/HOSPICE OF RN EA 15 MIN: HCPCS

## 2019-08-26 PROCEDURE — A4927 NON-STERILE GLOVES: HCPCS

## 2019-08-26 PROCEDURE — 0651 HSPC ROUTINE HOME CARE

## 2019-08-26 PROCEDURE — T4526 ADULT SIZE PULL-ON MED: HCPCS

## 2019-08-27 VITALS
SYSTOLIC BLOOD PRESSURE: 117 MMHG | RESPIRATION RATE: 18 BRPM | OXYGEN SATURATION: 97 % | HEART RATE: 67 BPM | DIASTOLIC BLOOD PRESSURE: 73 MMHG

## 2019-08-27 PROCEDURE — HOSPICE MEDICATION HC HH HOSPICE MEDICATION

## 2019-08-27 PROCEDURE — 0651 HSPC ROUTINE HOME CARE

## 2019-08-28 ENCOUNTER — HOME CARE VISIT (OUTPATIENT)
Dept: HOSPICE | Facility: HOSPICE | Age: 84
End: 2019-08-28
Payer: MEDICARE

## 2019-08-28 PROCEDURE — 0651 HSPC ROUTINE HOME CARE

## 2019-08-29 PROCEDURE — 0651 HSPC ROUTINE HOME CARE

## 2019-08-30 PROCEDURE — 0651 HSPC ROUTINE HOME CARE

## 2019-08-31 PROCEDURE — 0651 HSPC ROUTINE HOME CARE

## 2019-08-31 PROCEDURE — HOSPICE MEDICATION HC HH HOSPICE MEDICATION

## 2019-09-01 PROCEDURE — 0651 HSPC ROUTINE HOME CARE

## 2019-09-02 PROCEDURE — 0651 HSPC ROUTINE HOME CARE

## 2019-09-03 ENCOUNTER — HOME CARE VISIT (OUTPATIENT)
Dept: SCHEDULING | Facility: HOME HEALTH | Age: 84
End: 2019-09-03
Payer: MEDICARE

## 2019-09-03 VITALS
HEART RATE: 61 BPM | RESPIRATION RATE: 18 BRPM | SYSTOLIC BLOOD PRESSURE: 124 MMHG | DIASTOLIC BLOOD PRESSURE: 71 MMHG | OXYGEN SATURATION: 93 %

## 2019-09-03 PROCEDURE — 0651 HSPC ROUTINE HOME CARE

## 2019-09-03 PROCEDURE — G0299 HHS/HOSPICE OF RN EA 15 MIN: HCPCS

## 2019-09-04 PROCEDURE — 0651 HSPC ROUTINE HOME CARE

## 2019-09-05 PROCEDURE — 0651 HSPC ROUTINE HOME CARE

## 2019-09-05 RX ORDER — IPRATROPIUM BROMIDE AND ALBUTEROL SULFATE 2.5; .5 MG/3ML; MG/3ML
SOLUTION RESPIRATORY (INHALATION)
Qty: 270 NEBULE | Refills: 0 | Status: SHIPPED | OUTPATIENT
Start: 2019-09-05 | End: 2020-01-01

## 2019-09-06 ENCOUNTER — HOME CARE VISIT (OUTPATIENT)
Dept: HOSPICE | Facility: HOSPICE | Age: 84
End: 2019-09-06
Payer: MEDICARE

## 2019-09-06 PROCEDURE — 0651 HSPC ROUTINE HOME CARE

## 2019-09-07 PROCEDURE — 0651 HSPC ROUTINE HOME CARE

## 2019-09-08 PROCEDURE — 0651 HSPC ROUTINE HOME CARE

## 2019-09-09 PROCEDURE — 0651 HSPC ROUTINE HOME CARE

## 2019-09-10 PROCEDURE — HOSPICE MEDICATION HC HH HOSPICE MEDICATION

## 2019-09-10 PROCEDURE — 0651 HSPC ROUTINE HOME CARE

## 2019-09-11 PROCEDURE — 0651 HSPC ROUTINE HOME CARE

## 2019-09-12 PROCEDURE — 0651 HSPC ROUTINE HOME CARE

## 2019-09-13 ENCOUNTER — HOME CARE VISIT (OUTPATIENT)
Dept: SCHEDULING | Facility: HOME HEALTH | Age: 84
End: 2019-09-13
Payer: MEDICARE

## 2019-09-13 ENCOUNTER — HOME CARE VISIT (OUTPATIENT)
Dept: HOSPICE | Facility: HOSPICE | Age: 84
End: 2019-09-13
Payer: MEDICARE

## 2019-09-13 PROCEDURE — HOSPICE MEDICATION HC HH HOSPICE MEDICATION

## 2019-09-13 PROCEDURE — G0299 HHS/HOSPICE OF RN EA 15 MIN: HCPCS

## 2019-09-13 PROCEDURE — 0651 HSPC ROUTINE HOME CARE

## 2019-09-14 PROCEDURE — 0651 HSPC ROUTINE HOME CARE

## 2019-09-15 PROCEDURE — 0651 HSPC ROUTINE HOME CARE

## 2019-09-16 ENCOUNTER — HOME CARE VISIT (OUTPATIENT)
Dept: SCHEDULING | Facility: HOME HEALTH | Age: 84
End: 2019-09-16
Payer: MEDICARE

## 2019-09-16 VITALS
DIASTOLIC BLOOD PRESSURE: 81 MMHG | SYSTOLIC BLOOD PRESSURE: 105 MMHG | RESPIRATION RATE: 18 BRPM | HEART RATE: 70 BPM | OXYGEN SATURATION: 95 %

## 2019-09-16 PROCEDURE — T4526 ADULT SIZE PULL-ON MED: HCPCS

## 2019-09-16 PROCEDURE — 0651 HSPC ROUTINE HOME CARE

## 2019-09-16 PROCEDURE — G0299 HHS/HOSPICE OF RN EA 15 MIN: HCPCS

## 2019-09-16 PROCEDURE — A4927 NON-STERILE GLOVES: HCPCS

## 2019-09-17 PROCEDURE — 0651 HSPC ROUTINE HOME CARE

## 2019-09-18 PROCEDURE — 0651 HSPC ROUTINE HOME CARE

## 2019-09-18 PROCEDURE — T4526 ADULT SIZE PULL-ON MED: HCPCS

## 2019-09-19 ENCOUNTER — HOME CARE VISIT (OUTPATIENT)
Dept: SCHEDULING | Facility: HOME HEALTH | Age: 84
End: 2019-09-19
Payer: MEDICARE

## 2019-09-19 PROCEDURE — HOSPICE MEDICATION HC HH HOSPICE MEDICATION

## 2019-09-19 PROCEDURE — 0651 HSPC ROUTINE HOME CARE

## 2019-09-19 PROCEDURE — G0299 HHS/HOSPICE OF RN EA 15 MIN: HCPCS

## 2019-09-20 VITALS — OXYGEN SATURATION: 93 % | HEART RATE: 75 BPM | SYSTOLIC BLOOD PRESSURE: 148 MMHG | DIASTOLIC BLOOD PRESSURE: 74 MMHG

## 2019-09-20 PROCEDURE — 0651 HSPC ROUTINE HOME CARE

## 2019-09-21 PROCEDURE — 0651 HSPC ROUTINE HOME CARE

## 2019-09-22 PROCEDURE — 0651 HSPC ROUTINE HOME CARE

## 2019-09-22 PROCEDURE — HOSPICE MEDICATION HC HH HOSPICE MEDICATION

## 2019-09-23 PROCEDURE — 0651 HSPC ROUTINE HOME CARE

## 2019-09-24 ENCOUNTER — HOME CARE VISIT (OUTPATIENT)
Dept: SCHEDULING | Facility: HOME HEALTH | Age: 84
End: 2019-09-24
Payer: MEDICARE

## 2019-09-24 PROCEDURE — 0651 HSPC ROUTINE HOME CARE

## 2019-09-24 PROCEDURE — G0155 HHCP-SVS OF CSW,EA 15 MIN: HCPCS

## 2019-09-25 ENCOUNTER — HOME CARE VISIT (OUTPATIENT)
Dept: SCHEDULING | Facility: HOME HEALTH | Age: 84
End: 2019-09-25
Payer: MEDICARE

## 2019-09-25 PROCEDURE — T4526 ADULT SIZE PULL-ON MED: HCPCS

## 2019-09-25 PROCEDURE — T4541 LARGE DISPOSABLE UNDERPAD: HCPCS

## 2019-09-25 PROCEDURE — G0299 HHS/HOSPICE OF RN EA 15 MIN: HCPCS

## 2019-09-25 PROCEDURE — HOSPICE MEDICATION HC HH HOSPICE MEDICATION

## 2019-09-25 PROCEDURE — 0651 HSPC ROUTINE HOME CARE

## 2019-09-26 ENCOUNTER — HOME CARE VISIT (OUTPATIENT)
Dept: SCHEDULING | Facility: HOME HEALTH | Age: 84
End: 2019-09-26
Payer: MEDICARE

## 2019-09-26 PROCEDURE — 0651 HSPC ROUTINE HOME CARE

## 2019-09-26 PROCEDURE — G0299 HHS/HOSPICE OF RN EA 15 MIN: HCPCS

## 2019-09-26 PROCEDURE — HOSPICE MEDICATION HC HH HOSPICE MEDICATION

## 2019-09-27 PROCEDURE — 0651 HSPC ROUTINE HOME CARE

## 2019-09-28 PROCEDURE — 0651 HSPC ROUTINE HOME CARE

## 2019-09-29 VITALS
HEART RATE: 68 BPM | OXYGEN SATURATION: 96 % | DIASTOLIC BLOOD PRESSURE: 59 MMHG | SYSTOLIC BLOOD PRESSURE: 147 MMHG | TEMPERATURE: 97.8 F

## 2019-09-29 PROCEDURE — 0651 HSPC ROUTINE HOME CARE

## 2019-09-30 ENCOUNTER — HOME CARE VISIT (OUTPATIENT)
Dept: HOSPICE | Facility: HOSPICE | Age: 84
End: 2019-09-30
Payer: MEDICARE

## 2019-09-30 PROCEDURE — 0651 HSPC ROUTINE HOME CARE

## 2019-10-01 ENCOUNTER — HOME CARE VISIT (OUTPATIENT)
Dept: HOSPICE | Facility: HOSPICE | Age: 84
End: 2019-10-01
Payer: MEDICARE

## 2019-10-01 PROCEDURE — 0651 HSPC ROUTINE HOME CARE

## 2019-10-01 PROCEDURE — HOSPICE MEDICATION HC HH HOSPICE MEDICATION

## 2019-10-02 PROCEDURE — 0651 HSPC ROUTINE HOME CARE

## 2019-10-02 PROCEDURE — HOSPICE MEDICATION HC HH HOSPICE MEDICATION

## 2019-10-03 PROCEDURE — 0651 HSPC ROUTINE HOME CARE

## 2019-10-04 ENCOUNTER — HOME CARE VISIT (OUTPATIENT)
Dept: HOSPICE | Facility: HOSPICE | Age: 84
End: 2019-10-04
Payer: MEDICARE

## 2019-10-04 PROCEDURE — 0651 HSPC ROUTINE HOME CARE

## 2019-10-04 PROCEDURE — G0299 HHS/HOSPICE OF RN EA 15 MIN: HCPCS

## 2019-10-05 VITALS
TEMPERATURE: 96.4 F | RESPIRATION RATE: 18 BRPM | DIASTOLIC BLOOD PRESSURE: 66 MMHG | HEART RATE: 74 BPM | SYSTOLIC BLOOD PRESSURE: 118 MMHG | OXYGEN SATURATION: 99 %

## 2019-10-05 PROCEDURE — 0651 HSPC ROUTINE HOME CARE

## 2019-10-06 PROCEDURE — 0651 HSPC ROUTINE HOME CARE

## 2019-10-07 PROCEDURE — 0651 HSPC ROUTINE HOME CARE

## 2019-10-08 PROCEDURE — 0651 HSPC ROUTINE HOME CARE

## 2019-10-09 PROCEDURE — HOSPICE MEDICATION HC HH HOSPICE MEDICATION

## 2019-10-09 PROCEDURE — 0651 HSPC ROUTINE HOME CARE

## 2019-10-10 PROCEDURE — HOSPICE MEDICATION HC HH HOSPICE MEDICATION

## 2019-10-10 PROCEDURE — 0651 HSPC ROUTINE HOME CARE

## 2019-10-11 ENCOUNTER — HOME CARE VISIT (OUTPATIENT)
Dept: HOSPICE | Facility: HOSPICE | Age: 84
End: 2019-10-11
Payer: MEDICARE

## 2019-10-11 PROCEDURE — 0651 HSPC ROUTINE HOME CARE

## 2019-10-11 PROCEDURE — G0299 HHS/HOSPICE OF RN EA 15 MIN: HCPCS

## 2019-10-12 PROCEDURE — HOSPICE MEDICATION HC HH HOSPICE MEDICATION

## 2019-10-12 PROCEDURE — 0651 HSPC ROUTINE HOME CARE

## 2019-10-13 PROCEDURE — 0651 HSPC ROUTINE HOME CARE

## 2019-10-14 PROCEDURE — T4526 ADULT SIZE PULL-ON MED: HCPCS

## 2019-10-14 PROCEDURE — 0651 HSPC ROUTINE HOME CARE

## 2019-10-14 PROCEDURE — T4541 LARGE DISPOSABLE UNDERPAD: HCPCS

## 2019-10-15 PROCEDURE — 0651 HSPC ROUTINE HOME CARE

## 2019-10-16 PROCEDURE — HOSPICE MEDICATION HC HH HOSPICE MEDICATION

## 2019-10-16 PROCEDURE — 0651 HSPC ROUTINE HOME CARE

## 2019-10-17 ENCOUNTER — HOME CARE VISIT (OUTPATIENT)
Dept: SCHEDULING | Facility: HOME HEALTH | Age: 84
End: 2019-10-17
Payer: MEDICARE

## 2019-10-17 PROCEDURE — G0299 HHS/HOSPICE OF RN EA 15 MIN: HCPCS

## 2019-10-17 PROCEDURE — 0651 HSPC ROUTINE HOME CARE

## 2019-10-17 PROCEDURE — T4526 ADULT SIZE PULL-ON MED: HCPCS

## 2019-10-18 VITALS
SYSTOLIC BLOOD PRESSURE: 145 MMHG | DIASTOLIC BLOOD PRESSURE: 81 MMHG | OXYGEN SATURATION: 92 % | RESPIRATION RATE: 18 BRPM | HEART RATE: 89 BPM

## 2019-10-18 PROCEDURE — 0651 HSPC ROUTINE HOME CARE

## 2019-10-19 ENCOUNTER — APPOINTMENT (OUTPATIENT)
Dept: GENERAL RADIOLOGY | Age: 84
End: 2019-10-19
Attending: EMERGENCY MEDICINE
Payer: MEDICARE

## 2019-10-19 ENCOUNTER — HOSPITAL ENCOUNTER (EMERGENCY)
Age: 84
Discharge: HOME | End: 2019-10-19
Attending: EMERGENCY MEDICINE
Payer: MEDICARE

## 2019-10-19 VITALS
WEIGHT: 136 LBS | RESPIRATION RATE: 20 BRPM | SYSTOLIC BLOOD PRESSURE: 150 MMHG | TEMPERATURE: 98.3 F | BODY MASS INDEX: 24.1 KG/M2 | DIASTOLIC BLOOD PRESSURE: 86 MMHG | HEART RATE: 102 BPM | OXYGEN SATURATION: 94 % | HEIGHT: 63 IN

## 2019-10-19 DIAGNOSIS — S61.217A LACERATION OF LEFT LITTLE FINGER WITHOUT FOREIGN BODY WITHOUT DAMAGE TO NAIL, INITIAL ENCOUNTER: Primary | ICD-10-CM

## 2019-10-19 PROCEDURE — 75810000294 HC INTERM/LAYERED WND RPR

## 2019-10-19 PROCEDURE — HOSPICE MEDICATION HC HH HOSPICE MEDICATION

## 2019-10-19 PROCEDURE — 74011250637 HC RX REV CODE- 250/637: Performed by: PHYSICIAN ASSISTANT

## 2019-10-19 PROCEDURE — 0651 HSPC ROUTINE HOME CARE

## 2019-10-19 PROCEDURE — 99283 EMERGENCY DEPT VISIT LOW MDM: CPT

## 2019-10-19 PROCEDURE — 73140 X-RAY EXAM OF FINGER(S): CPT

## 2019-10-19 RX ORDER — BACITRACIN ZINC 500 UNIT/G
OINTMENT (GRAM) TOPICAL
Status: DISCONTINUED | OUTPATIENT
Start: 2019-10-19 | End: 2019-10-19 | Stop reason: HOSPADM

## 2019-10-19 RX ORDER — CEPHALEXIN 500 MG/1
500 CAPSULE ORAL 4 TIMES DAILY
Qty: 20 CAP | Refills: 0 | Status: SHIPPED | OUTPATIENT
Start: 2019-10-19 | End: 2019-10-19

## 2019-10-19 RX ORDER — CEPHALEXIN 250 MG/1
500 CAPSULE ORAL
Status: COMPLETED | OUTPATIENT
Start: 2019-10-19 | End: 2019-10-19

## 2019-10-19 RX ORDER — CEPHALEXIN 500 MG/1
500 CAPSULE ORAL 4 TIMES DAILY
Qty: 20 CAP | Refills: 0 | Status: SHIPPED | OUTPATIENT
Start: 2019-10-19 | End: 2019-10-24

## 2019-10-19 RX ADMIN — CEPHALEXIN 500 MG: 250 CAPSULE ORAL at 15:36

## 2019-10-19 NOTE — ED NOTES
Daughter and patient out to desk, stating they want to leave. Informed them that I was looking for resources to send her home with oxygen. Daughter stated multiple times infront of staff that patient would be fine going home without it. Daughter asked that prescription be called into her pharmacy. HETAL Corbin made aware and e-scribed it per daughter's request.  Lawrence Solares RN made aware of situation.

## 2019-10-19 NOTE — ED TRIAGE NOTES
Pt fell earlier today. Hit left 5th finger and cut it. [facility] and was told they saw bone and was sent here.  Finger was bandaged at [facility]

## 2019-10-19 NOTE — ED NOTES
I have reviewed discharge instructions with the patient and caregiver. The patient and parent verbalized understanding. Patient armband removed and shredded

## 2019-10-19 NOTE — ED PROVIDER NOTES
EMERGENCY DEPARTMENT HISTORY AND PHYSICAL EXAM 
 
3:25 PM 
 
 
Date: 10/19/2019 Patient Name: Miesha Ziegler History of Presenting Illness Chief Complaint Patient presents with  Fall  Finger Pain  Laceration History Provided By: Patient, Patient's daughter Additional History (Context): Miesha Ziegler is a 80 y.o. female with hx of COPD and other noted PMH, in hospice care who presents with complaint of left fifth digit pain and laceration that occurred earlier today. Daughter notes patient was getting out of bed without assistance when she fell and hit her hand against the mirror. Denies head injury or loss of consciousness. Denies blood thinner use. Did not take any medication for symptoms prior to arrival.  [facility] 
 
PCP: Naresh Harkins MD 
 
Current Facility-Administered Medications Medication Dose Route Frequency Provider Last Rate Last Dose  cephALEXin (KEFLEX) capsule 500 mg  500 mg Oral NOW Sharri Wilson PA      
 bacitracin zinc (BACITRACIN) 500 unit/gram ointment   Topical NOW Akhil Wilson, [address] Current Outpatient Medications Medication Sig Dispense Refill  cephALEXin (KEFLEX) 500 mg capsule Take 1 Cap by mouth four (4) times daily for 5 days. 20 Cap 0  predniSONE (DELTASONE) 10 mg tablet Take 40 mg by mouth daily. Pednisone taper- days 1,2,3- give 4 tabs daily; days 4,5,6-give 3 tabs daily; days 7,8,9-give 2 tabs daily; days 10,11,12-give 1 tab daily  albuterol-ipratropium (DUO-NEB) 2.5 mg-0.5 mg/3 ml nebu USE 1 VIAL IN NEBULIZER EVERY 6 HOURS AS NEEDED 270 Nebule 0  
 traZODone (DESYREL) 150 mg tablet Take 150 mg by mouth nightly.  senna-docusate (SENNA WITH DOCUSATE SODIUM) 8.6-50 mg per tablet Take 1-2 Tabs by mouth daily.     
 gabapentin (NEURONTIN) 300 mg capsule TAKE 1 CAPSULE BY MOUTH 3  TIMES DAILY 270 Cap 1  
  gabapentin (NEURONTIN) 300 mg capsule Take 300 mg by mouth three (3) times daily.  traMADol (ULTRAM) 50 mg tablet Take 50 mg by mouth nightly as needed (insomnia).  budesonide-formoterol (SYMBICORT) 160-4.5 mcg/actuation HFAA Take 1 Puff by inhalation daily.  bisacodyl (DULCOLAX, BISACODYL,) 10 mg suppository Insert 10 mg into rectum daily as needed (constipation). every 96 hrs as needed  mineral oil (ENEMA) enema Insert 1 Enema into rectum daily as needed for Constipation. every 120 hours as needed if no result from dulcolax suppository  magnesium hydroxide (MILK OF MAGNESIA) 400 mg/5 mL suspension Take 5 mL by mouth every seventy-two (72) hours as needed (constipation). administer on day 3 if no BM  insulin aspart U-100 (NOVOLOG) 100 unit/mL (3 mL) inpn 1 Units by SubCUTAneous route four (4) times daily as needed (DM). per sliding scale. 0 units for blood sugar 0-200; 2 units for -250; 4 units for -300; 6 units for 301-350; 8 units for 351-400  morphine (ROXANOL) 100 mg/5 mL (20 mg/mL) concentrated solution Take 5 mg by mouth every three (3) hours. for pain; PO/SL  LORazepam (INTENSOL) 2 mg/mL concentrated solution 0.25 mL by SubLINGual route every four (4) hours as needed for Agitation or Anxiety. Max Daily Amount: 3 mg. 30 mL 0  
 albuterol-ipratropium (DUO-NEB) 2.5 mg-0.5 mg/3 ml nebu 3 mL by Nebulization route every four (4) hours. 30 Nebule 0  
 famotidine (PEPCID) 20 mg tablet Take 1 Tab by mouth daily. 15 Tab 0  
 furosemide (LASIX) 20 mg tablet Take 1 Tab by mouth every fourty-eight (48) hours. 15 Tab 0  
 atorvastatin (LIPITOR) 40 mg tablet Take 1 Tab by mouth nightly. 30 Tab 0  
 PARoxetine (PAXIL) 30 mg tablet TAKE 1 TABLET BY MOUTH  DAILY 90 Tab 1  
 tamsulosin (FLOMAX) 0.4 mg capsule Take 1 Cap by mouth daily. 90 Cap 3  
 simvastatin (ZOCOR) 20 mg tablet Take 1 Tab by mouth nightly.  90 Tab 3  
  aspirin delayed-release 81 mg tablet Take 1 Tab by mouth daily. 100 Tab 1  
 inhalational spacing device (AEROCHAMBER MV) 1 Each by Does Not Apply route as needed. 1 Device 2  
 OXYGEN-AIR DELIVERY SYSTEMS 3 L by Nasal route continuous.  Nebulizer & Compressor (PORTABLE NEBULIZER SYSTEM) machine 1 Each by Does Not Apply route every six (6) hours as needed. 1 Each 0 Past History Past Medical History: 
Past Medical History:  
Diagnosis Date  Chronic lung disease  Colon polyps  COPD 8-30-02  DDD (degenerative disc disease), lumbar 10/1/2014  Degeneration of lumbar or lumbosacral intervertebral disc  Depression  Diabetes ([address] 7-19-05  Diabetes mellitus ([address]  Diverticulosis   DM neuropathy, painful ([address] 10/1/2014  MOORE (Dyspnea on Exertion) 4-19-02  
 echo: +mild LVH, WR03-07% w/ diastolic dysfxn  Glaucoma  HCAP (healthcare-associated pneumonia) 03/24/2017  Hemorrhoids 6-6-06  Hyperlipidemia 5/17/2011  Hypertension 4-16-02  LBP (low back pain) 4-13-04  Low back pain radiating to both legs 10/1/2014  Lumbago  Lumbar disc herniation 10/1/2014  Lumbar facet arthropathy 10/1/2014  Lumbosacral radiculopathy at L5 10/1/2014  Lumbosacral radiculopathy at S1 10/1/2014  Microscopic hematuria  OA (osteoarthritis)  Overactive bladder 5/17/2011  
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   
 -MRI brain  Urinary tract infection, site not specified Past Surgical History: 
Past Surgical History: Procedure Laterality Date  HX APPENDECTOMY  HX CHOLECYSTECTOMY    HX HYSTERECTOMY    
 (+)DUB  HX POLYPECTOMY  NC COLONOSCOPY FLX DX W/COLLJ SPEC WHEN PFRMD  06  
 normal, Dr Jamie Larsen  NC COLONOSCOPY FLX DX W/COLLJ SPEC WHEN PFRMD    
 (+)polyp= tubular adenoma Family History: 
Family History Problem Relation Age of Onset  Colon Cancer Sister  Heart Disease Brother  Seizures Son  Colon Cancer Maternal Aunt Social History: 
Social History Tobacco Use  Smoking status: Former Smoker Packs/day: 1.00 Years: 57.00 Pack years: 57.00 Types: Cigarettes Last attempt to quit: 2018 Years since quittin.8  Smokeless tobacco: Never Used Substance Use Topics  Alcohol use: No  
 Drug use: No  
 
 
Allergies: Allergies Allergen Reactions  Levaquin [Levofloxacin] Rash Review of Systems Review of Systems Constitutional: Negative for chills and fever. Respiratory: Negative for shortness of breath. Cardiovascular: Negative for chest pain. Gastrointestinal: Negative for abdominal pain, nausea and vomiting. Skin: Positive for wound. Negative for rash. Neurological: Negative for weakness. All other systems reviewed and are negative. Physical Exam  
 
Visit Vitals /86 (BP 1 Location: Left arm) Pulse (!) 102 Temp 98.3 °F (36.8 °C) Resp 20 Ht 5' 3\" (1.6 m) Wt 61.7 kg (136 lb) SpO2 94% BMI 24.09 kg/m² Physical Exam  
Constitutional: She appears well-developed and well-nourished. No distress. HENT:  
Head: Normocephalic and atraumatic. Neck: Normal range of motion. Neck supple. Cardiovascular: Normal rate, regular rhythm, normal heart sounds and intact distal pulses. Exam reveals no gallop and no friction rub. No murmur heard. Pulmonary/Chest: Breath sounds normal. No respiratory distress. She has no wheezes. She has no rales. On nasal cannula Musculoskeletal: Normal range of motion. Neurological: She is alert. Skin: Skin is warm. No rash noted. She is not diaphoretic. Full ROM of digit, full strength of digit, sensation intact, Radial pulse 2+ Nursing note and vitals reviewed. Diagnostic Study Results Labs - No results found for this or any previous visit (from the past 12 hour(s)). Radiologic Studies -  
XR 5TH FINGER LT MIN 2 V Final Result IMPRESSION:   
  
1. No bony abnormality or radiopaque foreign body. Medical Decision Making I am the first provider for this patient. I reviewed the vital signs, available nursing notes, past medical history, past surgical history, family history and social history. Vital Signs-Reviewed the patient's vital signs. Records Reviewed: Nursing Notes and Old Medical Records (Time of Review: 3:25 PM) ED Course: Progress Notes, Reevaluation, and Consults: 
3:25 PM  Reviewed results with patient and daughter. Discussed need for close outpatient follow-up this week for reassessment, suture removal in 7-10 days. Discussed strict return precautions, including fever, drainage, or any other medical concerns. Provider Notes (Medical Decision Making): 26-year-old female who presents due to left fifth digit laceration. Extremity neurovascularly intact. Full range of motion and strength of digit. X-ray without acute process. Wound irrigated thoroughly and approximated with sutures. Bacitracin, dressing, and splint applied. Stable for d/c with outpatient follow-up in 7-10 days for suture removal.  
 
Wound Repair 
Date/Time: 10/19/2019 3:30 PM 
Performed by: [id number] provider: Dr. Sanford Kenney Preparation: skin prepped with Betadine Pre-procedure re-eval: Immediately prior to the procedure, the patient was reevaluated and found suitable for the planned procedure and any planned medications. Time out: Immediately prior to the procedure a time out was called to verify the correct patient, procedure, equipment, staff and marking as appropriate. [place] Location details: left small finger Wound length:2.5 cm or less Anesthesia: local infiltration Anesthesia: 
Local Anesthetic: lidocaine 1% without epinephrine Anesthetic total: 2 mL Foreign bodies: no foreign bodies Irrigation solution: saline Irrigation method: syringe Debridement: none Skin closure: 5-0 nylon Subcutaneous closure: Vicryl (1) Number of sutures: 8 Technique: simple Approximation: close Dressing: antibiotic ointment, 4x4 and splint Patient tolerance: Patient tolerated the procedure well with no immediate complications My total time at bedside, performing this procedure was 16-30 minutes. Diagnosis Clinical Impression: 1. Laceration of left little finger without foreign body without damage to nail, initial encounter Disposition: home Follow-up Information Follow up With Specialties Details Why Contact Info [id number] [facility] EMERGENCY DEPT Emergency Medicine  If symptoms worsen [address] [place] [id number] [phone] Dorita Robb MD Internal Medicine In 10 days For suture removal [address] [place] [address] [address] [place] [address] 
[phone] Patient's Medications Start Taking CEPHALEXIN (KEFLEX) 500 MG CAPSULE    Take 1 Cap by mouth four (4) times daily for 5 days. Continue Taking ALBUTEROL-IPRATROPIUM (DUO-NEB) 2.5 MG-0.5 MG/3 ML NEBU    3 mL by Nebulization route every four (4) hours. ALBUTEROL-IPRATROPIUM (DUO-NEB) 2.5 MG-0.5 MG/3 ML NEBU    USE 1 VIAL IN NEBULIZER EVERY 6 HOURS AS NEEDED  
 ASPIRIN DELAYED-RELEASE 81 MG TABLET    Take 1 Tab by mouth daily. ATORVASTATIN (LIPITOR) 40 MG TABLET    Take 1 Tab by mouth nightly.   
 BISACODYL (DULCOLAX, BISACODYL,) 10 MG SUPPOSITORY    Insert 10 mg into rectum daily as needed (constipation). every 96 hrs as needed BUDESONIDE-FORMOTEROL (SYMBICORT) 160-4.5 MCG/ACTUATION HFAA    Take 1 Puff by inhalation daily. FAMOTIDINE (PEPCID) 20 MG TABLET    Take 1 Tab by mouth daily. FUROSEMIDE (LASIX) 20 MG TABLET    Take 1 Tab by mouth every fourty-eight (48) hours. GABAPENTIN (NEURONTIN) 300 MG CAPSULE    TAKE 1 CAPSULE BY MOUTH 3  TIMES DAILY  
 GABAPENTIN (NEURONTIN) 300 MG CAPSULE    Take 300 mg by mouth three (3) times daily. INHALATIONAL SPACING DEVICE (AEROCHAMBER MV)    1 Each by Does Not Apply route as needed. INSULIN ASPART U-100 (NOVOLOG) 100 UNIT/ML (3 ML) INPN    1 Units by SubCUTAneous route four (4) times daily as needed (DM). per sliding scale. 0 units for blood sugar 0-200; 2 units for -250; 4 units for -300; 6 units for 301-350; 8 units for 351-400 LORAZEPAM (INTENSOL) 2 MG/ML CONCENTRATED SOLUTION    0.25 mL by SubLINGual route every four (4) hours as needed for Agitation or Anxiety. Max Daily Amount: 3 mg. MAGNESIUM HYDROXIDE (MILK OF MAGNESIA) 400 MG/5 ML SUSPENSION    Take 5 mL by mouth every seventy-two (72) hours as needed (constipation). administer on day 3 if no BM MINERAL OIL (ENEMA) ENEMA    Insert 1 Enema into rectum daily as needed for Constipation. every 120 hours as needed if no result from dulcolax suppository MORPHINE (ROXANOL) 100 MG/5 ML (20 MG/ML) CONCENTRATED SOLUTION    Take 5 mg by mouth every three (3) hours. for pain; PO/SL NEBULIZER & COMPRESSOR (PORTABLE NEBULIZER SYSTEM) MACHINE    1 Each by Does Not Apply route every six (6) hours as needed. OXYGEN-AIR DELIVERY SYSTEMS    3 L by Nasal route continuous. PAROXETINE (PAXIL) 30 MG TABLET    TAKE 1 TABLET BY MOUTH  DAILY PREDNISONE (DELTASONE) 10 MG TABLET    Take 40 mg by mouth daily. Pednisone taper- days 1,2,3- give 4 tabs daily; days 4,5,6-give 3 tabs daily; days 7,8,9-give 2 tabs daily; days 10,11,12-give 1 tab daily SENNA-DOCUSATE (SENNA WITH DOCUSATE SODIUM) 8.6-50 MG PER TABLET    Take 1-2 Tabs by mouth daily. SIMVASTATIN (ZOCOR) 20 MG TABLET    Take 1 Tab by mouth nightly. TAMSULOSIN (FLOMAX) 0.4 MG CAPSULE    Take 1 Cap by mouth daily. TRAMADOL (ULTRAM) 50 MG TABLET    Take 50 mg by mouth nightly as needed (insomnia). TRAZODONE (DESYREL) 150 MG TABLET    Take 150 mg by mouth nightly. These Medications have changed No medications on file Stop Taking AMOXICILLIN-CLAVULANATE (AUGMENTIN) 875-125 MG PER TABLET    Take 1 Tab by mouth every twelve (12) hours. AZITHROMYCIN (ZITHROMAX) 500 MG TAB    Take 500 mg by mouth daily. Dictation disclaimer:  Please note that this dictation was completed with yoone, the computer voice recognition software. Quite often unanticipated grammatical, syntax, homophones, and other interpretive errors are inadvertently transcribed by the computer software. Please disregard these errors. Please excuse any errors that have escaped final proofreading.

## 2019-10-19 NOTE — DISCHARGE INSTRUCTIONS
Patient Education      Take medication as prescribed. Keep the finger dry for 24 hours, then clean with soap and water daily. Follow-up with your primary care physician in 7-10 days for reassessment and suture removal. Bring the results from this visit with you for their review. Return to the ED immediately for any new, worsening, or persistent symptoms, including fever, drainage, or any other medical concerns. Cuts on the Hand Closed With Stitches: Care Instructions  Your Care Instructions    A cut on your hand can be on your fingers, your thumb, or the front or back of your hand. Sometimes a cut can injure the tendons, blood vessels, or nerves of your hand. The doctor used stitches to close the cut. Using stitches also helps the cut heal and reduces scarring. The doctor may have given you a splint to help prevent you from moving your hand, fingers, or thumb. If the cut went deep and through the skin, the doctor put in two layers of stitches. The deeper layer brings the deep part of the cut together. These stitches will dissolve and don't need to be removed. The stitches in the upper layer are the ones you see on the cut. You will probably have a bandage. You will need to have the stitches removed, usually in 7 to 14 days. The doctor may suggest that you see a hand specialist if the cut is very deep or if you have trouble moving your fingers or have less feeling in your hand. The doctor has checked you carefully, but problems can develop later. If you notice any problems or new symptoms, get medical treatment right away. Follow-up care is a key part of your treatment and safety. Be sure to make and go to all appointments, and call your doctor if you are having problems. It's also a good idea to know your test results and keep a list of the medicines you take. How can you care for yourself at home? · Keep the cut dry for the first 24 to 48 hours. After this, you can shower if your doctor okays it.  Simin Chavez the cut dry. · Don't soak the cut, such as in a bathtub. Your doctor will tell you when it's safe to get the cut wet. · If your doctor told you how to care for your cut, follow your doctor's instructions. If you did not get instructions, follow this general advice:  ? After the first 24 to 48 hours, wash around the cut with clean water 2 times a day. Don't use hydrogen peroxide or alcohol, which can slow healing. ? You may cover the cut with a thin layer of petroleum jelly, such as Vaseline, and a nonstick bandage. ? Apply more petroleum jelly and replace the bandage as needed. · Prop up the sore hand on a pillow anytime you sit or lie down during the next 3 days. Try to keep it above the level of your heart. This will help reduce swelling. · Avoid any activity that could cause your cut to reopen. · Do not remove the stitches on your own. Your doctor will tell you when to come back to have the stitches removed. · Be safe with medicines. Take pain medicines exactly as directed. ? If the doctor gave you a prescription medicine for pain, take it as prescribed. ? If you are not taking a prescription pain medicine, ask your doctor if you can take an over-the-counter medicine. When should you call for help? Call your doctor now or seek immediate medical care if:    · You have new pain, or your pain gets worse.     · The skin near the cut is cold or pale or changes color.     · You have tingling, weakness, or numbness near the cut.     · The cut starts to bleed, and blood soaks through the bandage. Oozing small amounts of blood is normal.     · You have trouble moving the area of the hand near the cut.     · You have symptoms of infection, such as:  ? Increased pain, swelling, warmth, or redness around the cut.  ? Red streaks leading from the cut.  ? Pus draining from the cut.  ? A fever.    Watch closely for changes in your health, and be sure to contact your doctor if:    · You do not get better as expected. Where can you learn more? Go to http://shantell-leona.info/. Enter T250 in the search box to learn more about \"Cuts on the Hand Closed With Stitches: Care Instructions. \"  Current as of: June 26, 2019  Content Version: 12.2  © 8119-0317 [employer]. Care instructions adapted under license by [employer] (which disclaims liability or warranty for this information). If you have questions about a medical condition or this instruction, always ask your healthcare professional. [address] any warranty or liability for your use of this information.

## 2019-10-19 NOTE — ED NOTES
Daughter states patient is out of portable oxygen. Daughter asked if she could use one of our cylinders and bring it back on Monday. Informed daughter that I could not send her home with one of our tanks. Informed daughter that I would see what I could do about getting her sent home with oxygen.

## 2019-10-20 PROCEDURE — 0651 HSPC ROUTINE HOME CARE

## 2019-10-20 PROCEDURE — HOSPICE MEDICATION HC HH HOSPICE MEDICATION

## 2019-10-21 PROCEDURE — 0651 HSPC ROUTINE HOME CARE

## 2019-10-22 PROCEDURE — 0651 HSPC ROUTINE HOME CARE

## 2019-10-23 PROCEDURE — 0651 HSPC ROUTINE HOME CARE

## 2019-10-24 ENCOUNTER — HOME CARE VISIT (OUTPATIENT)
Dept: SCHEDULING | Facility: HOME HEALTH | Age: 84
End: 2019-10-24
Payer: MEDICARE

## 2019-10-24 PROCEDURE — G0299 HHS/HOSPICE OF RN EA 15 MIN: HCPCS

## 2019-10-24 PROCEDURE — A4927 NON-STERILE GLOVES: HCPCS

## 2019-10-24 PROCEDURE — 0651 HSPC ROUTINE HOME CARE

## 2019-10-24 PROCEDURE — T4526 ADULT SIZE PULL-ON MED: HCPCS

## 2019-10-24 PROCEDURE — T4541 LARGE DISPOSABLE UNDERPAD: HCPCS

## 2019-10-25 PROCEDURE — 0651 HSPC ROUTINE HOME CARE

## 2019-10-26 PROCEDURE — 0651 HSPC ROUTINE HOME CARE

## 2019-10-27 PROCEDURE — 0651 HSPC ROUTINE HOME CARE

## 2019-10-27 PROCEDURE — HOSPICE MEDICATION HC HH HOSPICE MEDICATION

## 2019-10-28 VITALS
DIASTOLIC BLOOD PRESSURE: 62 MMHG | HEART RATE: 56 BPM | OXYGEN SATURATION: 98 % | RESPIRATION RATE: 18 BRPM | SYSTOLIC BLOOD PRESSURE: 107 MMHG

## 2019-10-28 VITALS
DIASTOLIC BLOOD PRESSURE: 67 MMHG | HEART RATE: 61 BPM | RESPIRATION RATE: 18 BRPM | SYSTOLIC BLOOD PRESSURE: 152 MMHG | OXYGEN SATURATION: 97 %

## 2019-10-28 PROCEDURE — HOSPICE MEDICATION HC HH HOSPICE MEDICATION

## 2019-10-28 PROCEDURE — 0651 HSPC ROUTINE HOME CARE

## 2019-10-29 ENCOUNTER — HOME CARE VISIT (OUTPATIENT)
Dept: HOSPICE | Facility: HOSPICE | Age: 84
End: 2019-10-29
Payer: MEDICARE

## 2019-10-29 PROCEDURE — HOSPICE MEDICATION HC HH HOSPICE MEDICATION

## 2019-10-29 PROCEDURE — 0651 HSPC ROUTINE HOME CARE

## 2019-10-30 ENCOUNTER — HOME CARE VISIT (OUTPATIENT)
Dept: SCHEDULING | Facility: HOME HEALTH | Age: 84
End: 2019-10-30
Payer: MEDICARE

## 2019-10-30 PROCEDURE — G0299 HHS/HOSPICE OF RN EA 15 MIN: HCPCS

## 2019-10-30 PROCEDURE — 0651 HSPC ROUTINE HOME CARE

## 2019-10-30 PROCEDURE — G0155 HHCP-SVS OF CSW,EA 15 MIN: HCPCS

## 2019-10-31 PROCEDURE — HOSPICE MEDICATION HC HH HOSPICE MEDICATION

## 2019-10-31 PROCEDURE — 0651 HSPC ROUTINE HOME CARE

## 2019-11-01 PROCEDURE — 0651 HSPC ROUTINE HOME CARE

## 2019-11-02 PROCEDURE — 0651 HSPC ROUTINE HOME CARE

## 2019-11-03 PROCEDURE — 0651 HSPC ROUTINE HOME CARE

## 2019-11-04 PROCEDURE — 0651 HSPC ROUTINE HOME CARE

## 2019-11-05 PROCEDURE — 0651 HSPC ROUTINE HOME CARE

## 2019-11-05 PROCEDURE — HOSPICE MEDICATION HC HH HOSPICE MEDICATION

## 2019-11-06 PROCEDURE — HOSPICE MEDICATION HC HH HOSPICE MEDICATION

## 2019-11-06 PROCEDURE — 0651 HSPC ROUTINE HOME CARE

## 2019-11-07 PROCEDURE — 0651 HSPC ROUTINE HOME CARE

## 2019-11-08 ENCOUNTER — HOME CARE VISIT (OUTPATIENT)
Dept: SCHEDULING | Facility: HOME HEALTH | Age: 84
End: 2019-11-08
Payer: MEDICARE

## 2019-11-08 PROCEDURE — T4526 ADULT SIZE PULL-ON MED: HCPCS

## 2019-11-08 PROCEDURE — 0651 HSPC ROUTINE HOME CARE

## 2019-11-08 PROCEDURE — G0299 HHS/HOSPICE OF RN EA 15 MIN: HCPCS

## 2019-11-09 PROCEDURE — 0651 HSPC ROUTINE HOME CARE

## 2019-11-10 PROCEDURE — 0651 HSPC ROUTINE HOME CARE

## 2019-11-11 PROCEDURE — 0651 HSPC ROUTINE HOME CARE

## 2019-11-12 PROCEDURE — 0651 HSPC ROUTINE HOME CARE

## 2019-11-13 ENCOUNTER — HOME CARE VISIT (OUTPATIENT)
Dept: SCHEDULING | Facility: HOME HEALTH | Age: 84
End: 2019-11-13
Payer: MEDICARE

## 2019-11-13 VITALS
DIASTOLIC BLOOD PRESSURE: 77 MMHG | OXYGEN SATURATION: 93 % | HEART RATE: 57 BPM | RESPIRATION RATE: 18 BRPM | SYSTOLIC BLOOD PRESSURE: 127 MMHG

## 2019-11-13 PROCEDURE — T4526 ADULT SIZE PULL-ON MED: HCPCS

## 2019-11-13 PROCEDURE — G0299 HHS/HOSPICE OF RN EA 15 MIN: HCPCS

## 2019-11-13 PROCEDURE — 0651 HSPC ROUTINE HOME CARE

## 2019-11-14 PROCEDURE — 0651 HSPC ROUTINE HOME CARE

## 2019-11-15 PROCEDURE — 0651 HSPC ROUTINE HOME CARE

## 2019-11-16 PROCEDURE — 0651 HSPC ROUTINE HOME CARE

## 2019-11-17 PROCEDURE — 0651 HSPC ROUTINE HOME CARE

## 2019-11-18 PROCEDURE — 0651 HSPC ROUTINE HOME CARE

## 2019-11-19 PROCEDURE — 0651 HSPC ROUTINE HOME CARE

## 2019-11-20 ENCOUNTER — HOME CARE VISIT (OUTPATIENT)
Dept: SCHEDULING | Facility: HOME HEALTH | Age: 84
End: 2019-11-20
Payer: MEDICARE

## 2019-11-20 PROCEDURE — G0299 HHS/HOSPICE OF RN EA 15 MIN: HCPCS

## 2019-11-20 PROCEDURE — 0651 HSPC ROUTINE HOME CARE

## 2019-11-21 ENCOUNTER — HOME CARE VISIT (OUTPATIENT)
Dept: HOSPICE | Facility: HOSPICE | Age: 84
End: 2019-11-21
Payer: MEDICARE

## 2019-11-21 VITALS
SYSTOLIC BLOOD PRESSURE: 131 MMHG | OXYGEN SATURATION: 95 % | DIASTOLIC BLOOD PRESSURE: 68 MMHG | RESPIRATION RATE: 18 BRPM | HEART RATE: 68 BPM

## 2019-11-21 PROCEDURE — G0155 HHCP-SVS OF CSW,EA 15 MIN: HCPCS

## 2019-11-21 PROCEDURE — 0651 HSPC ROUTINE HOME CARE

## 2019-11-21 PROCEDURE — HOSPICE MEDICATION HC HH HOSPICE MEDICATION

## 2019-11-22 PROCEDURE — 0651 HSPC ROUTINE HOME CARE

## 2019-11-23 PROCEDURE — 0651 HSPC ROUTINE HOME CARE

## 2019-11-24 PROCEDURE — 0651 HSPC ROUTINE HOME CARE

## 2019-11-25 PROCEDURE — 0651 HSPC ROUTINE HOME CARE

## 2019-11-26 ENCOUNTER — HOME CARE VISIT (OUTPATIENT)
Dept: SCHEDULING | Facility: HOME HEALTH | Age: 84
End: 2019-11-26
Payer: MEDICARE

## 2019-11-26 PROCEDURE — G0299 HHS/HOSPICE OF RN EA 15 MIN: HCPCS

## 2019-11-26 PROCEDURE — 0651 HSPC ROUTINE HOME CARE

## 2019-11-26 PROCEDURE — T4541 LARGE DISPOSABLE UNDERPAD: HCPCS

## 2019-11-26 PROCEDURE — T4526 ADULT SIZE PULL-ON MED: HCPCS

## 2019-11-27 ENCOUNTER — HOME CARE VISIT (OUTPATIENT)
Dept: HOSPICE | Facility: HOSPICE | Age: 84
End: 2019-11-27
Payer: MEDICARE

## 2019-11-27 VITALS — HEART RATE: 62 BPM | TEMPERATURE: 98.1 F | OXYGEN SATURATION: 96 % | RESPIRATION RATE: 18 BRPM

## 2019-11-27 PROCEDURE — 0651 HSPC ROUTINE HOME CARE

## 2019-11-28 PROCEDURE — 0651 HSPC ROUTINE HOME CARE

## 2019-11-29 PROCEDURE — 0651 HSPC ROUTINE HOME CARE

## 2019-11-29 PROCEDURE — HOSPICE MEDICATION HC HH HOSPICE MEDICATION

## 2019-11-30 PROCEDURE — 0651 HSPC ROUTINE HOME CARE

## 2019-12-01 PROCEDURE — 0651 HSPC ROUTINE HOME CARE

## 2019-12-02 ENCOUNTER — HOME CARE VISIT (OUTPATIENT)
Dept: SCHEDULING | Facility: HOME HEALTH | Age: 84
End: 2019-12-02
Payer: MEDICARE

## 2019-12-02 ENCOUNTER — HOME CARE VISIT (OUTPATIENT)
Dept: HOSPICE | Facility: HOSPICE | Age: 84
End: 2019-12-02
Payer: MEDICARE

## 2019-12-02 PROCEDURE — 0651 HSPC ROUTINE HOME CARE

## 2019-12-02 PROCEDURE — G0299 HHS/HOSPICE OF RN EA 15 MIN: HCPCS

## 2019-12-02 PROCEDURE — HOSPICE MEDICATION HC HH HOSPICE MEDICATION

## 2019-12-03 ENCOUNTER — HOME CARE VISIT (OUTPATIENT)
Dept: SCHEDULING | Facility: HOME HEALTH | Age: 84
End: 2019-12-03
Payer: MEDICARE

## 2019-12-03 VITALS — HEART RATE: 73 BPM | RESPIRATION RATE: 22 BRPM | OXYGEN SATURATION: 86 %

## 2019-12-03 PROCEDURE — G0299 HHS/HOSPICE OF RN EA 15 MIN: HCPCS

## 2019-12-03 PROCEDURE — 0651 HSPC ROUTINE HOME CARE

## 2019-12-03 PROCEDURE — HOSPICE MEDICATION HC HH HOSPICE MEDICATION

## 2019-12-04 VITALS — OXYGEN SATURATION: 97 % | HEART RATE: 66 BPM | RESPIRATION RATE: 20 BRPM

## 2019-12-04 PROCEDURE — 0651 HSPC ROUTINE HOME CARE

## 2019-12-05 PROCEDURE — 0651 HSPC ROUTINE HOME CARE

## 2019-12-06 ENCOUNTER — HOME CARE VISIT (OUTPATIENT)
Dept: SCHEDULING | Facility: HOME HEALTH | Age: 84
End: 2019-12-06
Payer: MEDICARE

## 2019-12-06 PROCEDURE — 0651 HSPC ROUTINE HOME CARE

## 2019-12-06 PROCEDURE — HOSPICE MEDICATION HC HH HOSPICE MEDICATION

## 2019-12-06 PROCEDURE — G0299 HHS/HOSPICE OF RN EA 15 MIN: HCPCS

## 2019-12-07 VITALS — RESPIRATION RATE: 24 BRPM | OXYGEN SATURATION: 94 % | HEART RATE: 74 BPM

## 2019-12-07 PROCEDURE — 0651 HSPC ROUTINE HOME CARE

## 2019-12-07 PROCEDURE — HOSPICE MEDICATION HC HH HOSPICE MEDICATION

## 2019-12-08 PROCEDURE — 0651 HSPC ROUTINE HOME CARE

## 2019-12-08 PROCEDURE — HOSPICE MEDICATION HC HH HOSPICE MEDICATION

## 2019-12-09 PROCEDURE — 0651 HSPC ROUTINE HOME CARE

## 2019-12-10 PROCEDURE — 0651 HSPC ROUTINE HOME CARE

## 2019-12-11 ENCOUNTER — HOME CARE VISIT (OUTPATIENT)
Dept: SCHEDULING | Facility: HOME HEALTH | Age: 84
End: 2019-12-11
Payer: MEDICARE

## 2019-12-11 PROCEDURE — G0299 HHS/HOSPICE OF RN EA 15 MIN: HCPCS

## 2019-12-11 PROCEDURE — 0651 HSPC ROUTINE HOME CARE

## 2019-12-12 VITALS — RESPIRATION RATE: 20 BRPM | OXYGEN SATURATION: 97 % | HEART RATE: 60 BPM

## 2019-12-12 PROCEDURE — 0651 HSPC ROUTINE HOME CARE

## 2019-12-13 PROCEDURE — T4526 ADULT SIZE PULL-ON MED: HCPCS

## 2019-12-13 PROCEDURE — A4927 NON-STERILE GLOVES: HCPCS

## 2019-12-13 PROCEDURE — 0651 HSPC ROUTINE HOME CARE

## 2019-12-14 PROCEDURE — 0651 HSPC ROUTINE HOME CARE

## 2019-12-15 PROCEDURE — 0651 HSPC ROUTINE HOME CARE

## 2019-12-16 PROCEDURE — 0651 HSPC ROUTINE HOME CARE

## 2019-12-17 ENCOUNTER — HOME CARE VISIT (OUTPATIENT)
Dept: SCHEDULING | Facility: HOME HEALTH | Age: 84
End: 2019-12-17
Payer: MEDICARE

## 2019-12-17 PROCEDURE — 0651 HSPC ROUTINE HOME CARE

## 2019-12-17 PROCEDURE — G0155 HHCP-SVS OF CSW,EA 15 MIN: HCPCS

## 2019-12-18 ENCOUNTER — HOME CARE VISIT (OUTPATIENT)
Dept: SCHEDULING | Facility: HOME HEALTH | Age: 84
End: 2019-12-18
Payer: MEDICARE

## 2019-12-18 PROCEDURE — 0651 HSPC ROUTINE HOME CARE

## 2019-12-18 PROCEDURE — G0299 HHS/HOSPICE OF RN EA 15 MIN: HCPCS

## 2019-12-19 VITALS
SYSTOLIC BLOOD PRESSURE: 119 MMHG | RESPIRATION RATE: 18 BRPM | HEART RATE: 87 BPM | OXYGEN SATURATION: 93 % | DIASTOLIC BLOOD PRESSURE: 81 MMHG

## 2019-12-19 PROCEDURE — HOSPICE MEDICATION HC HH HOSPICE MEDICATION

## 2019-12-19 PROCEDURE — 0651 HSPC ROUTINE HOME CARE

## 2019-12-20 PROCEDURE — 0651 HSPC ROUTINE HOME CARE

## 2019-12-20 PROCEDURE — A4927 NON-STERILE GLOVES: HCPCS

## 2019-12-20 PROCEDURE — T4526 ADULT SIZE PULL-ON MED: HCPCS

## 2019-12-20 PROCEDURE — T4541 LARGE DISPOSABLE UNDERPAD: HCPCS

## 2019-12-21 PROCEDURE — 0651 HSPC ROUTINE HOME CARE

## 2019-12-22 PROCEDURE — 0651 HSPC ROUTINE HOME CARE

## 2019-12-23 PROCEDURE — 0651 HSPC ROUTINE HOME CARE

## 2019-12-24 PROCEDURE — 0651 HSPC ROUTINE HOME CARE

## 2019-12-25 PROCEDURE — 0651 HSPC ROUTINE HOME CARE

## 2019-12-26 ENCOUNTER — HOME CARE VISIT (OUTPATIENT)
Dept: SCHEDULING | Facility: HOME HEALTH | Age: 84
End: 2019-12-26
Payer: MEDICARE

## 2019-12-26 VITALS — HEART RATE: 86 BPM | TEMPERATURE: 98.7 F | OXYGEN SATURATION: 96 % | RESPIRATION RATE: 16 BRPM

## 2019-12-26 PROCEDURE — 0651 HSPC ROUTINE HOME CARE

## 2019-12-26 PROCEDURE — HOSPICE MEDICATION HC HH HOSPICE MEDICATION

## 2019-12-26 PROCEDURE — G0299 HHS/HOSPICE OF RN EA 15 MIN: HCPCS

## 2019-12-27 PROCEDURE — 0651 HSPC ROUTINE HOME CARE

## 2019-12-28 PROCEDURE — 0651 HSPC ROUTINE HOME CARE

## 2019-12-29 PROCEDURE — 0651 HSPC ROUTINE HOME CARE

## 2019-12-30 PROCEDURE — 0651 HSPC ROUTINE HOME CARE

## 2019-12-31 PROCEDURE — 0651 HSPC ROUTINE HOME CARE

## 2020-01-01 ENCOUNTER — HOME CARE VISIT (OUTPATIENT)
Dept: SCHEDULING | Facility: HOME HEALTH | Age: 85
End: 2020-01-01
Payer: MEDICARE

## 2020-01-01 ENCOUNTER — HOME CARE VISIT (OUTPATIENT)
Dept: HOSPICE | Facility: HOSPICE | Age: 85
End: 2020-01-01
Payer: MEDICARE

## 2020-01-01 ENCOUNTER — APPOINTMENT (OUTPATIENT)
Dept: GENERAL RADIOLOGY | Age: 85
End: 2020-01-01
Attending: EMERGENCY MEDICINE
Payer: MEDICARE

## 2020-01-01 ENCOUNTER — TELEPHONE (OUTPATIENT)
Dept: ORTHOPEDIC SURGERY | Age: 85
End: 2020-01-01

## 2020-01-01 ENCOUNTER — HOSPITAL ENCOUNTER (EMERGENCY)
Age: 85
Discharge: HOME | End: 2020-04-23
Attending: EMERGENCY MEDICINE | Admitting: EMERGENCY MEDICINE
Payer: MEDICARE

## 2020-01-01 VITALS
TEMPERATURE: 98 F | DIASTOLIC BLOOD PRESSURE: 61 MMHG | RESPIRATION RATE: 20 BRPM | OXYGEN SATURATION: 90 % | SYSTOLIC BLOOD PRESSURE: 108 MMHG | DIASTOLIC BLOOD PRESSURE: 56 MMHG | RESPIRATION RATE: 16 BRPM | HEART RATE: 53 BPM | SYSTOLIC BLOOD PRESSURE: 109 MMHG | OXYGEN SATURATION: 91 % | TEMPERATURE: 98.1 F | HEART RATE: 56 BPM

## 2020-01-01 VITALS
SYSTOLIC BLOOD PRESSURE: 129 MMHG | TEMPERATURE: 98 F | OXYGEN SATURATION: 91 % | DIASTOLIC BLOOD PRESSURE: 83 MMHG | HEART RATE: 81 BPM | RESPIRATION RATE: 16 BRPM

## 2020-01-01 VITALS
HEART RATE: 54 BPM | SYSTOLIC BLOOD PRESSURE: 113 MMHG | DIASTOLIC BLOOD PRESSURE: 64 MMHG | RESPIRATION RATE: 20 BRPM | OXYGEN SATURATION: 84 % | SYSTOLIC BLOOD PRESSURE: 111 MMHG | TEMPERATURE: 98.3 F | OXYGEN SATURATION: 95 % | TEMPERATURE: 98.1 F | DIASTOLIC BLOOD PRESSURE: 79 MMHG | RESPIRATION RATE: 16 BRPM | HEART RATE: 54 BPM

## 2020-01-01 VITALS
DIASTOLIC BLOOD PRESSURE: 73 MMHG | DIASTOLIC BLOOD PRESSURE: 54 MMHG | SYSTOLIC BLOOD PRESSURE: 102 MMHG | TEMPERATURE: 98.1 F | HEART RATE: 58 BPM | OXYGEN SATURATION: 93 % | SYSTOLIC BLOOD PRESSURE: 107 MMHG | OXYGEN SATURATION: 95 % | RESPIRATION RATE: 16 BRPM | TEMPERATURE: 98 F | RESPIRATION RATE: 16 BRPM | HEART RATE: 65 BPM

## 2020-01-01 VITALS
SYSTOLIC BLOOD PRESSURE: 158 MMHG | RESPIRATION RATE: 24 BRPM | HEART RATE: 70 BPM | DIASTOLIC BLOOD PRESSURE: 94 MMHG | TEMPERATURE: 98 F | OXYGEN SATURATION: 85 %

## 2020-01-01 VITALS
TEMPERATURE: 99 F | HEART RATE: 51 BPM | DIASTOLIC BLOOD PRESSURE: 71 MMHG | OXYGEN SATURATION: 89 % | SYSTOLIC BLOOD PRESSURE: 100 MMHG | RESPIRATION RATE: 18 BRPM

## 2020-01-01 VITALS
HEART RATE: 79 BPM | TEMPERATURE: 97 F | DIASTOLIC BLOOD PRESSURE: 86 MMHG | RESPIRATION RATE: 20 BRPM | OXYGEN SATURATION: 93 % | SYSTOLIC BLOOD PRESSURE: 169 MMHG

## 2020-01-01 VITALS
DIASTOLIC BLOOD PRESSURE: 65 MMHG | TEMPERATURE: 100.4 F | RESPIRATION RATE: 20 BRPM | HEART RATE: 87 BPM | OXYGEN SATURATION: 92 % | SYSTOLIC BLOOD PRESSURE: 114 MMHG

## 2020-01-01 VITALS
RESPIRATION RATE: 16 BRPM | OXYGEN SATURATION: 93 % | SYSTOLIC BLOOD PRESSURE: 112 MMHG | TEMPERATURE: 98 F | DIASTOLIC BLOOD PRESSURE: 59 MMHG | HEART RATE: 55 BPM

## 2020-01-01 VITALS
DIASTOLIC BLOOD PRESSURE: 70 MMHG | HEART RATE: 74 BPM | TEMPERATURE: 98.9 F | SYSTOLIC BLOOD PRESSURE: 133 MMHG | RESPIRATION RATE: 18 BRPM | OXYGEN SATURATION: 83 %

## 2020-01-01 VITALS
TEMPERATURE: 98.4 F | HEART RATE: 67 BPM | SYSTOLIC BLOOD PRESSURE: 133 MMHG | DIASTOLIC BLOOD PRESSURE: 62 MMHG | OXYGEN SATURATION: 91 % | RESPIRATION RATE: 18 BRPM

## 2020-01-01 VITALS
DIASTOLIC BLOOD PRESSURE: 64 MMHG | OXYGEN SATURATION: 88 % | SYSTOLIC BLOOD PRESSURE: 100 MMHG | RESPIRATION RATE: 18 BRPM | TEMPERATURE: 98.2 F | HEART RATE: 82 BPM

## 2020-01-01 VITALS — SYSTOLIC BLOOD PRESSURE: 144 MMHG | DIASTOLIC BLOOD PRESSURE: 80 MMHG

## 2020-01-01 VITALS
HEART RATE: 52 BPM | RESPIRATION RATE: 20 BRPM | SYSTOLIC BLOOD PRESSURE: 102 MMHG | TEMPERATURE: 98.2 F | DIASTOLIC BLOOD PRESSURE: 51 MMHG | OXYGEN SATURATION: 100 %

## 2020-01-01 VITALS
RESPIRATION RATE: 18 BRPM | TEMPERATURE: 97 F | SYSTOLIC BLOOD PRESSURE: 109 MMHG | HEART RATE: 59 BPM | OXYGEN SATURATION: 94 % | DIASTOLIC BLOOD PRESSURE: 67 MMHG

## 2020-01-01 VITALS
HEART RATE: 53 BPM | OXYGEN SATURATION: 93 % | DIASTOLIC BLOOD PRESSURE: 62 MMHG | TEMPERATURE: 98.7 F | RESPIRATION RATE: 16 BRPM | SYSTOLIC BLOOD PRESSURE: 103 MMHG

## 2020-01-01 VITALS — RESPIRATION RATE: 16 BRPM | HEART RATE: 57 BPM | TEMPERATURE: 98.2 F | OXYGEN SATURATION: 92 %

## 2020-01-01 VITALS
TEMPERATURE: 97.9 F | OXYGEN SATURATION: 95 % | SYSTOLIC BLOOD PRESSURE: 118 MMHG | HEART RATE: 71 BPM | DIASTOLIC BLOOD PRESSURE: 53 MMHG | RESPIRATION RATE: 18 BRPM

## 2020-01-01 VITALS — RESPIRATION RATE: 20 BRPM | OXYGEN SATURATION: 94 %

## 2020-01-01 VITALS
OXYGEN SATURATION: 91 % | HEART RATE: 57 BPM | DIASTOLIC BLOOD PRESSURE: 69 MMHG | TEMPERATURE: 98.9 F | SYSTOLIC BLOOD PRESSURE: 123 MMHG | RESPIRATION RATE: 20 BRPM

## 2020-01-01 VITALS
SYSTOLIC BLOOD PRESSURE: 125 MMHG | TEMPERATURE: 98.7 F | DIASTOLIC BLOOD PRESSURE: 83 MMHG | OXYGEN SATURATION: 96 % | HEART RATE: 74 BPM | RESPIRATION RATE: 18 BRPM

## 2020-01-01 VITALS
DIASTOLIC BLOOD PRESSURE: 59 MMHG | SYSTOLIC BLOOD PRESSURE: 116 MMHG | RESPIRATION RATE: 20 BRPM | OXYGEN SATURATION: 93 % | HEART RATE: 57 BPM | TEMPERATURE: 98.1 F

## 2020-01-01 VITALS
OXYGEN SATURATION: 96 % | TEMPERATURE: 97.9 F | RESPIRATION RATE: 20 BRPM | DIASTOLIC BLOOD PRESSURE: 81 MMHG | HEART RATE: 59 BPM | SYSTOLIC BLOOD PRESSURE: 123 MMHG

## 2020-01-01 VITALS
TEMPERATURE: 98.6 F | RESPIRATION RATE: 20 BRPM | SYSTOLIC BLOOD PRESSURE: 134 MMHG | HEART RATE: 64 BPM | DIASTOLIC BLOOD PRESSURE: 58 MMHG | OXYGEN SATURATION: 91 %

## 2020-01-01 VITALS
RESPIRATION RATE: 20 BRPM | HEART RATE: 53 BPM | SYSTOLIC BLOOD PRESSURE: 117 MMHG | OXYGEN SATURATION: 92 % | DIASTOLIC BLOOD PRESSURE: 62 MMHG | TEMPERATURE: 98 F

## 2020-01-01 VITALS
TEMPERATURE: 97.2 F | OXYGEN SATURATION: 87 % | HEART RATE: 76 BPM | SYSTOLIC BLOOD PRESSURE: 129 MMHG | RESPIRATION RATE: 20 BRPM | DIASTOLIC BLOOD PRESSURE: 74 MMHG

## 2020-01-01 VITALS — RESPIRATION RATE: 16 BRPM | TEMPERATURE: 98.1 F | HEART RATE: 67 BPM | OXYGEN SATURATION: 92 %

## 2020-01-01 VITALS
HEART RATE: 54 BPM | RESPIRATION RATE: 16 BRPM | TEMPERATURE: 97.2 F | OXYGEN SATURATION: 90 % | SYSTOLIC BLOOD PRESSURE: 60 MMHG | DIASTOLIC BLOOD PRESSURE: 40 MMHG

## 2020-01-01 VITALS
RESPIRATION RATE: 18 BRPM | SYSTOLIC BLOOD PRESSURE: 117 MMHG | HEART RATE: 78 BPM | DIASTOLIC BLOOD PRESSURE: 52 MMHG | OXYGEN SATURATION: 91 % | TEMPERATURE: 98.1 F

## 2020-01-01 VITALS
RESPIRATION RATE: 16 BRPM | DIASTOLIC BLOOD PRESSURE: 79 MMHG | OXYGEN SATURATION: 93 % | HEART RATE: 94 BPM | TEMPERATURE: 98 F | SYSTOLIC BLOOD PRESSURE: 131 MMHG

## 2020-01-01 VITALS — HEART RATE: 76 BPM | TEMPERATURE: 101.1 F | RESPIRATION RATE: 20 BRPM | OXYGEN SATURATION: 86 %

## 2020-01-01 VITALS
OXYGEN SATURATION: 86 % | TEMPERATURE: 98.4 F | DIASTOLIC BLOOD PRESSURE: 76 MMHG | RESPIRATION RATE: 18 BRPM | SYSTOLIC BLOOD PRESSURE: 112 MMHG | HEART RATE: 63 BPM

## 2020-01-01 VITALS
OXYGEN SATURATION: 96 % | DIASTOLIC BLOOD PRESSURE: 67 MMHG | TEMPERATURE: 98.4 F | SYSTOLIC BLOOD PRESSURE: 127 MMHG | HEART RATE: 62 BPM | RESPIRATION RATE: 20 BRPM

## 2020-01-01 VITALS
DIASTOLIC BLOOD PRESSURE: 50 MMHG | HEART RATE: 52 BPM | RESPIRATION RATE: 20 BRPM | TEMPERATURE: 98.7 F | SYSTOLIC BLOOD PRESSURE: 106 MMHG | OXYGEN SATURATION: 92 %

## 2020-01-01 VITALS
HEART RATE: 77 BPM | SYSTOLIC BLOOD PRESSURE: 107 MMHG | DIASTOLIC BLOOD PRESSURE: 64 MMHG | RESPIRATION RATE: 18 BRPM | OXYGEN SATURATION: 89 % | TEMPERATURE: 97.9 F

## 2020-01-01 VITALS
TEMPERATURE: 98.7 F | DIASTOLIC BLOOD PRESSURE: 62 MMHG | RESPIRATION RATE: 20 BRPM | HEART RATE: 80 BPM | OXYGEN SATURATION: 94 % | SYSTOLIC BLOOD PRESSURE: 95 MMHG

## 2020-01-01 VITALS
DIASTOLIC BLOOD PRESSURE: 72 MMHG | TEMPERATURE: 98.7 F | HEART RATE: 79 BPM | HEART RATE: 64 BPM | SYSTOLIC BLOOD PRESSURE: 109 MMHG | RESPIRATION RATE: 18 BRPM | SYSTOLIC BLOOD PRESSURE: 137 MMHG | DIASTOLIC BLOOD PRESSURE: 83 MMHG | RESPIRATION RATE: 18 BRPM | OXYGEN SATURATION: 93 % | OXYGEN SATURATION: 96 %

## 2020-01-01 VITALS
DIASTOLIC BLOOD PRESSURE: 76 MMHG | HEART RATE: 62 BPM | RESPIRATION RATE: 16 BRPM | SYSTOLIC BLOOD PRESSURE: 123 MMHG | TEMPERATURE: 98.6 F | OXYGEN SATURATION: 98 %

## 2020-01-01 VITALS
SYSTOLIC BLOOD PRESSURE: 120 MMHG | OXYGEN SATURATION: 94 % | DIASTOLIC BLOOD PRESSURE: 68 MMHG | RESPIRATION RATE: 16 BRPM | HEART RATE: 58 BPM | TEMPERATURE: 98 F

## 2020-01-01 VITALS
DIASTOLIC BLOOD PRESSURE: 78 MMHG | SYSTOLIC BLOOD PRESSURE: 96 MMHG | RESPIRATION RATE: 18 BRPM | RESPIRATION RATE: 16 BRPM | TEMPERATURE: 97.4 F | OXYGEN SATURATION: 92 % | SYSTOLIC BLOOD PRESSURE: 105 MMHG | OXYGEN SATURATION: 93 % | HEART RATE: 72 BPM | DIASTOLIC BLOOD PRESSURE: 55 MMHG | HEART RATE: 80 BPM | TEMPERATURE: 99 F

## 2020-01-01 VITALS
SYSTOLIC BLOOD PRESSURE: 131 MMHG | OXYGEN SATURATION: 93 % | HEART RATE: 77 BPM | TEMPERATURE: 98 F | DIASTOLIC BLOOD PRESSURE: 98 MMHG

## 2020-01-01 VITALS
RESPIRATION RATE: 20 BRPM | DIASTOLIC BLOOD PRESSURE: 80 MMHG | HEART RATE: 59 BPM | TEMPERATURE: 98.9 F | SYSTOLIC BLOOD PRESSURE: 126 MMHG | OXYGEN SATURATION: 90 %

## 2020-01-01 VITALS
OXYGEN SATURATION: 93 % | HEART RATE: 53 BPM | DIASTOLIC BLOOD PRESSURE: 68 MMHG | SYSTOLIC BLOOD PRESSURE: 128 MMHG | TEMPERATURE: 97.8 F

## 2020-01-01 VITALS
HEART RATE: 53 BPM | OXYGEN SATURATION: 93 % | SYSTOLIC BLOOD PRESSURE: 128 MMHG | DIASTOLIC BLOOD PRESSURE: 68 MMHG | RESPIRATION RATE: 18 BRPM | TEMPERATURE: 97.8 F

## 2020-01-01 VITALS
OXYGEN SATURATION: 97 % | TEMPERATURE: 98.7 F | DIASTOLIC BLOOD PRESSURE: 52 MMHG | HEART RATE: 70 BPM | RESPIRATION RATE: 20 BRPM | SYSTOLIC BLOOD PRESSURE: 100 MMHG

## 2020-01-01 VITALS
SYSTOLIC BLOOD PRESSURE: 95 MMHG | TEMPERATURE: 98 F | OXYGEN SATURATION: 95 % | DIASTOLIC BLOOD PRESSURE: 64 MMHG | HEART RATE: 71 BPM | RESPIRATION RATE: 20 BRPM

## 2020-01-01 VITALS
SYSTOLIC BLOOD PRESSURE: 101 MMHG | DIASTOLIC BLOOD PRESSURE: 43 MMHG | RESPIRATION RATE: 16 BRPM | TEMPERATURE: 97.3 F | HEART RATE: 69 BPM

## 2020-01-01 VITALS
RESPIRATION RATE: 20 BRPM | DIASTOLIC BLOOD PRESSURE: 60 MMHG | HEART RATE: 68 BPM | SYSTOLIC BLOOD PRESSURE: 118 MMHG | TEMPERATURE: 97.9 F | OXYGEN SATURATION: 88 %

## 2020-01-01 VITALS
RESPIRATION RATE: 17 BRPM | SYSTOLIC BLOOD PRESSURE: 124 MMHG | OXYGEN SATURATION: 94 % | DIASTOLIC BLOOD PRESSURE: 76 MMHG | HEART RATE: 80 BPM | TEMPERATURE: 98.8 F

## 2020-01-01 VITALS
OXYGEN SATURATION: 95 % | RESPIRATION RATE: 18 BRPM | SYSTOLIC BLOOD PRESSURE: 144 MMHG | HEART RATE: 69 BPM | TEMPERATURE: 98 F | DIASTOLIC BLOOD PRESSURE: 53 MMHG

## 2020-01-01 VITALS
DIASTOLIC BLOOD PRESSURE: 78 MMHG | OXYGEN SATURATION: 93 % | RESPIRATION RATE: 18 BRPM | HEART RATE: 62 BPM | SYSTOLIC BLOOD PRESSURE: 115 MMHG | TEMPERATURE: 97.9 F

## 2020-01-01 VITALS — TEMPERATURE: 101.1 F | HEART RATE: 71 BPM | OXYGEN SATURATION: 88 % | RESPIRATION RATE: 20 BRPM

## 2020-01-01 VITALS — TEMPERATURE: 98.1 F | HEART RATE: 62 BPM | RESPIRATION RATE: 18 BRPM | OXYGEN SATURATION: 91 %

## 2020-01-01 VITALS — TEMPERATURE: 98 F | RESPIRATION RATE: 18 BRPM

## 2020-01-01 VITALS
RESPIRATION RATE: 18 BRPM | DIASTOLIC BLOOD PRESSURE: 78 MMHG | SYSTOLIC BLOOD PRESSURE: 117 MMHG | HEART RATE: 82 BPM | TEMPERATURE: 98 F | OXYGEN SATURATION: 95 %

## 2020-01-01 VITALS
SYSTOLIC BLOOD PRESSURE: 125 MMHG | OXYGEN SATURATION: 93 % | TEMPERATURE: 98.2 F | RESPIRATION RATE: 16 BRPM | DIASTOLIC BLOOD PRESSURE: 65 MMHG | HEART RATE: 77 BPM

## 2020-01-01 VITALS
OXYGEN SATURATION: 93 % | DIASTOLIC BLOOD PRESSURE: 52 MMHG | HEART RATE: 64 BPM | TEMPERATURE: 98.4 F | SYSTOLIC BLOOD PRESSURE: 105 MMHG | RESPIRATION RATE: 18 BRPM

## 2020-01-01 VITALS
RESPIRATION RATE: 20 BRPM | TEMPERATURE: 97.9 F | SYSTOLIC BLOOD PRESSURE: 108 MMHG | HEART RATE: 52 BPM | OXYGEN SATURATION: 92 % | DIASTOLIC BLOOD PRESSURE: 69 MMHG

## 2020-01-01 VITALS — RESPIRATION RATE: 20 BRPM | OXYGEN SATURATION: 99 % | TEMPERATURE: 97.4 F | HEART RATE: 70 BPM

## 2020-01-01 VITALS
HEART RATE: 72 BPM | TEMPERATURE: 98.4 F | SYSTOLIC BLOOD PRESSURE: 96 MMHG | RESPIRATION RATE: 18 BRPM | OXYGEN SATURATION: 93 % | DIASTOLIC BLOOD PRESSURE: 55 MMHG

## 2020-01-01 VITALS — TEMPERATURE: 98.5 F | RESPIRATION RATE: 20 BRPM | HEART RATE: 58 BPM | OXYGEN SATURATION: 93 %

## 2020-01-01 VITALS — TEMPERATURE: 98.3 F | RESPIRATION RATE: 16 BRPM | OXYGEN SATURATION: 94 %

## 2020-01-01 VITALS
DIASTOLIC BLOOD PRESSURE: 60 MMHG | OXYGEN SATURATION: 93 % | TEMPERATURE: 97.6 F | HEART RATE: 74 BPM | RESPIRATION RATE: 18 BRPM | SYSTOLIC BLOOD PRESSURE: 92 MMHG

## 2020-01-01 VITALS
DIASTOLIC BLOOD PRESSURE: 91 MMHG | TEMPERATURE: 97.5 F | RESPIRATION RATE: 20 BRPM | OXYGEN SATURATION: 86 % | SYSTOLIC BLOOD PRESSURE: 127 MMHG | HEART RATE: 78 BPM

## 2020-01-01 VITALS
DIASTOLIC BLOOD PRESSURE: 49 MMHG | OXYGEN SATURATION: 86 % | TEMPERATURE: 98.9 F | SYSTOLIC BLOOD PRESSURE: 102 MMHG | HEART RATE: 71 BPM | RESPIRATION RATE: 20 BRPM

## 2020-01-01 VITALS
HEART RATE: 63 BPM | OXYGEN SATURATION: 96 % | TEMPERATURE: 98.1 F | SYSTOLIC BLOOD PRESSURE: 100 MMHG | DIASTOLIC BLOOD PRESSURE: 55 MMHG

## 2020-01-01 VITALS
DIASTOLIC BLOOD PRESSURE: 56 MMHG | OXYGEN SATURATION: 95 % | TEMPERATURE: 98 F | SYSTOLIC BLOOD PRESSURE: 102 MMHG | HEART RATE: 84 BPM | RESPIRATION RATE: 20 BRPM

## 2020-01-01 VITALS
DIASTOLIC BLOOD PRESSURE: 78 MMHG | HEART RATE: 56 BPM | TEMPERATURE: 98.1 F | SYSTOLIC BLOOD PRESSURE: 103 MMHG | RESPIRATION RATE: 20 BRPM | OXYGEN SATURATION: 93 %

## 2020-01-01 VITALS
SYSTOLIC BLOOD PRESSURE: 98 MMHG | TEMPERATURE: 97.3 F | HEART RATE: 66 BPM | OXYGEN SATURATION: 92 % | RESPIRATION RATE: 20 BRPM | DIASTOLIC BLOOD PRESSURE: 50 MMHG

## 2020-01-01 VITALS
DIASTOLIC BLOOD PRESSURE: 53 MMHG | TEMPERATURE: 99 F | HEART RATE: 90 BPM | RESPIRATION RATE: 22 BRPM | SYSTOLIC BLOOD PRESSURE: 127 MMHG | OXYGEN SATURATION: 93 %

## 2020-01-01 VITALS
DIASTOLIC BLOOD PRESSURE: 63 MMHG | RESPIRATION RATE: 16 BRPM | OXYGEN SATURATION: 98 % | TEMPERATURE: 98 F | HEART RATE: 67 BPM | SYSTOLIC BLOOD PRESSURE: 115 MMHG

## 2020-01-01 VITALS
TEMPERATURE: 97.6 F | HEART RATE: 97 BPM | SYSTOLIC BLOOD PRESSURE: 120 MMHG | RESPIRATION RATE: 16 BRPM | DIASTOLIC BLOOD PRESSURE: 96 MMHG | OXYGEN SATURATION: 95 %

## 2020-01-01 VITALS
OXYGEN SATURATION: 92 % | HEART RATE: 54 BPM | SYSTOLIC BLOOD PRESSURE: 117 MMHG | TEMPERATURE: 97.8 F | DIASTOLIC BLOOD PRESSURE: 63 MMHG | RESPIRATION RATE: 20 BRPM

## 2020-01-01 VITALS — HEART RATE: 87 BPM | OXYGEN SATURATION: 87 % | TEMPERATURE: 98 F | RESPIRATION RATE: 20 BRPM

## 2020-01-01 VITALS
HEART RATE: 72 BPM | TEMPERATURE: 98 F | RESPIRATION RATE: 20 BRPM | OXYGEN SATURATION: 84 % | SYSTOLIC BLOOD PRESSURE: 127 MMHG | DIASTOLIC BLOOD PRESSURE: 87 MMHG

## 2020-01-01 VITALS — OXYGEN SATURATION: 96 % | RESPIRATION RATE: 18 BRPM | TEMPERATURE: 98.2 F

## 2020-01-01 VITALS — RESPIRATION RATE: 20 BRPM | OXYGEN SATURATION: 88 % | HEART RATE: 53 BPM | TEMPERATURE: 100.5 F

## 2020-01-01 VITALS
HEART RATE: 50 BPM | OXYGEN SATURATION: 94 % | SYSTOLIC BLOOD PRESSURE: 100 MMHG | DIASTOLIC BLOOD PRESSURE: 48 MMHG | RESPIRATION RATE: 20 BRPM | TEMPERATURE: 98.1 F

## 2020-01-01 VITALS — RESPIRATION RATE: 18 BRPM | TEMPERATURE: 98 F | OXYGEN SATURATION: 87 %

## 2020-01-01 VITALS — HEART RATE: 65 BPM | OXYGEN SATURATION: 83 % | RESPIRATION RATE: 18 BRPM | TEMPERATURE: 98.6 F

## 2020-01-01 VITALS — OXYGEN SATURATION: 96 % | TEMPERATURE: 98 F

## 2020-01-01 DIAGNOSIS — Z20.822 SUSPECTED COVID-19 VIRUS INFECTION: ICD-10-CM

## 2020-01-01 DIAGNOSIS — S72.145A NONDISPLACED INTERTROCHANTERIC FRACTURE OF LEFT FEMUR, INITIAL ENCOUNTER FOR CLOSED FRACTURE (HCC): Primary | ICD-10-CM

## 2020-01-01 LAB — SARS-COV-2, COV2NT: NOT DETECTED

## 2020-01-01 PROCEDURE — HOSPICE MEDICATION HC HH HOSPICE MEDICATION

## 2020-01-01 PROCEDURE — 0651 HSPC ROUTINE HOME CARE

## 2020-01-01 PROCEDURE — T4541 LARGE DISPOSABLE UNDERPAD: HCPCS

## 2020-01-01 PROCEDURE — 74011250637 HC RX REV CODE- 250/637: Performed by: EMERGENCY MEDICINE

## 2020-01-01 PROCEDURE — T4526 ADULT SIZE PULL-ON MED: HCPCS

## 2020-01-01 PROCEDURE — A6250 SKIN SEAL PROTECT MOISTURIZR: HCPCS

## 2020-01-01 PROCEDURE — G0299 HHS/HOSPICE OF RN EA 15 MIN: HCPCS

## 2020-01-01 PROCEDURE — A4927 NON-STERILE GLOVES: HCPCS

## 2020-01-01 PROCEDURE — G0151 HHCP-SERV OF PT,EA 15 MIN: HCPCS

## 2020-01-01 PROCEDURE — G0157 HHC PT ASSISTANT EA 15: HCPCS

## 2020-01-01 PROCEDURE — G0155 HHCP-SVS OF CSW,EA 15 MIN: HCPCS

## 2020-01-01 PROCEDURE — A7003 NEBULIZER ADMINISTRATION SET: HCPCS

## 2020-01-01 PROCEDURE — A6213 FOAM DRG >16<=48 SQ IN W/BDR: HCPCS

## 2020-01-01 PROCEDURE — 99285 EMERGENCY DEPT VISIT HI MDM: CPT

## 2020-01-01 PROCEDURE — A5120 SKIN BARRIER, WIPE OR SWAB: HCPCS

## 2020-01-01 PROCEDURE — A4930 STERILE, GLOVES PER PAIR: HCPCS

## 2020-01-01 PROCEDURE — T4523 ADULT SIZE BRIEF/DIAPER LG: HCPCS

## 2020-01-01 PROCEDURE — T4522 ADULT SIZE BRIEF/DIAPER MED: HCPCS

## 2020-01-01 PROCEDURE — A4615 CANNULA NASAL: HCPCS

## 2020-01-01 PROCEDURE — A4314 CATH W/DRAINAGE 2-WAY LATEX: HCPCS

## 2020-01-01 PROCEDURE — 87635 SARS-COV-2 COVID-19 AMP PRB: CPT

## 2020-01-01 PROCEDURE — 71045 X-RAY EXAM CHEST 1 VIEW: CPT

## 2020-01-01 PROCEDURE — 73502 X-RAY EXAM HIP UNI 2-3 VIEWS: CPT

## 2020-01-01 RX ORDER — OXYCODONE AND ACETAMINOPHEN 5; 325 MG/1; MG/1
1 TABLET ORAL
Qty: 12 TAB | Refills: 0 | Status: SHIPPED | OUTPATIENT
Start: 2020-01-01 | End: 2020-01-01

## 2020-01-01 RX ORDER — DOXYCYCLINE HYCLATE 100 MG
100 TABLET ORAL 2 TIMES DAILY
Qty: 20 TAB | Refills: 0 | Status: SHIPPED | OUTPATIENT
Start: 2020-01-01 | End: 2020-01-01

## 2020-01-01 RX ORDER — DOXYCYCLINE 100 MG/1
100 CAPSULE ORAL
Status: COMPLETED | OUTPATIENT
Start: 2020-01-01 | End: 2020-01-01

## 2020-01-01 RX ORDER — AMOXICILLIN AND CLAVULANATE POTASSIUM 875; 125 MG/1; MG/1
1 TABLET, FILM COATED ORAL 2 TIMES DAILY
Qty: 20 TAB | Refills: 0 | Status: SHIPPED | OUTPATIENT
Start: 2020-01-01 | End: 2020-01-01

## 2020-01-01 RX ORDER — ALBUTEROL SULFATE 90 UG/1
2 AEROSOL, METERED RESPIRATORY (INHALATION)
Status: COMPLETED | OUTPATIENT
Start: 2020-01-01 | End: 2020-01-01

## 2020-01-01 RX ORDER — AMOXICILLIN AND CLAVULANATE POTASSIUM 875; 125 MG/1; MG/1
1 TABLET, FILM COATED ORAL
Status: COMPLETED | OUTPATIENT
Start: 2020-01-01 | End: 2020-01-01

## 2020-01-01 RX ADMIN — ALBUTEROL SULFATE 2 PUFF: 90 AEROSOL, METERED RESPIRATORY (INHALATION) at 15:09

## 2020-01-01 RX ADMIN — AMOXICILLIN AND CLAVULANATE POTASSIUM 1 TABLET: 875; 125 TABLET, FILM COATED ORAL at 15:09

## 2020-01-01 RX ADMIN — DOXYCYCLINE HYCLATE 100 MG: 100 CAPSULE ORAL at 15:09

## 2020-01-02 PROCEDURE — HOSPICE MEDICATION HC HH HOSPICE MEDICATION

## 2020-01-02 PROCEDURE — 0651 HSPC ROUTINE HOME CARE

## 2020-01-03 ENCOUNTER — HOME CARE VISIT (OUTPATIENT)
Dept: SCHEDULING | Facility: HOME HEALTH | Age: 85
End: 2020-01-03
Payer: MEDICARE

## 2020-01-03 VITALS
HEART RATE: 61 BPM | SYSTOLIC BLOOD PRESSURE: 138 MMHG | OXYGEN SATURATION: 95 % | RESPIRATION RATE: 18 BRPM | DIASTOLIC BLOOD PRESSURE: 86 MMHG

## 2020-01-03 PROCEDURE — 0651 HSPC ROUTINE HOME CARE

## 2020-01-03 PROCEDURE — G0299 HHS/HOSPICE OF RN EA 15 MIN: HCPCS

## 2020-01-04 PROCEDURE — 0651 HSPC ROUTINE HOME CARE

## 2020-01-05 PROCEDURE — 0651 HSPC ROUTINE HOME CARE

## 2020-01-06 PROCEDURE — 0651 HSPC ROUTINE HOME CARE

## 2020-01-07 PROCEDURE — 0651 HSPC ROUTINE HOME CARE

## 2020-01-08 PROCEDURE — 0651 HSPC ROUTINE HOME CARE

## 2020-01-09 PROCEDURE — 0651 HSPC ROUTINE HOME CARE

## 2020-01-10 ENCOUNTER — HOME CARE VISIT (OUTPATIENT)
Dept: SCHEDULING | Facility: HOME HEALTH | Age: 85
End: 2020-01-10
Payer: MEDICARE

## 2020-01-10 PROCEDURE — 0651 HSPC ROUTINE HOME CARE

## 2020-01-10 PROCEDURE — G0299 HHS/HOSPICE OF RN EA 15 MIN: HCPCS

## 2020-01-11 PROCEDURE — HOSPICE MEDICATION HC HH HOSPICE MEDICATION

## 2020-01-11 PROCEDURE — 0651 HSPC ROUTINE HOME CARE

## 2020-01-12 PROCEDURE — 0651 HSPC ROUTINE HOME CARE

## 2020-01-13 PROCEDURE — 0651 HSPC ROUTINE HOME CARE

## 2020-01-14 PROCEDURE — 0651 HSPC ROUTINE HOME CARE

## 2020-01-15 PROCEDURE — 0651 HSPC ROUTINE HOME CARE

## 2020-01-16 ENCOUNTER — HOME CARE VISIT (OUTPATIENT)
Dept: SCHEDULING | Facility: HOME HEALTH | Age: 85
End: 2020-01-16
Payer: MEDICARE

## 2020-01-16 PROCEDURE — 0651 HSPC ROUTINE HOME CARE

## 2020-01-16 PROCEDURE — T4526 ADULT SIZE PULL-ON MED: HCPCS

## 2020-01-16 PROCEDURE — G0299 HHS/HOSPICE OF RN EA 15 MIN: HCPCS

## 2020-01-17 ENCOUNTER — HOME CARE VISIT (OUTPATIENT)
Dept: SCHEDULING | Facility: HOME HEALTH | Age: 85
End: 2020-01-17
Payer: MEDICARE

## 2020-01-17 VITALS
RESPIRATION RATE: 18 BRPM | SYSTOLIC BLOOD PRESSURE: 96 MMHG | DIASTOLIC BLOOD PRESSURE: 56 MMHG | HEART RATE: 50 BPM | OXYGEN SATURATION: 94 %

## 2020-01-17 PROCEDURE — G0155 HHCP-SVS OF CSW,EA 15 MIN: HCPCS

## 2020-01-17 PROCEDURE — 0651 HSPC ROUTINE HOME CARE

## 2020-01-18 PROCEDURE — 0651 HSPC ROUTINE HOME CARE

## 2020-01-19 PROCEDURE — 0651 HSPC ROUTINE HOME CARE

## 2020-01-20 ENCOUNTER — HOME CARE VISIT (OUTPATIENT)
Dept: HOSPICE | Facility: HOSPICE | Age: 85
End: 2020-01-20
Payer: MEDICARE

## 2020-01-20 PROCEDURE — 0651 HSPC ROUTINE HOME CARE

## 2020-01-21 PROCEDURE — 0651 HSPC ROUTINE HOME CARE

## 2020-01-22 PROCEDURE — 0651 HSPC ROUTINE HOME CARE

## 2020-01-22 PROCEDURE — HOSPICE MEDICATION HC HH HOSPICE MEDICATION

## 2020-01-23 ENCOUNTER — HOME CARE VISIT (OUTPATIENT)
Dept: SCHEDULING | Facility: HOME HEALTH | Age: 85
End: 2020-01-23
Payer: MEDICARE

## 2020-01-23 VITALS
RESPIRATION RATE: 18 BRPM | DIASTOLIC BLOOD PRESSURE: 87 MMHG | HEART RATE: 97 BPM | OXYGEN SATURATION: 92 % | SYSTOLIC BLOOD PRESSURE: 129 MMHG

## 2020-01-23 PROCEDURE — HOSPICE MEDICATION HC HH HOSPICE MEDICATION

## 2020-01-23 PROCEDURE — G0299 HHS/HOSPICE OF RN EA 15 MIN: HCPCS

## 2020-01-23 PROCEDURE — 0651 HSPC ROUTINE HOME CARE

## 2020-01-24 PROCEDURE — 0651 HSPC ROUTINE HOME CARE

## 2020-01-24 PROCEDURE — HOSPICE MEDICATION HC HH HOSPICE MEDICATION

## 2020-01-25 PROCEDURE — 0651 HSPC ROUTINE HOME CARE

## 2020-01-26 PROCEDURE — 0651 HSPC ROUTINE HOME CARE

## 2020-01-27 ENCOUNTER — HOME CARE VISIT (OUTPATIENT)
Dept: SCHEDULING | Facility: HOME HEALTH | Age: 85
End: 2020-01-27
Payer: MEDICARE

## 2020-01-27 VITALS — RESPIRATION RATE: 20 BRPM | OXYGEN SATURATION: 94 %

## 2020-01-27 PROCEDURE — G0299 HHS/HOSPICE OF RN EA 15 MIN: HCPCS

## 2020-01-27 PROCEDURE — 0651 HSPC ROUTINE HOME CARE

## 2020-01-28 PROCEDURE — 0651 HSPC ROUTINE HOME CARE

## 2020-01-29 ENCOUNTER — HOME CARE VISIT (OUTPATIENT)
Dept: SCHEDULING | Facility: HOME HEALTH | Age: 85
End: 2020-01-29
Payer: MEDICARE

## 2020-01-29 PROCEDURE — 0651 HSPC ROUTINE HOME CARE

## 2020-01-29 PROCEDURE — HOSPICE MEDICATION HC HH HOSPICE MEDICATION

## 2020-01-29 PROCEDURE — G0299 HHS/HOSPICE OF RN EA 15 MIN: HCPCS

## 2020-01-30 VITALS
OXYGEN SATURATION: 96 % | DIASTOLIC BLOOD PRESSURE: 47 MMHG | SYSTOLIC BLOOD PRESSURE: 102 MMHG | RESPIRATION RATE: 18 BRPM | HEART RATE: 57 BPM

## 2020-01-30 PROCEDURE — A6250 SKIN SEAL PROTECT MOISTURIZR: HCPCS

## 2020-01-30 PROCEDURE — T4526 ADULT SIZE PULL-ON MED: HCPCS

## 2020-01-30 PROCEDURE — 0651 HSPC ROUTINE HOME CARE

## 2020-01-30 PROCEDURE — A4927 NON-STERILE GLOVES: HCPCS

## 2020-01-31 PROCEDURE — 0651 HSPC ROUTINE HOME CARE

## 2020-02-01 PROCEDURE — HOSPICE MEDICATION HC HH HOSPICE MEDICATION

## 2020-02-01 PROCEDURE — 0651 HSPC ROUTINE HOME CARE

## 2020-02-02 PROCEDURE — 0651 HSPC ROUTINE HOME CARE

## 2020-02-03 PROCEDURE — 0651 HSPC ROUTINE HOME CARE

## 2020-02-04 PROCEDURE — 0651 HSPC ROUTINE HOME CARE

## 2020-02-05 ENCOUNTER — HOME CARE VISIT (OUTPATIENT)
Dept: SCHEDULING | Facility: HOME HEALTH | Age: 85
End: 2020-02-05
Payer: MEDICARE

## 2020-02-05 PROCEDURE — 0651 HSPC ROUTINE HOME CARE

## 2020-02-05 PROCEDURE — G0299 HHS/HOSPICE OF RN EA 15 MIN: HCPCS

## 2020-02-06 VITALS
HEART RATE: 68 BPM | DIASTOLIC BLOOD PRESSURE: 59 MMHG | RESPIRATION RATE: 18 BRPM | OXYGEN SATURATION: 96 % | SYSTOLIC BLOOD PRESSURE: 133 MMHG

## 2020-02-06 PROCEDURE — 0651 HSPC ROUTINE HOME CARE

## 2020-02-07 PROCEDURE — 0651 HSPC ROUTINE HOME CARE

## 2020-02-08 PROCEDURE — 0651 HSPC ROUTINE HOME CARE

## 2020-02-09 PROCEDURE — HOSPICE MEDICATION HC HH HOSPICE MEDICATION

## 2020-02-09 PROCEDURE — 0651 HSPC ROUTINE HOME CARE

## 2020-02-10 PROCEDURE — 0651 HSPC ROUTINE HOME CARE

## 2020-02-10 PROCEDURE — HOSPICE MEDICATION HC HH HOSPICE MEDICATION

## 2020-02-11 ENCOUNTER — HOME CARE VISIT (OUTPATIENT)
Dept: HOSPICE | Facility: HOSPICE | Age: 85
End: 2020-02-11
Payer: MEDICARE

## 2020-02-11 PROCEDURE — 0651 HSPC ROUTINE HOME CARE

## 2020-02-11 PROCEDURE — G0299 HHS/HOSPICE OF RN EA 15 MIN: HCPCS

## 2020-02-11 PROCEDURE — T4526 ADULT SIZE PULL-ON MED: HCPCS

## 2020-02-12 VITALS
DIASTOLIC BLOOD PRESSURE: 52 MMHG | OXYGEN SATURATION: 92 % | SYSTOLIC BLOOD PRESSURE: 117 MMHG | RESPIRATION RATE: 18 BRPM | HEART RATE: 57 BPM

## 2020-02-12 PROCEDURE — HOSPICE MEDICATION HC HH HOSPICE MEDICATION

## 2020-02-12 PROCEDURE — 0651 HSPC ROUTINE HOME CARE

## 2020-02-13 PROCEDURE — 0651 HSPC ROUTINE HOME CARE

## 2020-02-14 ENCOUNTER — HOME CARE VISIT (OUTPATIENT)
Dept: HOSPICE | Facility: HOSPICE | Age: 85
End: 2020-02-14
Payer: MEDICARE

## 2020-02-14 ENCOUNTER — HOME CARE VISIT (OUTPATIENT)
Dept: SCHEDULING | Facility: HOME HEALTH | Age: 85
End: 2020-02-14
Payer: MEDICARE

## 2020-02-14 PROCEDURE — 0651 HSPC ROUTINE HOME CARE

## 2020-02-14 PROCEDURE — G0299 HHS/HOSPICE OF RN EA 15 MIN: HCPCS

## 2020-02-15 PROCEDURE — 0651 HSPC ROUTINE HOME CARE

## 2020-02-16 PROCEDURE — 0651 HSPC ROUTINE HOME CARE

## 2020-02-17 PROCEDURE — 0651 HSPC ROUTINE HOME CARE

## 2020-02-18 PROCEDURE — 0651 HSPC ROUTINE HOME CARE

## 2020-02-19 ENCOUNTER — HOME CARE VISIT (OUTPATIENT)
Dept: SCHEDULING | Facility: HOME HEALTH | Age: 85
End: 2020-02-19
Payer: MEDICARE

## 2020-02-19 PROCEDURE — 0651 HSPC ROUTINE HOME CARE

## 2020-02-19 PROCEDURE — G0299 HHS/HOSPICE OF RN EA 15 MIN: HCPCS

## 2020-02-20 VITALS
TEMPERATURE: 98.5 F | DIASTOLIC BLOOD PRESSURE: 46 MMHG | OXYGEN SATURATION: 96 % | RESPIRATION RATE: 18 BRPM | SYSTOLIC BLOOD PRESSURE: 118 MMHG | HEART RATE: 54 BPM

## 2020-02-20 PROCEDURE — 0651 HSPC ROUTINE HOME CARE

## 2020-02-21 PROCEDURE — 0651 HSPC ROUTINE HOME CARE

## 2020-02-22 PROCEDURE — 0651 HSPC ROUTINE HOME CARE

## 2020-02-23 PROCEDURE — 0651 HSPC ROUTINE HOME CARE

## 2020-02-24 PROCEDURE — HOSPICE MEDICATION HC HH HOSPICE MEDICATION

## 2020-02-24 PROCEDURE — 0651 HSPC ROUTINE HOME CARE

## 2020-02-25 PROCEDURE — 0651 HSPC ROUTINE HOME CARE

## 2020-02-26 ENCOUNTER — HOME CARE VISIT (OUTPATIENT)
Dept: SCHEDULING | Facility: HOME HEALTH | Age: 85
End: 2020-02-26
Payer: MEDICARE

## 2020-02-26 PROCEDURE — 0651 HSPC ROUTINE HOME CARE

## 2020-02-26 PROCEDURE — G0155 HHCP-SVS OF CSW,EA 15 MIN: HCPCS

## 2020-02-27 ENCOUNTER — HOME CARE VISIT (OUTPATIENT)
Dept: SCHEDULING | Facility: HOME HEALTH | Age: 85
End: 2020-02-27
Payer: MEDICARE

## 2020-02-27 PROCEDURE — 0651 HSPC ROUTINE HOME CARE

## 2020-02-27 PROCEDURE — G0299 HHS/HOSPICE OF RN EA 15 MIN: HCPCS

## 2020-02-28 VITALS
SYSTOLIC BLOOD PRESSURE: 116 MMHG | RESPIRATION RATE: 18 BRPM | OXYGEN SATURATION: 95 % | DIASTOLIC BLOOD PRESSURE: 56 MMHG | HEART RATE: 61 BPM

## 2020-02-28 PROCEDURE — 0651 HSPC ROUTINE HOME CARE

## 2020-02-29 PROCEDURE — 0651 HSPC ROUTINE HOME CARE

## 2020-03-01 PROCEDURE — 0651 HSPC ROUTINE HOME CARE

## 2020-03-02 PROCEDURE — 0651 HSPC ROUTINE HOME CARE

## 2020-03-03 PROCEDURE — 0651 HSPC ROUTINE HOME CARE

## 2020-03-03 PROCEDURE — HOSPICE MEDICATION HC HH HOSPICE MEDICATION

## 2020-04-23 NOTE — ED TRIAGE NOTES
Pt BIB EMS. Jose A Valencia on Sunday on L hip. Wasn't transported at that time but was evaluated on scene. Hospice for end stage COPD. O2 sats steadily dropping since fall. Normally on 2L, up to 3-4L. Fever since fall to 101F. No known exposure to COVID.

## 2020-04-23 NOTE — DISCHARGE INSTRUCTIONS
As we have discussed, your evaluation today showed a left hip fracture. After discussion with your hospice team, the best care going forward is likely to rest at home with pain control. There is also concerning signs of pneumonia versus coronavirus infection, so please take the antibiotics in case it is a bacterial infection on top of the viral infection. Please follow up with a doctor as discussed. The evaluation and treatment done today requires that you follow up with a physician for re-evaluation. Not following up could result in chronic pain/disability/injury. Medical problems can change over time and symptoms can get worse or new symptoms can develop over time, therefore, it is important that you follow up as we discussed or return immedediately to the ER. Diseases don't read textbooks and there are limitations to our evaluation and testing, so please immediately return to the ER if you have any concerns. Call the ER if you have any questions about what we discussed. Please visit [employer] for discounts on any prescriptions you may have. Please log in to your account to review your lab work and radiology results with your primary care doctor on follow up. At the [facility] Emergency Department we are genuinely concerned about your health and comfort. You may be selected to participate in a patient satisfaction survey mailed to your home. We are excited about the opportunity to learn from your experience so we may continue to improve. In striving for the very best we believe good is not good enough, but if you rate us as EXCELLENT in all boxes, we have succeeded. We strive to provide EXCELLENT care to you and your family. We appreciate the opportunity to take care of you, and hope you do well.

## 2020-04-23 NOTE — ED PROVIDER NOTES
EMERGENCY DEPARTMENT HISTORY AND PHYSICAL EXAM 
 
1:17 PM 
Date: 4/23/2020 Patient Name: Randy Brumfield History of Presenting Illness CC: fall, hypoxia History Provided By: Patient's Daughter HPI: Randy Brumfield is a 80 y.o. female with History of COPD, hypertension, diabetes, depression, home hospice, here for hypoxia and a fall. Pt fell on Sunday and since then has had fever to 101 with desaturations down to the 60s 80s. She is normally on 2 to 4 L of nasal cannula at home for her end-stage COPD on hospice. She has some moderate tenderness to her hip as well as scattered bruising. HPI limited due to dementia, history obtained from patient's daughter. PCP: Eunice Tate MD 
 
Past History Past Medical History: 
Past Medical History:  
Diagnosis Date  Chronic lung disease  Colon polyps  COPD 8-30-02  DDD (degenerative disc disease), lumbar 10/1/2014  Degeneration of lumbar or lumbosacral intervertebral disc  Depression  Diabetes ([address] 7-19-05  Diabetes mellitus ([address]  Diverticulosis   DM neuropathy, painful ([address] 10/1/2014  MOORE (Dyspnea on Exertion) 4-19-02  
 echo: +mild LVH, EX61-75% w/ diastolic dysfxn  Glaucoma  HCAP (healthcare-associated pneumonia) 03/24/2017  Hemorrhoids 6-6-06  Hyperlipidemia 5/17/2011  Hypertension 4-16-02  LBP (low back pain) 4-13-04  Low back pain radiating to both legs 10/1/2014  Lumbago  Lumbar disc herniation 10/1/2014  Lumbar facet arthropathy 10/1/2014  Lumbosacral radiculopathy at L5 10/1/2014  Lumbosacral radiculopathy at S1 10/1/2014  Microscopic hematuria  OA (osteoarthritis)  Overactive bladder 5/17/2011  
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   
 -MRI brain  Urinary tract infection, site not specified Past Surgical History: 
Past Surgical History:  
Procedure Laterality Date  HX APPENDECTOMY  HX CHOLECYSTECTOMY    HX HYSTERECTOMY    
 (+)DUB  HX POLYPECTOMY  [place] COLONOSCOPY FLX DX W/COLLJ SPEC WHEN PFRMD  06  
 normal, Dr Sindhu Cook  [place] COLONOSCOPY FLX DX W/COLLJ SPEC WHEN PFRMD    
 (+)polyp= tubular adenoma Family History: 
Family History Problem Relation Age of Onset  Colon Cancer Sister  Heart Disease Brother  Seizures Son  Colon Cancer Maternal Aunt Social History: 
Social History Tobacco Use  Smoking status: Former Smoker Packs/day: 1.00 Years: 57.00 Pack years: 57.00 Types: Cigarettes Last attempt to quit: 2018 Years since quittin.3  Smokeless tobacco: Never Used Substance Use Topics  Alcohol use: No  
 Drug use: No  
 
 
Allergies: Allergies Allergen Reactions  Levaquin [Levofloxacin] Rash Review of Systems ROS limited due to dementia. Physical Exam  
 
Patient Vitals for the past 12 hrs: 
 Temp Pulse Resp BP SpO2  
20 1441  80 17 108/80 91 % 20 1407  84 19  (!) 79 % 20 1406  84 21  (!) 86 % 20 1405  81 14  (!) 85 % 20 1404  85 19  (!) 84 % 20 1403  84 18  (!) 86 % 20 1402  84 18  90 % 20 1401  84 16  (!) 88 % 20 1400  85 21 (!) 66/32 (!) 87 % 20 1359  81 17  (!) 88 % 20 1358  79 20  90 % 20 1357  82 18  90 % 20 1356  81 17  92 % 20 1355  81 18  94 % 20 1354  80 16  96 % 20 1353  80 17  96 % 20 1352  81 20  94 % 04/23/20 1318     94 % 04/23/20 1310 98.8 °F (37.1 °C) 80 19 142/83 94 % Physical Exam  
Constitutional: Appears well-developed. Is not diaphoretic. Eyes: Conjunctiva clear, EOMI. Head: Normocephalic and atraumatic. Neck: Normal range of motion. No stridor or tracheal deviation. Cardiovascular: Intact distal pulses. Pulmonary/Chest: Effort normal and no respiratory distress. Scattered ecchymosis on left breast and lower chest, no rib crepitus or flail chest felt. Abdominal: Non distended. Musculoskeletal: Decreased range of motion to left hip, tenderness to left hip. No leg shortening or rotation. Distally neurovascular intact. Neurological: Conversant, alert. Oriented to self. Skin: Skin is warm and dry. Psychiatric: Has a normal mood and affect. Behavior is normal.  
Nursing note and vitals reviewed. Diagnostic Study Results Labs - No results found for this or any previous visit (from the past 12 hour(s)). Radiologic Studies - Xr Hip Lt W Or Wo Pelv 2-3 Vws Result Date: 4/23/2020 IMPRESSION: Left femur acute intertrochanteric fracture. Chronic left pubic rami fracture deformities. See additional details above. Xr Chest [facility] Result Date: 4/23/2020 IMPRESSION: Suspected mild perihilar interstitial infiltrate/edema. Left basilar streaky density may represent atelectasis or infiltrate. Medical Decision Making ED Course: Progress Notes, Reevaluation, and Consults: 
 
1:17 PM Initial assessment performed. The patients presenting problems have been discussed, and they/their family are in agreement with the care plan formulated and outlined with them. I have encouraged them to ask questions as they arise throughout their visit. I called patient's daughter Edi Santiago, and she confirmed the above and confirmed that patient is on her last status of hospice for COPD.   We will obtain x-ray of her left hip to eval for any traumatic fracture as well as chest x-ray to evaluate for either rib fractures, developing pneumonia secondary to a fall, or viral pneumonia, or COVID. 
 
 
240 PM- spoke with [facility], on call for hospice. Relayed new hip fx, CXR findings. Agrees with plan, will relay to Dr. Marga Holbrook. 340 PM- updated POA sherrie on course Provider Notes (Medical Decision Making): Patient is here from home hospice after a fall over the weekend. She developed hypoxia and fever, and pain in the left hip. She has a new left intertrochanteric fracture on x-ray, as well as concerning findings for either pneumonia versus viral coronavirus infection. Here she is stable on her 2 L of nasal oxygen. She intermittently has low blood pressures but is clinically unchanged and at the time of discharge back to her normal baseline. I reviewed with this new fracture findings with hospice, and given her overall status the plan will be to further return back to home hospice with additional pain medicines. I have given her Augmentin and doxycycline here in case of bacterial pneumonia, however the coronavirus testing will take couple days to come back. [employer]  results have been reviewed with her. She has been counseled regarding her diagnosis. She verbally conveys understanding and agreement of the signs, symptoms, diagnosis, treatment and prognosis and additionally agrees to follow up as recommended with Dr. Deion Jacinto MD in 24 - 48 hours. She also agrees with the care-plan and conveys that all of her questions have been answered. I have also put together some discharge instructions for her that include: 1) educational information regarding their diagnosis, 2) how to care for their diagnosis at home, as well a 3) list of reasons why they would want to return to the ED prior to their follow-up appointment, should their condition change.  
 
Critical Care Time: Critical Care Time: 
 The services I provided to this patient were to treat and/or prevent clinically significant deterioration that could result in the failure of one or more body systems and/or organ systems due to hypoxia, hypotension. Services included the following: 
-reviewing nursing notes and old charts 
-vital sign assessments 
-direct patient care 
-medication orders and management 
-interpreting and reviewing diagnostic studies/labs 
-re-evaluations 
-documentation time Aggregate critical care time was 35 minutes, which includes only time during which I was engaged in work directly related to the patient's care as described above, whether I was at bedside or elsewhere in the Emergency Department. It did not include time spent performing other reported procedures or the services of residents, students, nurses, or advance practice providers. Roosevelt Barahona MD 
 
2:54 PM 
 
 
Vital Signs-Reviewed the patient's vital signs. Reviewed pt's pulse ox reading. Records Reviewed: Nursing Notes and Old Medical Records (Time of Review: 1:17 PM) 
-I am the first provider for this patient. 
-I reviewed the vital signs, available nursing notes, past medical history, past surgical history, family history and social history. Current Facility-Administered Medications Medication Dose Route Frequency Provider Last Rate Last Dose  albuterol (PROVENTIL HFA, VENTOLIN HFA, PROAIR HFA) inhaler 2 Puff  2 Puff Inhalation NOW Roosevelt Barahona MD      
 amoxicillin-clavulanate (AUGMENTIN) 875-125 mg per tablet 1 Tab  1 Tab Oral NOW Roosevelt Barahona MD      
 doxycycline (VIBRAMYCIN) capsule 100 mg  100 mg Oral NOW Roosevelt Barahona MD      
 
Current Outpatient Medications Medication Sig Dispense Refill  oxyCODONE-acetaminophen (Percocet) 5-325 mg per tablet Take 1 Tab by mouth every four (4) hours as needed for Pain for up to 5 days. Max Daily Amount: 6 Tabs.  12 Tab 0  
 traMADoL (ULTRAM) 50 mg tablet Take 50 mg by mouth every six (6) hours as needed for Pain.  gabapentin (NEURONTIN) 300 mg capsule Take 300 mg by mouth four (4) times daily. Take 1 tablet in the morning. 1 tablet in the afternoon and 2 tablets at bedtime  dextromethorphan-guaiFENesin (ROBITUSSIN-DM)  mg/5 mL syrup Take 10 mL by mouth every four (4) hours as needed for Cough.  predniSONE (DELTASONE) 10 mg tablet Take 40 mg by mouth daily. Pednisone taper- days 1,2,3- give 4 tabs daily; days 4,5,6-give 3 tabs daily; days 7,8,9-give 2 tabs daily; days 10,11,12-give 1 tab daily  albuterol-ipratropium (DUO-NEB) 2.5 mg-0.5 mg/3 ml nebu USE 1 VIAL IN NEBULIZER EVERY 6 HOURS AS NEEDED 270 Nebule 0  
 traZODone (DESYREL) 150 mg tablet Take 150 mg by mouth nightly.  senna-docusate (SENNA WITH DOCUSATE SODIUM) 8.6-50 mg per tablet Take 1-2 Tabs by mouth daily.  traMADol (ULTRAM) 50 mg tablet Take 50 mg by mouth nightly as needed (insomnia).  budesonide-formoterol (SYMBICORT) 160-4.5 mcg/actuation HFAA Take 1 Puff by inhalation daily.  bisacodyl (DULCOLAX, BISACODYL,) 10 mg suppository Insert 10 mg into rectum daily as needed (constipation). every 96 hrs as needed  mineral oil (ENEMA) enema Insert 1 Enema into rectum daily as needed for Constipation. every 120 hours as needed if no result from dulcolax suppository  magnesium hydroxide (MILK OF MAGNESIA) 400 mg/5 mL suspension Take 5 mL by mouth every seventy-two (72) hours as needed (constipation). administer on day 3 if no BM  morphine (ROXANOL) 100 mg/5 mL (20 mg/mL) concentrated solution Take 5 mg by mouth every three (3) hours. for pain; PO/SL  LORazepam (INTENSOL) 2 mg/mL concentrated solution 0.25 mL by SubLINGual route every four (4) hours as needed for Agitation or Anxiety. Max Daily Amount: 3 mg. 30 mL 0  
 albuterol-ipratropium (DUO-NEB) 2.5 mg-0.5 mg/3 ml nebu 3 mL by Nebulization route every four (4) hours.  30 Nebule 0  
  famotidine (PEPCID) 20 mg tablet Take 1 Tab by mouth daily. 15 Tab 0  
 furosemide (LASIX) 20 mg tablet Take 1 Tab by mouth every fourty-eight (48) hours. 15 Tab 0  
 atorvastatin (LIPITOR) 40 mg tablet Take 1 Tab by mouth nightly. 30 Tab 0  
 PARoxetine (PAXIL) 30 mg tablet TAKE 1 TABLET BY MOUTH  DAILY 90 Tab 1  
 tamsulosin (FLOMAX) 0.4 mg capsule Take 1 Cap by mouth daily. 90 Cap 3  
 simvastatin (ZOCOR) 20 mg tablet Take 1 Tab by mouth nightly. 90 Tab 3  
 aspirin delayed-release 81 mg tablet Take 1 Tab by mouth daily. 100 Tab 1  
 inhalational spacing device (AEROCHAMBER MV) 1 Each by Does Not Apply route as needed. 1 Device 2  
 OXYGEN-AIR DELIVERY SYSTEMS 3 L by Nasal route continuous.  Nebulizer & Compressor (PORTABLE NEBULIZER SYSTEM) machine 1 Each by Does Not Apply route every six (6) hours as needed. 1 Each 0 Clinical Impression Clinical Impression: 1. Nondisplaced intertrochanteric fracture of left femur, initial encounter for closed fracture ([address] 2. Suspected Covid-19 Virus Infection Disposition: DC to home hospice This note was dictated utilizing voice recognition software which may lead to typographical errors. I apologize in advance if the situation occurs. If questions arise please do not hesitate to contact me or call our department.

## 2020-08-26 NOTE — ED NOTES
Noted, sent to triage in other encounter.    Patient complaining of chest pains.   EKG ordered and will place patient on cardiac monitor and inform MD.

## 2020-10-21 NOTE — MR AVS SNAPSHOT
[address] [facility] 
 
 
 [address], [address] [facility] 
[phone] Patient: Zhanna Finn MRN:  [id number] Visit Information Date & Time Provider Department Dept. Phone Encounter #  
 8/24/2018 11:30 AM Rosalva Burns MD [facility] [id number] Your Appointments 10/15/2018  9:25 AM  
LAB with IOC NURSE VISIT [facility] ([facility]) Appt Note: lab  
 [address], [address] [facility] [address]  
  
   
 [address] [facility]  
  
    
 10/22/2018  8:30 AM  
Office Visit with Rosalva Burns MD  
[facility]) Appt Note: 4 month f/u  
 [address] [id number] [address]  
  
   
 [address] [facility]  
  
    
 2/22/2019 11:00 AM  
Office Visit with Dilia Whitley MD  
[facility]) Appt Note: 6 months [address] [facility], [address] [place] [address]  
[address], [address] [facility] [address] [place] [address] Upcoming Health Maintenance Date Due  
 EYE EXAM RETINAL OR DILATED Q1 2/5/2015 Influenza Age 5 to Adult 8/1/2018 GLAUCOMA SCREENING Q2Y 10/1/2018* MEDICARE YEARLY EXAM 9/14/2018 MICROALBUMIN Q1 12/13/2018 HEMOGLOBIN A1C Q6M 1/13/2019 LIPID PANEL Q1 6/12/2019 DTaP/Tdap/Td series (2 - Td) 6/11/2024 *Topic was postponed. The date shown is not the original due date. Allergies as of 8/24/2018  Review Complete On: 8/24/2018 By: Rosalva Burns MD  
  
 Severity Noted Reaction Type Reactions Levaquin [Levofloxacin]  05/17/2011    Rash Current Immunizations  Reviewed on 6/18/2018 Name Date Influenza High Dose Vaccine PF 10/13/2016 Influenza Vaccine 10/15/2014 Influenza Vaccine (Quad) PF 12/8/2015 Influenza Vaccine Split 11/22/2011, 11/11/2010 11:46 AM  
 Pneumococcal Conjugate (PCV-13) 12/8/2015 Pneumococcal Polysaccharide (PPSV-23) 9/20/2000 Tdap 6/11/2014 ZZZ-RETIRED (DO NOT USE) Pneumococcal Vaccine (Unspecified Type) 9/20/2000 Not reviewed this visit You Were Diagnosed With   
  
 Codes Comments Type 2 diabetes mellitus with diabetic neuropathy, without long-term current use of insulin (HCC)    -  Primary ICD-10-CM: E11.40 ICD-9-CM: 250.60, 357.2 Panlobular emphysema [address] [address]     ICD-10-CM: J43.1 ICD-9-CM: 492.8 Primary osteoarthritis involving multiple joints     ICD-10-CM: M15.0 ICD-9-CM: 715.09 Hyperlipidemia LDL goal <100     ICD-10-CM: E78.5 ICD-9-CM: 272.4 Major depression, chronic     ICD-10-CM: F34.1 ICD-9-CM: 296.20 Primary insomnia     ICD-10-CM: F51.01 
ICD-9-CM: 307.42 Vitals BP Pulse Temp Resp Height(growth percentile) Weight(growth percentile) 120/60 72 98.8 °F (37.1 °C) (Oral) 16 5' 5\" (1.651 m) 135 lb (61.2 kg) SpO2 BMI OB Status Smoking Status 93% 22.47 kg/m2 Hysterectomy Former Smoker Vitals History BMI and BSA Data Body Mass Index Body Surface Area  
 22.47 kg/m 2 1.68 m 2 Preferred Pharmacy Pharmacy Name Phone [address] [place] [address], [address] [place] [address] [address] [facility] [address] Your Updated Medication List  
  
   
This list is accurate as of 8/24/18 12:45 PM.  Always use your most recent med list.  
  
  
  
  
 albuterol-ipratropium 2.5 mg-0.5 mg/3 ml Nebu Commonly known as:  DUO-NEB  
3 mL by Nebulization route every six (6) hours as needed. amLODIPine 5 mg tablet Commonly known as:  [place] Mend Take 1 Tab by mouth daily. aspirin delayed-release 81 mg tablet Take 1 Tab by mouth daily. budesonide-formoterol 80-4.5 mcg/actuation Hfaa Commonly known as:  SYMBICORT Take 2 Puffs by inhalation two (2) times a day.  
  
 gabapentin 300 mg capsule Commonly known as:  NEURONTIN  
TAKE 1 CAPSULE BY MOUTH 3  TIMES DAILY  
  
 inhalational spacing device Commonly known as:  AEROCHAMBER MV  
1 Each by Does Not Apply route as needed. mirtazapine 30 mg tablet Commonly known as:  Ardath Tanner Take 1 Tab by mouth nightly. Nebulizer & Compressor machine Commonly known as:  PORTABLE NEBULIZER SYSTEM  
1 Each by Does Not Apply route every six (6) hours as needed. OXYGEN-AIR DELIVERY SYSTEMS  
3 L by Nasal route continuous. PARoxetine 30 mg tablet Commonly known as:  PAXIL Take 1 Tab by mouth daily. simvastatin 20 mg tablet Commonly known as:  ZOCOR  
TAKE 1 TABLET BY MOUTH  NIGHTLY  
  
 tamsulosin 0.4 mg capsule Commonly known as:  FLOMAX Take 1 Cap by mouth daily. traMADol 50 mg tablet Commonly known as:  ULTRAM  
Take 1 Tab by mouth every eight (8) hours as needed for Pain. Max Daily Amount: 150 mg.  
  
  
  
  
Prescriptions Printed Refills  
 traMADol (ULTRAM) 50 mg tablet 2 Sig: Take 1 Tab by mouth every eight (8) hours as needed for Pain. Max Daily Amount: 150 mg.  
 Class: Print Route: Oral  
  
Introducing [facility]! [employer] introduces [employer] patient portal. Now you can access parts of your medical record, email your doctor's office, and request medication refills online. 1. In your internet browser, go to [email or web address] 2. Click on the First Time User? Click Here link in the Sign In box. You will see the New Member Sign Up page. 3. Enter your [employer] Access Code exactly as it appears below. You will not need to use this code after youve completed the sign-up process. If you do not sign up before the expiration date, you must request a new code. · [employer] Access Code: [email or web address] 
Expires: 9/16/2018  8:18 AM 
 
4.  Enter the last four digits of your Social Security Number (xxxx) and Date of Birth (mm/dd/yyyy) as indicated and click Submit. You will be taken to the next sign-up page. 5. Create a [employer] ID. This will be your [employer] login ID and cannot be changed, so think of one that is secure and easy to remember. 6. Create a [employer] password. You can change your password at any time. 7. Enter your Password Reset Question and Answer. This can be used at a later time if you forget your password. 8. Enter your e-mail address. You will receive e-mail notification when new information is available in [address] 9. Click Sign Up. You can now view and download portions of your medical record. 10. Click the Download Summary menu link to download a portable copy of your medical information. If you have questions, please visit the Frequently Asked Questions section of the [employer] website. Remember, [employer] is NOT to be used for urgent needs. For medical emergencies, dial 911. Now available from your iPhone and Android! Please provide this summary of care documentation to your next provider. Your primary care clinician is listed as Milli Luis. If you have any questions after today's visit, please call [phone]. no

## 2020-10-28 NOTE — TELEPHONE ENCOUNTER
Berenice Krishnan, daughter, [employer]. She is asking if we can order an xray of her hip and have this done by a mobile service, as patient has been bed bound for 8 months. She reports the patient is no longer on hospice for the reason she was on it and they really just want to see if the hip has healed well enough to get her moving more.   Ms. Keith Guillermo [phone]

## 2020-10-29 NOTE — TELEPHONE ENCOUNTER
Called and spoke to patients daughter. Patient had a fall in April that resulted in a troch fracture. They were told that a surgery wouldn't be appropriate due to her \"not making it off the table\". She was told to keep the patient \"in bed for 8 weeks to heal.\" They had hospice aides come and do PT, but the aids stopped coming after one got hurt. Patient has been in bed since. I advised to attempt to stand patient up and see how the patient does. If she has increased pain, to call insurance to have transport take her to the ER or to urgent care to get xray.

## 2020-10-29 NOTE — TELEPHONE ENCOUNTER
Dr. Cruz Amos last saw her in April/2019. At that visit she was WBAT. Unless she has had another fall or injury that WB status will stay the same. The facility she is in can do an xray as the pelvic ring injury is likely healed at this point. Then get her up and moving again.

## 2021-01-01 ENCOUNTER — HOME CARE VISIT (OUTPATIENT)
Dept: HOSPICE | Facility: HOSPICE | Age: 86
End: 2021-01-01
Payer: MEDICARE

## 2021-01-01 ENCOUNTER — HOME CARE VISIT (OUTPATIENT)
Dept: SCHEDULING | Facility: HOME HEALTH | Age: 86
End: 2021-01-01
Payer: MEDICARE

## 2021-01-01 VITALS
OXYGEN SATURATION: 97 % | DIASTOLIC BLOOD PRESSURE: 68 MMHG | HEART RATE: 73 BPM | RESPIRATION RATE: 20 BRPM | TEMPERATURE: 98.7 F | SYSTOLIC BLOOD PRESSURE: 112 MMHG

## 2021-01-01 VITALS — RESPIRATION RATE: 20 BRPM | HEART RATE: 83 BPM | OXYGEN SATURATION: 91 % | TEMPERATURE: 98.2 F

## 2021-01-01 VITALS
TEMPERATURE: 98.5 F | DIASTOLIC BLOOD PRESSURE: 56 MMHG | RESPIRATION RATE: 20 BRPM | OXYGEN SATURATION: 96 % | SYSTOLIC BLOOD PRESSURE: 109 MMHG | HEART RATE: 62 BPM

## 2021-01-01 VITALS — OXYGEN SATURATION: 71 % | HEART RATE: 117 BPM | RESPIRATION RATE: 16 BRPM | TEMPERATURE: 102.8 F

## 2021-01-01 VITALS
HEART RATE: 57 BPM | SYSTOLIC BLOOD PRESSURE: 107 MMHG | RESPIRATION RATE: 16 BRPM | TEMPERATURE: 98.8 F | OXYGEN SATURATION: 99 % | DIASTOLIC BLOOD PRESSURE: 63 MMHG

## 2021-01-01 VITALS
RESPIRATION RATE: 20 BRPM | OXYGEN SATURATION: 96 % | HEART RATE: 63 BPM | TEMPERATURE: 98.2 F | DIASTOLIC BLOOD PRESSURE: 59 MMHG | SYSTOLIC BLOOD PRESSURE: 107 MMHG

## 2021-01-01 VITALS
HEART RATE: 68 BPM | SYSTOLIC BLOOD PRESSURE: 110 MMHG | RESPIRATION RATE: 20 BRPM | DIASTOLIC BLOOD PRESSURE: 63 MMHG | OXYGEN SATURATION: 98 % | TEMPERATURE: 98.6 F

## 2021-01-01 VITALS
SYSTOLIC BLOOD PRESSURE: 118 MMHG | HEART RATE: 64 BPM | RESPIRATION RATE: 18 BRPM | TEMPERATURE: 98.7 F | DIASTOLIC BLOOD PRESSURE: 71 MMHG | OXYGEN SATURATION: 94 %

## 2021-01-01 VITALS
TEMPERATURE: 97.9 F | RESPIRATION RATE: 18 BRPM | OXYGEN SATURATION: 98 % | DIASTOLIC BLOOD PRESSURE: 62 MMHG | SYSTOLIC BLOOD PRESSURE: 109 MMHG | HEART RATE: 68 BPM

## 2021-01-01 VITALS — RESPIRATION RATE: 20 BRPM | TEMPERATURE: 98.5 F

## 2021-01-01 VITALS
TEMPERATURE: 97.9 F | DIASTOLIC BLOOD PRESSURE: 70 MMHG | HEART RATE: 70 BPM | RESPIRATION RATE: 14 BRPM | OXYGEN SATURATION: 84 % | SYSTOLIC BLOOD PRESSURE: 92 MMHG

## 2021-01-01 VITALS
HEART RATE: 70 BPM | TEMPERATURE: 98.8 F | OXYGEN SATURATION: 97 % | RESPIRATION RATE: 20 BRPM | SYSTOLIC BLOOD PRESSURE: 108 MMHG | DIASTOLIC BLOOD PRESSURE: 48 MMHG

## 2021-01-01 VITALS
SYSTOLIC BLOOD PRESSURE: 106 MMHG | DIASTOLIC BLOOD PRESSURE: 52 MMHG | OXYGEN SATURATION: 94 % | HEART RATE: 69 BPM | RESPIRATION RATE: 20 BRPM | TEMPERATURE: 99.9 F

## 2021-01-01 VITALS
RESPIRATION RATE: 20 BRPM | DIASTOLIC BLOOD PRESSURE: 64 MMHG | SYSTOLIC BLOOD PRESSURE: 109 MMHG | HEART RATE: 56 BPM | TEMPERATURE: 98.3 F | OXYGEN SATURATION: 92 %

## 2021-01-01 VITALS
HEART RATE: 59 BPM | DIASTOLIC BLOOD PRESSURE: 51 MMHG | OXYGEN SATURATION: 98 % | TEMPERATURE: 98.6 F | RESPIRATION RATE: 20 BRPM | SYSTOLIC BLOOD PRESSURE: 98 MMHG

## 2021-01-01 VITALS
SYSTOLIC BLOOD PRESSURE: 102 MMHG | HEART RATE: 83 BPM | OXYGEN SATURATION: 97 % | DIASTOLIC BLOOD PRESSURE: 61 MMHG | TEMPERATURE: 98.9 F | RESPIRATION RATE: 16 BRPM

## 2021-01-01 VITALS
SYSTOLIC BLOOD PRESSURE: 93 MMHG | HEART RATE: 48 BPM | TEMPERATURE: 98.9 F | OXYGEN SATURATION: 92 % | RESPIRATION RATE: 20 BRPM | DIASTOLIC BLOOD PRESSURE: 55 MMHG

## 2021-01-01 VITALS — RESPIRATION RATE: 16 BRPM | TEMPERATURE: 98.6 F

## 2021-01-01 PROCEDURE — 3331090004 HSPC SERVICE INTENSITY ADD-ON

## 2021-01-01 PROCEDURE — 0651 HSPC ROUTINE HOME CARE

## 2021-01-01 PROCEDURE — G0299 HHS/HOSPICE OF RN EA 15 MIN: HCPCS

## 2021-01-01 PROCEDURE — G0155 HHCP-SVS OF CSW,EA 15 MIN: HCPCS

## 2021-01-01 PROCEDURE — HOSPICE MEDICATION HC HH HOSPICE MEDICATION

## 2021-01-01 PROCEDURE — A4615 CANNULA NASAL: HCPCS

## 2021-01-01 PROCEDURE — MED10117 BRIEF,CLOTHLIKE,FITEXTRA,MD,32-42

## 2021-01-01 RX ADMIN — HYOSCYAMINE SULFATE 125 MCG: 0.12 LIQUID ORAL at 14:05

## 2021-01-01 RX ADMIN — LORAZEPAM 0.5 MG: 2 CONCENTRATE ORAL at 14:05

## 2021-01-01 RX ADMIN — MORPHINE SULFATE 5 MG: 20 SOLUTION ORAL at 09:00

## 2021-03-05 VITALS
HEART RATE: 114 BPM | OXYGEN SATURATION: 90 % | HEART RATE: 102 BPM | RESPIRATION RATE: 10 BRPM | RESPIRATION RATE: 16 BRPM | TEMPERATURE: 99 F

## 2022-06-13 NOTE — PROGRESS NOTES
Problem: Dysphagia (Adult)  Goal: *Acute Goals and Plan of Care (Insert Text)  Patient will:  1. Tolerate PO trials with 0 s/s overt distress in 4/5 trials  2. Utilize compensatory swallow strategies/maneuvers (decrease bite/sip, size/rate, alt. liq/sol) with min cues in 4/5 trials  3. Perform oral-motor/laryngeal exercises to increase oropharyngeal swallow function with min cues  4. Complete an objective swallow study (i.e., MBSS) to assess swallow integrity, r/o aspiration, and determine of safest LRD, min A as indicated/ordered by MD     Recommend:   Reg with thin liquids  Meds as tolerated  Aspiration precautions  HOB >45 degrees during all intake and for at least 30 min after po   Small bites/sips, slow rate of intake, alternating bites/sips  Oral care post meals   Outcome: Resolved/Met Date Met:  02/09/17  SPEECH LANGUAGE PATHOLOGY DYSPHAGIA TREATMENT/DISCHARGE     Patient: Randal Stauffer (48 y.o. female)  Date: 2/9/2017  Diagnosis: HCAP (healthcare-associated pneumonia)  Abnormal CT scan, chest  dx <principal problem not specified>  Procedure(s) (LRB):  ENDOSCOPIC BRONCHOSCOPY ULTRASOUND (EBUS) w/ FNA (N/A) 1 Day Post-Op  Precautions: aspiration        ASSESSMENT:  Pt was seen for f/u dysphagia management. Pt was alert and oriented to person and place. Pt reports no difficulty with swallowing and appetite intact. Pt presented with thin liquids+ straw with no s/sx of aspiration. Informal observations of oral motor structures prove to be consistent with initial evaluation. Rec pt continue on regular with thin liquids. SLP services are no longer warranted at this time. Re-consult as needed.    Progression toward goals:  [X]         Improving appropriately and progressing toward goals  [ ]         Improving slowly and progressing toward goals  [ ]         Not making progress toward goals and plan of care will be adjusted       PLAN:  Recommendations and Planned Interventions: Regular with thin   No further skilled intervention warranted at this time. Will sign off. Discharge Recommendations: None       SUBJECTIVE:   Patient stated I am starving. OBJECTIVE:   Cognitive and Communication Status:  Neurologic State: Alert  Orientation Level: Oriented to person, Oriented to place  Cognition: Appropriate decision making, Follows commands  Dysphagia Treatment:  Oral Assessment:  Oral Assessment  Labial: No impairment  Dentition: Natural  Oral Hygiene: good  Lingual: No impairment  Velum: No impairment  Mandible: No impairment  P.O. Trials:              Patient Position: 45 at [facility]              Vocal quality prior to P.O.: No impairment              Consistency Presented: Thin liquid              How Presented: Straw, SLP-fed/presented              Bolus Acceptance: No impairment              Bolus Formation/Control: No impairment              Propulsion: No impairment              Oral Residue: None              Initiation of Swallow: No impairment              Laryngeal Elevation: Functional              Aspiration Signs/Symptoms: None              Pharyngeal Phase Characteristics: No impairment, issues, or problems               Effective Modifications: None              Cues for Modifications: None              Oral Phase Severity: No impairment              Pharyngeal Phase Severity : No impairment  GCODESwallowing:  Swallow Current Status CH= 0%   Swallow Goal Status CH= 0%   Swallow D/C Status CH= 0%  PAIN:  Pt reports 0/10 pain or discomfort prior to tx. Pt reports 0/10 pain or discomfort post tx.    After treatment:   [ ]              Patient left in no apparent distress sitting up in chair  [X]              Patient left in no apparent distress in bed  [X]              Call bell left within reach  COMMUNICATION/EDUCATION:   [X]              SLP educated pt with regard to compensatory swallow strategies and                   aspiration/reflux precautions including: small bites/sips, alternate liquids/solids, decrease feeding rate, HOB > 45 with all po, and                   upright in bed at 30 degrees after po for at least 45                   minutes. Avtar Mendoza SLP Student     Venancio Ivory M.S., [id number] [facility]  Speech-Language Pathologist none

## 2023-02-21 NOTE — ED PROVIDER NOTES
Behavioral Health Psychotherapy Progress Note    Psychotherapy Provided: Individual Psychotherapy     1  Anxiety            Goals addressed in session: Goal 1 Manage stress and anxiety    DATA: George Lance reports some ongoing issues with stress and anxiety related to his academic workload while also working  When frustrated with these stressors/triggers, he tends to catastrophize and over-magnify stressors  Despite his stress, he is doing well academically  Goes to the gym four times a week and is also reading with his downtime as a stress relief  Denies any acute anxiety issues  Denies depressive symptoms  No SI  During this session, this clinician used the following therapeutic modalities: Cognitive Behavioral Therapy and Supportive Psychotherapy    Substance Abuse was not addressed during this session  If the client is diagnosed with a co-occurring substance use disorder, please indicate any changes in the frequency or amount of use: none  Stage of change for addressing substance use diagnoses: No substance use/Not applicable    ASSESSMENT:  Greg Harman presents with a Anxious mood  his affect is Normal range and intensity, which is congruent, with his mood and the content of the session  The client has made progress on their goals  Greg Harman presents with a minimal risk of suicide, minimal risk of self-harm, and minimal risk of harm to others  For any risk assessment that surpasses a "low" rating, a safety plan must be developed  A safety plan was indicated: no  If yes, describe in detail N/A    PLAN: Between sessions, Greg Harman will utilize CBT/restructuring strategies to view stressors related to his academics in less negative and overwhelming terms  Will utilize restructuring strategies to view his efforts in more positive and accurate terms  At the next session, the therapist will use Cognitive Behavioral Therapy and Supportive Psychotherapy to address anxiety/stress      Behavioral Health EMERGENCY DEPARTMENT HISTORY AND PHYSICAL EXAM    4:43 PM      Date: 7/13/2018  Patient Name: Yvette Perez    History of Presenting Illness     Chief Complaint   Patient presents with    Shortness of Breath    Chest Pain       History Provided By: Patient and Patient's Son    Chief Complaint: SOB  Duration:  Weeks  Timing:  Gradual, Intermittent, Worsening and Waxing and Waning  Location: Generalized  Quality: Tightness  Severity: Severe  Modifying Factors: Movement  Associated Symptoms: increased cough, sputum      Additional History (Context): Yvette Perez is a 80 y.o. female with hypertension, hyperlipidemia, osteoarthritis and COPD with intermitent to poor compliance who presents with acute on chronic worsening of her shortness of breath. Per the patient and her son at bedside, she sees pulmonologist Dr. Santa Alanis who has suggested the patient come in as she has been short of breath over the last week. Has home O2 which she intermittently wears at 2L, and occasionally does home treatments but overall has intermittent compliance at best. Denies fevers, chills, but does endorse chest tightness and increasing SOB over the last few days. Arrives to the ED with sats in the 60s on RA. PCP: Whit Harris MD    Current Outpatient Prescriptions   Medication Sig Dispense Refill    predniSONE (DELTASONE) 20 mg tablet 2 tablets by mouth daily for 5 days 10 Tab 0    mirtazapine (REMERON) 30 mg tablet Take 1 Tab by mouth nightly. 30 Tab 2    gabapentin (NEURONTIN) 300 mg capsule TAKE 1 CAPSULE BY MOUTH 3  TIMES DAILY 270 Cap 1    simvastatin (ZOCOR) 20 mg tablet TAKE 1 TABLET BY MOUTH  NIGHTLY 90 Tab 1    tamsulosin (FLOMAX) 0.4 mg capsule Take 1 Cap by mouth daily. 15 Cap 0    OXYGEN-AIR DELIVERY SYSTEMS 3 L by Nasal route continuous.  OXYGEN-AIR DELIVERY SYSTEMS 3 L by Nasal route continuous.       albuterol-ipratropium (DUO-NEB) 2.5 mg-0.5 mg/3 ml nebu 3 mL by Nebulization route every six (6) hours as needed. 30 Nebule 0    famotidine (PEPCID) 20 mg tablet Take 1 Tab by mouth nightly. 6 Tab 0    Nebulizer & Compressor (PORTABLE NEBULIZER SYSTEM) machine 1 Each by Does Not Apply route every six (6) hours as needed. 1 Each 0    PARoxetine (PAXIL) 30 mg tablet Take 1 Tab by mouth daily. 90 Tab 3    traZODone (DESYREL) 100 mg tablet Take 100 mg by mouth nightly as needed for Sleep (insomia).  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/2014    MOORE (Dyspnea on Exertion) 4-19-02    echo: +mild LVH, FM37-09% w/ diastolic dysfxn    Glaucoma     HCAP (healthcare-associated pneumonia) 03/24/2017    Hemorrhoids 6-6-06    Hyperlipidemia 5/17/2011    Hypertension 4-16-02    LBP (low back pain) 4-13-04    Low back pain radiating to both legs 10/1/2014    Lumbago     Lumbar disc herniation 10/1/2014    Lumbar facet arthropathy ([facility] [address] 10/1/2014    Lumbosacral radiculopathy at L5 10/1/2014    Lumbosacral radiculopathy at S1 10/1/2014    Microscopic hematuria     OA (osteoarthritis)     Overactive bladder 5/17/2011    RLS (restless legs syndrome) 1/17/2013    Sciatica     Shortness of breath     Spinal stenosis, lumbar region, without neurogenic claudication     Spondylolisthesis of lumbar region 10/1/2014    Spondylolisthesis, grade 1 10/1/2014    Stress urinary incontinence     Syncope     -MRI brain    Urinary tract infection, site not specified        Past Surgical History:  Past Surgical History:   Procedure Laterality Date    HX APPENDECTOMY      HX CHOLECYSTECTOMY      HX HYSTERECTOMY      (+)DUB    HX POLYPECTOMY      ID Treatment Plan and Discharge Planning: Johnny Huertas is aware of and agrees to continue to work on their treatment plan  They have identified and are working toward their discharge goals   yes    Visit start and stop times: 4:00-4:55    02/21/23 COLONOSCOPY FLX DX W/COLLJ SPEC WHEN PFRMD  6-16-06    normal, Dr Constantin Enriquez FLX DX W/COLLJ SPEC WHEN PFRMD      (+)polyp= tubular adenoma       Family History:  Family History   Problem Relation Age of Onset    Colon Cancer Sister     Heart Disease Brother     Seizures Son     Colon Cancer Maternal Aunt        Social History:  Social History   Substance Use Topics    Smoking status: Current Every Day Smoker     Packs/day: 0.25     Years: 50.00     Types: Cigarettes    Smokeless tobacco: Never Used      Comment: pt has cut down recently to less than 1 ppd    Alcohol use No       Allergies: Allergies   Allergen Reactions    Levaquin [Levofloxacin] Rash         Review of Systems       Review of Systems   Constitutional: Positive for fatigue. Negative for diaphoresis and fever. HENT: Negative for dental problem, sore throat, tinnitus, trouble swallowing and voice change. Eyes: Negative for photophobia. Respiratory: Positive for cough, chest tightness, shortness of breath and wheezing. Negative for stridor. Cardiovascular: Negative for chest pain, palpitations and leg swelling. Gastrointestinal: Negative for abdominal distention, abdominal pain, constipation, diarrhea, nausea, rectal pain and vomiting. Endocrine: Negative for cold intolerance and heat intolerance. Genitourinary: Negative for difficulty urinating, dysuria, flank pain, frequency and pelvic pain. Musculoskeletal: Negative for back pain and gait problem. Skin: Negative for color change and rash. Allergic/Immunologic: Negative for immunocompromised state. Neurological: Negative for dizziness, seizures, light-headedness, numbness and headaches. Hematological: Negative for adenopathy. Psychiatric/Behavioral: Negative for agitation. Physical Exam     Visit Vitals    Pulse 65    Temp 98.6 °F (37 °C)    Resp 16    SpO2 94%         Physical Exam   Constitutional: She appears distressed. HENT:   Head: Normocephalic. Mouth/Throat: No oropharyngeal exudate. Eyes: Pupils are equal, round, and reactive to light. Right eye exhibits no discharge. Left eye exhibits no discharge. Neck: Normal range of motion. No tracheal deviation present. Cardiovascular: Regular rhythm and intact distal pulses. Tachycardia present. Pulmonary/Chest: Accessory muscle usage present. Tachypnea noted. She is in respiratory distress. She has decreased breath sounds. She has wheezes. Abdominal: Soft. She exhibits no distension. There is no tenderness. Musculoskeletal: Normal range of motion. She exhibits edema. She exhibits no deformity. Neurological: She is alert. No cranial nerve deficit. Skin: Skin is warm and dry. She is not diaphoretic. Diagnostic Study Results     Labs -  Recent Results (from the past 12 hour(s))   CBC WITH AUTOMATED DIFF    Collection Time: 07/13/18  2:32 PM   Result Value Ref Range    WBC 8.1 4.6 - 13.2 K/uL    RBC 5.08 4.20 - 5.30 M/uL    HGB 14.9 12.0 - 16.0 g/dL    HCT 45.8 (H) 35.0 - 45.0 %    MCV 90.2 74.0 - 97.0 FL    MCH 29.3 24.0 - 34.0 PG    MCHC 32.5 31.0 - 37.0 g/dL    RDW 17.0 (H) 11.6 - 14.5 %    PLATELET 179 780 - 426 K/uL    MPV 9.8 9.2 - 11.8 FL    NEUTROPHILS 73 40 - 73 %    LYMPHOCYTES 15 (L) 21 - 52 %    MONOCYTES 11 (H) 3 - 10 %    EOSINOPHILS 1 0 - 5 %    BASOPHILS 0 0 - 2 %    ABS. NEUTROPHILS 5.9 1.8 - 8.0 K/UL    ABS. LYMPHOCYTES 1.2 0.9 - 3.6 K/UL    ABS. MONOCYTES 0.9 0.05 - 1.2 K/UL    ABS. EOSINOPHILS 0.1 0.0 - 0.4 K/UL    ABS.  BASOPHILS 0.0 0.0 - 0.1 K/UL    DF AUTOMATED     NT-PRO BNP    Collection Time: 07/13/18  2:32 PM   Result Value Ref Range    NT pro-BNP 3289 (H) 0 - 1800 PG/ML   CARDIAC PANEL,(CK, CKMB & TROPONIN)    Collection Time: 07/13/18  2:32 PM   Result Value Ref Range    CK 77 26 - 192 U/L    CK - MB 3.0 <3.6 ng/ml    CK-MB Index 3.9 0.0 - 4.0 %    Troponin-I, Qt. 0.02 0.0 - 0.045 NG/ML   POC G3    Collection Time: 07/13/18  2:34 PM   Result Value Ref Range    Device: Non rebreather      Flow rate (POC) 15 L/M    pH (POC) 7.293 (L) 7.35 - 7.45      pCO2 (POC) 85.8 (H) 35.0 - 45.0 MMHG    pO2 (POC) 104 (H) 80 - 100 MMHG    HCO3 (POC) 41.5 (H) 22 - 26 MMOL/L    sO2 (POC) 97 92 - 97 %    Base excess (POC) 11 mmol/L    Allens test (POC) YES      Site RIGHT RADIAL      Specimen type (POC) ARTERIAL      Performed by Isaac Gokul    POC G3    Collection Time: 07/13/18  4:01 PM   Result Value Ref Range    Device: BIPAP      FIO2 (POC) 35 %    pH (POC) 7.320 (L) 7.35 - 7.45      pCO2 (POC) 77.2 (H) 35.0 - 45.0 MMHG    pO2 (POC) 59 (L) 80 - 100 MMHG    HCO3 (POC) 39.8 (H) 22 - 26 MMOL/L    sO2 (POC) 86 (L) 92 - 97 %    Base excess (POC) 10 mmol/L    PEEP/CPAP (POC) 6 cmH2O    PIP (POC) 15      Pressure support 9 cmH2O    Allens test (POC) YES      Total resp. rate 17      Site RIGHT RADIAL      Specimen type (POC) ARTERIAL      Performed by Isaac Gokul     Spontaneous timed YES         Radiologic Studies -   XR CHEST PORT   Final Result            Medical Decision Making   I am the first provider for this patient. I reviewed the vital signs, available nursing notes, past medical history, past surgical history, family history and social history. Vital Signs-Reviewed the patient's vital signs.     Pulse Oximetry Analysis -  80s on 15L of O2 via Nonrebreather (Interpretation) ABnormal     Cardiac Monitor:  Rate: 80s  Rhythm:  Normal Sinus Rhythm     ECG:   Rate: 56  Sinus bradycardia  Left atrial enlargement, bifasicular block, Ts flipped in III, V3, V4 --> seen on prior  Comparison 4/21/17    Records Reviewed: Nursing Notes, Old Medical Records, Previous electrocardiograms, Previous Radiology Studies and Previous Laboratory Studies (Time of Review: 4:43 PM)    ED Course: Progress Notes, Reevaluation, and Consults:    Provider Notes (Medical Decision Making): [id number] F with extensive pulmonary history, noncompliant with her pulm meds and O2 at home, coming in with increased SOB, cough, wheezing, consistent with COPD exacerbation v. PNA. Also considered additional pathology however given extensive history and physical believe this to be leading differential. Initial blood gas with signs of retention + mild acidemia; patient placed on BiPAP with excellent improvement of status. Labs otherwise notable for elevated BNP, no elevated WBC. CXR with evidence of infiltrates. Given ABX, duonebs, steroids; plan to admit for COPD exacerbation 2/2 PNA. Dr. Waterman Sep to admit. For Hospitalized Patients:    1. Hospitalization Decision Time:  The decision to hospitalize the patient was made by Dr. Branch Patient at 3:26 PM on 7/13/2018    2. Aspirin: Aspirin was not given because the patient did not present with a stroke at the time of their Emergency Department evaluation    Diagnosis     Clinical Impression:   1. COPD exacerbation ([address]    2. Community acquired pneumonia, unspecified laterality        Disposition: Admit     Follow-up Information     None           Patient's Medications   Start Taking    No medications on file   Continue Taking    ALBUTEROL-IPRATROPIUM (DUO-NEB) 2.5 MG-0.5 MG/3 ML NEBU    3 mL by Nebulization route every six (6) hours as needed. FAMOTIDINE (PEPCID) 20 MG TABLET    Take 1 Tab by mouth nightly. GABAPENTIN (NEURONTIN) 300 MG CAPSULE    TAKE 1 CAPSULE BY MOUTH 3  TIMES DAILY    MIRTAZAPINE (REMERON) 30 MG TABLET    Take 1 Tab by mouth nightly. NEBULIZER & COMPRESSOR (PORTABLE NEBULIZER SYSTEM) MACHINE    1 Each by Does Not Apply route every six (6) hours as needed. OXYGEN-AIR DELIVERY SYSTEMS    3 L by Nasal route continuous. OXYGEN-AIR DELIVERY SYSTEMS    3 L by Nasal route continuous. PAROXETINE (PAXIL) 30 MG TABLET    Take 1 Tab by mouth daily.     PREDNISONE (DELTASONE) 20 MG TABLET    2 tablets by mouth daily for 5 days    SIMVASTATIN (ZOCOR) 20 MG TABLET    TAKE 1 TABLET BY MOUTH  NIGHTLY    TAMSULOSIN (FLOMAX) 0.4 MG CAPSULE    Take 1 Cap by mouth daily. TRAZODONE (DESYREL) 100 MG TABLET    Take 100 mg by mouth nightly as needed for Sleep (insomia). Patient takes one and half tabs at bedtime    These Medications have changed    No medications on file   Stop Taking    No medications on file     _______________________________    I personally saw and examined the patient. I have reviewed and agree with the residents findings, including all diagnostic interpretations, and plans as written. I was present during the key portions of separately billed procedures.   Reza Blount MD

## 2024-06-20 NOTE — TELEPHONE ENCOUNTER
[facility] requests a 90 d/s of medication on the patient's behalf    Last Visit: 04/16/2018 with MD Shirley Johnston    Next Appointment: 06/18/2018 with MD Shirley Johnston   Previous Refill Encounters: 02/10/2017 per MD Adolph Pena #[id number]    Requested Prescriptions     Pending Prescriptions Disp Refills    amLODIPine (NORVASC) 10 mg tablet 90 Tab 0     Sig: Take 1 Tab by mouth daily. Alert-The patient is alert, awake and responds to voice. The patient is oriented to time, place, and person. The triage nurse is able to obtain subjective information.

## 2024-07-07 NOTE — ED PROVIDER NOTES
PATIENT INFORMATION  Follow-Up  - Return in 1 year for your yearly well visit.    13-17 years old Health and Safety Tips - The following hyperlinks are available to access via [employer]    Parent Education from [employer]    Educación para padres sobre niños sanos    Additional Educational Resources:  For additional resources regarding your symptoms, diagnosis, or further health information, please visit the Discover a Healthier You section on [email or web address] or the Online Health Resources section in [employer].     EMERGENCY DEPARTMENT HISTORY AND PHYSICAL EXAM    1:33 AM  Date: 3/15/2019  Patient Name: Lilian Britton    History of Presenting Illness     Chief Complaint:   Chief Complaint   Patient presents with    O2/Oxygen        History Provided By: Patient and Patient's Son    Additional History (Context): Lilian Britton is a 80 y.o. female  with history of chronic diastolic CHF, NSTEMI, CKD, pelvic fracture, here for shortness of breath. Patient has been admitted for the past 5 days her shortness of breath after a fall complicated by a CO2 retention where she had to be intubated and admitted to the ICU. She was seen by palliative and hospice was extubated to comfort care per discharge instructions she had family want to go to a SNF for monitoring. If she fails SNF course, they will take her home with hospice. She is here now as when she was at the SNF admission process she was satting in the 80's on room air. she states this feels she feels much better than when she first arrived a week ago, however she does feel little short of breath right now which is consistent with her COPD.     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/2014    MOORE (Dyspnea on Exertion) 4-19-02    echo: +mild LVH, YF81-31% w/ diastolic dysfxn    Glaucoma     HCAP (healthcare-associated pneumonia) 03/24/2017    Hemorrhoids 6-6-06    Hyperlipidemia 5/17/2011    Hypertension 4-16-02    LBP (low back pain) 4-13-04    Low back pain radiating to both legs 10/1/2014    Lumbago     Lumbar disc herniation 10/1/2014    Lumbar facet arthropathy 10/1/2014  Lumbosacral radiculopathy at L5 10/1/2014    Lumbosacral radiculopathy at S1 10/1/2014    Microscopic hematuria     OA (osteoarthritis)     Overactive bladder 2011    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    RLS (restless legs syndrome) 2013    Sciatica     Shortness of breath     Spinal stenosis, lumbar region, without neurogenic claudication     Spondylolisthesis of lumbar region 10/1/2014    Spondylolisthesis, grade 1 10/1/2014    Stage 4 very severe COPD by GOLD classification ([address] 2019    Stress urinary incontinence     Syncope     -MRI brain    Urinary tract infection, site not specified        Past Surgical History:  Past Surgical History:   Procedure Laterality Date    HX APPENDECTOMY      HX CHOLECYSTECTOMY      HX HYSTERECTOMY      (+)DUB    HX POLYPECTOMY      OH COLONOSCOPY FLX DX W/COLLJ SPEC WHEN PFRMD  06    normal, Dr Sinan Hickey    OH COLONOSCOPY FLX DX W/COLLJ [address] WHEN PFRMD      (+)polyp= tubular adenoma       Family History:  Family History   Problem Relation Age of Onset    Colon Cancer Sister     Heart Disease Brother     Seizures Son     Colon Cancer Maternal Aunt        Social History:  Social History     Tobacco Use    Smoking status: Former Smoker     Packs/day: 1.00     Years: 57.00     Pack years: 57.00     Types: Cigarettes     Last attempt to quit: 2018     Years since quittin.2    Smokeless tobacco: Never Used    Tobacco comment: pt has cut down recently to less than 1 ppd   Substance Use Topics    Alcohol use: No    Drug use: No       Allergies:   Allergies   Allergen Reactions    Levaquin [Levofloxacin] Rash       Review of Systems     Review of Systems   Unable to perform ROS: Acuity of condition       Physical Exam     Patient Vitals for the past 12 hrs:   Temp Pulse Resp BP SpO2   19 0326  100 17  96 %   19 0149     (!) 75 %   19 014     (!) 75 %   03/16/19 0045  (!) 106 21 122/79 95 %   03/16/19 0012  (!) 105 16  94 %   03/16/19 0001  (!) 108 16  93 %   03/16/19 0000 97.9 °F (36.6 °C) (!) 108 16 173/78 93 %         Physical Exam   Constitutional: She is oriented to person, place, and time. She appears well-developed and well-nourished. No distress. HENT:   Head: Normocephalic and atraumatic. Eyes: Conjunctivae and EOM are normal.   Neck: Normal range of motion. No tracheal deviation present. Pulmonary/Chest: No stridor. She is in respiratory distress. She has wheezes. She has rales. Moderate respiratory distress, tripoding, retractions. Abdominal: Soft. There is no tenderness. Musculoskeletal: Normal range of motion. She exhibits no tenderness or deformity. Neurological: She is alert and oriented to person, place, and time. Skin: Skin is warm and dry. Psychiatric: She has a normal mood and affect. Her behavior is normal.   Nursing note and vitals reviewed. Diagnostic Study Results     Labs -  Recent Results (from the past 12 hour(s))   CARDIAC PANEL,(CK, CKMB & TROPONIN)    Collection Time: 03/15/19 11:58 PM   Result Value Ref Range     26 - 192 U/L    CK - MB 4.7 (H) <3.6 ng/ml    CK-MB Index 4.1 (H) 0.0 - 4.0 %    Troponin-I, QT 0.10 (H) 0.0 - 0.045 NG/ML   CBC WITH AUTOMATED DIFF    Collection Time: 03/15/19 11:58 PM   Result Value Ref Range    WBC 11.3 4.6 - 13.2 K/uL    RBC 4.93 4.20 - 5.30 M/uL    HGB 13.9 12.0 - 16.0 g/dL    HCT 43.8 35.0 - 45.0 %    MCV 88.8 74.0 - 97.0 FL    MCH 28.2 24.0 - 34.0 PG    MCHC 31.7 31.0 - 37.0 g/dL    RDW 15.4 (H) 11.6 - 14.5 %    PLATELET 638 345 - 215 K/uL    MPV 10.8 9.2 - 11.8 FL    NEUTROPHILS 96 (H) 40 - 73 %    LYMPHOCYTES 4 (L) 21 - 52 %    MONOCYTES 0 (L) 3 - 10 %    EOSINOPHILS 0 0 - 5 %    BASOPHILS 0 0 - 2 %    ABS. NEUTROPHILS 10.8 (H) 1.8 - 8.0 K/UL    ABS. LYMPHOCYTES 0.5 (L) 0.9 - 3.6 K/UL    ABS. MONOCYTES 0.0 (L) 0.05 - 1.2 K/UL    ABS.  EOSINOPHILS 0.0 0.0 - 0.4 K/UL    ABS. BASOPHILS 0.0 0.0 - 0.1 K/UL    DF AUTOMATED     MAGNESIUM    Collection Time: 03/15/19 11:58 PM   Result Value Ref Range    Magnesium 2.2 1.6 - 2.6 mg/dL   METABOLIC PANEL, COMPREHENSIVE    Collection Time: 03/15/19 11:58 PM   Result Value Ref Range    Sodium 136 136 - 145 mmol/L    Potassium 5.0 3.5 - 5.5 mmol/L    Chloride 95 (L) 100 - 108 mmol/L    CO2 34 (H) 21 - 32 mmol/L    Anion gap 7 3.0 - 18 mmol/L    Glucose 316 (H) 74 - 99 mg/dL    BUN 30 (H) 7.0 - 18 MG/DL    Creatinine 1.12 0.6 - 1.3 MG/DL    BUN/Creatinine ratio 27 (H) 12 - 20      GFR est AA 56 (L) >60 ml/min/1.73m2    GFR est non-AA 46 (L) >60 ml/min/1.73m2    Calcium 8.9 8.5 - 10.1 MG/DL    Bilirubin, total 0.5 0.2 - 1.0 MG/DL    ALT (SGPT) 30 13 - 56 U/L    AST (SGOT) 19 15 - 37 U/L    Alk.  phosphatase 155 (H) 45 - 117 U/L    Protein, total 6.5 6.4 - 8.2 g/dL    Albumin 3.3 (L) 3.4 - 5.0 g/dL    Globulin 3.2 2.0 - 4.0 g/dL    A-G Ratio 1.0 0.8 - 1.7     NT-PRO BNP    Collection Time: 03/15/19 11:58 PM   Result Value Ref Range    NT pro-BNP 4,208 (H) 0 - 1,800 PG/ML   EKG, 12 LEAD, INITIAL    Collection Time: 03/16/19  3:01 AM   Result Value Ref Range    Ventricular Rate 100 BPM    Atrial Rate 100 BPM    P-R Interval 140 ms    QRS Duration 116 ms    Q-T Interval 368 ms    QTC Calculation (Bezet) 474 ms    Calculated P Axis 57 degrees    Calculated R Axis -57 degrees    Calculated T Axis -9 degrees    Diagnosis       Sinus rhythm with frequent premature ventricular complexes  Right bundle branch block  Left anterior fascicular block  Bifascicular block  Abnormal ECG  When compared with ECG of 10-MAR-2019 22:29,  premature ventricular complexes are now present  Left anterior fascicular block is now present  Nonspecific T wave abnormality, improved in Lateral leads         Radiologic Studies -   XR CHEST PORT    (Results Pending)       Medical Decision Making     ED Course: Progress Notes, Reevaluation, and Consults:    2 AM- Called at bedside patient is now hypoxic down to 77% on room air. I placed her on a nonrebreather 4 L and her oxygen level is now back up to 94%    2:20 MERYL had a long discussion with the son, daughter-in-law, and grandson of the patient. We reviewed the patient's course this week in the hospital.  They are aware that the patient was hypoxic down to the 70s on room air. I described to the might concerned that she the patient is in end-stage COPD. After discussing the various options available they would like to consult hospice here, and check on the availability of inpatient hospice versus home hospice. 3am- Spoke with Toby Phillips who is the medical power of  for the patient. She will come shortly but she does agree with the plan to consult the hospice nurse. 5:15 AMhad a prolonged conference with Toby Phillips and the rest of the family. I briefly turned off the oxygen to see if the patient was still hypoxic and unfortunately her saturations dropped down to the 80s. She is now on 5 L of oxygen to maintain her sats at 90%. We will give another round of nebs to help support her work of breathing. Plan is to currently still to consult hospice for additional evaluation. 6:50 MERYL spoke with Yessi Sommer of hospice, and she will come evaluate patient for possible dispel options. 7 AM patient sent to Dr. Karlene Zaragoza pending hospice evaluation. Provider Notes (Medical Decision Making): See above course, the patient is here with end-stage COPD. She was planning to go to a nursing facility however, during the intake process she was found to be acutely hypoxic with increased work of breathing. Here after several rounds of nebs nebulizers she is much much better however still with a terminal condition that would likely benefit from hospice care.       Critical Care Time: Critical Care Time:  The services I provided to this patient were to treat and/or prevent clinically significant deterioration that could result in the failure of one or more body systems and/or organ systems due to hypoxia. Services included the following:  -reviewing nursing notes and old charts  -vital sign assessments  -direct patient care  -medication orders and management  -interpreting and reviewing diagnostic studies/labs  -re-evaluations  -documentation time    Aggregate critical care time was 45 minutes, which includes only time during which I was engaged in work directly related to the patient's care as described above, whether I was at bedside or elsewhere in the Emergency Department. It did not include time spent performing other reported procedures or the services of residents, students, nurses, or advance practice providers. Sol Ya MD    2:24 AM      Current Facility-Administered Medications   Medication Dose Route Frequency Provider Last Rate Last Dose    predniSONE (DELTASONE) tablet 50 mg  50 mg Oral NOW Sol Ya MD         Current Outpatient Medications   Medication Sig Dispense Refill    albuterol-ipratropium (DUO-NEB) 2.5 mg-0.5 mg/3 ml nebu 3 mL by Nebulization route every four (4) hours. 30 Nebule 0    predniSONE (DELTASONE) 10 mg tablet 40 mg po dailyx 3 days,30 mg po daily x 3 days, then 20 mg po daily for 3 daily then 10 mg po daily x 3 days 30 Tab 0    famotidine (PEPCID) 20 mg tablet Take 1 Tab by mouth daily. 15 Tab 0    furosemide (LASIX) 20 mg tablet Take 1 Tab by mouth every fourty-eight (48) hours. 15 Tab 0    atorvastatin (LIPITOR) 40 mg tablet Take 1 Tab by mouth nightly. 30 Tab 0    traMADol (ULTRAM) 50 mg tablet Take 1 Tab by mouth every eight (8) hours as needed for Pain for up to 5 days.  Max Daily Amount: 150 mg. 90 Tab 2    rOPINIRole (REQUIP) 1 mg tablet TAKE ONE-HALF TO ONE TABLET BY MOUTH ONCE NIGHTLY AS NEEDED FOR  RESTLESS  LEGS  FOR  UP  TO  90  DAYS 90 Tab 5    PARoxetine (PAXIL) 30 mg tablet TAKE 1 TABLET BY MOUTH  DAILY 90 Tab 1    mirtazapine (REMERON) 15 mg tablet Take 1 Tab by mouth nightly. 90 Tab 0    amLODIPine (NORVASC) 5 mg tablet Take 1 Tab by mouth daily. 90 Tab 3    tamsulosin (FLOMAX) 0.4 mg capsule Take 1 Cap by mouth daily. 90 Cap 3    gabapentin (NEURONTIN) 300 mg capsule 1 capsule by mouth morning, 1 capsule at midday and 2 capsules at bedtime 120 Cap 5    simvastatin (ZOCOR) 20 mg tablet Take 1 Tab by mouth nightly. 90 Tab 3    aspirin delayed-release 81 mg tablet Take 1 Tab by mouth daily. 100 Tab 1    inhalational spacing device (AEROCHAMBER MV) 1 Each by Does Not Apply route as needed. 1 Device 2    budesonide-formoterol (SYMBICORT) 80-4.5 mcg/actuation HFAA Take 2 Puffs by inhalation two (2) times a day. 1 Inhaler 0    OXYGEN-AIR DELIVERY SYSTEMS 3 L by Nasal route continuous.  Nebulizer & Compressor (PORTABLE NEBULIZER SYSTEM) machine 1 Each by Does Not Apply route every six (6) hours as needed. 1 Each 0        Vital Signs-Reviewed the patient's vital signs. Pulse Oximetry Analysis -  74% on room air (Interpretation)    Records Reviewed: Nursing Notes and Old Medical Records (Time of Review: 1:33 AM)  -I am the first provider for this patient.  -I reviewed the vital signs, available nursing notes, past medical history, past surgical history, family history and social history. Diagnosis     Clinical Impression:   1. Chronic obstructive pulmonary disease with acute exacerbation ([facility] [address]    2. Hypoxia        Disposition: Signed out to Dr. Ronda Sheffield your doctor about these medications    albuterol-ipratropium 2.5 mg-0.5 mg/3 ml Nebu  Commonly known as:  DUO-NEB  3 mL by Nebulization route every four (4) hours. amLODIPine 5 mg tablet  Commonly known as:  NORVASC  Take 1 Tab by mouth daily. aspirin delayed-release 81 mg tablet  Take 1 Tab by mouth daily. atorvastatin 40 mg tablet  Commonly known as:  LIPITOR  Take 1 Tab by mouth nightly.      budesonide-formoterol 80-4.5 mcg/actuation Hfaa  Commonly known as:  SYMBICORT  Take 2 Puffs by inhalation two (2) times a day. famotidine 20 mg tablet  Commonly known as:  PEPCID  Take 1 Tab by mouth daily. furosemide 20 mg tablet  Commonly known as:  LASIX  Take 1 Tab by mouth every fourty-eight (48) hours. gabapentin 300 mg capsule  Commonly known as:  NEURONTIN  1 capsule by mouth morning, 1 capsule at midday and 2 capsules at bedtime     inhalational spacing device  Commonly known as:  AEROCHAMBER MV  1 Each by Does Not Apply route as needed. mirtazapine 15 mg tablet  Commonly known as:  REMERON  Take 1 Tab by mouth nightly. Nebulizer & Compressor machine  Commonly known as:  PORTABLE NEBULIZER SYSTEM  1 Each by Does Not Apply route every six (6) hours as needed. OXYGEN-AIR DELIVERY SYSTEMS     PARoxetine 30 mg tablet  Commonly known as:  PAXIL  TAKE 1 TABLET BY MOUTH  DAILY     predniSONE 10 mg tablet  Commonly known as:  DELTASONE  40 mg po dailyx 3 days,30 mg po daily x 3 days, then 20 mg po daily for 3 daily then 10 mg po daily x 3 days     rOPINIRole 1 mg tablet  Commonly known as:  REQUIP  TAKE ONE-HALF TO ONE TABLET BY MOUTH ONCE NIGHTLY AS NEEDED FOR  RESTLESS  LEGS  FOR  UP  TO  90  DAYS     simvastatin 20 mg tablet  Commonly known as:  ZOCOR  Take 1 Tab by mouth nightly. tamsulosin 0.4 mg capsule  Commonly known as:  FLOMAX  Take 1 Cap by mouth daily. traMADol 50 mg tablet  Commonly known as:  ULTRAM  Take 1 Tab by mouth every eight (8) hours as needed for Pain for up to 5 days.  Max Daily Amount: 150 mg.          _______________________________    Attestations:  Chris Lala MD acting as a scribe for and in the presence of Jalen Palacios MD      March 16, 2019 at 1:33 AM       Provider Attestation:      I personally performed the services described in the documentation, reviewed the documentation, as recorded by the scribe in my presence, and it accurately and completely records my words and actions.  March 16, 2019 at 1:33 KRYSTIAN Ureña MD    _______________________________ <-- Click to add NO significant Past Surgical History

## 2025-04-08 NOTE — PROGRESS NOTES
conducted a Follow up consultation and Spiritual Assessment for Hari Pugh, who is a 80 y.o.,female. The  provided the following Interventions:  Continued the relationship of care and support. Listened empathically. Chart reviewed. The following outcomes were achieved:  Patient and family both expressed gratitude for 's visit. Assessment:  Patient is alert and interacting with family. Patient's ki in God is very important for her to cope. There are no further spiritual or Shinto issues which require Spiritual Care Services interventions at this time. Plan:  Chaplains will continue to follow and will provide pastoral care on an as needed/requested basis.  recommends bedside caregivers page  on duty if patient shows signs of acute spiritual or emotional distress.      [id number] Certified [address] [place] [address]   [phone] mother/father

## (undated) DEVICE — CANNULA ORIG TL CLR W FOAM CUSHIONS AND 14FT SUPL TB 3 CHN

## (undated) DEVICE — FLEX ADVANTAGE 3000CC: Brand: FLEX ADVANTAGE

## (undated) DEVICE — BLADE TNGE REG 6IN WOOD STRL -- CONVERT TO ITEM 153408

## (undated) DEVICE — MEDI-VAC NON-CONDUCTIVE SUCTION TUBING: Brand: CARDINAL HEALTH

## (undated) DEVICE — SYR 10ML LUER LOK 1/5ML GRAD --

## (undated) DEVICE — (D)GLOVE SURG TRIFLX 7.5 PWD L -- DISC BY MFR USE ITEM 302993

## (undated) DEVICE — AIRLIFE™ ADULT OXYGEN MASK VINYL, UNDER-THE-CHIN STYLE, MEDIUM CONCENTRATION MASK WITH 7 FEET (2.1 M) CRUSH-RESISTANT OXYGEN TUBING: Brand: AIRLIFE™

## (undated) DEVICE — FLUFF AND POLYMER UNDERPAD,EXTRA HEAVY: Brand: WINGS

## (undated) DEVICE — MEDI-VAC SUCTION HIGH CAPACITY: Brand: CARDINAL HEALTH

## (undated) DEVICE — BASIN EMESIS 500CC ROSE 250/CS 60/PLT: Brand: MEDEGEN MEDICAL PRODUCTS, LLC

## (undated) DEVICE — SINGLE USE SUCTION VALVE MAJ-209: Brand: SINGLE USE SUCTION VALVE (STERILE)

## (undated) DEVICE — (D)GLOVE EXAM LG NITRL NS -- DISC BY MFR NO SUB

## (undated) DEVICE — DRAPE TWL SURG 16X26IN BLU ORB04] ALLCARE INC]

## (undated) DEVICE — CATHETER SUCT TR FL TIP 14FR W/ O CTRL

## (undated) DEVICE — AIRLIFE™ NASAL OXYGEN CANNULA CURVED, NONFLARED TIP WITH 14 FOOT (4.3 M) CRUSH-RESISTANT TUBING, OVER-THE-EAR STYLE: Brand: AIRLIFE™

## (undated) DEVICE — BE 105-8 BRONCHOSCOPE SWIVEL - 15MM ID/22MM OD (PATIENT PORT) X15MM OD (EQUIPMENT PORT). REUSABLE.  FITS COMPONENTS OF ADULT VENTILATOR CIRCUITS.  MOLDED OF POLYETHERIMIDE. INCLUDES TWO SILICONE RUBBER CAPS; ONE CAP ALLOWS FOR THE USE OF A SUCTIONING CATHETER WHILE THE OTHER CAP ALLOWS FOR THE USE OF A FIBER-OPTIC BRONCHOSCOPE WITHOUT SIGNIFICANT LOSS OF PEEP.: Brand: BE 105-8 BRONCHOSCOPE SWIVEL

## (undated) DEVICE — SYR 50ML SLIP TIP NSAF LF STRL --

## (undated) DEVICE — SYRINGE MED 20ML STD CLR PLAS LUERLOCK TIP N CTRL DISP

## (undated) DEVICE — ENDOSCOPY PUMP TUBING/ CAP SET: Brand: ERBE

## (undated) DEVICE — (D)SYR 10ML SLIP TIP 1/5ML GRD -- DISC BY MFR USE ITEM 338000

## (undated) DEVICE — MAILER SLDE MICSCP 2 PLC --

## (undated) DEVICE — SINGLE USE ASPIRATION NEEDLE: Brand: SINGLE USE ASPIRATION NEEDLE

## (undated) DEVICE — TRAP SUC MUCOUS 70ML -- MEDICHOICE MEDLINE

## (undated) DEVICE — 1860S HEALTH CARE RESPIRATOR N95 120EA/C: Brand: 3M™

## (undated) DEVICE — GOWN ISOL IMPERV UNIV, DISP, OPEN BACK, BLUE --

## (undated) DEVICE — AIRLIFE™ NASAL OXYGEN CANNULA CURVED, FLARED TIP WITH 14 FOOT (4.3 M) CRUSH-RESISTANT TUBING, OVER-THE-EAR STYLE: Brand: AIRLIFE™

## (undated) DEVICE — SYRINGE MED 25GA 3ML L5/8IN SUBQ PLAS W/ DETACH NDL SFTY

## (undated) DEVICE — 1860 HEALTH CARE N95 MASK, 20EACH/BOX  6 BX/C: Brand: 3M™

## (undated) DEVICE — SOLUTION IRRIG 1000ML H2O STRL BLT

## (undated) DEVICE — SET ADMIN L104IN 20 GTT GRAV RLER CLMP SMRT SITE NDL FREE

## (undated) DEVICE — SINGLE USE BIOPSY VALVE MAJ-210: Brand: SINGLE USE BIOPSY VALVE (STERILE)

## (undated) DEVICE — GAUZE SPONGES,16 PLY: Brand: CURITY